# Patient Record
Sex: FEMALE | Race: WHITE | Employment: UNEMPLOYED | URBAN - METROPOLITAN AREA
[De-identification: names, ages, dates, MRNs, and addresses within clinical notes are randomized per-mention and may not be internally consistent; named-entity substitution may affect disease eponyms.]

---

## 2017-03-20 PROBLEM — R60.9 SWELLING: Status: ACTIVE | Noted: 2017-03-20

## 2017-03-20 PROBLEM — F19.10 DRUG ABUSE (HCC): Status: ACTIVE | Noted: 2017-03-20

## 2017-03-20 PROBLEM — B18.2 HEP C W/O COMA, CHRONIC (HCC): Status: ACTIVE | Noted: 2017-03-20

## 2017-03-20 PROBLEM — N17.9 AKI (ACUTE KIDNEY INJURY) (HCC): Status: ACTIVE | Noted: 2017-03-20

## 2017-03-20 PROBLEM — I33.0 ACUTE BACTERIAL ENDOCARDITIS: Status: ACTIVE | Noted: 2017-03-20

## 2017-11-26 PROBLEM — I16.1 HYPERTENSIVE EMERGENCY: Status: ACTIVE | Noted: 2017-11-26

## 2017-11-28 PROBLEM — I16.9 HYPERTENSIVE CRISIS: Status: ACTIVE | Noted: 2017-11-28

## 2017-12-11 PROBLEM — E87.5 HYPERKALEMIA: Status: ACTIVE | Noted: 2017-12-11

## 2018-06-05 LAB
A/G RATIO: 0.7 (ref 1.1–2.2)
ALBUMIN SERPL-MCNC: 3.1 G/DL (ref 3.4–5)
ALP BLD-CCNC: 144 U/L (ref 40–129)
ALT SERPL-CCNC: 12 U/L (ref 10–40)
ANION GAP SERPL CALCULATED.3IONS-SCNC: 17 MMOL/L (ref 3–16)
AST SERPL-CCNC: 17 U/L (ref 15–37)
BASOPHILS ABSOLUTE: 0.1 K/UL (ref 0–0.2)
BASOPHILS RELATIVE PERCENT: 0.4 %
BILIRUB SERPL-MCNC: 0.5 MG/DL (ref 0–1)
BUN BLDV-MCNC: 48 MG/DL (ref 7–20)
CALCIUM SERPL-MCNC: 9.2 MG/DL (ref 8.3–10.6)
CHLORIDE BLD-SCNC: 84 MMOL/L (ref 99–110)
CO2: 23 MMOL/L (ref 21–32)
CREAT SERPL-MCNC: 8.2 MG/DL (ref 0.6–1.1)
EOSINOPHILS ABSOLUTE: 0 K/UL (ref 0–0.6)
EOSINOPHILS RELATIVE PERCENT: 0.1 %
GFR AFRICAN AMERICAN: 7
GFR NON-AFRICAN AMERICAN: 6
GLOBULIN: 4.6 G/DL
GLUCOSE BLD-MCNC: 110 MG/DL (ref 70–99)
HCG QUALITATIVE: NEGATIVE
HCT VFR BLD CALC: 21.6 % (ref 36–48)
HEMOGLOBIN: 7.3 G/DL (ref 12–16)
LACTIC ACID: 0.8 MMOL/L (ref 0.4–2)
LYMPHOCYTES ABSOLUTE: 0.8 K/UL (ref 1–5.1)
LYMPHOCYTES RELATIVE PERCENT: 5.8 %
MCH RBC QN AUTO: 29.3 PG (ref 26–34)
MCHC RBC AUTO-ENTMCNC: 33.9 G/DL (ref 31–36)
MCV RBC AUTO: 86.3 FL (ref 80–100)
MONOCYTES ABSOLUTE: 0.9 K/UL (ref 0–1.3)
MONOCYTES RELATIVE PERCENT: 6.7 %
NEUTROPHILS ABSOLUTE: 12.3 K/UL (ref 1.7–7.7)
NEUTROPHILS RELATIVE PERCENT: 87 %
PDW BLD-RTO: 16.1 % (ref 12.4–15.4)
PLATELET # BLD: 144 K/UL (ref 135–450)
PMV BLD AUTO: 8.1 FL (ref 5–10.5)
POTASSIUM SERPL-SCNC: 4.6 MMOL/L (ref 3.5–5.1)
RBC # BLD: 2.5 M/UL (ref 4–5.2)
SODIUM BLD-SCNC: 124 MMOL/L (ref 136–145)
TOTAL PROTEIN: 7.7 G/DL (ref 6.4–8.2)
WBC # BLD: 14.1 K/UL (ref 4–11)

## 2018-06-05 PROCEDURE — 85610 PROTHROMBIN TIME: CPT

## 2018-06-05 PROCEDURE — 85025 COMPLETE CBC W/AUTO DIFF WBC: CPT

## 2018-06-05 PROCEDURE — 96365 THER/PROPH/DIAG IV INF INIT: CPT

## 2018-06-05 PROCEDURE — 96361 HYDRATE IV INFUSION ADD-ON: CPT

## 2018-06-05 PROCEDURE — 9990 CHARGE CONVERSION

## 2018-06-05 PROCEDURE — 96366 THER/PROPH/DIAG IV INF ADDON: CPT

## 2018-06-05 PROCEDURE — 87801 DETECT AGNT MULT DNA AMPLI: CPT

## 2018-06-05 PROCEDURE — 87040 BLOOD CULTURE FOR BACTERIA: CPT

## 2018-06-05 PROCEDURE — 84703 CHORIONIC GONADOTROPIN ASSAY: CPT

## 2018-06-05 PROCEDURE — 87077 CULTURE AEROBIC IDENTIFY: CPT

## 2018-06-05 PROCEDURE — 93005 ELECTROCARDIOGRAM TRACING: CPT

## 2018-06-05 PROCEDURE — 99285 EMERGENCY DEPT VISIT HI MDM: CPT

## 2018-06-05 PROCEDURE — 83605 ASSAY OF LACTIC ACID: CPT

## 2018-06-05 PROCEDURE — 87186 SC STD MICRODIL/AGAR DIL: CPT

## 2018-06-05 PROCEDURE — 71045 X-RAY EXAM CHEST 1 VIEW: CPT

## 2018-06-05 PROCEDURE — 80053 COMPREHEN METABOLIC PANEL: CPT

## 2018-06-05 RX ORDER — ACETAMINOPHEN 325 MG/1
650 TABLET ORAL EVERY 6 HOURS PRN
COMMUNITY

## 2018-06-05 RX ORDER — ACETAMINOPHEN 325 MG/1
650 TABLET ORAL ONCE
Status: COMPLETED | OUTPATIENT
Start: 2018-06-05 | End: 2018-06-05

## 2018-06-05 RX ORDER — 0.9 % SODIUM CHLORIDE 0.9 %
500 INTRAVENOUS SOLUTION INTRAVENOUS ONCE
Status: COMPLETED | OUTPATIENT
Start: 2018-06-05 | End: 2018-06-06

## 2018-06-05 RX ORDER — SODIUM CHLORIDE 9 MG/ML
1000 INJECTION, SOLUTION INTRAVENOUS CONTINUOUS
Status: DISCONTINUED | OUTPATIENT
Start: 2018-06-05 | End: 2018-06-06

## 2018-06-05 RX ADMIN — Medication 500 ML: at 22:41

## 2018-06-05 RX ADMIN — ACETAMINOPHEN 650 MG: 325 TABLET ORAL at 22:40

## 2018-06-05 ASSESSMENT — PAIN SCALES - GENERAL: PAINLEVEL_OUTOF10: 8

## 2018-06-06 ENCOUNTER — HOSPITAL ENCOUNTER (INPATIENT)
Dept: TELEMETRY | Age: 28
LOS: 42 days | Discharge: NURSING, REHAB OR LONG-TERM CARE | DRG: 004 | End: 2018-07-18
Attending: EMERGENCY MEDICINE | Admitting: INTERNAL MEDICINE
Payer: MEDICARE

## 2018-06-06 DIAGNOSIS — R09.02 HYPOXIA: ICD-10-CM

## 2018-06-06 DIAGNOSIS — N18.6 ESRD (END STAGE RENAL DISEASE) (HCC): ICD-10-CM

## 2018-06-06 DIAGNOSIS — K92.2 UPPER GI BLEED: ICD-10-CM

## 2018-06-06 DIAGNOSIS — Z86.79 H/O ENDOCARDITIS: ICD-10-CM

## 2018-06-06 DIAGNOSIS — F11.20 OPIATE DEPENDENCE, CONTINUOUS (HCC): ICD-10-CM

## 2018-06-06 DIAGNOSIS — J18.9 PNEUMONIA DUE TO ORGANISM: Primary | ICD-10-CM

## 2018-06-06 LAB
ABO/RH: NORMAL
ABO/RH: NORMAL
ANION GAP SERPL CALCULATED.3IONS-SCNC: 17 MMOL/L (ref 3–16)
ANTIBODY SCREEN: NORMAL
APTT: 29.8 SEC (ref 26–36)
BLOOD BANK DISPENSE STATUS: NORMAL
BLOOD BANK PRODUCT CODE: NORMAL
BPU ID: NORMAL
BUN BLDV-MCNC: 15 MG/DL (ref 7–20)
CALCIUM SERPL-MCNC: 9.2 MG/DL (ref 8.3–10.6)
CHLORIDE BLD-SCNC: 99 MMOL/L (ref 99–110)
CO2: 21 MMOL/L (ref 21–32)
CREAT SERPL-MCNC: 3 MG/DL (ref 0.6–1.1)
DESCRIPTION BLOOD BANK: NORMAL
GFR AFRICAN AMERICAN: 23
GFR NON-AFRICAN AMERICAN: 19
GLUCOSE BLD-MCNC: 104 MG/DL (ref 70–99)
HCT VFR BLD CALC: 19.8 % (ref 36–48)
HCT VFR BLD CALC: 28 % (ref 36–48)
HEMOGLOBIN: 6.6 G/DL (ref 12–16)
HEMOGLOBIN: 9.5 G/DL (ref 12–16)
INR BLD: 1.46 (ref 0.86–1.14)
INR BLD: 1.6 (ref 0.86–1.14)
LV EF: 43 %
LVEF MODALITY: NORMAL
MAGNESIUM: 2 MG/DL (ref 1.8–2.4)
POTASSIUM SERPL-SCNC: 3.5 MMOL/L (ref 3.5–5.1)
PROTHROMBIN TIME: 16.7 SEC (ref 9.8–13)
PROTHROMBIN TIME: 18.2 SEC (ref 9.8–13)
REPORT: NORMAL
SODIUM BLD-SCNC: 137 MMOL/L (ref 136–145)
VANCOMYCIN RANDOM: 25.9 UG/ML

## 2018-06-06 PROCEDURE — 86901 BLOOD TYPING SEROLOGIC RH(D): CPT

## 2018-06-06 PROCEDURE — 93306 TTE W/DOPPLER COMPLETE: CPT

## 2018-06-06 PROCEDURE — 9990 CHARGE CONVERSION

## 2018-06-06 PROCEDURE — 5A1D70Z PERFORMANCE OF URINARY FILTRATION, INTERMITTENT, LESS THAN 6 HOURS PER DAY: ICD-10-PCS | Performed by: INTERNAL MEDICINE

## 2018-06-06 PROCEDURE — A9538 TC99M PYROPHOSPHATE: HCPCS

## 2018-06-06 PROCEDURE — C9113 INJ PANTOPRAZOLE SODIUM, VIA: HCPCS

## 2018-06-06 PROCEDURE — 36430 TRANSFUSION BLD/BLD COMPNT: CPT

## 2018-06-06 PROCEDURE — 85014 HEMATOCRIT: CPT

## 2018-06-06 PROCEDURE — 80048 BASIC METABOLIC PNL TOTAL CA: CPT

## 2018-06-06 PROCEDURE — G0480 DRUG TEST DEF 1-7 CLASSES: HCPCS

## 2018-06-06 PROCEDURE — 78278 ACUTE GI BLOOD LOSS IMAGING: CPT

## 2018-06-06 PROCEDURE — 87040 BLOOD CULTURE FOR BACTERIA: CPT

## 2018-06-06 PROCEDURE — 0W3P8ZZ CONTROL BLEEDING IN GASTROINTESTINAL TRACT, VIA NATURAL OR ARTIFICIAL OPENING ENDOSCOPIC: ICD-10-PCS | Performed by: INTERNAL MEDICINE

## 2018-06-06 PROCEDURE — P9047 ALBUMIN (HUMAN), 25%, 50ML: HCPCS

## 2018-06-06 PROCEDURE — 99291 CRITICAL CARE FIRST HOUR: CPT | Performed by: INTERNAL MEDICINE

## 2018-06-06 PROCEDURE — 85610 PROTHROMBIN TIME: CPT

## 2018-06-06 PROCEDURE — 87641 MR-STAPH DNA AMP PROBE: CPT

## 2018-06-06 PROCEDURE — P9016 RBC LEUKOCYTES REDUCED: HCPCS

## 2018-06-06 PROCEDURE — 80307 DRUG TEST PRSMV CHEM ANLYZR: CPT

## 2018-06-06 PROCEDURE — 85018 HEMOGLOBIN: CPT

## 2018-06-06 PROCEDURE — 86920 COMPATIBILITY TEST SPIN: CPT

## 2018-06-06 PROCEDURE — 80202 ASSAY OF VANCOMYCIN: CPT

## 2018-06-06 PROCEDURE — 86880 COOMBS TEST DIRECT: CPT

## 2018-06-06 PROCEDURE — 93010 ELECTROCARDIOGRAM REPORT: CPT | Performed by: INTERNAL MEDICINE

## 2018-06-06 PROCEDURE — 36415 COLL VENOUS BLD VENIPUNCTURE: CPT

## 2018-06-06 PROCEDURE — 83735 ASSAY OF MAGNESIUM: CPT

## 2018-06-06 PROCEDURE — 86900 BLOOD TYPING SEROLOGIC ABO: CPT

## 2018-06-06 PROCEDURE — 85730 THROMBOPLASTIN TIME PARTIAL: CPT

## 2018-06-06 PROCEDURE — 86850 RBC ANTIBODY SCREEN: CPT

## 2018-06-06 PROCEDURE — 86923 COMPATIBILITY TEST ELECTRIC: CPT

## 2018-06-06 RX ORDER — CLONIDINE HYDROCHLORIDE 0.1 MG/1
0.1 TABLET ORAL 3 TIMES DAILY
Status: DISCONTINUED | OUTPATIENT
Start: 2018-06-06 | End: 2018-06-08

## 2018-06-06 RX ORDER — ISOSORBIDE MONONITRATE 60 MG/1
60 TABLET, EXTENDED RELEASE ORAL DAILY
Status: DISCONTINUED | OUTPATIENT
Start: 2018-06-06 | End: 2018-06-08

## 2018-06-06 RX ORDER — HYDROXYZINE PAMOATE 50 MG/1
50 CAPSULE ORAL EVERY 8 HOURS PRN
Status: DISCONTINUED | OUTPATIENT
Start: 2018-06-06 | End: 2018-07-09

## 2018-06-06 RX ORDER — 0.9 % SODIUM CHLORIDE 0.9 %
250 INTRAVENOUS SOLUTION INTRAVENOUS ONCE
Status: COMPLETED | OUTPATIENT
Start: 2018-06-06 | End: 2018-06-09

## 2018-06-06 RX ORDER — LABETALOL 100 MG/1
400 TABLET, FILM COATED ORAL 3 TIMES DAILY
Status: DISCONTINUED | OUTPATIENT
Start: 2018-06-06 | End: 2018-06-08

## 2018-06-06 RX ORDER — PROMETHAZINE HYDROCHLORIDE 25 MG/1
25 TABLET ORAL EVERY 6 HOURS PRN
Status: DISCONTINUED | OUTPATIENT
Start: 2018-06-06 | End: 2018-07-18 | Stop reason: HOSPADM

## 2018-06-06 RX ORDER — 0.9 % SODIUM CHLORIDE 0.9 %
250 INTRAVENOUS SOLUTION INTRAVENOUS ONCE
Status: COMPLETED | OUTPATIENT
Start: 2018-06-06 | End: 2018-06-07

## 2018-06-06 RX ORDER — HEPARIN SODIUM 1000 [USP'U]/ML
4100 INJECTION, SOLUTION INTRAVENOUS; SUBCUTANEOUS PRN
Status: DISCONTINUED | OUTPATIENT
Start: 2018-06-06 | End: 2018-06-17 | Stop reason: ALTCHOICE

## 2018-06-06 RX ORDER — SEVELAMER CARBONATE 800 MG/1
800 TABLET, FILM COATED ORAL
Status: DISCONTINUED | OUTPATIENT
Start: 2018-06-06 | End: 2018-06-13 | Stop reason: ALTCHOICE

## 2018-06-06 RX ORDER — SODIUM CHLORIDE 0.9 % (FLUSH) 0.9 %
10 SYRINGE (ML) INJECTION PRN
Status: DISCONTINUED | OUTPATIENT
Start: 2018-06-06 | End: 2018-06-22 | Stop reason: SDUPTHER

## 2018-06-06 RX ORDER — SODIUM CHLORIDE 9 MG/ML
INJECTION, SOLUTION INTRAVENOUS
Status: COMPLETED
Start: 2018-06-06 | End: 2018-06-07

## 2018-06-06 RX ORDER — AZITHROMYCIN 250 MG/1
250 TABLET, FILM COATED ORAL DAILY
Status: DISCONTINUED | OUTPATIENT
Start: 2018-06-07 | End: 2018-06-06

## 2018-06-06 RX ORDER — AZITHROMYCIN 250 MG/1
500 TABLET, FILM COATED ORAL DAILY
Status: DISCONTINUED | OUTPATIENT
Start: 2018-06-06 | End: 2018-06-06

## 2018-06-06 RX ORDER — SODIUM CHLORIDE 9 MG/ML
INJECTION, SOLUTION INTRAVENOUS
Status: DISCONTINUED
Start: 2018-06-06 | End: 2018-06-06

## 2018-06-06 RX ORDER — ACETAMINOPHEN 325 MG/1
650 TABLET ORAL EVERY 6 HOURS PRN
Status: DISCONTINUED | OUTPATIENT
Start: 2018-06-06 | End: 2018-07-18 | Stop reason: HOSPADM

## 2018-06-06 RX ORDER — SODIUM CHLORIDE 0.9 % (FLUSH) 0.9 %
10 SYRINGE (ML) INJECTION EVERY 12 HOURS SCHEDULED
Status: DISCONTINUED | OUTPATIENT
Start: 2018-06-06 | End: 2018-06-22 | Stop reason: SDUPTHER

## 2018-06-06 RX ORDER — ACETAMINOPHEN 10 MG/ML
1000 INJECTION, SOLUTION INTRAVENOUS ONCE
Status: COMPLETED | OUTPATIENT
Start: 2018-06-06 | End: 2018-06-06

## 2018-06-06 RX ORDER — ONDANSETRON 2 MG/ML
4 INJECTION INTRAMUSCULAR; INTRAVENOUS EVERY 6 HOURS PRN
Status: DISCONTINUED | OUTPATIENT
Start: 2018-06-06 | End: 2018-06-11

## 2018-06-06 RX ORDER — 0.9 % SODIUM CHLORIDE 0.9 %
250 INTRAVENOUS SOLUTION INTRAVENOUS ONCE
Status: COMPLETED | OUTPATIENT
Start: 2018-06-06 | End: 2018-06-06

## 2018-06-06 RX ADMIN — ACETAMINOPHEN 650 MG: 325 TABLET ORAL at 15:35

## 2018-06-06 RX ADMIN — Medication 250 ML: at 11:12

## 2018-06-06 RX ADMIN — LABETALOL 400 MG: 100 TABLET, FILM COATED ORAL at 20:13

## 2018-06-06 RX ADMIN — PROMETHAZINE HYDROCHLORIDE 25 MG: 25 TABLET ORAL at 20:37

## 2018-06-06 RX ADMIN — HYDROXYZINE PAMOATE 50 MG: 50 CAPSULE ORAL at 20:37

## 2018-06-06 RX ADMIN — Medication: at 20:13

## 2018-06-06 RX ADMIN — Medication 10 ML: at 20:13

## 2018-06-06 RX ADMIN — Medication 10 ML: at 10:20

## 2018-06-06 RX ADMIN — ACETAMINOPHEN 1000 MG: 10 INJECTION, SOLUTION INTRAVENOUS at 02:29

## 2018-06-06 RX ADMIN — HEPARIN SODIUM 4100 UNITS: 1000 INJECTION, SOLUTION INTRAVENOUS; SUBCUTANEOUS at 18:22

## 2018-06-06 RX ADMIN — Medication 250 ML: at 02:40

## 2018-06-06 RX ADMIN — SODIUM CHLORIDE 250 ML: 9 INJECTION, SOLUTION INTRAVENOUS at 11:12

## 2018-06-06 RX ADMIN — CLONIDINE HYDROCHLORIDE 0.1 MG: 0.1 TABLET ORAL at 20:13

## 2018-06-06 ASSESSMENT — PAIN DESCRIPTION - LOCATION
LOCATION: GENERALIZED
LOCATION: ABDOMEN

## 2018-06-06 ASSESSMENT — PAIN - FUNCTIONAL ASSESSMENT: PAIN_FUNCTIONAL_ASSESSMENT: 0-10

## 2018-06-06 ASSESSMENT — PAIN DESCRIPTION - DESCRIPTORS: DESCRIPTORS: ACHING;CONSTANT

## 2018-06-06 ASSESSMENT — PAIN DESCRIPTION - PAIN TYPE: TYPE: ACUTE PAIN;CHRONIC PAIN

## 2018-06-06 ASSESSMENT — PAIN SCALES - GENERAL
PAINLEVEL_OUTOF10: 6
PAINLEVEL_OUTOF10: 7
PAINLEVEL_OUTOF10: 7
PAINLEVEL_OUTOF10: 3

## 2018-06-06 NOTE — ED NOTES
Pt. Up to bedside commode independently, unable to urinate. Pt. Had yellow loose stool output which she states has been normal for her. abx started and pt. Is aware of plan of care at this time. piv's to R ac and R fa appear wnl.      Oxana Ortega RN  06/05/18 [id number]

## 2018-06-06 NOTE — PROGRESS NOTES
Lab called with panic result: Vanc 25.9 and H/H 6.6/19.8 , verified with read-back. Will notify MD. Shahana Mckeon for prepare 2 units RBC already in place. Pt in ENDO at this time.

## 2018-06-06 NOTE — PAYOR INFORMATION
Patient Dirk Ng:  [de-identified]  Primary AUTH/CERT:  [id number] [address] [place] [address] Name:   [facility]  Primary Insurance Plan Name:  [address] [place] [address]  Primary Insurance Group Number:  [id number]  Primary Insurance Plan Type: N  Primary Insurance Policy Number:  [id number]

## 2018-06-06 NOTE — PLAN OF CARE
ENDOSCOPY  PRE, INTRA, & POST PROCEDURAL  CARE PLAN    HEMODYNAMIC STATUS  INTERDISCIPLINARY   Goal: Hemodynamic Status to Baseline / Discharge Criteria Met  Interventions     1. Obtain Patient  Medical /  Surgical History     1 Assess & Review Allergies Prior to Endo & All  Meds (PRN)     2. Assess / Monitor Vital Signs / LOC (PRN). Intra Procedure VS/ LOC  Q5 Mins  & As Per Policy Until Discharge. 3. Obtain Baseline Reyna & Post Procedural  Reyna Per   Policy     4. Check Monitors, Alarms On     5. Patient Re Assessed by Physician     6. Monitor  I&O Post Procedure   NURSING   SAFETY & PSYCHO SOCIAL  INTERDISCIPLINARY   Goal: Patient Returns to Baseline Activity/ Meets Discharge Criteria  Interventions     1. Greet Patient with ID Badge/ Picture in View (PRN)     2. Be Available & Sensitive to Patients Needs (PRN)     3. Communicate referral to Pastoral Care as Appropriate (PRN)     4. Provide Age Specific Measures (PRN)     4. Admission Data Base Reviewed     5. Administer Meds Per Orders (PRN)     5. Maintain Baseline Activity  Or Activity as Ordered Per Physician     NUTRITION   INTERDISCIPLINARY   Goal: Patient to baseline/ Improved Nutrition  Interventions     1. Assess NPO Status / Notify Physician if Necessary (PRN)     2. NPO per Physician Orders     3. Assess Nutritional Status (PRN)     4. Assess Ability to Swallow as Indicated     LAB & DIAGNOSTICS   Goal: Additional Tests per Physicians   Orders  Interventions     1. Lab & Diagnostics per Physician Orders (PRN)     2.  Obtain  Urine / Serum HCG & FSBS On All Diabetic Patients  Per Policy & (PRN)     3. Assess Lab Time Out  for  Patient Safety as Needed (PRN)   RESPIRATORY  INTERDISCIPLINARY   Goal: Airway   Patency, SAO2   Saturation, Cough mechanism Maintained   Interventions     1. Evaluate Bilateral Breath Sounds  Baseline, (PRN), & Prior to Discharge     2. Weight & Height Noted ( PRN)     3.   Assess Baseline SPo2 (PRN) 4. Monitor   SAO2   Continuously During Sedation/ Analgesia & Until Patient Meets D/C Criteria. 5. Resuscitation Equipment Available (PRN)   GASTROINTESTINAL  INTERDISCIPLINARY   Goal: Bowel Sounds Maintained   Interventions     1. Evaluate Baseline Bowel Sounds, (PRN), & Prior to Discharge     2. Monitor  Abdominal status of Patient Throughout Procedure     KNOWLEDGE DEFICIT, EDUCATION, DISCHARGE PLAN   INTERDISCIPLINARY    Patient / SO verbalize Understanding Of Procedural discharge Instructions  Interventions     1. Obtain Informed Consent (PRN)      2. Initiate ENDO Plan of Care, Answer Questions (PRN)       3. Assess Patients Ability to Understand Information (PRN)     3. Discharge Planning ; Assure  Presence of   upon Patient Discharge     4. Education / Communication  Ongoing As Appropriate     5. Keep Families Aware of Delays As Appropriate     6. Reinforce Discharge Teaching / Post  Procedure  Instructions (PRN)    PAIN MANAGEMENT  INTERDISCIPLINARY   Goal:Patient Return to Pre Procedure Comfort  Interventions     1. Assess Baseline  Pain Level (PRN)     2. Intra Procedure ;  Evaluation & Assessment Of  Pain  is Ongoing     3. Post procedure; Assess Pain Level Once Awake/ Prior  To Discharge     2. Administer Analgesics as Ordered (PRN)      3. Assess Effectiveness of Pain Management (PRN)       Re-Assess Patient  after all Interventions. Assess Pain Level 30 - 60 Minutes After Pain Management Intervention.      4. Provide Discharge Teaching

## 2018-06-06 NOTE — PROCEDURES
Germaine Cox   6/6/2018  Esophagogastroduodenoscopy/gold probe coagulation  A pre-procedure re-evaluation was performed immediately prior to the procedure. Preprocedure Dx: gi bleed  Postprocedure Dx: angiodysplasia of stomach cauterised with gold probe  Medications: Procedural sedation with MAC  Complications: None  Estimated Blood Loss: <5cc  Specimens: were not obtained/RBC scan today due to massive rectal bleed after EGD may have lower GI angiodysplasia also ? can we order CT with contrast to localise bleed  Yobani Fuse

## 2018-06-06 NOTE — PLAN OF CARE
Problem: Falls - Risk of:  Goal: Will remain free from falls  Will remain free from falls   Outcome: Ongoing      Problem: Risk for Impaired Skin Integrity  Goal: Tissue integrity - skin and mucous membranes  Structural intactness and normal physiological function of skin and  mucous membranes.    Outcome: Ongoing

## 2018-06-06 NOTE — ED PROVIDER NOTES
[facility]  EMERGENCY DEPT VISIT      Patient Identification  Curt Ni is a 34 y.o. female. Chief Complaint   Fever (pt. states she has had fever x4 days tmax 104) and Shortness of Breath (pt. reporting \"pain in my legs\" left arm and shortness of breath with cough x4 days.)      History of Present Illness: This is a  34 y.o. female who presents ambulatory  to the ED with complaints of 4 day history of fever up to 103. This is been associated with generalized body aches, cough, and loose stools. She states that her chest hurts when she breathes or coughs. She denies any nausea or vomiting. She's had some slight abdominal pain. Patient has a history of previous endocarditis with subsequent severe tricuspid valve leakage. She is also on hemodialysis. She thinks they may have done blood cultures on her yesterday at dialysis however she is not currently on antibiotics. She is started to become increasingly short of breath. Past Medical History:   Diagnosis Date    Asthma     Chronic kidney disease     Depression     Hemodialysis patient ([facility] [address]     M/W/F    Hepatitis C antibody positive in blood 03/22/2017    History of blood transfusion     Hypertension     Liver disease     MRSA (methicillin resistant staph aureus) culture positive 03/12/2017    +blood culture - info [place] [facility]  [facility]    MRSA (methicillin resistant staph aureus) culture positive 07/31/2017    info from [facility] - positive nasal screen    PTSD (post-traumatic stress disorder)     Seizures ([facility] [address]     8/8/2017       Past Surgical History:   Procedure Laterality Date    TONSILLECTOMY           Current Facility-Administered Medications:     0.9 % sodium chloride infusion, 1,000 mL, Intravenous, Continuous, Kurt Funes MD, Stopped at 06/05/18 0790    meropenem (MERREM) 500 mg IVPB minibag, 500 mg, Intravenous, Once, Kurt Funes MD    Current Outpatient Prescriptions:     acetaminophen (TYLENOL) 325 MG speech difficulty  SKIN: no rashes, no erythema, no wounds, no ecchymosis      PHYSICAL EXAM:  GENERAL APPEARANCE: Ezequiel Lerner is in mildrespiratory distress. Awake and alert. VITAL SIGNS:   ED Triage Vitals   Enc Vitals Group      BP       Pulse       Resp       Temp       Temp src       SpO2       Weight       Height       Head Circumference       Peak Flow       Pain Score       Pain Loc       Pain Edu? Excl. in [address]? HEAD: Normocephalic, atraumatic. EYES:  Extraocular muscles are intact. Pupils equal round and reactive to light. Conjunctivas are pink. Negative scleral icterus. ENT:  Mucous membranes are moist.  Pharynx without erythema or exudates. NECK: Nontender and supple. No cervical adenopathy. CHEST:  Rales LLL. No wheezing. Right IJ vascath  HEART:  tachycardic rate and regular rhythm. Murmur present. Strong and equal pulses in the upper and lower extremities. ABDOMEN: Soft,  nondistended, positive bowel sounds. abdomen is tender in epigastrium. No rebound. no guarding. MUSCULOSKELETAL: The calves are nontender to palpation. Active range of motion of the upper and lower extremities. No edema. NEUROLOGICAL: Awake, alert and oriented x 3. Power intact in the upper and lower extremities. Sensation is intact to light touch in the upper and lower extremities. Cranial Nerves 2-12 are intact. DERMATOLOGIC: No petechiae, rashes, or ecchymoses. No erythema. PSYCH: normal mood and affect. Normal thought content. ED COURSE AND MEDICAL DECISION MAKING:    EKG as interpreted by myself:  sinus tachycardia, ugtp=[id number]   Axis is   Normal  QTc is  normal  Intervals and Durations are unremarkable. No specific ST-T wave changes appreciated. No evidence of acute ischemia. Compared to prior EKG dated 12/11/17, rate increassed today    Radiology:  Films have been read by radiologist as noted in chart unless otherwise stated.  Other radiologic studies (i.e. CT, MRI, ultrasounds, etc ) have been interpreted by radiologist.     XR CHEST PORTABLE   Final Result   Patchy airspace disease in the right lung, pneumonia versus atelectasis   versus less likely edema. That should be followed to resolution. Borderline pulmonary vascular congestion.              Labs:  Results for orders placed or performed during the hospital encounter of 06/05/18   CBC Auto Differential   Result Value Ref Range    WBC 14.1 (H) 4.0 - 11.0 K/uL    RBC 2.50 (L) 4.00 - 5.20 M/uL    Hemoglobin 7.3 (L) 12.0 - 16.0 g/dL    Hematocrit 21.6 (L) 36.0 - 48.0 %    MCV 86.3 80.0 - 100.0 fL    MCH 29.3 26.0 - 34.0 pg    MCHC 33.9 31.0 - 36.0 g/dL    RDW 16.1 (H) 12.4 - 15.4 %    Platelets 290 974 - 014 K/uL    MPV 8.1 5.0 - 10.5 fL    Neutrophils % 87.0 %    Lymphocytes % 5.8 %    Monocytes % 6.7 %    Eosinophils % 0.1 %    Basophils % 0.4 %    Neutrophils # 12.3 (H) 1.7 - 7.7 K/uL    Lymphocytes # 0.8 (L) 1.0 - 5.1 K/uL    Monocytes # 0.9 0.0 - 1.3 K/uL    Eosinophils # 0.0 0.0 - 0.6 K/uL    Basophils # 0.1 0.0 - 0.2 K/uL   Comprehensive Metabolic Panel   Result Value Ref Range    Sodium 124 (L) 136 - 145 mmol/L    Potassium 4.6 3.5 - 5.1 mmol/L    Chloride 84 (L) 99 - 110 mmol/L    CO2 23 21 - 32 mmol/L    Anion Gap 17 (H) 3 - 16    Glucose 110 (H) 70 - 99 mg/dL    BUN 48 (H) 7 - 20 mg/dL    CREATININE 8.2 (HH) 0.6 - 1.1 mg/dL    GFR Non- 6 (A) >60    GFR  7 (A) >60    Calcium 9.2 8.3 - 10.6 mg/dL    Total Protein 7.7 6.4 - 8.2 g/dL    Alb 3.1 (L) 3.4 - 5.0 g/dL    Albumin/Globulin Ratio 0.7 (L) 1.1 - 2.2    Total Bilirubin 0.5 0.0 - 1.0 mg/dL    Alkaline Phosphatase 144 (H) 40 - 129 U/L    ALT 12 10 - 40 U/L    AST 17 15 - 37 U/L    Globulin 4.6 g/dL   Lactic Acid, Plasma   Result Value Ref Range    Lactic Acid 0.8 0.4 - 2.0 mmol/L   HCG Qualitative, Serum   Result Value Ref Range    hCG Qual Negative Detects HCG level >10 MIU/mL   EKG 12 Lead   Result Value Ref Range    Ventricular Rate 107 BPM and ESRD with h/o noncompliance with HD at times and risk for volume overload. Clinical Impression:  1. Pneumonia due to organism    2. Upper GI bleed    3. Hypoxia    4. ESRD (end stage renal disease) ([address]    5. H/O endocarditis        Dispo:  Patient will be admitted at this time. Patient was informed of this decision and agrees with plan. I have discussed lab and xray findings with patient and they understand. Questions were answered to the best of my ability. Discharge vitals:  Blood pressure (!) 163/86, pulse 100, temperature 102.9 °F (39.4 °C), temperature source Oral, resp. rate 13, height 5' 2\" (1.575 m), weight 130 lb 1.1 oz (59 kg), SpO2 98 %, not currently breastfeeding. Prescriptions given:   New Prescriptions    No medications on file       Critical care time: 45 minutes excluding procedures. There was concern for hypoxia, upper GI bleed, potential for sepsis/hemorrhage. This was managed by IVF resuscitation, supplemental O2, consultation with specialists    This chart was created using Dragon voice recognition software.        Holden Calderón MD  06/06/18 [id number]

## 2018-06-06 NOTE — PROGRESS NOTES
Vancomycin Day: 2  Vanc random 25.9mcg/ml, no vanc today.  Continue to follow vanc sliding scale at end of HD  Yoon RAMOS 6/6/201811:06 AM  .

## 2018-06-06 NOTE — ED NOTES
This RN notified MD that pt. Now with blood in stool that is bright red with clots. Pt. Remains normotensive on hr/rr monitoring. bp changed to q10 cycle. pivs to R ac and R fa appear wnl.   md and clinical  stated ok for pt. To be transferred to icu at this time.       Angelina Lawrence RN  06/06/18 0100

## 2018-06-06 NOTE — CONSULTS
[facility]  Consult Note           Reason for Consult:  End stage renal disease  Requesting Physician:  Dr. Merlin Myron    Chief Complaint:  Weakness, fatigue, sob, abdominal pain, chest pain, fevers  History Obtained From:  patient, electronic medical record    History of Present Ilness:    34year old female with ESRD on HD MWF, polysubstance drug use, bacterial endocarditis,   chronic hepatitis C, hypertension admitted with weakness, fatigue, sob, abdominal pain, chest pain, fevers. We have been asked to assist in further mgmt of her dialysis care. Last had full HD treatment last Wednesday. Noted to have fevers as high as 102-104 F at home. Started having hematemesis, bright red blood in stool with clots here. Underwent EGD revealing angiodysplasia with cauterization. Still with maroon colored stools. Receiving prbc's. Continues to c/o sob, abdominal pain. Past Medical History:        Diagnosis Date    Asthma     Depression     ESRD (end stage renal disease) on dialysis ([facility] [address]     M/W/F    Hepatitis C antibody positive in blood 03/22/2017    History of blood transfusion     Hypertension     Liver disease     MRSA (methicillin resistant staph aureus) culture positive 03/12/2017    +[facility]    MRSA (methicillin resistant staph aureus) culture positive 07/31/2017    info from [facility] - positive nasal screen    PTSD (post-traumatic stress disorder)     Seizures ([facility] [address]     8/8/2017       Past Surgical History:        Procedure Laterality Date    TONSILLECTOMY         Home Medications:    No current facility-administered medications on file prior to encounter.       Current Outpatient Prescriptions on File Prior to Encounter   Medication Sig Dispense Refill    cloNIDine (CATAPRES) 0.1 MG tablet Take 0.1 mg by mouth three times daily      azithromycin (ZITHROMAX) 250 MG tablet Take 250 mg by mouth daily      folic acid (FOLVITE) 1 MG tablet Take 1 mg by mouth daily      sevelamer (RENVELA) 800 MG tablet Take 1 tablet by mouth 3 times daily (with meals)      labetalol (NORMODYNE) 100 MG tablet Take 400 mg by mouth 3 times daily       isosorbide mononitrate (IMDUR) 60 MG extended release tablet Take 60 mg by mouth daily         Allergies:  Patient has no known allergies. Social History:    Social History     Social History    Marital status: Single     Spouse name: N/A    Number of children: N/A    Years of education: N/A     Occupational History    NA      Social History Main Topics    Smoking status: Current Every Day Smoker     Packs/day: 0.25     Years: 4.00     Types: Cigarettes    Smokeless tobacco: Never Used      Comment: pt states that she only smokes 2 cigarettes per day    Alcohol use No    Drug use: Yes     Types: Cocaine, IV, Opiates       Comment: states she smoked marijuana this morning.  Sexual activity: Not on file     Other Topics Concern    Not on file     Social History Narrative    No narrative on file       Family History:   No history of kidney disease. Review of Systems:   Pertinent positives stated above in HPI. Remainder of 10 point review of systems were reviewed and were negative.     Physical exam:   Constitutional:  VITALS:  BP (!) 147/92   Pulse 99   Temp 100.8 °F (38.2 °C) Comment: axillary  Resp 27   Ht 5' 2\" (1.575 m)   Wt 130 lb 4.7 oz (59.1 kg)   SpO2 99%   BMI 23.83 kg/m²   Gen: alert, awake, ill-appearing  Skin: no rash, turgor wnl  Heent:  eomi, mmm  Neck: no bruits or jvd noted, thyroid normal  Cardiovascular:  S1, S2 without m/r/g  Respiratory: CTA B without w/r/r; respiratory effort normal  Abdomen:  +bs, soft, tender with palpation, nd, no hepatosplenomegaly  Ext: no lower extremity edema  Access: R IJ TDC  Psychiatric: mood and affect appropriate; judgement and insight intact  Musculoskeletal:  Rom, muscular strength limited; digits, nails normal    Data/  CBC:   Lab Results Component Value Date    WBC 14.1 06/05/2018    RBC 2.50 06/05/2018    HGB 6.6 06/06/2018    HCT 19.8 06/06/2018    MCV 86.3 06/05/2018    MCH 29.3 06/05/2018    MCHC 33.9 06/05/2018    RDW 16.1 06/05/2018     06/05/2018    MPV 8.1 06/05/2018     BMP:    Lab Results   Component Value Date     06/05/2018    K 4.6 06/05/2018    CL 84 06/05/2018    CO2 23 06/05/2018    BUN 48 06/05/2018    LABALBU 3.1 06/05/2018    CREATININE 8.2 06/05/2018    CALCIUM 9.2 06/05/2018    GFRAA 7 06/05/2018    LABGLOM 6 06/05/2018    GLUCOSE 110 06/05/2018         Assessment/    - End stage renal disease - on HD MWF  - Acute GI bleed - plans per GI noted  - Fevers, hx of MSSA bacteremia/endocarditis with severe TR - started on vancomycin, meropenem, azithromycin  - Hyponatremia - acute on chronic secondary to fluid overload, missed dialysis  - Hypertension - controlled    Plan/    - HD today per outpatient orders with no heparin  - Okay for CT with IV contrast, plan to complete HD treatment afterwards  - Dose KI with HD today  - Check blood culture from [facility] site  - 2D echocardiogram  - Dose vancomycin with HD per levels  - Trend labs      Thank you for the consultation. Please do not hesitate to call with questions.     [employer]

## 2018-06-06 NOTE — CONSULTS
History and Physical / Pre-Sedation Assessment    Patient:  Mychal Powers   :   1990     Intended Procedure:  egd    HPI: hemetemesis    Nurses notes reviewed and agreed. Medications reviewed  Allergies: No Known Allergies    Physical Exam:  Vital Signs: BP (!) 140/85   Pulse 111   Temp 100.5 °F (38.1 °C) (Temporal)   Resp 21   Ht 5' 2\" (1.575 m)   Wt 130 lb 4.7 oz (59.1 kg)   SpO2 (!) 89%   BMI 23.83 kg/m²    Airway: Mallampati: II (soft palate, uvula, fauces visible)  Pulmonary:Normal  Cardiac:Normal  Abdomen:Normal    Pre-Procedure Assessment / Plan:  ASA: Class 3 - A patient with severe systemic disease that limits activity but is not incapacitating  Level of Sedation Plan:mod sedation  Post Procedure plan: Return to same level of care    I assessed the patient and find that the patient is in satisfactory condition to proceed with the planned procedure and sedation plan. I have explained the risk, benefits, and alternatives to the procedure; the patient understands and agrees to proceed.        John  2018

## 2018-06-06 NOTE — H&P
History and Physical / Pre-Sedation Assessment    Patient:  Farhat Galloway   :   1990     Intended Procedure:  EGD    HPI: melena/hemetemesis    Nurses notes reviewed and agreed. Medications reviewed  Allergies: No Known Allergies    Physical Exam:  Vital Signs: BP (!) 140/85   Pulse 111   Temp 100.5 °F (38.1 °C) (Temporal)   Resp 21   Ht 5' 2\" (1.575 m)   Wt 130 lb 4.7 oz (59.1 kg)   SpO2 (!) 89%   BMI 23.83 kg/m²    Airway:Normal  Pulmonary:Normal  Cardiac:Normal  Abdomen:Normal    Pre-Procedure Assessment / Plan:  ASA:CLASS 2  Level of Sedation Plan:MAC  Post Procedure plan: Return to same level of care    I assessed the patient and find that the patient is in satisfactory condition to proceed with the planned procedure and sedation plan. I have explained the risk, benefits, and alternatives to the procedure; the patient understands and agrees to proceed.        John  2018

## 2018-06-06 NOTE — H&P
[facility], [address]                               HISTORY AND PHYSICAL    PATIENT NAME: Natalie Wen                    :        1990  MED REC NO:   [id number]                          ROOM:       3008  ACCOUNT NO:   [de-identified]                          ADMIT DATE: 2018  PROVIDER:     Cipriano Gruber MD    I obtained the history and performed a physical exam on the patient in the  intensive care unit on 2018. CHIEF COMPLAINT:  Pain all over and throwing out blood. HISTORY OF PRESENT ILLNESS:  The patient is a 32-year-old  female  who has got a history of multiple medical problems, has got end-stage renal  disease and is on hemodialysis, presents to the hospital with chief  complaint of two to three days of what she describes as generalized  weakness with increasing fatigue, bilateral leg pain, abdominal pain, sharp  chest pain, and increasing shortness of breath with fevers, with chills,  that she noted were as high as 102 to 104 at home. The patient does not  have any other constitutional symptoms. No hemoptysis and hematemesis. No  melena or hematochezia. While in the emergency room, the patient had suddenly started having two  episodes of massive hematemesis with passage of blood clots and also bright  red blood and also one episode of passage of large amount of bright red  blood in her stool, after which she has been feeling very weak and  diaphoretic. PAST MEDICAL HISTORY/PAST SURGICAL HISTORY:  1. 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 2.  Chronic hepatitis C.  3.  Hypertension. PAST SURGICAL HISTORY:  Right internal jugular vein hemodialysis access  placement. ALLERGIC HISTORY:  No known allergies.     FAMILY HISTORY:  Reviewed by me and is currently noncontributory. SOCIAL HISTORY:  The patient lives at home and is accompanied by her  mother. The patient states that she has been clean for over six months. MEDICATIONS:  Home medication list reviewed. REVIEW OF SYSTEMS:  Significant for the generalized pain, abdominal pain,  and per the history of present illness. All other systems have been  reviewed and are negative except for the history of present illness. PHYSICAL EXAMINATION:  The patient was examined by me in the intensive care  unit. VITAL SIGNS:  Temperature T-max 102.9, respirations 20, pulse 110, blood  pressure 137/81, saturating 94%. CNS:  Alert, awake and oriented to time, place, and person. PSYCH:  The patient is cooperative, answering questions appropriately. EYES:  Pupils are bilaterally equal, reactive to light. ENT:  Extraocular muscle movements are intact. The patient has got  significant conjunctival pallor. RESPIRATORY SYSTEM:  The patient has got no rales, rubs, or rhonchi. CVS:  S1, S2 are heard. The patient does have holosystolic murmur along  the right parasternal border. ABDOMEN:  Soft. Significant epigastric tenderness and right upper quadrant  tenderness to palpation noted. MUSCULOSKELETAL:  No acute deformities. SKIN:  Appears pale. The patient has got right internal jugular vein  PermCath which is clean with a good clean dressing on it and there is no  evidence of any tract tenderness palpable along the entire subcutaneous  tract. There is no evidence of any pus at the exit of the catheter through  the skin. DIAGNOSTIC DATA:  CBC shows hemoglobin 7.3, hematocrit 29.6, white count of  14.1, platelets 521. Comprehensive metabolic panel showed BUN 48,  creatinine 8.2, sodium 124, potassium 4.6, lactic acid is 0.8.  _____ is  negative.     Portable chest x-ray shows evidence of some infiltrate in the right lower  lobe, more towards the hilum, that has been interpreted by the radiologist  as patchy airspace disease in the right lung. EKG independently reviewed by me showed sinus tachycardia with a rate of  around 107 beats per minute. OLD RECORDS SUMMARY:  Shows:  1. A 2D echo from 2017 that shows severe eccentric tricuspid  regurgitation. 2.  Hemoglobin of 9.4, which clearly indicates a significant drop of almost  over 2 gm today, which is consistent with the large volume upper GI bleed. CONSULTATIONS:  We have requested a consultation to Nephrology and to  Gastroenterology. ASSESSMENT:  1. Acute-on-chronic posthemorrhagic anemia requiring transfusion,  secondary to acute large volume upper gastrointestinal bleed from a  bleeding peptic ulcer. 2.  Right lower lobe healthcare-associated bacterial pneumonia associated  with hemodialysis unit along with sepsis. 3.  Severe tricuspid regurgitation secondary to valvular damage from  infected endocarditis. 4.  End-stage renal dialysis on hemodialysis. 5.  Hypertension. PLAN OF CARE:  The patient is currently critically ill given the large  volume upper GI bleed and over 2 gm drop in hemoglobin at presentation  before this event of witnessed GI bleeding. Hence, she has been admitted  to the intensive care unit by me. We will continue the patient on Protonix  drip initiated in the emergency room. We will keep the patient NPO and we  will obtain H&H every six hours. GI consult has been requested. Likely  source of the GI bleed based on the clinical symptoms, especially the  epigastric tenderness, is bleeding peptic ulcer. We will consult Nephrology for dialysis requirements. We will continue the patient on broad-spectrum antibiotics especially  vancomycin and meropenem to cover for any recurrence of MRSA or any  Gram-negative infection, especially in a patient who is on end-stage renal  disease on dialysis and also has a history of intravenous drug use.   We  will repeat a 2D echo of the heart to look at the tricuspid valve to

## 2018-06-06 NOTE — PROGRESS NOTES
Spoke with Dr. Pinedo La, pt still with large volume post EGD - discussed need for Red cell scan.  See notes and eMAR for other orders/plan

## 2018-06-06 NOTE — PROGRESS NOTES
Nephrology consult called into Dr. Morris Muniz on 6/6/18 at [address] [place] [address].      Nayely Grier MT/PCA

## 2018-06-06 NOTE — FLOWSHEET NOTE
Treatment time: 1hr out of 4hrs. Tx interrupted for another urgent procedure. Net UF: 1.8 lliters    Pre weight: 58.3kg    Post weight: 57.1kg bedscale  EDW: 49kg    Access used: Rt IJ TDC  Access function: Good    Medications or blood products given: None. Aranesp 100mcg to be given later today w/HD. Regular outpatient schedule: MWF    Summary of response to treatment:      06/06/18 1141   Vital Signs   BP (!) 172/86   Temp 98.3 °F (36.8 °C)   Pulse 107   Weight 125 lb 14.1 oz (57.1 kg)   Weight Method Bed scale   Percent Weight Change -3.38   Dry Weight 108 lb 0.4 oz (49 kg)   Pain Assessment   Pain Assessment 0-10   Pain Level 7   Pain Location Abdomen   Post-Hemodialysis Assessment   Post-Treatment Procedures Blood returned;Catheter capped, clamped and heparinized x 2 ports   Machine Disinfection Process Exterior Machine Disinfection   Rinseback Volume (ml) 300 ml   Total Liters Processed (l/min) 22.4 l/min   Dialyzer Clearance Lightly streaked   Duration of Treatment (minutes) 60 minutes   Heparin amount administered during treatment (units) 0 units   Hemodialysis Intake (ml) 300 ml   Hemodialysis Output (ml) 2100 ml   NET Removed (ml) 1800 ml   Tolerated Treatment Fair   Patient Response to Treatment Hr elevated to 120 bpm toward end of 1hr tx. Bp stable. Pt also had prbc transfusing during tx per staff via IV pump. Pt had shivers. Oral temp 98.5 & 98.3F . Pt also had liquid bloody stool. x 1 during tx. Assisted w/bm care. Physician Notified? Yes   Copy of dialysis treatment record placed in chart, to be scanned into EMR.

## 2018-06-06 NOTE — PROGRESS NOTES
Spoke with Savana Montero in [address] - informed of CTA order and that pt will be going to Tag scan, will call to coordinate completing CT afterwards.

## 2018-06-06 NOTE — ANESTHESIA PRE-OP
(H) 06/05/2018    HGB 6.6 (LL) 06/06/2018    HGB 7.3 (L) 06/05/2018    HCT 19.8 (LL) 06/06/2018    HCT 21.6 (L) 06/05/2018     06/05/2018     Coagulation Studies   INR 1.60 (H) 06/06/2018    INR 1.46 (H) 06/05/2018     Blood Type: O POS  Pregnancy Test:     PREGTESTUR Negative 03/20/2017     EKG: ; no acute ischemic changes. ECHO:  Ejection fraction is visually estimated to be 55-60 %. Severe eccentric tricuspid regurgitation. No obvious masses, thrombi, or vegetations are noted. Medications:   acetaminophen (TYLENOL) 325 MG tablet Take 650 mg by mouth every 6 hours as needed for Pain      cloNIDine (CATAPRES) 0.1 MG tablet Take 0.1 mg by mouth three times daily      azithromycin (ZITHROMAX) 250 MG tablet Take 250 mg by mouth daily      folic acid (FOLVITE) 1 MG tablet Take 1 mg by mouth daily      sevelamer (RENVELA) 800 MG tablet Take 1 tablet by mouth 3 times daily (with meals)      labetalol (NORMODYNE) 100 MG tablet Take 400 mg by mouth 3 times daily       isosorbide mononitrate (IMDUR) 60 MG extended release tablet Take 60 mg by mouth daily          cloNIDine  0.1 mg Oral TID    isosorbide mononitrate  60 mg Oral Daily    labetalol  400 mg Oral TID    sevelamer  800 mg Oral TID WC    sodium chloride flush  10 mL Intravenous 2 times per day    azithromycin  500 mg Oral Daily    Followed by   Trudy Lisa ON 6/7/2018] azithromycin  250 mg Oral Daily    meropenem  500 mg Intravenous Q24H    vancomycin (VANCOCIN) intermittent dosing (placeholder)   Other RX Placeholder        Exercise Tolerance  Patient is able to walk up a flight of stairs or 1-2 blocks without chest pain or shortness of breath?  Yes    Anesthetic History  Personal History of Problems: No  Family History of Problems: No    Physical Examination    Airway    Mallampati: II    Dentition: intact    Mouth Opening:   >3 FB    Prominent Incisors: no    Thyromental Distance:   >3 cm    Head & Neck Range of Motion: full Anesthetic Plan     ASA Status: 4E Anesthesia Type: TIVA-GA if needed   Difficult Airway: No PONV History: No   Full Stomach:   No Nerve Block: none   Code Status: Full Code Advanced Directives: Not Received   Emergent: YES Notes: **PATIENT HAS RECEIVED SEDATION- GIVEN THE EMERGENT NATURE, IMPLIED CONSENT          The anesthetic plan was discussed with the patient and/or patient representative. All questions were answered. Risks and possible complications were explained. Complications include, but are not limited to, sore throat, dental injury or damage, trauma to soft tissue, eye injury, corneal abrasion, adverse reaction to blood products and/or medications, bleeding, nerve injury, cardiac or respiratory arrest, and death. The patient and patients family understands these complications and are agreeable to the anesthetic plan. Nursing staff present in room during this discussion and consent. Pt seen and examined, chart reviewed (including anesthesia, drug and allergy history). No interval changes to history and physical examination. Anesthetic plan, risks, benefits, alternatives, and personnel involved discussed with patient. Patient verbalized an understanding and agrees to proceed. Informed consent obtained verbally from the patient. Vargas Bowles MD

## 2018-06-06 NOTE — CONSULTS
WBC  14.1*   --    HGB  7.3*  6.6*   HCT  21.6*  19.8*   MCV  86.3   --    PLT  144   --      BMP:   Recent Labs      06/05/18   2224   NA  124*   K  4.6   CL  84*   CO2  23   BUN  48*   CREATININE  8.2*        Recent Labs      06/05/18   2224   AST  17   ALT  12   BILITOT  0.5   ALKPHOS  144*     Recent Labs      06/05/18   2245  06/06/18   0651   PROTIME  16.7*  18.2*   INR  1.46*  1.60*     No results for input(s): NITRITE, COLORU, PHUR, LABCAST, WBCUA, RBCUA, MUCUS, TRICHOMONAS, YEAST, BACTERIA, CLARITYU, SPECGRAV, LEUKOCYTESUR, UROBILINOGEN, BILIRUBINUR, BLOODU, GLUCOSEU, AMORPHOUS in the last 72 hours. Invalid input(s): KETONESU  No results for input(s): PHART, BMO2VVX, PO2ART in the last 72 hours. Chest imaging was reviewed by me and showed   Patchy airspace disease in the right lung, pneumonia versus atelectasis   versus less likely edema.  That should be followed to resolution. ASSESSMENT:  · Suspected upper and lower GI bleed  · Anemia from acute blood loss  · Sepsis from pneumonia  · Healthcare associated pneumonia - probable gram negative, risk for methicillin resistant Staph aureus as well  · ESRD disease on dialysis  · HCV  · History of infective endocarditis with severe tricuspid valve regurgitation    PLAN:  · Transfuse 2u PRBC  · Vit K IV  · H/H q6  · Continue Vanc, Merrem   · Pan-culture  · Sputum cx  · Blood cx  · PPI gtt  · TTE  · CTA ABD and tagged RBC scan per GI - will need to coordinate with HD  · Colonoscopy planned  · SCD  · Due to at least single organ failure and risk of rapid deterioration, I spent 45 minutes of Critical care time reviewing labs/films, examining patient, collaborating with other physicians. This does not include time performing critical procedures.     Thank you Stacey Reveles MD for this consult

## 2018-06-06 NOTE — CONSULTS
Pharmacy Note  Vancomycin Consult    Jennifer Chew is a 34 y.o. female dialysis patient started on Vancomycin for gram positive coverage; consult received from Dr. Sonia Andrews to manage therapy. Also receiving the following antibiotics: Merrem. Patient Active Problem List   Diagnosis    Bacterial endocarditis    J[facility]) [facility]    Drug abuse    Hep C w/o coma, chronic (HCC)    Swelling    Endocarditis and heart valve disorders in diseases classified elsewhere    IVDU (intravenous drug user)    MDD (major depressive disorder), recurrent severe, without psychosis ([facility]    Opiate dependence, continuous ([facility]    Opiate withdrawal ([facility]    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 Temp max: 102.9    Recent Labs      06/05/18   2224   BUN  48*       Recent Labs      06/05/18   2224   CREATININE  8.2*       Recent Labs      06/05/18   2224   WBC  14.1*         Intake/Output Summary (Last 24 hours) at 06/06/18 0446  Last data filed at 06/06/18 0350   Gross per 24 hour   Intake 604 ml   Output 0 ml   Net 604 ml       Culture Date      Source                       Results      Ht Readings from Last 1 Encounters:   06/06/18 5' 2\" (1.575 m)        Wt Readings from Last 1 Encounters:   06/06/18 130 lb 4.7 oz (59.1 kg)         Body mass index is 23.83 kg/m². Estimated Creatinine Clearance: 8 mL/min (A) (based on SCr of 8.2 mg/dL [facility])).     Goal

## 2018-06-07 PROBLEM — E44.0 MODERATE PROTEIN-CALORIE MALNUTRITION (HCC): Chronic | Status: ACTIVE | Noted: 2018-06-07

## 2018-06-07 LAB
ANION GAP SERPL CALCULATED.3IONS-SCNC: 15 MMOL/L (ref 3–16)
APTT: 28 SEC (ref 26–36)
BUN BLDV-MCNC: 31 MG/DL (ref 7–20)
CALCIUM SERPL-MCNC: 8.8 MG/DL (ref 8.3–10.6)
CHLORIDE BLD-SCNC: 97 MMOL/L (ref 99–110)
CO2: 23 MMOL/L (ref 21–32)
CREAT SERPL-MCNC: 4.6 MG/DL (ref 0.6–1.1)
EKG ATRIAL RATE: 107 BPM
EKG DIAGNOSIS: NORMAL
EKG P AXIS: 60 DEGREES
EKG P-R INTERVAL: 152 MS
EKG Q-T INTERVAL: 326 MS
EKG QRS DURATION: 74 MS
EKG QTC CALCULATION (BAZETT): 435 MS
EKG R AXIS: 67 DEGREES
EKG T AXIS: 49 DEGREES
EKG VENTRICULAR RATE: 107 BPM
GFR AFRICAN AMERICAN: 14
GFR NON-AFRICAN AMERICAN: 12
GLUCOSE BLD-MCNC: 103 MG/DL (ref 70–99)
HCT VFR BLD CALC: 26.6 % (ref 36–48)
HEMOGLOBIN: 8.9 G/DL (ref 12–16)
INR BLD: 1.25 (ref 0.86–1.14)
MCH RBC QN AUTO: 29 PG (ref 26–34)
MCHC RBC AUTO-ENTMCNC: 33.6 G/DL (ref 31–36)
MCV RBC AUTO: 86.3 FL (ref 80–100)
MRSA SCREEN RT-PCR: NORMAL
PDW BLD-RTO: 17.2 % (ref 12.4–15.4)
PLATELET # BLD: 122 K/UL (ref 135–450)
PMV BLD AUTO: 8.5 FL (ref 5–10.5)
POTASSIUM REFLEX MAGNESIUM: 4.4 MMOL/L (ref 3.5–5.1)
PROTHROMBIN TIME: 14.3 SEC (ref 9.8–13)
RBC # BLD: 3.08 M/UL (ref 4–5.2)
SODIUM BLD-SCNC: 135 MMOL/L (ref 136–145)
VANCOMYCIN RANDOM: 9.9 UG/ML
WBC # BLD: 16.5 K/UL (ref 4–11)

## 2018-06-07 PROCEDURE — 74174 CTA ABD&PLVS W/CONTRAST: CPT

## 2018-06-07 PROCEDURE — 99233 SBSQ HOSP IP/OBS HIGH 50: CPT | Performed by: INTERNAL MEDICINE

## 2018-06-07 PROCEDURE — 80048 BASIC METABOLIC PNL TOTAL CA: CPT

## 2018-06-07 PROCEDURE — 85027 COMPLETE CBC AUTOMATED: CPT

## 2018-06-07 PROCEDURE — 36415 COLL VENOUS BLD VENIPUNCTURE: CPT

## 2018-06-07 PROCEDURE — 80202 ASSAY OF VANCOMYCIN: CPT

## 2018-06-07 PROCEDURE — 87040 BLOOD CULTURE FOR BACTERIA: CPT

## 2018-06-07 PROCEDURE — C9113 INJ PANTOPRAZOLE SODIUM, VIA: HCPCS

## 2018-06-07 PROCEDURE — 85730 THROMBOPLASTIN TIME PARTIAL: CPT

## 2018-06-07 PROCEDURE — 85610 PROTHROMBIN TIME: CPT

## 2018-06-07 PROCEDURE — 73620 X-RAY EXAM OF FOOT: CPT

## 2018-06-07 PROCEDURE — 9990 CHARGE CONVERSION

## 2018-06-07 PROCEDURE — 99232 SBSQ HOSP IP/OBS MODERATE 35: CPT | Performed by: INTERNAL MEDICINE

## 2018-06-07 RX ORDER — SODIUM CHLORIDE 9 MG/ML
INJECTION, SOLUTION INTRAVENOUS
Status: COMPLETED
Start: 2018-06-07 | End: 2018-06-07

## 2018-06-07 RX ORDER — PANTOPRAZOLE SODIUM 40 MG/1
40 TABLET, DELAYED RELEASE ORAL
Status: DISCONTINUED | OUTPATIENT
Start: 2018-06-07 | End: 2018-06-09

## 2018-06-07 RX ADMIN — ACETAMINOPHEN 650 MG: 325 TABLET ORAL at 05:50

## 2018-06-07 RX ADMIN — Medication 10 ML: at 22:44

## 2018-06-07 RX ADMIN — HYDROXYZINE PAMOATE 50 MG: 50 CAPSULE ORAL at 12:14

## 2018-06-07 RX ADMIN — SEVELAMER CARBONATE 800 MG: 800 TABLET, FILM COATED ORAL at 12:14

## 2018-06-07 RX ADMIN — LABETALOL 400 MG: 100 TABLET, FILM COATED ORAL at 12:15

## 2018-06-07 RX ADMIN — PROMETHAZINE HYDROCHLORIDE 25 MG: 25 TABLET ORAL at 22:44

## 2018-06-07 RX ADMIN — HYDROXYZINE PAMOATE 50 MG: 50 CAPSULE ORAL at 22:44

## 2018-06-07 RX ADMIN — SODIUM CHLORIDE 250 ML: 9 INJECTION, SOLUTION INTRAVENOUS at 22:35

## 2018-06-07 RX ADMIN — HEPARIN SODIUM 4100 UNITS: 1000 INJECTION, SOLUTION INTRAVENOUS; SUBCUTANEOUS at 10:55

## 2018-06-07 RX ADMIN — ACETAMINOPHEN 650 MG: 325 TABLET ORAL at 22:34

## 2018-06-07 RX ADMIN — PROMETHAZINE HYDROCHLORIDE 25 MG: 25 TABLET ORAL at 12:16

## 2018-06-07 ASSESSMENT — PAIN DESCRIPTION - PROGRESSION: CLINICAL_PROGRESSION: GRADUALLY WORSENING

## 2018-06-07 ASSESSMENT — PAIN DESCRIPTION - LOCATION: LOCATION: GENERALIZED

## 2018-06-07 ASSESSMENT — PAIN SCALES - GENERAL
PAINLEVEL_OUTOF10: 0
PAINLEVEL_OUTOF10: 3
PAINLEVEL_OUTOF10: 0

## 2018-06-07 ASSESSMENT — PAIN DESCRIPTION - FREQUENCY: FREQUENCY: INTERMITTENT

## 2018-06-07 ASSESSMENT — PAIN DESCRIPTION - PAIN TYPE: TYPE: ACUTE PAIN

## 2018-06-07 ASSESSMENT — PAIN DESCRIPTION - DESCRIPTORS: DESCRIPTORS: ACHING;DISCOMFORT

## 2018-06-07 ASSESSMENT — PAIN DESCRIPTION - ONSET: ONSET: GRADUAL

## 2018-06-07 NOTE — CONSULTS
[address]                  [address] [facility]                                   CONSULTATION    PATIENT NAME: Smita Barber                    :        1990  MED REC NO:   [id number]                          ROOM:       3008  ACCOUNT NO:   [de-identified]                          ADMIT DATE: 2018  PROVIDER:     Orest Merlin, MD    CONSULT DATE:  2018    ATTENDING PHYSICIAN:  Suki Cornell MD    ROOM NO:  3008    HISTORY OF PRESENT ILLNESS:  The patient is a 32-year-old female with a  chief complaint of bacteremia. I have been asked to see the patient in  consultation regarding antibiotic management. The patient became ill 4 or  5 days ago and has had daily fevers to 103. The patient's highest  temperature since she came to the hospital was 102.9 on admission. The  patient has had diffuse myalgias, cough, loose stool, pleuritic chest pain,  particularly on the left and increasing dyspnea over the period of her  illness. The patient has positive blood cultures. One of the blood  cultures appears to be growing Gram-positive cocci as well as a  Gram-negative salazar in the anaerobic bottle. The other blood culture is  growing MRSA, Streptococcus. Second blood culture appears to be growing  Streptococcus and MRSA. The patient is currently receiving meropenem and  vancomycin. The patient's chest x-ray and CT scan of the abdomen suggests  the presence of cavitary pulmonary nodules consistent with septic pulmonary  emboli. The patient's vancomycin level was 25.9 today. The patient has  end-stage renal disease and is on hemodialysis Monday, Wednesday, and  Friday. Urine toxic screen was positive for cocaine. The patient's  urinalysis showed 10-20 white cells. The patient's white count is 14,000  with some left shift. ALLERGIES:  None known. FAMILY HISTORY:  Noncontributory.     CURRENT MEDICATIONS:  Vancomycin, meropenem, Renvela, Protonix, Zofran,  Bactroban, Normodyne, Imdur, Aranesp, and Catapres. PAST MEDICAL HISTORY:  The patient has had two previous episodes of  tricuspid endocarditis in 2017. One was due to MRSA and the other was due  to oxacillin-sensitive Staph aureus. The patient has had tricuspid  insufficiency since then. She developed end-stage renal disease in the  course of one of these previous episodes of endocarditis. She has a  history of asthma, depression, hepatitis C, hypertension, PTSD, and seizure  disorder. She has had a previous tonsillectomy. REVIEW OF SYSTEMS:  The patient had hematemesis earlier today. She had a  red cell scan, which did not clearly delineate a source of bleeding. The  patient denies headache, otalgia, or sore throat. She has a cough. She  has pleuritic left chest pain when she coughs. She is mildly dyspneic. She has some mild right paramedian abdominal discomfort. She has had loose  stools intermittently. The patient denies rash, pruritus, new visual  disturbance. The patient states that her left great toe is painful. She  also notes some mild pain of her left fifth toe and difficulty bearing  weight on her left lower extremity due to knee pain. NEUROLOGIC:  The  patient is alert and appropriate. There are no obvious focal findings. PHYSICAL EXAMINATION:  GENERAL:  The patient is a young female who is currently in the midst of a  dialysis treatment. VITAL SIGNS:  Temperature 101, heart 101, respirations 33, and blood  pressure 170/95. HEENT:  Eyes:  PERRL. There are no conjunctival lesions. Oral cavity:  No  mucosal lesions. NECK:  Supple, without lymphadenopathy. There are no remarkable  supraclavicular nodes. CHEST:  Clear to auscultation. Respirations are unlabored. HEART:  Regular rhythm, S1 and S2 within normal limits. There are no  murmurs, rubs or gallops. ABDOMEN:  Soft.   There is mild midabdominal tenderness without mass or  hepatosplenomegaly. EXTREMITIES:  Without clubbing, cyanosis, or peripheral edema. The patient  has tenderness over her distal left metatarsal over the extensor tendon. She does not seem to have pain on range of motion of the toe with erythema  of the skin over the dorsal aspect of the toe. She complains of mild  tenderness of left fifth toe. I was not able to elicit much tenderness  over the knee and it did not appear to be a significant effusion. NEUROLOGIC:  The patient is alert and appropriate. There are no obvious  focal findings. ASSESSMENT:  This patient has a history of two previous episodes of  tricuspid valve endocarditis associated with IV drug use. She is admitted  now with bacteremia and what appears to be a new episode of tricuspid  endocarditis, which may be polymicrobic in etiology. At least one of the  microbial etiologies would appeared to be MRSA. The source of the  endocarditis would appear most likely to be either infection of her  tunneled hemodialysis catheter, or more likely, recent IV drug use (which  the patient denies). The patient would appear to have evidence of cavitary  pulmonary nodules consistent with septic pulmonary emboli related to her  tricuspid valve infection. She had evidence of a GI bleed today. She has  tricuspid insufficiency. She has end-stage renal disease. Her random  vancomycin level today was 25. She might have metastatic infection to her  right foot. A splenic infarct is not ruled out. I would favor continuing the vancomycin with dosing based on levels. I would continue the  meropenem for now. Transesophageal echocardiogram at some point might be  helpful for ruling out infection of the left-sided valves, although at the  moment, it is not clear whether infection of the left sidedd valves would change her treatment. Repeat blood cultures have been ordered.   The patient will  probably need to have her tunneled line changed at some point

## 2018-06-07 NOTE — CARE COORDINATION
INTERDISCIPLINARY PLAN OF CARE CONFERENCE    Date/Time: 6/7/2018 10:20 AM  Completed by: Nitin Yi Case Management      Patient Name:  Maricarmen Stark  YOB: 1990  Admitting Diagnosis: UGIB     Admit Date/Time:  6/6/2018 12:35 AM    Chart reviewed. Interdisciplinary team met to discuss patient progress and discharge plans. Disciplines included Case Management, Nursing, and Dietitian. Current Status: Inpatient ICU    Anticipated Discharge Date: TBD  Expected D/C Disposition:  Home    Discharge Plan Comments: Chart reviewed. Plan: TBD. May need IVABTX (currently on Vanco and meropenum) if positive for endocarditis await GWENDOLYN. h/o IV drug use however denies recent use. CM will follow to assist with DC needs. Pt prefers to return home with mother and resume HD at [facility]. CM following.

## 2018-06-07 NOTE — PLAN OF CARE
Problem: Falls - Risk of:  Goal: Will remain free from falls  Will remain free from falls   Outcome: Ongoing  [place] fall-risk light on outside door, fall precautions in place. Problem: Risk for Impaired Skin Integrity  Goal: Tissue integrity - skin and mucous membranes  Structural intactness and normal physiological function of skin and  mucous membranes. Outcome: Ongoing  Patient mobile in bed. Instructed on importance of frequent repositioning to maintain skin integrity. Demonstrates understanding. Problem: Discharge Planning:  Goal: Discharged to appropriate level of care  Discharged to appropriate level of care  Outcome: Ongoing      Problem:  Bowel Function - Altered:  Goal: Bowel elimination is within specified parameters  Bowel elimination is within specified parameters  Outcome: Ongoing      Problem: Fluid Volume - Imbalance:  Goal: Absence of imbalanced fluid volume signs and symptoms  Absence of imbalanced fluid volume signs and symptoms  Outcome: Ongoing    Goal: Will show no signs and symptoms of excessive bleeding  Will show no signs and symptoms of excessive bleeding  Outcome: Ongoing

## 2018-06-07 NOTE — FLOWSHEET NOTE
Completed 3hr 40min of 4hr tx. Signed off tx AMA. Pt aware of risks. Dr Shawn Looney informed. VSS throughout. 2.4L net fluid removed. No HD meds given. HD again tomorrow. Report to [place], [place].  Full tx record in pts chart to be scanned to [facility].      06/07/18 0645 06/07/18 1045   Vital Signs   BP (!) 161/77 (!) 152/85   Temp 102.6 °F (39.2 °C) 98 °F (36.7 °C)   Pulse 92 102   Weight 119 lb 0.8 oz (54 kg) 114 lb 10.2 oz (52 kg)   Weight Method Bed scale Bed scale   Dry Weight 108 lb 0.4 oz (49 kg) --    Post-Hemodialysis Assessment   Duration of Treatment (minutes) --  220 minutes   Heparin amount administered during treatment (units) --  0 units   Hemodialysis Intake (ml) --  500 ml   Hemodialysis Output (ml) --  2940 ml   NET Removed (ml) --  2440 ml   Tolerated Treatment --  Good

## 2018-06-07 NOTE — PROGRESS NOTES
Internal Medicine ICU Progress Note      Events of Last 24 hours:     34year old white female who was admitted for GI bleed. Had an EGD yesterday. No chest pain. BC positive for strep pneumonia, staph aureus and gram-negative salazar. Echocardiogram showed tricuspid valve endocarditis. She's had this before. CT chest showed septic emboli and possible splenic infarct. She also is having tenderness of the right first MTP joint. Invasive Lines: Tunneled dialysis catheter, two peripheral IV       MV:  n/a    No results for input(s): PHART, KME3ZQO, PO2ART in the last 72 hours. MV Settings:     / / /     IV:   pantoprozole (PROTONIX) infusion 8 mg/hr (18 0117)       Vitals:  Temp  Av.9 °F (37.7 °C)  Min: 97.5 °F (36.4 °C)  Max: 102.6 °F (39.2 °C)  Pulse  Av.8  Min: 73  Max: 131  BP  Min: 100/44  Max: 180/89  SpO2  Av.1 %  Min: 87 %  Max: 100 %  Patient Vitals for the past 4 hrs:   BP Temp Temp src Pulse Resp SpO2 Weight   18 0914 - 97.5 °F (36.4 °C) Oral - - - -   18 0900 (!) 157/106 - - 82 20 95 % -   18 0800 (!) 170/81 - - 88 29 94 % -   18 07 (!) 162/88 - - 99 22 98 % -   18 0645 (!) 161/77 102.6 °F (39.2 °C) - 92 - - 119 lb 0.8 oz (54 kg)   18 06 (!) 176/80 - - 91 28 92 % -       CVP:        Intake/Output Summary (Last 24 hours) at 18 0952  Last data filed at 18 0300   Gross per 24 hour   Intake           2170.5 ml   Output             5400 ml   Net          -3229.5 ml       EXAM:  General: No distress. Alert. Eyes: PERRL. No sclera icterus. No conjunctiva injected. ENT: No discharge. Pharynx clear. Neck: Trachea midline. Normal thyroid. Resp: No accessory muscle use. No crackles. No wheezing. No rhonchi. No dullness on percussion. CV: Regular rate. Regular rhythm. + mumur no rub. No edema. No JVD. Palpable pedal pulses. GI: Non-tender. Non-distended. No masses. No organmegaly. Normal bowel sounds. No hernia.   Skin: Warm and dry. No nodule on exposed extremities. No rash on exposed extremities. Lymph: No cervical LAD. No supraclavicular LAD. M/S: No cyanosis. No clubbing. Right first MTP is swollen and tender  Neuro: Awake. Follows commands. Positive pupils/gag/corneals. Normal pain response. Psych: Oriented to person, place, time. No anxiety or agitation. Medications:  Scheduled Meds:   cloNIDine  0.1 mg Oral TID    isosorbide mononitrate  60 mg Oral Daily    labetalol  400 mg Oral TID    sevelamer  800 mg Oral TID WC    sodium chloride flush  10 mL Intravenous 2 times per day    meropenem  500 mg Intravenous Q24H    mupirocin   Nasal BID    sodium chloride  250 mL Intravenous Once    darbepoetin emi-polysorbate  100 mcg Intravenous Weekly       PRN Meds:  sodium chloride flush, ondansetron, acetaminophen, heparin (porcine), promethazine, hydrOXYzine    Results:  CBC: Recent Labs      06/05/18 2224 06/06/18 0651 06/06/18 1932 06/07/18   0455   WBC  14.1*   --    --   16.5*   HGB  7.3*  6.6*  9.5*  8.9*   HCT  21.6*  19.8*  28.0*  26.6*   MCV  86.3   --    --   86.3   PLT  144   --    --   122*     BMP: Recent Labs      06/05/18 2224 06/06/18 1932 06/07/18   0455   NA  124*  137  135*   K  4.6  3.5  4.4   CL  84*  99  97*   CO2  23  21  23   BUN  48*  15  31*   CREATININE  8.2*  3.0*  4.6*     LIVER PROFILE:   Recent Labs      06/05/18 2224   AST  17   ALT  12   BILITOT  0.5   ALKPHOS  144*     PT/INR:   Recent Labs      06/05/18 2245 06/06/18 0651  06/07/18   0455   PROTIME  16.7*  18.2*  14.3*   INR  1.46*  1.60*  1.25*     APTT:   Recent Labs      06/06/18 0651 06/07/18   0455   APTT  29.8  28.0       Cultures:  BC: S aureus, S pneumonia and gram neg salazar    Films:  CTA ABDOMEN PELVIS W WO CONTRAST   Final Result   No active gastrointestinal hemorrhage is identified. No vascular abnormality   is appreciated. Right lower lobe pneumonia as well as cavitary nodules.   Septic emboli are consideration. Compared with 11/26/2017, there is waxing and waning airspace   disease and pulmonary nodules. Organizing pneumonia is an alternative   possibility. Hepatosplenomegaly. Hepatomegaly is similar to 11/26/2017. Splenomegaly is   new. Hypodensity in the spleen, suggestive of acute to subacute embolic   infarction. Small amount of ascites, nonspecific. Hyperdensity of the gallbladder wall. This may represent mural   calcification. Etiology is unclear. There is variable association with   gallbladder wall calcification and risk for gallbladder carcinoma. Stable cardiomegaly. Lumbar spine and visualized osseous structures appear intact. NM GI BLOOD LOSS   Final Result   No evidence of active GI bleeding during acquisition. RECOMMENDATIONS:   If the patient shows hemodynamic signs of an active bleed in the next 20   hours, additional images can be acquired. XR CHEST PORTABLE   Final Result   Patchy airspace disease in the right lung, pneumonia versus atelectasis   versus less likely edema. That should be followed to resolution. Borderline pulmonary vascular congestion. ECHO 6/6/18  Conclusions      Summary   LV systolic function is mildly reduced with LVEF estimated at 40-45%.   Mild global hypokinesis is present.  There is mild systolic and diastolic   septal flattening c/w RV pressure and volume overload.   Normal left ventricular diastolic filling pressure.   The right atrium is mildly dilated.   There is trivial aortic regurgitation.   Moderate to large mobile, pedunculated vegetation (1.15 cm x 2.43 cm)   attached to base of TV leaflet c/w endocarditis.   Moderate tricuspid regurgitation.   Systolic pulmonary artery pressure (SPAP) estimated at 55 mmHg (RA pressure   3 mmHg), c/w moderate pulmonary hypertension.   Note TV endocarditis seen on prior March 2017 study but LV systolic function   is mildly decreased

## 2018-06-07 NOTE — PROGRESS NOTES
Patient care assumed, assessment completed as charted. Patient resting in bed, requesting medication for anxiety, and restless legs. Spoke with Dr. Yahaira Schwartz regarding, orders to come. Will monitor.

## 2018-06-07 NOTE — PROGRESS NOTES
Vanc-Day 3  Vanc level today 9.9mcg/ml, HD yesterday on MWF schedule.  Will place order for Vanc 1 gm today, random in am.   Bonny Rosales Pharm D 6/7/20188:21 AM  .

## 2018-06-07 NOTE — PROGRESS NOTES
ID Follow-up NOTE    CC: tricuspid valve endocarditis    Subjective:     Patient denies nausea, abdominal pain or diarrhea, cough, SOB, fever. Says she feels mildly improved overall. R foot a little less painful.  Says pleuritic chest pain not as noticeable      Objective:     Patient Vitals for the past 24 hrs:   BP Temp Temp src Pulse Resp SpO2 Weight   18 1300 (!) 174/77 - - 108 (!) 34 94 % -   18 1236 - 98.1 °F (36.7 °C) - - - - -   18 1214 - - - 107 - - -   18 1200 (!) 173/102 - - 103 28 93 % -   18 1100 (!) 159/78 - - 93 27 - -   18 1045 (!) 152/85 98 °F (36.7 °C) - 102 - - 114 lb 10.2 oz (52 kg)   18 1000 (!) 180/86 - - 87 25 - -   18 0914 - 97.5 °F (36.4 °C) Oral - - - -   18 0900 (!) 157/106 - - 82 20 95 % -   18 0800 (!) 170/81 - - 88 29 94 % -   18 0700 (!) 162/88 - - 99 22 98 % -   18 0645 (!) 161/77 102.6 °F (39.2 °C) - 92 - - 119 lb 0.8 oz (54 kg)   18 0600 (!) 176/80 - - 91 28 92 % -   18 0550 - 102.3 °F (39.1 °C) Oral - - - -   18 0500 (!) 156/74 - - 88 (!) 33 93 % -   18 0400 (!) 143/81 - - 84 (!) 35 97 % -   18 0300 (!) 148/85 98.4 °F (36.9 °C) Axillary 75 24 98 % 117 lb 4.6 oz (53.2 kg)   18 0200 130/66 - - 73 24 95 % -   18 0100 125/63 - - 73 26 95 % -   18 0000 123/70 - - 80 28 92 % -   18 2300 (!) 116/51 97.9 °F (36.6 °C) Oral 76 23 94 % -   18 2200 (!) 100/44 - - 84 28 - -   18 2100 (!) 150/67 - - 99 (!) 33 - -   18 2000 (!) 180/89 102.1 °F (38.9 °C) Oral 119 (!) 32 (!) 87 % -   18 1900 - - - 131 21 98 % -   18 1836 (!) 147/96 97.6 °F (36.4 °C) - 107 - - 118 lb 13.3 oz (53.9 kg)   18 1746 (!) 133/97 - - 109 (!) 32 - -   18 1716 (!) 138/103 - - 122 16 - -   18 1456 - 101.7 °F (38.7 °C) - - - - -   18 1447 (!) 170/95 - - 111 (!) 33 - -   18 1331 (!) 156/88 - - 119 (!) 31 - -     Temp (24hrs), Av.6 °F (37.6 °C), Min:97.5 °F (36.4 °C), Max:102.6 °F (39.2 °C)          EXAM:  General: alert appropriate afebrile  HEENT: mild conj icterus      LUNGS: Resp unlabored; clear to auscultation     CV: RR c gd II syst M     ABD: soft, non-tender, without mass     EXT: without edema; erythema and tenderness over R great toe. L knee nontender and ROM is not painful   Skin: no rash or stigmata of endocarditis      Data Review:    Lab Results   Component Value Date    WBC 16.5 (H) 06/07/2018    HGB 8.9 (L) 06/07/2018    HCT 26.6 (L) 06/07/2018    MCV 86.3 06/07/2018     (L) 06/07/2018     Lab Results   Component Value Date    CREATININE 4.6 (H) 06/07/2018    BUN 31 (H) 06/07/2018     (L) 06/07/2018    K 4.4 06/07/2018    CL 97 (L) 06/07/2018    CO2 23 06/07/2018     Lab Results   Component Value Date    ALT 12 06/05/2018    AST 17 06/05/2018    ALKPHOS 144 (H) 06/05/2018    BILITOT 0.5 06/05/2018         MICRO: both admission blood cultures growing MRSA. One culture also growing strep while the other culture is also growing a GNR to be identified: appears to be an aerobe. IMAGING: RBC scan did not identifiy a source of bleeding    Assessment:     Active Problems:    HCAP (healthcare-associated pneumonia)    UGIB (upper gastrointestinal bleed)    Sepsis due to Streptococcus pneumoniae ([facility] [address]  Resolved Problems:    * No resolved hospital problems. *  Tricuspid valve endocarditis due to MRSA and perhaps Strep and a gm negative salazar as well which are also being isolated from blood cultures. Septic pulmonary emboli due to TV endocarditis  TI  Possible splenic infarct. Possible metastatic infection to R great toe  L knee nontender and very little pain with ROM  No ongoing hemetemesis    Plan:   Vanc redosed this am  Level in am  Repeat blood cultures in progress.    Continue vancomycin and meropenem

## 2018-06-07 NOTE — PROGRESS NOTES
Dr. Citlaly Demarco team @ bedside. Discussed labs, meds, VS, I/O's, 02 needs, U/O, assessment, & plan of care for today. Please see progress note & new orders for details.     Emilee Griffin RN on 6/7/18 at 9:22 AM

## 2018-06-07 NOTE — PROGRESS NOTES
Pulmonary & Critical Care Medicine ICU Progress Note    CC: Endocarditis and GI bleed    Events of Last 24 hours: Hgb stable overnight. More alert today    Vitals:  BP (!) 157/106   Pulse 82   Temp 97.5 °F (36.4 °C) (Oral)   Resp 20   Ht 5' 2\" (1.575 m)   Wt 119 lb 0.8 oz (54 kg)   SpO2 95%   BMI 21.77 kg/m²   on ra    Intake/Output Summary (Last 24 hours) at 06/07/18 0923  Last data filed at 06/07/18 0300   Gross per 24 hour   Intake           2170.5 ml   Output             5400 ml   Net          -3229.5 ml     EXAM:  Constitutional: ill appearing. Not In distress. Eyes: PERRL. No sclera icterus. ENT: Normal Nose. Normal Tongue. Neck:  Trachea is midline. No thyroid tenderness. Respiratory: No accessory muscle usage. No wheezes. No rales. No Rhonchi. Cardiovascular: Normal S1S2. + murmur. No murmur. No digit cyanosis. No digit clubbing. No LE edema. GI: Non-tender. Non-distended. Normal bowel sounds. No masses. No umbilical hernia. Skin: No rash on the exposed extremities. No Nodules on exposed extremities. Right great toe with dorsal area of erythema and TTP  Psych: Alert. Oriented. No agitation, no anxiety.     Scheduled Meds:   cloNIDine  0.1 mg Oral TID    isosorbide mononitrate  60 mg Oral Daily    labetalol  400 mg Oral TID    sevelamer  800 mg Oral TID     sodium chloride flush  10 mL Intravenous 2 times per day    meropenem  500 mg Intravenous Q24H    mupirocin   Nasal BID    sodium chloride  250 mL Intravenous Once    darbepoetin emi-polysorbate  100 mcg Intravenous Weekly     PRN Meds:  sodium chloride flush, ondansetron, acetaminophen, heparin (porcine), promethazine, hydrOXYzine    Results:  CBC: Recent Labs      06/05/18   2224  06/06/18   0651  06/06/18   1932  06/07/18   0455   WBC  14.1*   --    --   16.5*   HGB  7.3*  6.6*  9.5*  8.9*   HCT  21.6*  19.8*  28.0*  26.6*   MCV  86.3   --    --   86.3   PLT  144   --    --   122*     BMP: Recent Labs      06/05/18

## 2018-06-07 NOTE — PLAN OF CARE
Problem: Nutrition  Goal: Optimal nutrition therapy  Outcome: Ongoing  Nutrition Problem:  Moderate malnutrition  Intervention: Food and/or Nutrient Delivery: Continue current diet, Start ONS  Nutritional Goals: patient will consume 75% or greater of meals on renal diet order x 3 meals per day without GI distress, without s/s of aspiration, and without additional BS/fluid/electrolyte abnormalities; weight will remain stable during remainder of admission; patient will accept Nepro with breakfast and dinner meals

## 2018-06-08 LAB
ALBUMIN SERPL-MCNC: 1.7 G/DL (ref 3.4–5)
ALBUMIN SERPL-MCNC: 2 G/DL (ref 3.4–5)
ALBUMIN SERPL-MCNC: 2 G/DL (ref 3.4–5)
ALP BLD-CCNC: 574 U/L (ref 40–129)
ALT SERPL-CCNC: 105 U/L (ref 10–40)
AMPHETAMINES SCREEN BLOOD: NEGATIVE NG/ML
ANION GAP SERPL CALCULATED.3IONS-SCNC: 13 MMOL/L (ref 3–16)
ANION GAP SERPL CALCULATED.3IONS-SCNC: 17 MMOL/L (ref 3–16)
ANION GAP SERPL CALCULATED.3IONS-SCNC: 23 MMOL/L (ref 3–16)
ANION GAP SERPL CALCULATED.3IONS-SCNC: 24 MMOL/L (ref 3–16)
ANISOCYTOSIS: ABNORMAL
APTT: 50.1 SEC (ref 26–36)
AST SERPL-CCNC: 193 U/L (ref 15–37)
BARBITURATES SCREEN BLOOD: NEGATIVE NG/ML
BASE EXCESS ARTERIAL: -11.5 MMOL/L (ref -3–3)
BASE EXCESS ARTERIAL: -18.7 MMOL/L (ref -3–3)
BASE EXCESS ARTERIAL: -9 (ref -3–3)
BASOPHILS ABSOLUTE: 0 K/UL (ref 0–0.2)
BASOPHILS RELATIVE PERCENT: 0 %
BENZODIAZEPINES SCREEN BLOOD: POSITIVE NG/ML
BILIRUB SERPL-MCNC: 2.5 MG/DL (ref 0–1)
BILIRUBIN DIRECT: 2.2 MG/DL (ref 0–0.3)
BILIRUBIN, INDIRECT: 0.3 MG/DL (ref 0–1)
BLOOD CULTURE, ROUTINE: ABNORMAL
BUN BLDV-MCNC: 29 MG/DL (ref 7–20)
BUN BLDV-MCNC: 30 MG/DL (ref 7–20)
BUN BLDV-MCNC: 31 MG/DL (ref 7–20)
BUN BLDV-MCNC: 31 MG/DL (ref 7–20)
BUPRENORPHINE: NEGATIVE NG/ML
CALCIUM IONIZED: 0.79 MMOL/L (ref 1.12–1.32)
CALCIUM IONIZED: 0.9 MMOL/L (ref 1.12–1.32)
CALCIUM SERPL-MCNC: 7 MG/DL (ref 8.3–10.6)
CALCIUM SERPL-MCNC: 7.7 MG/DL (ref 8.3–10.6)
CALCIUM SERPL-MCNC: 7.9 MG/DL (ref 8.3–10.6)
CALCIUM SERPL-MCNC: 8.5 MG/DL (ref 8.3–10.6)
CANNABINOID SCREEN BLOOD: POSITIVE NG/ML
CARBOXYHEMOGLOBIN ARTERIAL: 1.1 % (ref 0–1.5)
CARBOXYHEMOGLOBIN ARTERIAL: 1.9 % (ref 0–1.5)
CHLORIDE BLD-SCNC: 89 MMOL/L (ref 99–110)
CHLORIDE BLD-SCNC: 93 MMOL/L (ref 99–110)
CHLORIDE BLD-SCNC: 94 MMOL/L (ref 99–110)
CHLORIDE BLD-SCNC: 96 MMOL/L (ref 99–110)
CO2: 16 MMOL/L (ref 21–32)
CO2: 21 MMOL/L (ref 21–32)
CO2: 5 MMOL/L (ref 21–32)
CO2: 8 MMOL/L (ref 21–32)
COCAINE SCREEN BLOOD: POSITIVE NG/ML
CREAT SERPL-MCNC: 4 MG/DL (ref 0.6–1.1)
CREAT SERPL-MCNC: 4 MG/DL (ref 0.6–1.1)
CREAT SERPL-MCNC: 5.1 MG/DL (ref 0.6–1.1)
CREAT SERPL-MCNC: 5.2 MG/DL (ref 0.6–1.1)
CULTURE, BLOOD 2: ABNORMAL
D DIMER: >5250 NG/ML DDU (ref 0–229)
DAT IGG CAPTURE: NORMAL
DAT POLYSPECIFIC: NORMAL
DAT POLYSPECIFIC: NORMAL
EKG ATRIAL RATE: 136 BPM
EKG ATRIAL RATE: 144 BPM
EKG DIAGNOSIS: NORMAL
EKG DIAGNOSIS: NORMAL
EKG P AXIS: 50 DEGREES
EKG P AXIS: 84 DEGREES
EKG P-R INTERVAL: 136 MS
EKG P-R INTERVAL: 150 MS
EKG Q-T INTERVAL: 254 MS
EKG Q-T INTERVAL: 276 MS
EKG QRS DURATION: 68 MS
EKG QRS DURATION: 84 MS
EKG QTC CALCULATION (BAZETT): 393 MS
EKG QTC CALCULATION (BAZETT): 415 MS
EKG R AXIS: 106 DEGREES
EKG R AXIS: 96 DEGREES
EKG T AXIS: 38 DEGREES
EKG T AXIS: 48 DEGREES
EKG VENTRICULAR RATE: 136 BPM
EKG VENTRICULAR RATE: 144 BPM
EOSINOPHILS ABSOLUTE: 0 K/UL (ref 0–0.6)
EOSINOPHILS RELATIVE PERCENT: 0 %
FERRITIN: 4649 NG/ML (ref 15–150)
FIBRINOGEN: 153 MG/DL (ref 200–397)
GFR AFRICAN AMERICAN: 12
GFR AFRICAN AMERICAN: 12
GFR AFRICAN AMERICAN: 16
GFR AFRICAN AMERICAN: 16
GFR NON-AFRICAN AMERICAN: 10
GFR NON-AFRICAN AMERICAN: 10
GFR NON-AFRICAN AMERICAN: 14
GFR NON-AFRICAN AMERICAN: 14
GLUCOSE BLD-MCNC: 117 MG/DL (ref 70–99)
GLUCOSE BLD-MCNC: 230 MG/DL (ref 70–99)
GLUCOSE BLD-MCNC: 231 MG/DL (ref 70–99)
GLUCOSE BLD-MCNC: 236 MG/DL (ref 70–99)
GLUCOSE BLD-MCNC: 252 MG/DL (ref 70–99)
GLUCOSE BLD-MCNC: 290 MG/DL (ref 70–99)
GLUCOSE BLD-MCNC: 63 MG/DL (ref 70–99)
GLUCOSE BLD-MCNC: 99 MG/DL (ref 70–99)
HAPTOGLOBIN: 240 MG/DL (ref 30–200)
HCO3 ARTERIAL: 12 MMOL/L (ref 21–29)
HCO3 ARTERIAL: 15.1 MMOL/L (ref 21–29)
HCO3 ARTERIAL: 6.7 MMOL/L (ref 21–29)
HCT VFR BLD CALC: 16.4 % (ref 36–48)
HCT VFR BLD CALC: 18.4 % (ref 36–48)
HCT VFR BLD CALC: 23 % (ref 36–48)
HCT VFR BLD CALC: 23.9 % (ref 36–48)
HEMOGLOBIN, ART, EXTENDED: 4.7 G/DL (ref 12–16)
HEMOGLOBIN, ART, EXTENDED: 6.4 G/DL (ref 12–16)
HEMOGLOBIN: 4.7 G/DL (ref 12–16)
HEMOGLOBIN: 5.3 G/DL (ref 12–16)
HEMOGLOBIN: 7.8 G/DL (ref 12–16)
HEMOGLOBIN: 8.2 G/DL (ref 12–16)
HYPOCHROMIA: ABNORMAL
INR BLD: 1.07 (ref 0.86–1.14)
IRON SATURATION: 45 % (ref 15–50)
IRON: 64 UG/DL (ref 37–145)
LACTATE DEHYDROGENASE: 243 U/L (ref 100–190)
LACTIC ACID: 12.7 MMOL/L (ref 0.4–2)
LACTIC ACID: 7.7 MMOL/L (ref 0.4–2)
LACTIC ACID: 9.9 MMOL/L (ref 0.4–2)
LYMPHOCYTES ABSOLUTE: 4.4 K/UL (ref 1–5.1)
LYMPHOCYTES RELATIVE PERCENT: 19 %
Lab: NORMAL
MAGNESIUM: 2.6 MG/DL (ref 1.8–2.4)
MAGNESIUM: 3 MG/DL (ref 1.8–2.4)
MAGNESIUM: 3 MG/DL (ref 1.8–2.4)
MCH RBC QN AUTO: 28.4 PG (ref 26–34)
MCH RBC QN AUTO: 28.9 PG (ref 26–34)
MCH RBC QN AUTO: 29.2 PG (ref 26–34)
MCHC RBC AUTO-ENTMCNC: 28.2 G/DL (ref 31–36)
MCHC RBC AUTO-ENTMCNC: 28.5 G/DL (ref 31–36)
MCHC RBC AUTO-ENTMCNC: 34.2 G/DL (ref 31–36)
MCV RBC AUTO: 100.7 FL (ref 80–100)
MCV RBC AUTO: 101.4 FL (ref 80–100)
MCV RBC AUTO: 85.6 FL (ref 80–100)
METHADONE SCREEN BLOOD: NEGATIVE NG/ML
METHAMPHETAMINES SERUM/ PLASMA: NEGATIVE NG/ML
METHEMOGLOBIN ARTERIAL: 0.2 %
METHEMOGLOBIN ARTERIAL: 0.4 %
MONOCYTES ABSOLUTE: 0.2 K/UL (ref 0–1.3)
MONOCYTES RELATIVE PERCENT: 1 %
NEUTROPHILS ABSOLUTE: 18.5 K/UL (ref 1.7–7.7)
NEUTROPHILS RELATIVE PERCENT: 80 %
NUCLEATED RED BLOOD CELLS: 1 /100 WBC
O2 CONTENT ARTERIAL: 10 ML/DL
O2 CONTENT ARTERIAL: 7 ML/DL
O2 SAT, ARTERIAL: 100 % (ref 93–100)
O2 SAT, ARTERIAL: 98.6 %
O2 SAT, ARTERIAL: 99.6 %
O2 THERAPY: ABNORMAL
O2 THERAPY: ABNORMAL
OPIATES SCREEN BLOOD: NEGATIVE NG/ML
ORGANISM: ABNORMAL
OXYCODONE: POSITIVE NG/ML
PCO2 ARTERIAL: 14.9 MMHG (ref 35–45)
PCO2 ARTERIAL: 19.4 MMHG (ref 35–45)
PCO2 ARTERIAL: 20.5 MM HG (ref 35–45)
PDW BLD-RTO: 16.9 % (ref 12.4–15.4)
PDW BLD-RTO: 18.8 % (ref 12.4–15.4)
PDW BLD-RTO: 19 % (ref 12.4–15.4)
PERFORMED ON: ABNORMAL
PH ARTERIAL: 7.27 (ref 7.35–7.45)
PH ARTERIAL: 7.41 (ref 7.35–7.45)
PH ARTERIAL: 7.47 (ref 7.35–7.45)
PH VENOUS: 7.24 (ref 7.35–7.45)
PH VENOUS: 7.31 (ref 7.35–7.45)
PHENCYCLIDINE SCREEN BLOOD: NEGATIVE NG/ML
PHOSPHORUS: 5.7 MG/DL (ref 2.5–4.9)
PHOSPHORUS: 6.6 MG/DL (ref 2.5–4.9)
PHOSPHORUS: 7.2 MG/DL (ref 2.5–4.9)
PLATELET # BLD: 129 K/UL (ref 135–450)
PLATELET # BLD: 91 K/UL (ref 135–450)
PLATELET # BLD: 98 K/UL (ref 135–450)
PLATELET SLIDE REVIEW: ABNORMAL
PMV BLD AUTO: 8.3 FL (ref 5–10.5)
PMV BLD AUTO: 8.5 FL (ref 5–10.5)
PMV BLD AUTO: 9 FL (ref 5–10.5)
PO2 ARTERIAL: 142.2 MMHG (ref 75–108)
PO2 ARTERIAL: 163.9 MM HG (ref 75–108)
PO2 ARTERIAL: 252.2 MMHG (ref 75–108)
POC SAMPLE TYPE: ABNORMAL
POIKILOCYTES: ABNORMAL
POTASSIUM REFLEX MAGNESIUM: 3.8 MMOL/L (ref 3.5–5.1)
POTASSIUM SERPL-SCNC: 4.4 MMOL/L (ref 3.5–5.1)
POTASSIUM SERPL-SCNC: 5.5 MMOL/L (ref 3.5–5.1)
POTASSIUM SERPL-SCNC: 5.8 MMOL/L (ref 3.5–5.1)
PROTHROMBIN TIME: 12.2 SEC (ref 9.8–13)
RBC # BLD: 1.61 M/UL (ref 4–5.2)
RBC # BLD: 1.9 M/UL (ref 4–5.2)
RBC # BLD: 2.79 M/UL (ref 4–5.2)
SLIDE REVIEW: ABNORMAL
SODIUM BLD-SCNC: 122 MMOL/L (ref 136–145)
SODIUM BLD-SCNC: 123 MMOL/L (ref 136–145)
SODIUM BLD-SCNC: 125 MMOL/L (ref 136–145)
SODIUM BLD-SCNC: 129 MMOL/L (ref 136–145)
TCO2 ARTERIAL: 12.6 MMOL/L
TCO2 ARTERIAL: 7.1 MMOL/L
TOTAL IRON BINDING CAPACITY: 143 UG/DL (ref 260–445)
TOTAL PROTEIN: 5.3 G/DL (ref 6.4–8.2)
TROPONIN: 0.12 NG/ML
VANCOMYCIN RANDOM: 21.2 UG/ML
WBC # BLD: 13.1 K/UL (ref 4–11)
WBC # BLD: 21.4 K/UL (ref 4–11)
WBC # BLD: 23.1 K/UL (ref 4–11)

## 2018-06-08 PROCEDURE — 94750 CHARGE CONVERSION: CPT

## 2018-06-08 PROCEDURE — 99292 CRITICAL CARE ADDL 30 MIN: CPT | Performed by: INTERNAL MEDICINE

## 2018-06-08 PROCEDURE — 86923 COMPATIBILITY TEST ELECTRIC: CPT

## 2018-06-08 PROCEDURE — 31500 INSERT EMERGENCY AIRWAY: CPT | Performed by: INTERNAL MEDICINE

## 2018-06-08 PROCEDURE — 92950 HEART/LUNG RESUSCITATION CPR: CPT | Performed by: INTERNAL MEDICINE

## 2018-06-08 PROCEDURE — 85610 PROTHROMBIN TIME: CPT

## 2018-06-08 PROCEDURE — 82330 ASSAY OF CALCIUM: CPT

## 2018-06-08 PROCEDURE — 85730 THROMBOPLASTIN TIME PARTIAL: CPT

## 2018-06-08 PROCEDURE — 83540 ASSAY OF IRON: CPT

## 2018-06-08 PROCEDURE — 83010 ASSAY OF HAPTOGLOBIN QUANT: CPT

## 2018-06-08 PROCEDURE — 82746 ASSAY OF FOLIC ACID SERUM: CPT

## 2018-06-08 PROCEDURE — 85018 HEMOGLOBIN: CPT

## 2018-06-08 PROCEDURE — 80069 RENAL FUNCTION PANEL: CPT

## 2018-06-08 PROCEDURE — 83550 IRON BINDING TEST: CPT

## 2018-06-08 PROCEDURE — 90945 DIALYSIS ONE EVALUATION: CPT

## 2018-06-08 PROCEDURE — 80076 HEPATIC FUNCTION PANEL: CPT

## 2018-06-08 PROCEDURE — 84100 ASSAY OF PHOSPHORUS: CPT

## 2018-06-08 PROCEDURE — 5A1945Z RESPIRATORY VENTILATION, 24-96 CONSECUTIVE HOURS: ICD-10-PCS | Performed by: INTERNAL MEDICINE

## 2018-06-08 PROCEDURE — 85025 COMPLETE CBC W/AUTO DIFF WBC: CPT

## 2018-06-08 PROCEDURE — 85384 FIBRINOGEN ACTIVITY: CPT

## 2018-06-08 PROCEDURE — 05H633Z INSERTION OF INFUSION DEVICE INTO LEFT SUBCLAVIAN VEIN, PERCUTANEOUS APPROACH: ICD-10-PCS | Performed by: INTERNAL MEDICINE

## 2018-06-08 PROCEDURE — 36600 WITHDRAWAL OF ARTERIAL BLOOD: CPT

## 2018-06-08 PROCEDURE — 94761 N-INVAS EAR/PLS OXIMETRY MLT: CPT

## 2018-06-08 PROCEDURE — 82803 BLOOD GASES ANY COMBINATION: CPT

## 2018-06-08 PROCEDURE — 85379 FIBRIN DEGRADATION QUANT: CPT

## 2018-06-08 PROCEDURE — 36415 COLL VENOUS BLD VENIPUNCTURE: CPT

## 2018-06-08 PROCEDURE — 93005 ELECTROCARDIOGRAM TRACING: CPT

## 2018-06-08 PROCEDURE — 87040 BLOOD CULTURE FOR BACTERIA: CPT

## 2018-06-08 PROCEDURE — 36556 INSERT NON-TUNNEL CV CATH: CPT | Performed by: INTERNAL MEDICINE

## 2018-06-08 PROCEDURE — 85045 AUTOMATED RETICULOCYTE COUNT: CPT

## 2018-06-08 PROCEDURE — 83605 ASSAY OF LACTIC ACID: CPT

## 2018-06-08 PROCEDURE — 93308 TTE F-UP OR LMTD: CPT

## 2018-06-08 PROCEDURE — 9990 CHARGE CONVERSION

## 2018-06-08 PROCEDURE — 0BH18EZ INSERTION OF ENDOTRACHEAL AIRWAY INTO TRACHEA, VIA NATURAL OR ARTIFICIAL OPENING ENDOSCOPIC: ICD-10-PCS | Performed by: INTERNAL MEDICINE

## 2018-06-08 PROCEDURE — 99291 CRITICAL CARE FIRST HOUR: CPT | Performed by: INTERNAL MEDICINE

## 2018-06-08 PROCEDURE — 80202 ASSAY OF VANCOMYCIN: CPT

## 2018-06-08 PROCEDURE — 84238 ASSAY NONENDOCRINE RECEPTOR: CPT

## 2018-06-08 PROCEDURE — 84484 ASSAY OF TROPONIN QUANT: CPT

## 2018-06-08 PROCEDURE — 85027 COMPLETE CBC AUTOMATED: CPT

## 2018-06-08 PROCEDURE — P9016 RBC LEUKOCYTES REDUCED: HCPCS

## 2018-06-08 PROCEDURE — 85014 HEMATOCRIT: CPT

## 2018-06-08 PROCEDURE — 83735 ASSAY OF MAGNESIUM: CPT

## 2018-06-08 PROCEDURE — 82728 ASSAY OF FERRITIN: CPT

## 2018-06-08 PROCEDURE — 82607 VITAMIN B-12: CPT

## 2018-06-08 PROCEDURE — 99233 SBSQ HOSP IP/OBS HIGH 50: CPT | Performed by: INTERNAL MEDICINE

## 2018-06-08 PROCEDURE — 92950 HEART/LUNG RESUSCITATION CPR: CPT

## 2018-06-08 PROCEDURE — 71045 X-RAY EXAM CHEST 1 VIEW: CPT

## 2018-06-08 PROCEDURE — 82668 ASSAY OF ERYTHROPOIETIN: CPT

## 2018-06-08 PROCEDURE — 94002 VENT MGMT INPAT INIT DAY: CPT

## 2018-06-08 PROCEDURE — 86880 COOMBS TEST DIRECT: CPT

## 2018-06-08 PROCEDURE — 5A1D90Z PERFORMANCE OF URINARY FILTRATION, CONTINUOUS, GREATER THAN 18 HOURS PER DAY: ICD-10-PCS | Performed by: INTERNAL MEDICINE

## 2018-06-08 PROCEDURE — 83615 LACTATE (LD) (LDH) ENZYME: CPT

## 2018-06-08 RX ORDER — FENTANYL CITRATE 50 UG/ML
25 INJECTION, SOLUTION INTRAMUSCULAR; INTRAVENOUS
Status: DISCONTINUED | OUTPATIENT
Start: 2018-06-08 | End: 2018-06-11

## 2018-06-08 RX ORDER — SODIUM CHLORIDE 9 MG/ML
INJECTION, SOLUTION INTRAVENOUS CONTINUOUS PRN
Status: COMPLETED | OUTPATIENT
Start: 2018-06-08 | End: 2018-06-08

## 2018-06-08 RX ORDER — MIDAZOLAM HYDROCHLORIDE 1 MG/ML
2 INJECTION INTRAMUSCULAR; INTRAVENOUS
Status: DISCONTINUED | OUTPATIENT
Start: 2018-06-08 | End: 2018-06-11

## 2018-06-08 RX ORDER — MIDAZOLAM HYDROCHLORIDE 1 MG/ML
2 INJECTION INTRAMUSCULAR; INTRAVENOUS ONCE
Status: COMPLETED | OUTPATIENT
Start: 2018-06-08 | End: 2018-06-08

## 2018-06-08 RX ORDER — NALOXONE HYDROCHLORIDE 1 MG/ML
INJECTION INTRAMUSCULAR; INTRAVENOUS; SUBCUTANEOUS
Status: COMPLETED | OUTPATIENT
Start: 2018-06-08 | End: 2018-06-08

## 2018-06-08 RX ORDER — MAGNESIUM SULFATE 1 G/100ML
1 INJECTION INTRAVENOUS PRN
Status: DISCONTINUED | OUTPATIENT
Start: 2018-06-08 | End: 2018-06-12

## 2018-06-08 RX ORDER — PROPOFOL 10 MG/ML
10 INJECTION, EMULSION INTRAVENOUS CONTINUOUS
Status: DISCONTINUED | OUTPATIENT
Start: 2018-06-08 | End: 2018-06-11

## 2018-06-08 RX ORDER — NALOXONE HYDROCHLORIDE 1 MG/ML
INJECTION INTRAMUSCULAR; INTRAVENOUS; SUBCUTANEOUS
Status: DISPENSED
Start: 2018-06-08 | End: 2018-06-08

## 2018-06-08 RX ORDER — 0.9 % SODIUM CHLORIDE 0.9 %
250 INTRAVENOUS SOLUTION INTRAVENOUS ONCE
Status: COMPLETED | OUTPATIENT
Start: 2018-06-08 | End: 2018-06-09

## 2018-06-08 RX ORDER — DEXTROSE MONOHYDRATE 50 MG/ML
100 INJECTION, SOLUTION INTRAVENOUS PRN
Status: DISCONTINUED | OUTPATIENT
Start: 2018-06-08 | End: 2018-07-18 | Stop reason: HOSPADM

## 2018-06-08 RX ORDER — METHYLPREDNISOLONE SODIUM SUCCINATE 125 MG/2ML
INJECTION, POWDER, LYOPHILIZED, FOR SOLUTION INTRAMUSCULAR; INTRAVENOUS
Status: DISCONTINUED
Start: 2018-06-08 | End: 2018-06-09

## 2018-06-08 RX ORDER — METHYLPREDNISOLONE SODIUM SUCCINATE 125 MG/2ML
125 INJECTION, POWDER, LYOPHILIZED, FOR SOLUTION INTRAMUSCULAR; INTRAVENOUS EVERY 6 HOURS
Status: DISCONTINUED | OUTPATIENT
Start: 2018-06-08 | End: 2018-06-12

## 2018-06-08 RX ORDER — SODIUM CHLORIDE 9 MG/ML
INJECTION, SOLUTION INTRAVENOUS
Status: DISCONTINUED
Start: 2018-06-08 | End: 2018-06-09

## 2018-06-08 RX ORDER — SODIUM CHLORIDE 9 MG/ML
INJECTION, SOLUTION INTRAVENOUS CONTINUOUS
Status: DISCONTINUED | OUTPATIENT
Start: 2018-06-08 | End: 2018-06-08

## 2018-06-08 RX ORDER — PROPOFOL 10 MG/ML
INJECTION, EMULSION INTRAVENOUS
Status: DISPENSED
Start: 2018-06-08 | End: 2018-06-08

## 2018-06-08 RX ORDER — LIDOCAINE HYDROCHLORIDE 10 MG/ML
5 INJECTION, SOLUTION INFILTRATION; PERINEURAL ONCE
Status: DISCONTINUED | OUTPATIENT
Start: 2018-06-08 | End: 2018-06-22

## 2018-06-08 RX ORDER — ALBUMIN (HUMAN) 12.5 G/50ML
37.5 SOLUTION INTRAVENOUS ONCE
Status: DISCONTINUED | OUTPATIENT
Start: 2018-06-08 | End: 2018-06-08 | Stop reason: CLARIF

## 2018-06-08 RX ORDER — SODIUM CHLORIDE 0.9 % (FLUSH) 0.9 %
SYRINGE (ML) INJECTION
Status: DISPENSED
Start: 2018-06-08 | End: 2018-06-08

## 2018-06-08 RX ORDER — SODIUM CHLORIDE 0.9 % (FLUSH) 0.9 %
10 SYRINGE (ML) INJECTION EVERY 12 HOURS SCHEDULED
Status: DISCONTINUED | OUTPATIENT
Start: 2018-06-08 | End: 2018-06-22 | Stop reason: SDUPTHER

## 2018-06-08 RX ORDER — GLUCAGON 1 MG/ML
1 KIT INJECTION PRN
Status: DISCONTINUED | OUTPATIENT
Start: 2018-06-08 | End: 2018-06-09

## 2018-06-08 RX ORDER — CHLORHEXIDINE GLUCONATE 0.12 MG/ML
15 RINSE ORAL 2 TIMES DAILY
Status: DISCONTINUED | OUTPATIENT
Start: 2018-06-08 | End: 2018-06-11

## 2018-06-08 RX ORDER — SODIUM CHLORIDE 0.9 % (FLUSH) 0.9 %
10 SYRINGE (ML) INJECTION PRN
Status: DISCONTINUED | OUTPATIENT
Start: 2018-06-08 | End: 2018-06-22 | Stop reason: SDUPTHER

## 2018-06-08 RX ORDER — DEXTROSE MONOHYDRATE 25 G/50ML
12.5 INJECTION, SOLUTION INTRAVENOUS PRN
Status: DISCONTINUED | OUTPATIENT
Start: 2018-06-08 | End: 2018-06-09

## 2018-06-08 RX ORDER — NICOTINE POLACRILEX 4 MG
15 LOZENGE BUCCAL PRN
Status: DISCONTINUED | OUTPATIENT
Start: 2018-06-08 | End: 2018-06-09 | Stop reason: SDUPTHER

## 2018-06-08 RX ORDER — EPINEPHRINE 1 MG/ML
INJECTION, SOLUTION, CONCENTRATE INTRAVENOUS
Status: COMPLETED | OUTPATIENT
Start: 2018-06-08 | End: 2018-06-08

## 2018-06-08 RX ADMIN — PANTOPRAZOLE SODIUM 40 MG: 40 TABLET, DELAYED RELEASE ORAL at 16:39

## 2018-06-08 RX ADMIN — SODIUM CHLORIDE: 9 INJECTION, SOLUTION INTRAVENOUS at 12:26

## 2018-06-08 RX ADMIN — CHLORHEXIDINE GLUCONATE 15 ML: 0.12 RINSE ORAL at 15:15

## 2018-06-08 RX ADMIN — PROPOFOL 10 MCG/KG/MIN: 10 INJECTION, EMULSION INTRAVENOUS at 10:35

## 2018-06-08 RX ADMIN — NALOXONE HYDROCHLORIDE 0.4 MG: 1 INJECTION INTRAMUSCULAR; INTRAVENOUS; SUBCUTANEOUS at 09:59

## 2018-06-08 RX ADMIN — Medication 250 ML: at 12:45

## 2018-06-08 RX ADMIN — Medication: at 15:15

## 2018-06-08 RX ADMIN — CHLORHEXIDINE GLUCONATE 15 ML: 0.12 RINSE ORAL at 21:02

## 2018-06-08 RX ADMIN — Medication 50 MEQ: at 13:15

## 2018-06-08 RX ADMIN — PROPOFOL 50 MCG/KG/MIN: 10 INJECTION, EMULSION INTRAVENOUS at 21:00

## 2018-06-08 RX ADMIN — MIDAZOLAM HYDROCHLORIDE 2 MG: 1 INJECTION INTRAMUSCULAR; INTRAVENOUS at 22:11

## 2018-06-08 RX ADMIN — MIDAZOLAM HYDROCHLORIDE 2 MG: 1 INJECTION INTRAMUSCULAR; INTRAVENOUS at 10:35

## 2018-06-08 RX ADMIN — Medication: at 21:02

## 2018-06-08 RX ADMIN — PANTOPRAZOLE SODIUM 40 MG: 40 TABLET, DELAYED RELEASE ORAL at 06:32

## 2018-06-08 RX ADMIN — EPINEPHRINE 1 MG: 1 INJECTION, SOLUTION, CONCENTRATE INTRAVENOUS at 10:12

## 2018-06-08 RX ADMIN — SODIUM CHLORIDE 1000 ML: 9 INJECTION, SOLUTION INTRAVENOUS at 10:01

## 2018-06-08 RX ADMIN — DEXTROSE MONOHYDRATE 12.5 G: 25 INJECTION, SOLUTION INTRAVENOUS at 16:46

## 2018-06-08 RX ADMIN — MIDAZOLAM HYDROCHLORIDE 2 MG: 1 INJECTION INTRAMUSCULAR; INTRAVENOUS at 10:40

## 2018-06-08 RX ADMIN — Medication 10 ML: at 21:00

## 2018-06-08 RX ADMIN — SODIUM CHLORIDE 250 ML/HR: 9 INJECTION, SOLUTION INTRAVENOUS at 10:13

## 2018-06-08 RX ADMIN — METHYLPREDNISOLONE SODIUM SUCCINATE 125 MG: 125 INJECTION, POWDER, LYOPHILIZED, FOR SOLUTION INTRAMUSCULAR; INTRAVENOUS at 16:40

## 2018-06-08 RX ADMIN — METHYLPREDNISOLONE SODIUM SUCCINATE 125 MG: 125 INJECTION, POWDER, LYOPHILIZED, FOR SOLUTION INTRAMUSCULAR; INTRAVENOUS at 22:00

## 2018-06-08 ASSESSMENT — PAIN DESCRIPTION - PROGRESSION
CLINICAL_PROGRESSION: GRADUALLY WORSENING

## 2018-06-08 ASSESSMENT — PULMONARY FUNCTION TESTS
PIF_VALUE: 22
PIF_VALUE: 17
PIF_VALUE: 19

## 2018-06-08 NOTE — PROGRESS NOTES
6/8 [place] [facility] [place] [facility] Kyra Wu in xray to find jennifer freire for stat echo

## 2018-06-08 NOTE — PROGRESS NOTES
ID Follow-up NOTE    CC: tricuspid valve endocarditis    Subjective:     Patient gives no history. apparently complained of chest pain earlier. Objective:     Patient Vitals for the past 24 hrs:   BP Temp Temp src Pulse Resp SpO2 Height Weight   18 1100 (!) 152/138 98.6 °F (37 °C) Axillary 123 (!) 41 98 % - -   18 1037 - - - - - - 5' 2\" (1.575 m) -   18 1010 (!) 96/57 - - 136 30 100 % - -   18 1000 (!) 130/58 - - 65 21 - - -   18 0956 (!) 130/58 - - 103 28 - - -   18 0930 (!) 67/44 - - 124 - - - -   18 0720 116/74 100.8 °F (38.2 °C) Oral 96 22 97 % - -   18 0317 112/74 97.7 °F (36.5 °C) Oral 77 28 96 % - 103 lb 9.9 oz (47 kg)   18 0030 - 100.1 °F (37.8 °C) Oral - - - - -   18 2249 117/65 100.8 °F (38.2 °C) Oral 97 28 93 % - -   18 2040 (!) 102/56 99.2 °F (37.3 °C) Oral 90 (!) 32 93 % - -   18 1900 107/60 - - 89 (!) 34 - - -   18 1800 110/65 - - 91 (!) 32 93 % - -   18 1749 123/67 - - 92 (!) 31 94 % - -   18 1300 (!) 174/77 - - 108 (!) 34 94 % - -   18 1255 - - - - - - 5' 2\" (1.575 m) -   18 1236 - 98.1 °F (36.7 °C) - - - - - -   18 1214 - - - 107 - - - -   18 1200 (!) 173/102 - - 103 28 93 % - -     Temp (24hrs), Av.3 °F (37.4 °C), Min:97.7 °F (36.5 °C), Max:100.8 °F (38.2 °C)          EXAM:  General: extubated, tachypneic, doesn respond to questions or commands  HEENT: mild conj icterus      LUNGS: Resp minimally labored; clear to auscultation     CV: RR c gd II syst M     ABD: soft, non-tender, without mass     EXT: without edema; erythema R great toe.    Skin: no rash or stigmata of endocarditis      Data Review:    Lab Results   Component Value Date    WBC 13.1 (H) 2018    HGB 8.2 (L) 2018    HCT 23.9 (L) 2018    MCV 85.6 2018     (L) 2018     Lab Results   Component Value Date    CREATININE 4.0 (H) 2018    BUN 29 (H) 2018     (L) 06/08/2018    K 3.8 06/08/2018    CL 89 (L) 06/08/2018    CO2 21 06/08/2018     Lab Results   Component Value Date    ALT 12 06/05/2018    AST 17 06/05/2018    ALKPHOS 144 (H) 06/05/2018    BILITOT 0.5 06/05/2018         MICRO: 6/5 blood cultures both growing MRSA. One culture also growing strep while the other culture is also growing Enterobacter sensitive to cefepime. More recent blood cultures negative  IMAGING: RBC scan did not identifiy a source of bleeding    Assessment:     Active Problems:    HCAP (healthcare-associated pneumonia)    UGIB (upper gastrointestinal bleed)    Sepsis due to Streptococcus pneumoniae (HCC)    Moderate protein-calorie malnutrition (HCC)    PEA (Pulseless electrical activity) ([facility])  Resolved Problems:    * No resolved hospital problems. *  Tricuspid valve endocarditis due to MRSA and perhaps Strep and a gm negative salazar as well which are also being isolated from blood cultures. Would think this illness is mostly due to MRSA. The role of the streptococcus and the Enterobacter is less clear. Septic pulmonary emboli due to TV endocarditis. TI  Possible splenic infarct. Possible metastatic infection to R great toe    Events of this am note. Pt became hypotensive, unresponsive, had brief PEA arrest, was intubated and apparently self extubated a few minutes ago. Is tachypneic, BP fluctuating quite a bit. Question whether today's episode is related to recurrent septic pulmonary embolization of vegetation on tricuspid valve. Prognosis appears poor. Plan:   Vanc random level this am: 21.2  Repeat blood cultures in progress.    Continue vancomycin  Switch meropenem to cefepime  CRRT planned according to nurse: will start vanco 1 gm q24h if that is done and switch cefepime to 2 gms q12h

## 2018-06-08 NOTE — PROGRESS NOTES
Patient re-intubated per Dr. Olvera Nim. &.5 replaced at same location, 23 at [facility]. Tolerated well. SPO2 maintained at 100%.

## 2018-06-08 NOTE — CONSULTS
HEMATOLOGY/ONCOLOGY CONSULTATION:     6/8/2018 2:06 PM    REASON FOR CONSULT: Anemia    PROVIDERS:  Vickie Turpin, SHREYA - CNP    CHIEF COMPLAINT:     Chief Complaint   Patient presents with    Fever     pt. states she has had fever x4 days tmax 104    Shortness of Breath     pt. reporting \"pain in my legs\" left arm and shortness of breath with cough x4 days. HISTORY OF PRESENT ILLNESS:     HPI:  34 WF with pmh ESRD on dialysis, chronic anemia on Procrit, Hep C, IVDA, endocarditis. Pt is admitted with a 3rd bout of endocarditis. She is intubated and in the ICU and actually had a code blue earlier today. Her Hgb at baseline is in the 8-9 range. It dropped to 4.7 this admission. She had hematemesis and blood in the stool. EGD revealed an angiodysplasia which was cauterized.     PAST MEDICAL HISTORY:     Past Medical History:   Diagnosis Date    Asthma     Depression     ESRD (end stage renal disease) on dialysis ([facility] [address]     M/W/F    Hepatitis C antibody positive in blood 03/22/2017    History of blood transfusion     Hypertension     Liver disease     MRSA (methicillin resistant staph aureus) culture positive 6/5/18, 03/12/2017    +blood culture     MRSA (methicillin resistant staph aureus) culture positive 07/31/2017    info from [facility] - positive nasal screen    PTSD (post-traumatic stress disorder)     Seizures ([facility] [address]     8/8/2017       PAST SURGICAL HISTORY:        Past Surgical History:   Procedure Laterality Date    TONSILLECTOMY      UPPER GASTROINTESTINAL ENDOSCOPY  06/06/2018    Gastric Angiodysplasia       SOCIAL HISTORY:     Social History     Social History    Marital status: Single     Spouse name: N/A    Number of children: N/A    Years of education: N/A     Occupational History    NA      Social History Main Topics    Smoking status: Current Every Day Smoker     Packs/day: 0.25     Years: 4.00     Types: Cigarettes    Smokeless tobacco: Never Used      Comment: pt states 06/08/2018    BUN 31 (H) 06/08/2018    CREATININE 5.1 (HH) 06/08/2018    K 5.8 (H) 06/08/2018    PHOS 7.2 (H) 06/08/2018       Lab Results   Component Value Date    ALKPHOS 574 (H) 06/08/2018     (H) 06/08/2018     (H) 06/08/2018    BILITOT 2.5 (H) 06/08/2018    BILIDIR 2.2 (H) 06/08/2018    PROT 5.3 (L) 06/08/2018       Lab Results   Component Value Date    PROTIME 12.2 06/08/2018    INR 1.07 06/08/2018       IMAGING:     Xr Foot Right (2 Views)  Result Date: 6/7/2018  Lucency through the medial sesamoid most consistent with bipartite medial sesamoid in the absence of history of trauma. This can be associated with pain. Nm Gi Blood Loss  Result Date: 6/6/2018  No evidence of active GI bleeding during acquisition. RECOMMENDATIONS: If the patient shows hemodynamic signs of an active bleed in the next 20 hours, additional images can be acquired. Xr Chest Portable  Result Date: 6/8/2018  Multifocal pneumonia. Cta Abdomen Pelvis W Wo Contrast  Result Date: 6/6/2018  No active gastrointestinal hemorrhage is identified. No vascular abnormality is appreciated. Right lower lobe pneumonia as well as cavitary nodules. Septic emboli are consideration. Compared with 11/26/2017, there is waxing and waning airspace disease and pulmonary nodules. Organizing pneumonia is an alternative possibility. Hepatosplenomegaly. Hepatomegaly is similar to 11/26/2017. Splenomegaly is new. Hypodensity in the spleen, suggestive of acute to subacute embolic infarction. Small amount of ascites, nonspecific. Hyperdensity of the gallbladder wall. This may represent mural calcification. Etiology is unclear. There is variable association with gallbladder wall calcification and risk for gallbladder carcinoma. Stable cardiomegaly. Lumbar spine and visualized osseous structures appear intact. ASSESSMENT:     1. Bacteremia/Endocarditis  2. HCAP  3. Severe Anemia  4. ESRD on dialysis  5. Hep C  6. IVDA  7.  S/P Code Blue/PEA    PLAN:     1. Anemia  - suspect multifactorial  - baseline ACD with ESRD and Hep C - d/w nephrology, is chronically on Procrit  - suspect acute drop from GIB given hematemesis and melena  - EGD revealed angiodysplasia which was cauterized  - sepsis also likely contributing  - hemolysis also a concern   - check iron studies, STR, B12, folate, Epo, Julia, hemolytic panel  - transfuse for Hgb < 7  - if VICKIE, start Venofer    2. Leukocytosis  - suspect reactive from infection    Uli Campos MD     Addendum:    Polyspecific Julia   DIRECT ANTIGLOBULIN TEST   Collected: 06/06/18 0103   Resulting lab: [facility]   Value: POS   Comment:    *Additional information available - narrative     Julia (+). Will start solumederol. D/W Dr. Alta Marsh.     Uli Campos MD

## 2018-06-08 NOTE — PROGRESS NOTES
degrees or higher at all times  Daily spontaneous breathing trial once PEEP less than 8, FiO2 less than 55%. · Continue Vanc, Merrem  - ID following  · Limited TTE ruled out pericardial effusion  · See HPI for Code blue notes. · Recheck all labs   · PPI gtt per GI  · HD today vs CRRT depending on BP  · PICC for prolonged need for antibioitcs  · Change tunneled catheter after a period of antibiotics and negative blood cultures  · SCD  Due to at least single organ failure and risk of rapid deterioration, I spent 75 minutes of Critical care time reviewing labs/films, examining patient, collaborating with other physicians. This does not include time performing critical procedures.

## 2018-06-08 NOTE — PROGRESS NOTES
PCA called RN into room for patient \"not looking good\" and complaining of chest pain. Pt pale,  RR 40s, diaphoretic, tachycardic, and tachypneic. Charge RN and clinical notified. 250 ml bolus inititated per MD order, stat EKG, CXR, and POC glucose ordered. Patient continues to complain of chest pain and states she can't breathe. Denies any drug use at this time. Glucose was 231, BP 67/44, HR 130s-140s. Patient lost control of bowels. Patient placed on 2L O2 per NC and transferred to ICU.

## 2018-06-08 NOTE — FLOWSHEET NOTE
06/08/18 1245   Vital Signs   Temp 97.4 °F (36.3 °C)   Temp Source Axillary   Pulse 93   Resp (!) 38   BP (!) 63/38   MAP (mmHg) 47   Level of Consciousness 2   MEWS Score 8     MD paged at this time regarding hypotension, RR and PICC team unable to place line until after 1400.

## 2018-06-08 NOTE — PROCEDURES
Procedure: central venous access with TLC, Left subclavian    Indication: invasive hemodynamic monitoring, frequent blood draws, ensure stable IV access     Informed Consent: was obtained from family    Time Out: taken   Procedure: Sterile prep with chlorhexidine. Full maximum sterile field/barrier technique was followed (with cap and mask and sterile gown and large sterile sheet and hand hygeine and 2% chlorhexidine). Aqueous lidocaine anesthetic. I placed the left subclavian central venous catheter using anatomic landmarks and modified seldinger technique. Good dark venous blood from all 3 lumens. CXR pending. No immediate complication.    Recommendation: remove central line at earliest time feasible to mitigate infectious risks

## 2018-06-08 NOTE — PROGRESS NOTES
ABG's drawn from Left Radial x1 attempt. Site prepped using proper technique. Modified Boris's test performed and positive. Pressure held on site for 5 minutes after procedure. Pt tolerated well- unable to obtain 1cc- EPOC used.

## 2018-06-08 NOTE — PROCEDURES
ENDOTRACHEAL INTUBATION    INDICATION: Life threatening respiratory failure    TIME OUT: non, emergent    SEDATION: none    PROCEDURE: Using video laryngoscopy, the vocal cords were well visualized and a 7.5 mm endotracheal tube was place directly through the cords. Good breath sounds auscultated bilaterally without sounds over abdomen. Good return of CO2 on the monitor. CXR is pending.

## 2018-06-08 NOTE — PROCEDURES
ENDOTRACHEAL INTUBATION    INDICATION: Life threatening respiratory failure    TIME OUT: emergent    SEDATION: etomidate    PROCEDURE: Using video laryngoscopy, the vocal cords were well visualized and a 7.5 mm endotracheal tube was place directly through the cords. Good breath sounds auscultated bilaterally without sounds over abdomen. Good return of CO2 on the monitor. CXR is pending.

## 2018-06-08 NOTE — PROGRESS NOTES
Spoke with [facility] L IJ CVC okay to use. Recommended that ETT be pull back 3 cm- Dr. Maricarmen White here on unit & only wants its retracted 1 cm. RT called and made aware.   Karli Adams RN, BSN

## 2018-06-08 NOTE — PROGRESS NOTES
4 Eyes Skin Assessment     The patient is being assess for   Transfer to New Unit    I agree that 2 RN's have performed a thorough Head to Toe Skin Assessment on the patient. ALL assessment sites listed below have been assessed. Areas assessed by both nurses:   [x]   Head, Face, and Ears   [x]   Shoulders, Back, and Chest, Abdomen  [x]   Arms, Elbows, and Hands   [x]   Coccyx, Sacrum, and Ischium  [x]   Legs, Feet, and Heels        Redness to right foot, mepilex in place, coccyx pink and blanchable. **SHARE this note so that the co-signing nurse is able to place an eSignature**    Co-signer eSignature: Electronically signed by Delilah Lopez RN on 6/8/18 at 7:06 PM    Does the Patient have Skin Breakdown?   No          Hipolito Prevention initiated:  No   Wound Care Orders initiated:  No      [facility] nurse consulted for Pressure Injury (Stage 3,4, Unstageable, DTI, NWPT, Complex wounds)and New or Established Ostomies:  No      Primary Nurse eSignature: Electronically signed by Abel Carranza RN on 6/8/18 at 7:03 PM

## 2018-06-08 NOTE — FLOWSHEET NOTE
06/07/18 2040   Vital Signs   Temp 99.2 °F (37.3 °C)   Temp Source Oral   Pulse 90   Heart Rate Source Monitor   Resp (!) 32   BP (!) 102/56   BP Location Right upper arm   BP Upper/Lower Upper   Patient Position Semi fowlers   Level of Consciousness 0   MEWS Score 3   Oxygen Therapy   SpO2 93 %   O2 Device None (Room air)     Patient resting in bed, A&Ox4. Held BP medications at this time. NSR on telemetry. Currently on RA. No further needs indicated at this time. Bed in low position with wheels locked. Call light in reach. Will continue to monitor.

## 2018-06-08 NOTE — PROGRESS NOTES
Nutrition Assessment    Type and Reason for Visit: Reassess    Nutrition Recommendations:   1. Continue NPO status until patient is medically cleared for EN while intubated or po diet order, if extubated. 2. TF recommendations, if needed - Nepro with a goal rate of 40 ml/hr x 20 hours. Start with 20 ml/hr and increase initial rate by 20 ml every 4-6 hours, as tolerated by patient, until goal rate can be achieved and maintained. Water flushes, 60 ml every 6 hours or per MD guidance. 3. Monitor vent status, sedation type/amount, mental status, and plan of care. 4. Monitor for additional in-patient weight changes during this admission. 5. Monitor nutrition-related labs, bowel activity/regimen, and fluid/electrolyte management. Malnutrition Assessment:  · Malnutrition Status: Meets the criteria for moderate malnutrition  · Context: Acute illness or injury  · Findings of the 6 clinical characteristics of malnutrition (Minimum of 2 out of 6 clinical characteristics is required to make the diagnosis of moderate or severe Protein Calorie Malnutrition based on AND/ASPEN Guidelines):  1. Energy Intake-Less than or equal to 75%, greater than or equal to 3 months    2. Weight Loss-20% loss or greater (- 27# or 20.8% weight loss),  (x 2 days admission)  3. Fat Loss-Mild subcutaneous fat loss, Orbital, Triceps  4. Muscle Loss-Mild muscle mass loss, Temples (temporalis muscle), Clavicles (pectoralis and deltoids)  5. Fluid Accumulation-No significant fluid accumulation,    6.  Strength-Not measured    Nutrition Diagnosis:   · Problem:  Moderate malnutrition  · Etiology: related to Insufficient energy/nutrient consumption, Renal dysfunction, Psychological cause/life stress, Cardiac dysfunction, Alteration in GI function, Diarrhea     Signs and symptoms:  as evidenced by Weight loss, Lab values, GI abnormality, Diarrhea, Known losses from dialysis, Other (mild fat and muscle loss)    Nutrition

## 2018-06-08 NOTE — PROGRESS NOTES
2940 ml   Net            -1955 ml       EXAM:  General: Sick appearing on the ventilator now   Eyes: PERRL. No sclera icterus. No conjunctiva injected. ENT: No discharge. Neck: Trachea midline. Normal thyroid. Resp: + accessory muscle use. Few crackles. No wheezing. No rhonchi. No dullness on percussion. CV: Increased rate. Regular rhythm. + mumur no rub. No edema. No JVD. Palpable pedal pulses. GI: Non-tender. Non-distended. No masses. No organmegaly. Normal bowel sounds. No hernia. Skin: Warm and dry. No nodule on exposed extremities. No rash on exposed extremities. Lymph: No cervical LAD. No supraclavicular LAD. M/S: No cyanosis. No clubbing. Right first MTP is swollen and tender  Neuro: Awake. Follows commands. Psych: BRIGHT    Medications:  Scheduled Meds:   naloxone        sodium chloride flush        propofol        pantoprazole  40 mg Oral BID AC    cloNIDine  0.1 mg Oral TID    isosorbide mononitrate  60 mg Oral Daily    labetalol  400 mg Oral TID    sevelamer  800 mg Oral TID WC    sodium chloride flush  10 mL Intravenous 2 times per day    meropenem  500 mg Intravenous Q24H    mupirocin   Nasal BID    sodium chloride  250 mL Intravenous Once    darbepoetin emi-polysorbate  100 mcg Intravenous Weekly       PRN Meds:  sodium chloride flush, ondansetron, acetaminophen, heparin (porcine), promethazine, hydrOXYzine    Results:  CBC:   Recent Labs      06/05/18   2224   06/06/18 1932 06/07/18 0455 06/08/18   0432   WBC  14.1*   --    --   16.5*  13.1*   HGB  7.3*   < >  9.5*  8.9*  8.2*   HCT  21.6*   < >  28.0*  26.6*  23.9*   MCV  86.3   --    --   86.3  85.6   PLT  144   --    --   122*  129*    < > = values in this interval not displayed.      BMP:   Recent Labs      06/06/18 1932 06/07/18 0455 06/08/18   0431   NA  137  135*  123*   K  3.5  4.4  3.8   CL  99  97*  89*   CO2  21  23  21   BUN  15  31*  29*   CREATININE  3.0*  4.6*  4.0*     LIVER PROFILE:   Recent Enterobacter cloacae. Repeat BC sent. #  Bacteremia from poly microbial organism. Source of infection could be tunneled dialysis catheter and her endocarditis. This is from underlying IV drug abuse. She relapsed using IV drugs 2 months ago. #  End stage renal disease due to prior infection of the kidney from an infection from IV drug use. Continue hemodialysis. #  Monitor blood pressure closely. #  Patient has tenderness and pain of the right first metatarsophalangeal joint. Xray is negative. All questions and concerns were addressed to the patient/family. Alternatives to my treatment were discussed. The note was completed using EMR. Every effort was made to ensure accuracy; however, inadvertent computerized transcription errors may be present.          Darrick Comment 10:30 AM 6/8/2018

## 2018-06-08 NOTE — PROGRESS NOTES
Dr. Benton Reversrini paged in regards to ABG results & H/H results. Awaiting call back.   Donte Mahmood RN, BSN

## 2018-06-08 NOTE — FLOWSHEET NOTE
06/08/18 1100   Vital Signs   Temp 98.6 °F (37 °C)   Temp Source Axillary   Pulse 123   Heart Rate Source Monitor   Resp (!) 41   BP (!) 152/138   MAP (mmHg) 143   Level of Consciousness 0   MEWS Score 5   Oxygen Therapy   SpO2 98 %   O2 Device Ventilator   FiO2  50 %     Pt agitated, now hypertensive. Propofol infusing at 5mcg/kg/hr, mother at bedside. All lines and monitoring devices in place.

## 2018-06-08 NOTE — CODE DOCUMENTATION
Pt arrived to ICU RM 5 from PCU Rm 314 with agonal breathing noted. Pupils fixed and dilated. Not responsive at this time. Patient covered in stool. Cardiac monitor applied. Unable to palpate a pulse. CPR initiated per Clinical adm. Dr. Roberto Yan and Dr. Stacey Chávez at bedside. Code blue phoned over head.

## 2018-06-08 NOTE — PROGRESS NOTES
PROGRESS NOTE  S:28 yrs Patient  admitted on 6/6/2018 with GI BLEED . Today she complains of chest pain today    Exam:   Vitals:    06/08/18 1200   BP: (!) 65/48   Pulse: 109   Resp: (!) 50   Temp:    SpO2: 100%      General appearance: fatigued and flushed  HEENT: PERRLA  Neck: no adenopathy, no carotid bruit, no JVD, supple, symmetrical, trachea midline and thyroid not enlarged, symmetric, no tenderness/mass/nodules  Lungs: diminished breath sounds bilaterally  Heart: regular rate and rhythm, S1, S2 normal, no murmur, click, rub or gallop  Abdomen: soft, non-tender; bowel sounds normal; no masses,  no organomegaly  Extremities: extremities normal, atraumatic, no cyanosis or edema     Medications: Reviewed    Labs:  CBC:   Recent Labs      06/07/18   0455  06/08/18   0432  06/08/18   1125  06/08/18   1221   WBC  16.5*  13.1*  21.4*   --    HGB  8.9*  8.2*  4.7*  5.3*   HCT  26.6*  23.9*  16.4*  18.4*   MCV  86.3  85.6  101.4*   --    PLT  122*  129*  98*   --      BMP:   Recent Labs      06/07/18   0455  06/08/18   0431  06/08/18   1125   NA  135*  123*  125*   K  4.4  3.8  5.5*   CL  97*  89*  94*   CO2  23  21  8*   PHOS   --    --   6.6*   BUN  31*  29*  31*   CREATININE  4.6*  4.0*  5.2*     LIVER PROFILE:   Recent Labs      06/05/18   2224  06/08/18   1125   AST  17  193*   ALT  12  105*   PROT  7.7  5.3*   BILIDIR   --   2.2*   BILITOT  0.5  2.5*   ALKPHOS  144*  574*     PT/INR:   Recent Labs      06/05/18   2245  06/06/18   0651  06/07/18   0455   INR  1.46*  1.60*  1.25*       IMAGING:      Impression:ANEMIA WITHOUT BLEED IN LAST 36 HRS  ENDOCARDITIS  ANGIODYSPLASIA OF STOMACH CAUTERISED  RESPIRATORY ARREST/CARDIAC EVENT TODAY    Recommendation:  1.  Mario Santana MD  12:34 PM 6/8/2018

## 2018-06-08 NOTE — PROGRESS NOTES
Pt self -extubated shortly after intubation after the code blue . Since then has had spo2 100% on nasal cannula but fluctuating mental status and BP and increased work of breathing. She will need to be reintubated and a central line placed and CRRT started.

## 2018-06-08 NOTE — PROGRESS NOTES
Bedside report given to [facility]. Care transferred at this time.   Madison Raza RN

## 2018-06-09 LAB
A/G RATIO: 0.6 (ref 1.1–2.2)
ABO/RH: NORMAL
ALBUMIN SERPL-MCNC: 1.4 G/DL (ref 3.4–5)
ALBUMIN SERPL-MCNC: 2.2 G/DL (ref 3.4–5)
ALBUMIN SERPL-MCNC: 2.4 G/DL (ref 3.4–5)
ALBUMIN SERPL-MCNC: 2.7 G/DL (ref 3.4–5)
ALBUMIN SERPL-MCNC: 2.8 G/DL (ref 3.4–5)
ALP BLD-CCNC: 436 U/L (ref 40–129)
ALT SERPL-CCNC: 949 U/L (ref 10–40)
ANION GAP SERPL CALCULATED.3IONS-SCNC: 16 MMOL/L (ref 3–16)
ANION GAP SERPL CALCULATED.3IONS-SCNC: 19 MMOL/L (ref 3–16)
ANION GAP SERPL CALCULATED.3IONS-SCNC: 26 MMOL/L (ref 3–16)
ANION GAP SERPL CALCULATED.3IONS-SCNC: 27 MMOL/L (ref 3–16)
ANION GAP SERPL CALCULATED.3IONS-SCNC: 33 MMOL/L (ref 3–16)
ANION GAP SERPL CALCULATED.3IONS-SCNC: 34 MMOL/L (ref 3–16)
ANTIBODY SCREEN: NORMAL
AST SERPL-CCNC: 2716 U/L (ref 15–37)
BASE EXCESS ARTERIAL: -12.8 MMOL/L (ref -3–3)
BASE EXCESS ARTERIAL: -17.7 MMOL/L (ref -3–3)
BASE EXCESS ARTERIAL: 2.9 MMOL/L (ref -3–3)
BILIRUB SERPL-MCNC: 2.9 MG/DL (ref 0–1)
BLOOD BANK DISPENSE STATUS: NORMAL
BLOOD BANK PRODUCT CODE: NORMAL
BLOOD CULTURE, ROUTINE: ABNORMAL
BLOOD CULTURE, ROUTINE: ABNORMAL
BPU ID: NORMAL
BUN BLDV-MCNC: 21 MG/DL (ref 7–20)
BUN BLDV-MCNC: 22 MG/DL (ref 7–20)
BUN BLDV-MCNC: 22 MG/DL (ref 7–20)
BUN BLDV-MCNC: 24 MG/DL (ref 7–20)
BUN BLDV-MCNC: 24 MG/DL (ref 7–20)
BUN BLDV-MCNC: 26 MG/DL (ref 7–20)
CALCIUM IONIZED: 0.69 MMOL/L (ref 1.12–1.32)
CALCIUM IONIZED: 0.73 MMOL/L (ref 1.12–1.32)
CALCIUM IONIZED: 0.87 MMOL/L (ref 1.12–1.32)
CALCIUM IONIZED: 0.95 MMOL/L (ref 1.12–1.32)
CALCIUM IONIZED: 0.96 MMOL/L (ref 1.12–1.32)
CALCIUM IONIZED: 0.98 MMOL/L (ref 1.12–1.32)
CALCIUM SERPL-MCNC: 6.5 MG/DL (ref 8.3–10.6)
CALCIUM SERPL-MCNC: 6.8 MG/DL (ref 8.3–10.6)
CALCIUM SERPL-MCNC: 7.5 MG/DL (ref 8.3–10.6)
CALCIUM SERPL-MCNC: 8.4 MG/DL (ref 8.3–10.6)
CALCIUM SERPL-MCNC: 8.4 MG/DL (ref 8.3–10.6)
CALCIUM SERPL-MCNC: 8.6 MG/DL (ref 8.3–10.6)
CARBOXYHEMOGLOBIN ARTERIAL: 0.5 % (ref 0–1.5)
CARBOXYHEMOGLOBIN ARTERIAL: 0.6 % (ref 0–1.5)
CARBOXYHEMOGLOBIN ARTERIAL: 1.1 % (ref 0–1.5)
CHLORIDE BLD-SCNC: 94 MMOL/L (ref 99–110)
CHLORIDE BLD-SCNC: 95 MMOL/L (ref 99–110)
CHLORIDE BLD-SCNC: 95 MMOL/L (ref 99–110)
CHLORIDE BLD-SCNC: 96 MMOL/L (ref 99–110)
CHLORIDE BLD-SCNC: 96 MMOL/L (ref 99–110)
CHLORIDE BLD-SCNC: 97 MMOL/L (ref 99–110)
CO2: 10 MMOL/L (ref 21–32)
CO2: 11 MMOL/L (ref 21–32)
CO2: 14 MMOL/L (ref 21–32)
CO2: 22 MMOL/L (ref 21–32)
CO2: 25 MMOL/L (ref 21–32)
CO2: 8 MMOL/L (ref 21–32)
CREAT SERPL-MCNC: 1.2 MG/DL (ref 0.6–1.1)
CREAT SERPL-MCNC: 1.5 MG/DL (ref 0.6–1.1)
CREAT SERPL-MCNC: 1.9 MG/DL (ref 0.6–1.1)
CREAT SERPL-MCNC: 2.4 MG/DL (ref 0.6–1.1)
CREAT SERPL-MCNC: 2.4 MG/DL (ref 0.6–1.1)
CREAT SERPL-MCNC: 2.6 MG/DL (ref 0.6–1.1)
DESCRIPTION BLOOD BANK: NORMAL
EKG ATRIAL RATE: 106 BPM
EKG ATRIAL RATE: 396 BPM
EKG DIAGNOSIS: NORMAL
EKG DIAGNOSIS: NORMAL
EKG P AXIS: 76 DEGREES
EKG P AXIS: 78 DEGREES
EKG P-R INTERVAL: 222 MS
EKG Q-T INTERVAL: 336 MS
EKG Q-T INTERVAL: 346 MS
EKG QRS DURATION: 110 MS
EKG QRS DURATION: 84 MS
EKG QTC CALCULATION (BAZETT): 444 MS
EKG QTC CALCULATION (BAZETT): 446 MS
EKG R AXIS: 56 DEGREES
EKG R AXIS: 77 DEGREES
EKG T AXIS: 61 DEGREES
EKG T AXIS: 72 DEGREES
EKG VENTRICULAR RATE: 106 BPM
EKG VENTRICULAR RATE: 99 BPM
FIBRINOGEN: 144 MG/DL (ref 200–397)
FIBRINOGEN: 45 MG/DL (ref 200–397)
FIBRINOGEN: 61 MG/DL (ref 200–397)
FOLATE: >20 NG/ML (ref 4.78–24.2)
GFR AFRICAN AMERICAN: 27
GFR AFRICAN AMERICAN: 30
GFR AFRICAN AMERICAN: 30
GFR AFRICAN AMERICAN: 39
GFR AFRICAN AMERICAN: 51
GFR AFRICAN AMERICAN: >60
GFR NON-AFRICAN AMERICAN: 22
GFR NON-AFRICAN AMERICAN: 24
GFR NON-AFRICAN AMERICAN: 24
GFR NON-AFRICAN AMERICAN: 32
GFR NON-AFRICAN AMERICAN: 42
GFR NON-AFRICAN AMERICAN: 54
GLOBULIN: 2.3 G/DL
GLUCOSE BLD-MCNC: 199 MG/DL (ref 70–99)
GLUCOSE BLD-MCNC: 203 MG/DL (ref 70–99)
GLUCOSE BLD-MCNC: 219 MG/DL (ref 70–99)
GLUCOSE BLD-MCNC: 22 MG/DL (ref 70–99)
GLUCOSE BLD-MCNC: 221 MG/DL (ref 70–99)
GLUCOSE BLD-MCNC: 247 MG/DL (ref 70–99)
GLUCOSE BLD-MCNC: 281 MG/DL (ref 70–99)
GLUCOSE BLD-MCNC: 288 MG/DL (ref 70–99)
GLUCOSE BLD-MCNC: 292 MG/DL (ref 70–99)
GLUCOSE BLD-MCNC: 54 MG/DL (ref 70–99)
HCO3 ARTERIAL: 13.6 MMOL/L (ref 21–29)
HCO3 ARTERIAL: 24.9 MMOL/L (ref 21–29)
HCO3 ARTERIAL: 7.1 MMOL/L (ref 21–29)
HCT VFR BLD CALC: 19.5 % (ref 36–48)
HCT VFR BLD CALC: 21.1 % (ref 36–48)
HCT VFR BLD CALC: 21.3 % (ref 36–48)
HCT VFR BLD CALC: 26.5 % (ref 36–48)
HCT VFR BLD CALC: 27.7 % (ref 36–48)
HCT VFR BLD CALC: 34.6 % (ref 36–48)
HEMOGLOBIN, ART, EXTENDED: 5.5 G/DL (ref 12–16)
HEMOGLOBIN, ART, EXTENDED: 8.8 G/DL (ref 12–16)
HEMOGLOBIN, ART, EXTENDED: 9.5 G/DL (ref 12–16)
HEMOGLOBIN: 11.7 G/DL (ref 12–16)
HEMOGLOBIN: 3.8 GM/DL (ref 12–16)
HEMOGLOBIN: 6.3 G/DL (ref 12–16)
HEMOGLOBIN: 7.2 G/DL (ref 12–16)
HEMOGLOBIN: 9.1 G/DL (ref 12–16)
HEMOGLOBIN: 9.2 G/DL (ref 12–16)
IMMATURE RETIC FRACT: 0.58 (ref 0.21–0.37)
INR BLD: 2.12 (ref 0.86–1.14)
INR BLD: 2.95 (ref 0.86–1.14)
INR BLD: 3.55 (ref 0.86–1.14)
INR BLD: 4.42 (ref 0.86–1.14)
LACTIC ACID: 18.2 MMOL/L (ref 0.4–2)
LACTIC ACID: 24.4 MMOL/L (ref 0.4–2)
LACTIC ACID: 6 MMOL/L (ref 0.4–2)
LACTIC ACID: 8.1 MMOL/L (ref 0.4–2)
MAGNESIUM: 2.1 MG/DL (ref 1.8–2.4)
MAGNESIUM: 2.2 MG/DL (ref 1.8–2.4)
MAGNESIUM: 2.3 MG/DL (ref 1.8–2.4)
MAGNESIUM: 2.9 MG/DL (ref 1.8–2.4)
MAGNESIUM: 3 MG/DL (ref 1.8–2.4)
MCH RBC QN AUTO: 29.3 PG (ref 26–34)
MCH RBC QN AUTO: 29.4 PG (ref 26–34)
MCH RBC QN AUTO: 29.5 PG (ref 26–34)
MCH RBC QN AUTO: 30.2 PG (ref 26–34)
MCHC RBC AUTO-ENTMCNC: 32 G/DL (ref 31–36)
MCHC RBC AUTO-ENTMCNC: 32.9 G/DL (ref 31–36)
MCHC RBC AUTO-ENTMCNC: 33.8 G/DL (ref 31–36)
MCHC RBC AUTO-ENTMCNC: 34.5 G/DL (ref 31–36)
MCV RBC AUTO: 85.3 FL (ref 80–100)
MCV RBC AUTO: 86.7 FL (ref 80–100)
MCV RBC AUTO: 89.4 FL (ref 80–100)
MCV RBC AUTO: 94.2 FL (ref 80–100)
METHEMOGLOBIN ARTERIAL: 0.4 %
METHEMOGLOBIN ARTERIAL: 0.4 %
METHEMOGLOBIN ARTERIAL: 1.4 %
O2 CONTENT ARTERIAL: 13 ML/DL
O2 CONTENT ARTERIAL: 13 ML/DL
O2 CONTENT ARTERIAL: 5 ML/DL
O2 SAT, ARTERIAL: 58.4 %
O2 SAT, ARTERIAL: 98.2 %
O2 SAT, ARTERIAL: 99.6 %
O2 THERAPY: ABNORMAL
ORGANISM: ABNORMAL
PCO2 ARTERIAL: 15.8 MMHG (ref 35–45)
PCO2 ARTERIAL: 29.3 MMHG (ref 35–45)
PCO2 ARTERIAL: 33.8 MMHG (ref 35–45)
PDW BLD-RTO: 15.5 % (ref 12.4–15.4)
PDW BLD-RTO: 15.6 % (ref 12.4–15.4)
PDW BLD-RTO: 15.9 % (ref 12.4–15.4)
PDW BLD-RTO: 16.5 % (ref 12.4–15.4)
PERFORMED ON: ABNORMAL
PH ARTERIAL: 7.22 (ref 7.35–7.45)
PH ARTERIAL: 7.27 (ref 7.35–7.45)
PH ARTERIAL: 7.55 (ref 7.35–7.45)
PH VENOUS: 7.14 (ref 7.35–7.45)
PH VENOUS: 7.18 (ref 7.35–7.45)
PH VENOUS: 7.31 (ref 7.35–7.45)
PH VENOUS: 7.38 (ref 7.35–7.45)
PH VENOUS: 7.44 (ref 7.35–7.45)
PH VENOUS: 7.51 (ref 7.35–7.45)
PHOSPHORUS: 11.2 MG/DL (ref 2.5–4.9)
PHOSPHORUS: 4.6 MG/DL (ref 2.5–4.9)
PHOSPHORUS: 6 MG/DL (ref 2.5–4.9)
PHOSPHORUS: 6 MG/DL (ref 2.5–4.9)
PHOSPHORUS: 7.9 MG/DL (ref 2.5–4.9)
PLATELET # BLD: 18 K/UL (ref 135–450)
PLATELET # BLD: 23 K/UL (ref 135–450)
PLATELET # BLD: 59 K/UL (ref 135–450)
PLATELET # BLD: 82 K/UL (ref 135–450)
PLATELET SLIDE REVIEW: ABNORMAL
PLATELET SLIDE REVIEW: ABNORMAL
PMV BLD AUTO: 8.5 FL (ref 5–10.5)
PMV BLD AUTO: 8.8 FL (ref 5–10.5)
PMV BLD AUTO: 8.9 FL (ref 5–10.5)
PMV BLD AUTO: 9.1 FL (ref 5–10.5)
PO2 ARTERIAL: 301.3 MMHG (ref 75–108)
PO2 ARTERIAL: 35.7 MMHG (ref 75–108)
PO2 ARTERIAL: 98.6 MMHG (ref 75–108)
POC HEMATOCRIT: 11 % (ref 36–48)
POC POTASSIUM: 5.2 MMOL/L (ref 3.5–5.1)
POC SAMPLE TYPE: ABNORMAL
POC SODIUM: 147 MMOL/L (ref 136–145)
POTASSIUM REFLEX MAGNESIUM: 5.5 MMOL/L (ref 3.5–5.1)
POTASSIUM REFLEX MAGNESIUM: 6.5 MMOL/L (ref 3.5–5.1)
POTASSIUM SERPL-SCNC: 4 MMOL/L (ref 3.5–5.1)
POTASSIUM SERPL-SCNC: 4.2 MMOL/L (ref 3.5–5.1)
POTASSIUM SERPL-SCNC: 4.9 MMOL/L (ref 3.5–5.1)
POTASSIUM SERPL-SCNC: 6.4 MMOL/L (ref 3.5–5.1)
PROTHROMBIN TIME: 24.2 SEC (ref 9.8–13)
PROTHROMBIN TIME: 33.6 SEC (ref 9.8–13)
PROTHROMBIN TIME: 40.5 SEC (ref 9.8–13)
PROTHROMBIN TIME: 50.4 SEC (ref 9.8–13)
RBC # BLD: 2.07 M/UL (ref 4–5.2)
RBC # BLD: 3.1 M/UL (ref 4–5.2)
RBC # BLD: 3.11 M/UL (ref 4–5.2)
RBC # BLD: 3.98 M/UL (ref 4–5.2)
RETICULOCYTE ABSOLUTE COUNT: 0.03 M/UL (ref 0.02–0.1)
RETICULOCYTE COUNT PCT: 1.18 % (ref 0.5–2.18)
SLIDE REVIEW: ABNORMAL
SLIDE REVIEW: ABNORMAL
SODIUM BLD-SCNC: 133 MMOL/L (ref 136–145)
SODIUM BLD-SCNC: 135 MMOL/L (ref 136–145)
SODIUM BLD-SCNC: 136 MMOL/L (ref 136–145)
SODIUM BLD-SCNC: 137 MMOL/L (ref 136–145)
SODIUM BLD-SCNC: 138 MMOL/L (ref 136–145)
SODIUM BLD-SCNC: 139 MMOL/L (ref 136–145)
TCO2 ARTERIAL: 14.7 MMOL/L
TCO2 ARTERIAL: 25.8 MMOL/L
TCO2 ARTERIAL: 7.6 MMOL/L
TOTAL PROTEIN: 3.7 G/DL (ref 6.4–8.2)
TROPONIN: 0.04 NG/ML
VANCOMYCIN RANDOM: 22 UG/ML
VITAMIN B-12: 1595 PG/ML (ref 211–911)
WBC # BLD: 15.9 K/UL (ref 4–11)
WBC # BLD: 28.9 K/UL (ref 4–11)
WBC # BLD: 36.8 K/UL (ref 4–11)
WBC # BLD: 39.2 K/UL (ref 4–11)

## 2018-06-09 PROCEDURE — 86923 COMPATIBILITY TEST ELECTRIC: CPT

## 2018-06-09 PROCEDURE — 36600 WITHDRAWAL OF ARTERIAL BLOOD: CPT

## 2018-06-09 PROCEDURE — 84132 ASSAY OF SERUM POTASSIUM: CPT

## 2018-06-09 PROCEDURE — 36592 COLLECT BLOOD FROM PICC: CPT

## 2018-06-09 PROCEDURE — 85610 PROTHROMBIN TIME: CPT

## 2018-06-09 PROCEDURE — 85384 FIBRINOGEN ACTIVITY: CPT

## 2018-06-09 PROCEDURE — 84100 ASSAY OF PHOSPHORUS: CPT

## 2018-06-09 PROCEDURE — 82803 BLOOD GASES ANY COMBINATION: CPT

## 2018-06-09 PROCEDURE — 80069 RENAL FUNCTION PANEL: CPT

## 2018-06-09 PROCEDURE — C9113 INJ PANTOPRAZOLE SODIUM, VIA: HCPCS

## 2018-06-09 PROCEDURE — 71045 X-RAY EXAM CHEST 1 VIEW: CPT

## 2018-06-09 PROCEDURE — 93005 ELECTROCARDIOGRAM TRACING: CPT

## 2018-06-09 PROCEDURE — 92950 HEART/LUNG RESUSCITATION CPR: CPT

## 2018-06-09 PROCEDURE — P9017 PLASMA 1 DONOR FRZ W/IN 8 HR: HCPCS

## 2018-06-09 PROCEDURE — 36415 COLL VENOUS BLD VENIPUNCTURE: CPT

## 2018-06-09 PROCEDURE — 86157 COLD AGGLUTININ TITER: CPT

## 2018-06-09 PROCEDURE — 94750 CHARGE CONVERSION: CPT

## 2018-06-09 PROCEDURE — 94003 VENT MGMT INPAT SUBQ DAY: CPT

## 2018-06-09 PROCEDURE — P9012 CRYOPRECIPITATE EACH UNIT: HCPCS

## 2018-06-09 PROCEDURE — 36430 TRANSFUSION BLD/BLD COMPNT: CPT

## 2018-06-09 PROCEDURE — P9016 RBC LEUKOCYTES REDUCED: HCPCS

## 2018-06-09 PROCEDURE — P9035 PLATELET PHERES LEUKOREDUCED: HCPCS

## 2018-06-09 PROCEDURE — 84295 ASSAY OF SERUM SODIUM: CPT

## 2018-06-09 PROCEDURE — 86850 RBC ANTIBODY SCREEN: CPT

## 2018-06-09 PROCEDURE — 86901 BLOOD TYPING SEROLOGIC RH(D): CPT

## 2018-06-09 PROCEDURE — 87040 BLOOD CULTURE FOR BACTERIA: CPT

## 2018-06-09 PROCEDURE — 9990 CHARGE CONVERSION

## 2018-06-09 PROCEDURE — 90945 DIALYSIS ONE EVALUATION: CPT

## 2018-06-09 PROCEDURE — 82800 BLOOD PH: CPT

## 2018-06-09 PROCEDURE — 82947 ASSAY GLUCOSE BLOOD QUANT: CPT

## 2018-06-09 PROCEDURE — 93010 ELECTROCARDIOGRAM REPORT: CPT | Performed by: INTERNAL MEDICINE

## 2018-06-09 PROCEDURE — 82330 ASSAY OF CALCIUM: CPT

## 2018-06-09 PROCEDURE — 85027 COMPLETE CBC AUTOMATED: CPT

## 2018-06-09 PROCEDURE — 84484 ASSAY OF TROPONIN QUANT: CPT

## 2018-06-09 PROCEDURE — 83605 ASSAY OF LACTIC ACID: CPT

## 2018-06-09 PROCEDURE — 94762 N-INVAS EAR/PLS OXIMTRY CONT: CPT

## 2018-06-09 PROCEDURE — 80202 ASSAY OF VANCOMYCIN: CPT

## 2018-06-09 PROCEDURE — 83735 ASSAY OF MAGNESIUM: CPT

## 2018-06-09 PROCEDURE — 99233 SBSQ HOSP IP/OBS HIGH 50: CPT | Performed by: INTERNAL MEDICINE

## 2018-06-09 PROCEDURE — 80053 COMPREHEN METABOLIC PANEL: CPT

## 2018-06-09 PROCEDURE — 99291 CRITICAL CARE FIRST HOUR: CPT | Performed by: INTERNAL MEDICINE

## 2018-06-09 PROCEDURE — 86900 BLOOD TYPING SEROLOGIC ABO: CPT

## 2018-06-09 PROCEDURE — 85018 HEMOGLOBIN: CPT

## 2018-06-09 PROCEDURE — 85014 HEMATOCRIT: CPT

## 2018-06-09 RX ORDER — NICOTINE POLACRILEX 4 MG
15 LOZENGE BUCCAL PRN
Status: DISCONTINUED | OUTPATIENT
Start: 2018-06-09 | End: 2018-07-18 | Stop reason: HOSPADM

## 2018-06-09 RX ORDER — SODIUM CHLORIDE 9 MG/ML
INJECTION, SOLUTION INTRAVENOUS
Status: DISCONTINUED
Start: 2018-06-09 | End: 2018-06-09

## 2018-06-09 RX ORDER — METHYLPREDNISOLONE SODIUM SUCCINATE 125 MG/2ML
125 INJECTION, POWDER, LYOPHILIZED, FOR SOLUTION INTRAMUSCULAR; INTRAVENOUS ONCE
Status: COMPLETED | OUTPATIENT
Start: 2018-06-09 | End: 2018-06-09

## 2018-06-09 RX ORDER — DEXTROSE MONOHYDRATE 50 MG/ML
100 INJECTION, SOLUTION INTRAVENOUS PRN
Status: DISCONTINUED | OUTPATIENT
Start: 2018-06-09 | End: 2018-06-09

## 2018-06-09 RX ORDER — 0.9 % SODIUM CHLORIDE 0.9 %
250 INTRAVENOUS SOLUTION INTRAVENOUS ONCE
Status: COMPLETED | OUTPATIENT
Start: 2018-06-09 | End: 2018-06-09

## 2018-06-09 RX ORDER — 0.9 % SODIUM CHLORIDE 0.9 %
250 INTRAVENOUS SOLUTION INTRAVENOUS ONCE
Status: DISCONTINUED | OUTPATIENT
Start: 2018-06-09 | End: 2018-06-09

## 2018-06-09 RX ORDER — METHYLPREDNISOLONE SODIUM SUCCINATE 125 MG/2ML
125 INJECTION, POWDER, LYOPHILIZED, FOR SOLUTION INTRAMUSCULAR; INTRAVENOUS DAILY
Status: DISCONTINUED | OUTPATIENT
Start: 2018-06-09 | End: 2018-06-09

## 2018-06-09 RX ORDER — ATROPINE SULFATE 0.1 MG/ML
INJECTION INTRAVENOUS
Status: COMPLETED | OUTPATIENT
Start: 2018-06-09 | End: 2018-06-09

## 2018-06-09 RX ORDER — DEXTROSE MONOHYDRATE 25 G/50ML
INJECTION, SOLUTION INTRAVENOUS
Status: COMPLETED
Start: 2018-06-09 | End: 2018-06-09

## 2018-06-09 RX ORDER — PANTOPRAZOLE SODIUM 40 MG/10ML
40 INJECTION, POWDER, LYOPHILIZED, FOR SOLUTION INTRAVENOUS 2 TIMES DAILY
Status: DISCONTINUED | OUTPATIENT
Start: 2018-06-09 | End: 2018-06-18

## 2018-06-09 RX ORDER — GLUCAGON 1 MG/ML
1 KIT INJECTION PRN
Status: DISCONTINUED | OUTPATIENT
Start: 2018-06-09 | End: 2018-07-18 | Stop reason: HOSPADM

## 2018-06-09 RX ORDER — DOPAMINE HYDROCHLORIDE 160 MG/100ML
INJECTION, SOLUTION INTRAVENOUS CONTINUOUS PRN
Status: DISCONTINUED | OUTPATIENT
Start: 2018-06-09 | End: 2018-06-09

## 2018-06-09 RX ORDER — DEXTROSE MONOHYDRATE 25 G/50ML
12.5 INJECTION, SOLUTION INTRAVENOUS PRN
Status: DISCONTINUED | OUTPATIENT
Start: 2018-06-09 | End: 2018-06-17 | Stop reason: DRUGHIGH

## 2018-06-09 RX ORDER — 0.9 % SODIUM CHLORIDE 0.9 %
250 INTRAVENOUS SOLUTION INTRAVENOUS ONCE
Status: DISCONTINUED | OUTPATIENT
Start: 2018-06-09 | End: 2018-06-10

## 2018-06-09 RX ADMIN — DOPAMINE HYDROCHLORIDE 5 MCG/KG/MIN: 160 INJECTION, SOLUTION INTRAVENOUS at 01:34

## 2018-06-09 RX ADMIN — Medication: at 08:29

## 2018-06-09 RX ADMIN — Medication 10 ML: at 21:11

## 2018-06-09 RX ADMIN — METHYLPREDNISOLONE SODIUM SUCCINATE 125 MG: 125 INJECTION, POWDER, LYOPHILIZED, FOR SOLUTION INTRAMUSCULAR; INTRAVENOUS at 16:03

## 2018-06-09 RX ADMIN — METHYLPREDNISOLONE SODIUM SUCCINATE 125 MG: 125 INJECTION, POWDER, LYOPHILIZED, FOR SOLUTION INTRAMUSCULAR; INTRAVENOUS at 02:54

## 2018-06-09 RX ADMIN — PANTOPRAZOLE SODIUM 40 MG: 40 INJECTION, POWDER, LYOPHILIZED, FOR SOLUTION INTRAVENOUS at 09:03

## 2018-06-09 RX ADMIN — METHYLPREDNISOLONE SODIUM SUCCINATE 125 MG: 125 INJECTION, POWDER, LYOPHILIZED, FOR SOLUTION INTRAMUSCULAR; INTRAVENOUS at 05:19

## 2018-06-09 RX ADMIN — Medication 10 ML: at 08:29

## 2018-06-09 RX ADMIN — CHLORHEXIDINE GLUCONATE 15 ML: 0.12 RINSE ORAL at 21:11

## 2018-06-09 RX ADMIN — PROPOFOL 40 MCG/KG/MIN: 10 INJECTION, EMULSION INTRAVENOUS at 05:24

## 2018-06-09 RX ADMIN — CHLORHEXIDINE GLUCONATE 15 ML: 0.12 RINSE ORAL at 08:21

## 2018-06-09 RX ADMIN — Medication 10 ML: at 08:30

## 2018-06-09 RX ADMIN — PROPOFOL 40 MCG/KG/MIN: 10 INJECTION, EMULSION INTRAVENOUS at 15:10

## 2018-06-09 RX ADMIN — DEXTROSE MONOHYDRATE 50 ML: 25 INJECTION, SOLUTION INTRAVENOUS at 01:45

## 2018-06-09 RX ADMIN — Medication 250 ML: at 11:57

## 2018-06-09 RX ADMIN — METHYLPREDNISOLONE SODIUM SUCCINATE 125 MG: 125 INJECTION, POWDER, LYOPHILIZED, FOR SOLUTION INTRAMUSCULAR; INTRAVENOUS at 22:08

## 2018-06-09 RX ADMIN — Medication 250 ML: at 02:50

## 2018-06-09 RX ADMIN — Medication 250 ML: at 02:51

## 2018-06-09 RX ADMIN — Medication 50 MEQ: at 01:40

## 2018-06-09 RX ADMIN — PANTOPRAZOLE SODIUM 40 MG: 40 INJECTION, POWDER, LYOPHILIZED, FOR SOLUTION INTRAVENOUS at 21:11

## 2018-06-09 RX ADMIN — METHYLPREDNISOLONE SODIUM SUCCINATE 125 MG: 125 INJECTION, POWDER, LYOPHILIZED, FOR SOLUTION INTRAMUSCULAR; INTRAVENOUS at 11:00

## 2018-06-09 RX ADMIN — PROPOFOL 40 MCG/KG/MIN: 10 INJECTION, EMULSION INTRAVENOUS at 21:10

## 2018-06-09 RX ADMIN — ATROPINE SULFATE 1 MG: 0.1 INJECTION INTRAVENOUS at 01:32

## 2018-06-09 RX ADMIN — Medication: at 21:16

## 2018-06-09 ASSESSMENT — PULMONARY FUNCTION TESTS
PIF_VALUE: 28
PIF_VALUE: 21
PIF_VALUE: 28
PIF_VALUE: 28
PIF_VALUE: 30

## 2018-06-09 NOTE — CODE DOCUMENTATION
Dr Janel Stanley again attempted an A Line got in but was unable to thread the wire on the R femoral artery.

## 2018-06-09 NOTE — PROGRESS NOTES
Dr Tahnh Nova calls in and updated per phone on current vasopressor requirements and last lab results Ibeth Reynolds RN

## 2018-06-09 NOTE — PROGRESS NOTES
Dr Jarrod Hdez into eval patient and updated him on fibrinogen level.  He updates family at bedside Chloe

## 2018-06-09 NOTE — PROGRESS NOTES
3rd unit PRBCs initiated, patient tolerating without incident. Dr. Merlin Myron on unit, panic labs discussed, order for EKG received. Will monitor.

## 2018-06-09 NOTE — PROGRESS NOTES
Panic ABG results discussed with Dr. Yaquelin Dinero. RT to decrease FiO2. RR improved with Fentanyl drip. Will monitor.

## 2018-06-09 NOTE — PROGRESS NOTES
ID Follow-up NOTE    CC: tricuspid valve endocarditis    Subjective:     Patient gives no history; apparently had another arrest last night.        Objective:     Patient Vitals for the past 24 hrs:   BP Temp Temp src Pulse Resp SpO2 Weight   06/09/18 1201 134/79 96.9 °F (36.1 °C) - 95 22 100 % -   06/09/18 1117 - - - 92 22 100 % -   06/09/18 1100 138/74 - - 93 23 100 % -   06/09/18 1000 (!) 154/73 - - 87 25 100 % -   06/09/18 0900 (!) 173/88 - - 81 27 - -   06/09/18 0855 (!) 163/78 96.8 °F (36 °C) - 81 27 - -   06/09/18 0831 - - - 86 (!) 34 100 % -   06/09/18 0800 (!) 168/92 - - 83 (!) 35 100 % -   06/09/18 0700 (!) 159/89 - - 90 (!) 37 100 % -   06/09/18 0645 (!) 159/94 96.2 °F (35.7 °C) Temporal 93 (!) 38 100 % -   06/09/18 0612 (!) 167/111 96.3 °F (35.7 °C) - 102 (!) 42 - -   06/09/18 0600 (!) 165/91 - - 103 (!) 44 100 % -   06/09/18 0500 (!) 171/111 - - 110 (!) 34 100 % -   06/09/18 0400 (!) 167/97 97.6 °F (36.4 °C) Oral 105 (!) 47 100 % 130 lb 11.7 oz (59.3 kg)   06/09/18 0339 (!) 144/95 95.9 °F (35.5 °C) Temporal 106 (!) 34 100 % -   06/09/18 0312 - - - 102 (!) 50 (!) 79 % -   06/09/18 0310 (!) 143/73 - - 101 (!) 50 92 % -   06/09/18 0300 (!) 152/58 - - 100 (!) 49 93 % -   06/09/18 0240 (!) 149/107 - - 97 (!) 45 (!) 61 % -   06/09/18 0230 110/82 - - 94 (!) 44 - -   06/09/18 0220 93/82 - - 95 (!) 40 - -   06/09/18 0210 (!) 131/99 (!) 86 °F (30 °C) Temporal 98 (!) 46 (!) 54 % -   06/09/18 0208 - (!) 86 °F (30 °C) Temporal - - - -   06/09/18 0205 135/78 - - 101 (!) 45 - -   06/09/18 0200 (!) 144/85 - - 104 (!) 44 (!) 42 % -   06/09/18 0145 (!) 83/59 - - 111 (!) 48 - -   06/09/18 0136 (!) 57/47 - - 106 (!) 46 - -   06/09/18 0133 - - - 67 (!) 43 - -   06/09/18 0132 - - - (!) 41 - - -   06/09/18 0130 (!) 36/21 - - 70 (!) 47 - -   06/09/18 0127 (!) 53/16 - - 74 (!) 44 - -   06/09/18 0124 (!) 82/42 - - 74 (!) 43 (!) 28 % -   06/09/18 0121 (!) 114/100 - - 74 (!) 41 (!) 23 % -   06/09/18 0118 100/75 - - 76 (!) 43 - -

## 2018-06-09 NOTE — PROGRESS NOTES
06/09/18 0312   Vent Information   Vent Type 840   Vent Mode AC/VC   Vt Ordered 450 mL   Rate Set 22 bmp   Peak Flow 90 L/min   Pressure Support 0 cmH20   FiO2  100 %   Sensitivity 3   PEEP/CPAP 5   I Time/ I Time % 0 s   Vent Patient Data   Vt Exhaled 507 mL   Rate Measured 52 br/min   Minute Volume 26.1 Liters   Peak Inspiratory Pressure 21 cmH2O   I:E Ratio 1:1.20   High Peep/I Pressure 0   Mean Airway Pressure 15 cmH20   Spontaneous Breathing Trial (SBT) RT Doc   Pulse 102   SpO2 (!) 79 %   Breath Sounds   Right Upper Lobe Diminished   Right Middle Lobe Diminished   Right Lower Lobe Diminished   Left Upper Lobe Diminished   Left Lower Lobe Diminished   Additional Respiratory  Assessments   Resp (!) 50   Position Semi-Lucero's   Alarm Settings   High Pressure Alarm 40 cmH2O   Low Minute Volume Alarm 5 L/min   High Respiratory Rate 60 br/min   Non-Surgical Airway Endo Tracheal Tube   Placement Date/Time: 06/08/18 1005   Inserted by:    Insertion attempts: 1  Airway Device: Endo Tracheal Tube  Size: (c)   Placement Verified By[de-identified] Auscultation;Colorimetric EtCO2 device   Secured at 22 cm   Measured From [address] By [employer] tube [employer]   Site Condition Dry

## 2018-06-09 NOTE — ONCOLOGY
(SOLU-MEDROL) injection 125 mg, 125 mg, Intravenous, Q6H  glucose (GLUTOSE) 40 % oral gel 15 g, 15 g, Oral, PRN  dextrose 50 % solution 12.5 g, 12.5 g, Intravenous, PRN  glucagon injection 1 mg, 1 mg, Intramuscular, PRN  dextrose 5 % solution, 100 mL/hr, Intravenous, PRN  cefepime (MAXIPIME) 1 g IVPB minibag, 1 g, Intravenous, Q12H  fentaNYL (SUBLIMAZE) 1,000 mcg in sodium chloride 0.9 % 100 mL infusion, 25 mcg/hr, Intravenous, Continuous  sevelamer (RENVELA) tablet 800 mg, 800 mg, Oral, TID WC  sodium chloride flush 0.9 % injection 10 mL, 10 mL, Intravenous, 2 times per day  sodium chloride flush 0.9 % injection 10 mL, 10 mL, Intravenous, PRN  ondansetron (ZOFRAN) injection 4 mg, 4 mg, Intravenous, Q6H PRN  mupirocin (BACTROBAN) 2 % ointment, , Nasal, BID  0.9 % sodium chloride bolus, 250 mL, Intravenous, Once  darbepoetin emi-polysorbate (ARANESP) injection 100 mcg, 100 mcg, Intravenous, Weekly  acetaminophen (TYLENOL) tablet 650 mg, 650 mg, Oral, Q6H PRN  heparin (porcine) injection 4,100 Units, 4,100 Units, Intercatheter, PRN  promethazine (PHENERGAN) tablet 25 mg, 25 mg, Oral, Q6H PRN  hydrOXYzine (VISTARIL) capsule 50 mg, 50 mg, Oral, Q8H PRN    Imaging:     Xr Foot Right (2 Views)  Result Date: 6/7/2018  Lucency through the medial sesamoid most consistent with bipartite medial sesamoid in the absence of history of trauma. This can be associated with pain.      Nm Gi Blood Loss  Result Date: 6/6/2018  No evidence of active GI bleeding during acquisition. RECOMMENDATIONS: If the patient shows hemodynamic signs of an active bleed in the next 20 hours, additional images can be acquired.      Xr Chest Portable  Result Date: 6/8/2018  Multifocal pneumonia.      Cta Abdomen Pelvis W Wo Contrast  Result Date: 6/6/2018  No active gastrointestinal hemorrhage is identified. No vascular abnormality is appreciated. Right lower lobe pneumonia as well as cavitary nodules. Septic emboli are consideration.   Compared with 11/26/2017, there is waxing and waning airspace disease and pulmonary nodules. Organizing pneumonia is an alternative possibility. Hepatosplenomegaly. Hepatomegaly is similar to 11/26/2017. Splenomegaly is new. Hypodensity in the spleen, suggestive of acute to subacute embolic infarction. Small amount of ascites, nonspecific. Hyperdensity of the gallbladder wall. This may represent mural calcification. Etiology is unclear. There is variable association with gallbladder wall calcification and risk for gallbladder carcinoma. Stable cardiomegaly. Lumbar spine and visualized osseous structures appear intact. Impression:     1. Bacteremia/Endocarditis  2. HCAP  3. Severe Anemia  4. ESRD on dialysis  5. Hep C  6. IVDA  7. S/P Code Blue/PEA    Assessment and Plan:     1. Leukocytosis, anemia, thrombocytopenia  - suspect multifactorial  - baseline ACD with ESRD and Hep C - d/w nephrology, is chronically on Procrit  - suspect acute drop from GIB given hematemesis and rectal bleeding noted at the time of EGD  - EGD revealed angiodysplasia which was cauterized  - sepsis also likely contributing  - hemolysis also a concern     - Updated labs showed:   A. Fibrinogen 45 mg/dL, INR 4.42, aPTT 50.1 sec. (consistent with DIC)  B. Ferritin 4649 ng/mL, iron sat 45% (acute inflammation. Not iron deficient)  C. SHILPA pos. Anti IgG neg. Haptoglobin 240 mg/dL. (most likely the SHILPA was not a significant finding, but a cold agglutinin could do this. Check cold agglutinin titer). - Recommend continuing steroids for now. Check cold agglutinin titer. Give Cryoprecipitate for fibrinogen 45 and FFP for prolonged clotting time.     Jay Zepeda MD  Medical Oncology/Hematology    [facility] [place] [address] [address]  [place] [address]   Phone: [phone]  Fax: [phone]

## 2018-06-09 NOTE — PROGRESS NOTES
[facility]       Progress Note           Subjective/   34y.o. year old female who we are seeing in consultation for ESRD. HPI:  On CRRT  Coded again last night  On pressors now  Fighting the vent      Objective/   GEN:  Chronically ill, BP (!) 154/73   Pulse 87   Temp 96.8 °F (36 °C)   Resp 25   Ht 5' 2\" (1.575 m)   Wt 130 lb 11.7 oz (59.3 kg)   SpO2 100%   BMI 23.91 kg/m²   CV: S1, S2 without m/r/g; no edema  RESP: CTA B without w/r/r; breathing wnl  ACCESS: R IJ [facility]    Data/  Recent Labs      06/08/18   1221   06/09/18   0010  06/09/18   0140  06/09/18   0210  06/09/18   0600   WBC  23.1*   --    --    --   15.9*  28.9*   HGB  5.3*   < >  7.2*  3.8*  6.3*  9.1*   HCT  18.4*   < >  21.3*   --   19.5*  27.7*   MCV  100.7*   --    --    --   94.2  89.4   PLT  91*   --    --    --   23*  18*    < > = values in this interval not displayed.      Recent Labs      06/09/18   0010  06/09/18   0210  06/09/18   0600  06/09/18   0809   NA  133*  138  139  137   K  6.4*  6.5*  5.5*  4.9   CL  96*  97*  95*  96*   CO2  11*  8*  10*  14*   GLUCOSE  54*  219*  292*  281*   PHOS  6.0*  11.2*   --   7.9*   MG  2.90*  3.00*   --   2.30   BUN  22*  22*  26*  24*   CREATININE  2.4*  2.6*  2.4*  1.9*   LABGLOM  24*  22*  24*  32*   GFRAA  30*  27*  30*  39*       Assessment/Plan     - End stage renal disease - on HD MWF  Now on CRRT  -continue bicarbonate drip  -no UF at the moment    - Acute GI bleed    - plans per GI, ?angiodysplasia of UGI tract   - Weekly KI with HD, transfuse as needed    - MRSA, Streptococcus bacteremia, persistent endocarditis involving TV   - Hx of MSSA bacteremia/endocarditis with severe TR    - Started on vancomycin, meropenem - ID following

## 2018-06-09 NOTE — PROGRESS NOTES
Pt continues to rest quietly and no further changes noted in exam. Vitals and SpO2 stable. Dopamine and Epi have been titrated off and levophed gtt down to 5 mcg. All lines and monitoring devices remain in place. CRRT running well and maintaining even fluid balance. All ordered blood products have infused without adverse rx. Pt repositioned in bed. Continue current plan of care.  Sebastian Ramirez RN

## 2018-06-09 NOTE — PROGRESS NOTES
Patient care assumed, assessment completed as charted. Patient continues on ventilator, #7.5 at the 22 lip. FiO2 30%, PEEP 5, , A/C 22. Diprivan infusing at 45mcg/kg/min through intact left chest CVC, patient alert with stimulation, PERRL, moving all extremities. CRRT running without incident via right chest tunneled vascath. Bilateral soft wrist restraints in place for continued patient safety. No further needs assessed at this time. Will monitor.

## 2018-06-09 NOTE — PROGRESS NOTES
GI Progress Note      SUBJECTIVE:  Chart reviewed, events noted. Pt remains intubated on pressors receiving PRBCs. No reports of hematemesis, melena or hematochezia.     OBJECTIVE      Medications    Current Facility-Administered Medications: sodium bicarbonate 150 mEq in dextrose 5 % 1,000 mL infusion, , Intravenous, Continuous  EPINEPHrine (EPINEPHrine HCL) 5 mg in dextrose 5 % 250 mL infusion, 1 mcg/min, Intravenous, Continuous  DOPamine (INTROPIN) 400 mg in dextrose 5 % 250 mL infusion, , Intravenous, Continuous PRN  0.9 % sodium chloride bolus, 250 mL, Intravenous, Once  prismaSATE BGK 2/0 5,000 mL with calcium chloride 0.92 g solution, , Dialysis, Continuous  prismaSATE BGK 2/0 5,000 mL with calcium chloride 0.92 g solution, , Dialysis, Continuous  prismaSATE BGK 2/0 5,000 mL with calcium chloride 0.92 g solution, , Dialysis, Continuous  sodium chloride 0.9 % infusion, , ,   phytonadione (ADULT) (VITAMIN K) 10 mg in dextrose 5 % 100 mL IVPB, 10 mg, Intravenous, Once  pantoprazole (PROTONIX) injection 40 mg, 40 mg, Intravenous, BID  chlorhexidine (PERIDEX) 0.12 % solution 15 mL, 15 mL, Mouth/Throat, BID  propofol 1000 MG/100ML injection, 10 mcg/kg/min, Intravenous, Continuous  midazolam (VERSED) injection 2 mg, 2 mg, Intravenous, Q1H PRN  fentaNYL (SUBLIMAZE) injection 25 mcg, 25 mcg, Intravenous, Q1H PRN  norepinephrine (LEVOPHED) 16 mg in dextrose 5 % 250 mL infusion, 2 mcg/min, Intravenous, Continuous  magnesium sulfate 1 g in dextrose 5% 100 mL IVPB, 1 g, Intravenous, PRN  calcium gluconate 1 g in dextrose 5 % 100 mL IVPB, 1 g, Intravenous, PRN **OR** calcium gluconate 2 g in dextrose 5 % 100 mL IVPB, 2 g, Intravenous, PRN **OR** calcium gluconate 3 g in dextrose 5 % 100 mL IVPB, 3 g, Intravenous, PRN **OR** calcium gluconate 4 g in dextrose 5 % 100 mL IVPB, 4 g, Intravenous, PRN  sodium phosphate 6 mmol in dextrose 5 % 250 mL IVPB, 6 mmol, Intravenous, PRN **OR** sodium phosphate 12 mmol in dextrose 5 %

## 2018-06-09 NOTE — PROGRESS NOTES
Internal Medicine ICU Progress Note  34year old white female who was admitted for GI bleed. Had an EGD     BC positive for strep pneumonia, staph aureus and gram-negative salazar. Echocardiogram showed tricuspid valve endocarditis. She's had this before. CT chest showed septic emboli and possible splenic infarct.      -transferred t [facility]  For PEA arrest  ,intubated resuscitated with fluids    Pt is gettiingHD    Invasive Lines: Tunneled dialysis catheter, two peripheral IV       Recent Labs      18   0130  18   0445   PHART  7.224*  7.273*   [id number]  33.8*  15.8*   PO2ART  35.7*  301.3*       MV Settings:  Vent Mode: AC/VC Rate Set: 22 bmp/Vt Ordered: 450 mL/ /FiO2 : 35 %    IV:   IV infusion builder 150 mL/hr at 18 0821    EPINEPHrine infusion 1 mcg/min (18 0708)    dialysis builder 1,500 mL/hr at 18 1148    dialysis builder 1,000 mL/hr at 18 0929    dialysis builder      sodium chloride      propofol 40 mcg/kg/min (18 0524)    norepinephrine 10 mcg/min (18 0645)    vasopressin infusion 0.04 Units/min (18 1057)    dextrose      fentaNYL (SUBLIMAZE) infusion 25 mcg/hr (18 0457)       Vitals:  Temp  Av.5 °F (35.3 °C)  Min: 86 °F (30 °C)  Max: 97.8 °F (36.6 °C)  Pulse  Av.6  Min: 41  Max: 111  BP  Min: 36/21  Max: 173/88  SpO2  Av.6 %  Min: 23 %  Max: 100 %  FiO2   Av.8 %  Min: 30 %  Max: 100 %  Patient Vitals for the past 4 hrs:   BP Temp Pulse Resp SpO2   18 1201 134/79 96.9 °F (36.1 °C) 95 22 100 %   18 1117 - - 92 22 100 %   18 1100 138/74 - 93 23 100 %   18 1000 (!) 154/73 - 87 25 100 %   18 0900 (!) 173/88 - 81 27 -   18 0855 (!) 163/78 96.8 °F (36 °C) 81  -   18 0831 - - 86 (!) 34 100 %       CVP: CVP (Mean): 2 mmHg      Intake/Output Summary (Last 24 hours) at 18 1229  Last data filed at 18 1000   Gross per 24 hour   Intake             8877 ml   Output

## 2018-06-09 NOTE — PROGRESS NOTES
Non-Surgical Airway Endo Tracheal Tube   Placement Date/Time: 06/08/18 1005   Inserted by:    Insertion attempts: 1  Airway Device: Endo Tracheal Tube  Size: (c)   Placement Verified By[de-identified] Auscultation;Colorimetric EtCO2 device   Secured at 22 cm   Measured From Lips   Secured Location Left   Secured By Commercial tube cordova   Site Condition Dry

## 2018-06-10 LAB
7-AMINOCLONAZEPAM: <5 NG/ML
ALBUMIN SERPL-MCNC: 2.5 G/DL (ref 3.4–5)
ALBUMIN SERPL-MCNC: 2.7 G/DL (ref 3.4–5)
ALP BLD-CCNC: 616 U/L (ref 40–129)
ALPHA-HYDROXYALPRAZOLAM, S/P, QUANT: <5 NG/ML
ALPHA-HYDROXYMIDAZOLAM, S/P, QUANT: <20 NG/ML
ALPRAZOLAM: <5 NG/ML
ALT SERPL-CCNC: 2787 U/L (ref 10–40)
ANION GAP SERPL CALCULATED.3IONS-SCNC: 10 MMOL/L (ref 3–16)
ANION GAP SERPL CALCULATED.3IONS-SCNC: 17 MMOL/L (ref 3–16)
ANION GAP SERPL CALCULATED.3IONS-SCNC: 18 MMOL/L (ref 3–16)
ANION GAP SERPL CALCULATED.3IONS-SCNC: 9 MMOL/L (ref 3–16)
AST SERPL-CCNC: 5541 U/L (ref 15–37)
BASE EXCESS ARTERIAL: -6.1 MMOL/L (ref -3–3)
BASE EXCESS ARTERIAL: 3.7 MMOL/L (ref -3–3)
BILIRUB SERPL-MCNC: 7 MG/DL (ref 0–1)
BILIRUBIN DIRECT: 5.5 MG/DL (ref 0–0.3)
BILIRUBIN, INDIRECT: 1.5 MG/DL (ref 0–1)
BLOOD BANK DISPENSE STATUS: NORMAL
BLOOD BANK PRODUCT CODE: NORMAL
BPU ID: NORMAL
BUN BLDV-MCNC: 15 MG/DL (ref 7–20)
BUN BLDV-MCNC: 18 MG/DL (ref 7–20)
CALCIUM IONIZED: 1.06 MMOL/L (ref 1.12–1.32)
CALCIUM IONIZED: 1.06 MMOL/L (ref 1.12–1.32)
CALCIUM IONIZED: 1.09 MMOL/L (ref 1.12–1.32)
CALCIUM IONIZED: 1.09 MMOL/L (ref 1.12–1.32)
CALCIUM SERPL-MCNC: 8.7 MG/DL (ref 8.3–10.6)
CALCIUM SERPL-MCNC: 8.8 MG/DL (ref 8.3–10.6)
CARBOXYHEMOGLOBIN ARTERIAL: 1.5 % (ref 0–1.5)
CARBOXYHEMOGLOBIN ARTERIAL: 1.7 % (ref 0–1.5)
CHLORDIAZEPOXIDE: <20 NG/ML
CHLORIDE BLD-SCNC: 95 MMOL/L (ref 99–110)
CHLORIDE BLD-SCNC: 97 MMOL/L (ref 99–110)
CHLORIDE BLD-SCNC: 97 MMOL/L (ref 99–110)
CHLORIDE BLD-SCNC: 98 MMOL/L (ref 99–110)
CLONAZEPAM: <5 NG/ML
CO2: 19 MMOL/L (ref 21–32)
CO2: 22 MMOL/L (ref 21–32)
CO2: 26 MMOL/L (ref 21–32)
CO2: 28 MMOL/L (ref 21–32)
CREAT SERPL-MCNC: 0.7 MG/DL (ref 0.6–1.1)
CREAT SERPL-MCNC: 0.8 MG/DL (ref 0.6–1.1)
CREAT SERPL-MCNC: 0.8 MG/DL (ref 0.6–1.1)
CREAT SERPL-MCNC: 0.9 MG/DL (ref 0.6–1.1)
DESCRIPTION BLOOD BANK: NORMAL
DIAZEPAM LEVEL: <5 NG/ML
EKG ATRIAL RATE: 83 BPM
EKG DIAGNOSIS: NORMAL
EKG P AXIS: 62 DEGREES
EKG P-R INTERVAL: 156 MS
EKG Q-T INTERVAL: 434 MS
EKG QRS DURATION: 94 MS
EKG QTC CALCULATION (BAZETT): 509 MS
EKG R AXIS: 30 DEGREES
EKG T AXIS: 175 DEGREES
EKG VENTRICULAR RATE: 83 BPM
ERYTHROPOIETIN: 482 MU/ML (ref 4–27)
FIBRINOGEN: 107 MG/DL (ref 200–397)
FIBRINOGEN: 148 MG/DL (ref 200–397)
FIBRINOGEN: 164 MG/DL (ref 200–397)
FIBRINOGEN: 88 MG/DL (ref 200–397)
GFR AFRICAN AMERICAN: >60
GFR NON-AFRICAN AMERICAN: >60
GLUCOSE BLD-MCNC: 117 MG/DL (ref 70–99)
GLUCOSE BLD-MCNC: 128 MG/DL (ref 70–99)
GLUCOSE BLD-MCNC: 128 MG/DL (ref 70–99)
GLUCOSE BLD-MCNC: 134 MG/DL (ref 70–99)
GLUCOSE BLD-MCNC: 136 MG/DL (ref 70–99)
GLUCOSE BLD-MCNC: 138 MG/DL (ref 70–99)
GLUCOSE BLD-MCNC: 150 MG/DL (ref 70–99)
GLUCOSE BLD-MCNC: 152 MG/DL (ref 70–99)
GLUCOSE BLD-MCNC: 167 MG/DL (ref 70–99)
GLUCOSE BLD-MCNC: 197 MG/DL (ref 70–99)
HCO3 ARTERIAL: 18.4 MMOL/L (ref 21–29)
HCO3 ARTERIAL: 26.2 MMOL/L (ref 21–29)
HCT VFR BLD CALC: 20.6 % (ref 36–48)
HCT VFR BLD CALC: 21.4 % (ref 36–48)
HCT VFR BLD CALC: 23.8 % (ref 36–48)
HCT VFR BLD CALC: 25.3 % (ref 36–48)
HEMOGLOBIN, ART, EXTENDED: 7.2 G/DL (ref 12–16)
HEMOGLOBIN, ART, EXTENDED: 8.6 G/DL (ref 12–16)
HEMOGLOBIN: 7.1 G/DL (ref 12–16)
HEMOGLOBIN: 7.1 G/DL (ref 12–16)
HEMOGLOBIN: 8.1 G/DL (ref 12–16)
HEMOGLOBIN: 8.7 G/DL (ref 12–16)
INR BLD: 1.93 (ref 0.86–1.14)
INR BLD: 2.12 (ref 0.86–1.14)
INR BLD: 2.12 (ref 0.86–1.14)
INR BLD: 2.32 (ref 0.86–1.14)
LACTIC ACID: 4.2 MMOL/L (ref 0.4–2)
LORAZEPAM: <20 NG/ML
MAGNESIUM: 2.4 MG/DL (ref 1.8–2.4)
MAGNESIUM: 2.5 MG/DL (ref 1.8–2.4)
MAGNESIUM: 2.6 MG/DL (ref 1.8–2.4)
MAGNESIUM: 2.8 MG/DL (ref 1.8–2.4)
MCH RBC QN AUTO: 29.5 PG (ref 26–34)
MCH RBC QN AUTO: 29.6 PG (ref 26–34)
MCH RBC QN AUTO: 29.9 PG (ref 26–34)
MCH RBC QN AUTO: 30 PG (ref 26–34)
MCHC RBC AUTO-ENTMCNC: 33.4 G/DL (ref 31–36)
MCHC RBC AUTO-ENTMCNC: 33.9 G/DL (ref 31–36)
MCHC RBC AUTO-ENTMCNC: 34.3 G/DL (ref 31–36)
MCHC RBC AUTO-ENTMCNC: 34.4 G/DL (ref 31–36)
MCV RBC AUTO: 85.7 FL (ref 80–100)
MCV RBC AUTO: 87.4 FL (ref 80–100)
MCV RBC AUTO: 88.1 FL (ref 80–100)
MCV RBC AUTO: 88.7 FL (ref 80–100)
METHEMOGLOBIN ARTERIAL: 0.5 %
METHEMOGLOBIN ARTERIAL: 0.7 %
MIDAZOLAM: <20 NG/ML
NORDIAZEPAM: <20 NG/ML
O2 CONTENT ARTERIAL: 10 ML/DL
O2 CONTENT ARTERIAL: 12 ML/DL
O2 SAT, ARTERIAL: 98 %
O2 SAT, ARTERIAL: 98.7 %
O2 THERAPY: ABNORMAL
O2 THERAPY: ABNORMAL
OXAZEPAM: <20 NG/ML
PCO2 ARTERIAL: 31.3 MMHG (ref 35–45)
PCO2 ARTERIAL: 32.2 MMHG (ref 35–45)
PDW BLD-RTO: 14.9 % (ref 12.4–15.4)
PDW BLD-RTO: 14.9 % (ref 12.4–15.4)
PDW BLD-RTO: 15.4 % (ref 12.4–15.4)
PDW BLD-RTO: 16.2 % (ref 12.4–15.4)
PERFORMED ON: ABNORMAL
PH ARTERIAL: 7.38 (ref 7.35–7.45)
PH ARTERIAL: 7.54 (ref 7.35–7.45)
PH VENOUS: 7.29 (ref 7.35–7.45)
PH VENOUS: 7.36 (ref 7.35–7.45)
PH VENOUS: 7.43 (ref 7.35–7.45)
PH VENOUS: 7.48 (ref 7.35–7.45)
PHOSPHORUS: 3.2 MG/DL (ref 2.5–4.9)
PHOSPHORUS: 3.8 MG/DL (ref 2.5–4.9)
PHOSPHORUS: 5.2 MG/DL (ref 2.5–4.9)
PHOSPHORUS: 5.3 MG/DL (ref 2.5–4.9)
PLATELET # BLD: 35 K/UL (ref 135–450)
PLATELET # BLD: 49 K/UL (ref 135–450)
PLATELET # BLD: 52 K/UL (ref 135–450)
PLATELET # BLD: 54 K/UL (ref 135–450)
PMV BLD AUTO: 10.2 FL (ref 5–10.5)
PMV BLD AUTO: 10.7 FL (ref 5–10.5)
PMV BLD AUTO: 11.5 FL (ref 5–10.5)
PMV BLD AUTO: 9.7 FL (ref 5–10.5)
PO2 ARTERIAL: 110.9 MMHG (ref 75–108)
PO2 ARTERIAL: 118.3 MMHG (ref 75–108)
POTASSIUM SERPL-SCNC: 4.1 MMOL/L (ref 3.5–5.1)
POTASSIUM SERPL-SCNC: 4.4 MMOL/L (ref 3.5–5.1)
POTASSIUM SERPL-SCNC: 4.7 MMOL/L (ref 3.5–5.1)
POTASSIUM SERPL-SCNC: 5.4 MMOL/L (ref 3.5–5.1)
PROTHROMBIN TIME: 22 SEC (ref 9.8–13)
PROTHROMBIN TIME: 24.2 SEC (ref 9.8–13)
PROTHROMBIN TIME: 24.2 SEC (ref 9.8–13)
PROTHROMBIN TIME: 26.5 SEC (ref 9.8–13)
RBC # BLD: 2.36 M/UL (ref 4–5.2)
RBC # BLD: 2.41 M/UL (ref 4–5.2)
RBC # BLD: 2.7 M/UL (ref 4–5.2)
RBC # BLD: 2.95 M/UL (ref 4–5.2)
SODIUM BLD-SCNC: 132 MMOL/L (ref 136–145)
SODIUM BLD-SCNC: 134 MMOL/L (ref 136–145)
SODIUM BLD-SCNC: 135 MMOL/L (ref 136–145)
SODIUM BLD-SCNC: 135 MMOL/L (ref 136–145)
SOLUBLE TRANSFERRIN RECEPT: 1.2 MG/L (ref 1.9–4.4)
TCO2 ARTERIAL: 19.4 MMOL/L
TCO2 ARTERIAL: 27.2 MMOL/L
TEMAZEPAM: <20 NG/ML
TOTAL PROTEIN: 5.5 G/DL (ref 6.4–8.2)
VANCOMYCIN RANDOM: 26.4 UG/ML
WBC # BLD: 38.5 K/UL (ref 4–11)
WBC # BLD: 38.6 K/UL (ref 4–11)
WBC # BLD: 39.7 K/UL (ref 4–11)
WBC # BLD: 42 K/UL (ref 4–11)

## 2018-06-10 PROCEDURE — 83735 ASSAY OF MAGNESIUM: CPT

## 2018-06-10 PROCEDURE — 90945 DIALYSIS ONE EVALUATION: CPT

## 2018-06-10 PROCEDURE — P9017 PLASMA 1 DONOR FRZ W/IN 8 HR: HCPCS

## 2018-06-10 PROCEDURE — 99291 CRITICAL CARE FIRST HOUR: CPT | Performed by: INTERNAL MEDICINE

## 2018-06-10 PROCEDURE — 84100 ASSAY OF PHOSPHORUS: CPT

## 2018-06-10 PROCEDURE — 93005 ELECTROCARDIOGRAM TRACING: CPT

## 2018-06-10 PROCEDURE — 36592 COLLECT BLOOD FROM PICC: CPT

## 2018-06-10 PROCEDURE — 9990 CHARGE CONVERSION

## 2018-06-10 PROCEDURE — 85610 PROTHROMBIN TIME: CPT

## 2018-06-10 PROCEDURE — 71045 X-RAY EXAM CHEST 1 VIEW: CPT

## 2018-06-10 PROCEDURE — 36600 WITHDRAWAL OF ARTERIAL BLOOD: CPT

## 2018-06-10 PROCEDURE — P9016 RBC LEUKOCYTES REDUCED: HCPCS

## 2018-06-10 PROCEDURE — 82330 ASSAY OF CALCIUM: CPT

## 2018-06-10 PROCEDURE — 94750 CHARGE CONVERSION: CPT

## 2018-06-10 PROCEDURE — 86923 COMPATIBILITY TEST ELECTRIC: CPT

## 2018-06-10 PROCEDURE — 85384 FIBRINOGEN ACTIVITY: CPT

## 2018-06-10 PROCEDURE — 94003 VENT MGMT INPAT SUBQ DAY: CPT

## 2018-06-10 PROCEDURE — C9113 INJ PANTOPRAZOLE SODIUM, VIA: HCPCS

## 2018-06-10 PROCEDURE — 80069 RENAL FUNCTION PANEL: CPT

## 2018-06-10 PROCEDURE — 83605 ASSAY OF LACTIC ACID: CPT

## 2018-06-10 PROCEDURE — 85027 COMPLETE CBC AUTOMATED: CPT

## 2018-06-10 PROCEDURE — P9012 CRYOPRECIPITATE EACH UNIT: HCPCS

## 2018-06-10 PROCEDURE — 80202 ASSAY OF VANCOMYCIN: CPT

## 2018-06-10 PROCEDURE — 93010 ELECTROCARDIOGRAM REPORT: CPT | Performed by: INTERNAL MEDICINE

## 2018-06-10 PROCEDURE — 82803 BLOOD GASES ANY COMBINATION: CPT

## 2018-06-10 PROCEDURE — 94762 N-INVAS EAR/PLS OXIMTRY CONT: CPT

## 2018-06-10 PROCEDURE — 80076 HEPATIC FUNCTION PANEL: CPT

## 2018-06-10 RX ORDER — 0.9 % SODIUM CHLORIDE 0.9 %
250 INTRAVENOUS SOLUTION INTRAVENOUS ONCE
Status: COMPLETED | OUTPATIENT
Start: 2018-06-10 | End: 2018-06-11

## 2018-06-10 RX ORDER — 0.9 % SODIUM CHLORIDE 0.9 %
250 INTRAVENOUS SOLUTION INTRAVENOUS ONCE
Status: DISCONTINUED | OUTPATIENT
Start: 2018-06-10 | End: 2018-06-23

## 2018-06-10 RX ORDER — SODIUM CHLORIDE 9 MG/ML
INJECTION, SOLUTION INTRAVENOUS
Status: DISPENSED
Start: 2018-06-10 | End: 2018-06-11

## 2018-06-10 RX ORDER — 0.9 % SODIUM CHLORIDE 0.9 %
250 INTRAVENOUS SOLUTION INTRAVENOUS ONCE
Status: COMPLETED | OUTPATIENT
Start: 2018-06-10 | End: 2018-06-10

## 2018-06-10 RX ADMIN — Medication 10 ML: at 08:31

## 2018-06-10 RX ADMIN — PROPOFOL 40 MCG/KG/MIN: 10 INJECTION, EMULSION INTRAVENOUS at 23:00

## 2018-06-10 RX ADMIN — Medication: at 20:56

## 2018-06-10 RX ADMIN — Medication 250 ML: at 08:32

## 2018-06-10 RX ADMIN — METHYLPREDNISOLONE SODIUM SUCCINATE 125 MG: 125 INJECTION, POWDER, LYOPHILIZED, FOR SOLUTION INTRAMUSCULAR; INTRAVENOUS at 16:25

## 2018-06-10 RX ADMIN — PANTOPRAZOLE SODIUM 40 MG: 40 INJECTION, POWDER, LYOPHILIZED, FOR SOLUTION INTRAVENOUS at 08:30

## 2018-06-10 RX ADMIN — METHYLPREDNISOLONE SODIUM SUCCINATE 125 MG: 125 INJECTION, POWDER, LYOPHILIZED, FOR SOLUTION INTRAMUSCULAR; INTRAVENOUS at 03:58

## 2018-06-10 RX ADMIN — METHYLPREDNISOLONE SODIUM SUCCINATE 125 MG: 125 INJECTION, POWDER, LYOPHILIZED, FOR SOLUTION INTRAMUSCULAR; INTRAVENOUS at 10:21

## 2018-06-10 RX ADMIN — PROPOFOL 40 MCG/KG/MIN: 10 INJECTION, EMULSION INTRAVENOUS at 06:18

## 2018-06-10 RX ADMIN — Medication: at 08:31

## 2018-06-10 RX ADMIN — PANTOPRAZOLE SODIUM 40 MG: 40 INJECTION, POWDER, LYOPHILIZED, FOR SOLUTION INTRAVENOUS at 20:55

## 2018-06-10 RX ADMIN — METHYLPREDNISOLONE SODIUM SUCCINATE 125 MG: 125 INJECTION, POWDER, LYOPHILIZED, FOR SOLUTION INTRAMUSCULAR; INTRAVENOUS at 22:22

## 2018-06-10 RX ADMIN — Medication 10 ML: at 20:55

## 2018-06-10 RX ADMIN — CHLORHEXIDINE GLUCONATE 15 ML: 0.12 RINSE ORAL at 08:30

## 2018-06-10 RX ADMIN — Medication 250 ML: at 12:11

## 2018-06-10 RX ADMIN — CHLORHEXIDINE GLUCONATE 15 ML: 0.12 RINSE ORAL at 20:55

## 2018-06-10 ASSESSMENT — PULMONARY FUNCTION TESTS
PIF_VALUE: 31
PIF_VALUE: 30
PIF_VALUE: 35
PIF_VALUE: 29
PIF_VALUE: 28
PIF_VALUE: 31

## 2018-06-10 NOTE — PROGRESS NOTES
06/10/18 1913   Vent Information   Vent Type 840   Vent Mode AC/VC   Vt Ordered 450 mL   Rate Set 18 bmp   Peak Flow 90 L/min   Pressure Support 0 cmH20   FiO2  30 %   Sensitivity 3   PEEP/CPAP 5   I Time/ I Time % 0 s   Humidification Source Heated wire   Humidification Temp Measured 36.9   Circuit Condensation Drained   Vent Patient Data   Vt Exhaled 462 mL   Rate Measured 18 br/min   Minute Volume 8.31 Liters   Peak Inspiratory Pressure 29 cmH2O   I:E Ratio 1:5.20   High Peep/I Pressure 0   Mean Airway Pressure 9.6 cmH20   Plateau Pressure 19 PEW30   Static Compliance 33 mL/cmH2O   Dynamic Compliance 19 mL/cmH2O   Spontaneous Breathing Trial (SBT) RT Doc   Pulse 83   SpO2 98 %   Cough/Sputum   Sputum How Obtained Endotracheal;Suctioned   Cough Non-productive   Breath Sounds   Right Upper Lobe Rhonchi   Right Middle Lobe Diminished   Right Lower Lobe Clear   Left Upper Lobe Diminished   Left Lower Lobe Diminished   Additional Respiratory  Assessments   Resp 18   Position Semi-Lucero's   Alarm Settings   High Pressure Alarm 40 cmH2O   Low Minute Volume Alarm 5 L/min   High Respiratory Rate 45 br/min   Patient Observation   Observations ETT 7.5.  Ambu bag at bedside   Non-Surgical Airway Endo Tracheal Tube   Placement Date/Time: 06/08/18 1005   Inserted by:    Insertion attempts: 1  Airway Device: Endo Tracheal Tube  Size: (c)   Placement Verified By[de-identified] Auscultation;Colorimetric EtCO2 device   Secured at 22 cm   Measured From [facility] By Commercial tube cordova   Site Condition Dry

## 2018-06-10 NOTE — PROGRESS NOTES
Initial exam completed- See doc flowsheet for assessment findings. Pt resting quietly in bed and withdraws slightly to pain but does not open eyes or follow commands at this time . All lines and monitoring devices are in place . Vitals and SpO2 stable. Vasopressin at 0.04. CRRT running well and maintaining even fluid balance. Call light is within easy reach. Plan of care and goals reviewed.  Sanam Gentile RN

## 2018-06-10 NOTE — PROGRESS NOTES
GI Progress Note      SUBJECTIVE:  Pt remains sedated and intubated. No reports of melena, hematochezia or hematemesis.     OBJECTIVE      Medications    Current Facility-Administered Medications: 0.9 % sodium chloride bolus, 250 mL, Intravenous, Once  sodium bicarbonate 75 mEq in dextrose 5 % 1,000 mL infusion, , Intravenous, Continuous  pantoprazole (PROTONIX) injection 40 mg, 40 mg, Intravenous, BID  cefepime (MAXIPIME) 1 g IVPB minibag, 1 g, Intravenous, Q8H  glucose (GLUTOSE) 40 % oral gel 15 g, 15 g, Oral, PRN  dextrose 50 % solution 12.5 g, 12.5 g, Intravenous, PRN  glucagon injection 1 mg, 1 mg, Intramuscular, PRN  prismaSATE BGK 4/2.5 5,000 mL solution, , Dialysis, Continuous  prismaSATE BGK 4/2.5 5,000 mL solution, , Dialysis, Continuous  prismaSATE BGK 4/2.5 5,000 mL solution, , Dialysis, Continuous  insulin lispro (HUMALOG) injection vial 0-6 Units, 0-6 Units, Subcutaneous, Q4H  vancomycin (VANCOCIN) 1,250 mg in dextrose 5 % 250 mL IVPB, 1,250 mg, Intravenous, Q24H  chlorhexidine (PERIDEX) 0.12 % solution 15 mL, 15 mL, Mouth/Throat, BID  propofol 1000 MG/100ML injection, 10 mcg/kg/min, Intravenous, Continuous  midazolam (VERSED) injection 2 mg, 2 mg, Intravenous, Q1H PRN  fentaNYL (SUBLIMAZE) injection 25 mcg, 25 mcg, Intravenous, Q1H PRN  norepinephrine (LEVOPHED) 16 mg in dextrose 5 % 250 mL infusion, 2 mcg/min, Intravenous, Continuous  magnesium sulfate 1 g in dextrose 5% 100 mL IVPB, 1 g, Intravenous, PRN  calcium gluconate 1 g in dextrose 5 % 100 mL IVPB, 1 g, Intravenous, PRN **OR** calcium gluconate 2 g in dextrose 5 % 100 mL IVPB, 2 g, Intravenous, PRN **OR** calcium gluconate 3 g in dextrose 5 % 100 mL IVPB, 3 g, Intravenous, PRN **OR** calcium gluconate 4 g in dextrose 5 % 100 mL IVPB, 4 g, Intravenous, PRN  sodium phosphate 6 mmol in dextrose 5 % 250 mL IVPB, 6 mmol, Intravenous, PRN **OR** sodium phosphate 12 mmol in dextrose 5 % 250 mL IVPB, 12 mmol, Intravenous, PRN **OR** sodium phosphate 18 mmol in dextrose 5 % 500 mL IVPB, 18 mmol, Intravenous, PRN **OR** sodium phosphate 24 mmol in dextrose 5 % 500 mL IVPB, 24 mmol, Intravenous, PRN  lidocaine 1 % injection 5 mL, 5 mL, Intradermal, Once  sodium chloride flush 0.9 % injection 10 mL, 10 mL, Intravenous, 2 times per day  sodium chloride flush 0.9 % injection 10 mL, 10 mL, Intravenous, PRN  vasopressin 20 Units in dextrose 5 % 100 mL infusion, 0.04 Units/min, Intravenous, Continuous  methylPREDNISolone sodium (SOLU-MEDROL) injection 125 mg, 125 mg, Intravenous, Q6H  dextrose 5 % solution, 100 mL/hr, Intravenous, PRN  fentaNYL (SUBLIMAZE) 1,000 mcg in sodium chloride 0.9 % 100 mL infusion, 25 mcg/hr, Intravenous, Continuous  sevelamer (RENVELA) tablet 800 mg, 800 mg, Oral, TID WC  sodium chloride flush 0.9 % injection 10 mL, 10 mL, Intravenous, 2 times per day  sodium chloride flush 0.9 % injection 10 mL, 10 mL, Intravenous, PRN  ondansetron (ZOFRAN) injection 4 mg, 4 mg, Intravenous, Q6H PRN  mupirocin (BACTROBAN) 2 % ointment, , Nasal, BID  darbepoetin emi-polysorbate (ARANESP) injection 100 mcg, 100 mcg, Intravenous, Weekly  acetaminophen (TYLENOL) tablet 650 mg, 650 mg, Oral, Q6H PRN  heparin (porcine) injection 4,100 Units, 4,100 Units, Intercatheter, PRN  promethazine (PHENERGAN) tablet 25 mg, 25 mg, Oral, Q6H PRN  hydrOXYzine (VISTARIL) capsule 50 mg, 50 mg, Oral, Q8H PRN  Physical    VITALS:  /66   Pulse 86   Temp 95.8 °F (35.4 °C)   Resp 21   Ht 5' 2\" (1.575 m)   Wt 138 lb 7.2 oz (62.8 kg)   SpO2 100%   BMI 25.32 kg/m²   ABD: soft, no grimacing today with deep palpation, ND, NABs  Data    CBC with Differential:    Lab Results   Component Value Date    WBC 42.0 06/10/2018    RBC 2.41 06/10/2018    HGB 7.1 06/10/2018    HCT 21.4 06/10/2018    PLT 54 06/10/2018    MCV 88.7 06/10/2018    MCH 29.6 06/10/2018    MCHC 33.4 06/10/2018    RDW 16.2 06/10/2018    NRBC 1 06/08/2018    LYMPHOPCT 19.0 06/08/2018    MONOPCT 1.0 06/08/2018

## 2018-06-10 NOTE — CONSULTS
[address]                  [address] [address]                                   CONSULTATION    PATIENT NAME: Jacqueline Gamboa                    :        1990  MED REC NO:   [id number]                          ROOM:       8572  ACCOUNT NO:   [de-identified]                          ADMIT DATE: 2018  PROVIDER:     Sirena Teran MD    CONSULT DATE:  06/10/2018    REASON FOR CONSULTATION:  Evaluate the patient post arrest with abnormal  electrocardiogram.    HISTORY OF PRESENT ILLNESS:  The patient is unfortunate 72-year-old lady  who has tricuspid valve endocarditis with history of recurring  presentations for recurrent tricuspid valve endocarditis with history of IV  drug use. The patient is not available for history due to currently being  on the ventilator, attempted to call family members without current  success. History obtained from the nursing staff, currently not sure if  she is actively abusing IV drugs. She has presented to the hospital with  recurrent tricuspid valve endocarditis with evidence for numerous septic  emboli to her lungs and spleen. She has large vegetation on her tricuspid  valve. She is receiving antibiotics. She is status post code, currently  on continuous dialysis with vital signs have been maintained. She is  currently unresponsive. She has been in multiple hospitals for treatment  as she has been in [facility] _____ [address]:  Includes history of asthma, chronic kidney disease,  depression, hepatitis C positive, history of meth resistance staph aureus  positive, continued IV drug use. SOCIAL HISTORY:  Includes cocaine use, IV drugs, opioids. Drink alcohol. FAMILY HISTORY:  Currently noncontributory. PHYSICAL EXAMINATION:  GENERAL:  She is sedated on a ventilator and unresponsive. VITAL SIGNS:  Temperature 95.8, blood pressure 100/66 on ventilator. Oxygen saturation is 100%. HEENT:  Unremarkable. LUNGS:  Revealed crackles throughout. CARDIAC:  Notable for systolic and diastolic murmur. ABDOMEN:  Flat. No tenderness elicited. EXTREMITIES:  Show 1+ edema. DIAGNOSTIC DATA:  EKG now shows deep T-wave inversion inferiorly and  laterally. Recent CT scan shows multiple septic emboli in her lungs,  likely a splenic infarct. Potassium is 5.4, creatinine 0.8, alkaline phos  616, ALT 2787, AST 5541, bilirubin 7. White blood cell count 42,000,  hemoglobin 7.1. Currently, the patient is receiving continuous dialysis. She is getting Solu-Medrol, vancomycin and being sedated. Echocardiogram  shows LV dysfunction, EF 45. Large vegetation on the tricuspid valve. Recent blood cultures show meth resistance staph aureus. IMPRESSION:  Tricuspid valve endocarditis, which has never been healed and  with signs of active infection, likely she has never been able to complete  a course of therapy and then has returned to IV drug use. Continue risk of  recurrent infection, now with multisystem organ failure related to  recurrent septic pulmonary emboli and emboli elsewhere. Also fulminant  liver failure is present, abnormal EKG related to right ventricular failure  from the tricuspid valve endocarditis and septic pulmonary emboli. At this  point in time, prognosis is quite grim. I have attempted to contact family  members and we will try again not much to add to her care other than to  continue full support. Consultation from our group even suggested 11/2017,  consult from our group even suggested hospice and palliative care would be  appropriate in this unfortunate young patient with multiple end-stage  problems.         Tigre Turpin MD    D: 06/10/2018 8:50:26       T: 06/10/2018 11:01:33     [email or web address]  Job#: [id number]     Doc#: [id number]    CC:

## 2018-06-10 NOTE — PROGRESS NOTES
emi-polysorbate  100 mcg Intravenous Weekly     PRN Meds:  glucose, dextrose, glucagon (rDNA), midazolam, fentanNYL, magnesium sulfate, calcium gluconate IVPB **OR** calcium gluconate IVPB **OR** calcium gluconate IVPB **OR** calcium gluconate IVPB, sodium phosphate IVPB **OR** sodium phosphate IVPB **OR** sodium phosphate IVPB **OR** sodium phosphate IVPB, sodium chloride flush, dextrose, sodium chloride flush, ondansetron, acetaminophen, heparin (porcine), promethazine, hydrOXYzine    Results:  CBC:   Recent Labs      06/09/18   1810  06/10/18   0010  06/10/18   0610   WBC  36.8*  38.6*  42.0*   HGB  9.2*  8.7*  7.1*   HCT  26.5*  25.3*  21.4*   MCV  85.3  85.7  88.7   PLT  59*  52*  54*     BMP:   Recent Labs      06/09/18   1810  06/10/18   0010  06/10/18   0610   NA  135*  135*  134*   K  4.0  4.1  5.4*   CL  94*  97*  98*   CO2  25  28  19*   PHOS  4.6  3.8  5.2*   BUN  21*  18  15   CREATININE  1.2*  0.9  0.8     LIVER PROFILE:   Recent Labs      06/08/18   1125  06/09/18   0210  06/10/18   0610   AST  193*  2,716*  5,541*   ALT  105*  949*  2,787*   BILIDIR  2.2*   --   5.5*   BILITOT  2.5*  2.9*  7.0*   ALKPHOS  574*  436*  616*       Cultures:  6/5/18 Blood cx with MRSA, Strep pneumoniae and Enterobacter Cloacae  6/6/18 blood cx ngtd  6/7 Blood cx NGTD    Films:  CTA ABD showed basialr nodules with cavitations consistent with septic emboli; possibly splenic infarct    6/9/18 CXR: Unchanged multifocal cavitary pneumonia    ASSESSMENT:  · Recurrent Cardiac arrest: due to septic shock and severe anemia with hypotension  · Septic Shock  · DIC - requiring multiple blood products  · Bacteremia with strep pneumonia, MRSA, Enterobacter  · Tricuspid valve endocarditis  · Cavitary pneumonia  · Possible splenic infract  · Possible right foot mtp infection  · IVDA - relapsed recently  · Upper GI bleed from angiodysplasia  · Moderate TVR  · Ischemic liver injury  · ESRD disease on dialysis  · HCV  · History of infective endocarditis x2      PLAN:  Sedation vacation today  Transfuse 1 u PRBC, cryo  Mechanical ventilation as per my orders. The ventilator was adjusted by me at the bedside for unstable, life threatening respiratory failure. IV Propofol for sedation, target RASS -2, with daily spontaneous awakening trial.  Fentanyl PRN pain, gtt as needed  Head of bed 30 degrees or higher at all times  Daily spontaneous breathing trial once PEEP less than 8, FiO2 less than 55%. Vasopressin  Levophed to keep MAP > 65  Oncology Consult appreciated  Solumedrol 125mg IV q6 started by oncology  Change heme and renal labs to q8  Transfuse as needed for Hgb < 7, PLT < 10, Fibrinogen < 100  · Continue Vanc, Cefepimw  - ID following  · CRRT   · Keep I/O even  · Bicarb gtt per renal  · Start TF's and titrate to goal  · Head CT when stable  · Change tunneled catheter after a period of antibiotics and negative blood cultures  · SCD  · GI prophylaxis, PPI  Due to at least single organ failure and risk of rapid deterioration, I spent 38 minutes of Critical care time reviewing labs/films, examining patient, collaborating with other physicians. This does not include time performing critical procedures.

## 2018-06-10 NOTE — PROGRESS NOTES
Pt continues to rest quietly and no further changes noted in exam. Vitals and SpO2 stable. All lines and monitoring devices remain in place. Pt repositioned in bed. Dr Monica Galvez calls in and brief update given. Continue current plan of care.  Eliseo Mercer RN

## 2018-06-10 NOTE — PROGRESS NOTES
Increased PS to to 12 d/t patient with increased WOB and RSBI over 100. Patient says he is having a hard time breathing. sp02 92% on PS 12 and 45%. Will continue to monitor.

## 2018-06-10 NOTE — PROGRESS NOTES
Pt resting quietly in bed with all lines and monitoring devices in place. Vitals and SpO2 stable. Vasopressin remains at 0.04 units. Has tolerated all blood products without adverse rx. CRRT on target to maintain even fluid balance.  No changes noted in exam. Continue current plan of care Champ Al RN

## 2018-06-10 NOTE — PROGRESS NOTES
06/10/18 0318   Vent Information   Vent Type 840   Vent Mode AC/VC   Vt Ordered 450 mL   Rate Set 18 bmp   Peak Flow 90 L/min   Pressure Support 0 cmH20   FiO2  30 %   Sensitivity 3   PEEP/CPAP 5   I Time/ I Time % 0 s   Humidification Source Heated wire   Humidification Temp Measured 35.3   Circuit Condensation Drained   Vent Patient Data   Vt Exhaled 473 mL   Rate Measured 18 br/min   Minute Volume 8.69 Liters   Peak Inspiratory Pressure 30 cmH2O   I:E Ratio 1:5.20   High Peep/I Pressure 0   Mean Airway Pressure 9.8 cmH20   Spontaneous Breathing Trial (SBT) RT Doc   Pulse 97   SpO2 100 %   Cough/Sputum   Sputum How Obtained None   Breath Sounds   Right Upper Lobe Diminished   Right Middle Lobe Diminished   Right Lower Lobe Diminished   Left Upper Lobe Diminished   Left Lower Lobe Diminished   Additional Respiratory  Assessments   Resp 18   Position Semi-Lucero's   Alarm Settings   High Pressure Alarm 40 cmH2O   Low Minute Volume Alarm 5 L/min   High Respiratory Rate 60 br/min   Low Exhaled Vt  350 mL   Patient Observation   Observations Ambu bag at bedside   Non-Surgical Airway Endo Tracheal Tube   Placement Date/Time: 06/08/18 1005   Inserted by:    Insertion attempts: 1  Airway Device: Endo Tracheal Tube  Size: (c)   Placement Verified By[de-identified] Auscultation;Colorimetric EtCO2 device   Secured at 22 cm   Measured From Lips   Secured Location Left   Secured By Commercial tube cordova   Site Condition Dry

## 2018-06-10 NOTE — PROGRESS NOTES
°C) - 86 18 100 %       CVP: CVP (Mean): 7 mmHg      Intake/Output Summary (Last 24 hours) at 06/10/18 1421  Last data filed at 06/10/18 1300   Gross per 24 hour   Intake             4744 ml   Output             4782 ml   Net              -38 ml       EXAM:  General:on vent appears ill sedated  Eyes: PERRL. No sclera icterus. No conjunctiva injected. ENT: No discharge. Neck: Trachea midline. Normal thyroid. Resp:coarse AE No rhonchi. No dullness on percussion. CV:tachy . Regular rhythm. No edema. No JVD. Palpable pedal pulses. GI: Non-tender. Non-distended. No masses. No organmegaly. Normal bowel sounds. No hernia. Skin: Warm and dry. No nodule on exposed extremities. No rash on exposed extremities. appears jaundiced  Lymph: No cervical LAD. No supraclavicular LAD.    M/S: . Right first MTP is swollen    Psych: BRIGHT    Medications:  Scheduled Meds:   sodium chloride  250 mL Intravenous Once    sodium chloride  250 mL Intravenous Once    sodium chloride  250 mL Intravenous Once    vancomycin  1,000 mg Intravenous Q24H    pantoprazole  40 mg Intravenous BID    cefepime  1 g Intravenous Q8H    insulin lispro  0-6 Units Subcutaneous Q4H    chlorhexidine  15 mL Mouth/Throat BID    lidocaine 1 % injection  5 mL Intradermal Once    sodium chloride flush  10 mL Intravenous 2 times per day    methylPREDNISolone  125 mg Intravenous Q6H    sevelamer  800 mg Oral TID WC    sodium chloride flush  10 mL Intravenous 2 times per day    mupirocin   Nasal BID    darbepoetin emi-polysorbate  100 mcg Intravenous Weekly       PRN Meds:  glucose, dextrose, glucagon (rDNA), midazolam, fentanNYL, magnesium sulfate, calcium gluconate IVPB **OR** calcium gluconate IVPB **OR** calcium gluconate IVPB **OR** calcium gluconate IVPB, sodium phosphate IVPB **OR** sodium phosphate IVPB **OR** sodium phosphate IVPB **OR** sodium phosphate IVPB, sodium chloride flush, dextrose, sodium chloride flush, ondansetron, acetaminophen,

## 2018-06-10 NOTE — PROGRESS NOTES
06/10/18 1523   Vent Information   Vent Type 840   Vent Mode AC/VC   Vt Ordered 450 mL   Rate Set 18 bmp   Peak Flow 90 L/min   Pressure Support 0 cmH20   FiO2  30 %   Sensitivity 3   PEEP/CPAP 5   I Time/ I Time % 0 s   Humidification Source Heated wire   Humidification Temp 37   Circuit Condensation Drained   Vent Patient Data   Vt Exhaled 468 mL   Rate Measured 18 br/min   Minute Volume 8.43 Liters   Peak Inspiratory Pressure 31 cmH2O   I:E Ratio 1:5.20   High Peep/I Pressure 0   Mean Airway Pressure 9.9 cmH20   Spontaneous Breathing Trial (SBT) RT Doc   Pulse 84   SpO2 99 %   Cough/Sputum   Sputum How Obtained Endotracheal;Suctioned   Sputum Amount Small   Sputum Color Cloudy   Tenacity Thick   Breath Sounds   Right Upper Lobe Diminished;Rhonchi   Right Middle Lobe Diminished   Right Lower Lobe Diminished   Left Upper Lobe Diminished;Rhonchi   Left Lower Lobe Diminished   Additional Respiratory  Assessments   Resp 18   Subglottic Suction Done?  Yes   Alarm Settings   High Pressure Alarm 40 cmH2O   Low Minute Volume Alarm 5 L/min   Apnea (secs) 20 secs   High Respiratory Rate 45 br/min   Low Exhaled Vt  350 mL   Patient Observation   Observations ambu bs, h20 ok   Non-Surgical Airway Endo Tracheal Tube   Placement Date/Time: 06/08/18 1005   Inserted by:    Insertion attempts: 1  Airway Device: Endo Tracheal Tube  Size: (c)   Placement Verified By[de-identified] Auscultation;Colorimetric EtCO2 device   Secured at 22 cm   Measured From Lips   Secured Location Right   Secured By Commercial tube cordova   Site Condition Dry

## 2018-06-10 NOTE — PROGRESS NOTES
06/09/18 2244   Vent Information   Vent Type 840   Vent Mode AC/VC   Vt Ordered 450 mL   Rate Set 18 bmp   Peak Flow 90 L/min   Pressure Support 0 cmH20   FiO2  30 %   Sensitivity 3   PEEP/CPAP 5   I Time/ I Time % 0 s   Humidification Source Heated wire   Humidification Temp Measured 35.1   Circuit Condensation Drained   Vent Patient Data   Vt Exhaled 464 mL   Rate Measured 18 br/min   Minute Volume 8.37 Liters   Peak Inspiratory Pressure 28 cmH2O   I:E Ratio 1:5.20   High Peep/I Pressure 0   Mean Airway Pressure 9.4 cmH20   Spontaneous Breathing Trial (SBT) RT Doc   Pulse 66   SpO2 98 %   Cough/Sputum   Sputum How Obtained Suctioned   Cough None   Breath Sounds   Right Upper Lobe Diminished   Right Middle Lobe Diminished   Right Lower Lobe Diminished   Left Upper Lobe Diminished   Left Lower Lobe Diminished   Additional Respiratory  Assessments   Resp 18   Position Semi-Lucero's   Alarm Settings   High Pressure Alarm 40 cmH2O   Low Minute Volume Alarm 5 L/min   High Respiratory Rate 60 br/min   Low Exhaled Vt  350 mL   Patient Observation   Observations Ambu bag at bedside   Non-Surgical Airway Endo Tracheal Tube   Placement Date/Time: 06/08/18 1005   Inserted by:    Insertion attempts: 1  Airway Device: Endo Tracheal Tube  Size: (c)   Placement Verified By[de-identified] Auscultation;Colorimetric EtCO2 device   Secured at 22 cm   Measured From Lips   Secured Location Left   Secured By Commercial tube cordova   Site Condition Dry

## 2018-06-10 NOTE — ONCOLOGY
Hematology/Oncology Progress Note       Subjective: Intubated. Down to Vasopressin only. On Propofol and Fentanyl. On CVVHD. No obvious bleeding. ROS:     Constitutional: Denies fever, sweats, weight loss. .     Eyes: No visual changes or diplopia. No scleral icterus. ENT: No Headaches, no hearing loss, no  vertigo. No mouth sores or sore throat. Cardiovascular: No chest pain, no dyspnea on exertion, no palpitations, no  loss of consciousness. Respiratory: No cough, no  wheezing, no dyspnea, no sputum production. No hemoptysis. .    Gastrointestinal: No abdominal pain, no appetite loss, no blood in stools. No change in bowel habits. Genitourinary: No dysuria, trouble voiding, or hematuria. Musculoskeletal:  Generalized weakness. No joint complaints. Integumentary: No rash or pruritis. Neurological: No headache, diplopia. No change in gait, balance, or coordination. No paresthesias. Endocrine: No temperature intolerance. No excessive thirst, fluid intake, or urination. Hematologic/Lymphatic: No abnormal bruising or ecchymoses, no blood clots or swollen lymph nodes. Allergic/Immunologic: No nasal congestion or hives. Objective:     Physical examination:     /66   Pulse 87   Temp 96.2 °F (35.7 °C)   Resp 18   Ht 5' 2\" (1.575 m)   Wt 138 lb 7.2 oz (62.8 kg)   SpO2 100%   BMI 25.32 kg/m²     CONSTITUTIONAL: awake, alert, cooperative, no apparent distress. EYES:  Pupils equal, round and reactive to light, sclera non-icteric, conjunctiva normal  ENT:  Normocephalic, without obvious abnormality, atraumatic, sinuses nontender on palpation, external ears without lesions, oral pharynx with moist mucus membranes, no mucositis.   NECK:  Supple, symmetrical, trachea midline, no adenopathy, thyroid symmetric, not enlarged and no tenderness, skin normal  HEMATOLOGIC/LYMPHATICS:  no cervical lymphadenopathy, no supraclavicular lymphadenopathy, no axillary lymphadenopathy and no inguinal gel 15 g, 15 g, Oral, PRN  dextrose 50 % solution 12.5 g, 12.5 g, Intravenous, PRN  glucagon injection 1 mg, 1 mg, Intramuscular, PRN  prismaSATE BGK 4/2.5 5,000 mL solution, , Dialysis, Continuous  prismaSATE BGK 4/2.5 5,000 mL solution, , Dialysis, Continuous  prismaSATE BGK 4/2.5 5,000 mL solution, , Dialysis, Continuous  insulin lispro (HUMALOG) injection vial 0-6 Units, 0-6 Units, Subcutaneous, Q4H  vancomycin (VANCOCIN) 1,250 mg in dextrose 5 % 250 mL IVPB, 1,250 mg, Intravenous, Q24H  chlorhexidine (PERIDEX) 0.12 % solution 15 mL, 15 mL, Mouth/Throat, BID  propofol 1000 MG/100ML injection, 10 mcg/kg/min, Intravenous, Continuous  midazolam (VERSED) injection 2 mg, 2 mg, Intravenous, Q1H PRN  fentaNYL (SUBLIMAZE) injection 25 mcg, 25 mcg, Intravenous, Q1H PRN  norepinephrine (LEVOPHED) 16 mg in dextrose 5 % 250 mL infusion, 2 mcg/min, Intravenous, Continuous  magnesium sulfate 1 g in dextrose 5% 100 mL IVPB, 1 g, Intravenous, PRN  calcium gluconate 1 g in dextrose 5 % 100 mL IVPB, 1 g, Intravenous, PRN **OR** calcium gluconate 2 g in dextrose 5 % 100 mL IVPB, 2 g, Intravenous, PRN **OR** calcium gluconate 3 g in dextrose 5 % 100 mL IVPB, 3 g, Intravenous, PRN **OR** calcium gluconate 4 g in dextrose 5 % 100 mL IVPB, 4 g, Intravenous, PRN  sodium phosphate 6 mmol in dextrose 5 % 250 mL IVPB, 6 mmol, Intravenous, PRN **OR** sodium phosphate 12 mmol in dextrose 5 % 250 mL IVPB, 12 mmol, Intravenous, PRN **OR** sodium phosphate 18 mmol in dextrose 5 % 500 mL IVPB, 18 mmol, Intravenous, PRN **OR** sodium phosphate 24 mmol in dextrose 5 % 500 mL IVPB, 24 mmol, Intravenous, PRN  lidocaine 1 % injection 5 mL, 5 mL, Intradermal, Once  sodium chloride flush 0.9 % injection 10 mL, 10 mL, Intravenous, 2 times per day  sodium chloride flush 0.9 % injection 10 mL, 10 mL, Intravenous, PRN  vasopressin 20 Units in dextrose 5 % 100 mL infusion, 0.04 Units/min, Intravenous, Continuous  methylPREDNISolone sodium (SOLU-MEDROL)

## 2018-06-11 PROBLEM — J96.01 ACUTE RESPIRATORY FAILURE WITH HYPOXIA (HCC): Status: ACTIVE | Noted: 2018-06-11

## 2018-06-11 LAB
ALBUMIN SERPL-MCNC: 2.7 G/DL (ref 3.4–5)
ALBUMIN SERPL-MCNC: 3 G/DL (ref 3.4–5)
ANION GAP SERPL CALCULATED.3IONS-SCNC: 11 MMOL/L (ref 3–16)
ANION GAP SERPL CALCULATED.3IONS-SCNC: 6 MMOL/L (ref 3–16)
BASE EXCESS ARTERIAL: 1.2 MMOL/L (ref -3–3)
BASE EXCESS ARTERIAL: 2.7 MMOL/L (ref -3–3)
BLOOD BANK DISPENSE STATUS: NORMAL
BLOOD BANK PRODUCT CODE: NORMAL
BLOOD CULTURE, ROUTINE: NORMAL
BPU ID: NORMAL
BUN BLDV-MCNC: 16 MG/DL (ref 7–20)
BUN BLDV-MCNC: 17 MG/DL (ref 7–20)
CALCIUM IONIZED: 1.05 MMOL/L (ref 1.12–1.32)
CALCIUM IONIZED: 1.1 MMOL/L (ref 1.12–1.32)
CALCIUM SERPL-MCNC: 8.5 MG/DL (ref 8.3–10.6)
CALCIUM SERPL-MCNC: 8.6 MG/DL (ref 8.3–10.6)
CANNABINOID GC/MS: 279 NG/ML
CARBOXYHEMOGLOBIN ARTERIAL: 1.4 % (ref 0–1.5)
CARBOXYHEMOGLOBIN ARTERIAL: 1.9 % (ref 0–1.5)
CHLORIDE BLD-SCNC: 97 MMOL/L (ref 99–110)
CHLORIDE BLD-SCNC: 97 MMOL/L (ref 99–110)
CO2: 27 MMOL/L (ref 21–32)
CO2: 29 MMOL/L (ref 21–32)
COCAINE GC/MS CONF: >1000 NG/ML
CREAT SERPL-MCNC: 0.6 MG/DL (ref 0.6–1.1)
CREAT SERPL-MCNC: 0.7 MG/DL (ref 0.6–1.1)
DESCRIPTION BLOOD BANK: NORMAL
FIBRINOGEN: 117 MG/DL (ref 200–397)
FIBRINOGEN: 129 MG/DL (ref 200–397)
GFR AFRICAN AMERICAN: >60
GFR AFRICAN AMERICAN: >60
GFR NON-AFRICAN AMERICAN: >60
GFR NON-AFRICAN AMERICAN: >60
GLUCOSE BLD-MCNC: 103 MG/DL (ref 70–99)
GLUCOSE BLD-MCNC: 115 MG/DL (ref 70–99)
GLUCOSE BLD-MCNC: 116 MG/DL (ref 70–99)
GLUCOSE BLD-MCNC: 147 MG/DL (ref 70–99)
GLUCOSE BLD-MCNC: 156 MG/DL (ref 70–99)
GLUCOSE BLD-MCNC: 179 MG/DL (ref 70–99)
GLUCOSE BLD-MCNC: 35 MG/DL (ref 70–99)
GLUCOSE BLD-MCNC: 93 MG/DL (ref 70–99)
GONADOTROPIN, CHORIONIC (HCG) QUANT: <5 MIU/ML
HCO3 ARTERIAL: 25.7 MMOL/L (ref 21–29)
HCO3 ARTERIAL: 26.3 MMOL/L (ref 21–29)
HCT VFR BLD CALC: 19.3 % (ref 36–48)
HCT VFR BLD CALC: 24.3 % (ref 36–48)
HCT VFR BLD CALC: 26 % (ref 36–48)
HEMOGLOBIN, ART, EXTENDED: 6.4 G/DL (ref 12–16)
HEMOGLOBIN, ART, EXTENDED: 9.4 G/DL (ref 12–16)
HEMOGLOBIN: 6.5 G/DL (ref 12–16)
HEMOGLOBIN: 8.1 G/DL (ref 12–16)
HEMOGLOBIN: 8.8 G/DL (ref 12–16)
INR BLD: 1.53 (ref 0.86–1.14)
INR BLD: 1.56 (ref 0.86–1.14)
INR BLD: 1.83 (ref 0.86–1.14)
LACTIC ACID: 2.6 MMOL/L (ref 0.4–2)
MAGNESIUM: 2.5 MG/DL (ref 1.8–2.4)
MAGNESIUM: 2.7 MG/DL (ref 1.8–2.4)
MCH RBC QN AUTO: 29.2 PG (ref 26–34)
MCHC RBC AUTO-ENTMCNC: 33.3 G/DL (ref 31–36)
MCHC RBC AUTO-ENTMCNC: 33.4 G/DL (ref 31–36)
MCHC RBC AUTO-ENTMCNC: 33.8 G/DL (ref 31–36)
MCV RBC AUTO: 86.3 FL (ref 80–100)
MCV RBC AUTO: 87.3 FL (ref 80–100)
MCV RBC AUTO: 87.7 FL (ref 80–100)
METHEMOGLOBIN ARTERIAL: 0.4 %
METHEMOGLOBIN ARTERIAL: 0.7 %
O2 CONTENT ARTERIAL: 13 ML/DL
O2 CONTENT ARTERIAL: 9 ML/DL
O2 SAT, ARTERIAL: 95.4 %
O2 SAT, ARTERIAL: 97.6 %
O2 THERAPY: ABNORMAL
O2 THERAPY: ABNORMAL
OPIATES, HYDROCODONE: <2 NG/ML
OPIATES, HYDROMORPHONE: <2 NG/ML
OPIATES, OXYCODONE: <2 NG/ML
OPIATES, OXYMORPHONE: <2 NG/ML
OPIATES, SER/ PLASMA CODEINE: <2 NG/ML
OPIATES, SER/PLAS: <2 NG/ML
OPITATES, MORPHINE: <2 NG/ML
PCO2 ARTERIAL: 32.4 MMHG (ref 35–45)
PCO2 ARTERIAL: 43.9 MMHG (ref 35–45)
PDW BLD-RTO: 15.1 % (ref 12.4–15.4)
PDW BLD-RTO: 15.3 % (ref 12.4–15.4)
PDW BLD-RTO: 15.6 % (ref 12.4–15.4)
PERFORMED ON: ABNORMAL
PH ARTERIAL: 7.39 (ref 7.35–7.45)
PH ARTERIAL: 7.52 (ref 7.35–7.45)
PH VENOUS: 7.37 (ref 7.35–7.45)
PH VENOUS: 7.46 (ref 7.35–7.45)
PHOSPHORUS: 2.3 MG/DL (ref 2.5–4.9)
PHOSPHORUS: 2.9 MG/DL (ref 2.5–4.9)
PLATELET # BLD: 45 K/UL (ref 135–450)
PLATELET # BLD: 51 K/UL (ref 135–450)
PLATELET # BLD: 62 K/UL (ref 135–450)
PMV BLD AUTO: 10.9 FL (ref 5–10.5)
PMV BLD AUTO: 10.9 FL (ref 5–10.5)
PMV BLD AUTO: 11.1 FL (ref 5–10.5)
PO2 ARTERIAL: 77.6 MMHG (ref 75–108)
PO2 ARTERIAL: 88.5 MMHG (ref 75–108)
POTASSIUM SERPL-SCNC: 4.3 MMOL/L (ref 3.5–5.1)
POTASSIUM SERPL-SCNC: 4.3 MMOL/L (ref 3.5–5.1)
PROTHROMBIN TIME: 17.4 SEC (ref 9.8–13)
PROTHROMBIN TIME: 17.8 SEC (ref 9.8–13)
PROTHROMBIN TIME: 20.9 SEC (ref 9.8–13)
RBC # BLD: 2.17 M/UL (ref 4–5.2)
RBC # BLD: 2.78 M/UL (ref 4–5.2)
RBC # BLD: 3.01 M/UL (ref 4–5.2)
SODIUM BLD-SCNC: 132 MMOL/L (ref 136–145)
SODIUM BLD-SCNC: 135 MMOL/L (ref 136–145)
TCO2 ARTERIAL: 26.7 MMOL/L
TCO2 ARTERIAL: 27.6 MMOL/L
VANCOMYCIN RANDOM: 20.6 UG/ML
WBC # BLD: 39.3 K/UL (ref 4–11)
WBC # BLD: 50.1 K/UL (ref 4–11)
WBC # BLD: 53.7 K/UL (ref 4–11)

## 2018-06-11 PROCEDURE — 36415 COLL VENOUS BLD VENIPUNCTURE: CPT

## 2018-06-11 PROCEDURE — 36600 WITHDRAWAL OF ARTERIAL BLOOD: CPT

## 2018-06-11 PROCEDURE — 0H9MXZZ DRAINAGE OF RIGHT FOOT SKIN, EXTERNAL APPROACH: ICD-10-PCS | Performed by: PODIATRIST

## 2018-06-11 PROCEDURE — 85027 COMPLETE CBC AUTOMATED: CPT

## 2018-06-11 PROCEDURE — G8996 SWALLOW CURRENT STATUS: HCPCS

## 2018-06-11 PROCEDURE — P9016 RBC LEUKOCYTES REDUCED: HCPCS

## 2018-06-11 PROCEDURE — 94762 N-INVAS EAR/PLS OXIMTRY CONT: CPT

## 2018-06-11 PROCEDURE — G8997 SWALLOW GOAL STATUS: HCPCS

## 2018-06-11 PROCEDURE — 83605 ASSAY OF LACTIC ACID: CPT

## 2018-06-11 PROCEDURE — 82803 BLOOD GASES ANY COMBINATION: CPT

## 2018-06-11 PROCEDURE — 85384 FIBRINOGEN ACTIVITY: CPT

## 2018-06-11 PROCEDURE — 86901 BLOOD TYPING SEROLOGIC RH(D): CPT

## 2018-06-11 PROCEDURE — 92610 EVALUATE SWALLOWING FUNCTION: CPT

## 2018-06-11 PROCEDURE — 83735 ASSAY OF MAGNESIUM: CPT

## 2018-06-11 PROCEDURE — 85610 PROTHROMBIN TIME: CPT

## 2018-06-11 PROCEDURE — C9113 INJ PANTOPRAZOLE SODIUM, VIA: HCPCS

## 2018-06-11 PROCEDURE — 86850 RBC ANTIBODY SCREEN: CPT

## 2018-06-11 PROCEDURE — 71045 X-RAY EXAM CHEST 1 VIEW: CPT

## 2018-06-11 PROCEDURE — 90945 DIALYSIS ONE EVALUATION: CPT

## 2018-06-11 PROCEDURE — 86900 BLOOD TYPING SEROLOGIC ABO: CPT

## 2018-06-11 PROCEDURE — 87040 BLOOD CULTURE FOR BACTERIA: CPT

## 2018-06-11 PROCEDURE — 80202 ASSAY OF VANCOMYCIN: CPT

## 2018-06-11 PROCEDURE — 92526 ORAL FUNCTION THERAPY: CPT

## 2018-06-11 PROCEDURE — 80069 RENAL FUNCTION PANEL: CPT

## 2018-06-11 PROCEDURE — 9990 CHARGE CONVERSION

## 2018-06-11 PROCEDURE — 94003 VENT MGMT INPAT SUBQ DAY: CPT

## 2018-06-11 PROCEDURE — 84702 CHORIONIC GONADOTROPIN TEST: CPT

## 2018-06-11 PROCEDURE — 36592 COLLECT BLOOD FROM PICC: CPT

## 2018-06-11 PROCEDURE — 82330 ASSAY OF CALCIUM: CPT

## 2018-06-11 PROCEDURE — 86923 COMPATIBILITY TEST ELECTRIC: CPT

## 2018-06-11 PROCEDURE — 99291 CRITICAL CARE FIRST HOUR: CPT | Performed by: INTERNAL MEDICINE

## 2018-06-11 PROCEDURE — 99233 SBSQ HOSP IP/OBS HIGH 50: CPT | Performed by: INTERNAL MEDICINE

## 2018-06-11 RX ORDER — 0.9 % SODIUM CHLORIDE 0.9 %
250 INTRAVENOUS SOLUTION INTRAVENOUS ONCE
Status: COMPLETED | OUTPATIENT
Start: 2018-06-11 | End: 2018-06-11

## 2018-06-11 RX ORDER — ONDANSETRON 2 MG/ML
4 INJECTION INTRAMUSCULAR; INTRAVENOUS EVERY 6 HOURS PRN
Status: DISCONTINUED | OUTPATIENT
Start: 2018-06-11 | End: 2018-07-18 | Stop reason: HOSPADM

## 2018-06-11 RX ORDER — DIMETHICONE, OXYBENZONE, AND PADIMATE O 2; 2.5; 6.6 G/100G; G/100G; G/100G
STICK TOPICAL
Status: COMPLETED
Start: 2018-06-11 | End: 2018-06-11

## 2018-06-11 RX ADMIN — Medication 10 ML: at 08:24

## 2018-06-11 RX ADMIN — HEPARIN SODIUM 4100 UNITS: 1000 INJECTION, SOLUTION INTRAVENOUS; SUBCUTANEOUS at 18:10

## 2018-06-11 RX ADMIN — PANTOPRAZOLE SODIUM 40 MG: 40 INJECTION, POWDER, LYOPHILIZED, FOR SOLUTION INTRAVENOUS at 21:16

## 2018-06-11 RX ADMIN — PROPOFOL 40 MCG/KG/MIN: 10 INJECTION, EMULSION INTRAVENOUS at 06:39

## 2018-06-11 RX ADMIN — Medication 10 ML: at 21:16

## 2018-06-11 RX ADMIN — Medication 250 ML: at 06:10

## 2018-06-11 RX ADMIN — PANTOPRAZOLE SODIUM 40 MG: 40 INJECTION, POWDER, LYOPHILIZED, FOR SOLUTION INTRAVENOUS at 08:30

## 2018-06-11 RX ADMIN — CHLORHEXIDINE GLUCONATE 15 ML: 0.12 RINSE ORAL at 08:23

## 2018-06-11 RX ADMIN — METHYLPREDNISOLONE SODIUM SUCCINATE 125 MG: 125 INJECTION, POWDER, LYOPHILIZED, FOR SOLUTION INTRAMUSCULAR; INTRAVENOUS at 17:30

## 2018-06-11 RX ADMIN — DIMETHICONE, OXYBENZONE, AND PADIMATE O: 2; 2.5; 6.6 STICK TOPICAL at 13:12

## 2018-06-11 RX ADMIN — METHYLPREDNISOLONE SODIUM SUCCINATE 125 MG: 125 INJECTION, POWDER, LYOPHILIZED, FOR SOLUTION INTRAMUSCULAR; INTRAVENOUS at 04:35

## 2018-06-11 RX ADMIN — METHYLPREDNISOLONE SODIUM SUCCINATE 125 MG: 125 INJECTION, POWDER, LYOPHILIZED, FOR SOLUTION INTRAMUSCULAR; INTRAVENOUS at 10:16

## 2018-06-11 RX ADMIN — METHYLPREDNISOLONE SODIUM SUCCINATE 125 MG: 125 INJECTION, POWDER, LYOPHILIZED, FOR SOLUTION INTRAMUSCULAR; INTRAVENOUS at 21:58

## 2018-06-11 ASSESSMENT — PULMONARY FUNCTION TESTS
PIF_VALUE: 10
PIF_VALUE: 30
PIF_VALUE: 32

## 2018-06-11 ASSESSMENT — PAIN SCALES - GENERAL: PAINLEVEL_OUTOF10: 0

## 2018-06-11 NOTE — PROGRESS NOTES
Results for Nai Young (MRN [id number]) as of 6/11/2018 11:27   Ref. Range 6/11/2018 10:59   pH, Arterial Latest Ref Range: 7.350 - 7.450  7.395   pCO2, Arterial Latest Ref Range: 35.0 - 45.0 mmHg 43.9   pO2, Arterial Latest Ref Range: 75.0 - 108.0 mmHg 77.6   HCO3, Arterial Latest Ref Range: 21.0 - 29.0 mmol/L 26.3   TCO2 (calc), Art Latest Ref Range: Not Established mmol/L 27.6   Base Excess, Arterial Latest Ref Range: -3.0 - 3.0 mmol/L 1.2   O2 Sat, Arterial Latest Ref Range: >92 % 95.4   O2 Content, Arterial Latest Ref Range: Not Established mL/dL 13   Methemoglobin, Arterial Latest Ref Range: <1.5 % 0.4   Carboxyhgb, Arterial Latest Ref Range: 0.0 - 1.5 % 1.4       Spoke with Dr. Nneka Hyatt and order to extubate. RT made aware.  SLP eval ordered for later

## 2018-06-11 NOTE — PROGRESS NOTES
Reassessment completed, see flowhseets. Pt extubated and remains on RA. Pt alert and oriented x2, recognizes family, moving all extremities. Voice is weak. L CW CVC, WNL, fentanyl was stopped at 1130. PIVs, WNL. R CW VasCath, WNL, CRRT to keep pt even, pt tolerating well and lines remained reversed. SCDs in place. Call light within reach. Bed locked in lowest position. Family at the bedside.

## 2018-06-11 NOTE — ONCOLOGY
Hematology/Oncology Progress Note       Subjective:     Remains in ICU intubated. ROS:     Unable to assess. Objective:     Physical examination:     /80   Pulse 74   Temp 97.6 °F (36.4 °C) (Oral)   Resp 13   Ht 5' 2\" (1.575 m)   Wt 144 lb 6.4 oz (65.5 kg)   SpO2 98%   BMI 26.41 kg/m²     CONSTITUTIONAL: intubated  HEMATOLOGIC/LYMPHATICS:  no cervical lymphadenopathy, no supraclavicular lymphadenopathy, no axillary lymphadenopathy and no inguinal lymphadenopathy  BACK:  Symmetric, no curvature, spinous processes are non-tender on palpation, paraspinous muscles are non-tender on palpation, no costal vertebral tenderness  LUNGS:  Clear to auscultation bilaterally, no crackles or wheezing  CARDIOVASCULAR:  Regular rate and rhythm, normal S1 and S2, no S3 or S4, and no murmur noted  ABDOMEN:  Normal bowel sounds, soft, non-distended, non-tender, no masses palpated, no hepatosplenomegally  MUSCULOSKELETAL:  There is no redness, warmth, or swelling of the joints  NEUROLOGIC:   No focal findings. SKIN:  Scattered bruising and ecchymoses. EXT: without clubbing, cyanosis or edema.     Data:     CBC:   Recent Labs      06/11/18   0445  06/10/18   2200  06/10/18   1407   WBC  39.3*  39.7*  38.5*   HGB  6.5*  7.1*  8.1*   HCT  19.3*  20.6*  23.8*   MCV  87.3  87.4  88.1   PLT  45*  49*  35*       BMP:   Lab Results   Component Value Date     06/11/2018    K 4.3 06/11/2018    K 5.5 06/09/2018    CO2 27 06/11/2018    BUN 17 06/11/2018    CREATININE 0.7 06/11/2018    CALCIUM 8.5 06/11/2018    MG 2.50 06/11/2018    TSH 4.47 11/26/2017       HEPATIC:  Lab Results   Component Value Date    AST 5,541 06/10/2018    ALT 2,787 06/10/2018    ALKPHOS 616 06/10/2018    PROT 5.5 06/10/2018    BILITOT 7.0 06/10/2018    BILIDIR 5.5 06/10/2018     06/08/2018       Current Medications:     Current Facility-Administered Medications: vancomycin (VANCOCIN) 1,250 mg in dextrose 5 % 250 mL IVPB, 1,250 mg, Intravenous, Q24H  sodium bicarbonate 75 mEq in dextrose 5 % 1,000 mL infusion, , Intravenous, Continuous  0.9 % sodium chloride bolus, 250 mL, Intravenous, Once  pantoprazole (PROTONIX) injection 40 mg, 40 mg, Intravenous, BID  cefepime (MAXIPIME) 1 g IVPB minibag, 1 g, Intravenous, Q8H  glucose (GLUTOSE) 40 % oral gel 15 g, 15 g, Oral, PRN  dextrose 50 % solution 12.5 g, 12.5 g, Intravenous, PRN  glucagon injection 1 mg, 1 mg, Intramuscular, PRN  prismaSATE BGK 4/2.5 5,000 mL solution, , Dialysis, Continuous  prismaSATE BGK 4/2.5 5,000 mL solution, , Dialysis, Continuous  prismaSATE BGK 4/2.5 5,000 mL solution, , Dialysis, Continuous  insulin lispro (HUMALOG) injection vial 0-6 Units, 0-6 Units, Subcutaneous, Q4H  chlorhexidine (PERIDEX) 0.12 % solution 15 mL, 15 mL, Mouth/Throat, BID  propofol 1000 MG/100ML injection, 10 mcg/kg/min, Intravenous, Continuous  midazolam (VERSED) injection 2 mg, 2 mg, Intravenous, Q1H PRN  fentaNYL (SUBLIMAZE) injection 25 mcg, 25 mcg, Intravenous, Q1H PRN  norepinephrine (LEVOPHED) 16 mg in dextrose 5 % 250 mL infusion, 2 mcg/min, Intravenous, Continuous  magnesium sulfate 1 g in dextrose 5% 100 mL IVPB, 1 g, Intravenous, PRN  calcium gluconate 1 g in dextrose 5 % 100 mL IVPB, 1 g, Intravenous, PRN **OR** calcium gluconate 2 g in dextrose 5 % 100 mL IVPB, 2 g, Intravenous, PRN **OR** calcium gluconate 3 g in dextrose 5 % 100 mL IVPB, 3 g, Intravenous, PRN **OR** calcium gluconate 4 g in dextrose 5 % 100 mL IVPB, 4 g, Intravenous, PRN  sodium phosphate 6 mmol in dextrose 5 % 250 mL IVPB, 6 mmol, Intravenous, PRN **OR** sodium phosphate 12 mmol in dextrose 5 % 250 mL IVPB, 12 mmol, Intravenous, PRN **OR** sodium phosphate 18 mmol in dextrose 5 % 500 mL IVPB, 18 mmol, Intravenous, PRN **OR** sodium phosphate 24 mmol in dextrose 5 % 500 mL IVPB, 24 mmol, Intravenous, PRN  lidocaine 1 % injection 5 mL, 5 mL, Intradermal, Once  sodium chloride flush 0.9 % injection 10 mL, 10 mL, Intravenous, 2 times per day  sodium chloride flush 0.9 % injection 10 mL, 10 mL, Intravenous, PRN  vasopressin 20 Units in dextrose 5 % 100 mL infusion, 0.04 Units/min, Intravenous, Continuous  methylPREDNISolone sodium (SOLU-MEDROL) injection 125 mg, 125 mg, Intravenous, Q6H  dextrose 5 % solution, 100 mL/hr, Intravenous, PRN  fentaNYL (SUBLIMAZE) 1,000 mcg in sodium chloride 0.9 % 100 mL infusion, 25 mcg/hr, Intravenous, Continuous  sevelamer (RENVELA) tablet 800 mg, 800 mg, Oral, TID WC  sodium chloride flush 0.9 % injection 10 mL, 10 mL, Intravenous, 2 times per day  sodium chloride flush 0.9 % injection 10 mL, 10 mL, Intravenous, PRN  ondansetron (ZOFRAN) injection 4 mg, 4 mg, Intravenous, Q6H PRN  darbepoetin emi-polysorbate (ARANESP) injection 100 mcg, 100 mcg, Intravenous, Weekly  acetaminophen (TYLENOL) tablet 650 mg, 650 mg, Oral, Q6H PRN  heparin (porcine) injection 4,100 Units, 4,100 Units, Intercatheter, PRN  promethazine (PHENERGAN) tablet 25 mg, 25 mg, Oral, Q6H PRN  hydrOXYzine (VISTARIL) capsule 50 mg, 50 mg, Oral, Q8H PRN    Imaging:     Xr Foot Right (2 Views)  Result Date: 6/7/2018  Lucency through the medial sesamoid most consistent with bipartite medial sesamoid in the absence of history of trauma. This can be associated with pain.      Nm Gi Blood Loss  Result Date: 6/6/2018  No evidence of active GI bleeding during acquisition. RECOMMENDATIONS: If the patient shows hemodynamic signs of an active bleed in the next 20 hours, additional images can be acquired.      Xr Chest Portable  Result Date: 6/8/2018  Multifocal pneumonia.      Cta Abdomen Pelvis W Wo Contrast  Result Date: 6/6/2018  No active gastrointestinal hemorrhage is identified. No vascular abnormality is appreciated. Right lower lobe pneumonia as well as cavitary nodules. Septic emboli are consideration. Compared with 11/26/2017, there is waxing and waning airspace disease and pulmonary nodules.   Organizing pneumonia is an alternative possibility. Hepatosplenomegaly. Hepatomegaly is similar to 11/26/2017. Splenomegaly is new. Hypodensity in the spleen, suggestive of acute to subacute embolic infarction. Small amount of ascites, nonspecific. Hyperdensity of the gallbladder wall. This may represent mural calcification. Etiology is unclear. There is variable association with gallbladder wall calcification and risk for gallbladder carcinoma. Stable cardiomegaly. Lumbar spine and visualized osseous structures appear intact. Impression:     1. Bacteremia/Endocarditis  2. HCAP  3. Severe Anemia  4. ESRD on dialysis  5. Hep C  6. IVDA  7. S/P Code Blue/PEA    Assessment and Plan:     1. Leukocytosis, anemia, thrombocytopenia  - suspect multifactorial  - baseline ACD with ESRD and Hep C - d/w nephrology, is chronically on Procrit  - suspect acute drop from GIB given hematemesis and rectal bleeding noted at the time of EGD  - EGD revealed angiodysplasia which was cauterized  - sepsis also likely contributing  - hemolysis also a concern     - Updated labs showed:   A. Fibrinogen 45 mg/dL, INR 4.42, aPTT 50.1 sec. (consistent with DIC)  B. Ferritin 4649 ng/mL, iron sat 45% (acute inflammation. Not iron deficient)  C. SHILPA pos. Anti IgG neg. Haptoglobin 240 mg/dL. (most likely the SHILPA was not a significant finding, but a cold agglutinin could do this. Check cold agglutinin titer). - Recommend continuing steroids for now. Check cold agglutinin titer pending. Agree with 1 unit PRBC with severe anemia and Hgb < 7.  Will give additional cryoprecipitate if bleeding or fibrinogen < 100      Uli Campos MD

## 2018-06-11 NOTE — PROGRESS NOTES
06/11/18 0306   Vent Information   Vent Type 840   Vent Mode AC/VC   Vt Ordered 450 mL   Rate Set 18 bmp   Peak Flow 90 L/min   Pressure Support 0 cmH20   FiO2  30 %   Sensitivity 3   PEEP/CPAP 5   I Time/ I Time % 0 s   Humidification Source Heated wire   Humidification Temp Measured 35.3   Circuit Condensation Drained   Vent Patient Data   Vt Exhaled 460 mL   Rate Measured 18 br/min   Minute Volume 8.31 Liters   Peak Inspiratory Pressure 32 cmH2O   I:E Ratio 1:5.20   High Peep/I Pressure 0   Mean Airway Pressure 9.8 cmH20   Spontaneous Breathing Trial (SBT) RT Doc   Pulse 65   SpO2 100 %   Cough/Sputum   Sputum How Obtained None   Breath Sounds   Right Upper Lobe Diminished   Right Middle Lobe Diminished   Right Lower Lobe Diminished   Left Upper Lobe Inspiratory Wheezes   Left Lower Lobe Diminished   Additional Respiratory  Assessments   Resp 18   Position Semi-Lucero's   Alarm Settings   High Pressure Alarm 40 cmH2O   Low Minute Volume Alarm 5 L/min   High Respiratory Rate 45 br/min   Low Exhaled Vt  350 mL   Patient Observation   Observations ambu bag at bedside   Non-Surgical Airway Endo Tracheal Tube   Placement Date/Time: 06/08/18 1005   Inserted by:    Insertion attempts: 1  Airway Device: Endo Tracheal Tube  Size: (c)   Placement Verified By[de-identified] Auscultation;Colorimetric EtCO2 device   Secured at 22 cm   Measured From Lips   Secured Location Right   Secured By Commercial tube cordova   Site Condition Dry

## 2018-06-11 NOTE — CONSULTS
Progress Note    Admit Date:  6/6/2018    Subjective:  34 y.o. female who is seen as consult for evaluation of abscess on the right foot. Patient currently in ICU and drowsy. Most of information obtained from nursing and chart. Patient was admitted on 6/5/18 and found to have positive blood cultures and endocarditis. Was transferred to ICU after PEA arrest.   Does have history of IV drug use.        Past Medical History:        Diagnosis Date    Asthma     Cardiac arrest ([facility] 06/08/2018    Depression     Endocarditis     ESRD (end stage renal disease) on dialysis ([facility] [address] [facility]     M/W/F    Hepatitis C antibody positive in blood 03/22/2017    History of blood transfusion     Hypertension     IV drug abuse     Liver disease     MRSA (methicillin resistant staph aureus) culture positive 6/5/18, 03/12/2017    +blood culture     MRSA (methicillin resistant staph aureus) culture positive 07/31/2017    info from [facility] - positive nasal screen    PTSD (post-traumatic stress disorder)     Seizures ([facility]     8/8/2017       Past Surgical History:        Procedure Laterality Date    TONSILLECTOMY      UPPER GASTROINTESTINAL ENDOSCOPY  06/06/2018    Gastric Angiodysplasia       Current Medications:    Current Facility-Administered Medications: vancomycin (VANCOCIN) 1,250 mg in dextrose 5 % 250 mL IVPB, 1,250 mg, Intravenous, Q24H  ondansetron (ZOFRAN) injection 4 mg, 4 mg, Intravenous, Q6H PRN  sodium phosphate 6 mmol in dextrose 5 % 250 mL IVPB, 6 mmol, Intravenous, Once  0.9 % sodium chloride bolus, 250 mL, Intravenous, Once  pantoprazole (PROTONIX) injection 40 mg, 40 mg, Intravenous, BID  cefepime (MAXIPIME) 1 g IVPB minibag, 1 g, Intravenous, Q8H  glucose (GLUTOSE) 40 % oral gel 15 g, 15 g, Oral, PRN  dextrose 50 % solution 12.5 g, 12.5 g, Intravenous, PRN  glucagon injection 1 mg, 1 mg, Intramuscular, PRN  prismaSATE BGK 4/2.5 5,000 mL solution, , Dialysis, Continuous  prismaSATE BGK 4/2.5 5,000 mL solution, , Dialysis, Continuous  prismaSATE BGK 4/2.5 5,000 mL solution, , Dialysis, Continuous  insulin lispro (HUMALOG) injection vial 0-6 Units, 0-6 Units, Subcutaneous, Q4H  norepinephrine (LEVOPHED) 16 mg in dextrose 5 % 250 mL infusion, 2 mcg/min, Intravenous, Continuous  magnesium sulfate 1 g in dextrose 5% 100 mL IVPB, 1 g, Intravenous, PRN  calcium gluconate 1 g in dextrose 5 % 100 mL IVPB, 1 g, Intravenous, PRN **OR** calcium gluconate 2 g in dextrose 5 % 100 mL IVPB, 2 g, Intravenous, PRN **OR** calcium gluconate 3 g in dextrose 5 % 100 mL IVPB, 3 g, Intravenous, PRN **OR** calcium gluconate 4 g in dextrose 5 % 100 mL IVPB, 4 g, Intravenous, PRN  sodium phosphate 6 mmol in dextrose 5 % 250 mL IVPB, 6 mmol, Intravenous, PRN **OR** sodium phosphate 12 mmol in dextrose 5 % 250 mL IVPB, 12 mmol, Intravenous, PRN **OR** sodium phosphate 18 mmol in dextrose 5 % 500 mL IVPB, 18 mmol, Intravenous, PRN **OR** sodium phosphate 24 mmol in dextrose 5 % 500 mL IVPB, 24 mmol, Intravenous, PRN  lidocaine 1 % injection 5 mL, 5 mL, Intradermal, Once  sodium chloride flush 0.9 % injection 10 mL, 10 mL, Intravenous, 2 times per day  sodium chloride flush 0.9 % injection 10 mL, 10 mL, Intravenous, PRN  vasopressin 20 Units in dextrose 5 % 100 mL infusion, 0.04 Units/min, Intravenous, Continuous  methylPREDNISolone sodium (SOLU-MEDROL) injection 125 mg, 125 mg, Intravenous, Q6H  dextrose 5 % solution, 100 mL/hr, Intravenous, PRN  sevelamer (RENVELA) tablet 800 mg, 800 mg, Oral, TID WC  sodium chloride flush 0.9 % injection 10 mL, 10 mL, Intravenous, 2 times per day  sodium chloride flush 0.9 % injection 10 mL, 10 mL, Intravenous, PRN  darbepoetin emi-polysorbate (ARANESP) injection 100 mcg, 100 mcg, Intravenous, Weekly  acetaminophen (TYLENOL) tablet 650 mg, 650 mg, Oral, Q6H PRN  heparin (porcine) injection 4,100 Units, 4,100 Units, Intercatheter, PRN  promethazine (PHENERGAN) tablet 25 mg, 25 mg, Oral, Q6H tissues noted. Physical Exam:    General Appearance: alert and oriented to person, place and time, well developed and well- nourished, in no acute distress  Skin: warm and dry, no rash or erythema  Head: normocephalic and atraumatic  Eyes: pupils equal, round, and reactive to light, extraocular eye movements intact, conjunctivae normal  ENT: tympanic membrane, external ear and ear canal normal bilaterally, nose without deformity, nasal mucosa and turbinates normal without polyps  Neck: supple and non-tender without mass, no thyromegaly or thyroid nodules, no cervical lymphadenopathy  Pulmonary/Chest: clear to auscultation bilaterally- no wheezes, rales or rhonchi, normal air movement, no respiratory distress  Cardiovascular: normal rate, regular rhythm, normal S1 and S2, no murmurs, rubs, clicks, or gallops, distal pulses intact, no carotid bruits  Abdomen: soft, non-tender, non-distended, normal bowel sounds, no masses or organomegaly    DP/PT palpable right foot  Dorsal aspect right 1st MPJ with fluctuant mass with purple discoloration in central aspect. Mild erythema surrounding area on dorsal aspect and does not extend to medial or plantar aspect of the 1st MPJ region. No increase in skin temperature noted. No crepitus noted. No malodor noted. Able to actively perform 1st MPJ ROM right foot. No bony prominence in region of abscess noted. Manipulation of the right 1st MPJ did not cause her pain. Compression of the 1st IM space did cause her to moan in some pain during drainage and this decreased after the I&D.        Assessment:  Patient Active Problem List   Diagnosis Code    Bacterial endocarditis I33.0    JAMES (acute kidney injury) ([facility] [address] N17.9    Drug abuse F19.10    Hep C w/o coma, chronic (HCC) B18.2    Swelling R60.9    Endocarditis and heart valve disorders in diseases classified elsewhere I39    IVDU (intravenous drug user) F19.90    MDD (major depressive disorder), recurrent severe, without psychosis ([facility] F33.2    Opiate dependence, continuous ([facility]) F11.20    Opiate withdrawal ([facility]) F11.23    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 Pneumonia due to organism J18.9    ESRD (end stage renal disease) ([facility] N18.6    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    Healthcare-associated pneumonia J18.9    Abnormal CT of the chest R93.8    Essential hypertension I10    Pericardial effusion I31.3    Hypertensive urgency I16.0    H/O endocarditis Z86.79    Hypertensive crisis I16.9    Cardiac mass I51.89    Noncompliance Z91.19    Hyperkalemia E87.5    HCAP (healthcare-associated pneumonia) J18.9    Upper GI bleed K92.2    Sepsis due to Streptococcus pneumoniae ([facility]) A40.3    Moderate protein-calorie malnutrition ([facility]) E44.0    PEA (Pulseless electrical activity) ([facility]) I46.9    DIC (disseminated intravascular coagulation) ([facility] D65    Acute respiratory failure with hypoxia ([facility]) J96.01     Abscess right foot      Plan  Patient examined. Reviewed labs and imaging. IV antibiotics as per ID. No open lesions so suspect abscess secondary to bacteremia. Cleansed dorsal aspect of the sterile prep. Attempted aspiration with 18 gauge and was only able to obtain very small amount of thick yellow purulence. Therefore small incision overlying the abscess was performed with 15 blade. Thick yellow purulence expressed. Pocket of purulence did not appear to be in the joint. Applied mepilex border. If does not improve may require formal I&D in the OR. May have future complications depending on extent of soft tissue destruction from the infection. Will follow. Thank you for allowing me to participate in the care of your patient.          Electronically signed by Helen Palacio DPM on 6/11/2018 at 5:36 PM.

## 2018-06-11 NOTE — PROGRESS NOTES
Dr. Kimmie James at the bedside to examine pt. See progress not for details. DC bicarb gtt. If pt extubated then potentially may stop CRRT after this shift and pt may have HD tomorrow. Stated to call if any changes with pt.

## 2018-06-11 NOTE — PROGRESS NOTES
PROGRESS NOTE  S:28 yrs Patient  admitted on 6/6/2018 with GI BLEED . Today she is more alert. She was extubated and is off pressors but continues to be on CVRT    Exam:   Vitals:    06/11/18 1600   BP: 138/77   Pulse: 86   Resp: 25   Temp: 98.2 °F (36.8 °C)   SpO2: 92%      General appearance: alert, appears stated age and cooperative  HEENT: Oropharynx clear, no lesions  Neck: no adenopathy and supple, symmetrical, trachea midline  Lungs: clear to auscultation bilaterally  Heart: regular rate and rhythm, S1, S2 normal, no murmur, click, rub or gallop  Abdomen: soft, non-tender; bowel sounds normal; no masses,  no organomegaly  Extremities: extremities normal, atraumatic, no cyanosis or edema     Medications: Reviewed    Labs:  CBC:   Recent Labs      06/10/18   2200  06/11/18   0445  06/11/18   1426   WBC  39.7*  39.3*  50.1*   HGB  7.1*  6.5*  8.8*   HCT  20.6*  19.3*  26.0*   MCV  87.4  87.3  86.3   PLT  49*  45*  51*     BMP:   Recent Labs      06/10/18   2200  06/11/18   0600  06/11/18   1426   NA  132*  135*  132*   K  4.4  4.3  4.3   CL  97*  97*  97*   CO2  26  27  29   PHOS  3.2  2.9  2.3*   BUN  15  17  16   CREATININE  0.7  0.7  0.6     LIVER PROFILE:   Recent Labs      06/09/18   0210  06/10/18   0610   AST  2,716*  5,541*   ALT  949*  2,787*   PROT  3.7*  5.5*   BILIDIR   --   5.5*   BILITOT  2.9*  7.0*   ALKPHOS  436*  616*     PT/INR:   Recent Labs      06/10/18   2200  06/11/18   0600  06/11/18   1426   INR  1.93*  1.83*  1.56*     Impression: 34year old female with history of HTN, Chronic hepatitis C, seizure do, ESRD, IVDU, endocarditis, admitted with sepsis and PEA c/b shock liver, septic emboli, DIC and GIbleed    Recommendation:  1. Continue supportive care  2. Broad spectrum antibiotics  3. Monitor LFTs  4. Transfuse pRBC and platelets as necessary  5. Observe for signs of bleeding  6.  Will follow      Favian Bauman MD  4:45 PM 6/11/2018

## 2018-06-11 NOTE — PROGRESS NOTES
Shift assessment, completed, see flow sheet. RASS -1 Following commands. Intubated and sedated on AC # 7.5 ETT, at 22 LL. 18 / 450 /30 %/ 5. SR 76, /67 (82), SpO2 99%. Respirations are easy, even, and unlabored. Bilateral lung sounds clear. OG in place at 47 , with with TF at 40 mL/hr.  L CW CVC, WNL with fentanyl @ 75/mcg/hr infusing, propofol stopped for SBT, was infusing at 40 mcg/kg/min. 2 PIVs, WNL, 1 unit of blood just finished infusing. R CW VC with CRRT, running well with lines reversed and Blood flow 200 ml/min, Pre blood 1000ml/hr, dialysate 1500 ml/hr, and post blood 500 ml/hr. Running CRRT to keep even. No michelle in place as pt anuric. Bilateral soft wrist restraints in place for pt safety. Lizeth hugger on high  (43 C) and blood warmer on CRRT on at (40.8 C) to help keep pt normothermic. Call light within reach. Bed in lowest position. Bed alarm on.  Will continue to monitor

## 2018-06-11 NOTE — PROGRESS NOTES
[facility] Daily Progress Note      Admit Date:  6/6/2018    Subjective:  Ms. Jc Llamas is seen for follow up  She is intubated on vent. She opens eyes to routine exam.  Appears in no pain. [place]:  Seen by my associate Dr Vivek Faust  With following initial history. She is s/p arrest with abnormal  electrocardiogram.    The patient is unfortunate 51-year-old lady  who has tricuspid valve endocarditis with history of recurring  presentations for recurrent tricuspid valve endocarditis with history of IV  drug use. Most recent use 6 weeks ago. The patient is not available for history due to currently being  on the ventilator. I talked to mother who reports patient having endocarditis one year ago treated at [facility] and then at [facility]. She is on hemodialysis for a year. Has not used any IV drugs until about 6 weeks ago. She came to hospital with fever weakness and seen in ED. Per mother initially thought to have pneumonia and then vomited bright red blood in ED with blood per rectum. She has history of recurrent tricuspid valve endocarditis with evidence for numerous septic  emboli to her lungs and spleen. She has large vegetation on her tricuspid  valve. She is receiving antibiotics. She is status post code, currently  on continuous dialysis with vital signs have been maintained. Mother says patient became unresponsive on the floor.  Apparently unresponsive leading to intubation.   ROS:  12 point ROS negative in all areas as listed below except as in [place]  Constitutional, EENT, Cardiovascular, pulmonary, GI, , Musculoskeletal, skin, neurological, hematological, endocrine, Psychiatric    Past Medical History:   Diagnosis Date    Asthma     Cardiac arrest ([facility] 06/08/2018    Depression     Endocarditis     ESRD (end stage renal disease) on dialysis ([facility]     M/W/F    Hepatitis C antibody positive in blood 03/22/2017    History of blood transfusion     Hypertension     IV drug abuse     bleed) 06/06/2018    Sepsis due to Streptococcus pneumoniae ([facility]     Hyperkalemia 12/11/2017    Hypertensive crisis 11/28/2017    H/O endocarditis     Healthcare-associated pneumonia     Abnormal CT of the chest     Essential hypertension     Pericardial effusion     Hypertensive emergency 11/26/2017    Severe hypertension     Pneumonia due to organism     ESRD on dialysis [facility])     Endocarditis of tricuspid valve     Opiate dependence, continuous (HCC)     Opiate withdrawal (HCC)     Mood disorder secondary to multiple medical problems     MDD (major depressive disorder), recurrent severe, without psychosis ([facility]     Bacterial endocarditis 03/20/2017    JAMES (acute kidney injury) ([facility] 03/20/2017    Drug abuse 03/20/2017    Hep C w/o coma, chronic ([facility] 03/20/2017    Swelling 03/20/2017    Endocarditis and heart valve disorders in diseases classified elsewhere     IVDU (intravenous drug user)        Plan:  1. Antibiotics to cover gram +ve and gram -ve  2. Pregnancy test  3. Will follow   4. No cardiac surgery indicated at this time  5.  Discussed with mother    Core Measures:  · Discharge instructions:   · LVEF documented:   · ACEI for LV dysfunction:   · Smoking Cessation:    Berna Church MD 6/11/2018 5:40 AM

## 2018-06-11 NOTE — PROGRESS NOTES
Patient report given to Shell Hood RN. Patient finishing PRBC transfusion, tolerating well. Vasopressin off, and patient resting comfortably on Propofol 40mcg/kg/min, and Fentanyl 75mcg/hr. Care transferred.

## 2018-06-11 NOTE — PROGRESS NOTES
ID Follow-up NOTE    CC: tricuspid valve endocarditis    Subjective:     Patient gives no history, is intubated, on vent.        Objective:     Patient Vitals for the past 24 hrs:   BP Temp Temp src Pulse Resp SpO2 Weight   06/11/18 0620 (!) 114/54 96.8 °F (36 °C) Temporal 75 21 96 % -   06/11/18 0600 112/60 - - 73 18 95 % -   06/11/18 0545 (!) 110/56 96.3 °F (35.7 °C) Temporal 72 18 95 % -   06/11/18 0518 - 95.7 °F (35.4 °C) - - - - -   06/11/18 0500 (!) 107/58 - - 68 18 98 % -   06/11/18 0400 (!) 101/57 95 °F (35 °C) Temporal 69 18 99 % 144 lb 6.4 oz (65.5 kg)   06/11/18 0306 - - - 65 18 100 % -   06/11/18 0300 102/62 - - 67 18 100 % -   06/11/18 0200 (!) 101/58 - - 70 18 97 % -   06/11/18 0100 103/62 - - 78 18 96 % -   06/11/18 0000 129/72 97.5 °F (36.4 °C) Oral 74 18 96 % -   06/10/18 2305 - - - 81 18 98 % -   06/10/18 2300 111/63 - - 82 18 97 % -   06/10/18 2200 (!) 93/52 - - 85 18 98 % -   06/10/18 2100 (!) 92/49 - - 86 18 98 % -   06/10/18 2000 (!) 104/59 97.7 °F (36.5 °C) Oral 83 18 97 % -   06/10/18 1913 - - - 83 18 98 % -   06/10/18 1900 (!) 104/53 - - 83 18 98 % -   06/10/18 1830 98/62 - - 84 18 98 % -   06/10/18 1800 (!) 100/52 - - 82 18 99 % -   06/10/18 1730 (!) 107/53 - - 82 18 99 % -   06/10/18 1702 (!) 102/51 - - 82 18 99 % -   06/10/18 1700 (!) 102/51 96.8 °F (36 °C) Temporal 84 18 99 % -   06/10/18 1630 (!) 99/54 - - 81 18 99 % -   06/10/18 1600 (!) 101/56 96 °F (35.6 °C) - 83 18 99 % -   06/10/18 1531 (!) 100/56 - - 82 18 99 % -   06/10/18 1530 - 96.2 °F (35.7 °C) - 81 18 99 % -   06/10/18 1523 - - - 84 18 99 % -   06/10/18 1506 - 96.4 °F (35.8 °C) - - - - -   06/10/18 1501 (!) 103/59 96.4 °F (35.8 °C) - 83 18 98 % -   06/10/18 1500 (!) 103/59 - - 84 18 99 % -   06/10/18 1430 (!) 106/55 - - 84 18 98 % -   06/10/18 1400 (!) 105/58 - - 84 18 99 % -   06/10/18 1330 102/64 - - 84 18 99 % -   06/10/18 1300 91/64 - - 86 18 99 % -   06/10/18 1231 99/86 - - 87 18 99 % -   06/10/18 1226 96/68 96 °F (35.6

## 2018-06-11 NOTE — PROGRESS NOTES
Spoke with Dr. Ashli Cole, and stated to stop CRRT at end of shift. Cancel overnight renal panel overnight and draw it in the AM and will decide if pt needs HD tomorrow or Wednesday.

## 2018-06-11 NOTE — PROGRESS NOTES
Patient care assumed, assessment completed as charted. Patient continues on ventilator, #7.5 at the 22 lip line. FiO2 30%, PEEP 5, , A/C 18. Diprivan infusing at 40mcg/kg/min, Fentanyl at 75mcg/hr, and Bicarb at 100mL/hr through intact left chest CVC. CRRT running smoothly via right chest vascath, with goal for even fluid balance. OG in place with tube feed running at 20mL/hr. Patient wakes to gentle stimuli, attempts to speak over ET tube, then drifts back to sleep. Bilateral soft wrist restraints in place for continued patient safety. No further needs assessed at this time. Will monitor.

## 2018-06-11 NOTE — PLAN OF CARE
Problem: Falls - Risk of:  Goal: Will remain free from falls  Will remain free from falls   Outcome: Ongoing    Goal: Absence of physical injury  Absence of physical injury   Outcome: Ongoing      Problem: Risk for Impaired Skin Integrity  Goal: Tissue integrity - skin and mucous membranes  Structural intactness and normal physiological function of skin and  mucous membranes. Outcome: Ongoing  Patient turned/repositioned every 2 hours and as needed to maintain and improve skin integrity. Problem: Discharge Planning:  Goal: Discharged to appropriate level of care  Discharged to appropriate level of care    Outcome: Ongoing    Goal: Participates in care planning  Participates in care planning   Outcome: Ongoing      Problem: Bowel Function - Altered:  Goal: Bowel elimination is within specified parameters  Bowel elimination is within specified parameters   Outcome: Ongoing      Problem: Fluid Volume - Imbalance:  Goal: Absence of imbalanced fluid volume signs and symptoms  Absence of imbalanced fluid volume signs and symptoms   Outcome: Ongoing    Goal: Will show no signs and symptoms of excessive bleeding  Will show no signs and symptoms of excessive bleeding   Outcome: Ongoing      Problem: Nausea/Vomiting:  Goal: Absence of nausea/vomiting  Absence of nausea/vomiting   Outcome: Ongoing    Goal: Able to drink  Able to drink   Outcome: Ongoing    Goal: Able to eat  Able to eat   Outcome: Ongoing    Goal: Ability to achieve adequate nutritional intake will improve  Ability to achieve adequate nutritional intake will improve   Outcome: Ongoing      Problem: Nutrition  Goal: Optimal nutrition therapy  Outcome: Ongoing  Patient receiving nutrition via continuous tube feed.    Goal: Understanding of nutritional guidelines  Outcome: Ongoing      Problem: Airway Clearance - Ineffective:  Goal: Clear lung sounds  Clear lung sounds   Outcome: Ongoing  Breath sounds assessed every four hours and as indicated by nursing staff.   Goal: Ability to maintain a clear airway will improve  Ability to maintain a clear airway will improve   Outcome: Ongoing      Problem: Fluid Volume - Deficit:  Goal: Achieves intake and output within specified parameters  Achieves intake and output within specified parameters   Outcome: Ongoing  I&O monitored carefully each hour. Problem: Gas Exchange - Impaired:  Goal: Levels of oxygenation will improve  Levels of oxygenation will improve   Outcome: Ongoing      Problem: Hyperthermia:  Goal: Ability to maintain a body temperature in the normal range will improve  Ability to maintain a body temperature in the normal range will improve   Outcome: Ongoing      Problem: Infection - Central Venous Catheter-Associated Bloodstream Infection:  Goal: Will show no infection signs and symptoms  Will show no infection signs and symptoms   Outcome: Ongoing      Problem: Restraint Use - Nonviolent/Non-Self-Destructive Behavior:  Goal: Absence of restraint indications  Absence of restraint indications   Outcome: Ongoing  Bilateral soft wrist restraints remain in place for patient safety, and to protect all lines and airways.    Goal: Absence of restraint-related injury  Absence of restraint-related injury   Outcome: Ongoing

## 2018-06-11 NOTE — PROGRESS NOTES
Placed the patient on SBT with pressure support of 5 and peep of 5. The patient appears comfortable at this time.  I will cont to monitor the patient during the breathing trial.

## 2018-06-11 NOTE — PROGRESS NOTES
Dr. Lilo Lynne at the bedside to examine pt. See progress not for details.  Discussed the option of abscess on R foot being drainted

## 2018-06-11 NOTE — CONSULTS
Take 0.1 mg by mouth three times daily   Yes Historical Provider, MD   azithromycin (ZITHROMAX) 250 MG tablet Take 250 mg by mouth daily   Yes Historical Provider, MD   folic acid (FOLVITE) 1 MG tablet Take 1 mg by mouth daily   Yes Historical Provider, MD   sevelamer (RENVELA) 800 MG tablet Take 1 tablet by mouth 3 times daily (with meals)   Yes Historical Provider, MD   labetalol (NORMODYNE) 100 MG tablet Take 400 mg by mouth 3 times daily  11/25/17  Yes Historical Provider, MD   isosorbide mononitrate (IMDUR) 60 MG extended release tablet Take 60 mg by mouth daily   Yes Historical Provider, MD       Allergies:  Patient has no known allergies. Social History:    Tobacco:  reports that she has been smoking Cigarettes. She has a 1.00 pack-year smoking history. She has never used smokeless tobacco.   Alcohol:  reports that she does not drink alcohol. Illicit Drug: Yes  Family History:   History reviewed. No pertinent family history. REVIEW OF SYSTEMS:    CONSTITUTIONAL:  negative  MUSCULOSKELETAL:  positive for  pain    PHYSICAL EXAM:    Appears stated age. Intubated on vent. MUSCULOSKELETAL:    Right foot: 1+ pitting edema right foot. 2+ pedal pulse. Erythema and warmth noted to right great toe MTP joint. No open lesions noted, but a dark lesion noted at the center of the erythema. Pt grimacing with palpation and manipulation of joint. Unable to assess ROM or strength.     DATA:    CBC:   Recent Labs      06/10/18   1407  06/10/18   2200  06/11/18   0445   WBC  38.5*  39.7*  39.3*   HGB  8.1*  7.1*  6.5*   PLT  35*  49*  45*     BMP:    Recent Labs      06/10/18   1407  06/10/18   2200  06/11/18   0600   NA  135*  132*  135*   K  4.7  4.4  4.3   CL  95*  97*  97*   CO2  22  26  27   BUN  15  15  17   CREATININE  0.8  0.7  0.7   GLUCOSE  152*  134*  147*     INR:   Recent Labs      06/10/18   1407  06/10/18   2200  06/11/18   0600   INR  2.12*  1.93*  1.83*       Radiology:   XR CHEST PORTABLE   Final Result   Improving bilateral airspace disease. XR CHEST PORTABLE   Final Result   Worsening bilateral airspace disease, probably pneumonia. XR CHEST PORTABLE   Final Result   Multifocal pneumonia, unchanged. XR CHEST 1 VW   Final Result   No change other than repositioning of the endotracheal tube. XR CHEST PORTABLE   Final Result   The endotracheal tube needs retracted approximately 3 cm. Satisfactory position right IJ tunnel catheter and left central venous   catheter. Increased size of the multifocal nodular lung opacities presumed multifocal   pneumonia. Findings were discussed with ICU nurse caring for the patient at 2:21 pm on   6/8/2018. XR CHEST PORTABLE   Final Result   Multifocal pneumonia. XR FOOT RIGHT (2 VIEWS)   Final Result   Lucency through the medial sesamoid most consistent with bipartite medial   sesamoid in the absence of history of trauma. This can be associated with   pain. CTA ABDOMEN PELVIS W WO CONTRAST   Final Result   No active gastrointestinal hemorrhage is identified. No vascular abnormality   is appreciated. Right lower lobe pneumonia as well as cavitary nodules. Septic emboli are   consideration. Compared with 11/26/2017, there is waxing and waning airspace   disease and pulmonary nodules. Organizing pneumonia is an alternative   possibility. Hepatosplenomegaly. Hepatomegaly is similar to 11/26/2017. Splenomegaly is   new. Hypodensity in the spleen, suggestive of acute to subacute embolic   infarction. Small amount of ascites, nonspecific. Hyperdensity of the gallbladder wall. This may represent mural   calcification. Etiology is unclear. There is variable association with   gallbladder wall calcification and risk for gallbladder carcinoma. Stable cardiomegaly. Lumbar spine and visualized osseous structures appear intact.          NM GI BLOOD LOSS   Final Result   No evidence of active GI bleeding during acquisition. RECOMMENDATIONS:   If the patient shows hemodynamic signs of an active bleed in the next 20   hours, additional images can be acquired. XR CHEST PORTABLE   Final Result   Patchy airspace disease in the right lung, pneumonia versus atelectasis   versus less likely edema. That should be followed to resolution. Borderline pulmonary vascular congestion. XR CHEST PORTABLE    (Results Pending)          IMPRESSION/RECOMMENDATIONS:    Assessment: Right 1st MTP joint abscess    Plan:  1) Discussed abscess with Dr. Alexy Lee who would like to consult podiatry given this is an abscess of the toe. Podiatry consult ordered. 2) Continue plan of care per IM. Continue IV Vanc per ID. Nephrology, GI, pulmonology, hem/onc and cardiology also consulted and following. 3) Please contact us for any further needs    Thank you for the opportunity to consult on this patient.     Arianne Gomes, CNP

## 2018-06-11 NOTE — PROGRESS NOTES
compensatory strategies, and safe eating environment. Impression  Dysphagia Diagnosis: Moderate oral stage dysphagia; Moderate pharyngeal stage dysphagia; Suspected needs further assessment  Dysphagia Outcome Severity Scale: Level 1: Severe dysphagia- NPO. Unable to tolerate any PO safely   Pt seen upright in bed, alert and agreeable to evaluation, but fatigued. RN OK'odin SLP entry and evaluation, pt's mother at bedside. Pt with hx of seizures, IV drug abuse, ESRD. Intubated 6/8-6/11/18. Per chart, pt also self-extubated on 6/8 and was then reintubated. No hx of dysphagia per chart review, pt and pt's family deny dysphagia. Pt with weak, dysphonic vocal quality-- concern for poor airway protection putting pt at a higher risk of aspiration. She completed delayed volitional swallow. Noted reduced lingual/labial strength during OM exam, likely 2/2 fatigue. Noted pt with weak, ineffective cough prior to PO trials with eventual suctioning of oral cavity required. Concern for pt's ability to manage own secretions. Pt observed with ice chips and thin liquid trials via tsp and cup with slightly delayed swallow initiation noted. Pt with x1 delayed, weak cough post-swallow and increased in RR from 20-32 post-swallow. Nectar-thick liquid then trialed via tsp and cup with grossly timely swallow initiation throughout and again x1 delayed, weak cough post-swallow. Pt also observed with several bites of puree with reduced A-P bolus transit and poor mastication noted, min oral/lingual residue post-swallow. No further solid PO trialed this date. Noted pt becoming increasingly fatigued as BSE progressed, trials discontinued at this time. Recommend that pt continue to be NPO at this time with good oral care and repeat BSE completed tomorrow AM as pt is able and appropriate to participate. Treatment Plan  Requires SLP Intervention: Yes  Duration/Frequency of Treatment: 3-5x/week for LOS  D/C Recommendations:  To be determined     Recommended Diet and Intervention  Diet Solids Recommendation: NPO (Pending repeat BSE)  Liquid Consistency Recommendation: NPO (Pending repeat BSE)  Recommended Form of Meds: Via alternative means of nutrition  Therapeutic Interventions: Patient/Family education, Therapeutic PO trials with SLP, Oral care    Compensatory Swallowing Strategies  Oral care  Upright positioning    Treatment/Goals  Short-term Goals  Timeframe for Short-term Goals: 2-3 sessions  Long-term Goals  Timeframe for Long-term Goals: 1 week  Goal 1: The pt will tolerate safest and least restrictive diet without s/s of aspiration. Dysphagia Goals: The patient will tolerate repeat BSE when able. , The patient/caregiver will demonstrate understanding of compensatory strategies for improved swallowing safety. General  Chart Reviewed: Yes  Comments: Pt with hx of seizures, IV drug abuse, ESRD. Intubated 6/8-6/11/18. Per chart, pt also self-extubated on 6/8 and was then reintubated. Behavior/Cognition: Alert; Cooperative;Lethargic;Requires cueing  Respiratory Status: Room air  O2 Device: None (Room air)  Communication Observation: Functional  Follows Directions: Simple (With cueing)  Dentition: Adequate  Patient Positioning: Upright in bed  Baseline Vocal Quality: Weak;Dysphonic  Volitional Cough: Weak  Volitional Swallow: Delayed  Prior Dysphagia History: No hx of dysphagia per chart review. Consistencies Administered: Puree; Thin - teaspoon; Ice Chips; Thin - cup;Nectar - cup    Vision/Hearing  Vision  Vision: Within Functional Limits  Hearing  Hearing: Within functional limits    Oral Motor Deficits  Oral/Motor  Oral Motor: Exceptions to [facility]  Labial Strength: Reduced (Likely 2/2 fatigue)  Lingual Strength: Reduced (Likely 2/2 fatigue)    Oral Phase Dysfunction  Oral Phase  Oral Phase: Exceptions  Oral Phase Dysfunction  Impaired Mastication: Puree  Decreased Anterior to Posterior Transit: Puree  Suspected Premature Bolus Loss: Thin - cup; Thin - teaspoon  Lingual/Palatal Residue: Puree (Minimal)  Oral Phase  Oral Phase - Comment: For solid PO, only puree trialed this date 2/2 pt's reduced A-P bolus transit noted with this consistency and increasing fatigue as BSE progressed. Indicators of Pharyngeal Phase Dysfunction   Pharyngeal Phase  Pharyngeal Phase: Exceptions  Indicators of Pharyngeal Phase Dysfunction  Delayed Swallow: Thin - cup; Thin - teaspoon  Cough - Delayed: Thin - cup  Change in Vital Signs: Thin - cup  Pharyngeal Phase   Pharyngeal: Pt with ineffective, weak cough at start of BSE, concern for inability to clear/manage own secretions. She also presents with weak, dysphonic vocal quality with likely poor airway protection at this time. Prognosis  Prognosis  Prognosis for safe diet advancement: good  Barriers to reach goals: fatigue;time post onset  Individuals consulted  Consulted and agree with results and recommendations: Patient; Family member;RN  Family member consulted: Pt's mother at bedside    Education  Patient Education: Pt educated on reason for referral, role of ST, assessment results and recommendations. Patient Education Response: Demonstrated understanding;Needs reinforcement    G-Code  SLP G-Codes  Functional Limitations: Swallowing  Swallow Current Status (): At least 80 percent but less than 100 percent impaired, limited or restricted  Swallow Goal Status ():  At least 20 percent but less than 40 percent impaired, limited or restricted     Therapy Time  SLP Individual Minutes  Time In: 1427  Time Out: 6619  Minutes: 24 minutes; dysphagia augie Geller, M.S. [id number] [facility]  Speech-language pathologist  [id number]

## 2018-06-11 NOTE — PROGRESS NOTES
69 18 99 % 144 lb 6.4 oz (65.5 kg)       CVP: CVP (Mean): 12 mmHg      Intake/Output Summary (Last 24 hours) at 06/11/18 0746  Last data filed at 06/11/18 0600   Gross per 24 hour   Intake             5566 ml   Output             5432 ml   Net              134 ml       EXAM:  General: young female off vent. Drowsy and weak  Eyes: PERRL. No sclera icterus. No conjunctiva injected. ENT: No discharge. Neck: Trachea midline. Normal thyroid. Resp:  zulma air entry,  No rhonchi. No dullness on percussion. CV:tachy . Regular rhythm. No edema. No JVD. Palpable pedal pulses. GI: right UQ +tender. Non-distended. No masses. No organmegaly. Normal bowel sounds. No hernia. Skin: Warm and dry. No nodule on exposed extremities. No rash on exposed extremities. appears jaundiced  Lymph: No cervical LAD. No supraclavicular LAD.    M/S: . Right first MTP is swollen    Psych: drowsy post extubation     Medications:  Scheduled Meds:   sodium chloride  250 mL Intravenous Once    sodium chloride  250 mL Intravenous Once    vancomycin  1,000 mg Intravenous Q24H    pantoprazole  40 mg Intravenous BID    cefepime  1 g Intravenous Q8H    insulin lispro  0-6 Units Subcutaneous Q4H    chlorhexidine  15 mL Mouth/Throat BID    lidocaine 1 % injection  5 mL Intradermal Once    sodium chloride flush  10 mL Intravenous 2 times per day    methylPREDNISolone  125 mg Intravenous Q6H    sevelamer  800 mg Oral TID WC    sodium chloride flush  10 mL Intravenous 2 times per day    mupirocin   Nasal BID    darbepoetin emi-polysorbate  100 mcg Intravenous Weekly       PRN Meds:  glucose, dextrose, glucagon (rDNA), midazolam, fentanNYL, magnesium sulfate, calcium gluconate IVPB **OR** calcium gluconate IVPB **OR** calcium gluconate IVPB **OR** calcium gluconate IVPB, sodium phosphate IVPB **OR** sodium phosphate IVPB **OR** sodium phosphate IVPB **OR** sodium phosphate IVPB, sodium chloride flush, dextrose, sodium chloride flush,

## 2018-06-12 LAB
ABO/RH: NORMAL
ALBUMIN SERPL-MCNC: 2.9 G/DL (ref 3.4–5)
ALBUMIN SERPL-MCNC: 2.9 G/DL (ref 3.4–5)
ALP BLD-CCNC: 591 U/L (ref 40–129)
ALT SERPL-CCNC: 1173 U/L (ref 10–40)
AMYLASE: 125 U/L (ref 25–115)
ANION GAP SERPL CALCULATED.3IONS-SCNC: 17 MMOL/L (ref 3–16)
ANION GAP SERPL CALCULATED.3IONS-SCNC: 23 MMOL/L (ref 3–16)
ANION GAP SERPL CALCULATED.3IONS-SCNC: 9 MMOL/L (ref 3–16)
ANTIBODY SCREEN: NORMAL
AST SERPL-CCNC: 571 U/L (ref 15–37)
BASE EXCESS ARTERIAL: -7.2 MMOL/L (ref -3–3)
BILIRUB SERPL-MCNC: 7.3 MG/DL (ref 0–1)
BILIRUBIN DIRECT: 5.8 MG/DL (ref 0–0.3)
BILIRUBIN, INDIRECT: 1.5 MG/DL (ref 0–1)
BLOOD BANK DISPENSE STATUS: NORMAL
BLOOD BANK DISPENSE STATUS: NORMAL
BLOOD BANK PRODUCT CODE: NORMAL
BLOOD BANK PRODUCT CODE: NORMAL
BPU ID: NORMAL
BPU ID: NORMAL
BUN BLDV-MCNC: 39 MG/DL (ref 7–20)
BUN BLDV-MCNC: 55 MG/DL (ref 7–20)
BUN BLDV-MCNC: 60 MG/DL (ref 7–20)
CALCIUM IONIZED: 1.07 MMOL/L (ref 1.12–1.32)
CALCIUM IONIZED: 1.15 MMOL/L (ref 1.12–1.32)
CALCIUM SERPL-MCNC: 8.9 MG/DL (ref 8.3–10.6)
CALCIUM SERPL-MCNC: 9.3 MG/DL (ref 8.3–10.6)
CALCIUM SERPL-MCNC: 9.5 MG/DL (ref 8.3–10.6)
CARBOXYHEMOGLOBIN ARTERIAL: 1.2 % (ref 0–1.5)
CHLORIDE BLD-SCNC: 100 MMOL/L (ref 99–110)
CHLORIDE BLD-SCNC: 98 MMOL/L (ref 99–110)
CHLORIDE BLD-SCNC: 99 MMOL/L (ref 99–110)
CO2: 15 MMOL/L (ref 21–32)
CO2: 21 MMOL/L (ref 21–32)
CO2: 26 MMOL/L (ref 21–32)
CREAT SERPL-MCNC: 1.7 MG/DL (ref 0.6–1.1)
CREAT SERPL-MCNC: 2.4 MG/DL (ref 0.6–1.1)
CREAT SERPL-MCNC: 2.5 MG/DL (ref 0.6–1.1)
DESCRIPTION BLOOD BANK: NORMAL
DESCRIPTION BLOOD BANK: NORMAL
FIBRINOGEN: 126 MG/DL (ref 200–397)
FIBRINOGEN: 147 MG/DL (ref 200–397)
FIBRINOGEN: 151 MG/DL (ref 200–397)
FIBRINOGEN: 168 MG/DL (ref 200–397)
GFR AFRICAN AMERICAN: 28
GFR AFRICAN AMERICAN: 30
GFR AFRICAN AMERICAN: 44
GFR NON-AFRICAN AMERICAN: 23
GFR NON-AFRICAN AMERICAN: 24
GFR NON-AFRICAN AMERICAN: 36
GLUCOSE BLD-MCNC: 118 MG/DL (ref 70–99)
GLUCOSE BLD-MCNC: 134 MG/DL (ref 70–99)
GLUCOSE BLD-MCNC: 79 MG/DL (ref 70–99)
GLUCOSE BLD-MCNC: 88 MG/DL (ref 70–99)
GLUCOSE BLD-MCNC: 95 MG/DL (ref 70–99)
GLUCOSE BLD-MCNC: 97 MG/DL (ref 70–99)
GLUCOSE BLD-MCNC: 98 MG/DL (ref 70–99)
HCO3 ARTERIAL: 17.8 MMOL/L (ref 21–29)
HCT VFR BLD CALC: 21 % (ref 36–48)
HCT VFR BLD CALC: 24.4 % (ref 36–48)
HCT VFR BLD CALC: 31 % (ref 36–48)
HEMATOLOGY PATH CONSULT: NO
HEMATOLOGY PATH CONSULT: NO
HEMATOLOGY PATH CONSULT: YES
HEMOGLOBIN, ART, EXTENDED: 8.4 G/DL (ref 12–16)
HEMOGLOBIN: 10.3 G/DL (ref 12–16)
HEMOGLOBIN: 6.7 G/DL (ref 12–16)
HEMOGLOBIN: 8.2 G/DL (ref 12–16)
INR BLD: 1.47 (ref 0.86–1.14)
INR BLD: 1.48 (ref 0.86–1.14)
INR BLD: 1.68 (ref 0.86–1.14)
LIPASE: 82 U/L (ref 13–60)
MAGNESIUM: 2.8 MG/DL (ref 1.8–2.4)
MAGNESIUM: 2.9 MG/DL (ref 1.8–2.4)
MCH RBC QN AUTO: 28.8 PG (ref 26–34)
MCH RBC QN AUTO: 29 PG (ref 26–34)
MCH RBC QN AUTO: 29.3 PG (ref 26–34)
MCHC RBC AUTO-ENTMCNC: 32 G/DL (ref 31–36)
MCHC RBC AUTO-ENTMCNC: 33.2 G/DL (ref 31–36)
MCHC RBC AUTO-ENTMCNC: 33.7 G/DL (ref 31–36)
MCV RBC AUTO: 86.1 FL (ref 80–100)
MCV RBC AUTO: 88.3 FL (ref 80–100)
MCV RBC AUTO: 89.9 FL (ref 80–100)
METHEMOGLOBIN ARTERIAL: 0.7 %
O2 CONTENT ARTERIAL: 12 ML/DL
O2 SAT, ARTERIAL: 96 %
O2 THERAPY: ABNORMAL
PCO2 ARTERIAL: 33.2 MMHG (ref 35–45)
PDW BLD-RTO: 15.3 % (ref 12.4–15.4)
PDW BLD-RTO: 15.5 % (ref 12.4–15.4)
PDW BLD-RTO: 16.1 % (ref 12.4–15.4)
PERFORMED ON: ABNORMAL
PERFORMED ON: NORMAL
PH ARTERIAL: 7.35 (ref 7.35–7.45)
PH VENOUS: 7.35 (ref 7.35–7.45)
PH VENOUS: 7.41 (ref 7.35–7.45)
PHOSPHORUS: 3.6 MG/DL (ref 2.5–4.9)
PHOSPHORUS: 6.5 MG/DL (ref 2.5–4.9)
PLATELET # BLD: 101 K/UL (ref 135–450)
PLATELET # BLD: 71 K/UL (ref 135–450)
PLATELET # BLD: 77 K/UL (ref 135–450)
PLATELET SLIDE REVIEW: ABNORMAL
PMV BLD AUTO: 10.5 FL (ref 5–10.5)
PMV BLD AUTO: 10.6 FL (ref 5–10.5)
PMV BLD AUTO: 9.8 FL (ref 5–10.5)
PO2 ARTERIAL: 84.9 MMHG (ref 75–108)
POTASSIUM REFLEX MAGNESIUM: 5.9 MMOL/L (ref 3.5–5.1)
POTASSIUM SERPL-SCNC: 4.7 MMOL/L (ref 3.5–5.1)
POTASSIUM SERPL-SCNC: 5 MMOL/L (ref 3.5–5.1)
PROTHROMBIN TIME: 16.8 SEC (ref 9.8–13)
PROTHROMBIN TIME: 16.9 SEC (ref 9.8–13)
PROTHROMBIN TIME: 19.1 SEC (ref 9.8–13)
RBC # BLD: 2.34 M/UL (ref 4–5.2)
RBC # BLD: 2.83 M/UL (ref 4–5.2)
RBC # BLD: 3.51 M/UL (ref 4–5.2)
SLIDE REVIEW: ABNORMAL
SODIUM BLD-SCNC: 134 MMOL/L (ref 136–145)
SODIUM BLD-SCNC: 136 MMOL/L (ref 136–145)
SODIUM BLD-SCNC: 138 MMOL/L (ref 136–145)
TCO2 ARTERIAL: 18.8 MMOL/L
TOTAL PROTEIN: 6.4 G/DL (ref 6.4–8.2)
TRIGL SERPL-MCNC: 194 MG/DL (ref 0–150)
VANCOMYCIN RANDOM: 32.2 UG/ML
WBC # BLD: 54.9 K/UL (ref 4–11)
WBC # BLD: 62 K/UL (ref 4–11)
WBC # BLD: 64.9 K/UL (ref 4–11)

## 2018-06-12 PROCEDURE — 5A1955Z RESPIRATORY VENTILATION, GREATER THAN 96 CONSECUTIVE HOURS: ICD-10-PCS | Performed by: INTERNAL MEDICINE

## 2018-06-12 PROCEDURE — 94762 N-INVAS EAR/PLS OXIMTRY CONT: CPT

## 2018-06-12 PROCEDURE — 99233 SBSQ HOSP IP/OBS HIGH 50: CPT | Performed by: INTERNAL MEDICINE

## 2018-06-12 PROCEDURE — 83690 ASSAY OF LIPASE: CPT

## 2018-06-12 PROCEDURE — 82330 ASSAY OF CALCIUM: CPT

## 2018-06-12 PROCEDURE — 9990 CHARGE CONVERSION

## 2018-06-12 PROCEDURE — 83735 ASSAY OF MAGNESIUM: CPT

## 2018-06-12 PROCEDURE — 36592 COLLECT BLOOD FROM PICC: CPT

## 2018-06-12 PROCEDURE — 84100 ASSAY OF PHOSPHORUS: CPT

## 2018-06-12 PROCEDURE — 86923 COMPATIBILITY TEST ELECTRIC: CPT

## 2018-06-12 PROCEDURE — 90945 DIALYSIS ONE EVALUATION: CPT

## 2018-06-12 PROCEDURE — 80202 ASSAY OF VANCOMYCIN: CPT

## 2018-06-12 PROCEDURE — 85610 PROTHROMBIN TIME: CPT

## 2018-06-12 PROCEDURE — 71045 X-RAY EXAM CHEST 1 VIEW: CPT

## 2018-06-12 PROCEDURE — 36600 WITHDRAWAL OF ARTERIAL BLOOD: CPT

## 2018-06-12 PROCEDURE — 71250 CT THORAX DX C-: CPT

## 2018-06-12 PROCEDURE — 82803 BLOOD GASES ANY COMBINATION: CPT

## 2018-06-12 PROCEDURE — 99291 CRITICAL CARE FIRST HOUR: CPT | Performed by: INTERNAL MEDICINE

## 2018-06-12 PROCEDURE — 0BH18EZ INSERTION OF ENDOTRACHEAL AIRWAY INTO TRACHEA, VIA NATURAL OR ARTIFICIAL OPENING ENDOSCOPIC: ICD-10-PCS | Performed by: INTERNAL MEDICINE

## 2018-06-12 PROCEDURE — 70450 CT HEAD/BRAIN W/O DYE: CPT

## 2018-06-12 PROCEDURE — 84478 ASSAY OF TRIGLYCERIDES: CPT

## 2018-06-12 PROCEDURE — 94003 VENT MGMT INPAT SUBQ DAY: CPT

## 2018-06-12 PROCEDURE — 87186 SC STD MICRODIL/AGAR DIL: CPT

## 2018-06-12 PROCEDURE — 87040 BLOOD CULTURE FOR BACTERIA: CPT

## 2018-06-12 PROCEDURE — 85384 FIBRINOGEN ACTIVITY: CPT

## 2018-06-12 PROCEDURE — 31500 INSERT EMERGENCY AIRWAY: CPT | Performed by: INTERNAL MEDICINE

## 2018-06-12 PROCEDURE — P9016 RBC LEUKOCYTES REDUCED: HCPCS

## 2018-06-12 PROCEDURE — 36415 COLL VENOUS BLD VENIPUNCTURE: CPT

## 2018-06-12 PROCEDURE — C9113 INJ PANTOPRAZOLE SODIUM, VIA: HCPCS

## 2018-06-12 PROCEDURE — 94750 CHARGE CONVERSION: CPT

## 2018-06-12 PROCEDURE — 80076 HEPATIC FUNCTION PANEL: CPT

## 2018-06-12 PROCEDURE — 87205 SMEAR GRAM STAIN: CPT

## 2018-06-12 PROCEDURE — 87077 CULTURE AEROBIC IDENTIFY: CPT

## 2018-06-12 PROCEDURE — 87070 CULTURE OTHR SPECIMN AEROBIC: CPT

## 2018-06-12 PROCEDURE — 82150 ASSAY OF AMYLASE: CPT

## 2018-06-12 PROCEDURE — 80069 RENAL FUNCTION PANEL: CPT

## 2018-06-12 PROCEDURE — 86403 PARTICLE AGGLUT ANTBDY SCRN: CPT

## 2018-06-12 PROCEDURE — 85027 COMPLETE CBC AUTOMATED: CPT

## 2018-06-12 PROCEDURE — 74018 RADEX ABDOMEN 1 VIEW: CPT

## 2018-06-12 PROCEDURE — 74176 CT ABD & PELVIS W/O CONTRAST: CPT

## 2018-06-12 RX ORDER — CHLORHEXIDINE GLUCONATE 0.12 MG/ML
15 RINSE ORAL 2 TIMES DAILY
Status: DISCONTINUED | OUTPATIENT
Start: 2018-06-12 | End: 2018-06-29

## 2018-06-12 RX ORDER — MORPHINE SULFATE 2 MG/ML
4 INJECTION, SOLUTION INTRAMUSCULAR; INTRAVENOUS ONCE
Status: COMPLETED | OUTPATIENT
Start: 2018-06-12 | End: 2018-06-12

## 2018-06-12 RX ORDER — FENTANYL CITRATE 50 UG/ML
50 INJECTION, SOLUTION INTRAMUSCULAR; INTRAVENOUS
Status: DISCONTINUED | OUTPATIENT
Start: 2018-06-12 | End: 2018-06-29

## 2018-06-12 RX ORDER — MORPHINE SULFATE 2 MG/ML
INJECTION, SOLUTION INTRAMUSCULAR; INTRAVENOUS
Status: COMPLETED
Start: 2018-06-12 | End: 2018-06-12

## 2018-06-12 RX ORDER — PROPOFOL 10 MG/ML
10 INJECTION, EMULSION INTRAVENOUS CONTINUOUS
Status: DISCONTINUED | OUTPATIENT
Start: 2018-06-12 | End: 2018-06-18

## 2018-06-12 RX ORDER — MINERAL OIL AND WHITE PETROLATUM 150; 830 MG/G; MG/G
OINTMENT OPHTHALMIC EVERY 4 HOURS
Status: DISCONTINUED | OUTPATIENT
Start: 2018-06-12 | End: 2018-06-29

## 2018-06-12 RX ORDER — MIDAZOLAM HYDROCHLORIDE 1 MG/ML
2 INJECTION INTRAMUSCULAR; INTRAVENOUS
Status: DISCONTINUED | OUTPATIENT
Start: 2018-06-12 | End: 2018-06-29

## 2018-06-12 RX ORDER — DEXTROSE MONOHYDRATE 25 G/50ML
25 INJECTION, SOLUTION INTRAVENOUS ONCE
Status: COMPLETED | OUTPATIENT
Start: 2018-06-12 | End: 2018-06-12

## 2018-06-12 RX ORDER — CARBOXYMETHYLCELLULOSE SODIUM 10 MG/ML
1 GEL OPHTHALMIC EVERY 4 HOURS
Status: DISCONTINUED | OUTPATIENT
Start: 2018-06-12 | End: 2018-06-29

## 2018-06-12 RX ORDER — MAGNESIUM SULFATE 1 G/100ML
1 INJECTION INTRAVENOUS PRN
Status: DISCONTINUED | OUTPATIENT
Start: 2018-06-12 | End: 2018-06-17

## 2018-06-12 RX ORDER — DEXTROSE MONOHYDRATE 25 G/50ML
25 INJECTION, SOLUTION INTRAVENOUS PRN
Status: DISCONTINUED | OUTPATIENT
Start: 2018-06-12 | End: 2018-07-18 | Stop reason: HOSPADM

## 2018-06-12 RX ORDER — METHYLPREDNISOLONE SODIUM SUCCINATE 40 MG/ML
40 INJECTION, POWDER, LYOPHILIZED, FOR SOLUTION INTRAMUSCULAR; INTRAVENOUS DAILY
Status: DISCONTINUED | OUTPATIENT
Start: 2018-06-12 | End: 2018-06-15

## 2018-06-12 RX ORDER — 0.9 % SODIUM CHLORIDE 0.9 %
250 INTRAVENOUS SOLUTION INTRAVENOUS ONCE
Status: COMPLETED | OUTPATIENT
Start: 2018-06-12 | End: 2018-06-15

## 2018-06-12 RX ORDER — SODIUM CHLORIDE 9 MG/ML
INJECTION, SOLUTION INTRAVENOUS
Status: DISPENSED
Start: 2018-06-12 | End: 2018-06-13

## 2018-06-12 RX ORDER — METHYLPREDNISOLONE SODIUM SUCCINATE 40 MG/ML
40 INJECTION, POWDER, LYOPHILIZED, FOR SOLUTION INTRAMUSCULAR; INTRAVENOUS DAILY
Status: DISCONTINUED | OUTPATIENT
Start: 2018-06-13 | End: 2018-06-12

## 2018-06-12 RX ADMIN — CARBOXYMETHYLCELLULOSE SODIUM 1 DROP: 10 GEL OPHTHALMIC at 13:30

## 2018-06-12 RX ADMIN — METHYLPREDNISOLONE SODIUM SUCCINATE 125 MG: 125 INJECTION, POWDER, LYOPHILIZED, FOR SOLUTION INTRAMUSCULAR; INTRAVENOUS at 04:42

## 2018-06-12 RX ADMIN — MORPHINE SULFATE 4 MG: 2 INJECTION, SOLUTION INTRAMUSCULAR; INTRAVENOUS at 09:04

## 2018-06-12 RX ADMIN — MIDAZOLAM HYDROCHLORIDE 2 MG: 1 INJECTION INTRAMUSCULAR; INTRAVENOUS at 10:14

## 2018-06-12 RX ADMIN — PROPOFOL 35 MCG/KG/MIN: 10 INJECTION, EMULSION INTRAVENOUS at 19:18

## 2018-06-12 RX ADMIN — FENTANYL CITRATE 50 MCG: 50 INJECTION, SOLUTION INTRAMUSCULAR; INTRAVENOUS at 09:47

## 2018-06-12 RX ADMIN — PROPOFOL 15 MCG/KG/MIN: 10 INJECTION, EMULSION INTRAVENOUS at 11:00

## 2018-06-12 RX ADMIN — ONDANSETRON 4 MG: 2 INJECTION, SOLUTION INTRAMUSCULAR; INTRAVENOUS at 08:51

## 2018-06-12 RX ADMIN — Medication 10 ML: at 11:02

## 2018-06-12 RX ADMIN — MINERAL OIL AND WHITE PETROLATUM: 150; 830 OINTMENT OPHTHALMIC at 12:08

## 2018-06-12 RX ADMIN — Medication 10 ML: at 21:23

## 2018-06-12 RX ADMIN — Medication 50 MEQ: at 15:38

## 2018-06-12 RX ADMIN — MINERAL OIL AND WHITE PETROLATUM: 150; 830 OINTMENT OPHTHALMIC at 15:41

## 2018-06-12 RX ADMIN — CARBOXYMETHYLCELLULOSE SODIUM 1 DROP: 10 GEL OPHTHALMIC at 11:22

## 2018-06-12 RX ADMIN — Medication 10 ML: at 08:51

## 2018-06-12 RX ADMIN — DEXTROSE MONOHYDRATE 25 G: 25 INJECTION, SOLUTION INTRAVENOUS at 15:38

## 2018-06-12 RX ADMIN — PANTOPRAZOLE SODIUM 40 MG: 40 INJECTION, POWDER, LYOPHILIZED, FOR SOLUTION INTRAVENOUS at 21:23

## 2018-06-12 RX ADMIN — PANTOPRAZOLE SODIUM 40 MG: 40 INJECTION, POWDER, LYOPHILIZED, FOR SOLUTION INTRAVENOUS at 08:51

## 2018-06-12 RX ADMIN — CARBOXYMETHYLCELLULOSE SODIUM 1 DROP: 10 GEL OPHTHALMIC at 22:05

## 2018-06-12 RX ADMIN — MINERAL OIL AND WHITE PETROLATUM: 150; 830 OINTMENT OPHTHALMIC at 19:52

## 2018-06-12 RX ADMIN — CHLORHEXIDINE GLUCONATE 15 ML: 0.12 RINSE ORAL at 21:23

## 2018-06-12 ASSESSMENT — PAIN SCALES - GENERAL
PAINLEVEL_OUTOF10: 10

## 2018-06-12 ASSESSMENT — PULMONARY FUNCTION TESTS
PIF_VALUE: 17
PIF_VALUE: 19
PIF_VALUE: 20

## 2018-06-12 NOTE — PROGRESS NOTES
CRRT alarming \"patient fluid limit reached\"- 189 cc blood returned to patient.  Pt tolerated w/o incident  Evetta Phalen RN, BSN

## 2018-06-12 NOTE — PROGRESS NOTES
Spoke with Dr. Elissa See (GI) and updated him about the pt's drop in her H and H. He stated he agrees with giving 2 units of PRBC but he would like for Dr. Kiana Montanez (Hematology) updated as well. Dr. Eric chappell.

## 2018-06-12 NOTE — PROGRESS NOTES
06/12/18 1127   Vent Information   Vent Type 840   Vent Mode AC/VC   Vt Ordered 450 mL   Rate Set 14 bmp   Peak Flow 50 L/min   FiO2  60 %   Sensitivity 3   PEEP/CPAP 5   Humidification Source Heated wire   Humidification Temp 36.9   Circuit Condensation Drained   Vent Patient Data   Vt Exhaled 488 mL   Rate Measured 22 br/min   Minute Volume 10.8 Liters   Peak Inspiratory Pressure 17 cmH2O   I:E Ratio 1:2.1   Mean Airway Pressure 8.6 cmH20   Spontaneous Breathing Trial (SBT) RT Doc   Pulse 107   SpO2 100 %   Cough/Sputum   Sputum How Obtained Endotracheal   Cough Non-productive   Breath Sounds   Right Upper Lobe Diminished   Right Middle Lobe Diminished   Right Lower Lobe Diminished   Left Upper Lobe Diminished   Left Lower Lobe Diminished   Additional Respiratory  Assessments   Resp 22   Position Semi-Lucero's   Subglottic Suction Done?  Yes   Patient Observation   Observations #7.5 ETT @ 23 lip line

## 2018-06-12 NOTE — ONCOLOGY
1. Bacteremia/Endocarditis  2. HCAP  3. Severe Anemia  4. ESRD on dialysis  5. Hep C  6. IVDA  7. S/P Code Blue/PEA    Assessment and Plan:     1. Leukocytosis, anemia, thrombocytopenia  - suspect multifactorial  - baseline ACD with ESRD and Hep C - d/w nephrology, is chronically on Procrit  - suspect acute drop from GIB given hematemesis and rectal bleeding noted at the time of EGD  - EGD revealed angiodysplasia which was cauterized  - sepsis also likely contributing  - hemolysis also a concern     - Updated labs showed:   A. Fibrinogen 45 mg/dL, INR 4.42, aPTT 50.1 sec. (consistent with DIC)  B. Ferritin 4649 ng/mL, iron sat 45% (acute inflammation. Not iron deficient)  C. SHILPA pos. Anti IgG neg. Haptoglobin 240 mg/dL. (most likely the SHILPA was not a significant finding, but a cold agglutinin could do this. Check cold agglutinin titer). - Check cold agglutinin titer pending. Agree with 1 unit PRBC with severe anemia and Hgb < 7.  Hgb improved  - Will give additional cryoprecipitate if bleeding or fibrinogen < 100  - suspect leukocytosis reactive from infection and steroids  - can start to wean solumederol from heme standpoint      Arslan Salazar MD

## 2018-06-12 NOTE — PROGRESS NOTES
Speech Language Pathology  Attempt Note    Noted pt re-intubated per chart, not appropriate for f/u at this time. ST to sign-off, will require new orders once pt is extubated and appropriate for further dysphagia tx.     Thank you,  Oksana Keating M.S. [id number] [facility]  Speech-language pathologist  [id number]  Phone: [phone]

## 2018-06-12 NOTE — PLAN OF CARE
Problem: Nutrition  Goal: Optimal nutrition therapy  Outcome: Not Met This Shift  Nutrition Problem:  Moderate malnutrition  Intervention: Food and/or Nutrient Delivery: Continue NPO, Start Tube Feeding  Nutritional Goals: patient will tolerate Nepro at goal rate of 40 ml/hr x 20 hours without GI distress, without s/s of aspiration, and without additional BS/fluid/electrolyte abnormalities; weight will remain stable during remainder of admission

## 2018-06-12 NOTE — PROGRESS NOTES
S2 without m/r/g; no edema  RESP: CTA B without w/r/r; breathing wnl  ACCESS: R IJ TDC  gen-confused  abd - distended, tender, no bowel sounds    Data/  Recent Labs      06/11/18   1426  06/11/18   2150  06/12/18   0515   WBC  50.1*  53.7*  62.0*   HGB  8.8*  8.1*  8.2*   HCT  26.0*  24.3*  24.4*   MCV  86.3  87.7  86.1   PLT  51*  62*  71*     Recent Labs      06/11/18   0600  06/11/18   1426  06/12/18   0515   NA  135*  132*  134*   K  4.3  4.3  4.7   CL  97*  97*  99   CO2  27  29  26   GLUCOSE  147*  93  97   PHOS  2.9  2.3*  3.6   MG  2.50*  2.70*  2.80*   BUN  17  16  39*   CREATININE  0.7  0.6  1.7*   LABGLOM  >60  >60  36*   GFRAA  >60  >60  44*       Assessment/Plan     - End stage renal disease - on HD MWF   -no acute HD need at present   - will try iHD from 6/13 if remains stable    - Acute GI bleed    - plans per GI, ?angiodysplasia of UGI tract   - Weekly KI with HD, transfuse as needed -recived 1 PRBC    - MRSA, Streptococcus bacteremia, persistent endocarditis involving TV   - Hx of MSSA bacteremia/endocarditis with severe TR    - on vancomycin, cefepime- ID following  -DIC  -cardiac arrest times 2   -HCV   -Abd pain   - concerns for peritonitis, would get CT scan a/p- defer to GI on the case       1515: labs and events of this am reviewed; pt has been re intubated and k has risen to 5.9 with worsening acidosis; will resume CRRT after using iv bicarb, iv d50 and iv insulin.

## 2018-06-12 NOTE — PROGRESS NOTES
Nutrition Assessment    Type and Reason for Visit: Reassess, Consult (consult for TF ordering and management)    Nutrition Recommendations:   1. Start TF - Nepro with a goal rate of 40 ml/hr x 20 hours. Start with 20 ml/hr and increase by 20 ml every 4-6 hours, as tolerated by patient, until goal rate can be achieved and maintained. Water flushes, 60 ml every 6 hours or per nephrology guidance. 2. Monitor TF start, rate, intake, and tolerance + water flushes. 3. Monitor vent status, sedation type/amount, test/procedure results, and plan of care. 4. Monitor for additional in-patient weight changes during remainder of admission. 5. Monitor nutrition-related labs, bowel activity/regimen, and fluid/electrolyte management. Malnutrition Assessment:  · Malnutrition Status: Meets the criteria for moderate malnutrition  · Context: Acute illness or injury  · Findings of the 6 clinical characteristics of malnutrition (Minimum of 2 out of 6 clinical characteristics is required to make the diagnosis of moderate or severe Protein Calorie Malnutrition based on AND/ASPEN Guidelines):  1. Energy Intake-Less than or equal to 75%, greater than or equal to 3 months    2. Weight Loss-No significant weight loss, in 1 week  3. Fat Loss-Mild subcutaneous fat loss, Orbital, Triceps  4. Muscle Loss-Mild muscle mass loss, Temples (temporalis muscle), Clavicles (pectoralis and deltoids)  5. Fluid Accumulation-No significant fluid accumulation,  (abdominal edema )  6.  Strength-Not measured    Nutrition Diagnosis:   · Problem:  Moderate malnutrition  · Etiology: related to Insufficient energy/nutrient consumption, Renal dysfunction, Psychological cause/life stress, Cardiac dysfunction, Alteration in GI function, Diarrhea     Signs and symptoms:  as evidenced by Weight loss, Lab values, GI abnormality, Diarrhea, Known losses from dialysis, Other (mild fat and muscle loss)    Nutrition Assessment:  · Subjective Assessment: patient was extubated on 6/11 but required re-intubation this am d/t life threatening respiratory failure; patient had been c/o abdominal pain and nausea prior to intubation; CRRT ongoing at this time - managed per nephrology; + head CT today; + KUB today; per GI, patient's abdominal pain most likely had been d/t ileus and splenic infarcts; propofol was at 15 mcg this am; on low-dose SSI  · Nutrition-Focused Physical Findings: patient remains intubated this afternoon - she was re-intubated this am; consult placed for TF ordering and management; patient's abdomen is round and appears distended; patient had been evaluated by SLP on 6/11 and SLP recommended patient remain NPO until this am; when SLP went to f/u with patient today, patient had already been re-intubated; CBW is 134# 14 oz; admission weight was 130# 4 oz; + abdominal edema; + redness on R foot remains  · Wound Type:  (boil on R buttock)  · Current Nutrition Therapies:  · Oral Diet Orders: NPO   · Oral Diet intake: NPO  · Oral Nutrition Supplement (ONS) Orders: None  · ONS intake: NPO  · Tube Feeding (TF) Orders:   · Feeding Route: Orogastric  · Formula: Renal  · Rate (ml/hr):40 ml/hr     · Volume (ml/day): 800 ml  · Duration:  (x 20 hours)  · Additives/Modulars:  (none)  · TF Residuals: Not applicable  · Water Flushes:  (60 ml every 6 hours or per nephrology)  · Current TF & Flush Orders Provides: TF recommendations requested today  · Goal TF & Flush Orders Provides: Nepro with a goal rate of 40 ml/hr x 20 hours = 800 ml TV, 1440 kcals, 65 g protein, and 582 ml free water   · Additional Calories: propofol at 15 mcg x 24 hours = 145 kcals from lipids  · Anthropometric Measures:  · Ht: 5' 2\" (157.5 cm)   · Current Body Wt: 134 lb 14 oz (61.2 kg)  · Admission Body Wt: 130 lb 4 oz (59.1 kg)  · Usual Body Wt:  (120's; weight on 12/11/17 was 126# 8 oz)  · % Weight Change: + 4# weight gain (+ dialysis patient),  x 6 days admission  · [place] Body Wt:

## 2018-06-12 NOTE — PROGRESS NOTES
PROGRESS NOTE  S:28 yrs Patient  admitted on 6/6/2018 with GI BLEED . Today she complains of Abdominal Pain    Exam:   Vitals:    06/12/18 0900   BP: 92/64   Pulse: 135   Resp: (!) 39   Temp:    SpO2: 95%      General appearance: alert, appears stated age and cooperative  HEENT: Sclera clear, anicteric and Oropharynx clear, no lesions  Neck: no adenopathy and supple, symmetrical, trachea midline  Lungs: clear to auscultation bilaterally  Heart: regular rate and rhythm, S1, S2 normal, no murmur, click, rub or gallop  Abdomen: abdominal distention  Extremities: extremities normal, atraumatic, no cyanosis or edema     Medications: Reviewed    Labs:  CBC:   Recent Labs      06/11/18   1426  06/11/18   2150 06/12/18   0515   WBC  50.1*  53.7*  62.0*   HGB  8.8*  8.1*  8.2*   HCT  26.0*  24.3*  24.4*   MCV  86.3  87.7  86.1   PLT  51*  62*  71*     BMP:   Recent Labs      06/11/18   0600  06/11/18   1426  06/12/18   0515   NA  135*  132*  134*   K  4.3  4.3  4.7   CL  97*  97*  99   CO2  27  29  26   PHOS  2.9  2.3*  3.6   BUN  17  16  39*   CREATININE  0.7  0.6  1.7*     LIVER PROFILE:   Recent Labs      06/10/18   0610   AST  5,541*   ALT  2,787*   PROT  5.5*   BILIDIR  5.5*   BILITOT  7.0*   ALKPHOS  616*     PT/INR:   Recent Labs      06/11/18   1426  06/11/18   2150  06/12/18   0515   INR  1.56*  1.53*  1.47*     Impression: 34year old female with history of HTN, Chronic hepatitis C, seizure do, ESRD, IVDU, endocarditis, admitted with sepsis and PEA c/b shock liver, septic emboli, DIC and GIbleed    Recommendation:  1. Continue supportive care  2. Check abd x ray  3. Monitor LFTs  4. Abdominal pain likely due to ileus and splenic infarcts  5. Bedside swallow eval  6.  Will follow      Pastor Murtaza MD  9:08 AM 6/12/2018

## 2018-06-12 NOTE — PROGRESS NOTES
Intravenous 2 times per day    methylPREDNISolone  125 mg Intravenous Q6H    sevelamer  800 mg Oral TID     sodium chloride flush  10 mL Intravenous 2 times per day    darbepoetin emi-polysorbate  100 mcg Intravenous Weekly      dialysis builder 1,500 mL/hr at 06/11/18 1638    dialysis builder 1,000 mL/hr at 06/11/18 1522    dialysis builder 500 mL/hr at 06/11/18 1016    norepinephrine Stopped (06/09/18 1702)    vasopressin infusion Stopped (06/11/18 0620)    dextrose       ondansetron, glucose, dextrose, glucagon (rDNA), magnesium sulfate, calcium gluconate IVPB **OR** calcium gluconate IVPB **OR** calcium gluconate IVPB **OR** calcium gluconate IVPB, sodium phosphate IVPB **OR** sodium phosphate IVPB **OR** sodium phosphate IVPB **OR** sodium phosphate IVPB, sodium chloride flush, dextrose, sodium chloride flush, acetaminophen, heparin (porcine), promethazine, hydrOXYzine    Lab Data:  CBC:   Recent Labs      06/11/18   0445  06/11/18   1426  06/11/18   2150   WBC  39.3*  50.1*  53.7*   HGB  6.5*  8.8*  8.1*   HCT  19.3*  26.0*  24.3*   MCV  87.3  86.3  87.7   PLT  45*  51*  62*     BMP:   Recent Labs      06/10/18   2200  06/11/18   0600  06/11/18   1426   NA  132*  135*  132*   K  4.4  4.3  4.3   CL  97*  97*  97*   CO2  26  27  29   PHOS  3.2  2.9  2.3*   BUN  15  17  16   CREATININE  0.7  0.7  0.6     LIVER PROFILE:   Recent Labs      06/10/18   0610   AST  5,541*   ALT  2,787*   BILIDIR  5.5*   BILITOT  7.0*   ALKPHOS  616*     PT/INR:   Recent Labs      06/11/18   0600  06/11/18   1426  06/11/18   2150   PROTIME  20.9*  17.8*  17.4*   INR  1.83*  1.56*  1.53*     APTT:   No results for input(s): APTT in the last 72 hours. BNP:  No results for input(s): BNP in the last 72 hours. CXR 6/11/18  Tubes and lines remain in place. There has been slight improvement in bilateral airspace disease. The heart size is mildly enlarged. There is no definite pleural effusion or pneumothorax.         EKG

## 2018-06-12 NOTE — PROGRESS NOTES
ABG drawn x 1 attempt(s) from right radial artery. Patient had positive modified Boris's Test.  Patient was on 30 (LPM/Fio2) oxygen per ventilator (device). Pressure held x 5 minutes. No bleeding or bruising noted at puncture site. Patient tolerated procedure well.

## 2018-06-12 NOTE — PLAN OF CARE
Problem: Falls - Risk of:   Intervention: Assess potential safety hazards  Assess potential safety hazards  Intervention: Assess risk factors for falls  Assess risk for falls  Intervention: Assess medication that may increase risk of fall  Assess any medications that may increase risk of falls

## 2018-06-12 NOTE — PROGRESS NOTES
Internal Medicine ICU Progress Note    34year old white female who was admitted for GI bleed. Had an EGD     BC positive for strep pneumonia, staph aureus and gram-negative salazar. Echocardiogram showed tricuspid valve endocarditis. She's had this before. CT chest showed septic emboli and possible splenic infarct. -transferred to ICU  For PEA arrest  ,intubated resuscitated with fluids      - Off levophed  Vaso weaned =  Extubated to  RA     reintubated this am for increasing sob       Pt seen back on vent. Sedated      Invasive Lines: Tunneled dialysis catheter, two peripheral IV       Recent Labs      18   0405  18   1059   PHART  7.518*  7.395   [id number]  32.4*  43.9   PO2ART  88.5  77.6       MV Settings:  Vent Mode: PS Rate Set: 0 bmp/Vt Ordered: 450 mL/ /FiO2 : 30 %    IV:   dialysis builder 1,500 mL/hr at 18 1638    dialysis builder 1,000 mL/hr at 18 1522    dialysis builder 500 mL/hr at 18 1016    norepinephrine Stopped (18 170)    vasopressin infusion Stopped (18 06)    dextrose         Vitals:  Temp  Av.8 °F (36.6 °C)  Min: 96.5 °F (35.8 °C)  Max: 98.4 °F (36.9 °C)  Pulse  Av.6  Min: 72  Max: 108  BP  Min: 126/86  Max: 170/109  SpO2  Av.3 %  Min: 91 %  Max: 99 %  FiO2   Av %  Min: 30 %  Max: 30 %  Patient Vitals for the past 4 hrs:   BP Pulse Resp SpO2 Height Weight   18 0700 (!) 150/88 100 26 91 % - -   18 0600 (!) 161/97 104 (!) 33 92 % - -   18 0500 (!) 170/109 108 27 92 % 5' 2\" (1.575 m) 134 lb 14.7 oz (61.2 kg)       CVP: CVP (Mean): 5 mmHg      Intake/Output Summary (Last 24 hours) at 18 0803  Last data filed at 18 2204   Gross per 24 hour   Intake              902 ml   Output             1407 ml   Net             -505 ml       EXAM:  General: young female on vent. sedated  Eyes: PERRL. No sclera icterus. No conjunctiva injected. ENT: No discharge. Neck: Trachea midline.  Normal thyroid. Resp:  zulma air entry,  No rhonchi. No dullness on percussion. CV:tachy . Regular rhythm. No edema. No JVD. Palpable pedal pulses. GI:   Non-distended. No masses. No organmegaly. Normal bowel sounds. No hernia. Skin: Warm and dry. No nodule on exposed extremities. No rash on exposed extremities. appears jaundiced  Lymph: No cervical LAD. No supraclavicular LAD.    M/S: . Right first MTP wound is drained    Psych: sedated    Medications:  Scheduled Meds:   sodium chloride  250 mL Intravenous Once    pantoprazole  40 mg Intravenous BID    cefepime  1 g Intravenous Q8H    insulin lispro  0-6 Units Subcutaneous Q4H    lidocaine 1 % injection  5 mL Intradermal Once    sodium chloride flush  10 mL Intravenous 2 times per day    methylPREDNISolone  125 mg Intravenous Q6H    sevelamer  800 mg Oral TID WC    sodium chloride flush  10 mL Intravenous 2 times per day    darbepoetin emi-polysorbate  100 mcg Intravenous Weekly       PRN Meds:  ondansetron, glucose, dextrose, glucagon (rDNA), magnesium sulfate, calcium gluconate IVPB **OR** calcium gluconate IVPB **OR** calcium gluconate IVPB **OR** calcium gluconate IVPB, sodium phosphate IVPB **OR** sodium phosphate IVPB **OR** sodium phosphate IVPB **OR** sodium phosphate IVPB, sodium chloride flush, dextrose, sodium chloride flush, acetaminophen, heparin (porcine), promethazine, hydrOXYzine    Results:  CBC:   Recent Labs      06/11/18   1426  06/11/18   2150  06/12/18   0515   WBC  50.1*  53.7*  62.0*   HGB  8.8*  8.1*  8.2*   HCT  26.0*  24.3*  24.4*   MCV  86.3  87.7  86.1   PLT  51*  62*  71*     BMP:   Recent Labs      06/11/18   0600  06/11/18   1426  06/12/18   0515   NA  135*  132*  134*   K  4.3  4.3  4.7   CL  97*  97*  99   CO2  27  29  26   PHOS  2.9  2.3*  3.6   BUN  17  16  39*   CREATININE  0.7  0.6  1.7*     LIVER PROFILE:   Recent Labs      06/10/18   0610   AST  5,541*   ALT  2,787*   BILIDIR  5.5*   BILITOT  7.0*   ALKPHOS  616*

## 2018-06-12 NOTE — PROGRESS NOTES
CRRT therapy running w/o issue. VSS> /56 (72)  ST RR 18. Pt anuric. Lungs diminished. ETT #7.5 22 @ lip line AC 14 tv 450 fIo2 30% & PEEP +5. [facility] Pt is anuric. Pt in bilateral soft wrist restraint sfor safety. L sub clavian CVC WNL Fentanyl at 25 mcg & diprivan gtt at 25 mcg. PRBC infusing at this time- pt tolerating well no s/s of transfusion reactions noted.   Valerio Tang RN, BSN

## 2018-06-12 NOTE — PROGRESS NOTES
Pulmonary & Critical Care Medicine ICU Progress Note  CC: Acute respiratory failure with hypoxemia    Events of Last 24 hours: Marked acute distress this am, complaining abdominal pain and nausea. She became tachycardic. Invasive Lines:   IV Line: L scl TLC 6/8. R scl vasc cath-chronic    MV:  6/8/18  Vent Mode: PS Rate Set: 0 bmp/Vt Ordered: 450 mL/ /FiO2 : 30 %  Recent Labs      06/11/18   0405  06/11/18   1059   PHART  7.518*  7.395   [id number]  32.4*  43.9   PO2ART  88.5  77.6       IV:   dialysis builder 1,500 mL/hr at 06/11/18 1638    dialysis builder 1,000 mL/hr at 06/11/18 1522    dialysis builder 500 mL/hr at 06/11/18 1016    norepinephrine Stopped (06/09/18 1702)    vasopressin infusion Stopped (06/11/18 0620)    dextrose         EXAM:  BP (!) 161/97   Pulse 104   Temp 98.3 °F (36.8 °C) (Oral)   Resp (!) 33   Ht 5' 2\" (1.575 m)   Wt 134 lb 14.7 oz (61.2 kg)   SpO2 92%   BMI 24.68 kg/m²  on vent    CVP: CVP (Mean): 5 mmHg    Intake/Output Summary (Last 24 hours) at 06/12/18 0638  Last data filed at 06/11/18 2204   Gross per 24 hour   Intake             1228 ml   Output             1726 ml   Net             -498 ml     General: Severe distress. Normocephalic, atraumatic. Tachypnic. Eyes: PERRL. No sclera icterus. No conjunctival injection. ENT: No discharge. Pharynx clear. Neck: Trachea midline. Normal thyroid. Resp: + accessory muscle use. No crackles. No wheezing. No rhonchi. No dullness on percussion. CV: tachy rate. Regular rhythm. No mumur or rub. No edema. GI: Diffusely tender. +distended. No masses. No organmegaly. reduced bowel sounds. No hernia. Skin: Warm and dry. No nodule on exposed extremities. No rash on exposed extremities. Lymph: No cervical LAD. No supraclavicular LAD. M/S: No cyanosis. No joint deformity. No clubbing.    Neuro:  Awake and alert, moves all extremities; agitated    Medications:   vancomycin  1,250 mg Intravenous Q24H    sodium chloride  250 mL

## 2018-06-12 NOTE — PROGRESS NOTES
06/12/18 1521   Vent Information   Vent Type 840   Vent Mode AC/VC   Vt Ordered 450 mL   Rate Set 14 bmp   Peak Flow 50 L/min   FiO2  30 %   Sensitivity 3   PEEP/CPAP 5   Humidification Source Heated wire   Humidification Temp 36.9   Circuit Condensation Drained   Vent Patient Data   Vt Exhaled 523 mL   Rate Measured 31 br/min   Minute Volume 15.2 Liters   Peak Inspiratory Pressure 19 cmH2O   I:E Ratio 1:1.5   Mean Airway Pressure 5.7 cmH20   Spontaneous Breathing Trial (SBT) RT Doc   Pulse 103   SpO2 100 %   Cough/Sputum   Sputum How Obtained Endotracheal   Cough Productive   Sputum Amount Moderate   Sputum Color Bright red   Tenacity Thick   Breath Sounds   Right Upper Lobe Diminished   Right Middle Lobe Diminished   Right Lower Lobe Diminished   Left Upper Lobe Diminished   Left Lower Lobe Diminished   Additional Respiratory  Assessments   Resp 17   Position Semi-Lucero's   Subglottic Suction Done?  Yes   Patient Observation   Observations #7.5 ETT @ 22 lip line

## 2018-06-12 NOTE — PLAN OF CARE
Problem: Falls - Risk of:  Goal: Will remain free from falls  Will remain free from falls   Outcome: Ongoing  Patient will remain free from falls

## 2018-06-13 LAB
ANION GAP SERPL CALCULATED.3IONS-SCNC: 10 MMOL/L (ref 3–16)
ANION GAP SERPL CALCULATED.3IONS-SCNC: 10 MMOL/L (ref 3–16)
ANION GAP SERPL CALCULATED.3IONS-SCNC: 12 MMOL/L (ref 3–16)
ANION GAP SERPL CALCULATED.3IONS-SCNC: 9 MMOL/L (ref 3–16)
BASE EXCESS ARTERIAL: 0 MMOL/L (ref -3–3)
BLOOD CULTURE, ROUTINE: NORMAL
BUN BLDV-MCNC: 40 MG/DL (ref 7–20)
BUN BLDV-MCNC: 41 MG/DL (ref 7–20)
BUN BLDV-MCNC: 45 MG/DL (ref 7–20)
BUN BLDV-MCNC: 45 MG/DL (ref 7–20)
CALCIUM IONIZED: 1.1 MMOL/L (ref 1.12–1.32)
CALCIUM IONIZED: 1.13 MMOL/L (ref 1.12–1.32)
CALCIUM IONIZED: 1.15 MMOL/L (ref 1.12–1.32)
CALCIUM IONIZED: 1.16 MMOL/L (ref 1.12–1.32)
CALCIUM SERPL-MCNC: 8.8 MG/DL (ref 8.3–10.6)
CALCIUM SERPL-MCNC: 8.9 MG/DL (ref 8.3–10.6)
CALCIUM SERPL-MCNC: 8.9 MG/DL (ref 8.3–10.6)
CALCIUM SERPL-MCNC: 9 MG/DL (ref 8.3–10.6)
CARBOXYHEMOGLOBIN ARTERIAL: 1.2 % (ref 0–1.5)
CHLORIDE BLD-SCNC: 101 MMOL/L (ref 99–110)
CHLORIDE BLD-SCNC: 99 MMOL/L (ref 99–110)
CO2: 24 MMOL/L (ref 21–32)
CO2: 26 MMOL/L (ref 21–32)
CO2: 26 MMOL/L (ref 21–32)
CO2: 27 MMOL/L (ref 21–32)
COLD AGGLUTININ TITER: NORMAL
CREAT SERPL-MCNC: 1.1 MG/DL (ref 0.6–1.1)
CREAT SERPL-MCNC: 1.4 MG/DL (ref 0.6–1.1)
CREAT SERPL-MCNC: 1.4 MG/DL (ref 0.6–1.1)
CREAT SERPL-MCNC: 1.6 MG/DL (ref 0.6–1.1)
FIBRINOGEN: 175 MG/DL (ref 200–397)
GFR AFRICAN AMERICAN: 47
GFR AFRICAN AMERICAN: 55
GFR AFRICAN AMERICAN: 55
GFR AFRICAN AMERICAN: >60
GFR NON-AFRICAN AMERICAN: 39
GFR NON-AFRICAN AMERICAN: 45
GFR NON-AFRICAN AMERICAN: 45
GFR NON-AFRICAN AMERICAN: >60
GLUCOSE BLD-MCNC: 101 MG/DL (ref 70–99)
GLUCOSE BLD-MCNC: 101 MG/DL (ref 70–99)
GLUCOSE BLD-MCNC: 104 MG/DL (ref 70–99)
GLUCOSE BLD-MCNC: 104 MG/DL (ref 70–99)
GLUCOSE BLD-MCNC: 129 MG/DL (ref 70–99)
GLUCOSE BLD-MCNC: 133 MG/DL (ref 70–99)
GLUCOSE BLD-MCNC: 139 MG/DL (ref 70–99)
GLUCOSE BLD-MCNC: 141 MG/DL (ref 70–99)
GLUCOSE BLD-MCNC: 89 MG/DL (ref 70–99)
HCO3 ARTERIAL: 23.8 MMOL/L (ref 21–29)
HCT VFR BLD CALC: 27 % (ref 36–48)
HCT VFR BLD CALC: 32.1 % (ref 36–48)
HEMATOLOGY PATH CONSULT: NO
HEMATOLOGY PATH CONSULT: NORMAL
HEMOGLOBIN, ART, EXTENDED: 10.3 G/DL (ref 12–16)
HEMOGLOBIN: 10.7 G/DL (ref 12–16)
HEMOGLOBIN: 9 G/DL (ref 12–16)
INR BLD: 1.34 (ref 0.86–1.14)
MAGNESIUM: 2.7 MG/DL (ref 1.8–2.4)
MAGNESIUM: 2.9 MG/DL (ref 1.8–2.4)
MAGNESIUM: 2.9 MG/DL (ref 1.8–2.4)
MCH RBC QN AUTO: 29.2 PG (ref 26–34)
MCHC RBC AUTO-ENTMCNC: 33.5 G/DL (ref 31–36)
MCV RBC AUTO: 87.2 FL (ref 80–100)
METHEMOGLOBIN ARTERIAL: 0.5 %
O2 CONTENT ARTERIAL: 14 ML/DL
O2 SAT, ARTERIAL: 97.2 %
O2 THERAPY: ABNORMAL
PCO2 ARTERIAL: 35.8 MMHG (ref 35–45)
PDW BLD-RTO: 15.3 % (ref 12.4–15.4)
PERFORMED ON: ABNORMAL
PERFORMED ON: NORMAL
PH ARTERIAL: 7.44 (ref 7.35–7.45)
PH VENOUS: 7.35 (ref 7.35–7.45)
PH VENOUS: 7.37 (ref 7.35–7.45)
PH VENOUS: 7.38 (ref 7.35–7.45)
PH VENOUS: 7.45 (ref 7.35–7.45)
PHOSPHORUS: 3.1 MG/DL (ref 2.5–4.9)
PHOSPHORUS: 3.5 MG/DL (ref 2.5–4.9)
PHOSPHORUS: 3.7 MG/DL (ref 2.5–4.9)
PLATELET # BLD: 74 K/UL (ref 135–450)
PMV BLD AUTO: 10.5 FL (ref 5–10.5)
PO2 ARTERIAL: 90.4 MMHG (ref 75–108)
POTASSIUM SERPL-SCNC: 4.7 MMOL/L (ref 3.5–5.1)
POTASSIUM SERPL-SCNC: 4.8 MMOL/L (ref 3.5–5.1)
POTASSIUM SERPL-SCNC: 5 MMOL/L (ref 3.5–5.1)
POTASSIUM SERPL-SCNC: 5.4 MMOL/L (ref 3.5–5.1)
PROTHROMBIN TIME: 15.3 SEC (ref 9.8–13)
RBC # BLD: 3.68 M/UL (ref 4–5.2)
SODIUM BLD-SCNC: 135 MMOL/L (ref 136–145)
SODIUM BLD-SCNC: 137 MMOL/L (ref 136–145)
TCO2 ARTERIAL: 24.9 MMOL/L
VANCOMYCIN RANDOM: 15.4 UG/ML
WBC # BLD: 62.4 K/UL (ref 4–11)

## 2018-06-13 PROCEDURE — 99233 SBSQ HOSP IP/OBS HIGH 50: CPT | Performed by: INTERNAL MEDICINE

## 2018-06-13 PROCEDURE — 36592 COLLECT BLOOD FROM PICC: CPT

## 2018-06-13 PROCEDURE — 85018 HEMOGLOBIN: CPT

## 2018-06-13 PROCEDURE — 80202 ASSAY OF VANCOMYCIN: CPT

## 2018-06-13 PROCEDURE — 85384 FIBRINOGEN ACTIVITY: CPT

## 2018-06-13 PROCEDURE — 36600 WITHDRAWAL OF ARTERIAL BLOOD: CPT

## 2018-06-13 PROCEDURE — 82803 BLOOD GASES ANY COMBINATION: CPT

## 2018-06-13 PROCEDURE — 85610 PROTHROMBIN TIME: CPT

## 2018-06-13 PROCEDURE — C9113 INJ PANTOPRAZOLE SODIUM, VIA: HCPCS

## 2018-06-13 PROCEDURE — 84100 ASSAY OF PHOSPHORUS: CPT

## 2018-06-13 PROCEDURE — 99291 CRITICAL CARE FIRST HOUR: CPT | Performed by: INTERNAL MEDICINE

## 2018-06-13 PROCEDURE — 80048 BASIC METABOLIC PNL TOTAL CA: CPT

## 2018-06-13 PROCEDURE — 94003 VENT MGMT INPAT SUBQ DAY: CPT

## 2018-06-13 PROCEDURE — 36415 COLL VENOUS BLD VENIPUNCTURE: CPT

## 2018-06-13 PROCEDURE — 94762 N-INVAS EAR/PLS OXIMTRY CONT: CPT

## 2018-06-13 PROCEDURE — 9990 CHARGE CONVERSION

## 2018-06-13 PROCEDURE — 94750 CHARGE CONVERSION: CPT

## 2018-06-13 PROCEDURE — 85027 COMPLETE CBC AUTOMATED: CPT

## 2018-06-13 PROCEDURE — 71045 X-RAY EXAM CHEST 1 VIEW: CPT

## 2018-06-13 PROCEDURE — 85014 HEMATOCRIT: CPT

## 2018-06-13 PROCEDURE — 83735 ASSAY OF MAGNESIUM: CPT

## 2018-06-13 PROCEDURE — 90945 DIALYSIS ONE EVALUATION: CPT

## 2018-06-13 PROCEDURE — 82330 ASSAY OF CALCIUM: CPT

## 2018-06-13 RX ORDER — SODIUM CHLORIDE 9 MG/ML
INJECTION, SOLUTION INTRAVENOUS
Status: DISPENSED
Start: 2018-06-13 | End: 2018-06-14

## 2018-06-13 RX ADMIN — MINERAL OIL AND WHITE PETROLATUM: 150; 830 OINTMENT OPHTHALMIC at 07:45

## 2018-06-13 RX ADMIN — MINERAL OIL AND WHITE PETROLATUM: 150; 830 OINTMENT OPHTHALMIC at 01:29

## 2018-06-13 RX ADMIN — CHLORHEXIDINE GLUCONATE 15 ML: 0.12 RINSE ORAL at 07:46

## 2018-06-13 RX ADMIN — CARBOXYMETHYLCELLULOSE SODIUM 1 DROP: 10 GEL OPHTHALMIC at 10:09

## 2018-06-13 RX ADMIN — CARBOXYMETHYLCELLULOSE SODIUM 1 DROP: 10 GEL OPHTHALMIC at 06:15

## 2018-06-13 RX ADMIN — PANTOPRAZOLE SODIUM 40 MG: 40 INJECTION, POWDER, LYOPHILIZED, FOR SOLUTION INTRAVENOUS at 07:45

## 2018-06-13 RX ADMIN — MIDAZOLAM HYDROCHLORIDE 2 MG: 1 INJECTION INTRAMUSCULAR; INTRAVENOUS at 02:16

## 2018-06-13 RX ADMIN — MINERAL OIL AND WHITE PETROLATUM: 150; 830 OINTMENT OPHTHALMIC at 04:04

## 2018-06-13 RX ADMIN — CARBOXYMETHYLCELLULOSE SODIUM 1 DROP: 10 GEL OPHTHALMIC at 14:21

## 2018-06-13 RX ADMIN — PANTOPRAZOLE SODIUM 40 MG: 40 INJECTION, POWDER, LYOPHILIZED, FOR SOLUTION INTRAVENOUS at 21:17

## 2018-06-13 RX ADMIN — MIDAZOLAM HYDROCHLORIDE 2 MG: 1 INJECTION INTRAMUSCULAR; INTRAVENOUS at 19:24

## 2018-06-13 RX ADMIN — CARBOXYMETHYLCELLULOSE SODIUM 1 DROP: 10 GEL OPHTHALMIC at 18:31

## 2018-06-13 RX ADMIN — MINERAL OIL AND WHITE PETROLATUM: 150; 830 OINTMENT OPHTHALMIC at 15:08

## 2018-06-13 RX ADMIN — PROPOFOL 60 MCG/KG/MIN: 10 INJECTION, EMULSION INTRAVENOUS at 21:16

## 2018-06-13 RX ADMIN — METHYLPREDNISOLONE SODIUM SUCCINATE 40 MG: 40 INJECTION, POWDER, LYOPHILIZED, FOR SOLUTION INTRAMUSCULAR; INTRAVENOUS at 07:44

## 2018-06-13 RX ADMIN — Medication 10 ML: at 07:46

## 2018-06-13 RX ADMIN — MINERAL OIL AND WHITE PETROLATUM: 150; 830 OINTMENT OPHTHALMIC at 10:08

## 2018-06-13 RX ADMIN — PROPOFOL 45 MCG/KG/MIN: 10 INJECTION, EMULSION INTRAVENOUS at 17:11

## 2018-06-13 RX ADMIN — CARBOXYMETHYLCELLULOSE SODIUM 1 DROP: 10 GEL OPHTHALMIC at 21:38

## 2018-06-13 RX ADMIN — PROPOFOL 35 MCG/KG/MIN: 10 INJECTION, EMULSION INTRAVENOUS at 06:23

## 2018-06-13 RX ADMIN — CHLORHEXIDINE GLUCONATE 15 ML: 0.12 RINSE ORAL at 21:07

## 2018-06-13 RX ADMIN — MINERAL OIL AND WHITE PETROLATUM: 150; 830 OINTMENT OPHTHALMIC at 21:05

## 2018-06-13 RX ADMIN — CARBOXYMETHYLCELLULOSE SODIUM 1 DROP: 10 GEL OPHTHALMIC at 01:29

## 2018-06-13 ASSESSMENT — PULMONARY FUNCTION TESTS
PIF_VALUE: 19
PIF_VALUE: 18
PIF_VALUE: 20
PIF_VALUE: 25
PIF_VALUE: 22
PIF_VALUE: 21
PIF_VALUE: 21

## 2018-06-13 NOTE — PROGRESS NOTES
PROGRESS NOTE  S:28 yrs Patient  admitted on 6/6/2018 with GI BLEED . Today she remains intubated and sedated. She was re-intubated yesterday. She had a decline in Hgb yesterday but responded appropriately. Exam:   Vitals:    06/13/18 1507   BP:    Pulse: 99   Resp: 14   Temp:    SpO2: 92%      General appearance: intubated and sedated  HEENT: Oropharynx clear, no lesions and Neck supple with midline trachea  Neck: no adenopathy and supple, symmetrical, trachea midline  Lungs: clear to auscultation bilaterally  Heart: regular rate and rhythm, S1, S2 normal, no murmur, click, rub or gallop  Abdomen: distended, NT, BS hypoactive  Extremities: extremities normal, atraumatic, no cyanosis or edema     Medications: Reviewed    Labs:  CBC:   Recent Labs      06/12/18   1331  06/12/18 2120  06/13/18   0530   WBC  64.9*  54.9*  62.4*   HGB  6.7*  10.3*  10.7*   HCT  21.0*  31.0*  32.1*   MCV  89.9  88.3  87.2   PLT  101*  77*  74*     BMP:   Recent Labs      06/12/18   1835  06/13/18   0020  06/13/18   0530  06/13/18   1412   NA  138  137  135*  137   K  5.0  4.8  5.4*  5.0   CL  100  101  99  101   CO2  21  27  26  26   PHOS  6.5*  3.5  3.7   --    BUN  60*  45*  40*  45*   CREATININE  2.4*  1.4*  1.1  1.6*     LIVER PROFILE:   Recent Labs      06/12/18   0515   AST  571*   ALT  1,173*   LIPASE  82.0*   PROT  6.4   BILIDIR  5.8*   BILITOT  7.3*   ALKPHOS  591*     PT/INR:   Recent Labs      06/12/18   1331  06/12/18 2120  06/13/18   0530   INR  1.68*  1.48*  1.34*     Impression: 34year old female with history of HTN, Chronic hepatitis C, seizure do, ESRD, IVDU, endocarditis, admitted with sepsis and PEA c/b shock liver, septic emboli, DIC and GIbleed    Recommendation:  1. Continue supportive care  2. Observe for signs of bleeding  3. Ascites most likely due to passive congestion  4. Will consider diagnostic paracentesis once she is more stable  5.  Start tubefeeds per NG  6. Monitor LFTs      Be Muse MD  4:01 PM 6/13/2018

## 2018-06-13 NOTE — PROGRESS NOTES
D: Bedside report received from Page LEBRON, all current drips, treatments including CRRT, skin issues, and plan of care were reviewed by both RN's, care transferred.

## 2018-06-13 NOTE — PROGRESS NOTES
Progress Note    Admit Date:  6/6/2018    Subjective:  34 y.o. female who is seen for follow up abscess on the right foot. Patient currently in ICU and intubated.     Past Medical History:        Diagnosis Date    Asthma     Cardiac arrest ([facility] 06/08/2018    Depression     Endocarditis     ESRD (end stage renal disease) on dialysis ([facility]     M/W/F    Hepatitis C antibody positive in blood 03/22/2017    History of blood transfusion     Hypertension     IV drug abuse     Liver disease     MRSA (methicillin resistant staph aureus) culture positive 6/5/18, 03/12/2017    +blood culture     MRSA (methicillin resistant staph aureus) culture positive 07/31/2017    info from [facility] - positive nasal screen    PTSD (post-traumatic stress disorder)     Seizures ([facility]     8/8/2017       Past Surgical History:        Procedure Laterality Date    TONSILLECTOMY      UPPER GASTROINTESTINAL ENDOSCOPY  06/06/2018    Gastric Angiodysplasia       Current Medications:    Current Facility-Administered Medications: vancomycin (VANCOCIN) 1,250 mg in dextrose 5 % 250 mL IVPB, 1,250 mg, Intravenous, Q24H  meropenem (MERREM) 1 g in sodium chloride 0.9 % 100 mL IVPB, 1 g, Intravenous, Q12H  chlorhexidine (PERIDEX) 0.12 % solution 15 mL, 15 mL, Mouth/Throat, BID  fentaNYL (SUBLIMAZE) injection 50 mcg, 50 mcg, Intravenous, Q1H PRN  midazolam (VERSED) injection 2 mg, 2 mg, Intravenous, Q1H PRN  propofol 1000 MG/100ML injection, 10 mcg/kg/min, Intravenous, Continuous  carboxymethylcellulose PF (THERATEARS) 1 % ophthalmic gel 1 drop, 1 drop, Both Eyes, Q4H **AND** lubrifresh P.M. (artificial tears) ophthalmic ointment, , Both Eyes, Q4H  fentaNYL (SUBLIMAZE) 1,000 mcg in sodium chloride 0.9 % 100 mL infusion, 25 mcg/hr, Intravenous, Continuous  methylPREDNISolone sodium (SOLU-MEDROL) injection 40 mg, 40 mg, Intravenous, Daily  0.9 % sodium chloride bolus, 250 mL, Intravenous, Once  dextrose 50 % solution 25 g, 25 g, Intravenous, Exam:    General Appearance: alert and oriented to person, place and time, well developed and well- nourished, in no acute distress  Skin: warm and dry, no rash or erythema  Head: normocephalic and atraumatic  Eyes: pupils equal, round, and reactive to light, extraocular eye movements intact, conjunctivae normal  ENT: tympanic membrane, external ear and ear canal normal bilaterally, nose without deformity, nasal mucosa and turbinates normal without polyps  Neck: supple and non-tender without mass, no thyromegaly or thyroid nodules, no cervical lymphadenopathy  Pulmonary/Chest: clear to auscultation bilaterally- no wheezes, rales or rhonchi, normal air movement, no respiratory distress  Cardiovascular: normal rate, regular rhythm, normal S1 and S2, no murmurs, rubs, clicks, or gallops, distal pulses intact, no carotid bruits  Abdomen: soft, non-tender, non-distended, normal bowel sounds, no masses or organomegaly    DP/PT palpable right foot  Dorsal aspect right 1st MPJ with small amount of thick purulence mixed with heme expressed with compression of the area. No malodor noted. Reduced erythema and edema of the foot noted. No increase in skin temperature noted. No crepitus noted.         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

## 2018-06-13 NOTE — ONCOLOGY
Medications: chlorhexidine (PERIDEX) 0.12 % solution 15 mL, 15 mL, Mouth/Throat, BID  fentaNYL (SUBLIMAZE) injection 50 mcg, 50 mcg, Intravenous, Q1H PRN  midazolam (VERSED) injection 2 mg, 2 mg, Intravenous, Q1H PRN  propofol 1000 MG/100ML injection, 10 mcg/kg/min, Intravenous, Continuous  carboxymethylcellulose PF (THERATEARS) 1 % ophthalmic gel 1 drop, 1 drop, Both Eyes, Q4H **AND** lubrifresh P.M. (artificial tears) ophthalmic ointment, , Both Eyes, Q4H  fentaNYL (SUBLIMAZE) 1,000 mcg in sodium chloride 0.9 % 100 mL infusion, 25 mcg/hr, Intravenous, Continuous  meropenem (MERREM) 500 mg IVPB minibag, 500 mg, Intravenous, Q12H  methylPREDNISolone sodium (SOLU-MEDROL) injection 40 mg, 40 mg, Intravenous, Daily  0.9 % sodium chloride bolus, 250 mL, Intravenous, Once  dextrose 50 % solution 25 g, 25 g, Intravenous, PRN  prismaSATE BGK 4/2.5 dialysis solution, , Dialysis, Continuous  prismaSATE BGK 4/2.5 dialysis solution, , Dialysis, Continuous  prismaSATE BGK 4/2.5 dialysis solution, , Dialysis, Continuous  magnesium sulfate 1 g in dextrose 5% 100 mL IVPB, 1 g, Intravenous, PRN  calcium gluconate 1 g in dextrose 5 % 100 mL IVPB, 1 g, Intravenous, PRN **OR** calcium gluconate 2 g in dextrose 5 % 100 mL IVPB, 2 g, Intravenous, PRN **OR** calcium gluconate 3 g in dextrose 5 % 100 mL IVPB, 3 g, Intravenous, PRN **OR** calcium gluconate 4 g in dextrose 5 % 100 mL IVPB, 4 g, Intravenous, PRN  sodium phosphate 6 mmol in dextrose 5 % 250 mL IVPB, 6 mmol, Intravenous, PRN **OR** sodium phosphate 12 mmol in dextrose 5 % 250 mL IVPB, 12 mmol, Intravenous, PRN **OR** sodium phosphate 18 mmol in dextrose 5 % 500 mL IVPB, 18 mmol, Intravenous, PRN **OR** sodium phosphate 24 mmol in dextrose 5 % 500 mL IVPB, 24 mmol, Intravenous, PRN  ondansetron (ZOFRAN) injection 4 mg, 4 mg, Intravenous, Q6H PRN  0.9 % sodium chloride bolus, 250 mL, Intravenous, Once  pantoprazole (PROTONIX) injection 40 mg, 40 mg, Intravenous, BID  glucose (GLUTOSE) 40 % oral gel 15 g, 15 g, Oral, PRN  dextrose 50 % solution 12.5 g, 12.5 g, Intravenous, PRN  glucagon injection 1 mg, 1 mg, Intramuscular, PRN  insulin lispro (HUMALOG) injection vial 0-6 Units, 0-6 Units, Subcutaneous, Q4H  norepinephrine (LEVOPHED) 16 mg in dextrose 5 % 250 mL infusion, 2 mcg/min, Intravenous, Continuous  lidocaine 1 % injection 5 mL, 5 mL, Intradermal, Once  sodium chloride flush 0.9 % injection 10 mL, 10 mL, Intravenous, 2 times per day  sodium chloride flush 0.9 % injection 10 mL, 10 mL, Intravenous, PRN  vasopressin 20 Units in dextrose 5 % 100 mL infusion, 0.04 Units/min, Intravenous, Continuous  dextrose 5 % solution, 100 mL/hr, Intravenous, PRN  sevelamer (RENVELA) tablet 800 mg, 800 mg, Oral, TID WC  sodium chloride flush 0.9 % injection 10 mL, 10 mL, Intravenous, 2 times per day  sodium chloride flush 0.9 % injection 10 mL, 10 mL, Intravenous, PRN  darbepoetin mei-polysorbate (ARANESP) injection 100 mcg, 100 mcg, Intravenous, Weekly  acetaminophen (TYLENOL) tablet 650 mg, 650 mg, Oral, Q6H PRN  heparin (porcine) injection 4,100 Units, 4,100 Units, Intercatheter, PRN  promethazine (PHENERGAN) tablet 25 mg, 25 mg, Oral, Q6H PRN  hydrOXYzine (VISTARIL) capsule 50 mg, 50 mg, Oral, Q8H PRN    Imaging:     Xr Foot Right (2 Views)  Result Date: 6/7/2018  Lucency through the medial sesamoid most consistent with bipartite medial sesamoid in the absence of history of trauma. This can be associated with pain.      Nm Gi Blood Loss  Result Date: 6/6/2018  No evidence of active GI bleeding during acquisition. RECOMMENDATIONS: If the patient shows hemodynamic signs of an active bleed in the next 20 hours, additional images can be acquired.      Xr Chest Portable  Result Date: 6/8/2018  Multifocal pneumonia.      Cta Abdomen Pelvis W Wo Contrast  Result Date: 6/6/2018  No active gastrointestinal hemorrhage is identified. No vascular abnormality is appreciated.  Right lower lobe pneumonia as

## 2018-06-13 NOTE — PROGRESS NOTES
Internal Medicine ICU Progress Note    34year old white female who was admitted for GI bleed. Had an EGD     BC positive for strep pneumonia, staph aureus and gram-negative salazar. Echocardiogram showed tricuspid valve endocarditis. She's had this before. CT chest showed septic emboli and possible splenic infarct. -transferred to ICU  For PEA arrest  ,intubated resuscitated with fluids      - Off levophed  Vaso weaned =  Extubated to  RA     reintubated  , CRRT initiated       Pt seen  on vent.  Sedated, no distress      Invasive Lines: Tunneled dialysis catheter, two peripheral IV       Recent Labs      18   1730  18   0530   PHART  7.346*  7.441   [id number]  33.2*  35.8   PO2ART  84.9  90.4       MV Settings:  Vent Mode: AC/VC Rate Set: 14 bmp/Vt Ordered: 450 mL/ /FiO2 : 30 %    IV:   propofol 35 mcg/kg/min (18 0623)    fentaNYL (SUBLIMAZE) infusion 35 mcg/hr (18 1123)    prismaSATE BGK 4/2.5 1,000 mL/hr (18 2332)    prismaSATE BGK 4/2.5 500 mL/hr (18 0414)    prismaSATE BGK 4/2.5 500 mL/hr (18 0425)    norepinephrine Stopped (18 1702)    vasopressin infusion Stopped (18 0620)    dextrose         Vitals:  Temp  Av.9 °F (36.6 °C)  Min: 96.2 °F (35.7 °C)  Max: 99 °F (37.2 °C)  Pulse  Av.5  Min: 69  Max: 135  BP  Min: 92/51  Max: 136/80  SpO2  Av.3 %  Min: 95 %  Max: 100 %  FiO2   Av.5 %  Min: 30 %  Max: 60 %  Patient Vitals for the past 4 hrs:   BP Temp Temp src Pulse Resp SpO2 Height Weight   18 0800 (!) 105/58 97 °F (36.1 °C) Oral 100 18 95 % - -   18 0730 (!) 102/59 - - 99 19 96 % - -   18 0713 - - - 99 15 96 % - -   18 0700 118/81 - - 95 15 97 % - -   18 0600 97/60 97.7 °F (36.5 °C) Oral 90 15 98 % 5' 2\" (1.575 m) 142 lb 3.2 oz (64.5 kg)   18 0530 100/63 - - 85 14 99 % - -   18 0500 102/61 - - 85 14 99 % - -       CVP: CVP (Mean): 5 mmHg      Intake/Output Summary (Last 24 hours) at 06/13/18 0833  Last data filed at 06/13/18 0800   Gross per 24 hour   Intake          1751.43 ml   Output              786 ml   Net           965.43 ml       EXAM:  General: young female on vent. sedated  Eyes: PERRL. No sclera icterus. No conjunctiva injected. ENT: No discharge. Neck: Trachea midline. Normal thyroid. Oral ETT and OG noted  Resp:  zulma air entry,  No rhonchi. No dullness on percussion. CV:tachy . Regular rhythm. No edema. No JVD. Palpable pedal pulses. GI:   Non-distended. No masses. Still with abd tenderness   No organmegaly. Normal bowel sounds. No hernia. Skin: Warm and dry. No nodule on exposed extremities. No rash on exposed extremities. appears jaundiced  Lymph: No cervical LAD. No supraclavicular LAD. M/S: . Right first MTP wound is drained    Psych: sedated    Medications:  Scheduled Meds:   chlorhexidine  15 mL Mouth/Throat BID    carboxymethylcellulose PF  1 drop Both Eyes Q4H    And    lubrifresh P.M.    Both Eyes Q4H    meropenem  500 mg Intravenous Q12H    methylPREDNISolone  40 mg Intravenous Daily    sodium chloride  250 mL Intravenous Once    sodium chloride  250 mL Intravenous Once    pantoprazole  40 mg Intravenous BID    insulin lispro  0-6 Units Subcutaneous Q4H    lidocaine 1 % injection  5 mL Intradermal Once    sodium chloride flush  10 mL Intravenous 2 times per day    sevelamer  800 mg Oral TID WC    sodium chloride flush  10 mL Intravenous 2 times per day    darbepoetin emi-polysorbate  100 mcg Intravenous Weekly       PRN Meds:  fentanNYL, midazolam, dextrose, magnesium sulfate, calcium gluconate IVPB **OR** calcium gluconate IVPB **OR** calcium gluconate IVPB **OR** calcium gluconate IVPB, sodium phosphate IVPB **OR** sodium phosphate IVPB **OR** sodium phosphate IVPB **OR** sodium phosphate IVPB, ondansetron, glucose, dextrose, glucagon (rDNA), sodium chloride flush, dextrose, sodium chloride flush, acetaminophen, heparin (porcine), promethazine, hydrOXYzine    Results:  CBC:   Recent Labs      06/12/18   1331  06/12/18 2120 06/13/18   0530   WBC  64.9*  54.9*  62.4*   HGB  6.7*  10.3*  10.7*   HCT  21.0*  31.0*  32.1*   MCV  89.9  88.3  87.2   PLT  101*  77*  74*     BMP:   Recent Labs      06/12/18   1835  06/13/18   0020  06/13/18   0530   NA  138  137  135*   K  5.0  4.8  5.4*   CL  100  101  99   CO2  21  27  26   PHOS  6.5*  3.5  3.7   BUN  60*  45*  40*   CREATININE  2.4*  1.4*  1.1     LIVER PROFILE:   Recent Labs      06/12/18   0515   AST  571*   ALT  1,173*   LIPASE  82.0*   BILIDIR  5.8*   BILITOT  7.3*   ALKPHOS  591*     PT/INR:   Recent Labs      06/12/18   1331  06/12/18 2120 06/13/18   0530   PROTIME  19.1*  16.9*  15.3*   INR  1.68*  1.48*  1.34*     APTT:   No results for input(s): APTT in the last 72 hours. Cultures:    BC: S aureus, S pneumonia and gram neg salazar    Assessment:    Active Problems:    IVDU (intravenous drug user)    Endocarditis of tricuspid valve    HCAP (healthcare-associated pneumonia)    Upper GI bleed    Sepsis due to Streptococcus pneumoniae (HCC)    Moderate protein-calorie malnutrition (HCC)    PEA (Pulseless electrical activity) ([facility]    DIC (disseminated intravascular coagulation) ([facility]    Acute respiratory failure with hypoxia (HCC)  Resolved Problems:    * No resolved hospital problems. *         Plan:    # septic shock- sec to bacteremia /septic emboli  Sustained PEA arrest , requiring multiple pressors, intubation   Now improved  - weaned off pressors- levo. Vasopressin  - monitor BP in ICU  - wbc keeps going up     #Acute resp failure  - s.p PEA arrest, off vent today 6/11- now back on vent for increasing abd distention and pain   - pulm managing  - ct chest with no new findings except for septic emboli and cavitory lesions  - ct abd with no acute findings as well    #  Acute upper GI bleed.    s/p  EGD -Three angiodysplastic spots at the junction of the body and  the fundus

## 2018-06-13 NOTE — PROGRESS NOTES
[facility]       Progress Note           ID   34y.o. year old female who we are seeing in consultation for ESRD. Sub/interval history  CRRT stopped on 6/11 PM but needed to be resumed on 6/12 afternoon; CRRT running well    Hb dropped to 6.7 on 6/12 and has increased to 10 after transfusions  Wbc 62 k  re intubated on 6/12  Extubated on 6/11    ROS: UTO  PSFH: No visitor    Scheduled Meds:   vancomycin  1,250 mg Intravenous Q24H    meropenem (MERREM) IVPB  1 g Intravenous Q12H    chlorhexidine  15 mL Mouth/Throat BID    carboxymethylcellulose PF  1 drop Both Eyes Q4H    And    lubrifresh P.M.    Both Eyes Q4H    methylPREDNISolone  40 mg Intravenous Daily    sodium chloride  250 mL Intravenous Once    sodium chloride  250 mL Intravenous Once    pantoprazole  40 mg Intravenous BID    insulin lispro  0-6 Units Subcutaneous Q4H    lidocaine 1 % injection  5 mL Intradermal Once    sodium chloride flush  10 mL Intravenous 2 times per day    sevelamer  800 mg Oral TID WC    sodium chloride flush  10 mL Intravenous 2 times per day    darbepoetin emi-polysorbate  100 mcg Intravenous Weekly     Continuous Infusions:   propofol 35 mcg/kg/min (06/13/18 0623)    fentaNYL (SUBLIMAZE) infusion 50 mcg/hr (06/13/18 0935)    prismaSATE BGK 4/2.5 1,000 mL/hr (06/12/18 2332)    prismaSATE BGK 4/2.5 500 mL/hr (06/13/18 0414)    prismaSATE BGK 4/2.5 500 mL/hr (06/13/18 0425)    norepinephrine Stopped (06/09/18 1702)    vasopressin infusion Stopped (06/11/18 0620)    dextrose       PRN Meds:.fentanNYL, midazolam, dextrose, magnesium sulfate, calcium gluconate IVPB **OR** calcium gluconate IVPB **OR** calcium gluconate IVPB **OR** calcium gluconate IVPB, sodium phosphate IVPB **OR** sodium phosphate IVPB **OR** sodium phosphate IVPB **OR** sodium phosphate IVPB, ondansetron, glucose, dextrose, glucagon (rDNA), sodium chloride flush, dextrose, sodium chloride flush, acetaminophen, heparin

## 2018-06-13 NOTE — PLAN OF CARE
Problem: Restraint Use - Nonviolent/Non-Self-Destructive Behavior:  Goal: Absence of restraint-related injury  Absence of restraint-related injury   Outcome: Ongoing  Patient shall remain free from restraint-related injury while in restraints

## 2018-06-13 NOTE — PROGRESS NOTES
Pulmonary & Critical Care Medicine ICU Progress Note  CC: Acute respiratory failure with hypoxemia    Events of Last 24 hours: Patient intubated and placed back on mechanical ventilation. She had CT of head/chest/abdomen. Main new finding was worsening of splenic infarcts/septic emboli and moderate ascites. Invasive Lines:   IV Line: L scl TLC 6/8. R scl vasc cath-chronic    MV:  6/8/18  Vent Mode: AC/VC Rate Set: 14 bmp/Vt Ordered: 450 mL/ /FiO2 : 30 %  Recent Labs      06/12/18   1730  06/13/18   0530   PHART  7.346*  7.441   [id number]  33.2*  35.8   PO2ART  84.9  90.4       IV:   propofol 35 mcg/kg/min (06/12/18 1918)    fentaNYL (SUBLIMAZE) infusion 35 mcg/hr (06/12/18 1123)    prismaSATE BGK 4/2.5 1,000 mL/hr (06/12/18 2332)    prismaSATE BGK 4/2.5 500 mL/hr (06/13/18 0414)    prismaSATE BGK 4/2.5 500 mL/hr (06/13/18 0425)    sodium chloride      norepinephrine Stopped (06/09/18 1702)    vasopressin infusion Stopped (06/11/18 0620)    dextrose         EXAM:  BP 97/60   Pulse 90   Temp 97.7 °F (36.5 °C) (Oral)   Resp 15   Ht 5' 2\" (1.575 m)   Wt 142 lb 3.2 oz (64.5 kg)   SpO2 98%   BMI 26.01 kg/m²  on vent    CVP: CVP (Mean): 5 mmHg    Intake/Output Summary (Last 24 hours) at 06/13/18 0619  Last data filed at 06/13/18 0600   Gross per 24 hour   Intake          1750.43 ml   Output              562 ml   Net          1188.43 ml     General: Mild distress. Normocephalic, atraumatic. Eyes: PERRL. No sclera icterus. No conjunctival injection. ENT: No discharge. Pharynx clear. Neck: Trachea midline. Normal thyroid. Resp: + accessory muscle use. No crackles. No wheezing. No rhonchi. No dullness on percussion. CV: tachy rate. Regular rhythm. No mumur or rub. No edema. GI: Not tender. +distended. No masses. No organmegaly. reduced bowel sounds. No hernia. Skin: Warm and dry. No nodule on exposed extremities. No rash on exposed extremities. Lymph: No cervical LAD. No supraclavicular LAD. M/S: No cyanosis. No joint deformity. No clubbing. Neuro:  sedated, moves all extremities; she does not follow commands    Medications:   chlorhexidine  15 mL Mouth/Throat BID    carboxymethylcellulose PF  1 drop Both Eyes Q4H    And    lubrifresh P.M. Both Eyes Q4H    meropenem  500 mg Intravenous Q12H    methylPREDNISolone  40 mg Intravenous Daily    sodium chloride  250 mL Intravenous Once    sodium chloride  250 mL Intravenous Once    pantoprazole  40 mg Intravenous BID    insulin lispro  0-6 Units Subcutaneous Q4H    lidocaine 1 % injection  5 mL Intradermal Once    sodium chloride flush  10 mL Intravenous 2 times per day    sevelamer  800 mg Oral TID WC    sodium chloride flush  10 mL Intravenous 2 times per day    darbepoetin emi-polysorbate  100 mcg Intravenous Weekly     PRN Meds:  fentanNYL, midazolam, dextrose, magnesium sulfate, calcium gluconate IVPB **OR** calcium gluconate IVPB **OR** calcium gluconate IVPB **OR** calcium gluconate IVPB, sodium phosphate IVPB **OR** sodium phosphate IVPB **OR** sodium phosphate IVPB **OR** sodium phosphate IVPB, ondansetron, glucose, dextrose, glucagon (rDNA), sodium chloride flush, dextrose, sodium chloride flush, acetaminophen, heparin (porcine), promethazine, hydrOXYzine    Results:  CBC:   Recent Labs      06/12/18   1331  06/12/18   2120  06/13/18   0530   WBC  64.9*  54.9*  62.4*   HGB  6.7*  10.3*  10.7*   HCT  21.0*  31.0*  32.1*   MCV  89.9  88.3  87.2   PLT  101*  77*  74*     BMP:   Recent Labs      06/12/18   0515   06/12/18   1835  06/13/18   0020  06/13/18   0530   NA  134*   < >  138  137  135*   K  4.7   < >  5.0  4.8  5.4*   CL  99   < >  100  101  99   CO2  26   < >  21  27  26   PHOS  3.6   --   6.5*  3.5   --    BUN  39*   < >  60*  45*  40*   CREATININE  1.7*   < >  2.4*  1.4*  1.1    < > = values in this interval not displayed.      LIVER PROFILE:   Recent Labs      06/12/18   0515   AST  571*   ALT  1,173*   LIPASE  82.0* BILIDIR  5.8*   BILITOT  7.3*   ALKPHOS  591*     PT/INR:   Recent Labs      06/12/18   1331  06/12/18   2120  06/13/18   0530   PROTIME  19.1*  16.9*  15.3*   INR  1.68*  1.48*  1.34*     APTT:   No results for input(s): APTT in the last 72 hours. UA:No results for input(s): NITRITE, COLORU, PHUR, LABCAST, WBCUA, RBCUA, MUCUS, TRICHOMONAS, YEAST, BACTERIA, CLARITYU, SPECGRAV, LEUKOCYTESUR, UROBILINOGEN, BILIRUBINUR, BLOODU, GLUCOSEU, AMORPHOUS in the last 72 hours. Invalid input(s): Nithya Waite    Cultures:  6/5/18 Blood cx with MRSA, Strep pneumoniae and Enterobacter Cloacae  6/6/18 blood cx ngtd  6/7 Blood cx NGTD  6/12/18 blood NGTD      Films:  CXR: low lung volumes; stable bilateral airspace opacities    Head CT- no acute disease    Chest/abd CT: Development of moderate diffuse ascites since the prior study.  Mosaic appearance of the spleen either due to multiple splenic infarcts or possible  splenic abscesses.  Question of perihepatic and subhepatic adhesions. Chronic gallbladder disease.  Bibasilar pneumonias and pulmonary nodules  consistent with septic emboli.  Mild anasarca. Assessment:  · Septic shock secondary to tricuspid valve endocarditis  · Acute respiratory failure with hypoxemia  · Probable DIC  · Polymicrobial bacteremia with MRSA strep pneumonia and Enterobacter  · Splenic infarcts/septic emboli  · Ascites  · Acute recurrent Tricuspid valve endocarditis  · Cavitary pneumonia secondary to septic emboli  · Possible right foot MTP septic joint  · IV drug abuse active  · Upper GI bleed secondary to angiodysplasia status post cauterization  · Acute liver failure secondary to shock  · End-stage renal disease on hemodialysis  · Hepatitis C infection  · Severe leukocytosis- worsening      Plan:  · Mechanical ventilation per my orders. The ventilator was adjusted by me at the bedside for unstable, life threatening respiratory failure. Wean oxygen as tolerated.    · Sedation/analgesia with propofol

## 2018-06-13 NOTE — PROGRESS NOTES
Pt more restless and coughing frequently despite suctioning.  See MAR for rate changes on Diprivan and fentanyl gtts Archie Cameron RN

## 2018-06-13 NOTE — PROGRESS NOTES
Pt coughing frequently and more restless after care rounds completed.  Diprivan gtt increased to prior rate (45 mcg) Fentanyl gtt increased to 50 mcg /hr Cassie Cosme RN

## 2018-06-13 NOTE — PROGRESS NOTES
18 1451 117/69 - - 102 24 100 % - -   18 1400 119/68 - - 104 22 100 % - -   18 1300 119/71 - - 103 20 100 % - -   18 1200 136/80 - - 106 19 100 % - -   18 1127 - - - 107 22 100 % - -   18 1100 126/80 98.3 °F (36.8 °C) Axillary 105 24 99 % - -   18 1000 136/80 - - 115 20 100 % - -     Temp (24hrs), Av.9 °F (36.6 °C), Min:96.2 °F (35.7 °C), Max:99 °F (37.2 °C)          EXAM:  General:intubated mech vent. afebrile  HEENT: conj icterus  Neck: s nodes, fairly supple      LUNGS: Resp minimally labored; fairly clear      CV: RR c gd II syst M lower LSB     ABD: soft, seems tender to palpation. Mildly distended; BS somewhat hypoactive. No mass or omegaly     EXT: without edema; Abscess R great toe has been I and D'd and most of the erythema and swelling has resolved  Skin: no rash or other skin stigmata of endocarditis; pt is icteric    Vancomycin random level this am 15 (low because CRRT was restarted)    Data Review:    Lab Results   Component Value Date    WBC 62.4 (HH) 2018    HGB 10.7 (L) 2018    HCT 32.1 (L) 2018    MCV 87.2 2018    PLT 74 (L) 2018     Lab Results   Component Value Date    CREATININE 1.1 2018    BUN 40 (H) 2018     (L) 2018    K 5.4 (H) 2018    CL 99 2018    CO2 26 2018     Lab Results   Component Value Date    ALT 1,173 (H) 2018     (H) 2018    ALKPHOS 591 (H) 2018    BILITOT 7.3 (H) 2018       MICRO: 6/5 blood cultures both growing MRSA. One culture also growing strep while the other culture is also growing Enterobacter sensitive to cefepime. More recent blood cultures negative. / blood cultures x 2 negative but  blood culture appears positive for MRSA once again. Several blood cultures drawn after  are still negative    IMAGING: CXR multifocal airspace disease presumably related to septic pulmonary emboli, improving based on today's CXR.  CT

## 2018-06-13 NOTE — CARE COORDINATION
INTERDISCIPLINARY PLAN OF CARE CONFERENCE    Date/Time: 6/13/2018 3:59 PM  Completed by: Fleet Stairs, Case Management      Patient Name:  Tres Damon  YOB: 1990  Admitting Diagnosis: GI BLEED     Admit Date/Time:  6/6/2018 12:35 AM    Chart reviewed. Interdisciplinary team met to discuss patient progress and discharge plans. Disciplines included Case Management, Nursing, and Dietitian. Current Status: Remains Intubated, CRRT    Anticipated Discharge Date: TBD  Expected D/C Disposition:  Pending  Confirmed plan with patient and/or family   Discharge Plan Comments: Chart reviewed, noted pt remains on vent. CM will continue to follow pt.            Home O2 in place on admit: No  Pt informed of need to bring portable home O2 tank on day of discharge for nursing to connect prior to leaving:  Not Indicated  Verbalized agreement/Understanding:  Not Indicated

## 2018-06-13 NOTE — PROGRESS NOTES
06/13/18 1850   Vent Information   Vent Type 840   Vent Mode AC/VC   Vt Ordered 450 mL   Rate Set 14 bmp   Peak Flow 50 L/min   Pressure Support 0 cmH20   FiO2  30 %   Sensitivity 3   PEEP/CPAP 5   I Time/ I Time % 0 s   Humidification Source Heated wire   Humidification Temp Measured 37   Circuit Condensation Drained   Vent Patient Data   Vt Exhaled 478 mL   Rate Measured 19 br/min   Minute Volume 9.02 Liters   Peak Inspiratory Pressure 18 cmH2O   I:E Ratio 1:2.20   High Peep/I Pressure 0   Mean Airway Pressure 9.3 cmH20   Plateau Pressure 16 [id number]   Static Compliance 43 mL/cmH2O   Dynamic Compliance 37 mL/cmH2O   Spontaneous Breathing Trial (SBT) RT Doc   Pulse 95   SpO2 95 %   Cough/Sputum   Sputum How Obtained Suctioned;Endotracheal   Cough Non-productive   Breath Sounds   Right Upper Lobe Diminished   Right Middle Lobe Diminished   Right Lower Lobe Diminished   Left Upper Lobe Diminished   Left Lower Lobe Diminished   Additional Respiratory  Assessments   Resp 19   Position Semi-Lucero's   Oral Care Completed? Yes   Oral Care Mouth suctioned   Subglottic Suction Done?  Yes   Alarm Settings   High Pressure Alarm 40 cmH2O   Low Minute Volume Alarm 2.5 L/min   Apnea (secs) 20 secs   High Respiratory Rate 40 br/min   Low Exhaled Vt  250 mL   ETT (adult)   Placement Date/Time: 06/12/18 1500     Secured at 21 cm   Measured From [address] By [employer]   Site Condition Dry

## 2018-06-13 NOTE — PROGRESS NOTES
D: Patient alarming the vent. Trying to move, o2 sat. Dropped into the low 70's then started to go back up to the 90's. A: In-lined suctioned patient several times with minimal results, now patient is at 100%.

## 2018-06-13 NOTE — PROGRESS NOTES
A: Following hospital protocol and sterile technique, Patient's VC was connected to CRRT. Current treatment, prescription, and access was was reviewed by two RN's, treatment started.

## 2018-06-13 NOTE — PROGRESS NOTES
Care rounds completed with Dr Candelaria Quinn and multidisciplinary team.  Reviewed labs, meds, VS (temp/HR/RR), I/O's, assessment, & plan of care for today. See progress note & new orders for details.  Ibeth Quiles RN

## 2018-06-14 PROBLEM — D73.5 SPLENIC INFARCTION: Status: ACTIVE | Noted: 2018-06-14

## 2018-06-14 LAB
ANION GAP SERPL CALCULATED.3IONS-SCNC: 12 MMOL/L (ref 3–16)
ANION GAP SERPL CALCULATED.3IONS-SCNC: 9 MMOL/L (ref 3–16)
ANION GAP SERPL CALCULATED.3IONS-SCNC: 9 MMOL/L (ref 3–16)
BASE EXCESS ARTERIAL: 0.7 MMOL/L (ref -3–3)
BLOOD BANK REF CASE: NORMAL
BLOOD CULTURE, ROUTINE: NORMAL
BUN BLDV-MCNC: 40 MG/DL (ref 7–20)
BUN BLDV-MCNC: 40 MG/DL (ref 7–20)
BUN BLDV-MCNC: 42 MG/DL (ref 7–20)
C DIFFICILE TOXIN, EIA: NORMAL
CALCIUM IONIZED: 1.11 MMOL/L (ref 1.12–1.32)
CALCIUM IONIZED: 1.13 MMOL/L (ref 1.12–1.32)
CALCIUM IONIZED: 1.15 MMOL/L (ref 1.12–1.32)
CALCIUM SERPL-MCNC: 8.8 MG/DL (ref 8.3–10.6)
CALCIUM SERPL-MCNC: 8.9 MG/DL (ref 8.3–10.6)
CALCIUM SERPL-MCNC: 8.9 MG/DL (ref 8.3–10.6)
CARBOXYHEMOGLOBIN ARTERIAL: 1.6 % (ref 0–1.5)
CHLORIDE BLD-SCNC: 101 MMOL/L (ref 99–110)
CHLORIDE BLD-SCNC: 102 MMOL/L (ref 99–110)
CHLORIDE BLD-SCNC: 98 MMOL/L (ref 99–110)
CO2: 26 MMOL/L (ref 21–32)
CO2: 26 MMOL/L (ref 21–32)
CO2: 27 MMOL/L (ref 21–32)
CREAT SERPL-MCNC: 1.2 MG/DL (ref 0.6–1.1)
CREAT SERPL-MCNC: 1.2 MG/DL (ref 0.6–1.1)
CREAT SERPL-MCNC: 1.3 MG/DL (ref 0.6–1.1)
CULTURE, RESPIRATORY: ABNORMAL
CULTURE, RESPIRATORY: ABNORMAL
FIBRINOGEN: 229 MG/DL (ref 200–397)
GFR AFRICAN AMERICAN: 60
GFR AFRICAN AMERICAN: >60
GFR AFRICAN AMERICAN: >60
GFR NON-AFRICAN AMERICAN: 49
GFR NON-AFRICAN AMERICAN: 54
GFR NON-AFRICAN AMERICAN: 54
GLUCOSE BLD-MCNC: 119 MG/DL (ref 70–99)
GLUCOSE BLD-MCNC: 125 MG/DL (ref 70–99)
GLUCOSE BLD-MCNC: 127 MG/DL (ref 70–99)
GLUCOSE BLD-MCNC: 127 MG/DL (ref 70–99)
GLUCOSE BLD-MCNC: 130 MG/DL (ref 70–99)
GLUCOSE BLD-MCNC: 139 MG/DL (ref 70–99)
GLUCOSE BLD-MCNC: 149 MG/DL (ref 70–99)
GRAM STAIN RESULT: ABNORMAL
HCO3 ARTERIAL: 24.8 MMOL/L (ref 21–29)
HCT VFR BLD CALC: 25.8 % (ref 36–48)
HCT VFR BLD CALC: 26.1 % (ref 36–48)
HEMATOLOGY PATH CONSULT: NO
HEMOGLOBIN, ART, EXTENDED: 8.4 G/DL (ref 12–16)
HEMOGLOBIN: 8.5 G/DL (ref 12–16)
HEMOGLOBIN: 8.5 G/DL (ref 12–16)
INR BLD: 1.11 (ref 0.86–1.14)
MAGNESIUM: 2.8 MG/DL (ref 1.8–2.4)
MAGNESIUM: 2.9 MG/DL (ref 1.8–2.4)
MCH RBC QN AUTO: 29.6 PG (ref 26–34)
MCHC RBC AUTO-ENTMCNC: 33 G/DL (ref 31–36)
MCV RBC AUTO: 89.4 FL (ref 80–100)
METHEMOGLOBIN ARTERIAL: 0.6 %
O2 CONTENT ARTERIAL: 12 ML/DL
O2 SAT, ARTERIAL: 97.8 %
O2 THERAPY: ABNORMAL
ORGANISM: ABNORMAL
PCO2 ARTERIAL: 37.3 MMHG (ref 35–45)
PDW BLD-RTO: 15.8 % (ref 12.4–15.4)
PERFORMED ON: ABNORMAL
PH ARTERIAL: 7.44 (ref 7.35–7.45)
PH VENOUS: 7.35 (ref 7.35–7.45)
PH VENOUS: 7.36 (ref 7.35–7.45)
PH VENOUS: 7.44 (ref 7.35–7.45)
PHOSPHORUS: 2.6 MG/DL (ref 2.5–4.9)
PHOSPHORUS: 2.7 MG/DL (ref 2.5–4.9)
PLATELET # BLD: 75 K/UL (ref 135–450)
PMV BLD AUTO: 9.7 FL (ref 5–10.5)
PO2 ARTERIAL: 99.4 MMHG (ref 75–108)
POTASSIUM SERPL-SCNC: 4.5 MMOL/L (ref 3.5–5.1)
POTASSIUM SERPL-SCNC: 4.6 MMOL/L (ref 3.5–5.1)
POTASSIUM SERPL-SCNC: 4.7 MMOL/L (ref 3.5–5.1)
PROTHROMBIN TIME: 12.6 SEC (ref 9.8–13)
RBC # BLD: 2.88 M/UL (ref 4–5.2)
SODIUM BLD-SCNC: 133 MMOL/L (ref 136–145)
SODIUM BLD-SCNC: 137 MMOL/L (ref 136–145)
SODIUM BLD-SCNC: 140 MMOL/L (ref 136–145)
TCO2 ARTERIAL: 25.9 MMOL/L
TRIGL SERPL-MCNC: 419 MG/DL (ref 0–150)
VANCOMYCIN RANDOM: 20.8 UG/ML
WBC # BLD: 62 K/UL (ref 4–11)

## 2018-06-14 PROCEDURE — 36600 WITHDRAWAL OF ARTERIAL BLOOD: CPT

## 2018-06-14 PROCEDURE — 71045 X-RAY EXAM CHEST 1 VIEW: CPT

## 2018-06-14 PROCEDURE — 99291 CRITICAL CARE FIRST HOUR: CPT | Performed by: INTERNAL MEDICINE

## 2018-06-14 PROCEDURE — 94003 VENT MGMT INPAT SUBQ DAY: CPT

## 2018-06-14 PROCEDURE — 80048 BASIC METABOLIC PNL TOTAL CA: CPT

## 2018-06-14 PROCEDURE — 36415 COLL VENOUS BLD VENIPUNCTURE: CPT

## 2018-06-14 PROCEDURE — 99233 SBSQ HOSP IP/OBS HIGH 50: CPT | Performed by: INTERNAL MEDICINE

## 2018-06-14 PROCEDURE — 85018 HEMOGLOBIN: CPT

## 2018-06-14 PROCEDURE — C9113 INJ PANTOPRAZOLE SODIUM, VIA: HCPCS

## 2018-06-14 PROCEDURE — 82803 BLOOD GASES ANY COMBINATION: CPT

## 2018-06-14 PROCEDURE — 84100 ASSAY OF PHOSPHORUS: CPT

## 2018-06-14 PROCEDURE — 36592 COLLECT BLOOD FROM PICC: CPT

## 2018-06-14 PROCEDURE — 83735 ASSAY OF MAGNESIUM: CPT

## 2018-06-14 PROCEDURE — 90945 DIALYSIS ONE EVALUATION: CPT

## 2018-06-14 PROCEDURE — 94750 CHARGE CONVERSION: CPT

## 2018-06-14 PROCEDURE — 94762 N-INVAS EAR/PLS OXIMTRY CONT: CPT

## 2018-06-14 PROCEDURE — 85384 FIBRINOGEN ACTIVITY: CPT

## 2018-06-14 PROCEDURE — 80202 ASSAY OF VANCOMYCIN: CPT

## 2018-06-14 PROCEDURE — 87449 NOS EACH ORGANISM AG IA: CPT

## 2018-06-14 PROCEDURE — 9990 CHARGE CONVERSION

## 2018-06-14 PROCEDURE — 85610 PROTHROMBIN TIME: CPT

## 2018-06-14 PROCEDURE — 85027 COMPLETE CBC AUTOMATED: CPT

## 2018-06-14 PROCEDURE — 82330 ASSAY OF CALCIUM: CPT

## 2018-06-14 PROCEDURE — 84478 ASSAY OF TRIGLYCERIDES: CPT

## 2018-06-14 PROCEDURE — 87324 CLOSTRIDIUM AG IA: CPT

## 2018-06-14 PROCEDURE — 85014 HEMATOCRIT: CPT

## 2018-06-14 RX ORDER — METHADONE HYDROCHLORIDE 5 MG/1
5 TABLET ORAL EVERY 8 HOURS SCHEDULED
Status: DISCONTINUED | OUTPATIENT
Start: 2018-06-14 | End: 2018-06-15

## 2018-06-14 RX ORDER — SODIUM CHLORIDE 9 MG/ML
INJECTION, SOLUTION INTRAVENOUS
Status: COMPLETED
Start: 2018-06-14 | End: 2018-06-14

## 2018-06-14 RX ORDER — SODIUM CHLORIDE 9 MG/ML
INJECTION, SOLUTION INTRAVENOUS
Status: DISPENSED
Start: 2018-06-14 | End: 2018-06-14

## 2018-06-14 RX ADMIN — PANTOPRAZOLE SODIUM 40 MG: 40 INJECTION, POWDER, LYOPHILIZED, FOR SOLUTION INTRAVENOUS at 21:15

## 2018-06-14 RX ADMIN — PROPOFOL 50 MCG/KG/MIN: 10 INJECTION, EMULSION INTRAVENOUS at 09:45

## 2018-06-14 RX ADMIN — CARBOXYMETHYLCELLULOSE SODIUM 1 DROP: 10 GEL OPHTHALMIC at 02:00

## 2018-06-14 RX ADMIN — CARBOXYMETHYLCELLULOSE SODIUM 1 DROP: 10 GEL OPHTHALMIC at 14:30

## 2018-06-14 RX ADMIN — SODIUM CHLORIDE 250 ML: 9 INJECTION, SOLUTION INTRAVENOUS at 02:00

## 2018-06-14 RX ADMIN — CARBOXYMETHYLCELLULOSE SODIUM 1 DROP: 10 GEL OPHTHALMIC at 06:06

## 2018-06-14 RX ADMIN — METHADONE HYDROCHLORIDE 5 MG: 5 TABLET ORAL at 13:26

## 2018-06-14 RX ADMIN — MINERAL OIL AND WHITE PETROLATUM: 150; 830 OINTMENT OPHTHALMIC at 23:59

## 2018-06-14 RX ADMIN — METHADONE HYDROCHLORIDE 5 MG: 5 TABLET ORAL at 21:26

## 2018-06-14 RX ADMIN — CARBOXYMETHYLCELLULOSE SODIUM 1 DROP: 10 GEL OPHTHALMIC at 21:07

## 2018-06-14 RX ADMIN — Medication 10 ML: at 08:51

## 2018-06-14 RX ADMIN — MIDAZOLAM HYDROCHLORIDE 2 MG: 1 INJECTION INTRAMUSCULAR; INTRAVENOUS at 21:58

## 2018-06-14 RX ADMIN — MINERAL OIL AND WHITE PETROLATUM: 150; 830 OINTMENT OPHTHALMIC at 12:12

## 2018-06-14 RX ADMIN — MIDAZOLAM HYDROCHLORIDE 2 MG: 1 INJECTION INTRAMUSCULAR; INTRAVENOUS at 05:40

## 2018-06-14 RX ADMIN — CHLORHEXIDINE GLUCONATE 15 ML: 0.12 RINSE ORAL at 08:51

## 2018-06-14 RX ADMIN — PROPOFOL 50 MCG/KG/MIN: 10 INJECTION, EMULSION INTRAVENOUS at 01:36

## 2018-06-14 RX ADMIN — Medication 10 ML: at 21:26

## 2018-06-14 RX ADMIN — METHYLPREDNISOLONE SODIUM SUCCINATE 40 MG: 40 INJECTION, POWDER, LYOPHILIZED, FOR SOLUTION INTRAMUSCULAR; INTRAVENOUS at 08:51

## 2018-06-14 RX ADMIN — MINERAL OIL AND WHITE PETROLATUM: 150; 830 OINTMENT OPHTHALMIC at 01:31

## 2018-06-14 RX ADMIN — MINERAL OIL AND WHITE PETROLATUM: 150; 830 OINTMENT OPHTHALMIC at 21:09

## 2018-06-14 RX ADMIN — Medication 250 ML: at 23:58

## 2018-06-14 RX ADMIN — CHLORHEXIDINE GLUCONATE 15 ML: 0.12 RINSE ORAL at 21:11

## 2018-06-14 RX ADMIN — MIDAZOLAM HYDROCHLORIDE 2 MG: 1 INJECTION INTRAMUSCULAR; INTRAVENOUS at 23:59

## 2018-06-14 RX ADMIN — MINERAL OIL AND WHITE PETROLATUM: 150; 830 OINTMENT OPHTHALMIC at 04:18

## 2018-06-14 RX ADMIN — MINERAL OIL AND WHITE PETROLATUM: 150; 830 OINTMENT OPHTHALMIC at 08:51

## 2018-06-14 RX ADMIN — MINERAL OIL AND WHITE PETROLATUM: 150; 830 OINTMENT OPHTHALMIC at 15:58

## 2018-06-14 RX ADMIN — PROPOFOL 50 MCG/KG/MIN: 10 INJECTION, EMULSION INTRAVENOUS at 21:07

## 2018-06-14 RX ADMIN — PANTOPRAZOLE SODIUM 40 MG: 40 INJECTION, POWDER, LYOPHILIZED, FOR SOLUTION INTRAVENOUS at 08:51

## 2018-06-14 RX ADMIN — HYDROXYZINE PAMOATE 50 MG: 50 CAPSULE ORAL at 21:58

## 2018-06-14 RX ADMIN — CARBOXYMETHYLCELLULOSE SODIUM 1 DROP: 10 GEL OPHTHALMIC at 09:34

## 2018-06-14 RX ADMIN — Medication 10 ML: at 21:16

## 2018-06-14 RX ADMIN — CARBOXYMETHYLCELLULOSE SODIUM 1 DROP: 10 GEL OPHTHALMIC at 17:17

## 2018-06-14 ASSESSMENT — PULMONARY FUNCTION TESTS
PIF_VALUE: 18
PIF_VALUE: 18
PIF_VALUE: 16
PIF_VALUE: 17
PIF_VALUE: 17
PIF_VALUE: 16

## 2018-06-14 ASSESSMENT — PAIN SCALES - GENERAL
PAINLEVEL_OUTOF10: 0
PAINLEVEL_OUTOF10: 0
PAINLEVEL_OUTOF10: 4

## 2018-06-14 NOTE — PROGRESS NOTES
D: CRRT continues without difficulty, patient is tolerating well, all vital signs are within normal limits, will continue to monitor closely.

## 2018-06-14 NOTE — PROGRESS NOTES
Initial exam completed- See doc flowsheet for assessment findings. Pt resting quietly in bed weakly opens eyes to stimulation of exam and she does not follow commands. She starts to cough more frequently once awake and suctioned for moderate amts of lt brown/ tan sputum. Diprivan at 50 mcg and Fentanyl at 100 mcg. All lines and monitoring devices are in place. CRRT running well and behind on fluid removal so removal rate increased until ordered balance reached- see flow sheet. Vitals and SpO2 stable. Call light is within easy reach. Plan of care and goals reviewed.  Timmy Croft RN

## 2018-06-14 NOTE — PROGRESS NOTES
A: Following hospital policy and using sterile technique, connected CRRT lines to access lines of patient via right vas cath, CRRT started without difficulty all vital signs are within limits.

## 2018-06-14 NOTE — PROGRESS NOTES
Pt is resting quietly and no further changes noted in exam. Vitals and SpO2 stable. All lines and monitoring devices remain in place. Pt repositioned in bed. Continue current plan of care.  Romero Lucas RN

## 2018-06-14 NOTE — PROGRESS NOTES
06/14/18 1916   Vent Information   Vt Ordered 450 mL   Rate Set 14 bmp   Peak Flow 50 L/min   Pressure Support 0 cmH20   FiO2  30 %   Sensitivity 3   PEEP/CPAP 5   I Time/ I Time % 0 s   Humidification Source Heated wire   Humidification Temp Measured 37   Circuit Condensation Drained   Vent Patient Data   Vt Exhaled 483 mL   Rate Measured 14 br/min   Minute Volume 6.77 Liters   Peak Inspiratory Pressure 16 cmH2O   I:E Ratio 1:3.40   High Peep/I Pressure 0   Mean Airway Pressure 7.9 cmH20   Plateau Pressure 14 [id number]   Static Compliance 53 mL/cmH2O   Dynamic Compliance 43 mL/cmH2O   Spontaneous Breathing Trial (SBT) RT Doc   Pulse 96   SpO2 98 %   Cough/Sputum   Sputum How Obtained Endotracheal   Cough Productive   Sputum Amount Small   Sputum Color Brown   Tenacity Thick   Breath Sounds   Right Upper Lobe Diminished   Right Middle Lobe Diminished   Right Lower Lobe Diminished   Left Upper Lobe Diminished   Additional Respiratory  Assessments   Resp 14   Position Semi-Lucero's   Alarm Settings   High Pressure Alarm 40 cmH2O   Low Minute Volume Alarm 2.5 L/min   High Respiratory Rate 40 br/min   Patient Observation   Observations 7.5 ETT, 21 lip   ETT (adult)   Placement Date/Time: 06/12/18 1500     Secured at 21 cm   Measured From Lips   ET Placement Left   Secured By [employer]

## 2018-06-14 NOTE — PROGRESS NOTES
Pt continues to rest quietly and no further changes noted in exam. Pt now passing small amt of liquid stool per rectal tube and specimen sent for C diff. Vitals and SpO2 stable. All lines and monitoring devices remain in place. Pt repositioned in bed. Continue current plan of care.   Romero Lucas RN

## 2018-06-14 NOTE — PROGRESS NOTES
06/14/18 1053   Vent Information   Vent Type 840   Vent Mode AC/VC   Vt Ordered 450 mL   Rate Set 14 bmp   Peak Flow 50 L/min   Pressure Support 0 cmH20   FiO2  30 %   Sensitivity 3   PEEP/CPAP 5   I Time/ I Time % 0 s   Cuff Pressure (cm H2O) 30 cm H2O   Humidification Source Heated wire   Humidification Temp 36   Humidification Temp Measured 36   Circuit Condensation Drained   Vent Patient Data   Vt Exhaled 481 mL   Rate Measured 14 br/min   Minute Volume 6.68 Liters   Peak Inspiratory Pressure 17 cmH2O   I:E Ratio 1:3.40   High Peep/I Pressure 0   Mean Airway Pressure 8.3 cmH20   Plateau Pressure 18 [id number]   Spontaneous Breathing Trial (SBT) RT Doc   Pulse 83   SpO2 100 %   Cough/Sputum   Sputum How Obtained Endotracheal   Cough Productive   Sputum Amount Small   Sputum Color Brown;Creamy   Tenacity Thick   Breath Sounds   Right Upper Lobe Diminished   Right Middle Lobe Diminished   Right Lower Lobe Diminished   Left Upper Lobe Diminished   Left Lower Lobe Diminished   Additional Respiratory  Assessments   Resp 14   Position Semi-Lucero's   Oral Care Completed? Yes   Oral Care Mouth suctioned   Subglottic Suction Done?  Yes   Alarm Settings   High Pressure Alarm 40 cmH2O   Low Minute Volume Alarm 2.5 L/min   High Respiratory Rate 40 br/min   ETT (adult)   Placement Date/Time: 06/12/18 1500     Secured at 21 cm   Measured From [address] [place] [address],[address] By [employer]

## 2018-06-14 NOTE — PROGRESS NOTES
ID Follow-up NOTE    CC: tricuspid valve endocarditis    Subjective:     Patient gives no history, is intubated, on vent. Objective:     Patient Vitals for the past 24 hrs:   BP Temp Temp src Pulse Resp SpO2 Height Weight   18 1000 113/66 - - 88 16 100 % - -   18 0900 (!) 129/57 - - 99 15 98 % - -   18 0800 (!) 101/54 - - 85 14 99 % - -   18 0714 - - - 86 14 100 % - -   18 0700 (!) 94/55 - - 80 14 98 % - -   18 0600 (!) 92/46 - - 84 14 99 % - -   18 0500 103/70 - - 82 14 99 % 5' 2\" (1.575 m) 132 lb 15 oz (60.3 kg)   18 0400 (!) 96/48 97.9 °F (36.6 °C) Oral 84 14 100 % - -   18 0300 106/66 - - 86 14 98 % - -   18 0200 (!) 102/55 - - 91 14 98 % - -   18 0100 123/63 - - 98 14 96 % - -   18 0000 114/61 98.3 °F (36.8 °C) Oral 95 18 94 % - -   18 2315 - - - 88 22 95 % - -   18 2300 (!) 99/57 - - 87 22 95 % - -   18 2200 (!) 98/52 - - 87 21 95 % - -   18 2100 (!) 103/57 - - 92 20 95 % - -   18 2000 (!) 102/51 96.5 °F (35.8 °C) Temporal 95 20 94 % - -   18 1900 (!) 144/54 - - 93 19 95 % - -   18 1850 - - - 95 19 95 % - -   18 1800 124/60 - - 96 19 92 % - -   18 1700 (!) 106/49 - - 93 17 93 % - -   18 1600 105/63 97.1 °F (36.2 °C) - 99 15 92 % - -   18 1507 - - - 99 14 92 % - -   18 1500 108/65 - - 101 14 92 % - -   18 1400 112/64 - - 104 15 99 % - -   18 1300 113/61 - - 103 14 99 % - -   18 1200 114/66 97 °F (36.1 °C) Temporal 112 14 100 % - -   18 1111 - - - 107 15 95 % - -   18 1100 (!) 103/58 - - 107 16 95 % - -     Temp (24hrs), Av.4 °F (36.3 °C), Min:96.5 °F (35.8 °C), Max:98.3 °F (36.8 °C)          EXAM:  General:intubated mech vent.  Afebrile; responds to verbal stimuli a bit with eye opening; grimaces a little with abdominal palpation   HEENT: conj icterus less marked  Neck: s nodes, fairly supple      LUNGS: Resp minimally labored; fairly clear      CV: RR c gd I syst M lower LSB     ABD: soft, less tender to palpation. No mass or omegaly     EXT: without edema; Abscess R great toe mostly resolved  Skin: no rash or other skin stigmata of endocarditis; icterus seems less intense. Vancomycin random level this am 20  Data Review:    Lab Results   Component Value Date    WBC 62.0 (HH) 06/14/2018    HGB 8.5 (L) 06/14/2018    HCT 25.8 (L) 06/14/2018    MCV 89.4 06/14/2018    PLT 75 (L) 06/14/2018     Lab Results   Component Value Date    CREATININE 1.2 (H) 06/14/2018    BUN 40 (H) 06/14/2018     06/14/2018    K 4.6 06/14/2018     06/14/2018    CO2 26 06/14/2018     Lab Results   Component Value Date    ALT 1,173 (H) 06/12/2018     (H) 06/12/2018    ALKPHOS 591 (H) 06/12/2018    BILITOT 7.3 (H) 06/12/2018       MICRO: 6/5 blood cultures both growing MRSA. One culture also growing strep while the other culture is also growing Enterobacter sensitive to cefepime. More recent blood cultures negative. 6/6 blood cultures x 2 negative but 6/7 blood culture appears positive for MRSA once again. Several blood cultures drawn after 6/7 are still negative    IMAGING: CXR multifocal airspace disease presumably related to septic pulmonary emboli, improving. CT head: NAD; CT chest: iniltrates and cavitary pulmonary nodules consistent with septic emboli. CT abd and pelvis: ascites, splenic abscess or infarct. Assessment:     Active Problems:    IVDU (intravenous drug user)    Endocarditis of tricuspid valve    HCAP (healthcare-associated pneumonia)    Upper GI bleed    Sepsis due to Streptococcus pneumoniae (HCC)    Moderate protein-calorie malnutrition (HCC)    PEA (Pulseless electrical activity) (HCC)    DIC (disseminated intravascular coagulation) ([address]    Acute respiratory failure with hypoxia (HCC)    Splenic infarction  Resolved Problems:    * No resolved hospital problems.  *  Tricuspid valve endocarditis (L sided endocarditis not ruled out) due to MRSA and perhaps Strep and Enterobacter which were also isolated from admission blood cultures. Strep was nonviable for on plate and definitive ID could not be done. Ths is not Strep pneumoniae based on BCID. Would think this illness is mostly due to MRSA. The role of the streptococcus and the Enterobacter is less clear. Last positive blood culture was drawn on 6/7: I would think the patient's bacteremia is cleared. Septic pulmonary emboli due to TV endocarditis; L sided endocarditis not ruled out. possible splenic infarct or abscess. Abscess of R great toe s/p I and D appears to be nearly resolved. s/p arrest x2.    Icterus and liver enzyme abmnormalities presumably related to hypoperfusion associated with hypotension  Leukemoid reaction continues  Fibrinogen improved   Plan:   Continue vancomycin 1250 mg q24h  Random vanc level in am  Continue meropenem  Stool for C diff

## 2018-06-14 NOTE — ONCOLOGY
Contrast  Result Date: 6/6/2018  No active gastrointestinal hemorrhage is identified. No vascular abnormality is appreciated. Right lower lobe pneumonia as well as cavitary nodules. Septic emboli are consideration. Compared with 11/26/2017, there is waxing and waning airspace disease and pulmonary nodules. Organizing pneumonia is an alternative possibility. Hepatosplenomegaly. Hepatomegaly is similar to 11/26/2017. Splenomegaly is new. Hypodensity in the spleen, suggestive of acute to subacute embolic infarction. Small amount of ascites, nonspecific. Hyperdensity of the gallbladder wall. This may represent mural calcification. Etiology is unclear. There is variable association with gallbladder wall calcification and risk for gallbladder carcinoma. Stable cardiomegaly. Lumbar spine and visualized osseous structures appear intact. Impression:     1. Bacteremia/Endocarditis  2. HCAP  3. Severe Anemia  4. ESRD on dialysis  5. Hep C  6. IVDA  7. S/P Code Blue/PEA    Assessment and Plan:     1.  Leukocytosis, anemia, thrombocytopenia  - suspect multifactorial  - baseline ACD with ESRD and Hep C - d/w nephrology, is chronically on Procrit  - suspect acute drop from GIB given hematemesis and rectal bleeding noted at the time of EGD  - EGD revealed angiodysplasia which was cauterized  - sepsis also likely contributing  - hemolysis Was a concern, but her haptoglobin is normal which makes this less of a concern    Disseminated intravascular coagulation  -Her fibrinogen and platelet count are stable  -She does not need blood products today    Anemia:  -She is slowly drifting down and this could be residual from her bleed  -No transfusion today  -cold agglutin titer is negative          Hattie Ureña MD

## 2018-06-14 NOTE — PROGRESS NOTES
06/14/18 0714   Vent Information   Vent Type 840   Vent Mode AC/VC   Vt Ordered 450 mL   Rate Set 14 bmp   Peak Flow 50 L/min   Pressure Support 0 cmH20   FiO2  30 %   Sensitivity 3   PEEP/CPAP 5   I Time/ I Time % 0 s   Vent Patient Data   Vt Exhaled 38 mL   Rate Measured 17 br/min   Minute Volume 9.18 Liters   Peak Inspiratory Pressure 16 cmH2O   I:E Ratio 2.50:1   High Peep/I Pressure 0   Mean Airway Pressure 9.5 cmH20   Plateau Pressure 18 [id number]   Static Compliance 42 mL/cmH2O   Dynamic Compliance 36 mL/cmH2O   Spontaneous Breathing Trial (SBT) RT Doc   Pulse 86   SpO2 100 %   Cough/Sputum   Sputum How Obtained Endotracheal   Cough Productive   Sputum Amount Small   Sputum Color Brown   Tenacity Thick   Breath Sounds   Right Upper Lobe Diminished   Right Middle Lobe Diminished   Right Lower Lobe Diminished   Left Upper Lobe Diminished   Left Lower Lobe Diminished   Additional Respiratory  Assessments   Resp 14   Position Semi-Ulcero's   Oral Care Completed? Yes   Oral Care Mouth suctioned   Subglottic Suction Done?  Yes   Alarm Settings   High Pressure Alarm 40 cmH2O   Low Minute Volume Alarm 2.5 L/min   High Respiratory Rate 40 br/min   Patient Observation   Observations 7.5 ett 21 lip

## 2018-06-14 NOTE — PROGRESS NOTES
A: Assessment completed and documented, discussed plan of care with patient's mother who agrees, patient is currently sedated and intubated, is unable to understand, bed exit alarm is on for added safety.

## 2018-06-14 NOTE — PROGRESS NOTES
06/14/18 1536   Vent Information   Vent Type 840   Vent Mode AC/VC   Vt Ordered 450 mL   Rate Set 14 bmp   Peak Flow 50 L/min   Pressure Support 0 cmH20   FiO2  30 %   Sensitivity 3   PEEP/CPAP 5   I Time/ I Time % 0 s   Vent Patient Data   Vt Exhaled 525 mL   Rate Measured 14 br/min   Minute Volume 6.63 Liters   Peak Inspiratory Pressure 18 cmH2O   I:E Ratio 1:3.40   High Peep/I Pressure 0   Mean Airway Pressure 8.4 cmH20   Spontaneous Breathing Trial (SBT) RT Doc   Pulse 89   SpO2 98 %   Cough/Sputum   Sputum How Obtained Endotracheal   Cough Productive   Sputum Amount Small   Sputum Color Brown   Tenacity Thick   Breath Sounds   Right Upper Lobe Diminished   Right Middle Lobe Diminished   Right Lower Lobe Diminished   Left Upper Lobe Diminished   Left Lower Lobe Diminished   Additional Respiratory  Assessments   Resp 14   Position Semi-Lucero's   Oral Care Completed? Yes   Oral Care Mouth suctioned   Subglottic Suction Done?  Yes   Alarm Settings   High Pressure Alarm 40 cmH2O   Low Minute Volume Alarm 2.5 L/min   High Respiratory Rate 40 br/min   ETT (adult)   Placement Date/Time: 06/12/18 1500     Secured at 21 cm   Measured From Lips   ET Placement Left   Secured By Commercial tube cordova

## 2018-06-14 NOTE — PROGRESS NOTES
[facility]       Progress Note           ID   34y.o. year old female who we are seeing in consultation for ESRD. Sub/interval history    Seen and examined while in CRRT, filter clotted last night and blood couldn't be returned; getting  UF close to -50 ml/h  Now has diarrhea    CRRT stopped on 6/11 PM but needed to be resumed on 6/12 afternoon; CRRT running well  Wbc 62 k  re intubated on 6/12  Extubated on 6/11    ROS: UTO  PSFH: No visitor    Scheduled Meds:   methadone  5 mg Oral 3 times per day    vancomycin  1,250 mg Intravenous Q24H    meropenem (MERREM) IVPB  1 g Intravenous Q12H    chlorhexidine  15 mL Mouth/Throat BID    carboxymethylcellulose PF  1 drop Both Eyes Q4H    And    lubrifresh P.M.    Both Eyes Q4H    methylPREDNISolone  40 mg Intravenous Daily    sodium chloride  250 mL Intravenous Once    sodium chloride  250 mL Intravenous Once    pantoprazole  40 mg Intravenous BID    insulin lispro  0-6 Units Subcutaneous Q4H    lidocaine 1 % injection  5 mL Intradermal Once    sodium chloride flush  10 mL Intravenous 2 times per day    sodium chloride flush  10 mL Intravenous 2 times per day    darbepoetin emi-polysorbate  100 mcg Intravenous Weekly     Continuous Infusions:   sodium chloride      propofol 50 mcg/kg/min (06/14/18 0136)    fentaNYL (SUBLIMAZE) infusion 100 mcg/hr (06/14/18 0232)    prismaSATE BGK 4/2.5 1,000 mL/hr (06/14/18 4582)    prismaSATE BGK 4/2.5 500 mL/hr (06/14/18 0022)    prismaSATE BGK 4/2.5 500 mL/hr (06/14/18 0023)    norepinephrine Stopped (06/09/18 1702)    vasopressin infusion Stopped (06/11/18 0620)    dextrose       PRN Meds:.fentanNYL, midazolam, dextrose, magnesium sulfate, calcium gluconate IVPB **OR** calcium gluconate IVPB **OR** calcium gluconate IVPB **OR** calcium gluconate IVPB, sodium phosphate IVPB **OR** sodium phosphate IVPB **OR** sodium phosphate IVPB **OR** sodium phosphate IVPB, ondansetron, glucose, throughout the lungs   suspected to be from septic emboli.  The emboli may be from recurrent   endocarditis, IV drug abuse, or infected DVT.  Scattered focal pneumonia in   the lingula, right middle lobe, and right lower lobe.  Trace bilateral   pleural effusions.  Tip of the endotracheal tube lying near the tee. Moderate ascites seen in the upper abdomen.  Possible multiple splenic   infarcts or abscesses.  Please refer to the dictation of the CT scan of the   abdomen and pelvis.       RECOMMENDATIONS:   Endotracheal tube should be pulled back 1-2 cm.          Assessment/Plan     - End stage renal disease - on HD MWF   -CRRT resumed on 6/12 with hyperkalemia and acidosis   - UF -50 ml/h, cont same      - Acute GI bleed    - plans per GI, ?angiodysplasia of UGI tract   - Weekly KI with HD, transfuse as needed     - MRSA, Streptococcus bacteremia, persistent endocarditis involving TV with septic embolizations to lungs and spleen   - Hx of MSSA bacteremia/endocarditis with severe TR    - on vancomycin, cefepime--> vanc and meropenem- ID following  -DIC  -cardiac arrest times 2   -HCV   -Ascites, splenic infarcts vs abscess  -Acute resp failure , re intubated on 6/12/18      Sree Oates MD  [facility]  Office: [phone]  Fax:    [phone]

## 2018-06-14 NOTE — PROGRESS NOTES
--    --    INR  1.47*   < >  1.48*  1.34*  1.11    < > = values in this interval not displayed. HgBA1c:No results found for: LABA1C    Cultures:     Imaging: xray right foot - no osteomyelitis noted. No gas in soft tissues noted. Physical Exam:    General Appearance: alert and oriented to person, place and time, well developed and well- nourished, in no acute distress  Skin: warm and dry, no rash or erythema  Head: normocephalic and atraumatic  Eyes: pupils equal, round, and reactive to light, extraocular eye movements intact, conjunctivae normal  ENT: tympanic membrane, external ear and ear canal normal bilaterally, nose without deformity, nasal mucosa and turbinates normal without polyps  Neck: supple and non-tender without mass, no thyromegaly or thyroid nodules, no cervical lymphadenopathy  Pulmonary/Chest: clear to auscultation bilaterally- no wheezes, rales or rhonchi, normal air movement, no respiratory distress  Cardiovascular: normal rate, regular rhythm, normal S1 and S2, no murmurs, rubs, clicks, or gallops, distal pulses intact, no carotid bruits  Abdomen: soft, non-tender, non-distended, normal bowel sounds, no masses or organomegaly    DP/PT palpable right foot  Dorsal aspect right 1st MPJ with small amount of purulence mixed with heme expressed with compression of the area. No malodor noted. Minimal erythema in region of 1st MH. No edema in 1st MPJ region. Does not appear to have pain with 1st MPJ ROM. No increase in skin temperature noted. No crepitus noted.         Assessment:  Patient Active Problem List   Diagnosis Code    Bacterial endocarditis I33.0    JAMES (acute kidney injury) ([facility] [address] N17.9    Drug abuse F19.10    Hep C w/o coma, chronic (HCC) B18.2    Swelling R60.9    Endocarditis and heart valve disorders in diseases classified elsewhere I39    IVDU (intravenous drug user) F19.90    MDD (major depressive disorder), recurrent severe, without psychosis ([facility] [address] F33.2    Opiate

## 2018-06-14 NOTE — PROGRESS NOTES
Pulmonary & Critical Care Medicine ICU Progress Note  CC: Acute respiratory failure with hypoxemia    Events of Last 24 hours: Patient tolerating CRR; filter clotted. Invasive Lines:   IV Line: L scl TLC 6/8. R scl vasc cath-chronic    MV:  6/8/18  Vent Mode: AC/VC Rate Set: 14 bmp/Vt Ordered: 450 mL/ /FiO2 : 30 %  Recent Labs      06/13/18   0530  06/14/18   0555   PHART  7.441  7.440   ESN8XSV  35.8  37.3   PO2ART  90.4  99.4       IV:   sodium chloride      propofol 50 mcg/kg/min (06/14/18 0136)    fentaNYL (SUBLIMAZE) infusion 100 mcg/hr (06/14/18 0232)    prismaSATE BGK 4/2.5 1,000 mL/hr (06/14/18 0022)    prismaSATE BGK 4/2.5 500 mL/hr (06/14/18 0022)    prismaSATE BGK 4/2.5 500 mL/hr (06/14/18 0023)    norepinephrine Stopped (06/09/18 1702)    vasopressin infusion Stopped (06/11/18 0620)    dextrose         EXAM:  BP (!) 92/46   Pulse 84   Temp 97.9 °F (36.6 °C) (Oral)   Resp 14   Ht 5' 2\" (1.575 m)   Wt 132 lb 15 oz (60.3 kg)   SpO2 99%   BMI 24.31 kg/m²  on vent    CVP: CVP (Mean): 5 mmHg    Intake/Output Summary (Last 24 hours) at 06/14/18 0623  Last data filed at 06/14/18 0600   Gross per 24 hour   Intake             2485 ml   Output             3084 ml   Net             -599 ml     General: Mild distress. Normocephalic, atraumatic. Eyes: PERRL. No sclera icterus. No conjunctival injection. ENT: No discharge. Pharynx clear. Neck: Trachea midline. Normal thyroid. Resp: + accessory muscle use. No crackles. No wheezing. No rhonchi. No dullness on percussion. CV: tachy rate. Regular rhythm. No mumur or rub. No edema. GI: Not tender. +distended. No masses. No organmegaly. + bowel sounds. No hernia. Skin: Warm and dry. No nodule on exposed extremities. No rash on exposed extremities. Lymph: No cervical LAD. No supraclavicular LAD. M/S: No cyanosis. No joint deformity. No clubbing.    Neuro:  sedated, moves all extremities; she does not follow commands    Medications:   06/14/18   0550   PROTIME  16.9*  15.3*  12.6   INR  1.48*  1.34*  1.11     APTT:   No results for input(s): APTT in the last 72 hours. UA:No results for input(s): NITRITE, COLORU, PHUR, LABCAST, WBCUA, RBCUA, MUCUS, TRICHOMONAS, YEAST, BACTERIA, CLARITYU, SPECGRAV, LEUKOCYTESUR, UROBILINOGEN, BILIRUBINUR, BLOODU, GLUCOSEU, AMORPHOUS in the last 72 hours. Invalid input(s): Billee     Cultures:  6/5/18 Blood cx with MRSA, Strep pneumoniae and Enterobacter Cloacae  6/6/18 blood cx ngtd  6/7 Blood cx NGTD  6/12/18 blood NGTD  6/12 trach asp- MRSA      Films:  CXR 6/14: stable, ETT in place low lung volumes; stable bilateral airspace opacities    Head CT- no acute disease    Chest/abd CT: Development of moderate diffuse ascites since the prior study.  Mosaic appearance of the spleen either due to multiple splenic infarcts or possible  splenic abscesses.  Question of perihepatic and subhepatic adhesions. Chronic gallbladder disease.  Bibasilar pneumonias and pulmonary nodules  consistent with septic emboli.  Mild anasarca. Assessment:  · Septic shock secondary to tricuspid valve endocarditis  · Acute respiratory failure with hypoxemia  · Probable DIC  · Polymicrobial bacteremia with MRSA strep pneumonia and Enterobacter  · Splenic infarcts/septic emboli  · Ascites  · Acute recurrent Tricuspid valve endocarditis  · Cavitary pneumonia secondary to septic emboli  · Possible right foot MTP septic joint  · IV drug abuse active  · Upper GI bleed secondary to angiodysplasia status post cauterization  · Acute liver failure secondary to shock  · End-stage renal disease on hemodialysis  · Hepatitis C infection  · Severe leukocytosis- worsening      Plan:  · Mechanical ventilation per my orders. The ventilator was adjusted by me at the bedside for unstable, life threatening respiratory failure. Wean oxygen as tolerated.    · Sedation/analgesia with propofol gtt and fentanyl gtt  · abx: vanc D10 and merrem D2  · Solu-Medrol

## 2018-06-15 LAB
ALBUMIN SERPL-MCNC: 3.1 G/DL (ref 3.4–5)
ALP BLD-CCNC: 679 U/L (ref 40–129)
ALT SERPL-CCNC: 329 U/L (ref 10–40)
ANION GAP SERPL CALCULATED.3IONS-SCNC: 10 MMOL/L (ref 3–16)
ANION GAP SERPL CALCULATED.3IONS-SCNC: 9 MMOL/L (ref 3–16)
ANION GAP SERPL CALCULATED.3IONS-SCNC: 9 MMOL/L (ref 3–16)
AST SERPL-CCNC: 125 U/L (ref 15–37)
BASE EXCESS ARTERIAL: 1 MMOL/L (ref -3–3)
BILIRUB SERPL-MCNC: 4.9 MG/DL (ref 0–1)
BILIRUBIN DIRECT: 3.9 MG/DL (ref 0–0.3)
BILIRUBIN, INDIRECT: 1 MG/DL (ref 0–1)
BUN BLDV-MCNC: 36 MG/DL (ref 7–20)
BUN BLDV-MCNC: 40 MG/DL (ref 7–20)
BUN BLDV-MCNC: 42 MG/DL (ref 7–20)
CALCIUM IONIZED: 1.14 MMOL/L (ref 1.12–1.32)
CALCIUM IONIZED: 1.14 MMOL/L (ref 1.12–1.32)
CALCIUM IONIZED: 1.17 MMOL/L (ref 1.12–1.32)
CALCIUM SERPL-MCNC: 8.8 MG/DL (ref 8.3–10.6)
CALCIUM SERPL-MCNC: 8.9 MG/DL (ref 8.3–10.6)
CALCIUM SERPL-MCNC: 8.9 MG/DL (ref 8.3–10.6)
CARBOXYHEMOGLOBIN ARTERIAL: 1.7 % (ref 0–1.5)
CHLORIDE BLD-SCNC: 98 MMOL/L (ref 99–110)
CHLORIDE BLD-SCNC: 99 MMOL/L (ref 99–110)
CHLORIDE BLD-SCNC: 99 MMOL/L (ref 99–110)
CO2: 25 MMOL/L (ref 21–32)
CO2: 26 MMOL/L (ref 21–32)
CO2: 26 MMOL/L (ref 21–32)
CREAT SERPL-MCNC: 1.1 MG/DL (ref 0.6–1.1)
CREAT SERPL-MCNC: 1.2 MG/DL (ref 0.6–1.1)
CREAT SERPL-MCNC: 1.4 MG/DL (ref 0.6–1.1)
FIBRINOGEN: 221 MG/DL (ref 200–397)
GFR AFRICAN AMERICAN: 55
GFR AFRICAN AMERICAN: >60
GFR AFRICAN AMERICAN: >60
GFR NON-AFRICAN AMERICAN: 45
GFR NON-AFRICAN AMERICAN: 54
GFR NON-AFRICAN AMERICAN: >60
GLUCOSE BLD-MCNC: 102 MG/DL (ref 70–99)
GLUCOSE BLD-MCNC: 112 MG/DL (ref 70–99)
GLUCOSE BLD-MCNC: 112 MG/DL (ref 70–99)
GLUCOSE BLD-MCNC: 113 MG/DL (ref 70–99)
GLUCOSE BLD-MCNC: 120 MG/DL (ref 70–99)
GLUCOSE BLD-MCNC: 122 MG/DL (ref 70–99)
GLUCOSE BLD-MCNC: 92 MG/DL (ref 70–99)
GLUCOSE BLD-MCNC: 98 MG/DL (ref 70–99)
HCO3 ARTERIAL: 25.6 MMOL/L (ref 21–29)
HCT VFR BLD CALC: 26.1 % (ref 36–48)
HEMOGLOBIN, ART, EXTENDED: 8.7 G/DL (ref 12–16)
HEMOGLOBIN: 8.4 G/DL (ref 12–16)
INR BLD: 1.02 (ref 0.86–1.14)
MAGNESIUM: 2.6 MG/DL (ref 1.8–2.4)
MAGNESIUM: 2.8 MG/DL (ref 1.8–2.4)
MCH RBC QN AUTO: 31.1 PG (ref 26–34)
MCHC RBC AUTO-ENTMCNC: 32.3 G/DL (ref 31–36)
MCV RBC AUTO: 96.3 FL (ref 80–100)
METHEMOGLOBIN ARTERIAL: 0.6 %
O2 CONTENT ARTERIAL: 11 ML/DL
O2 SAT, ARTERIAL: 91.8 %
O2 THERAPY: ABNORMAL
PCO2 ARTERIAL: 40.5 MMHG (ref 35–45)
PDW BLD-RTO: 15.9 % (ref 12.4–15.4)
PERFORMED ON: ABNORMAL
PERFORMED ON: NORMAL
PERFORMED ON: NORMAL
PH ARTERIAL: 7.42 (ref 7.35–7.45)
PH VENOUS: 7.33 (ref 7.35–7.45)
PH VENOUS: 7.35 (ref 7.35–7.45)
PH VENOUS: 7.4 (ref 7.35–7.45)
PHOSPHORUS: 2.3 MG/DL (ref 2.5–4.9)
PHOSPHORUS: 3.2 MG/DL (ref 2.5–4.9)
PLATELET # BLD: 67 K/UL (ref 135–450)
PMV BLD AUTO: 10.1 FL (ref 5–10.5)
PO2 ARTERIAL: 61 MMHG (ref 75–108)
POTASSIUM SERPL-SCNC: 4.3 MMOL/L (ref 3.5–5.1)
POTASSIUM SERPL-SCNC: 4.4 MMOL/L (ref 3.5–5.1)
POTASSIUM SERPL-SCNC: 4.6 MMOL/L (ref 3.5–5.1)
PROTHROMBIN TIME: 11.6 SEC (ref 9.8–13)
RBC # BLD: 2.71 M/UL (ref 4–5.2)
SODIUM BLD-SCNC: 132 MMOL/L (ref 136–145)
SODIUM BLD-SCNC: 134 MMOL/L (ref 136–145)
SODIUM BLD-SCNC: 135 MMOL/L (ref 136–145)
TCO2 ARTERIAL: 26.8 MMOL/L
TOTAL PROTEIN: 7.1 G/DL (ref 6.4–8.2)
TRIGL SERPL-MCNC: 304 MG/DL (ref 0–150)
VANCOMYCIN RANDOM: 21.4 UG/ML
WBC # BLD: 44.7 K/UL (ref 4–11)

## 2018-06-15 PROCEDURE — 85610 PROTHROMBIN TIME: CPT

## 2018-06-15 PROCEDURE — 84100 ASSAY OF PHOSPHORUS: CPT

## 2018-06-15 PROCEDURE — C9113 INJ PANTOPRAZOLE SODIUM, VIA: HCPCS

## 2018-06-15 PROCEDURE — 80202 ASSAY OF VANCOMYCIN: CPT

## 2018-06-15 PROCEDURE — 9990 CHARGE CONVERSION

## 2018-06-15 PROCEDURE — 71045 X-RAY EXAM CHEST 1 VIEW: CPT

## 2018-06-15 PROCEDURE — 85027 COMPLETE CBC AUTOMATED: CPT

## 2018-06-15 PROCEDURE — 82330 ASSAY OF CALCIUM: CPT

## 2018-06-15 PROCEDURE — 99291 CRITICAL CARE FIRST HOUR: CPT | Performed by: INTERNAL MEDICINE

## 2018-06-15 PROCEDURE — 36592 COLLECT BLOOD FROM PICC: CPT

## 2018-06-15 PROCEDURE — 94003 VENT MGMT INPAT SUBQ DAY: CPT

## 2018-06-15 PROCEDURE — 80048 BASIC METABOLIC PNL TOTAL CA: CPT

## 2018-06-15 PROCEDURE — 84478 ASSAY OF TRIGLYCERIDES: CPT

## 2018-06-15 PROCEDURE — 99232 SBSQ HOSP IP/OBS MODERATE 35: CPT | Performed by: INTERNAL MEDICINE

## 2018-06-15 PROCEDURE — 85384 FIBRINOGEN ACTIVITY: CPT

## 2018-06-15 PROCEDURE — 82803 BLOOD GASES ANY COMBINATION: CPT

## 2018-06-15 PROCEDURE — 83735 ASSAY OF MAGNESIUM: CPT

## 2018-06-15 PROCEDURE — 94762 N-INVAS EAR/PLS OXIMTRY CONT: CPT

## 2018-06-15 PROCEDURE — 80076 HEPATIC FUNCTION PANEL: CPT

## 2018-06-15 RX ORDER — METHYLPREDNISOLONE SODIUM SUCCINATE 40 MG/ML
30 INJECTION, POWDER, LYOPHILIZED, FOR SOLUTION INTRAMUSCULAR; INTRAVENOUS DAILY
Status: DISCONTINUED | OUTPATIENT
Start: 2018-06-16 | End: 2018-06-18

## 2018-06-15 RX ORDER — SODIUM CHLORIDE 9 MG/ML
INJECTION, SOLUTION INTRAVENOUS
Status: COMPLETED
Start: 2018-06-15 | End: 2018-06-15

## 2018-06-15 RX ORDER — METHADONE HYDROCHLORIDE 10 MG/1
10 TABLET ORAL EVERY 8 HOURS SCHEDULED
Status: DISCONTINUED | OUTPATIENT
Start: 2018-06-15 | End: 2018-06-28

## 2018-06-15 RX ORDER — DIAZEPAM 10 MG/1
10 TABLET ORAL 3 TIMES DAILY
Status: DISCONTINUED | OUTPATIENT
Start: 2018-06-15 | End: 2018-06-29

## 2018-06-15 RX ADMIN — CARBOXYMETHYLCELLULOSE SODIUM 1 DROP: 10 GEL OPHTHALMIC at 05:37

## 2018-06-15 RX ADMIN — DIAZEPAM 10 MG: 10 TABLET ORAL at 14:25

## 2018-06-15 RX ADMIN — PROPOFOL 50 MCG/KG/MIN: 10 INJECTION, EMULSION INTRAVENOUS at 06:56

## 2018-06-15 RX ADMIN — Medication 10 ML: at 22:23

## 2018-06-15 RX ADMIN — PROPOFOL 50 MCG/KG/MIN: 10 INJECTION, EMULSION INTRAVENOUS at 01:34

## 2018-06-15 RX ADMIN — PANTOPRAZOLE SODIUM 40 MG: 40 INJECTION, POWDER, LYOPHILIZED, FOR SOLUTION INTRAVENOUS at 20:41

## 2018-06-15 RX ADMIN — CARBOXYMETHYLCELLULOSE SODIUM 1 DROP: 10 GEL OPHTHALMIC at 10:01

## 2018-06-15 RX ADMIN — METHADONE HYDROCHLORIDE 10 MG: 10 TABLET ORAL at 14:25

## 2018-06-15 RX ADMIN — MINERAL OIL AND WHITE PETROLATUM: 150; 830 OINTMENT OPHTHALMIC at 07:52

## 2018-06-15 RX ADMIN — Medication 10 ML: at 20:41

## 2018-06-15 RX ADMIN — DIAZEPAM 10 MG: 10 TABLET ORAL at 10:18

## 2018-06-15 RX ADMIN — MINERAL OIL AND WHITE PETROLATUM: 150; 830 OINTMENT OPHTHALMIC at 16:22

## 2018-06-15 RX ADMIN — CARBOXYMETHYLCELLULOSE SODIUM 1 DROP: 10 GEL OPHTHALMIC at 21:46

## 2018-06-15 RX ADMIN — CHLORHEXIDINE GLUCONATE 15 ML: 0.12 RINSE ORAL at 08:15

## 2018-06-15 RX ADMIN — SODIUM CHLORIDE 1000 ML: 9 INJECTION, SOLUTION INTRAVENOUS at 17:32

## 2018-06-15 RX ADMIN — MINERAL OIL AND WHITE PETROLATUM: 150; 830 OINTMENT OPHTHALMIC at 03:58

## 2018-06-15 RX ADMIN — METHADONE HYDROCHLORIDE 5 MG: 5 TABLET ORAL at 05:37

## 2018-06-15 RX ADMIN — METHADONE HYDROCHLORIDE 10 MG: 10 TABLET ORAL at 21:46

## 2018-06-15 RX ADMIN — MIDAZOLAM HYDROCHLORIDE 2 MG: 1 INJECTION INTRAMUSCULAR; INTRAVENOUS at 16:13

## 2018-06-15 RX ADMIN — Medication 10 ML: at 20:42

## 2018-06-15 RX ADMIN — CARBOXYMETHYLCELLULOSE SODIUM 1 DROP: 10 GEL OPHTHALMIC at 14:25

## 2018-06-15 RX ADMIN — PANTOPRAZOLE SODIUM 40 MG: 40 INJECTION, POWDER, LYOPHILIZED, FOR SOLUTION INTRAVENOUS at 08:15

## 2018-06-15 RX ADMIN — CARBOXYMETHYLCELLULOSE SODIUM 1 DROP: 10 GEL OPHTHALMIC at 18:09

## 2018-06-15 RX ADMIN — METHYLPREDNISOLONE SODIUM SUCCINATE 40 MG: 40 INJECTION, POWDER, LYOPHILIZED, FOR SOLUTION INTRAMUSCULAR; INTRAVENOUS at 08:15

## 2018-06-15 RX ADMIN — SODIUM CHLORIDE 1000 ML: 9 INJECTION, SOLUTION INTRAVENOUS at 16:21

## 2018-06-15 RX ADMIN — DIAZEPAM 10 MG: 10 TABLET ORAL at 20:41

## 2018-06-15 RX ADMIN — CARBOXYMETHYLCELLULOSE SODIUM 1 DROP: 10 GEL OPHTHALMIC at 02:08

## 2018-06-15 RX ADMIN — MIDAZOLAM HYDROCHLORIDE 2 MG: 1 INJECTION INTRAMUSCULAR; INTRAVENOUS at 19:39

## 2018-06-15 RX ADMIN — CHLORHEXIDINE GLUCONATE 15 ML: 0.12 RINSE ORAL at 20:41

## 2018-06-15 RX ADMIN — MINERAL OIL AND WHITE PETROLATUM: 150; 830 OINTMENT OPHTHALMIC at 19:57

## 2018-06-15 RX ADMIN — Medication 10 ML: at 19:39

## 2018-06-15 RX ADMIN — Medication 10 ML: at 08:16

## 2018-06-15 RX ADMIN — Medication 10 ML: at 08:15

## 2018-06-15 RX ADMIN — Medication 10 ML: at 14:26

## 2018-06-15 RX ADMIN — MINERAL OIL AND WHITE PETROLATUM: 150; 830 OINTMENT OPHTHALMIC at 12:11

## 2018-06-15 RX ADMIN — MIDAZOLAM HYDROCHLORIDE 2 MG: 1 INJECTION INTRAMUSCULAR; INTRAVENOUS at 03:57

## 2018-06-15 ASSESSMENT — PAIN SCALES - GENERAL
PAINLEVEL_OUTOF10: 0
PAINLEVEL_OUTOF10: 0
PAINLEVEL_OUTOF10: 3
PAINLEVEL_OUTOF10: 0
PAINLEVEL_OUTOF10: 0

## 2018-06-15 ASSESSMENT — PULMONARY FUNCTION TESTS
PIF_VALUE: 19
PIF_VALUE: 16
PIF_VALUE: 15
PIF_VALUE: 24
PIF_VALUE: 30
PIF_VALUE: 21

## 2018-06-15 NOTE — PROGRESS NOTES
Dr. Emmy Lopez at the bedside to examine pt. See progress not for details. No new orders at this time.

## 2018-06-15 NOTE — PROGRESS NOTES
supple      LUNGS: Resp minimally labored; fairly clear      CV: RR c gd I syst M lower LSB     ABD: soft, nontender. No mass or omegaly     EXT: without edema; Abscess R great toe resolved,  Skin: no rash or other skin stigmata of endocarditis; icterus seems less intense. Vancomycin random level this am 20  Data Review:    Lab Results   Component Value Date    WBC 44.7 (HH) 06/15/2018    HGB 8.4 (L) 06/15/2018    HCT 26.1 (L) 06/15/2018    MCV 96.3 06/15/2018    PLT 67 (L) 06/15/2018     Lab Results   Component Value Date    CREATININE 1.2 (H) 06/15/2018    BUN 40 (H) 06/15/2018     (L) 06/15/2018    K 4.3 06/15/2018    CL 98 (L) 06/15/2018    CO2 25 06/15/2018     Lab Results   Component Value Date    ALT 1,173 (H) 06/12/2018     (H) 06/12/2018    ALKPHOS 591 (H) 06/12/2018    BILITOT 7.3 (H) 06/12/2018       MICRO: 6/5 blood cultures both growing MRSA. One culture also growing strep while the other culture is also growing Enterobacter sensitive to cefepime. More recent blood cultures negative. 6/6 blood cultures x 2 negative but 6/7 blood culture appears positive for MRSA once again. Several blood cultures drawn after 6/7 are still negative    IMAGING: CXR multifocal airspace disease presumably related to septic pulmonary emboli, improving. CT head: NAD; CT chest: iniltrates and cavitary pulmonary nodules consistent with septic emboli. CT abd and pelvis: ascites, splenic abscess or infarct. Assessment:     Active Problems:    IVDU (intravenous drug user)    Endocarditis of tricuspid valve    HCAP (healthcare-associated pneumonia)    Upper GI bleed    Sepsis due to Streptococcus pneumoniae (HCC)    Moderate protein-calorie malnutrition (HCC)    PEA (Pulseless electrical activity) (HCC)    DIC (disseminated intravascular coagulation) ([address]    Acute respiratory failure with hypoxia (HCC)    Splenic infarction  Resolved Problems:    * No resolved hospital problems.  *  Tricuspid valve endocarditis

## 2018-06-15 NOTE — PROGRESS NOTES
Spoke with Dr. Zapata Cart and updated on pt tolerating removal of 75 ml/hr and filter will be going down shortly. Stated to continue CRRT and may stop it tomorrow.

## 2018-06-15 NOTE — PROGRESS NOTES
Shift assessment, completed, see flow sheet. RASS -2. Intubated and sedated on AC # 7.5 ETT, at 22 LL. 14 / 450 /30 %/ 5. , /69 (86), SpO2 95%, RR 14. Respirations are easy, even, and unlabored. Bilateral lung sounds diminished. Moderate amount of thick secretions orally and endotracheally. Pt starts coughing with any stimulus. OG in place at 60, with with TF at 40 mL/hr. LCW CVC WNL with propofol @ 60 mcg/kg/min and fentanyl @ 100 mcg/hr infusing. R CW Vas Cath with CRRT, running smoothly at the moment, removing 50 ml/hr, pt tolerating well. Flexiseal in place for watery brown stool. Bilateral soft wrist restraints in place for pt safety. Lizeth hugger on medium. SCDs in place. Bed in lowest position. Bed alarm on.  Will continue to monitor

## 2018-06-15 NOTE — PROGRESS NOTES
RR increased to 18 and tidal volume decreased to 350 on the vent by Dr. Eusebio Kuhn at the bedside.

## 2018-06-15 NOTE — PROGRESS NOTES
Reassessment completed, see flowsheets. VSS. Pt RASS -3, weaning down and hopefully off propofol now that versed gtt is at 4 mg/hr and fentanyl @ 100 mcg/hr. LCW CVC, WNL and R CW Vas Cath, WNL with CRRT running well with 75 ml/hr removed, pt tolerating well. All ICU monitoring and lines in place.

## 2018-06-15 NOTE — PROGRESS NOTES
Pt coughing and gagging on ET tube. Eyes open off and on. Pt appears uncomfortable. Medicated with Versed 2 mg IVP and Visaril per OG for increased oral secretions.

## 2018-06-15 NOTE — PROGRESS NOTES
06/15/18 1526   Vent Information   Vent Type 840   Vent Mode AC/VC   Vt Ordered 350 mL   Rate Set 18 bmp   Peak Flow 50 L/min   Pressure Support 0 cmH20   FiO2  30 %   Sensitivity 3   PEEP/CPAP 5   I Time/ I Time % 0 s   Humidification Source Heated wire   Humidification Temp 36.7   Circuit Condensation Drained   Vent Patient Data   Vt Exhaled 378 mL   Rate Measured 18 br/min   Minute Volume 6.79 Liters   Peak Inspiratory Pressure 21 cmH2O   I:E Ratio 1:3.40   High Peep/I Pressure 0   Mean Airway Pressure 8.3 cmH20   Spontaneous Breathing Trial (SBT) RT Doc   Pulse 103   SpO2 98 %   Cough/Sputum   Sputum How Obtained Endotracheal   Cough Non-productive   Breath Sounds   Right Upper Lobe Clear   Right Middle Lobe Diminished   Right Lower Lobe Diminished   Left Upper Lobe Clear   Left Lower Lobe Diminished   Additional Respiratory  Assessments   Resp 18   Position Semi-Lucero's   Subglottic Suction Done?  Yes   Alarm Settings   High Pressure Alarm 40 cmH2O   Low Minute Volume Alarm 2.5 L/min   High Respiratory Rate 40 br/min   Patient Observation   Observations #7.5 ETT @ 22 lip line   ETT (adult)   Placement Date/Time: 06/12/18 1500     Secured at 22 cm   Measured From [address] By [employer] tube [employer]   Site Condition Dry

## 2018-06-15 NOTE — PLAN OF CARE
Problem: Risk for Impaired Skin Integrity  Goal: Tissue integrity - skin and mucous membranes  Structural intactness and normal physiological function of skin and  mucous membranes. Outcome: Ongoing  Pt intubated/sedated. Pt with preventative mepilex on coccyx. Healing surgical wound on R foot and abrasion on R buttocks. Pt turned q2h, podus (heel protective boots) on, and pericare provided with incontinent episodes/leaking around flexiseal.     Problem: Bowel Function - Altered:  Goal: Bowel elimination is within specified parameters  Bowel elimination is within specified parameters   Outcome: Ongoing  No bleeding seen. Hbg remain stable around 8.0. Pt with diarrhea, flexiseal in place. Negative for CDiff but pt is receiving TF. Problem: Nutrition  Goal: Optimal nutrition therapy  Outcome: Ongoing  Pt receiving renal formula TF at goal of 40 ml/hr. Pt tolerating well. Problem: Airway Clearance - Ineffective:  Goal: Clear lung sounds  Clear lung sounds   Outcome: Ongoing  As day as gone on, pt lungs now clear in upper lobes (not rhonchorus like earlier). Continues to have thick secretions but less frequently. Pt tolerating vent when not stimulated. When pt stimulated to coughing and gagging. Problem: Infection - Central Venous Catheter-Associated Bloodstream Infection:  Goal: Will show no infection signs and symptoms  Will show no infection signs and symptoms   Outcome: Ongoing  RCW VasCath is WNL and L CW CVC is WNL. No sign of symptoms of infection. Problem: Restraint Use - Nonviolent/Non-Self-Destructive Behavior:  Goal: Absence of restraint indications  Absence of restraint indications   Outcome: Ongoing  Pt remains intubated and sedated, bilateral soft wrist restraints remain in place as pt tries to pull at lines and tubes. Goal: Absence of restraint-related injury  Absence of restraint-related injury   Outcome: Ongoing  Pt remains free from any injury related to restraints.  Will continue to monitor.

## 2018-06-15 NOTE — CARE COORDINATION
INTERDISCIPLINARY PLAN OF CARE CONFERENCE    Date/Time: 6/15/2018 9:17 AM  Completed by: Oliver Rios Case Management      Patient Name:  Ezequiel Lerner  YOB: 1990  Admitting Diagnosis: GI BLEED     Admit Date/Time:  6/6/2018 12:35 AM    Chart reviewed. Interdisciplinary team met to discuss patient progress and discharge plans. Disciplines included Case Management, Nursing, and Dietitian. Current Status:ongoing,monitor labs,vital signs    Anticipated Discharge Date: TBD  Expected D/C Disposition:  Home vrs SNF  Confirmed plan with patient and/or family No  Discharge Plan Comments: Reviewed chart. Pt remains in ICU on CRRT and Mechanical Ventilation. Pt from home with Mom and will need PT/OT evaluation when appropriate. Pt active with HD at [facility] prior to admission. Following.            Home O2 in place on admit: No  Pt informed of need to bring portable home O2 tank on day of discharge for nursing to connect prior to leaving:  Not Indicated  Verbalized agreement/Understanding:  Not Indicated

## 2018-06-15 NOTE — PROGRESS NOTES
Pt agitated and coughing. Pt medicated with Versed 2 mg IVP for restlessness. Pt calms quickly after Versed given.

## 2018-06-15 NOTE — ONCOLOGY
Hematology/Oncology Progress Note       Subjective: The patient remains intubated and sedated. ROS:     Unable to assess     Objective:     Physical examination:     /63   Pulse 101   Temp 98.6 °F (37 °C) (Oral)   Resp 18   Ht 5' 2\" (1.575 m)   Wt 130 lb 8.2 oz (59.2 kg)   SpO2 96%   BMI 23.87 kg/m²     CONSTITUTIONAL: Intubated on CRRT  HEMATOLOGIC/LYMPHATICS:  no cervical lymphadenopathy, no supraclavicular lymphadenopathy, no axillary lymphadenopathy and no inguinal lymphadenopathy  BACK:  Symmetric, no curvature, spinous processes are non-tender on palpation, paraspinous muscles are non-tender on palpation, no costal vertebral tenderness  LUNGS:  Clear to auscultation bilaterally, no crackles or wheezing  CARDIOVASCULAR:  Regular rate and rhythm, normal S1 and S2, no S3 or S4, and no murmur noted  ABDOMEN:  Normal bowel sounds, soft, non-distended, non-tender, no masses palpated, no hepatosplenomegally  MUSCULOSKELETAL:  There is no redness, warmth, or swelling of the joints  NEUROLOGIC:   No focal findings. SKIN:  Scattered bruising and ecchymoses. EXT: without clubbing, cyanosis or edema. Data:     CBC:   Recent Labs      06/15/18   0545  06/14/18   1815  06/14/18   0550   06/13/18   0530   WBC  44.7*   --   62.0*   --   62.4*   HGB  8.4*  8.5*  8.5*   < >  10.7*   HCT  26.1*  26.1*  25.8*   < >  32.1*   MCV  96.3   --   89.4   --   87.2   PLT  67*   --   75*   --   74*    < > = values in this interval not displayed.        BMP:   Lab Results   Component Value Date     06/15/2018    K 4.3 06/15/2018    K 5.9 06/12/2018    CO2 25 06/15/2018    BUN 40 06/15/2018    CREATININE 1.2 06/15/2018    CALCIUM 8.9 06/15/2018    MG 2.80 06/15/2018    TSH 4.47 11/26/2017       HEPATIC:  Lab Results   Component Value Date     06/12/2018    ALT 1,173 06/12/2018    ALKPHOS 591 06/12/2018    PROT 6.4 06/12/2018    BILITOT 7.3 06/12/2018    BILIDIR 5.8 06/12/2018     06/08/2018 Current Medications:     Current Facility-Administered Medications: midazolam (VERSED) 100 mg in dextrose 5 % 100 mL infusion, 4 mg/hr, Intravenous, Continuous  methadone (DOLOPHINE) tablet 10 mg, 10 mg, Oral, 3 times per day  diazepam (VALIUM) tablet 10 mg, 10 mg, Oral, TID  [START ON 6/16/2018] methylPREDNISolone sodium (SOLU-MEDROL) injection 30 mg, 30 mg, Intravenous, Daily  vancomycin 1000 mg IVPB in 250 mL D5W addavial, 1,000 mg, Intravenous, Q24H  meropenem (MERREM) 1 g in sodium chloride 0.9 % 100 mL IVPB, 1 g, Intravenous, Q12H  chlorhexidine (PERIDEX) 0.12 % solution 15 mL, 15 mL, Mouth/Throat, BID  fentaNYL (SUBLIMAZE) injection 50 mcg, 50 mcg, Intravenous, Q1H PRN  midazolam (VERSED) injection 2 mg, 2 mg, Intravenous, Q1H PRN  propofol 1000 MG/100ML injection, 10 mcg/kg/min, Intravenous, Continuous  carboxymethylcellulose PF (THERATEARS) 1 % ophthalmic gel 1 drop, 1 drop, Both Eyes, Q4H **AND** lubrifresh P.M. (artificial tears) ophthalmic ointment, , Both Eyes, Q4H  fentaNYL (SUBLIMAZE) 1,000 mcg in sodium chloride 0.9 % 100 mL infusion, 25 mcg/hr, Intravenous, Continuous  0.9 % sodium chloride bolus, 250 mL, Intravenous, Once  dextrose 50 % solution 25 g, 25 g, Intravenous, PRN  prismaSATE BGK 4/2.5 dialysis solution, , Dialysis, Continuous  prismaSATE BGK 4/2.5 dialysis solution, , Dialysis, Continuous  prismaSATE BGK 4/2.5 dialysis solution, , Dialysis, Continuous  magnesium sulfate 1 g in dextrose 5% 100 mL IVPB, 1 g, Intravenous, PRN  calcium gluconate 1 g in dextrose 5 % 100 mL IVPB, 1 g, Intravenous, PRN **OR** calcium gluconate 2 g in dextrose 5 % 100 mL IVPB, 2 g, Intravenous, PRN **OR** calcium gluconate 3 g in dextrose 5 % 100 mL IVPB, 3 g, Intravenous, PRN **OR** calcium gluconate 4 g in dextrose 5 % 100 mL IVPB, 4 g, Intravenous, PRN  sodium phosphate 6 mmol in dextrose 5 % 250 mL IVPB, 6 mmol, Intravenous, PRN **OR** sodium phosphate 12 mmol in dextrose 5 % 250 mL IVPB, 12 mmol,

## 2018-06-15 NOTE — PROGRESS NOTES
Nutrition Assessment    Type and Reason for Visit: Reassess    Nutrition Recommendations:   1. Continue Nepro TF at goal rate of 40 ml/hr x 20 hours. Water flushes, 60 ml every 6 hours or per nephrology guidance. 2. Monitor TF rate, intake, and tolerance + water flushes. 3. Monitor vent status, sedation type/amount, and check TG every 72 hours while patient is receiving propofol for sedation. 4. Monitor for in-patient weight changes during remainder of admission. 5. Monitor nutrition-related labs, bowel activity/regimen, and fluid/electrolyte management. Malnutrition Assessment:  · Malnutrition Status: Meets the criteria for moderate malnutrition  · Context: Acute illness or injury  · Findings of the 6 clinical characteristics of malnutrition (Minimum of 2 out of 6 clinical characteristics is required to make the diagnosis of moderate or severe Protein Calorie Malnutrition based on AND/ASPEN Guidelines):  1. Energy Intake-Less than or equal to 75%, greater than or equal to 3 months    2. Weight Loss-No significant weight loss, in 1 week (x 9 days admission)  3. Fat Loss-Mild subcutaneous fat loss, Orbital, Triceps  4. Muscle Loss-Mild muscle mass loss, Temples (temporalis muscle), Clavicles (pectoralis and deltoids)  5. Fluid Accumulation- (+ ascites), Ascites  6.  Strength-Not measured    Nutrition Diagnosis:   · Problem:  Moderate malnutrition  · Etiology: related to Insufficient energy/nutrient consumption, Renal dysfunction, Psychological cause/life stress, Cardiac dysfunction, Alteration in GI function, Diarrhea     Signs and symptoms:  as evidenced by Weight loss, Lab values, GI abnormality, Diarrhea, Known losses from dialysis, Other (mild fat and muscle loss)    Nutrition Assessment:  · Subjective Assessment: patient remains intubated and was sedated this am on 50 mcg propofol; + CRRT running at this time - it had been turned off on 6/11 but had to be resumed in the afternoon on · Evaluation: Goal achieved   · Goals: patient will continue to tolerate Nepro at goal rate of 40 ml/hr x 20 hours without GI distress, without s/s of aspiration, and without additional BS/fluid/electrolyte abnormalities; weight will remain stable during remainder of admission   · Monitoring: NPO Status, TF Intake, TF Tolerance, Skin Integrity, Wound Healing, Ascites/Edema, Weight, Pertinent Labs, Diarrhea    See Adult Nutrition Doc Flowsheet for more detail.      Electronically signed by Christina Worley RD, IRMA on 6/15/18 at 4:57 PM    Contact Number: [phone]

## 2018-06-15 NOTE — PROGRESS NOTES
[facility]       Progress Note           ID   34y.o. year old female who we are seeing in consultation for ESRD. Sub/interval history    Seen and examined while in CRRT, getting  UF close to -50 ml/h    CRRT stopped on 6/11 PM but needed to be resumed on 6/12 afternoon; CRRT running well  Wbc 62--> 40s k  re intubated on 6/12  Extubated on 6/11    ROS: UTO  PSFH: No visitor    Scheduled Meds:   methadone  10 mg Oral 3 times per day    diazepam  10 mg Oral TID    [START ON 6/16/2018] methylPREDNISolone  30 mg Intravenous Daily    vancomycin  1,250 mg Intravenous Q24H    meropenem (MERREM) IVPB  1 g Intravenous Q12H    chlorhexidine  15 mL Mouth/Throat BID    carboxymethylcellulose PF  1 drop Both Eyes Q4H    And    lubrifresh P.M.    Both Eyes Q4H    sodium chloride  250 mL Intravenous Once    sodium chloride  250 mL Intravenous Once    pantoprazole  40 mg Intravenous BID    insulin lispro  0-6 Units Subcutaneous Q4H    lidocaine 1 % injection  5 mL Intradermal Once    sodium chloride flush  10 mL Intravenous 2 times per day    sodium chloride flush  10 mL Intravenous 2 times per day    darbepoetin emi-polysorbate  100 mcg Intravenous Weekly     Continuous Infusions:   midazolam      propofol 50 mcg/kg/min (06/15/18 1012)    fentaNYL (SUBLIMAZE) infusion 100 mcg/hr (06/15/18 0847)    prismaSATE BGK 4/2.5 1,000 mL/hr (06/15/18 0705)    prismaSATE BGK 4/2.5 500 mL/hr (06/15/18 0705)    prismaSATE BGK 4/2.5 500 mL/hr (06/15/18 0705)    dextrose       PRN Meds:.fentanNYL, midazolam, dextrose, magnesium sulfate, calcium gluconate IVPB **OR** calcium gluconate IVPB **OR** calcium gluconate IVPB **OR** calcium gluconate IVPB, sodium phosphate IVPB **OR** sodium phosphate IVPB **OR** sodium phosphate IVPB **OR** sodium phosphate IVPB, ondansetron, glucose, dextrose, glucagon (rDNA), sodium chloride flush, dextrose, sodium chloride flush, acetaminophen, heparin (porcine), promethazine, hydrOXYzine      Objective/   GEN:  Chronically ill, BP (!) 102/59   Pulse 102   Temp 98.6 °F (37 °C) (Oral)   Resp 14   Ht 5' 2\" (1.575 m)   Wt 130 lb 8.2 oz (59.2 kg)   SpO2 96%   BMI 23.87 kg/m²   CV: S1, S2 without m/r/g; no edema  RESP: CTA B without w/r/r; breathing wnl  ACCESS: R IJ TDC  gen-confused  abd - distended, tender, no bowel sounds    Data/  Recent Labs      06/13/18   0530   06/14/18   0550  06/14/18   1815  06/15/18   0545   WBC  62.4*   --   62.0*   --   44.7*   HGB  10.7*   < >  8.5*  8.5*  8.4*   HCT  32.1*   < >  25.8*  26.1*  26.1*   MCV  87.2   --   89.4   --   96.3   PLT  74*   --   75*   --   67*    < > = values in this interval not displayed. Recent Labs      06/14/18   0550  06/14/18   1405  06/14/18   1815  06/14/18   2145  06/15/18   0545   NA  140  137   --   133*  132*   K  4.6  4.7   --   4.5  4.3   CL  102  101   --   98*  98*   CO2  26  27   --   26  25   GLUCOSE  139*  149*   --   127*  113*   PHOS  2.6   --   2.7   --   2.3*   MG  2.90*   --   2.80*   --   2.80*   BUN  40*  42*   --   40*  40*   CREATININE  1.2*  1.2*   --   1.3*  1.2*   LABGLOM  54*  54*   --   49*  54*   GFRAA  >60  >60   --   60*  >60     CT chest/a/p on 6/12/18  Impression   Development of moderate diffuse ascites since the prior study.  Mosaic   appearance of the spleen either due to multiple splenic infarcts or possible   splenic abscesses.  Question of perihepatic and subhepatic adhesions. Chronic gallbladder disease.  Bibasilar pneumonias and pulmonary nodules   consistent with septic emboli.  Mild anasarca.       RECOMMENDATIONS:   NG tube should be pulled back about 4 cm since it is pressing against the   anterior stomach wall.       Gallbladder ultrasound suggested for evaluation of gallbladder disease.       Diagnostic Ultrasound-guided paracentesis may be helpful.      Impression   Multiple scattered cavitary and solid nodules scattered throughout the lungs   suspected to

## 2018-06-15 NOTE — PROGRESS NOTES
2545 ml   Output             4355 ml   Net            -1810 ml       EXAM:  General: young female on vent. sedated  Eyes: PERRL. No sclera icterus. No conjunctiva injected. ENT: No discharge. Neck: Trachea midline. Normal thyroid. Oral ETT and OG noted  Resp:  zulma air entry,  No rhonchi. No dullness on percussion. CV:tachy . Regular rhythm. No edema. No JVD. Palpable pedal pulses. GI:   Non-distended. No masses. Still with abd tenderness , grimaces with deep palpation   No organmegaly. Normal bowel sounds. No hernia. Skin: Warm and dry. No nodule on exposed extremities. No rash on exposed extremities. appears jaundiced  Lymph: No cervical LAD. No supraclavicular LAD. M/S: . Right first MTP wound is drained  Improving erythema  Psych: sedated    Medications:  Scheduled Meds:   methadone  5 mg Oral 3 times per day    vancomycin  1,250 mg Intravenous Q24H    meropenem (MERREM) IVPB  1 g Intravenous Q12H    chlorhexidine  15 mL Mouth/Throat BID    carboxymethylcellulose PF  1 drop Both Eyes Q4H    And    lubrifresh P.M.    Both Eyes Q4H    methylPREDNISolone  40 mg Intravenous Daily    sodium chloride  250 mL Intravenous Once    sodium chloride  250 mL Intravenous Once    pantoprazole  40 mg Intravenous BID    insulin lispro  0-6 Units Subcutaneous Q4H    lidocaine 1 % injection  5 mL Intradermal Once    sodium chloride flush  10 mL Intravenous 2 times per day    sodium chloride flush  10 mL Intravenous 2 times per day    darbepoetin emi-polysorbate  100 mcg Intravenous Weekly       PRN Meds:  fentanNYL, midazolam, dextrose, magnesium sulfate, calcium gluconate IVPB **OR** calcium gluconate IVPB **OR** calcium gluconate IVPB **OR** calcium gluconate IVPB, sodium phosphate IVPB **OR** sodium phosphate IVPB **OR** sodium phosphate IVPB **OR** sodium phosphate IVPB, ondansetron, glucose, dextrose, glucagon (rDNA), sodium chloride flush, dextrose, sodium chloride flush, acetaminophen, heparin

## 2018-06-15 NOTE — PROGRESS NOTES
06/15/18 0335   Vent Information   Vent Type 840   Vent Mode AC/VC   Vt Ordered 450 mL   Rate Set 14 bmp   Peak Flow 50 L/min   Pressure Support 0 cmH20   FiO2  30 %   Sensitivity 3   PEEP/CPAP 5   I Time/ I Time % 0 s   Humidification Source Heated wire   Humidification Temp Measured 36.5   Circuit Condensation Drained   Vent Patient Data   Vt Exhaled 0 mL   Rate Measured 41 br/min   Minute Volume 5.49 Liters   Peak Inspiratory Pressure 30 cmH2O   I:E Ratio 4.60:1   High Peep/I Pressure 0   Mean Airway Pressure 9.2 cmH20   Spontaneous Breathing Trial (SBT) RT Doc   Pulse 105   SpO2 98 %   Cough/Sputum   Sputum How Obtained Endotracheal   Cough Productive   Sputum Amount Small   Sputum Color Brown   Tenacity Thick   Breath Sounds   Right Upper Lobe Diminished   Right Middle Lobe Diminished   Right Lower Lobe Diminished   Left Upper Lobe Diminished   Left Lower Lobe Diminished   Additional Respiratory  Assessments   Resp 14   Position Semi-Lucero's   Alarm Settings   High Pressure Alarm 40 cmH2O   Low Minute Volume Alarm 2.5 L/min   High Respiratory Rate 40 br/min   Patient Observation   Observations water bag Changed

## 2018-06-15 NOTE — PROGRESS NOTES
06/15/18 0743   Vent Information   Vent Type I-Vent   Vent Mode AC/VC   Vt Ordered 450 mL   Rate Set 14 bmp   Peak Flow 50 L/min   Pressure Support 0 cmH20   FiO2  30 %   Sensitivity 3   PEEP/CPAP 5   I Time/ I Time % 0 s   Humidification Source Heated wire   Humidification Temp 36.8   Circuit Condensation Drained   Vent Patient Data   Vt Exhaled 46 mL   Rate Measured 25 br/min   Minute Volume 8.49 Liters   Peak Inspiratory Pressure 24 cmH2O   I:E Ratio 1:3.30   High Peep/I Pressure 0   Mean Airway Pressure 9.5 cmH20   Spontaneous Breathing Trial (SBT) RT Doc   Pulse 100   SpO2 100 %   Cough/Sputum   Sputum How Obtained Endotracheal   Cough Non-productive   Breath Sounds   Right Upper Lobe Clear   Right Middle Lobe Diminished   Right Lower Lobe Diminished   Left Upper Lobe Clear   Left Lower Lobe Diminished   Additional Respiratory  Assessments   Resp 12   Position Semi-Lucero's   Subglottic Suction Done?  Yes   Alarm Settings   High Pressure Alarm 40 cmH2O   Low Minute Volume Alarm 2.5 L/min   High Respiratory Rate 40 br/min   Patient Observation   Observations #7.5 ETT @ 22 lip line   ETT (adult)   Placement Date/Time: 06/12/18 1500     Secured at 22 cm   Measured From [address] [place] [address],[address] By [employer] tube [employer]   Site Condition Dry

## 2018-06-15 NOTE — PROGRESS NOTES
LIPASE, BILIDIR, BILITOT, ALKPHOS in the last 72 hours. Invalid input(s): AMYLASE,  ALB  PT/INR:   Recent Labs      06/13/18   0530  06/14/18   0550  06/15/18   0545   PROTIME  15.3*  12.6  11.6   INR  1.34*  1.11  1.02     APTT:   No results for input(s): APTT in the last 72 hours. UA:No results for input(s): NITRITE, COLORU, PHUR, LABCAST, WBCUA, RBCUA, MUCUS, TRICHOMONAS, YEAST, BACTERIA, CLARITYU, SPECGRAV, LEUKOCYTESUR, UROBILINOGEN, BILIRUBINUR, BLOODU, GLUCOSEU, AMORPHOUS in the last 72 hours. Invalid input(s): beatrizvargasjackelin Waite    Cultures:  6/5/18 Blood cx with MRSA, Strep pneumoniae and Enterobacter Cloacae  6/6/18 blood cx ngtd  6/7 Blood cx NGTD  6/12/18 blood NGTD  6/12 trach asp- MRSA      Films:  CXR 6/15:  stable, ETT in place low lung volumes; stable bilateral airspace opacities    Head CT 6/13- no acute disease    Chest/abd CT 6/13: Development of moderate diffuse ascites since the prior study.  Mosaic appearance of the spleen either due to multiple splenic infarcts or possible  splenic abscesses.  Question of perihepatic and subhepatic adhesions. Chronic gallbladder disease.  Bibasilar pneumonias and pulmonary nodules  consistent with septic emboli.  Mild anasarca. Assessment:  · Septic shock secondary to tricuspid valve endocarditis  · Acute respiratory failure with hypoxemia  · Probable DIC  · Polymicrobial bacteremia with MRSA strep pneumonia and Enterobacter  · Splenic infarcts/septic emboli  · Ascites  · Acute recurrent Tricuspid valve endocarditis  · Cavitary pneumonia secondary to septic emboli  · Possible right foot MTP septic joint  · IV drug abuse active  · Upper GI bleed secondary to angiodysplasia status post cauterization  · Acute liver failure secondary to shock  · End-stage renal disease on hemodialysis  · Hepatitis C infection  · Severe leukocytosis  · Elevated tricglycerides      Plan:  · Mechanical ventilation per my orders.  The ventilator was adjusted by me at the bedside for

## 2018-06-16 LAB
ALBUMIN SERPL-MCNC: 2.6 G/DL (ref 3.4–5)
ALP BLD-CCNC: 695 U/L (ref 40–129)
ALT SERPL-CCNC: 267 U/L (ref 10–40)
ANION GAP SERPL CALCULATED.3IONS-SCNC: 10 MMOL/L (ref 3–16)
ANION GAP SERPL CALCULATED.3IONS-SCNC: 7 MMOL/L (ref 3–16)
ANION GAP SERPL CALCULATED.3IONS-SCNC: 9 MMOL/L (ref 3–16)
AST SERPL-CCNC: 104 U/L (ref 15–37)
BASE EXCESS ARTERIAL: 1.1 MMOL/L (ref -3–3)
BILIRUB SERPL-MCNC: 4.2 MG/DL (ref 0–1)
BILIRUBIN DIRECT: 3.4 MG/DL (ref 0–0.3)
BILIRUBIN, INDIRECT: 0.8 MG/DL (ref 0–1)
BLOOD CULTURE, ROUTINE: NORMAL
BUN BLDV-MCNC: 30 MG/DL (ref 7–20)
BUN BLDV-MCNC: 36 MG/DL (ref 7–20)
BUN BLDV-MCNC: 37 MG/DL (ref 7–20)
CALCIUM IONIZED: 1.13 MMOL/L (ref 1.12–1.32)
CALCIUM IONIZED: 1.14 MMOL/L (ref 1.12–1.32)
CALCIUM IONIZED: 1.15 MMOL/L (ref 1.12–1.32)
CALCIUM SERPL-MCNC: 8.5 MG/DL (ref 8.3–10.6)
CALCIUM SERPL-MCNC: 8.6 MG/DL (ref 8.3–10.6)
CALCIUM SERPL-MCNC: 9 MG/DL (ref 8.3–10.6)
CARBOXYHEMOGLOBIN ARTERIAL: 2 % (ref 0–1.5)
CHLORIDE BLD-SCNC: 101 MMOL/L (ref 99–110)
CHLORIDE BLD-SCNC: 97 MMOL/L (ref 99–110)
CHLORIDE BLD-SCNC: 98 MMOL/L (ref 99–110)
CO2: 26 MMOL/L (ref 21–32)
CO2: 26 MMOL/L (ref 21–32)
CO2: 27 MMOL/L (ref 21–32)
CREAT SERPL-MCNC: 0.8 MG/DL (ref 0.6–1.1)
CREAT SERPL-MCNC: 1 MG/DL (ref 0.6–1.1)
CREAT SERPL-MCNC: 1.1 MG/DL (ref 0.6–1.1)
FIBRINOGEN: 269 MG/DL (ref 200–397)
GFR AFRICAN AMERICAN: >60
GFR NON-AFRICAN AMERICAN: >60
GLUCOSE BLD-MCNC: 106 MG/DL (ref 70–99)
GLUCOSE BLD-MCNC: 113 MG/DL (ref 70–99)
GLUCOSE BLD-MCNC: 122 MG/DL (ref 70–99)
GLUCOSE BLD-MCNC: 135 MG/DL (ref 70–99)
GLUCOSE BLD-MCNC: 58 MG/DL (ref 70–99)
GLUCOSE BLD-MCNC: 64 MG/DL (ref 70–99)
GLUCOSE BLD-MCNC: 67 MG/DL (ref 70–99)
GLUCOSE BLD-MCNC: 72 MG/DL (ref 70–99)
GLUCOSE BLD-MCNC: 78 MG/DL (ref 70–99)
GLUCOSE BLD-MCNC: 84 MG/DL (ref 70–99)
GLUCOSE BLD-MCNC: 86 MG/DL (ref 70–99)
GLUCOSE BLD-MCNC: 88 MG/DL (ref 70–99)
GLUCOSE BLD-MCNC: 94 MG/DL (ref 70–99)
HCO3 ARTERIAL: 24.5 MMOL/L (ref 21–29)
HCT VFR BLD CALC: 28.3 % (ref 36–48)
HEMATOLOGY PATH CONSULT: NO
HEMOGLOBIN, ART, EXTENDED: 9.8 G/DL (ref 12–16)
HEMOGLOBIN: 8.9 G/DL (ref 12–16)
INR BLD: 1.05 (ref 0.86–1.14)
MAGNESIUM: 2.7 MG/DL (ref 1.8–2.4)
MAGNESIUM: 2.7 MG/DL (ref 1.8–2.4)
MCH RBC QN AUTO: 31.3 PG (ref 26–34)
MCHC RBC AUTO-ENTMCNC: 31.5 G/DL (ref 31–36)
MCV RBC AUTO: 99.3 FL (ref 80–100)
METHEMOGLOBIN ARTERIAL: 0.7 %
O2 CONTENT ARTERIAL: 13 ML/DL
O2 SAT, ARTERIAL: 93.8 %
O2 THERAPY: ABNORMAL
PCO2 ARTERIAL: 34 MMHG (ref 35–45)
PDW BLD-RTO: 16.5 % (ref 12.4–15.4)
PERFORMED ON: ABNORMAL
PERFORMED ON: NORMAL
PH ARTERIAL: 7.47 (ref 7.35–7.45)
PH VENOUS: 7.26 (ref 7.35–7.45)
PH VENOUS: 7.31 (ref 7.35–7.45)
PH VENOUS: 7.33 (ref 7.35–7.45)
PHOSPHORUS: 1.8 MG/DL (ref 2.5–4.9)
PHOSPHORUS: 3.3 MG/DL (ref 2.5–4.9)
PLATELET # BLD: 59 K/UL (ref 135–450)
PLATELET SLIDE REVIEW: ABNORMAL
PMV BLD AUTO: 9.9 FL (ref 5–10.5)
PO2 ARTERIAL: 63.4 MMHG (ref 75–108)
POTASSIUM SERPL-SCNC: 4 MMOL/L (ref 3.5–5.1)
POTASSIUM SERPL-SCNC: 4.2 MMOL/L (ref 3.5–5.1)
POTASSIUM SERPL-SCNC: 4.3 MMOL/L (ref 3.5–5.1)
PROTHROMBIN TIME: 12 SEC (ref 9.8–13)
RBC # BLD: 2.85 M/UL (ref 4–5.2)
SLIDE REVIEW: ABNORMAL
SODIUM BLD-SCNC: 132 MMOL/L (ref 136–145)
SODIUM BLD-SCNC: 133 MMOL/L (ref 136–145)
SODIUM BLD-SCNC: 136 MMOL/L (ref 136–145)
TCO2 ARTERIAL: 25.5 MMOL/L
TOTAL PROTEIN: 6.9 G/DL (ref 6.4–8.2)
TRIGL SERPL-MCNC: 249 MG/DL (ref 0–150)
VANCOMYCIN RANDOM: 15.7 UG/ML
WBC # BLD: 24 K/UL (ref 4–11)

## 2018-06-16 PROCEDURE — 36592 COLLECT BLOOD FROM PICC: CPT

## 2018-06-16 PROCEDURE — 94750 CHARGE CONVERSION: CPT

## 2018-06-16 PROCEDURE — 80048 BASIC METABOLIC PNL TOTAL CA: CPT

## 2018-06-16 PROCEDURE — 84100 ASSAY OF PHOSPHORUS: CPT

## 2018-06-16 PROCEDURE — 83735 ASSAY OF MAGNESIUM: CPT

## 2018-06-16 PROCEDURE — 94003 VENT MGMT INPAT SUBQ DAY: CPT

## 2018-06-16 PROCEDURE — C9113 INJ PANTOPRAZOLE SODIUM, VIA: HCPCS

## 2018-06-16 PROCEDURE — 94762 N-INVAS EAR/PLS OXIMTRY CONT: CPT

## 2018-06-16 PROCEDURE — 84478 ASSAY OF TRIGLYCERIDES: CPT

## 2018-06-16 PROCEDURE — 9990 CHARGE CONVERSION

## 2018-06-16 PROCEDURE — 85027 COMPLETE CBC AUTOMATED: CPT

## 2018-06-16 PROCEDURE — 82330 ASSAY OF CALCIUM: CPT

## 2018-06-16 PROCEDURE — 80076 HEPATIC FUNCTION PANEL: CPT

## 2018-06-16 PROCEDURE — 71045 X-RAY EXAM CHEST 1 VIEW: CPT

## 2018-06-16 PROCEDURE — 80202 ASSAY OF VANCOMYCIN: CPT

## 2018-06-16 PROCEDURE — 99291 CRITICAL CARE FIRST HOUR: CPT | Performed by: INTERNAL MEDICINE

## 2018-06-16 PROCEDURE — 85610 PROTHROMBIN TIME: CPT

## 2018-06-16 PROCEDURE — 90945 DIALYSIS ONE EVALUATION: CPT

## 2018-06-16 PROCEDURE — 85384 FIBRINOGEN ACTIVITY: CPT

## 2018-06-16 PROCEDURE — 36591 DRAW BLOOD OFF VENOUS DEVICE: CPT

## 2018-06-16 PROCEDURE — 82803 BLOOD GASES ANY COMBINATION: CPT

## 2018-06-16 RX ADMIN — MINERAL OIL AND WHITE PETROLATUM: 150; 830 OINTMENT OPHTHALMIC at 00:03

## 2018-06-16 RX ADMIN — MIDAZOLAM HYDROCHLORIDE 2 MG: 1 INJECTION INTRAMUSCULAR; INTRAVENOUS at 23:40

## 2018-06-16 RX ADMIN — DEXTROSE MONOHYDRATE 12.5 G: 25 INJECTION, SOLUTION INTRAVENOUS at 04:08

## 2018-06-16 RX ADMIN — MINERAL OIL AND WHITE PETROLATUM: 150; 830 OINTMENT OPHTHALMIC at 08:53

## 2018-06-16 RX ADMIN — DIAZEPAM 10 MG: 10 TABLET ORAL at 14:19

## 2018-06-16 RX ADMIN — Medication 10 ML: at 09:10

## 2018-06-16 RX ADMIN — CHLORHEXIDINE GLUCONATE 15 ML: 0.12 RINSE ORAL at 19:42

## 2018-06-16 RX ADMIN — MIDAZOLAM HYDROCHLORIDE 2 MG: 1 INJECTION INTRAMUSCULAR; INTRAVENOUS at 09:10

## 2018-06-16 RX ADMIN — PANTOPRAZOLE SODIUM 40 MG: 40 INJECTION, POWDER, LYOPHILIZED, FOR SOLUTION INTRAVENOUS at 20:28

## 2018-06-16 RX ADMIN — CARBOXYMETHYLCELLULOSE SODIUM 1 DROP: 10 GEL OPHTHALMIC at 15:10

## 2018-06-16 RX ADMIN — Medication 10 ML: at 23:39

## 2018-06-16 RX ADMIN — METHADONE HYDROCHLORIDE 10 MG: 10 TABLET ORAL at 21:33

## 2018-06-16 RX ADMIN — Medication 10 ML: at 20:28

## 2018-06-16 RX ADMIN — FENTANYL CITRATE 50 MCG: 50 INJECTION, SOLUTION INTRAMUSCULAR; INTRAVENOUS at 09:39

## 2018-06-16 RX ADMIN — Medication 10 ML: at 00:42

## 2018-06-16 RX ADMIN — DEXTROSE MONOHYDRATE 12.5 G: 25 INJECTION, SOLUTION INTRAVENOUS at 23:39

## 2018-06-16 RX ADMIN — MINERAL OIL AND WHITE PETROLATUM: 150; 830 OINTMENT OPHTHALMIC at 04:07

## 2018-06-16 RX ADMIN — DEXTROSE MONOHYDRATE 12.5 G: 25 INJECTION, SOLUTION INTRAVENOUS at 06:22

## 2018-06-16 RX ADMIN — Medication 10 ML: at 09:11

## 2018-06-16 RX ADMIN — CARBOXYMETHYLCELLULOSE SODIUM 1 DROP: 10 GEL OPHTHALMIC at 21:32

## 2018-06-16 RX ADMIN — MIDAZOLAM HYDROCHLORIDE 2 MG: 1 INJECTION INTRAMUSCULAR; INTRAVENOUS at 04:07

## 2018-06-16 RX ADMIN — MINERAL OIL AND WHITE PETROLATUM: 150; 830 OINTMENT OPHTHALMIC at 19:19

## 2018-06-16 RX ADMIN — CARBOXYMETHYLCELLULOSE SODIUM 1 DROP: 10 GEL OPHTHALMIC at 18:25

## 2018-06-16 RX ADMIN — CARBOXYMETHYLCELLULOSE SODIUM 1 DROP: 10 GEL OPHTHALMIC at 01:51

## 2018-06-16 RX ADMIN — DIAZEPAM 10 MG: 10 TABLET ORAL at 20:28

## 2018-06-16 RX ADMIN — MINERAL OIL AND WHITE PETROLATUM: 150; 830 OINTMENT OPHTHALMIC at 16:21

## 2018-06-16 RX ADMIN — METHADONE HYDROCHLORIDE 10 MG: 10 TABLET ORAL at 06:11

## 2018-06-16 RX ADMIN — DIAZEPAM 10 MG: 10 TABLET ORAL at 09:11

## 2018-06-16 RX ADMIN — MIDAZOLAM HYDROCHLORIDE 2 MG: 1 INJECTION INTRAMUSCULAR; INTRAVENOUS at 00:42

## 2018-06-16 RX ADMIN — PANTOPRAZOLE SODIUM 40 MG: 40 INJECTION, POWDER, LYOPHILIZED, FOR SOLUTION INTRAVENOUS at 09:10

## 2018-06-16 RX ADMIN — MIDAZOLAM HYDROCHLORIDE 2 MG: 1 INJECTION INTRAMUSCULAR; INTRAVENOUS at 06:22

## 2018-06-16 RX ADMIN — METHYLPREDNISOLONE SODIUM SUCCINATE 30 MG: 40 INJECTION, POWDER, LYOPHILIZED, FOR SOLUTION INTRAMUSCULAR; INTRAVENOUS at 09:10

## 2018-06-16 RX ADMIN — CHLORHEXIDINE GLUCONATE 15 ML: 0.12 RINSE ORAL at 08:55

## 2018-06-16 RX ADMIN — MINERAL OIL AND WHITE PETROLATUM: 150; 830 OINTMENT OPHTHALMIC at 10:59

## 2018-06-16 RX ADMIN — FENTANYL CITRATE 50 MCG: 50 INJECTION, SOLUTION INTRAMUSCULAR; INTRAVENOUS at 03:14

## 2018-06-16 RX ADMIN — Medication 10 ML: at 11:41

## 2018-06-16 RX ADMIN — MIDAZOLAM HYDROCHLORIDE 2 MG: 1 INJECTION INTRAMUSCULAR; INTRAVENOUS at 11:41

## 2018-06-16 RX ADMIN — CARBOXYMETHYLCELLULOSE SODIUM 1 DROP: 10 GEL OPHTHALMIC at 06:11

## 2018-06-16 RX ADMIN — METHADONE HYDROCHLORIDE 10 MG: 10 TABLET ORAL at 14:19

## 2018-06-16 ASSESSMENT — PAIN SCALES - GENERAL
PAINLEVEL_OUTOF10: 0

## 2018-06-16 ASSESSMENT — PULMONARY FUNCTION TESTS
PIF_VALUE: 27
PIF_VALUE: 16
PIF_VALUE: 14
PIF_VALUE: 18
PIF_VALUE: 11
PIF_VALUE: 20
PIF_VALUE: 17
PIF_VALUE: 11
PIF_VALUE: 14
PIF_VALUE: 16

## 2018-06-16 NOTE — PROGRESS NOTES
Shift assessment completed and documented in flowsheet. RASS -1. Pt intubed and sedated. VSS. Respirations are east, even, and unlabored with diminished breath sounds bilaterally. Rhonchi heard in upper lung lobes. Small amount of thick, brown secretions suctioned from ETT. OG in placed with TF @ 40mL/hr. CVC is WNL with fentanyl infusing @ 150mcg/hr and versed infusing @ 6mg/hr. R Vas cath is WNL with CRRT currently running. Flexiseal in place with watery brown stool. Abdominal tenderness noted with active bowel sounds x4. Bilateral soft wrist restraints in place. SCDs in place. Will continue to monitor pt.  Primitivo Khalil RN

## 2018-06-16 NOTE — PROGRESS NOTES
06/16/18 0307   Vent Information   Vent Type 840   Vent Mode AC/VC   Vt Ordered 350 mL   Rate Set 18 bmp   Peak Flow 50 L/min   FiO2  30 %   Sensitivity 3   PEEP/CPAP 5   Humidification Temp Measured 36.9   Circuit Condensation Drained   Vent Patient Data   Vt Exhaled 425 mL   Rate Measured 31 br/min   Minute Volume 9.9 Liters   Peak Inspiratory Pressure 27 cmH2O   I:E Ratio 1:3.4   Mean Airway Pressure 12 cmH20   Spontaneous Breathing Trial (SBT) RT Doc   Pulse 108   SpO2 100 %   Cough/Sputum   Sputum How Obtained Endotracheal   Cough Productive   Sputum Amount Small   Sputum Color Red   Tenacity Thick   Breath Sounds   Right Upper Lobe Rhonchi   Right Middle Lobe Diminished   Right Lower Lobe Diminished   Left Upper Lobe Rhonchi   Left Lower Lobe Diminished   Alarm Settings   High Pressure Alarm 40 cmH2O   Low Minute Volume Alarm 2.5 L/min   Apnea (secs) 20 secs   High Respiratory Rate 40 br/min   Low Exhaled Vt  200 mL   Patient Observation   Observations 7.5 ett 22 atlip

## 2018-06-16 NOTE — PROGRESS NOTES
Pt leaked small amount of stool aroung flexiseal tube. Pt cleaned and pad changed. CRRT continues without problem. Pt remains tachycardic with HR in low 100's. Will continue to closely monitor.  Martita Serrano RN

## 2018-06-16 NOTE — PROGRESS NOTES
Patient medications collected and placed in security bag to be escorted to pharmacy.    Eddie Mejias RN

## 2018-06-16 NOTE — PROGRESS NOTES
Patient medicated with 2mg PRN versed for agitation, restlessness, and continued coughing. ETT suctioning performed with thick, creamy secretions. Will continue to monitor.  Álvaro Ards, RN

## 2018-06-16 NOTE — PROGRESS NOTES
06/16/18 1911   Vent Information   Vent Type 840   Vent Mode AC/VC   Vt Ordered 350 mL   Rate Set 18 bmp   Peak Flow 50 L/min   Pressure Support 0 cmH20   FiO2  30 %   Sensitivity 3   PEEP/CPAP 5   I Time/ I Time % 0 s   Cuff Pressure (cm H2O) 25 cm H2O   Humidification Source Heated wire   Humidification Temp 36.4   Circuit Condensation Drained   Vent Patient Data   Vt Exhaled 378 mL   Rate Measured 18 br/min   Minute Volume 6.54 Liters   Peak Inspiratory Pressure 20 cmH2O   I:E Ratio 1:3.40   High Peep/I Pressure 0   Mean Airway Pressure 8.8 cmH20   Plateau Pressure 13 MSN07   Static Compliance 47 mL/cmH2O   Dynamic Compliance 25 mL/cmH2O   Spontaneous Breathing Trial (SBT) RT Doc   Pulse 117   Cough/Sputum   Sputum How Obtained Endotracheal   Cough Productive   Sputum Amount Moderate   Sputum Color Cloudy;Brown   Tenacity Thick   Breath Sounds   Right Upper Lobe Rhonchi   Right Middle Lobe Diminished   Right Lower Lobe Diminished   Left Upper Lobe Rhonchi   Left Lower Lobe Diminished   Additional Respiratory  Assessments   Resp 24   Position Semi-Lucero's   Alarm Settings   High Pressure Alarm 40 cmH2O   Low Minute Volume Alarm 2.5 L/min   High Respiratory Rate 40 br/min   Patient Observation   Observations 7.5 ETT, 22 @ lip   ETT (adult)   Placement Date/Time: 06/12/18 1500     Secured at 22 cm   Measured From [address] [place] [address],[address] By [employer] tube [employer]   Site Condition Dry

## 2018-06-16 NOTE — PROGRESS NOTES
Dr Slater Stake at the pt's bedside. Meds, labs, V/S, and POC reviewed. Tachycardia discussed and stated sedation was increased and several prn's of fentanyl and versed have been given. CRRT continues to run without problem. Will continue to closely monitor.  Kimbelry Obrien RN

## 2018-06-16 NOTE — PROGRESS NOTES
Pulmonary & Critical Care Medicine ICU Progress Note  CC: Acute respiratory failure with hypoxemia    Events of Last 24 hours: Patient became tachycardic during SAT/SBT. Invasive Lines:   IV Line: L scl TLC 6/8. R scl vasc cath-chronic    MV:  6/8/18  Vent Mode: AC/VC Rate Set: 18 bmp/Vt Ordered: 350 mL/ /FiO2 : 30 %  Recent Labs      06/15/18   0615  06/16/18 0617   PHART  7.418  7.475*   [id number]  40.5  34.0*   PO2ART  61.0*  63.4*       IV:   midazolam 6 mg/hr (06/16/18 0419)    propofol Stopped (06/15/18 1348)    fentaNYL (SUBLIMAZE) infusion 100 mcg/hr (06/16/18 0630)    prismaSATE BGK 4/2.5 1,000 mL/hr (06/16/18 0334)    prismaSATE BGK 4/2.5 500 mL/hr (06/16/18 0341)    prismaSATE BGK 4/2.5 500 mL/hr (06/16/18 0344)    dextrose         EXAM:  BP (!) 146/87   Pulse 120   Temp 98.3 °F (36.8 °C) (Temporal)   Resp 18   Ht 5' 2\" (1.575 m)   Wt 136 lb 11 oz (62 kg)   SpO2 99%   BMI 25.00 kg/m²  on vent    CVP: CVP (Mean): 5 mmHg    Intake/Output Summary (Last 24 hours) at 06/16/18 0716  Last data filed at 06/16/18 0700   Gross per 24 hour   Intake             2451 ml   Output             4469 ml   Net            -2018 ml     General: Mild distress. Normocephalic, atraumatic. Eyes: PERRL. No sclera icterus. No conjunctival injection. ENT: No discharge. Pharynx clear. Neck: Trachea midline. Normal thyroid. Resp: No accessory muscle use. No crackles. No wheezing. L sided rhonchi. No dullness on percussion. CV: tachy rate. Regular rhythm. No mumur or rub. No edema. GI: Not tender. +distended. No masses. No organmegaly. + bowel sounds. No hernia. Skin: Warm and dry. No nodule on exposed extremities. No rash on exposed extremities. Lymph: No cervical LAD. No supraclavicular LAD. M/S: No cyanosis. No joint deformity. No clubbing.    Neuro:  sedated, moves all extremities; she does not follow commands, + pupillary response    Medications:   methadone  10 mg Oral 3 times per day    diazepam D12 and merrem D4  · Solu-Medrol 30 mg IV daily  · Transfuse blood products products as needed-   · Daily cbc  · CRRT per renal- remove 75 ml/hr  · Infectious disease following  · Cardiology following  · Oncology following  · GI following  · Podiatry following  · methadone 10 mg tid; valium 10mg tid  · Negative stool for C diff  · ICU care: protonix, finished bactroban; SCDs    Critical care time spent reviewing labs/films, examining patient, collaborating with other physicians but excluding procedures for life threatening organ failure is 31 minutes.

## 2018-06-16 NOTE — PROGRESS NOTES
Reassessment completed and documented in flowsheet. VSS. RASS -1. CVC is WNL with propofol infusing @ 60mcg/kg/min, fentanyl infusing @ 100mcg/hr, and versed @ 6mg/hr. R Vas cath is WNL with CRRT currently running. No additional changes. Will continue to monitor.  Rocael Saunders RN

## 2018-06-16 NOTE — PROGRESS NOTES
06/16/18 0825   Vital Signs   Pulse 141   Resp 20   Oxygen Therapy   SpO2 100 %       I switched patient back to Assist Control at this time.

## 2018-06-16 NOTE — PROGRESS NOTES
Department of Internal Medicine  Nephrology Attending Progress Note        SUBJECTIVE:  We are following this patient for JAMES on CRRT. The patient remains on CRRT    Physical Exam:    VITALS:  BP (!) 148/80   Pulse 141   Temp 99.9 °F (37.7 °C)   Resp 20   Ht 5' 2\" (1.575 m)   Wt 136 lb 11 oz (62 kg)   SpO2 100%   BMI 25.00 kg/m²   TEMPERATURE:  Current - Temp: 99.9 °F (37.7 °C); Max - Temp  Av.7 °F (37.1 °C)  Min: 98 °F (36.7 °C)  Max: 99.9 °F (37.7 °C)  RESPIRATIONS RANGE: Resp  Av  Min: 11  Max: 20  PULSE RANGE: Pulse  Av.4  Min: 97  Max: 141  BLOOD PRESSURE RANGE:  Systolic (98WZC), LHB:745 , Min:102 , CDH:686   ; Diastolic (28ACK), WPW:86, Min:55, Max:88    24HR INTAKE/OUTPUT:    Intake/Output Summary (Last 24 hours) at 18 1025  Last data filed at 18 0700   Gross per 24 hour   Intake             2022 ml   Output             4039 ml   Net            -2017 ml       Social History:  Family not present. Systems Review:  General:  Sedated and intubated  Pulmonary:   On vent  CV: negative, tachy with breathing trial  GI:  Tolerating TF  :  Not making much urine    Exam:  Constitutional:  Sedated and intubated   Cardiovascular:  tachy  Edema:  none  Respiratory:  Bilateral rhonchi  Gastrointestinal:  Soft, nt, nd    DATA:   Recent Labs      18   0550  18   1815  06/15/18   0545  18   0612   WBC  62.0*   --   44.7*  24.0*   HGB  8.5*  8.5*  8.4*  8.9*   HCT  25.8*  26.1*  26.1*  28.3*   MCV  89.4   --   96.3  99.3   PLT  75*   --   67*  59*     Recent Labs      06/15/18   0545  06/15/18   1418  06/15/18   1806  06/15/18   2157  18   0612   NA  132*  134*   --   135*  136   K  4.3  4.6   --   4.4  4.0   CL  98*  99   --   99  101   CO2  25  26   --   26  26   GLUCOSE  113*  112*   --   102*  94   PHOS  2.3*   --   3.2   --   1.8*   MG  2.80*   --   2.60*   --   2.70*   BUN  40*  36*   --   42*  37*   CREATININE  1.2*  1.1   --   1.4*  1.1   LABGLOM 54*  >60   --   45*  >60   GFRAA  >60  >60   --   55*  >60       IMPRESSION/RECOMMENDATIONS:      JAMES:  Remains on CRRT with good clearances. No change to Rx. Will consider stopping CRRT tomorrow    Sepsis:  Multi infectious agents. ID following. Please do not hesitate to contact me if you have any questions regarding the patients care or my recommendations. My cell number is ([phone]). If you need assistance after 5 pm, please call my office ([phone]) for the on call nephrologist  __________________________________________________________________  Yfn Cornelius.  Juana Suarez MD, FACP, FASN  [facility]

## 2018-06-16 NOTE — PROGRESS NOTES
EXAM:  General: young female on vent. sedated  Eyes: PERRL. No sclera icterus. No conjunctiva injected. ENT: No discharge. Neck: Trachea midline. Normal thyroid. Oral ETT and OG noted  Resp:  zulma air entry,  No rhonchi. No dullness on percussion. CV:tachy . Regular rhythm. No edema. No JVD. Palpable pedal pulses. GI:   Non-distended. No masses. Still with abd tenderness , grimaces with deep palpation   No organmegaly. Normal bowel sounds. No hernia. Skin: Warm and dry. No nodule on exposed extremities. No rash on exposed extremities. appears jaundiced  Lymph: No cervical LAD. No supraclavicular LAD. M/S: . Right first MTP wound is drained  Improving erythema  Psych: sedated    Medications:  Scheduled Meds:   methadone  10 mg Oral 3 times per day    diazepam  10 mg Oral TID    methylPREDNISolone  30 mg Intravenous Daily    vancomycin  1,000 mg Intravenous Q24H    meropenem (MERREM) IVPB  1 g Intravenous Q12H    chlorhexidine  15 mL Mouth/Throat BID    carboxymethylcellulose PF  1 drop Both Eyes Q4H    And    lubrifresh P.M.    Both Eyes Q4H    sodium chloride  250 mL Intravenous Once    pantoprazole  40 mg Intravenous BID    insulin lispro  0-6 Units Subcutaneous Q4H    lidocaine 1 % injection  5 mL Intradermal Once    sodium chloride flush  10 mL Intravenous 2 times per day    sodium chloride flush  10 mL Intravenous 2 times per day    darbepoetin emi-polysorbate  100 mcg Intravenous Weekly       PRN Meds:  fentanNYL, midazolam, dextrose, magnesium sulfate, calcium gluconate IVPB **OR** calcium gluconate IVPB **OR** calcium gluconate IVPB **OR** calcium gluconate IVPB, sodium phosphate IVPB **OR** sodium phosphate IVPB **OR** sodium phosphate IVPB **OR** sodium phosphate IVPB, ondansetron, glucose, dextrose, glucagon (rDNA), sodium chloride flush, dextrose, sodium chloride flush, acetaminophen, heparin (porcine), promethazine, hydrOXYzine    Results:  CBC:   Recent Labs for septic emboli and cavitory pneumonia  - ct abd with no acute findings as well    #  Acute upper GI bleed. s/p  EGD -Three angiodysplastic spots at the junction of the body and  the fundus area that was cauterized during endoscopy.     h/h monitors/p multiple  PRBC h/h stable. RBC scan neg      #  Tricuspid valve endocarditis- recurrent   She is growing MRSA, strep pneumoniae and Enterobacter cloacae. ID involved  Large pedunculated vegetation on septal leaflet of TV   Previously had MSSA TV endocarditis  Now on  vancomycin and merrem. Wbc remains very high at 60 K- to decrease steroids as above  Consider CHf given new tachycardia     #  Bacteremia from poly microbial organism. Source of infection could be tunneled dialysis catheter and her recurrent endocarditis. has underlying IV drug abuse. She relapsed using IV drugs 2 months ago    #  End stage renal disease on HD  Now on  CRRT, off pressors,  Nephrology f/w    #  Hyperglycemia   - sec to steroids  - on ssi    # DIC, -   Sec to severe sepsis , on steroids- weaning   Hematology f/w     SCD for prophylaxis  TF      All questions and concerns were addressed to the patient/family. Alternatives to my treatment were discussed. The note was completed using EMR. Every effort was made to ensure accuracy; however, inadvertent computerized transcription errors may be present.        Jerry Peralta MD 6/16/2018 7:46 AM

## 2018-06-17 LAB
ANION GAP SERPL CALCULATED.3IONS-SCNC: 8 MMOL/L (ref 3–16)
BASE EXCESS ARTERIAL: 0.4 MMOL/L (ref -3–3)
BLOOD CULTURE, ROUTINE: NORMAL
BUN BLDV-MCNC: 37 MG/DL (ref 7–20)
CALCIUM IONIZED: 1.18 MMOL/L (ref 1.12–1.32)
CALCIUM SERPL-MCNC: 9.1 MG/DL (ref 8.3–10.6)
CARBOXYHEMOGLOBIN ARTERIAL: 2.9 % (ref 0–1.5)
CHLORIDE BLD-SCNC: 100 MMOL/L (ref 99–110)
CO2: 29 MMOL/L (ref 21–32)
CREAT SERPL-MCNC: 1.2 MG/DL (ref 0.6–1.1)
FIBRINOGEN: 415 MG/DL (ref 200–397)
GFR AFRICAN AMERICAN: >60
GFR NON-AFRICAN AMERICAN: 54
GLUCOSE BLD-MCNC: 100 MG/DL (ref 70–99)
GLUCOSE BLD-MCNC: 122 MG/DL (ref 70–99)
GLUCOSE BLD-MCNC: 65 MG/DL (ref 70–99)
GLUCOSE BLD-MCNC: 68 MG/DL (ref 70–99)
GLUCOSE BLD-MCNC: 70 MG/DL (ref 70–99)
GLUCOSE BLD-MCNC: 78 MG/DL (ref 70–99)
GLUCOSE BLD-MCNC: 84 MG/DL (ref 70–99)
GLUCOSE BLD-MCNC: 87 MG/DL (ref 70–99)
GLUCOSE BLD-MCNC: 94 MG/DL (ref 70–99)
GLUCOSE BLD-MCNC: 95 MG/DL (ref 70–99)
HCO3 ARTERIAL: 24.8 MMOL/L (ref 21–29)
HCT VFR BLD CALC: 28.9 % (ref 36–48)
HEMOGLOBIN, ART, EXTENDED: 10.3 G/DL (ref 12–16)
HEMOGLOBIN: 9.2 G/DL (ref 12–16)
INR BLD: 1.07 (ref 0.86–1.14)
MAGNESIUM: 2.6 MG/DL (ref 1.8–2.4)
MCH RBC QN AUTO: 31.4 PG (ref 26–34)
MCHC RBC AUTO-ENTMCNC: 31.8 G/DL (ref 31–36)
MCV RBC AUTO: 99 FL (ref 80–100)
METHEMOGLOBIN ARTERIAL: 0.5 %
O2 CONTENT ARTERIAL: 14 ML/DL
O2 SAT, ARTERIAL: 97 %
O2 THERAPY: ABNORMAL
PCO2 ARTERIAL: 39.2 MMHG (ref 35–45)
PDW BLD-RTO: 17.5 % (ref 12.4–15.4)
PERFORMED ON: ABNORMAL
PERFORMED ON: NORMAL
PH ARTERIAL: 7.42 (ref 7.35–7.45)
PH VENOUS: 7.34 (ref 7.35–7.45)
PHOSPHORUS: 3.2 MG/DL (ref 2.5–4.9)
PLATELET # BLD: 61 K/UL (ref 135–450)
PMV BLD AUTO: 9.5 FL (ref 5–10.5)
PO2 ARTERIAL: 90.2 MMHG (ref 75–108)
POTASSIUM SERPL-SCNC: 4.5 MMOL/L (ref 3.5–5.1)
PROTHROMBIN TIME: 12.2 SEC (ref 9.8–13)
RBC # BLD: 2.92 M/UL (ref 4–5.2)
SODIUM BLD-SCNC: 137 MMOL/L (ref 136–145)
TCO2 ARTERIAL: 26 MMOL/L
TRIGL SERPL-MCNC: 204 MG/DL (ref 0–150)
VANCOMYCIN RANDOM: 18.3 UG/ML
WBC # BLD: 26.7 K/UL (ref 4–11)

## 2018-06-17 PROCEDURE — 85027 COMPLETE CBC AUTOMATED: CPT

## 2018-06-17 PROCEDURE — 99232 SBSQ HOSP IP/OBS MODERATE 35: CPT | Performed by: INTERNAL MEDICINE

## 2018-06-17 PROCEDURE — 80048 BASIC METABOLIC PNL TOTAL CA: CPT

## 2018-06-17 PROCEDURE — 99291 CRITICAL CARE FIRST HOUR: CPT | Performed by: INTERNAL MEDICINE

## 2018-06-17 PROCEDURE — 36592 COLLECT BLOOD FROM PICC: CPT

## 2018-06-17 PROCEDURE — 90945 DIALYSIS ONE EVALUATION: CPT

## 2018-06-17 PROCEDURE — 9990 CHARGE CONVERSION

## 2018-06-17 PROCEDURE — 85384 FIBRINOGEN ACTIVITY: CPT

## 2018-06-17 PROCEDURE — 94750 CHARGE CONVERSION: CPT

## 2018-06-17 PROCEDURE — 84478 ASSAY OF TRIGLYCERIDES: CPT

## 2018-06-17 PROCEDURE — 80202 ASSAY OF VANCOMYCIN: CPT

## 2018-06-17 PROCEDURE — 71045 X-RAY EXAM CHEST 1 VIEW: CPT

## 2018-06-17 PROCEDURE — 36600 WITHDRAWAL OF ARTERIAL BLOOD: CPT

## 2018-06-17 PROCEDURE — 94762 N-INVAS EAR/PLS OXIMTRY CONT: CPT

## 2018-06-17 PROCEDURE — 85610 PROTHROMBIN TIME: CPT

## 2018-06-17 PROCEDURE — 83735 ASSAY OF MAGNESIUM: CPT

## 2018-06-17 PROCEDURE — C9113 INJ PANTOPRAZOLE SODIUM, VIA: HCPCS

## 2018-06-17 PROCEDURE — 82803 BLOOD GASES ANY COMBINATION: CPT

## 2018-06-17 PROCEDURE — 84100 ASSAY OF PHOSPHORUS: CPT

## 2018-06-17 PROCEDURE — 82330 ASSAY OF CALCIUM: CPT

## 2018-06-17 PROCEDURE — 94003 VENT MGMT INPAT SUBQ DAY: CPT

## 2018-06-17 RX ORDER — HEPARIN SODIUM 1000 [USP'U]/ML
1000 INJECTION, SOLUTION INTRAVENOUS; SUBCUTANEOUS ONCE
Status: COMPLETED | OUTPATIENT
Start: 2018-06-17 | End: 2018-06-17

## 2018-06-17 RX ADMIN — METHADONE HYDROCHLORIDE 10 MG: 10 TABLET ORAL at 05:42

## 2018-06-17 RX ADMIN — DIAZEPAM 10 MG: 10 TABLET ORAL at 09:25

## 2018-06-17 RX ADMIN — Medication 10 ML: at 09:38

## 2018-06-17 RX ADMIN — MIDAZOLAM HYDROCHLORIDE 2 MG: 1 INJECTION INTRAMUSCULAR; INTRAVENOUS at 14:49

## 2018-06-17 RX ADMIN — PANTOPRAZOLE SODIUM 40 MG: 40 INJECTION, POWDER, LYOPHILIZED, FOR SOLUTION INTRAVENOUS at 20:24

## 2018-06-17 RX ADMIN — METHYLPREDNISOLONE SODIUM SUCCINATE 30 MG: 40 INJECTION, POWDER, LYOPHILIZED, FOR SOLUTION INTRAMUSCULAR; INTRAVENOUS at 09:24

## 2018-06-17 RX ADMIN — MINERAL OIL AND WHITE PETROLATUM: 150; 830 OINTMENT OPHTHALMIC at 16:00

## 2018-06-17 RX ADMIN — MIDAZOLAM HYDROCHLORIDE 2 MG: 1 INJECTION INTRAMUSCULAR; INTRAVENOUS at 04:20

## 2018-06-17 RX ADMIN — DIAZEPAM 10 MG: 10 TABLET ORAL at 14:24

## 2018-06-17 RX ADMIN — FENTANYL CITRATE 50 MCG: 50 INJECTION, SOLUTION INTRAMUSCULAR; INTRAVENOUS at 01:22

## 2018-06-17 RX ADMIN — MINERAL OIL AND WHITE PETROLATUM: 150; 830 OINTMENT OPHTHALMIC at 00:21

## 2018-06-17 RX ADMIN — Medication 10 ML: at 04:20

## 2018-06-17 RX ADMIN — HEPARIN SODIUM 1000 UNITS: 1000 INJECTION, SOLUTION INTRAVENOUS; SUBCUTANEOUS at 19:26

## 2018-06-17 RX ADMIN — Medication 10 ML: at 20:24

## 2018-06-17 RX ADMIN — MINERAL OIL AND WHITE PETROLATUM: 150; 830 OINTMENT OPHTHALMIC at 09:25

## 2018-06-17 RX ADMIN — FENTANYL CITRATE 50 MCG: 50 INJECTION, SOLUTION INTRAMUSCULAR; INTRAVENOUS at 05:42

## 2018-06-17 RX ADMIN — METHADONE HYDROCHLORIDE 10 MG: 10 TABLET ORAL at 20:23

## 2018-06-17 RX ADMIN — FENTANYL CITRATE 50 MCG: 50 INJECTION, SOLUTION INTRAMUSCULAR; INTRAVENOUS at 20:21

## 2018-06-17 RX ADMIN — DEXTROSE MONOHYDRATE 12.5 G: 25 INJECTION, SOLUTION INTRAVENOUS at 00:04

## 2018-06-17 RX ADMIN — DEXTROSE MONOHYDRATE 12.5 G: 25 INJECTION, SOLUTION INTRAVENOUS at 02:00

## 2018-06-17 RX ADMIN — CHLORHEXIDINE GLUCONATE 15 ML: 0.12 RINSE ORAL at 20:23

## 2018-06-17 RX ADMIN — CARBOXYMETHYLCELLULOSE SODIUM 1 DROP: 10 GEL OPHTHALMIC at 10:15

## 2018-06-17 RX ADMIN — CARBOXYMETHYLCELLULOSE SODIUM 1 DROP: 10 GEL OPHTHALMIC at 22:44

## 2018-06-17 RX ADMIN — MINERAL OIL AND WHITE PETROLATUM: 150; 830 OINTMENT OPHTHALMIC at 14:16

## 2018-06-17 RX ADMIN — CARBOXYMETHYLCELLULOSE SODIUM 1 DROP: 10 GEL OPHTHALMIC at 02:03

## 2018-06-17 RX ADMIN — Medication 10 ML: at 20:23

## 2018-06-17 RX ADMIN — CARBOXYMETHYLCELLULOSE SODIUM 1 DROP: 10 GEL OPHTHALMIC at 18:31

## 2018-06-17 RX ADMIN — METHADONE HYDROCHLORIDE 10 MG: 10 TABLET ORAL at 14:24

## 2018-06-17 RX ADMIN — CARBOXYMETHYLCELLULOSE SODIUM 1 DROP: 10 GEL OPHTHALMIC at 14:25

## 2018-06-17 RX ADMIN — DIAZEPAM 10 MG: 10 TABLET ORAL at 20:24

## 2018-06-17 RX ADMIN — Medication 10 ML: at 02:00

## 2018-06-17 RX ADMIN — ACETAMINOPHEN 650 MG: 325 TABLET ORAL at 21:18

## 2018-06-17 RX ADMIN — Medication 10 ML: at 09:25

## 2018-06-17 RX ADMIN — PANTOPRAZOLE SODIUM 40 MG: 40 INJECTION, POWDER, LYOPHILIZED, FOR SOLUTION INTRAVENOUS at 09:25

## 2018-06-17 RX ADMIN — MINERAL OIL AND WHITE PETROLATUM: 150; 830 OINTMENT OPHTHALMIC at 20:23

## 2018-06-17 RX ADMIN — MINERAL OIL AND WHITE PETROLATUM: 150; 830 OINTMENT OPHTHALMIC at 03:53

## 2018-06-17 RX ADMIN — MIDAZOLAM HYDROCHLORIDE 2 MG: 1 INJECTION INTRAMUSCULAR; INTRAVENOUS at 19:26

## 2018-06-17 RX ADMIN — CHLORHEXIDINE GLUCONATE 15 ML: 0.12 RINSE ORAL at 09:24

## 2018-06-17 RX ADMIN — DEXTROSE MONOHYDRATE 12.5 G: 25 INJECTION, SOLUTION INTRAVENOUS at 09:57

## 2018-06-17 RX ADMIN — CARBOXYMETHYLCELLULOSE SODIUM 1 DROP: 10 GEL OPHTHALMIC at 05:42

## 2018-06-17 ASSESSMENT — PULMONARY FUNCTION TESTS
PIF_VALUE: 17
PIF_VALUE: 11
PIF_VALUE: 18
PIF_VALUE: 14
PIF_VALUE: 11
PIF_VALUE: .4
PIF_VALUE: 15
PIF_VALUE: 17

## 2018-06-17 ASSESSMENT — PAIN SCALES - GENERAL
PAINLEVEL_OUTOF10: 5
PAINLEVEL_OUTOF10: 0
PAINLEVEL_OUTOF10: 5
PAINLEVEL_OUTOF10: 0

## 2018-06-17 NOTE — PROGRESS NOTES
Dr Vlad Almaguer at the pt's bedside assessing the pt. Meds, labs, CRRT, and POC reviewed. New orders to stop CRRT today. Will continue to closely monitor.  Claudia Jade RN

## 2018-06-17 NOTE — PROGRESS NOTES
06/16/18 2247   Vent Information   Vent Type 840   Vent Mode AC/VC   Vt Ordered 350 mL   Rate Set 18 bmp   Peak Flow 50 L/min   Pressure Support 0 cmH20   FiO2  30 %   Sensitivity 3   PEEP/CPAP 5   I Time/ I Time % 0 s   Humidification Source Heated wire   Humidification Temp 35.4   Circuit Condensation Drained   Vent Patient Data   Vt Exhaled 166 mL   Rate Measured 24 br/min   Minute Volume 8.27 Liters   Peak Inspiratory Pressure 14 cmH2O   I:E Ratio 1:1.20   High Peep/I Pressure 0   Mean Airway Pressure 9 cmH20   Spontaneous Breathing Trial (SBT) RT Doc   Pulse 96   Cough/Sputum   Sputum How Obtained None   Breath Sounds   Right Upper Lobe Clear   Right Middle Lobe Diminished   Right Lower Lobe Diminished   Left Upper Lobe Clear   Left Lower Lobe Diminished   Additional Respiratory  Assessments   Resp 22   Position Semi-Lucero's   Alarm Settings   High Pressure Alarm 40 cmH2O   Low Minute Volume Alarm 2.5 L/min   High Respiratory Rate 40 br/min   Patient Observation   Observations water level is good

## 2018-06-17 NOTE — PROGRESS NOTES
1. 07     APTT:   No results for input(s): APTT in the last 72 hours. UA:No results for input(s): NITRITE, COLORU, PHUR, LABCAST, WBCUA, RBCUA, MUCUS, TRICHOMONAS, YEAST, BACTERIA, CLARITYU, SPECGRAV, LEUKOCYTESUR, UROBILINOGEN, BILIRUBINUR, BLOODU, GLUCOSEU, AMORPHOUS in the last 72 hours. Invalid input(s): Aneita Sings    Cultures:  6/5/18 Blood cx with MRSA, Strep pneumoniae and Enterobacter Cloacae  6/6/18 blood cx ngtd  6/7 Blood cx NGTD  6/12/18 blood NGTD  6/12 trach asp- MRSA      Films:  CXR 6/17: slightly improved, ETT in place low lung volumes; stable bilateral airspace opacities    Head CT 6/13- no acute disease    Chest/abd CT 6/13: Development of moderate diffuse ascites since the prior study.  Mosaic appearance of the spleen either due to multiple splenic infarcts or possible  splenic abscesses.  Question of perihepatic and subhepatic adhesions. Chronic gallbladder disease.  Bibasilar pneumonias and pulmonary nodules  consistent with septic emboli.  Mild anasarca. Assessment:  · Tricuspid valve endocarditis  · Acute respiratory failure with hypoxemia  · Polymicrobial bacteremia with MRSA strep pneumonia and Enterobacter  · Splenic infarcts/septic emboli  · Ascites  · Cavitary pneumonia secondary to septic emboli  · Possible right foot MTP septic joint  · IV drug abuse active  · Upper GI bleed secondary to angiodysplasia status post cauterization  · Acute liver failure secondary to shock -improving enzymes  · End-stage renal disease on hemodialysis  · Hepatitis C infection  · Severe leukocytosis  · Elevated tricglycerides      Plan:  · Mechanical ventilation per my orders. The ventilator was adjusted by me at the bedside for unstable, life threatening respiratory failure. Wean oxygen as tolerated. · Sedation/analgesia with propofol gtt and fentanyl gtt;  versed gtt;off propofol given elevated triglycerides  · abx: vanc D13 and merrem D5  · Solu-Medrol 30 mg IV daily for ?  Autoimmune hemolytic

## 2018-06-17 NOTE — PROGRESS NOTES
06/15/18   0545  06/16/18   0612  06/17/18   0517   WBC  44.7*  24.0*  26.7*   HGB  8.4*  8.9*  9.2*   HCT  26.1*  28.3*  28.9*   MCV  96.3  99.3  99.0   PLT  67*  59*  61*     BMP:   Recent Labs      06/16/18   0612  06/16/18   1435  06/16/18   1930  06/16/18   2121  06/17/18   0517   NA  136  133*   --   132*  137   K  4.0  4.3   --   4.2  4.5   CL  101  97*   --   98*  100   CO2  26  26   --   27  29   PHOS  1.8*   --   3.3   --   3.2   BUN  37*  30*   --   36*  37*   CREATININE  1.1  0.8   --   1.0  1.2*     LIVER PROFILE:   Recent Labs      06/15/18   1418  06/16/18   0612   AST  125*  104*   ALT  329*  267*   BILIDIR  3.9*  3.4*   BILITOT  4.9*  4.2*   ALKPHOS  679*  695*     PT/INR:   Recent Labs      06/15/18   0545  06/16/18   0612  06/17/18   0517   PROTIME  11.6  12.0  12.2   INR  1.02  1.05  1.07     APTT:   No results for input(s): APTT in the last 72 hours. Cultures:    BC: S aureus, S pneumonia and gram neg salazar    Assessment:    Active Problems:    IVDU (intravenous drug user)    Endocarditis of tricuspid valve    HCAP (healthcare-associated pneumonia)    Upper GI bleed    Sepsis due to Streptococcus pneumoniae (HCC)    Moderate protein-calorie malnutrition (HCC)    PEA (Pulseless electrical activity) (HCC)    DIC (disseminated intravascular coagulation) ([address]    Acute respiratory failure with hypoxia ([address]    Splenic infarction  Resolved Problems:    * No resolved hospital problems. *         Plan:    # septic shock- sec to bacteremia /septic emboli/endocarditis    Sustained PEA arrest x2  requiring multiple pressors, intubation     - weaned off pressors- levo.  Vasopressin  - monitor BP in ICU  - wbc improving from 60 K to 44 K - critical care and ID involved    #Acute resp failure/cavitary pneumonia    - s.p PEA arrest, off vent  6/11- now back on vent for increasing abd distention and pain   - pulm managing  - ct chest with no new findings except for septic emboli and cavitory pneumonia  - ct

## 2018-06-17 NOTE — PROGRESS NOTES
Pt agitated and biting ETT. Medicated with 2mg PRN versed. Will continue to monitor pt.  Lanre Vang RN

## 2018-06-17 NOTE — FLOWSHEET NOTE
Pt is RASS -1, opens eyes with name called or when touched. VSS. CRRT continues without problem. Will continue to closely monitor.  Stacie Garcia RN

## 2018-06-17 NOTE — PROGRESS NOTES
Department of Internal Medicine  Nephrology Attending Progress Note        SUBJECTIVE:  We are following this patient for JAMES on CRRT. The patient remains on CRRT    Physical Exam:    VITALS:  BP (!) 144/74   Pulse 127   Temp 98.3 °F (36.8 °C) (Axillary)   Resp 22   Ht 5' 2\" (1.575 m)   Wt 126 lb 8.7 oz (57.4 kg)   SpO2 99%   BMI 23.15 kg/m²   TEMPERATURE:  Current - Temp: 98.3 °F (36.8 °C); Max - Temp  Av.1 °F (36.7 °C)  Min: 97.2 °F (36.2 °C)  Max: 99.3 °F (37.4 °C)  RESPIRATIONS RANGE: Resp  Av.6  Min: 13  Max: 24  PULSE RANGE: Pulse  Av.2  Min: 96  Max: 133  BLOOD PRESSURE RANGE:  Systolic (04EII), QFZ:866 , Min:114 , RWV:489   ; Diastolic (85OHS), PFY:62, Min:59, Max:105    24HR INTAKE/OUTPUT:      Intake/Output Summary (Last 24 hours) at 18 1051  Last data filed at 18 0700   Gross per 24 hour   Intake             2366 ml   Output             4262 ml   Net            -1896 ml       Social History:  Family not present. Systems Review:  General:  Sedated and intubated  Pulmonary:   On vent  CV: negative, tachy with breathing trial  GI:  Tolerating TF  :  Not making much urine    Exam:  Constitutional:  Sedated and intubated   Cardiovascular:  tachy  Edema:  none  Respiratory:  Bilateral rhonchi  Gastrointestinal:  Soft, nt, nd    DATA:   Recent Labs      06/15/18   0545  18   0612  18   0517   WBC  44.7*  24.0*  26.7*   HGB  8.4*  8.9*  9.2*   HCT  26.1*  28.3*  28.9*   MCV  96.3  99.3  99.0   PLT  67*  59*  61*     Recent Labs      18   0612  18   1435  18   1930  18   2121  18   0517   NA  136  133*   --   132*  137   K  4.0  4.3   --   4.2  4.5   CL  101  97*   --   98*  100   CO2  26  26   --   27  29   GLUCOSE  94  135*   --   122*  84   PHOS  1.8*   --   3.3   --   3.2   MG  2.70*   --   2.70*   --   2.60*   BUN  37*  30*   --   36*  37*   CREATININE  1.1  0.8   --   1.0  1.2*   LABGLOM  >60  >60   --   >60  54*   GFRAA

## 2018-06-17 NOTE — PROGRESS NOTES
06/17/18 1910   Vent Information   Vent Type 840   Vent Mode AC/VC   Vt Ordered 350 mL   Rate Set 18 bmp   Peak Flow 50 L/min   Pressure Support 0 cmH20   FiO2  30 %   Sensitivity 3   PEEP/CPAP 5   I Time/ I Time % 0 s   Cuff Pressure (cm H2O) 25 cm H2O   Humidification Source Heated wire   Humidification Temp 36.4   Circuit Condensation Drained   Vent Patient Data   Vt Exhaled 392 mL   Rate Measured 19 br/min   Minute Volume 7.3 Liters   Peak Inspiratory Pressure 14 cmH2O   I:E Ratio 1:3.30   High Peep/I Pressure 0   Mean Airway Pressure 8.4 cmH20   Plateau Pressure 14 [id number]   Static Compliance 44 mL/cmH2O   Dynamic Compliance 44 mL/cmH2O   Spontaneous Breathing Trial (SBT) RT Doc   Pulse 129   SpO2 97 %   Cough/Sputum   Sputum How Obtained None   Breath Sounds   Right Upper Lobe Diminished   Right Middle Lobe Diminished   Right Lower Lobe Diminished   Left Upper Lobe Diminished   Left Lower Lobe Diminished   Additional Respiratory  Assessments   Resp 19   Position Semi-Lucero's   Alarm Settings   High Pressure Alarm 40 cmH2O   Low Minute Volume Alarm 2.5 L/min   High Respiratory Rate 40 br/min   Patient Observation   Observations . 5 ETT, 22 @ Lip   ETT (adult)   Placement Date/Time: 06/12/18 1500     Secured at 22 cm   Measured From [address] [place] [address],[address] [employer]   Site Condition Dry

## 2018-06-17 NOTE — PROGRESS NOTES
ABG drawn x 1l attempt(s) from Rt Radial artery. Patient had positive modified Boris's Test.  Patient was on 40% (LPM/Fio2) oxygen per Vent (device). Pressure held x 5 minutes. No bleeding or bruising noted at puncture site. Patient tolerated procedure well.

## 2018-06-17 NOTE — PROGRESS NOTES
Reassessment completed and documented in flowsheet. No changes noted. RASS -1. CVC is WNL with fentanyl infusing @ 150mcg/hr and versed infusing @ 6mg/hr. R Vas cath is WNL with CRRT currently running. OG in placed with TF @ 40mL/hr. Will continue to monitor.  Vita Sales RN

## 2018-06-17 NOTE — PROGRESS NOTES
ID Follow-up NOTE    CC: tricuspid valve endocarditis    Subjective:     Patient gives no history, is intubated, on vent sedated    Objective:     Patient Vitals for the past 24 hrs:   BP Temp Temp src Pulse Resp SpO2 Weight   18 0803 - - - 128 18 - -   18 0800 (!) 147/105 - - 115 20 - -   18 0756 - - - 117 20 - -   18 0702 136/77 - - 126 21 99 % -   18 0700 136/77 - - 128 21 - -   18 0600 136/82 - - 118 23 98 % -   18 0500 131/78 - - 110 20 100 % -   18 0400 (!) 132/93 - - 114 23 99 % 126 lb 8.7 oz (57.4 kg)   18 0318 - - - 101 20 - -   18 0300 126/77 97.5 °F (36.4 °C) Oral 103 20 100 % -   18 0200 121/85 - - 97 20 100 % -   18 0100 121/77 - - 102 20 99 % -   18 0000 131/86 - - 114 19 100 % -   18 2300 130/76 97.4 °F (36.3 °C) Temporal 96 21 99 % -   18 2247 - - - 96 22 - -   18 2200 121/70 - - 101 18 100 % -   18 2100 128/74 - - 106 18 99 % -   18 2000 127/69 - - 105 18 100 % -   18 1911 - - - 117 24 - -   18 1900 136/81 97.2 °F (36.2 °C) Temporal 105 18 99 % -   18 1800 125/74 - - 103 18 - -   18 1700 122/69 - - 104 19 - -   18 1600 (!) 114/59 98.8 °F (37.1 °C) Axillary 108 18 - -   18 1540 - - - 109 18 - -   18 1500 114/62 - - 114 18 - -   18 1400 (!) 144/80 - - 123 19 - -   18 1300 138/76 - - 121 19 - -   18 1200 139/82 99.3 °F (37.4 °C) Axillary 129 19 - -   18 1142 - - - 131 18 100 % -   18 1100 122/73 - - 133 22 - -   18 1000 129/78 - - 130 18 - -   18 0900 (!) 138/94 - - 134 20 - -   18 0825 - - - 141 20 100 % -     Temp (24hrs), Av °F (36.7 °C), Min:97.2 °F (36.2 °C), Max:99.3 °F (37.4 °C)          EXAM:  General:intubated mech vent. Afebrile; on weaning trial. Opens eyes to voice.  Does not really respond to questions   HEENT: conj icterus less marked  Neck: s nodes, fairly supple      LUNGS: coarse BS bilat

## 2018-06-17 NOTE — PROGRESS NOTES
Pt continuing to cough, appears agitated and restless in bed. ETT suctioning performed with thick, creamy secretions. 50mcg PRN fentanyl administered. Will continue to monitor.  Roseann Dillard RN

## 2018-06-18 LAB
ALBUMIN SERPL-MCNC: 2.5 G/DL (ref 3.4–5)
ALP BLD-CCNC: 448 U/L (ref 40–129)
ALT SERPL-CCNC: 127 U/L (ref 10–40)
AMMONIA: 35 UMOL/L (ref 11–51)
ANION GAP SERPL CALCULATED.3IONS-SCNC: 12 MMOL/L (ref 3–16)
AST SERPL-CCNC: 40 U/L (ref 15–37)
BASE EXCESS ARTERIAL: -2.2 MMOL/L (ref -3–3)
BILIRUB SERPL-MCNC: 2.5 MG/DL (ref 0–1)
BILIRUBIN DIRECT: 1.8 MG/DL (ref 0–0.3)
BILIRUBIN, INDIRECT: 0.7 MG/DL (ref 0–1)
BUN BLDV-MCNC: 70 MG/DL (ref 7–20)
CALCIUM SERPL-MCNC: 9 MG/DL (ref 8.3–10.6)
CARBOXYHEMOGLOBIN ARTERIAL: 3 % (ref 0–1.5)
CHLORIDE BLD-SCNC: 98 MMOL/L (ref 99–110)
CO2: 24 MMOL/L (ref 21–32)
CREAT SERPL-MCNC: 2.5 MG/DL (ref 0.6–1.1)
EKG ATRIAL RATE: 136 BPM
EKG DIAGNOSIS: NORMAL
EKG P AXIS: 43 DEGREES
EKG P-R INTERVAL: 194 MS
EKG Q-T INTERVAL: 244 MS
EKG QRS DURATION: 76 MS
EKG QTC CALCULATION (BAZETT): 367 MS
EKG R AXIS: 49 DEGREES
EKG T AXIS: 184 DEGREES
EKG VENTRICULAR RATE: 136 BPM
FIBRINOGEN: 443 MG/DL (ref 200–397)
GFR AFRICAN AMERICAN: 28
GFR NON-AFRICAN AMERICAN: 23
GLUCOSE BLD-MCNC: 123 MG/DL (ref 70–99)
GLUCOSE BLD-MCNC: 124 MG/DL (ref 70–99)
GLUCOSE BLD-MCNC: 73 MG/DL (ref 70–99)
GLUCOSE BLD-MCNC: 76 MG/DL (ref 70–99)
GLUCOSE BLD-MCNC: 81 MG/DL (ref 70–99)
GLUCOSE BLD-MCNC: 83 MG/DL (ref 70–99)
GLUCOSE BLD-MCNC: 90 MG/DL (ref 70–99)
HCO3 ARTERIAL: 23.1 MMOL/L (ref 21–29)
HCT VFR BLD CALC: 25.9 % (ref 36–48)
HEMOGLOBIN, ART, EXTENDED: 9 G/DL (ref 12–16)
HEMOGLOBIN: 8.4 G/DL (ref 12–16)
INR BLD: 1.11 (ref 0.86–1.14)
MCH RBC QN AUTO: 32.3 PG (ref 26–34)
MCHC RBC AUTO-ENTMCNC: 32.3 G/DL (ref 31–36)
MCV RBC AUTO: 100.2 FL (ref 80–100)
METHEMOGLOBIN ARTERIAL: 0.7 %
O2 CONTENT ARTERIAL: 12 ML/DL
O2 SAT, ARTERIAL: 93.1 %
O2 THERAPY: ABNORMAL
PCO2 ARTERIAL: 42.2 MMHG (ref 35–45)
PDW BLD-RTO: 27.5 % (ref 12.4–15.4)
PERFORMED ON: ABNORMAL
PERFORMED ON: ABNORMAL
PERFORMED ON: NORMAL
PH ARTERIAL: 7.36 (ref 7.35–7.45)
PLATELET # BLD: 94 K/UL (ref 135–450)
PMV BLD AUTO: 10.1 FL (ref 5–10.5)
PO2 ARTERIAL: 68.9 MMHG (ref 75–108)
POTASSIUM SERPL-SCNC: 4.5 MMOL/L (ref 3.5–5.1)
PROTHROMBIN TIME: 12.6 SEC (ref 9.8–13)
RBC # BLD: 2.59 M/UL (ref 4–5.2)
SODIUM BLD-SCNC: 134 MMOL/L (ref 136–145)
TCO2 ARTERIAL: 24.4 MMOL/L
TOTAL PROTEIN: 6.8 G/DL (ref 6.4–8.2)
TRIGL SERPL-MCNC: 210 MG/DL (ref 0–150)
TROPONIN: 0.18 NG/ML
TROPONIN: 0.18 NG/ML
TROPONIN: 0.22 NG/ML
VANCOMYCIN TROUGH: 48.7 UG/ML (ref 10–20)
WBC # BLD: 26.3 K/UL (ref 4–11)

## 2018-06-18 PROCEDURE — 36415 COLL VENOUS BLD VENIPUNCTURE: CPT

## 2018-06-18 PROCEDURE — 80076 HEPATIC FUNCTION PANEL: CPT

## 2018-06-18 PROCEDURE — 84484 ASSAY OF TROPONIN QUANT: CPT

## 2018-06-18 PROCEDURE — 99291 CRITICAL CARE FIRST HOUR: CPT | Performed by: INTERNAL MEDICINE

## 2018-06-18 PROCEDURE — 94003 VENT MGMT INPAT SUBQ DAY: CPT

## 2018-06-18 PROCEDURE — 93010 ELECTROCARDIOGRAM REPORT: CPT | Performed by: INTERNAL MEDICINE

## 2018-06-18 PROCEDURE — 94750 CHARGE CONVERSION: CPT

## 2018-06-18 PROCEDURE — C9113 INJ PANTOPRAZOLE SODIUM, VIA: HCPCS

## 2018-06-18 PROCEDURE — 71045 X-RAY EXAM CHEST 1 VIEW: CPT

## 2018-06-18 PROCEDURE — 84478 ASSAY OF TRIGLYCERIDES: CPT

## 2018-06-18 PROCEDURE — 80202 ASSAY OF VANCOMYCIN: CPT

## 2018-06-18 PROCEDURE — 85027 COMPLETE CBC AUTOMATED: CPT

## 2018-06-18 PROCEDURE — 99233 SBSQ HOSP IP/OBS HIGH 50: CPT | Performed by: INTERNAL MEDICINE

## 2018-06-18 PROCEDURE — 94762 N-INVAS EAR/PLS OXIMTRY CONT: CPT

## 2018-06-18 PROCEDURE — 9990 CHARGE CONVERSION

## 2018-06-18 PROCEDURE — 80048 BASIC METABOLIC PNL TOTAL CA: CPT

## 2018-06-18 PROCEDURE — 82140 ASSAY OF AMMONIA: CPT

## 2018-06-18 PROCEDURE — 85384 FIBRINOGEN ACTIVITY: CPT

## 2018-06-18 PROCEDURE — 82803 BLOOD GASES ANY COMBINATION: CPT

## 2018-06-18 PROCEDURE — 93005 ELECTROCARDIOGRAM TRACING: CPT

## 2018-06-18 PROCEDURE — 85610 PROTHROMBIN TIME: CPT

## 2018-06-18 RX ORDER — METHYLPREDNISOLONE SODIUM SUCCINATE 40 MG/ML
10 INJECTION, POWDER, LYOPHILIZED, FOR SOLUTION INTRAMUSCULAR; INTRAVENOUS DAILY
Status: DISCONTINUED | OUTPATIENT
Start: 2018-06-19 | End: 2018-06-19

## 2018-06-18 RX ORDER — PANTOPRAZOLE SODIUM 40 MG/10ML
40 INJECTION, POWDER, LYOPHILIZED, FOR SOLUTION INTRAVENOUS DAILY
Status: DISCONTINUED | OUTPATIENT
Start: 2018-06-19 | End: 2018-07-14

## 2018-06-18 RX ADMIN — PANTOPRAZOLE SODIUM 40 MG: 40 INJECTION, POWDER, LYOPHILIZED, FOR SOLUTION INTRAVENOUS at 08:11

## 2018-06-18 RX ADMIN — MINERAL OIL AND WHITE PETROLATUM: 150; 830 OINTMENT OPHTHALMIC at 00:40

## 2018-06-18 RX ADMIN — Medication 10 ML: at 08:12

## 2018-06-18 RX ADMIN — MIDAZOLAM HYDROCHLORIDE 2 MG: 1 INJECTION INTRAMUSCULAR; INTRAVENOUS at 01:22

## 2018-06-18 RX ADMIN — DIAZEPAM 10 MG: 10 TABLET ORAL at 21:08

## 2018-06-18 RX ADMIN — CHLORHEXIDINE GLUCONATE 15 ML: 0.12 RINSE ORAL at 21:08

## 2018-06-18 RX ADMIN — CARBOXYMETHYLCELLULOSE SODIUM 1 DROP: 10 GEL OPHTHALMIC at 17:25

## 2018-06-18 RX ADMIN — Medication 10 ML: at 21:09

## 2018-06-18 RX ADMIN — METHYLPREDNISOLONE SODIUM SUCCINATE 30 MG: 40 INJECTION, POWDER, LYOPHILIZED, FOR SOLUTION INTRAMUSCULAR; INTRAVENOUS at 08:11

## 2018-06-18 RX ADMIN — CARBOXYMETHYLCELLULOSE SODIUM 1 DROP: 10 GEL OPHTHALMIC at 15:06

## 2018-06-18 RX ADMIN — FENTANYL CITRATE 50 MCG: 50 INJECTION, SOLUTION INTRAMUSCULAR; INTRAVENOUS at 09:03

## 2018-06-18 RX ADMIN — CARBOXYMETHYLCELLULOSE SODIUM 1 DROP: 10 GEL OPHTHALMIC at 01:27

## 2018-06-18 RX ADMIN — METHADONE HYDROCHLORIDE 10 MG: 10 TABLET ORAL at 22:00

## 2018-06-18 RX ADMIN — MINERAL OIL AND WHITE PETROLATUM: 150; 830 OINTMENT OPHTHALMIC at 21:08

## 2018-06-18 RX ADMIN — CHLORHEXIDINE GLUCONATE 15 ML: 0.12 RINSE ORAL at 08:11

## 2018-06-18 RX ADMIN — CARBOXYMETHYLCELLULOSE SODIUM 1 DROP: 10 GEL OPHTHALMIC at 08:11

## 2018-06-18 RX ADMIN — CARBOXYMETHYLCELLULOSE SODIUM 1 DROP: 10 GEL OPHTHALMIC at 22:00

## 2018-06-18 RX ADMIN — MINERAL OIL AND WHITE PETROLATUM: 150; 830 OINTMENT OPHTHALMIC at 11:57

## 2018-06-18 RX ADMIN — METHADONE HYDROCHLORIDE 10 MG: 10 TABLET ORAL at 06:15

## 2018-06-18 RX ADMIN — MINERAL OIL AND WHITE PETROLATUM: 150; 830 OINTMENT OPHTHALMIC at 08:11

## 2018-06-18 RX ADMIN — CARBOXYMETHYLCELLULOSE SODIUM 1 DROP: 10 GEL OPHTHALMIC at 05:48

## 2018-06-18 RX ADMIN — Medication 10 ML: at 21:08

## 2018-06-18 RX ADMIN — DIAZEPAM 10 MG: 10 TABLET ORAL at 15:06

## 2018-06-18 RX ADMIN — MINERAL OIL AND WHITE PETROLATUM: 150; 830 OINTMENT OPHTHALMIC at 03:32

## 2018-06-18 RX ADMIN — MINERAL OIL AND WHITE PETROLATUM: 150; 830 OINTMENT OPHTHALMIC at 15:07

## 2018-06-18 RX ADMIN — METHADONE HYDROCHLORIDE 10 MG: 10 TABLET ORAL at 15:06

## 2018-06-18 RX ADMIN — DIAZEPAM 10 MG: 10 TABLET ORAL at 08:11

## 2018-06-18 ASSESSMENT — PAIN SCALES - GENERAL
PAINLEVEL_OUTOF10: 0
PAINLEVEL_OUTOF10: 2
PAINLEVEL_OUTOF10: 0
PAINLEVEL_OUTOF10: 0

## 2018-06-18 ASSESSMENT — PULMONARY FUNCTION TESTS
PIF_VALUE: 40
PIF_VALUE: 19
PIF_VALUE: 16
PIF_VALUE: 17
PIF_VALUE: 16
PIF_VALUE: 14

## 2018-06-18 NOTE — PROGRESS NOTES
This note also relates to the following rows which could not be included:  Vt Ordered - Cannot attach notes to unvalidated device data  Rate Set - Cannot attach notes to unvalidated device data  Peak Flow - Cannot attach notes to unvalidated device data  Pressure Support - Cannot attach notes to unvalidated device data  FiO2  - Cannot attach notes to unvalidated device data  Sensitivity - Cannot attach notes to unvalidated device data  PEEP/CPAP - Cannot attach notes to unvalidated device data  I Time/ I Time % - Cannot attach notes to unvalidated device data  Vt Exhaled - Cannot attach notes to unvalidated device data  Rate Measured - Cannot attach notes to unvalidated device data  Minute Volume - Cannot attach notes to unvalidated device data  Peak Inspiratory Pressure - Cannot attach notes to unvalidated device data  I:E Ratio - Cannot attach notes to unvalidated device data  High Peep/I Pressure - Cannot attach notes to unvalidated device data  Mean Airway Pressure - Cannot attach notes to unvalidated device data  Pulse - Cannot attach notes to unvalidated device data  SpO2 - Cannot attach notes to unvalidated device data  Resp - Cannot attach notes to unvalidated device data  High Pressure Alarm - Cannot attach notes to unvalidated device data  Low Minute Volume Alarm - Cannot attach notes to unvalidated device data  High Respiratory Rate - Cannot attach notes to unvalidated device data       06/18/18 0740   Vent Information   Vent Type 840   Vent Mode AC/VC   Humidification Source Heated wire   Humidification Temp 37   Humidification Temp Measured 35.5   Vent Patient Data   Plateau Pressure 14 [id number]   Static Compliance 41 mL/cmH2O   Dynamic Compliance 33 mL/cmH2O   Cough/Sputum   Sputum How Obtained Endotracheal;Suctioned   Cough Productive   Sputum Amount Small   Sputum Color Creamy;Dark red;Brown;Red   Tenacity Thick   Breath Sounds   Right Upper Lobe Clear   Right Middle Lobe Diminished   Right Lower Lobe

## 2018-06-18 NOTE — ONCOLOGY
Plan:     1.  Leukocytosis, anemia, thrombocytopenia  - suspect multifactorial  - baseline ACD with ESRD and Hep C - d/w nephrology, is chronically on Procrit  - suspect acute drop from GIB given hematemesis and rectal bleeding noted at the time of EGD  - EGD revealed angiodysplasia which was cauterized  - sepsis also likely contributing  - hemolysis Was a concern, but her haptoglobin is normal which makes this less of a concern - ok to wean/stop steroids    Disseminated intravascular coagulation  -Her fibrinogen and platelet count are stable  -She does not need blood products today    Anemia:  -She is slowly drifting down and this could be residual from her bleed  -No transfusion today  -cold agglutin titer is negative    Jennifer Gottlieb MD

## 2018-06-18 NOTE — PROGRESS NOTES
06/18/18 1923   Vent Information   Vent Mode AC/VC   Vt Ordered 350 mL   Rate Set 18 bmp   Peak Flow 50 L/min   FiO2  30 %   PEEP/CPAP 5   Vent Patient Data   Vt Exhaled 441 mL   Rate Measured 18 br/min   Minute Volume 6.79 Liters   Peak Inspiratory Pressure 17 cmH2O   I:E Ratio 1:3.4   Mean Airway Pressure 8.4 cmH20   Plateau Pressure 14 [id number]   Static Compliance 49 mL/cmH2O   Dynamic Compliance 37 mL/cmH2O   Spontaneous Breathing Trial (SBT) RT Doc   Pulse 110   SpO2 96 %   Cough/Sputum   Cough Productive   Sputum Amount Small   Sputum Color Creamy   Tenacity Thick

## 2018-06-18 NOTE — PROGRESS NOTES
volumes with bilateral ASD with satisfactory ETT and lines positions. ASSESSMENT:  · Acute hypoxemic respiratory failure   · Tricuspid valve infectious endocarditis  · Polymicrobial bacteremia with MRSA, strep pneumonia and Enterobacter  · Acute liver failure, ascites and splenic infarct/septic emboli  · Ascites  · Cavitary pneumonia- due to septic emboli  · Possible right foot MTP septic joint  · Upper GI bleed secondary to angiodysplasia status post cauterization   · DIC  · End-stage renal disease on hemodialysis  · IV drug abuse and Hepatitis C  · Severe leukocytosis  · Elevated triglyceride  · Cardiopulmonary arrest 6/8/2018   · Reintubated 6/12/2018       PLAN:  Mechanical ventilation as per my orders. The ventilator was adjusted by me at the bedside for unstable, life threatening respiratory failure. Follow ABG  IV Versed and Fentanyl, target RASS -2, with daily spontaneous awakening trial  Methadone 10 mg 3 times a day Valium 10 mg 3 times a day  Head of bed 30 degrees or higher at all times  Daily spontaneous breathing trial once PEEP less than 8, FiO2 less than 55%. Vancomycin day #13 and Meropenem day #7  Wean Solu-Medrol 10 IV daily - plan to wean off. D/W Dr. Eva Bhakta   Off CRRT and HD tomorrow   GI, podiatry, ID and oncology following  Tube feed   12 lead ECG  LFTs and ammonia   D/C daily fibrinogen   GI prophylaxis: Protonix change to daily   DVT prophylaxis: SCDs  MRSA prophylaxis: Bactroban          Total critical care time caring for this patient with life threatening, unstable organ failure, including direct patient contact, management of life support systems, review of data including imaging and labs, discussions with other team members and physicians is 40 minutes so far today, excluding procedures.

## 2018-06-18 NOTE — PROGRESS NOTES
Internal Medicine ICU Progress Note    34year old white female who was admitted for GI bleed. Had an EGD     BC positive for strep pneumonia, staph aureus and gram-negative salazar. Echocardiogram showed tricuspid valve endocarditis. She's had this before. CT chest showed septic emboli and possible splenic infarct. -transferred to ICU  For PEA arrest  ,intubated resuscitated with fluids      - Off levophed  Vaso weaned   Extubated to  RA     reintubated  , ongoing CRRT   No fevers   Ongoing SBT  Remains Tachycardic     Pt seen  on vent. Sedated, no distress, vent settings stable      Invasive Lines: Tunneled dialysis catheter, two peripheral IV       Recent Labs      18   0600  18   06   PHART  7.419  7.357   [id number]  39.2  42.2   PO2ART  90.2  68.9*       MV Settings:  Vent Mode: AC/VC Rate Set: 18 bmp/Vt Ordered: 350 mL/ /FiO2 : 30 %    IV:   midazolam 3 mg/hr (18 0317)    fentaNYL (SUBLIMAZE) infusion 100 mcg/hr (18 0548)    dextrose         Vitals:  Temp  Av.3 °F (37.4 °C)  Min: 97.6 °F (36.4 °C)  Max: 100.3 °F (37.9 °C)  Pulse  Av.9  Min: 108  Max: 140  BP  Min: 83/44  Max: 149/88  SpO2  Av.3 %  Min: 92 %  Max: 100 %  FiO2   Av %  Min: 30 %  Max: 30 %  Patient Vitals for the past 4 hrs:   BP Pulse Resp SpO2   18 1100 126/71 133 20 97 %   18 1000 117/67 134 19 97 %   18 0900 115/67 140 23 97 %   18 0800 117/66 133 19 95 %   18 0740 - 131 19 96 %       CVP: CVP (Mean): 5 mmHg      Intake/Output Summary (Last 24 hours) at 18 1115  Last data filed at 18 0500   Gross per 24 hour   Intake             1231 ml   Output              639 ml   Net              592 ml       EXAM:  General: young female on vent. sedated  Eyes: PERRL. No sclera icterus. No conjunctiva injected. ENT: No discharge. Neck: Trachea midline. Normal thyroid. Oral ETT and OG noted  Resp:  zulma air entry,  No rhonchi.  No dullness on percussion. CV:tachy . Regular rhythm. No edema. No JVD. Palpable pedal pulses. GI:   Non-distended. No masses. Still with abd tenderness , grimaces with deep palpation   No organmegaly. Normal bowel sounds. No hernia. Skin: Warm and dry. No nodule on exposed extremities. No rash on exposed extremities. appears jaundiced  Lymph: No cervical LAD. No supraclavicular LAD. M/S: . Right first MTP wound is drained  Improving erythema  Psych: sedated    Medications:  Scheduled Meds:   [START ON 6/19/2018] methylPREDNISolone  10 mg Intravenous Daily    meropenem (MERREM) IVPB  250 mg Intravenous Q8H    methadone  10 mg Oral 3 times per day    diazepam  10 mg Oral TID    chlorhexidine  15 mL Mouth/Throat BID    carboxymethylcellulose PF  1 drop Both Eyes Q4H    And    lubrifresh P.M.    Both Eyes Q4H    sodium chloride  250 mL Intravenous Once    pantoprazole  40 mg Intravenous BID    insulin lispro  0-6 Units Subcutaneous Q4H    lidocaine 1 % injection  5 mL Intradermal Once    sodium chloride flush  10 mL Intravenous 2 times per day    sodium chloride flush  10 mL Intravenous 2 times per day    darbepoetin emi-polysorbate  100 mcg Intravenous Weekly       PRN Meds:  fentanNYL, midazolam, dextrose, sodium phosphate IVPB **OR** [DISCONTINUED] sodium phosphate IVPB **OR** [DISCONTINUED] sodium phosphate IVPB **OR** [DISCONTINUED] sodium phosphate IVPB, ondansetron, glucose, glucagon (rDNA), sodium chloride flush, dextrose, sodium chloride flush, acetaminophen, promethazine, hydrOXYzine    Results:  CBC:   Recent Labs      06/16/18   0612  06/17/18   0517  06/18/18   0600   WBC  24.0*  26.7*  26.3*   HGB  8.9*  9.2*  8.4*   HCT  28.3*  28.9*  25.9*   MCV  99.3  99.0  100.2*   PLT  59*  61*  94*     BMP:   Recent Labs      06/16/18   0612   06/16/18   1930  06/16/18   2121  06/17/18   0517  06/18/18   0600   NA  136   < >   --   132*  137  134*   K  4.0   < >   --   4.2  4.5  4.5   CL  101   < >   --   98* She is growing MRSA, strep pneumoniae and Enterobacter cloacae. ID involved  Large pedunculated vegetation on septal leaflet of TV   Previously had MSSA TV endocarditis  Now on  vancomycin and merrem. Wbc remains very high at 60 K- to decrease steroids as above  Consider CHf given new tachycardia     #  Bacteremia from poly microbial organism. Source of infection could be tunneled dialysis catheter and her recurrent endocarditis. has underlying IV drug abuse.  She relapsed using IV drugs 2 months ago    #  End stage renal disease on HD  Now on  CRRT, off pressors,  Nephrology f/w  CRRT stopped     #  Hyperglycemia   - sec to steroids  - on ssi, TF on hold for SBT     # DIC, -   Sec to severe sepsis , on steroids- weaning   Hematology f/w     SCD for prophylaxis  TF        [facility], MD 6/18/2018 11:15 AM

## 2018-06-18 NOTE — PROGRESS NOTES
marked; pupils equal, gaze dysconjugate  Chest: line sites look ok  Neck: s nodes, fairly supple      LUNGS: coarse BS bilat      CV: RR c gd I syst M lower LSB     ABD: soft, nontender. No mass or omegaly     EXT: without edema;   Skin: no rash or other skin stigmata of endocarditis; icterus nearly resolved  . Data Review:    Lab Results   Component Value Date    WBC 26.3 (H) 06/18/2018    HGB 8.4 (L) 06/18/2018    HCT 25.9 (L) 06/18/2018    .2 (H) 06/18/2018    PLT 94 (L) 06/18/2018     Lab Results   Component Value Date    CREATININE 2.5 (H) 06/18/2018    BUN 70 (H) 06/18/2018     (L) 06/18/2018    K 4.5 06/18/2018    CL 98 (L) 06/18/2018    CO2 24 06/18/2018     Lab Results   Component Value Date     (H) 06/16/2018     (H) 06/16/2018    ALKPHOS 695 (H) 06/16/2018    BILITOT 4.2 (H) 06/16/2018   vancomycin random level 38    MICRO: 6/5 blood cultures both growing MRSA. One culture also growing strep while the other culture is also growing Enterobacter sensitive to cefepime. More recent blood cultures negative. 6/6 blood cultures x 2 negative but 6/7 blood culture appears positive for MRSA once again. Several blood cultures drawn after 6/7 are still negative. 6/12 respiratory culture: MRSA    IMAGING: CXR: cavitary multifocal infiltrates presumably related to septic pulmonary emboli, improving. CT head: NAD;   CT chest: iniltrates and cavitary pulmonary nodules consistent with septic emboli. CT abd and pelvis: ascites, splenic abscess or infarct.    Assessment:     Active Problems:    IVDU (intravenous drug user)    Endocarditis of tricuspid valve    HCAP (healthcare-associated pneumonia)    Upper GI bleed    Sepsis due to Streptococcus pneumoniae (HCC)    Moderate protein-calorie malnutrition (HCC)    PEA (Pulseless electrical activity) (HCC)    DIC (disseminated intravascular coagulation) ([facility] [address]    Acute respiratory failure with hypoxia (HCC)    Splenic infarction  Resolved

## 2018-06-18 NOTE — PROGRESS NOTES
[facility]       Progress Note           ID   34y.o. year old female who we are seeing in consultation for ESRD. Sub/interval history  Tachycardic  electrolytes stable     CRRT stopped on 6/11 PM but needed to be resumed on 6/12 afternoon;stopped on 6/17  re intubated on 6/12  Extubated on 6/11    ROS: UTO  PSFH: No visitor    Scheduled Meds:   [START ON 6/19/2018] methylPREDNISolone  10 mg Intravenous Daily    meropenem (MERREM) IVPB  250 mg Intravenous Q8H    methadone  10 mg Oral 3 times per day    diazepam  10 mg Oral TID    chlorhexidine  15 mL Mouth/Throat BID    carboxymethylcellulose PF  1 drop Both Eyes Q4H    And    lubrifresh P.M.    Both Eyes Q4H    sodium chloride  250 mL Intravenous Once    pantoprazole  40 mg Intravenous BID    insulin lispro  0-6 Units Subcutaneous Q4H    lidocaine 1 % injection  5 mL Intradermal Once    sodium chloride flush  10 mL Intravenous 2 times per day    sodium chloride flush  10 mL Intravenous 2 times per day    darbepoetin emi-polysorbate  100 mcg Intravenous Weekly     Continuous Infusions:   midazolam 3 mg/hr (06/18/18 0317)    fentaNYL (SUBLIMAZE) infusion 100 mcg/hr (06/18/18 0548)    dextrose       PRN Meds:.fentanNYL, midazolam, dextrose, sodium phosphate IVPB **OR** [DISCONTINUED] sodium phosphate IVPB **OR** [DISCONTINUED] sodium phosphate IVPB **OR** [DISCONTINUED] sodium phosphate IVPB, ondansetron, glucose, glucagon (rDNA), sodium chloride flush, dextrose, sodium chloride flush, acetaminophen, promethazine, hydrOXYzine      Objective/   GEN:  Chronically ill, /67   Pulse 140   Temp 98.9 °F (37.2 °C) (Oral)   Resp 23   Ht 5' 2\" (1.575 m)   Wt 126 lb 8.7 oz (57.4 kg)   SpO2 97%   BMI 23.15 kg/m²   CV: S1, S2 without m/r/g; no edema  RESP:clear anteriorly  ACCESS: R IJ TDC  gen-intubated, sedated   abd - distended, BS+    Data/  Recent Labs      06/16/18   0612  06/17/18   0517  06/18/18   0600   WBC  24.0* 26.7*  26.3*   HGB  8.9*  9.2*  8.4*   HCT  28.3*  28.9*  25.9*   MCV  99.3  99.0  100.2*   PLT  59*  61*  94*     Recent Labs      06/16/18   0612   06/16/18   1930  06/16/18   2121  06/17/18   0517  06/18/18   0600   NA  136   < >   --   132*  137  134*   K  4.0   < >   --   4.2  4.5  4.5   CL  101   < >   --   98*  100  98*   CO2  26   < >   --   27  29  24   GLUCOSE  94   < >   --   122*  84  81   PHOS  1.8*   --   3.3   --   3.2   --    MG  2.70*   --   2.70*   --   2.60*   --    BUN  37*   < >   --   36*  37*  70*   CREATININE  1.1   < >   --   1.0  1.2*  2.5*   LABGLOM  >60   < >   --   >60  54*  23*   GFRAA  >60   < >   --   >60  >60  28*    < > = values in this interval not displayed. CT chest/a/p on 6/12/18  Impression   Development of moderate diffuse ascites since the prior study.  Mosaic   appearance of the spleen either due to multiple splenic infarcts or possible   splenic abscesses.  Question of perihepatic and subhepatic adhesions. Chronic gallbladder disease.  Bibasilar pneumonias and pulmonary nodules   consistent with septic emboli.  Mild anasarca.       RECOMMENDATIONS:   NG tube should be pulled back about 4 cm since it is pressing against the   anterior stomach wall.       Gallbladder ultrasound suggested for evaluation of gallbladder disease.       Diagnostic Ultrasound-guided paracentesis may be helpful. Impression   Multiple scattered cavitary and solid nodules scattered throughout the lungs   suspected to be from septic emboli.  The emboli may be from recurrent   endocarditis, IV drug abuse, or infected DVT.  Scattered focal pneumonia in   the lingula, right middle lobe, and right lower lobe.  Trace bilateral   pleural effusions.  Tip of the endotracheal tube lying near the tee.    Moderate ascites seen in the upper abdomen.  Possible multiple splenic   infarcts or abscesses.  Please refer to the dictation of the CT scan of the   abdomen and pelvis.       RECOMMENDATIONS: Endotracheal tube should be pulled back 1-2 cm.          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

## 2018-06-18 NOTE — PROGRESS NOTES
Eric Prieto NP updated regarding Pt's elevated HR. Give fentanyl PRN and continue to monitor.   Roberto Maya RN

## 2018-06-18 NOTE — PROGRESS NOTES
Versed gtt increased at this time d/t agitation. Pt responded well. Resting comfortably at this time, HR continues to be elevated. MD aware.   Lenore Maya RN

## 2018-06-18 NOTE — PLAN OF CARE
Problem: Risk for Impaired Skin Integrity  Goal: Tissue integrity - skin and mucous membranes  Structural intactness and normal physiological function of skin and  mucous membranes. Outcome: Ongoing  Increased rash through berenice area. Area cleaned and dry. Problem: Gas Exchange - Impaired:  Goal: Levels of oxygenation will improve  Levels of oxygenation will improve   Outcome: Ongoing  Pt O2 sats had decreased with heart rate up Pt recovered after several rounds of 100% O2. Pt suctioned for mod amount of mucous.

## 2018-06-18 NOTE — PROGRESS NOTES
This note also relates to the following rows which could not be included:  Vt Ordered - Cannot attach notes to unvalidated device data  Rate Set - Cannot attach notes to unvalidated device data  Peak Flow - Cannot attach notes to unvalidated device data  Pressure Support - Cannot attach notes to unvalidated device data  FiO2  - Cannot attach notes to unvalidated device data  Sensitivity - Cannot attach notes to unvalidated device data  PEEP/CPAP - Cannot attach notes to unvalidated device data  I Time/ I Time % - Cannot attach notes to unvalidated device data  Vt Exhaled - Cannot attach notes to unvalidated device data  Rate Measured - Cannot attach notes to unvalidated device data  Minute Volume - Cannot attach notes to unvalidated device data  Peak Inspiratory Pressure - Cannot attach notes to unvalidated device data  I:E Ratio - Cannot attach notes to unvalidated device data  High Peep/I Pressure - Cannot attach notes to unvalidated device data  Mean Airway Pressure - Cannot attach notes to unvalidated device data  Pulse - Cannot attach notes to unvalidated device data  SpO2 - Cannot attach notes to unvalidated device data  Resp - Cannot attach notes to unvalidated device data  High Pressure Alarm - Cannot attach notes to unvalidated device data  Low Minute Volume Alarm - Cannot attach notes to unvalidated device data  High Respiratory Rate - Cannot attach notes to unvalidated device data       06/18/18 1511   Vent Information   Vent Type 840   Vent Mode AC/VC   Cough/Sputum   Sputum How Obtained Endotracheal;Suctioned   Cough Productive   Sputum Amount Small   Sputum Color Brown;Creamy   Tenacity Thick   Breath Sounds   Right Upper Lobe Diminished   Right Middle Lobe Diminished   Right Lower Lobe Diminished   Left Upper Lobe Diminished   Left Lower Lobe Diminished   Additional Respiratory  Assessments   Position Semi-Lucero's   Alarm Settings   Apnea (secs) 20 secs   Low Exhaled Vt  200 mL   ETT (adult)

## 2018-06-18 NOTE — PROGRESS NOTES
06/18/18 1128   Vent Information   Vent Type 840   Vent Mode AC/VC   Vt Ordered 350 mL   Rate Set 18 bmp   Peak Flow 50 L/min   Pressure Support 0 cmH20   FiO2  30 %   Sensitivity 3   PEEP/CPAP 5   I Time/ I Time % 0 s   Vent Patient Data   Vt Exhaled 427 mL   Rate Measured 18 br/min   Minute Volume 7.29 Liters   Peak Inspiratory Pressure 16 cmH2O   I:E Ratio 1:3.40   High Peep/I Pressure 0   Mean Airway Pressure 7.8 cmH20   Spontaneous Breathing Trial (SBT) RT Doc   Pulse 132   SpO2 100 %   Cough/Sputum   Sputum How Obtained Endotracheal   Cough Productive   Sputum Amount Small   Sputum Color Brown;Creamy   Tenacity Thick   Breath Sounds   Right Upper Lobe Diminished   Right Middle Lobe Diminished   Right Lower Lobe Diminished   Left Upper Lobe Diminished   Left Lower Lobe Diminished   Additional Respiratory  Assessments   Resp 17   Position Semi-Lucero's   Oral Care Completed? Yes   Oral Care Mouth suctioned   Subglottic Suction Done?  Yes   Alarm Settings   High Pressure Alarm 40 cmH2O   Low Minute Volume Alarm 2.5 L/min   High Respiratory Rate 40 br/min   ETT (adult)   Placement Date/Time: 06/12/18 1500     Secured at 22 cm   Measured From Lips   ET Placement Right   Secured By [employer] tube cordova

## 2018-06-18 NOTE — PROGRESS NOTES
Report given to Florencia Duke RN at bedside for transfer of care. Pt resting comfortably at this time, call light within reach.   Jamal Maya RN

## 2018-06-18 NOTE — PROGRESS NOTES
Dr Jaya Beasley called with updates on the pt's status. Orders received to end SBT and flip pt back to [facility] mode. RT called and aware of new orders. Fentanyl gtt increased to 150 mcg, and versed gtt to 3 mg. Will continue to closely monitor.  Abhi Duron RN

## 2018-06-18 NOTE — PROGRESS NOTES
06/17/18 2301   Vent Information   Vent Type 840   Vent Mode AC/VC   Vt Ordered 350 mL   Rate Set 18 bmp   Peak Flow 50 L/min   Pressure Support 0 cmH20   FiO2  30 %   Sensitivity 3   PEEP/CPAP 5   I Time/ I Time % 0 s   Humidification Source Heated wire   Humidification Temp 36.6   Circuit Condensation Drained   Vent Patient Data   Vt Exhaled 417 mL   Rate Measured 18 br/min   Minute Volume 6.69 Liters   Peak Inspiratory Pressure 15 cmH2O   I:E Ratio 1:3.40   High Peep/I Pressure 0   Mean Airway Pressure 8.2 cmH20   Spontaneous Breathing Trial (SBT) RT Doc   Pulse 112   SpO2 99 %   Cough/Sputum   Sputum How Obtained Endotracheal   Cough Productive   Sputum Amount Small   Sputum Color Creamy;Cloudy   Tenacity Thick   Breath Sounds   Right Upper Lobe Rhonchi   Right Middle Lobe Diminished   Right Lower Lobe Diminished   Left Upper Lobe Rhonchi   Left Lower Lobe Diminished   Additional Respiratory  Assessments   Resp 18   Position Semi-Lucero's   Alarm Settings   High Pressure Alarm 40 cmH2O   Low Minute Volume Alarm 2.5 L/min   High Respiratory Rate 40 br/min   Patient Observation   Observations water bag changed

## 2018-06-18 NOTE — PROGRESS NOTES
Pt bathed and CVC dressing changed. Pt reaches for ETT tube when awake with both R  & L hand. HR up to 135. Pt had several episodes of desaturation- recovering with 100% for 2 minutes then dropping again. Versed increased to 3 mg/hr. Pt suctioned per ETT for moderate amount of mucous. Pt finally settled down HR now 126 with O2 sat maintaining at 95%.

## 2018-06-19 LAB
ALBUMIN SERPL-MCNC: 2.5 G/DL (ref 3.4–5)
ALP BLD-CCNC: 398 U/L (ref 40–129)
ALT SERPL-CCNC: 99 U/L (ref 10–40)
ANION GAP SERPL CALCULATED.3IONS-SCNC: 16 MMOL/L (ref 3–16)
AST SERPL-CCNC: 38 U/L (ref 15–37)
BASE EXCESS ARTERIAL: -5.5 MMOL/L (ref -3–3)
BILIRUB SERPL-MCNC: 2.1 MG/DL (ref 0–1)
BILIRUBIN DIRECT: 1.5 MG/DL (ref 0–0.3)
BILIRUBIN, INDIRECT: 0.6 MG/DL (ref 0–1)
BUN BLDV-MCNC: 123 MG/DL (ref 7–20)
CALCIUM SERPL-MCNC: 8.6 MG/DL (ref 8.3–10.6)
CARBOXYHEMOGLOBIN ARTERIAL: 2.8 % (ref 0–1.5)
CHLORIDE BLD-SCNC: 97 MMOL/L (ref 99–110)
CO2: 21 MMOL/L (ref 21–32)
CREAT SERPL-MCNC: 4.3 MG/DL (ref 0.6–1.1)
GFR AFRICAN AMERICAN: 15
GFR NON-AFRICAN AMERICAN: 12
GLUCOSE BLD-MCNC: 102 MG/DL (ref 70–99)
GLUCOSE BLD-MCNC: 103 MG/DL (ref 70–99)
GLUCOSE BLD-MCNC: 121 MG/DL (ref 70–99)
GLUCOSE BLD-MCNC: 156 MG/DL (ref 70–99)
GLUCOSE BLD-MCNC: 84 MG/DL (ref 70–99)
GLUCOSE BLD-MCNC: 87 MG/DL (ref 70–99)
GLUCOSE BLD-MCNC: 88 MG/DL (ref 70–99)
HCO3 ARTERIAL: 19.9 MMOL/L (ref 21–29)
HCT VFR BLD CALC: 24.8 % (ref 36–48)
HEMOGLOBIN, ART, EXTENDED: 8.4 G/DL (ref 12–16)
HEMOGLOBIN: 7.8 G/DL (ref 12–16)
INR BLD: 1.13 (ref 0.86–1.14)
MCH RBC QN AUTO: 31.5 PG (ref 26–34)
MCHC RBC AUTO-ENTMCNC: 31.6 G/DL (ref 31–36)
MCV RBC AUTO: 99.7 FL (ref 80–100)
METHEMOGLOBIN ARTERIAL: 0.7 %
O2 CONTENT ARTERIAL: 11 ML/DL
O2 SAT, ARTERIAL: 94.5 %
O2 THERAPY: ABNORMAL
PCO2 ARTERIAL: 38.6 MMHG (ref 35–45)
PDW BLD-RTO: 24.8 % (ref 12.4–15.4)
PERFORMED ON: ABNORMAL
PERFORMED ON: NORMAL
PERFORMED ON: NORMAL
PH ARTERIAL: 7.33 (ref 7.35–7.45)
PLATELET # BLD: 147 K/UL (ref 135–450)
PMV BLD AUTO: 9.2 FL (ref 5–10.5)
PO2 ARTERIAL: 76.6 MMHG (ref 75–108)
POTASSIUM SERPL-SCNC: 4.9 MMOL/L (ref 3.5–5.1)
PROTHROMBIN TIME: 12.9 SEC (ref 9.8–13)
RBC # BLD: 2.49 M/UL (ref 4–5.2)
SODIUM BLD-SCNC: 134 MMOL/L (ref 136–145)
TCO2 ARTERIAL: 21.1 MMOL/L
TOTAL PROTEIN: 6.5 G/DL (ref 6.4–8.2)
VANCOMYCIN RANDOM: 38.6 UG/ML
WBC # BLD: 27.4 K/UL (ref 4–11)

## 2018-06-19 PROCEDURE — 94762 N-INVAS EAR/PLS OXIMTRY CONT: CPT

## 2018-06-19 PROCEDURE — C9113 INJ PANTOPRAZOLE SODIUM, VIA: HCPCS

## 2018-06-19 PROCEDURE — 94750 CHARGE CONVERSION: CPT

## 2018-06-19 PROCEDURE — P9047 ALBUMIN (HUMAN), 25%, 50ML: HCPCS

## 2018-06-19 PROCEDURE — 85027 COMPLETE CBC AUTOMATED: CPT

## 2018-06-19 PROCEDURE — 36415 COLL VENOUS BLD VENIPUNCTURE: CPT

## 2018-06-19 PROCEDURE — 82803 BLOOD GASES ANY COMBINATION: CPT

## 2018-06-19 PROCEDURE — 99291 CRITICAL CARE FIRST HOUR: CPT | Performed by: INTERNAL MEDICINE

## 2018-06-19 PROCEDURE — 85610 PROTHROMBIN TIME: CPT

## 2018-06-19 PROCEDURE — 9990 CHARGE CONVERSION

## 2018-06-19 PROCEDURE — 71045 X-RAY EXAM CHEST 1 VIEW: CPT

## 2018-06-19 PROCEDURE — 94003 VENT MGMT INPAT SUBQ DAY: CPT

## 2018-06-19 PROCEDURE — 80202 ASSAY OF VANCOMYCIN: CPT

## 2018-06-19 PROCEDURE — 80076 HEPATIC FUNCTION PANEL: CPT

## 2018-06-19 PROCEDURE — 80048 BASIC METABOLIC PNL TOTAL CA: CPT

## 2018-06-19 PROCEDURE — 99233 SBSQ HOSP IP/OBS HIGH 50: CPT | Performed by: INTERNAL MEDICINE

## 2018-06-19 RX ORDER — HEPARIN SODIUM 1000 [USP'U]/ML
4100 INJECTION, SOLUTION INTRAVENOUS; SUBCUTANEOUS PRN
Status: DISCONTINUED | OUTPATIENT
Start: 2018-06-19 | End: 2018-06-26

## 2018-06-19 RX ORDER — ALBUMIN (HUMAN) 12.5 G/50ML
12.5 SOLUTION INTRAVENOUS PRN
Status: DISCONTINUED | OUTPATIENT
Start: 2018-06-19 | End: 2018-07-18 | Stop reason: HOSPADM

## 2018-06-19 RX ADMIN — ACETAMINOPHEN 650 MG: 325 TABLET ORAL at 07:56

## 2018-06-19 RX ADMIN — DIAZEPAM 10 MG: 10 TABLET ORAL at 21:42

## 2018-06-19 RX ADMIN — Medication 10 ML: at 07:56

## 2018-06-19 RX ADMIN — CHLORHEXIDINE GLUCONATE 15 ML: 0.12 RINSE ORAL at 21:44

## 2018-06-19 RX ADMIN — ACETAMINOPHEN 650 MG: 325 TABLET ORAL at 21:42

## 2018-06-19 RX ADMIN — METHADONE HYDROCHLORIDE 10 MG: 10 TABLET ORAL at 21:42

## 2018-06-19 RX ADMIN — Medication 10 ML: at 20:00

## 2018-06-19 RX ADMIN — Medication 10 ML: at 19:59

## 2018-06-19 RX ADMIN — HEPARIN SODIUM 4100 UNITS: 1000 INJECTION, SOLUTION INTRAVENOUS; SUBCUTANEOUS at 12:39

## 2018-06-19 RX ADMIN — MINERAL OIL AND WHITE PETROLATUM: 150; 830 OINTMENT OPHTHALMIC at 07:55

## 2018-06-19 RX ADMIN — MINERAL OIL AND WHITE PETROLATUM: 150; 830 OINTMENT OPHTHALMIC at 20:00

## 2018-06-19 RX ADMIN — METHYLPREDNISOLONE SODIUM SUCCINATE 10 MG: 40 INJECTION, POWDER, LYOPHILIZED, FOR SOLUTION INTRAMUSCULAR; INTRAVENOUS at 07:55

## 2018-06-19 RX ADMIN — METHADONE HYDROCHLORIDE 10 MG: 10 TABLET ORAL at 14:54

## 2018-06-19 RX ADMIN — CARBOXYMETHYLCELLULOSE SODIUM 1 DROP: 10 GEL OPHTHALMIC at 14:54

## 2018-06-19 RX ADMIN — MINERAL OIL AND WHITE PETROLATUM: 150; 830 OINTMENT OPHTHALMIC at 00:49

## 2018-06-19 RX ADMIN — ALBUMIN (HUMAN) 12.5 G: 12.5 SOLUTION INTRAVENOUS at 10:15

## 2018-06-19 RX ADMIN — CARBOXYMETHYLCELLULOSE SODIUM 1 DROP: 10 GEL OPHTHALMIC at 17:46

## 2018-06-19 RX ADMIN — MINERAL OIL AND WHITE PETROLATUM: 150; 830 OINTMENT OPHTHALMIC at 03:37

## 2018-06-19 RX ADMIN — CHLORHEXIDINE GLUCONATE 15 ML: 0.12 RINSE ORAL at 07:55

## 2018-06-19 RX ADMIN — PANTOPRAZOLE SODIUM 40 MG: 40 INJECTION, POWDER, FOR SOLUTION INTRAVENOUS at 07:55

## 2018-06-19 RX ADMIN — CARBOXYMETHYLCELLULOSE SODIUM 1 DROP: 10 GEL OPHTHALMIC at 21:43

## 2018-06-19 RX ADMIN — CARBOXYMETHYLCELLULOSE SODIUM 1 DROP: 10 GEL OPHTHALMIC at 07:55

## 2018-06-19 RX ADMIN — ALBUMIN (HUMAN) 12.5 G: 12.5 SOLUTION INTRAVENOUS at 08:40

## 2018-06-19 RX ADMIN — DIAZEPAM 10 MG: 10 TABLET ORAL at 14:54

## 2018-06-19 RX ADMIN — MINERAL OIL AND WHITE PETROLATUM: 150; 830 OINTMENT OPHTHALMIC at 12:00

## 2018-06-19 RX ADMIN — ALBUMIN (HUMAN) 12.5 G: 12.5 SOLUTION INTRAVENOUS at 08:39

## 2018-06-19 RX ADMIN — CARBOXYMETHYLCELLULOSE SODIUM 1 DROP: 10 GEL OPHTHALMIC at 03:37

## 2018-06-19 RX ADMIN — DIAZEPAM 10 MG: 10 TABLET ORAL at 07:56

## 2018-06-19 RX ADMIN — FENTANYL CITRATE 50 MCG: 50 INJECTION, SOLUTION INTRAMUSCULAR; INTRAVENOUS at 12:00

## 2018-06-19 RX ADMIN — METHADONE HYDROCHLORIDE 10 MG: 10 TABLET ORAL at 07:56

## 2018-06-19 ASSESSMENT — PAIN SCALES - GENERAL
PAINLEVEL_OUTOF10: 0
PAINLEVEL_OUTOF10: 7
PAINLEVEL_OUTOF10: 0

## 2018-06-19 ASSESSMENT — PULMONARY FUNCTION TESTS
PIF_VALUE: 14
PIF_VALUE: 17
PIF_VALUE: 22

## 2018-06-19 NOTE — PROGRESS NOTES
This note also relates to the following rows which could not be included:  Vt Ordered - Cannot attach notes to unvalidated device data  Rate Set - Cannot attach notes to unvalidated device data  Peak Flow - Cannot attach notes to unvalidated device data  Pressure Support - Cannot attach notes to unvalidated device data  FiO2  - Cannot attach notes to unvalidated device data  Sensitivity - Cannot attach notes to unvalidated device data  PEEP/CPAP - Cannot attach notes to unvalidated device data  I Time/ I Time % - Cannot attach notes to unvalidated device data  Vt Exhaled - Cannot attach notes to unvalidated device data  Rate Measured - Cannot attach notes to unvalidated device data  Minute Volume - Cannot attach notes to unvalidated device data  Peak Inspiratory Pressure - Cannot attach notes to unvalidated device data  I:E Ratio - Cannot attach notes to unvalidated device data  High Peep/I Pressure - Cannot attach notes to unvalidated device data  Mean Airway Pressure - Cannot attach notes to unvalidated device data  Pulse - Cannot attach notes to unvalidated device data  SpO2 - Cannot attach notes to unvalidated device data  Resp - Cannot attach notes to unvalidated device data  High Pressure Alarm - Cannot attach notes to unvalidated device data  Low Minute Volume Alarm - Cannot attach notes to unvalidated device data  High Respiratory Rate - Cannot attach notes to unvalidated device data       06/19/18 0701   Vent Information   Vent Type 840   Vent Mode AC/VC   Humidification Source Heated wire   Humidification Temp 37   Humidification Temp Measured 35.5   Vent Patient Data   Plateau Pressure 15 [id number]   Static Compliance 42 mL/cmH2O   Dynamic Compliance 38 mL/cmH2O   Cough/Sputum   Sputum How Obtained Endotracheal;Suctioned   Cough Productive   Sputum Amount Large   Sputum Color Tan;Creamy   Tenacity Thick   Breath Sounds   Right Upper Lobe Clear   Right Middle Lobe Clear;Diminished   Right Lower Lobe Diminished

## 2018-06-19 NOTE — PROGRESS NOTES
ID Follow-up NOTE    CC: tricuspid valve endocarditis    Subjective:     Patient gives no history, is intubated, on vent; sedated    Objective:     Patient Vitals for the past 24 hrs:   BP Temp Temp src Pulse Resp SpO2   18 0800 130/71 - - 128 19 99 %   18 0700 124/71 100.6 °F (38.1 °C) Oral 122 18 91 %   18 0500 116/68 - - 121 18 95 %   18 0400 (!) 112/59 - - 115 18 94 %   18 0300 120/67 - - 117 18 92 %   18 0200 119/70 - - 116 19 92 %   18 0100 113/62 - - 110 18 97 %   18 0000 (!) 112/57 98.9 °F (37.2 °C) Oral 110 18 97 %   18 2331 - - - 113 19 94 %   18 2330 - - - 112 18 95 %   18 2300 111/60 - - 112 18 95 %   18 2200 110/60 - - 115 18 94 %   18 2100 121/71 - - 112 18 97 %   18 2002 112/64 - - - - 97 %   18 - - - 114 18 97 %   18 1923 - - - 110 18 96 %   18 1900 120/70 98.9 °F (37.2 °C) Oral 113 18 96 %   18 1750 - 98.7 °F (37.1 °C) Oral - - -   18 1700 109/69 - - 110 18 99 %   18 1400 111/67 - - 117 18 98 %   18 1324 - 99.6 °F (37.6 °C) Oral - - -   18 1300 118/66 - - 124 19 98 %   18 1200 - - - 128 21 97 %   18 1128 - - - 132 17 100 %   18 1100 126/71 101.1 °F (38.4 °C) Oral 133 20 97 %   18 1000 117/67 - - 134 19 97 %   18 0900 115/67 - - 140 23 97 %     Temp (24hrs), Av.6 °F (37.6 °C), Min:98.7 °F (37.1 °C), Max:101.1 °F (38.4 °C)          EXAM:  General:intubated mech vent. Low grade fever. Afebrile; sedated. Does not respond to verbal stim; tachycardic at about 140  HEENT: conj icterus less marked; pupils equal, gaze dysconjugate  Neck: s nodes, fairly supple      LUNGS: coarse BS bilat      CV: RR c gd I syst M lower LSB     ABD: soft, question mild tenderness. No mass or omegaly     EXT: without edema; Abscess R great toe resolved,  Skin: no rash or other skin stigmata of endocarditis; icterus less intense.   Vancomycin random level this of the streptococcus and the Enterobacter is less clear. possible splenic infarct or abscess. s/p arrest x2. Icterus and liver enzyme abmnormalities improving  Continues to have a leukocytosis and occasional low grade fever; would suspect these are related to septic pulmonary infarcts  Diarrhea, C diff negative. vanc level high as CRRT was stopped. Appears to have SVT this am  Plan:   vanc level 38  Day 14 of vanco  Don't see a compelling reason to have patient on meropenem at this point. Will de-escalate to cefepime to continue to cover the Enterobacter in one of the admission blood cultures.

## 2018-06-19 NOTE — PROGRESS NOTES
Report given to Pauline Blanca RN at bedside for transfer of care. Pt resting comfortably at this time, call light within reach. Mother at bedside.  Roberto Maya RN

## 2018-06-19 NOTE — PROGRESS NOTES
Pt resting comfortably at this time, Precedex increased d/t restlessness and agitation. See MAR. Mother at bedside, will continue to monitor.   Lulu Maya RN

## 2018-06-19 NOTE — ONCOLOGY
Hematology/Oncology Progress Note       Subjective: The patient remains intubated and sedated. ROS:     Unable to assess     Objective:     Physical examination:     /71   Pulse 122   Temp 100.6 °F (38.1 °C) (Oral)   Resp 18   Ht 5' 2\" (1.575 m)   Wt 126 lb 8.7 oz (57.4 kg)   SpO2 91%   BMI 23.15 kg/m²     CONSTITUTIONAL: Intubated on CRRT  HEMATOLOGIC/LYMPHATICS:  no cervical lymphadenopathy, no supraclavicular lymphadenopathy, no axillary lymphadenopathy and no inguinal lymphadenopathy  BACK:  Symmetric, no curvature, spinous processes are non-tender on palpation, paraspinous muscles are non-tender on palpation, no costal vertebral tenderness  LUNGS:  Clear to auscultation bilaterally, no crackles or wheezing  CARDIOVASCULAR:  Regular rate and rhythm, normal S1 and S2, no S3 or S4, and no murmur noted  ABDOMEN:  Normal bowel sounds, soft, non-distended, non-tender, no masses palpated, no hepatosplenomegally  MUSCULOSKELETAL:  There is no redness, warmth, or swelling of the joints  NEUROLOGIC:   No focal findings. SKIN:  Scattered bruising and ecchymoses. EXT: without clubbing, cyanosis or edema.     Data:     CBC:   Recent Labs      06/19/18   0555  06/18/18   0600  06/17/18   0517   WBC  27.4*  26.3*  26.7*   HGB  7.8*  8.4*  9.2*   HCT  24.8*  25.9*  28.9*   MCV  99.7  100.2*  99.0   PLT  147  94*  61*       BMP:   Lab Results   Component Value Date     06/19/2018    K 4.9 06/19/2018    K 5.9 06/12/2018    CO2 21 06/19/2018     06/19/2018    CREATININE 4.3 06/19/2018    CALCIUM 8.6 06/19/2018    MG 2.60 06/17/2018    TSH 4.47 11/26/2017       HEPATIC:  Lab Results   Component Value Date    AST 40 06/18/2018     06/18/2018    ALKPHOS 448 06/18/2018    PROT 6.8 06/18/2018    BILITOT 2.5 06/18/2018    BILIDIR 1.8 06/18/2018     06/08/2018       Current Medications:     Current Facility-Administered Medications: methylPREDNISolone sodium (SOLU-MEDROL) injection 10 Leukocytosis, anemia, thrombocytopenia  - suspect multifactorial  - baseline ACD with ESRD and Hep C - d/w nephrology, is chronically on Procrit  - suspect acute drop from GIB given hematemesis and rectal bleeding noted at the time of EGD  - EGD revealed angiodysplasia which was cauterized  - sepsis also likely contributing  - hemolysis Was a concern, but her haptoglobin is normal which makes this less of a concern - ok to wean/stop steroids    Disseminated intravascular coagulation  -Her fibrinogen and platelet count are stable  -She does not need blood products today    Anemia:  -She is slowly drifting down and this could be residual from her bleed  -No transfusion today  -cold agglutin titer is negative    Ronni Melgar MD

## 2018-06-19 NOTE — PROGRESS NOTES
IVPB  250 mg Intravenous Q8H    methadone  10 mg Oral 3 times per day    diazepam  10 mg Oral TID    chlorhexidine  15 mL Mouth/Throat BID    carboxymethylcellulose PF  1 drop Both Eyes Q4H    And    lubrifresh P.M. Both Eyes Q4H    sodium chloride  250 mL Intravenous Once    insulin lispro  0-6 Units Subcutaneous Q4H    lidocaine 1 % injection  5 mL Intradermal Once    sodium chloride flush  10 mL Intravenous 2 times per day    sodium chloride flush  10 mL Intravenous 2 times per day    darbepoetin emi-polysorbate  100 mcg Intravenous Weekly     PRN Meds:  fentanNYL, midazolam, dextrose, sodium phosphate IVPB **OR** [DISCONTINUED] sodium phosphate IVPB **OR** [DISCONTINUED] sodium phosphate IVPB **OR** [DISCONTINUED] sodium phosphate IVPB, ondansetron, glucose, glucagon (rDNA), sodium chloride flush, dextrose, sodium chloride flush, acetaminophen, promethazine, hydrOXYzine    Results:  CBC:   Recent Labs      06/17/18   0517  06/18/18   0600  06/19/18   0555   WBC  26.7*  26.3*  27.4*   HGB  9.2*  8.4*  7.8*   HCT  28.9*  25.9*  24.8*   MCV  99.0  100.2*  99.7   PLT  61*  94*  147     BMP:   Recent Labs      06/16/18   1930   06/17/18   0517  06/18/18   0600  06/19/18   0555   NA   --    < >  137  134*  134*   K   --    < >  4.5  4.5  4.9   CL   --    < >  100  98*  97*   CO2   --    < >  29  24  21   PHOS  3.3   --   3.2   --    --    BUN   --    < >  37*  70*  123*   CREATININE   --    < >  1.2*  2.5*  4.3*    < > = values in this interval not displayed.      LIVER PROFILE:   Recent Labs      06/18/18   1720   AST  40*   ALT  127*   BILIDIR  1.8*   BILITOT  2.5*   ALKPHOS  448*       Cultures:  BCx 6/7 MRSA and Streptococcus   BCx 6/12 NGTD  Resp Cx 6/12 Staph aureus MRSA (A) and Enterobacter cloacae (A)      Films:  CXR 6/19 was reviewed by me and it showed low lung volumes with bilateral ASD with satisfactory ETT and lines positions- no changes     ASSESSMENT:  · Acute hypoxemic respiratory failure · Tricuspid valve infectious endocarditis  · Polymicrobial bacteremia with MRSA, strep pneumonia and Enterobacter  · Acute liver failure, ascites and splenic infarct/septic emboli  · Ascites  · Cavitary pneumonia- due to septic emboli  · Possible right foot MTP septic joint  · Upper GI bleed secondary to angiodysplasia status post cauterization   · DIC  · End-stage renal disease on hemodialysis  · IV drug abuse and Hepatitis C  · Severe leukocytosis  · Elevated triglyceride  · Cardiopulmonary arrest 6/8/2018   · Reintubated 6/12/2018       PLAN:  Mechanical ventilation as per my orders. The ventilator was adjusted by me at the bedside for unstable, life threatening respiratory failure. Follow ABG  IV Versed and Fentanyl, target RASS -2, with daily spontaneous awakening trial  Precedex drip   Methadone 10 mg 3 times a day Valium 10 mg 3 times a day  Head of bed 30 degrees or higher at all times  Daily spontaneous breathing trial once PEEP less than 8, FiO2 less than 55%. Vancomycin day #14 and Meropenem day #8  D/C Solu-Medrol 10 IV daily per Dr. Adeola Landaverde   HD today   Tube feed   GI prophylaxis: Protonix change to daily   DVT prophylaxis: SCDs  MRSA prophylaxis: Bactroban          Total critical care time caring for this patient with life threatening, unstable organ failure, including direct patient contact, management of life support systems, review of data including imaging and labs, discussions with other team members and physicians is 32 minutes so far today, excluding procedures.

## 2018-06-19 NOTE — FLOWSHEET NOTE
Treatment time: 4 hours  Net UF: 500 ml    Pre weight: 53.6 kg   Post weight: 52.9 kg  EDW: TBD kg( 56.5 KG -HD clinic dry weight)    Access used: right chest wall TDC  Access function: Well with  ml/min    Medications or blood products given: 25% Albumin 37.5 gm , Hecotorol and Aranesp    Regular outpatient schedule: MWF    Summary of response to treatment: Good, hypotension once and albumin given. Copy of dialysis treatment record placed in chart, to be scanned into EMR.     06/19/18 0830 06/19/18 1245   Vital Signs   /68 (!) 143/71   Temp 99 °F (37.2 °C) 98.4 °F (36.9 °C)   Pulse 126 122   Weight 118 lb 2.7 oz (53.6 kg) 116 lb 10 oz (52.9 kg)   Weight Method Actual;Bed scale  (1 pillow, 2 sheets and 1 pad) Actual;Bed scale   Percent Weight Change -6.62 -1.31   Pain Assessment   Pain Assessment CPOT CPOT   Pain Level 0 0   Post-Hemodialysis Assessment   Post-Treatment Procedures --  Blood returned;Catheter capped, clamped and heparinized x 2 ports   Machine Disinfection Process --  Acid/Vinegar Clean;Heat Disinfect; Exterior Machine Disinfection   Rinseback Volume (ml) --  400 ml   Total Liters Processed (l/min) --  90.3 l/min   Dialyzer Clearance --  Lightly streaked   Duration of Treatment (minutes) --  210 minutes   Heparin amount administered during treatment (units) --  0 units   Hemodialysis Intake (ml) --  750 ml   Hemodialysis Output (ml) --  1250 ml   NET Removed (ml) --  500 ml   Tolerated Treatment --  Fair   Bilateral Breath Sounds Clear;Diminished Clear;Diminished   Edema None None

## 2018-06-19 NOTE — PROGRESS NOTES
AM assessment complete, see flowsheet. Pt responds to voice and follows commands. Continues with elevated HR. PRN tylenol administered per OG for elevated temp. Dialysis RN at bedside preparing pt for hemodialysis. Oral care provided, pt resting comfortably at this time, will continue to monitor.   Jamal Maya RN

## 2018-06-19 NOTE — PROGRESS NOTES
06/19/18 1949   Vent Information   Vt Ordered 350 mL   Rate Set 18 bmp   Peak Flow 50 L/min   FiO2  40 %   Sensitivity 3   PEEP/CPAP 5   Vent Patient Data   Vt Exhaled 339 mL   Rate Measured 18 br/min   Minute Volume 6.39 Liters   Peak Inspiratory Pressure 17 cmH2O   I:E Ratio 1:3.40   Mean Airway Pressure 8.1 cmH20   Plateau Pressure 15 BXO60   Static Compliance 34 mL/cmH2O   Dynamic Compliance 28 mL/cmH2O   Spontaneous Breathing Trial (SBT) RT Doc   Pulse 94   SpO2 96 %   Cough/Sputum   Cough Productive   Sputum Amount Moderate   Sputum Color Creamy; Tan   Tenacity Thick

## 2018-06-19 NOTE — PROGRESS NOTES
refer to the dictation of the CT scan of the   abdomen and pelvis.       RECOMMENDATIONS:   Endotracheal tube should be pulled back 1-2 cm.          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

## 2018-06-19 NOTE — PROGRESS NOTES
Positive Boris's test to right wrist. ABG drawn as per P&P. Site prepped with x's one attempt. Pressure held for 5 minutes with dressing applied. No further bleeding or hematoma noted.  Denys Saint, RN

## 2018-06-20 LAB
ALBUMIN SERPL-MCNC: 2.8 G/DL (ref 3.4–5)
ALBUMIN SERPL-MCNC: 2.9 G/DL (ref 3.4–5)
ALP BLD-CCNC: 325 U/L (ref 40–129)
ALT SERPL-CCNC: 70 U/L (ref 10–40)
ANION GAP SERPL CALCULATED.3IONS-SCNC: 13 MMOL/L (ref 3–16)
ANION GAP SERPL CALCULATED.3IONS-SCNC: 14 MMOL/L (ref 3–16)
ANISOCYTOSIS: ABNORMAL
APPEARANCE BAL (LAVAGE): ABNORMAL
AST SERPL-CCNC: 45 U/L (ref 15–37)
BANDED NEUTROPHILS RELATIVE PERCENT: 9 % (ref 0–7)
BASE EXCESS ARTERIAL: -1.4 MMOL/L (ref -3–3)
BASE EXCESS ARTERIAL: -4.7 MMOL/L (ref -3–3)
BASOPHILS ABSOLUTE: 0 K/UL (ref 0–0.2)
BASOPHILS RELATIVE PERCENT: 0 %
BILIRUB SERPL-MCNC: 2.6 MG/DL (ref 0–1)
BILIRUBIN DIRECT: 1.8 MG/DL (ref 0–0.3)
BILIRUBIN, INDIRECT: 0.8 MG/DL (ref 0–1)
BUN BLDV-MCNC: 63 MG/DL (ref 7–20)
BUN BLDV-MCNC: 83 MG/DL (ref 7–20)
CALCIUM SERPL-MCNC: 8.8 MG/DL (ref 8.3–10.6)
CALCIUM SERPL-MCNC: 9.1 MG/DL (ref 8.3–10.6)
CARBOXYHEMOGLOBIN ARTERIAL: 2.1 % (ref 0–1.5)
CARBOXYHEMOGLOBIN ARTERIAL: 2.6 % (ref 0–1.5)
CHLORIDE BLD-SCNC: 101 MMOL/L (ref 99–110)
CHLORIDE BLD-SCNC: 97 MMOL/L (ref 99–110)
CLOT EVALUATION BAL: ABNORMAL
CO2: 21 MMOL/L (ref 21–32)
CO2: 23 MMOL/L (ref 21–32)
COLOR LAVAGE: ABNORMAL
CREAT SERPL-MCNC: 2.8 MG/DL (ref 0.6–1.1)
CREAT SERPL-MCNC: 3.6 MG/DL (ref 0.6–1.1)
EOSINOPHILS ABSOLUTE: 0 K/UL (ref 0–0.6)
EOSINOPHILS RELATIVE PERCENT: 0 %
GFR AFRICAN AMERICAN: 18
GFR AFRICAN AMERICAN: 25
GFR NON-AFRICAN AMERICAN: 15
GFR NON-AFRICAN AMERICAN: 20
GLUCOSE BLD-MCNC: 100 MG/DL (ref 70–99)
GLUCOSE BLD-MCNC: 74 MG/DL (ref 70–99)
GLUCOSE BLD-MCNC: 77 MG/DL (ref 70–99)
GLUCOSE BLD-MCNC: 91 MG/DL (ref 70–99)
GLUCOSE BLD-MCNC: 92 MG/DL (ref 70–99)
GLUCOSE BLD-MCNC: 95 MG/DL (ref 70–99)
HCO3 ARTERIAL: 20 MMOL/L (ref 21–29)
HCO3 ARTERIAL: 23.1 MMOL/L (ref 21–29)
HCT VFR BLD CALC: 24.2 % (ref 36–48)
HCT VFR BLD CALC: 28 % (ref 36–48)
HEMOGLOBIN, ART, EXTENDED: 9.1 G/DL (ref 12–16)
HEMOGLOBIN, ART, EXTENDED: 9.5 G/DL (ref 12–16)
HEMOGLOBIN: 7.7 G/DL (ref 12–16)
HEMOGLOBIN: 8.9 G/DL (ref 12–16)
INR BLD: 1.24 (ref 0.86–1.14)
LYMPHOCYTES ABSOLUTE: 3.3 K/UL (ref 1–5.1)
LYMPHOCYTES RELATIVE PERCENT: 11 %
LYMPHOCYTES, BAL: 6 % (ref 5–10)
MACROPHAGES, BAL: 28 % (ref 90–95)
MCH RBC QN AUTO: 31.6 PG (ref 26–34)
MCH RBC QN AUTO: 31.6 PG (ref 26–34)
MCHC RBC AUTO-ENTMCNC: 31.9 G/DL (ref 31–36)
MCHC RBC AUTO-ENTMCNC: 32 G/DL (ref 31–36)
MCV RBC AUTO: 98.9 FL (ref 80–100)
MCV RBC AUTO: 98.9 FL (ref 80–100)
METAMYELOCYTES RELATIVE PERCENT: 4 %
METHEMOGLOBIN ARTERIAL: 0.6 %
METHEMOGLOBIN ARTERIAL: 0.8 %
MONOCYTES ABSOLUTE: 0.9 K/UL (ref 0–1.3)
MONOCYTES RELATIVE PERCENT: 3 %
MYELOCYTE PERCENT: 1 %
NEUTROPHILS ABSOLUTE: 25.6 K/UL (ref 1.7–7.7)
NEUTROPHILS RELATIVE PERCENT: 72 %
NUMBER OF CELLS COUNTED BAL (LAVAGE): 200
O2 CONTENT ARTERIAL: 12 ML/DL
O2 CONTENT ARTERIAL: 13 ML/DL
O2 SAT, ARTERIAL: 93.4 %
O2 SAT, ARTERIAL: 96.7 %
O2 THERAPY: ABNORMAL
O2 THERAPY: ABNORMAL
PCO2 ARTERIAL: 35.3 MMHG (ref 35–45)
PCO2 ARTERIAL: 38 MMHG (ref 35–45)
PDW BLD-RTO: 24.3 % (ref 12.4–15.4)
PDW BLD-RTO: 24.9 % (ref 12.4–15.4)
PERFORMED ON: ABNORMAL
PERFORMED ON: NORMAL
PH ARTERIAL: 7.37 (ref 7.35–7.45)
PH ARTERIAL: 7.4 (ref 7.35–7.45)
PHOSPHORUS: 4.6 MG/DL (ref 2.5–4.9)
PLATELET # BLD: 168 K/UL (ref 135–450)
PLATELET # BLD: 187 K/UL (ref 135–450)
PMV BLD AUTO: 8.8 FL (ref 5–10.5)
PMV BLD AUTO: 8.9 FL (ref 5–10.5)
PO2 ARTERIAL: 66.9 MMHG (ref 75–108)
PO2 ARTERIAL: 89.3 MMHG (ref 75–108)
POTASSIUM SERPL-SCNC: 4.3 MMOL/L (ref 3.5–5.1)
POTASSIUM SERPL-SCNC: 4.3 MMOL/L (ref 3.5–5.1)
PROTHROMBIN TIME: 14.1 SEC (ref 9.8–13)
RBC # BLD: 2.45 M/UL (ref 4–5.2)
RBC # BLD: 2.83 M/UL (ref 4–5.2)
RBC, BAL: ABNORMAL /CUMM
SEGMENTED NEUTROPHILS, BAL: 66 % (ref 5–10)
SODIUM BLD-SCNC: 134 MMOL/L (ref 136–145)
SODIUM BLD-SCNC: 135 MMOL/L (ref 136–145)
TCO2 ARTERIAL: 21.1 MMOL/L
TCO2 ARTERIAL: 24.3 MMOL/L
TOTAL PROTEIN: 7 G/DL (ref 6.4–8.2)
VANCOMYCIN RANDOM: 20.6 UG/ML
WBC # BLD: 24.6 K/UL (ref 4–11)
WBC # BLD: 29.8 K/UL (ref 4–11)
WBC/EPI CELLS BAL: 328 /CUMM

## 2018-06-20 PROCEDURE — 88112 CYTOPATH CELL ENHANCE TECH: CPT

## 2018-06-20 PROCEDURE — 88305 TISSUE EXAM BY PATHOLOGIST: CPT

## 2018-06-20 PROCEDURE — 9990 CHARGE CONVERSION

## 2018-06-20 PROCEDURE — 99291 CRITICAL CARE FIRST HOUR: CPT | Performed by: INTERNAL MEDICINE

## 2018-06-20 PROCEDURE — 86403 PARTICLE AGGLUT ANTBDY SCRN: CPT

## 2018-06-20 PROCEDURE — 87070 CULTURE OTHR SPECIMN AEROBIC: CPT

## 2018-06-20 PROCEDURE — 80048 BASIC METABOLIC PNL TOTAL CA: CPT

## 2018-06-20 PROCEDURE — 94761 N-INVAS EAR/PLS OXIMETRY MLT: CPT

## 2018-06-20 PROCEDURE — 87077 CULTURE AEROBIC IDENTIFY: CPT

## 2018-06-20 PROCEDURE — 87015 SPECIMEN INFECT AGNT CONCNTJ: CPT

## 2018-06-20 PROCEDURE — 94750 CHARGE CONVERSION: CPT

## 2018-06-20 PROCEDURE — 87252 VIRUS INOCULATION TISSUE: CPT

## 2018-06-20 PROCEDURE — 36415 COLL VENOUS BLD VENIPUNCTURE: CPT

## 2018-06-20 PROCEDURE — 99232 SBSQ HOSP IP/OBS MODERATE 35: CPT | Performed by: INTERNAL MEDICINE

## 2018-06-20 PROCEDURE — 87205 SMEAR GRAM STAIN: CPT

## 2018-06-20 PROCEDURE — 89051 BODY FLUID CELL COUNT: CPT

## 2018-06-20 PROCEDURE — 0B9D8ZX DRAINAGE OF RIGHT MIDDLE LUNG LOBE, VIA NATURAL OR ARTIFICIAL OPENING ENDOSCOPIC, DIAGNOSTIC: ICD-10-PCS | Performed by: INTERNAL MEDICINE

## 2018-06-20 PROCEDURE — 80076 HEPATIC FUNCTION PANEL: CPT

## 2018-06-20 PROCEDURE — 71045 X-RAY EXAM CHEST 1 VIEW: CPT

## 2018-06-20 PROCEDURE — 87116 MYCOBACTERIA CULTURE: CPT

## 2018-06-20 PROCEDURE — 0BCB8ZZ EXTIRPATION OF MATTER FROM LEFT LOWER LOBE BRONCHUS, VIA NATURAL OR ARTIFICIAL OPENING ENDOSCOPIC: ICD-10-PCS | Performed by: INTERNAL MEDICINE

## 2018-06-20 PROCEDURE — 85025 COMPLETE CBC W/AUTO DIFF WBC: CPT

## 2018-06-20 PROCEDURE — 31645 BRNCHSC W/THER ASPIR 1ST: CPT | Performed by: INTERNAL MEDICINE

## 2018-06-20 PROCEDURE — 87040 BLOOD CULTURE FOR BACTERIA: CPT

## 2018-06-20 PROCEDURE — 94640 AIRWAY INHALATION TREATMENT: CPT

## 2018-06-20 PROCEDURE — 87280 RESPIRATORY SYNCYTIAL AG IF: CPT

## 2018-06-20 PROCEDURE — 94003 VENT MGMT INPAT SUBQ DAY: CPT

## 2018-06-20 PROCEDURE — 80202 ASSAY OF VANCOMYCIN: CPT

## 2018-06-20 PROCEDURE — 87186 SC STD MICRODIL/AGAR DIL: CPT

## 2018-06-20 PROCEDURE — 84100 ASSAY OF PHOSPHORUS: CPT

## 2018-06-20 PROCEDURE — 0BC68ZZ EXTIRPATION OF MATTER FROM RIGHT LOWER LOBE BRONCHUS, VIA NATURAL OR ARTIFICIAL OPENING ENDOSCOPIC: ICD-10-PCS | Performed by: INTERNAL MEDICINE

## 2018-06-20 PROCEDURE — 85610 PROTHROMBIN TIME: CPT

## 2018-06-20 PROCEDURE — 85027 COMPLETE CBC AUTOMATED: CPT

## 2018-06-20 PROCEDURE — 31624 DX BRONCHOSCOPE/LAVAGE: CPT | Performed by: INTERNAL MEDICINE

## 2018-06-20 PROCEDURE — 87254 VIRUS INOCULATION SHELL VIA: CPT

## 2018-06-20 PROCEDURE — 88312 SPECIAL STAINS GROUP 1: CPT

## 2018-06-20 PROCEDURE — 87102 FUNGUS ISOLATION CULTURE: CPT

## 2018-06-20 PROCEDURE — C9113 INJ PANTOPRAZOLE SODIUM, VIA: HCPCS

## 2018-06-20 PROCEDURE — 82803 BLOOD GASES ANY COMBINATION: CPT

## 2018-06-20 PROCEDURE — 87081 CULTURE SCREEN ONLY: CPT

## 2018-06-20 PROCEDURE — 87206 SMEAR FLUORESCENT/ACID STAI: CPT

## 2018-06-20 RX ORDER — MORPHINE SULFATE 4 MG/ML
4 INJECTION, SOLUTION INTRAMUSCULAR; INTRAVENOUS EVERY 4 HOURS PRN
Status: DISCONTINUED | OUTPATIENT
Start: 2018-06-20 | End: 2018-06-30

## 2018-06-20 RX ORDER — MORPHINE SULFATE 2 MG/ML
4 INJECTION, SOLUTION INTRAMUSCULAR; INTRAVENOUS EVERY 4 HOURS PRN
Status: DISCONTINUED | OUTPATIENT
Start: 2018-06-20 | End: 2018-06-20

## 2018-06-20 RX ORDER — SODIUM CHLORIDE FOR INHALATION 3 %
15 VIAL, NEBULIZER (ML) INHALATION 4 TIMES DAILY
Status: DISCONTINUED | OUTPATIENT
Start: 2018-06-20 | End: 2018-06-27

## 2018-06-20 RX ORDER — SODIUM CHLORIDE 9 MG/ML
INJECTION, SOLUTION INTRAVENOUS
Status: COMPLETED
Start: 2018-06-20 | End: 2018-06-20

## 2018-06-20 RX ORDER — PROPOFOL 10 MG/ML
10 INJECTION, EMULSION INTRAVENOUS
Status: DISCONTINUED | OUTPATIENT
Start: 2018-06-20 | End: 2018-06-21

## 2018-06-20 RX ORDER — LINEZOLID 2 MG/ML
600 INJECTION, SOLUTION INTRAVENOUS EVERY 12 HOURS
Status: DISCONTINUED | OUTPATIENT
Start: 2018-06-20 | End: 2018-06-20

## 2018-06-20 RX ORDER — HEPARIN SODIUM 5000 [USP'U]/ML
5000 INJECTION, SOLUTION INTRAVENOUS; SUBCUTANEOUS EVERY 8 HOURS SCHEDULED
Status: DISCONTINUED | OUTPATIENT
Start: 2018-06-20 | End: 2018-06-21

## 2018-06-20 RX ADMIN — CHLORHEXIDINE GLUCONATE 15 ML: 0.12 RINSE ORAL at 11:30

## 2018-06-20 RX ADMIN — FENTANYL CITRATE 50 MCG: 50 INJECTION, SOLUTION INTRAMUSCULAR; INTRAVENOUS at 14:00

## 2018-06-20 RX ADMIN — PROPOFOL 10 MCG/KG/MIN: 10 INJECTION, EMULSION INTRAVENOUS at 14:44

## 2018-06-20 RX ADMIN — CARBOXYMETHYLCELLULOSE SODIUM 1 DROP: 10 GEL OPHTHALMIC at 14:54

## 2018-06-20 RX ADMIN — Medication 10 ML: at 11:31

## 2018-06-20 RX ADMIN — Medication 10 ML: at 20:39

## 2018-06-20 RX ADMIN — Medication 15 ML: at 11:47

## 2018-06-20 RX ADMIN — HEPARIN SODIUM 5000 UNITS: 5000 INJECTION, SOLUTION INTRAVENOUS; SUBCUTANEOUS at 15:23

## 2018-06-20 RX ADMIN — ACETAMINOPHEN 650 MG: 325 TABLET ORAL at 22:15

## 2018-06-20 RX ADMIN — PANTOPRAZOLE SODIUM 40 MG: 40 INJECTION, POWDER, FOR SOLUTION INTRAVENOUS at 11:30

## 2018-06-20 RX ADMIN — MIDAZOLAM HYDROCHLORIDE 2 MG: 1 INJECTION INTRAMUSCULAR; INTRAVENOUS at 14:01

## 2018-06-20 RX ADMIN — CARBOXYMETHYLCELLULOSE SODIUM 1 DROP: 10 GEL OPHTHALMIC at 21:57

## 2018-06-20 RX ADMIN — DIAZEPAM 10 MG: 10 TABLET ORAL at 11:31

## 2018-06-20 RX ADMIN — DIAZEPAM 10 MG: 10 TABLET ORAL at 20:38

## 2018-06-20 RX ADMIN — HEPARIN SODIUM 5000 UNITS: 5000 INJECTION, SOLUTION INTRAVENOUS; SUBCUTANEOUS at 22:06

## 2018-06-20 RX ADMIN — MINERAL OIL AND WHITE PETROLATUM: 150; 830 OINTMENT OPHTHALMIC at 11:31

## 2018-06-20 RX ADMIN — SODIUM CHLORIDE 500 ML/HR: 9 INJECTION, SOLUTION INTRAVENOUS at 18:12

## 2018-06-20 RX ADMIN — METHADONE HYDROCHLORIDE 10 MG: 10 TABLET ORAL at 21:57

## 2018-06-20 RX ADMIN — Medication 15 ML: at 19:15

## 2018-06-20 RX ADMIN — ACETAMINOPHEN 650 MG: 325 TABLET ORAL at 15:49

## 2018-06-20 RX ADMIN — CARBOXYMETHYLCELLULOSE SODIUM 1 DROP: 10 GEL OPHTHALMIC at 05:28

## 2018-06-20 RX ADMIN — MINERAL OIL AND WHITE PETROLATUM: 150; 830 OINTMENT OPHTHALMIC at 04:20

## 2018-06-20 RX ADMIN — MINERAL OIL AND WHITE PETROLATUM: 150; 830 OINTMENT OPHTHALMIC at 00:33

## 2018-06-20 RX ADMIN — SODIUM CHLORIDE 1000 ML: 9 INJECTION, SOLUTION INTRAVENOUS at 21:56

## 2018-06-20 RX ADMIN — MINERAL OIL AND WHITE PETROLATUM: 150; 830 OINTMENT OPHTHALMIC at 20:12

## 2018-06-20 RX ADMIN — METHADONE HYDROCHLORIDE 10 MG: 10 TABLET ORAL at 15:22

## 2018-06-20 RX ADMIN — CARBOXYMETHYLCELLULOSE SODIUM 1 DROP: 10 GEL OPHTHALMIC at 18:11

## 2018-06-20 RX ADMIN — DIAZEPAM 10 MG: 10 TABLET ORAL at 15:22

## 2018-06-20 RX ADMIN — MINERAL OIL AND WHITE PETROLATUM: 150; 830 OINTMENT OPHTHALMIC at 14:54

## 2018-06-20 RX ADMIN — FENTANYL CITRATE 50 MCG: 50 INJECTION, SOLUTION INTRAMUSCULAR; INTRAVENOUS at 16:19

## 2018-06-20 RX ADMIN — SODIUM CHLORIDE 1000 ML: 9 INJECTION, SOLUTION INTRAVENOUS at 16:25

## 2018-06-20 RX ADMIN — CHLORHEXIDINE GLUCONATE 15 ML: 0.12 RINSE ORAL at 20:38

## 2018-06-20 RX ADMIN — CARBOXYMETHYLCELLULOSE SODIUM 1 DROP: 10 GEL OPHTHALMIC at 11:30

## 2018-06-20 RX ADMIN — Medication 10 ML: at 11:30

## 2018-06-20 RX ADMIN — PROPOFOL 25 MCG/KG/MIN: 10 INJECTION, EMULSION INTRAVENOUS at 22:12

## 2018-06-20 RX ADMIN — METHADONE HYDROCHLORIDE 10 MG: 10 TABLET ORAL at 05:28

## 2018-06-20 ASSESSMENT — PULMONARY FUNCTION TESTS
PIF_VALUE: 17
PIF_VALUE: 18
PIF_VALUE: 18
PIF_VALUE: 21

## 2018-06-20 ASSESSMENT — PAIN SCALES - GENERAL
PAINLEVEL_OUTOF10: 0

## 2018-06-20 NOTE — PROGRESS NOTES
06/20/18 0703   Vent Information   Vt Ordered 350 mL   Rate Set 18 bmp   Peak Flow 50 L/min   Pressure Support 0 cmH20   FiO2  40 %   Sensitivity 3   PEEP/CPAP 5   I Time/ I Time % 0 s   Vent Patient Data   Vt Exhaled 105 mL   Rate Measured 20 br/min   Minute Volume 6.86 Liters   Peak Inspiratory Pressure 18 cmH2O   I:E Ratio 1.70:1   High Peep/I Pressure 0   Mean Airway Pressure 8.8 cmH20   Plateau Pressure 12 DEQ93   Static Compliance 47 mL/cmH2O   Dynamic Compliance 28 mL/cmH2O   Spontaneous Breathing Trial (SBT) RT Doc   Pulse 109   SpO2 95 %   Cough/Sputum   Sputum How Obtained Endotracheal;Suctioned   Cough Productive   Sputum Amount Large   Sputum Color Tan   Tenacity Thick   Breath Sounds   Right Upper Lobe Diminished   Right Middle Lobe Diminished   Right Lower Lobe Diminished   Left Upper Lobe Diminished   Left Lower Lobe Diminished   Additional Respiratory  Assessments   Resp 17   Position Semi-Lucero's   Oral Care Completed? Yes   Subglottic Suction Done?  Yes   Alarm Settings   High Pressure Alarm 40 cmH2O   Low Minute Volume Alarm 2.5 L/min   High Respiratory Rate 40 br/min   Low Exhaled Vt  200 mL   Patient Observation   Observations water level good, ambu at [facility]   ETT (adult)   Placement Date/Time: 06/12/18 1500     Secured at 22 cm   Measured From Lips   ET Placement Left   Secured By Commercial tube cordova

## 2018-06-20 NOTE — PROGRESS NOTES
Pt sat dropping to 80's and unable to correct with suctioning. Pt placed on 100% and RT called to room.   Noy Maya RN

## 2018-06-20 NOTE — PROGRESS NOTES
Pt tolerating SBT well. Tachy at times but resolves with 1:1. Will continue to monitor.   Kostas Maya RN

## 2018-06-20 NOTE — PROGRESS NOTES
Return call from Dr. Honey Ross, restart Fentanyl gtt and start propofol gtt. Will continue to monitor.   Lulu Maya RN

## 2018-06-20 NOTE — PROGRESS NOTES
affect is full       Scheduled Meds:   cefepime  2 g Intravenous Once per day on Mon Wed Fri    pantoprazole  40 mg Intravenous Daily    methadone  10 mg Oral 3 times per day    diazepam  10 mg Oral TID    chlorhexidine  15 mL Mouth/Throat BID    carboxymethylcellulose PF  1 drop Both Eyes Q4H    And    lubrifresh P.M. Both Eyes Q4H    sodium chloride  250 mL Intravenous Once    insulin lispro  0-6 Units Subcutaneous Q4H    lidocaine 1 % injection  5 mL Intradermal Once    sodium chloride flush  10 mL Intravenous 2 times per day    sodium chloride flush  10 mL Intravenous 2 times per day     PRN Meds:  heparin (porcine), albumin human, fentanNYL, midazolam, dextrose, sodium phosphate IVPB **OR** [DISCONTINUED] sodium phosphate IVPB **OR** [DISCONTINUED] sodium phosphate IVPB **OR** [DISCONTINUED] sodium phosphate IVPB, ondansetron, glucose, glucagon (rDNA), sodium chloride flush, dextrose, sodium chloride flush, acetaminophen, promethazine, hydrOXYzine    Results:  CBC:   Recent Labs      06/18/18   0600  06/19/18   0555  06/20/18   0442   WBC  26.3*  27.4*  24.6*   HGB  8.4*  7.8*  7.7*   HCT  25.9*  24.8*  24.2*   MCV  100.2*  99.7  98.9   PLT  94*  147  168     BMP:   Recent Labs      06/18/18   0600  06/19/18   0555  06/20/18   0442   NA  134*  134*  134*   K  4.5  4.9  4.3   CL  98*  97*  97*   CO2  24  21  23   BUN  70*  123*  63*   CREATININE  2.5*  4.3*  2.8*     LIVER PROFILE:   Recent Labs      06/18/18   1720  06/19/18   0555   AST  40*  38*   ALT  127*  99*   BILIDIR  1.8*  1.5*   BILITOT  2.5*  2.1*   ALKPHOS  448*  398*       Cultures:  BCx 6/7 MRSA and Streptococcus   BCx 6/12 NGTD  Resp Cx 6/12 Staph aureus MRSA (A) and Enterobacter cloacae (A)      Films:  CXR 6/20 was reviewed by me and it showed; Low lung volume with R atelectasis with satisfactory ETT and L CVC positions.      ASSESSMENT:  · Acute hypoxemic respiratory failure    · Mucus plugging  · Tricuspid valve infectious endocarditis  · Polymicrobial bacteremia with MRSA, strep pneumonia and Enterobacter  · Acute liver failure, ascites and splenic infarct/septic emboli  · Ascites  · Cavitary pneumonia- due to septic emboli  · Possible right foot MTP septic joint  · Upper GI bleed secondary to angiodysplasia status post cauterization   · DIC  · End-stage renal disease on hemodialysis  · IV drug abuse and Hepatitis C  · Severe leukocytosis  · Elevated triglyceride  · Cardiopulmonary arrest 6/8/2018   · Reintubated 6/12/2018       PLAN:  Mechanical ventilation as per my orders. The ventilator was adjusted by me at the bedside for unstable, life threatening respiratory failure. Follow ABG  Precedex drip   Decrease Methadone 5 mg 3 times a day Valium 5 mg 3 times a day if she gets extubated   Head of bed 30 degrees or higher at all times  Daily spontaneous breathing trial once PEEP less than 8, FiO2 less than 55%. Vancomycin day #15 and Cefepime D#2. Completed 8 days of Meropenem. Off Solumedrol   HD today   Tube feed   Surveillance/Therapeutic bronchoscopy   Hypertonic saline Neb   GI prophylaxis: Protonix daily   DVT prophylaxis: start Heparin SQ   MRSA prophylaxis: Bactroban          Total critical care time caring for this patient with life threatening, unstable organ failure, including direct patient contact, management of life support systems, review of data including imaging and labs, discussions with other team members and physicians is 34 minutes so far today, excluding procedures.

## 2018-06-20 NOTE — PROGRESS NOTES
[facility]       Progress Note           ID   34y.o. year old female who we are seeing in consultation for ESRD. Sub/interval history  Tachycardic, intubated    HD on 6/19, pre wt 53.6-->52.9 kg after 500 ml UF, HR in the 120s     CRRT stopped on 6/11 PM but needed to be resumed on 6/12 afternoon;stopped on 6/17  re intubated on 6/12  Extubated on 6/11    ROS: UTO  PSFH: No visitor    Scheduled Meds:   heparin (porcine)  5,000 Units Subcutaneous 3 times per day    sodium chloride (Inhalant)  15 mL Nebulization 4x Daily    cefepime  1 g Intravenous Once    pantoprazole  40 mg Intravenous Daily    methadone  10 mg Oral 3 times per day    diazepam  10 mg Oral TID    chlorhexidine  15 mL Mouth/Throat BID    carboxymethylcellulose PF  1 drop Both Eyes Q4H    And    lubrifresh P.M.    Both Eyes Q4H    sodium chloride  250 mL Intravenous Once    insulin lispro  0-6 Units Subcutaneous Q4H    lidocaine 1 % injection  5 mL Intradermal Once    sodium chloride flush  10 mL Intravenous 2 times per day    sodium chloride flush  10 mL Intravenous 2 times per day     Continuous Infusions:   dexmedetomidine (PRECEDEX) IV infusion 0.5 mcg/kg/hr (06/20/18 0908)    midazolam Stopped (06/19/18 1500)    fentaNYL (SUBLIMAZE) infusion Stopped (06/20/18 0340)    dextrose       PRN Meds:.morphine, heparin (porcine), albumin human, fentanNYL, midazolam, dextrose, sodium phosphate IVPB **OR** [DISCONTINUED] sodium phosphate IVPB **OR** [DISCONTINUED] sodium phosphate IVPB **OR** [DISCONTINUED] sodium phosphate IVPB, ondansetron, glucose, glucagon (rDNA), sodium chloride flush, dextrose, sodium chloride flush, acetaminophen, promethazine, hydrOXYzine      Objective/   GEN:  Chronically ill, BP (!) 96/39 Comment: RN notified  Pulse 112   Temp 99.3 °F (37.4 °C) (Axillary)   Resp 18   Ht 5' 2\" (1.575 m)   Wt 118 lb 9.7 oz (53.8 kg)   SpO2 98%   BMI 21.69 kg/m²   CV: S1, S2 without m/r/g; no for this week      - Acute GI bleed    - plans per GI, ?angiodysplasia of UGI tract   - Weekly KI with HD, transfuse as needed     - MRSA, Streptococcus bacteremia, 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  On vanc and cefepime  -DIC  -cardiac arrest times 2   -HCV   -Ascites, splenic infarcts vs abscess  -Acute resp failure , re intubated on 6/12/18      Brandie Chung MD  [facility]  Office: [phone]  Fax:    [phone]

## 2018-06-20 NOTE — PROGRESS NOTES
Report given to Rusty Rodriguez RN at bedside for transfer of care. Pt resting comfortably at this time, no distress noted.    Case Maya RN

## 2018-06-20 NOTE — PROGRESS NOTES
Shift assessment complete and documented. Patient is resting quietly in bed with eyes closed. Continues with fentanyl and precedex infusion with [id number] for RASS goal -1 to -2. Patient repositioned in bed with pillow support to turn to left side. BLE elevated off bed with pillows and continues with SCD therapy. All lines and tubing remains secured and in position. Will continue to monitor and adjust POC as needed.  Candace Salgado RN

## 2018-06-20 NOTE — PROGRESS NOTES
No discharge. Neck: Trachea midline. Normal thyroid. Oral ETT and OG noted  Resp:  zulma air entry,  No rhonchi. No dullness on percussion. CV:tachy . Regular rhythm. No edema. No JVD. Palpable pedal pulses. GI:   Non-distended. No masses. No organmegaly. Normal bowel sounds. No hernia. Skin: Warm and dry. No nodule on exposed extremities. No rash on exposed extremities. appears jaundiced  Lymph: No cervical LAD. No supraclavicular LAD. M/S: . Right first MTP wound is drained  Improving erythema  Psych: sedated    Medications:  Scheduled Meds:   cefepime  2 g Intravenous Once per day on Mon Wed Fri    pantoprazole  40 mg Intravenous Daily    methadone  10 mg Oral 3 times per day    diazepam  10 mg Oral TID    chlorhexidine  15 mL Mouth/Throat BID    carboxymethylcellulose PF  1 drop Both Eyes Q4H    And    lubrifresh P.M.    Both Eyes Q4H    sodium chloride  250 mL Intravenous Once    insulin lispro  0-6 Units Subcutaneous Q4H    lidocaine 1 % injection  5 mL Intradermal Once    sodium chloride flush  10 mL Intravenous 2 times per day    sodium chloride flush  10 mL Intravenous 2 times per day       PRN Meds:  heparin (porcine), albumin human, fentanNYL, midazolam, dextrose, sodium phosphate IVPB **OR** [DISCONTINUED] sodium phosphate IVPB **OR** [DISCONTINUED] sodium phosphate IVPB **OR** [DISCONTINUED] sodium phosphate IVPB, ondansetron, glucose, glucagon (rDNA), sodium chloride flush, dextrose, sodium chloride flush, acetaminophen, promethazine, hydrOXYzine    Results:  CBC:   Recent Labs      06/18/18   0600  06/19/18   0555  06/20/18   0442   WBC  26.3*  27.4*  24.6*   HGB  8.4*  7.8*  7.7*   HCT  25.9*  24.8*  24.2*   MCV  100.2*  99.7  98.9   PLT  94*  147  168     BMP:   Recent Labs      06/18/18   0600  06/19/18   0555  06/20/18   0442   NA  134*  134*  134*   K  4.5  4.9  4.3   CL  98*  97*  97*   CO2  24  21  23   BUN  70*  123*  63*   CREATININE  2.5*  4.3*  2.8*     LIVER PROFILE: high at 60 K- to decrease steroids as above  Consider  b blockers  given new tachycardia     #  Bacteremia from poly microbial organism. Source of infection could be tunneled dialysis catheter and her recurrent endocarditis. has underlying IV drug abuse. She relapsed using IV drugs 2 months ago    #  End stage renal disease on HD  Now on  CRRT, off pressors,  Nephrology f/w  CRRT stopped.   Now on HD    #  Hyperglycemia   - sec to steroids  - on ssi, TF on hold for SBT     # DIC, -   Sec to severe sepsis , on steroids- weaning   Hematology f/w     SCD for prophylaxis  TF        Xavier Fernandez MD 6/20/2018 7:27 AM

## 2018-06-20 NOTE — ONCOLOGY
Hematology/Oncology Progress Note       Subjective: The patient remains intubated and sedated. ROS:     Unable to assess     Objective:     Physical examination:     /76   Pulse 123   Temp 99.3 °F (37.4 °C) (Axillary)   Resp 24   Ht 5' 2\" (1.575 m)   Wt 118 lb 9.7 oz (53.8 kg)   SpO2 96%   BMI 21.69 kg/m²     CONSTITUTIONAL: Intubated on CRRT  HEMATOLOGIC/LYMPHATICS:  no cervical lymphadenopathy, no supraclavicular lymphadenopathy, no axillary lymphadenopathy and no inguinal lymphadenopathy  BACK:  Symmetric, no curvature, spinous processes are non-tender on palpation, paraspinous muscles are non-tender on palpation, no costal vertebral tenderness  LUNGS:  Clear to auscultation bilaterally, no crackles or wheezing  CARDIOVASCULAR:  Regular rate and rhythm, normal S1 and S2, no S3 or S4, and no murmur noted  ABDOMEN:  Normal bowel sounds, soft, non-distended, non-tender, no masses palpated, no hepatosplenomegally  MUSCULOSKELETAL:  There is no redness, warmth, or swelling of the joints  NEUROLOGIC:   No focal findings. SKIN:  Scattered bruising and ecchymoses. EXT: without clubbing, cyanosis or edema.     Data:     CBC:   Recent Labs      06/20/18   0442  06/19/18   0555  06/18/18   0600   WBC  24.6*  27.4*  26.3*   HGB  7.7*  7.8*  8.4*   HCT  24.2*  24.8*  25.9*   MCV  98.9  99.7  100.2*   PLT  168  147  94*       BMP:   Lab Results   Component Value Date     06/20/2018    K 4.3 06/20/2018    K 5.9 06/12/2018    CO2 23 06/20/2018    BUN 63 06/20/2018    CREATININE 2.8 06/20/2018    CALCIUM 9.1 06/20/2018    MG 2.60 06/17/2018    TSH 4.47 11/26/2017       HEPATIC:  Lab Results   Component Value Date    AST 38 06/19/2018    ALT 99 06/19/2018    ALKPHOS 398 06/19/2018    PROT 6.5 06/19/2018    BILITOT 2.1 06/19/2018    BILIDIR 1.5 06/19/2018     06/08/2018       Current Medications:     Current Facility-Administered Medications: morphine injection 4 mg, 4 mg, Intravenous, Q4H PRN  insulin lispro (HUMALOG) injection vial 0-6 Units, 0-6 Units, Subcutaneous, Q4H  lidocaine 1 % injection 5 mL, 5 mL, Intradermal, Once  sodium chloride flush 0.9 % injection 10 mL, 10 mL, Intravenous, 2 times per day  sodium chloride flush 0.9 % injection 10 mL, 10 mL, Intravenous, PRN  dextrose 5 % solution, 100 mL/hr, Intravenous, PRN  sodium chloride flush 0.9 % injection 10 mL, 10 mL, Intravenous, 2 times per day  sodium chloride flush 0.9 % injection 10 mL, 10 mL, Intravenous, PRN  acetaminophen (TYLENOL) tablet 650 mg, 650 mg, Oral, Q6H PRN  promethazine (PHENERGAN) tablet 25 mg, 25 mg, Oral, Q6H PRN  hydrOXYzine (VISTARIL) capsule 50 mg, 50 mg, Oral, Q8H PRN    Imaging:     Xr Foot Right (2 Views)  Result Date: 6/7/2018  Lucency through the medial sesamoid most consistent with bipartite medial sesamoid in the absence of history of trauma. This can be associated with pain.      Nm Gi Blood Loss  Result Date: 6/6/2018  No evidence of active GI bleeding during acquisition. RECOMMENDATIONS: If the patient shows hemodynamic signs of an active bleed in the next 20 hours, additional images can be acquired.      Xr Chest Portable  Result Date: 6/8/2018  Multifocal pneumonia.      Cta Abdomen Pelvis W Wo Contrast  Result Date: 6/6/2018  No active gastrointestinal hemorrhage is identified. No vascular abnormality is appreciated. Right lower lobe pneumonia as well as cavitary nodules. Septic emboli are consideration. Compared with 11/26/2017, there is waxing and waning airspace disease and pulmonary nodules. Organizing pneumonia is an alternative possibility. Hepatosplenomegaly. Hepatomegaly is similar to 11/26/2017. Splenomegaly is new. Hypodensity in the spleen, suggestive of acute to subacute embolic infarction. Small amount of ascites, nonspecific. Hyperdensity of the gallbladder wall. This may represent mural calcification. Etiology is unclear.   There is variable association with gallbladder wall

## 2018-06-20 NOTE — PROGRESS NOTES
CVP continues at 8, started 2nd 500mil bolus at this time. Pt resting comfortably, HR continues in the 130s. Will continue to monitor.   Conrado Maya RN

## 2018-06-20 NOTE — PROGRESS NOTES
Patient placed on SBT 5/5. , RR 14, /60, Vt 424mL, VE 6, RSBI 35, SpO2 96%. Patient is tolerating well.

## 2018-06-21 LAB
ABO/RH: NORMAL
ALBUMIN SERPL-MCNC: 2.2 G/DL (ref 3.4–5)
ALP BLD-CCNC: 221 U/L (ref 40–129)
ALT SERPL-CCNC: 52 U/L (ref 10–40)
ANION GAP SERPL CALCULATED.3IONS-SCNC: 13 MMOL/L (ref 3–16)
ANTIBODY SCREEN: NORMAL
AST SERPL-CCNC: 42 U/L (ref 15–37)
BASE EXCESS ARTERIAL: -10.6 MMOL/L (ref -3–3)
BILIRUB SERPL-MCNC: 2 MG/DL (ref 0–1)
BILIRUBIN DIRECT: 1.4 MG/DL (ref 0–0.3)
BILIRUBIN, INDIRECT: 0.6 MG/DL (ref 0–1)
BLOOD BANK DISPENSE STATUS: NORMAL
BLOOD BANK PRODUCT CODE: NORMAL
BPU ID: NORMAL
BUN BLDV-MCNC: 88 MG/DL (ref 7–20)
CALCIUM SERPL-MCNC: 8.7 MG/DL (ref 8.3–10.6)
CARBOXYHEMOGLOBIN ARTERIAL: 1.7 % (ref 0–1.5)
CHLORIDE BLD-SCNC: 105 MMOL/L (ref 99–110)
CO2: 17 MMOL/L (ref 21–32)
CREAT SERPL-MCNC: 4.2 MG/DL (ref 0.6–1.1)
DESCRIPTION BLOOD BANK: NORMAL
GFR AFRICAN AMERICAN: 15
GFR NON-AFRICAN AMERICAN: 13
GLUCOSE BLD-MCNC: 63 MG/DL (ref 70–99)
GLUCOSE BLD-MCNC: 72 MG/DL (ref 70–99)
GLUCOSE BLD-MCNC: 74 MG/DL (ref 70–99)
GLUCOSE BLD-MCNC: 85 MG/DL (ref 70–99)
GLUCOSE BLD-MCNC: 99 MG/DL (ref 70–99)
HCO3 ARTERIAL: 15.3 MMOL/L (ref 21–29)
HCT VFR BLD CALC: 23 % (ref 36–48)
HEMOGLOBIN, ART, EXTENDED: 7.1 G/DL (ref 12–16)
HEMOGLOBIN: 7.1 G/DL (ref 12–16)
INR BLD: 1.38 (ref 0.86–1.14)
MCH RBC QN AUTO: 31.1 PG (ref 26–34)
MCHC RBC AUTO-ENTMCNC: 30.8 G/DL (ref 31–36)
MCV RBC AUTO: 101.2 FL (ref 80–100)
METHEMOGLOBIN ARTERIAL: 0.9 %
O2 CONTENT ARTERIAL: 10 ML/DL
O2 SAT, ARTERIAL: 98 %
O2 THERAPY: ABNORMAL
PCO2 ARTERIAL: 33.8 MMHG (ref 35–45)
PDW BLD-RTO: 23.6 % (ref 12.4–15.4)
PERFORMED ON: ABNORMAL
PERFORMED ON: NORMAL
PH ARTERIAL: 7.27 (ref 7.35–7.45)
PLATELET # BLD: 177 K/UL (ref 135–450)
PMV BLD AUTO: 8.7 FL (ref 5–10.5)
PO2 ARTERIAL: 122.7 MMHG (ref 75–108)
POTASSIUM SERPL-SCNC: 4.1 MMOL/L (ref 3.5–5.1)
PROTHROMBIN TIME: 15.7 SEC (ref 9.8–13)
RBC # BLD: 2.28 M/UL (ref 4–5.2)
SODIUM BLD-SCNC: 135 MMOL/L (ref 136–145)
TCO2 ARTERIAL: 16.3 MMOL/L
TOTAL PROTEIN: 6 G/DL (ref 6.4–8.2)
VANCOMYCIN RANDOM: 15.3 UG/ML
WBC # BLD: 31.5 K/UL (ref 4–11)

## 2018-06-21 PROCEDURE — 94640 AIRWAY INHALATION TREATMENT: CPT

## 2018-06-21 PROCEDURE — 80048 BASIC METABOLIC PNL TOTAL CA: CPT

## 2018-06-21 PROCEDURE — 86850 RBC ANTIBODY SCREEN: CPT

## 2018-06-21 PROCEDURE — 86900 BLOOD TYPING SEROLOGIC ABO: CPT

## 2018-06-21 PROCEDURE — C9113 INJ PANTOPRAZOLE SODIUM, VIA: HCPCS

## 2018-06-21 PROCEDURE — 86923 COMPATIBILITY TEST ELECTRIC: CPT

## 2018-06-21 PROCEDURE — 94003 VENT MGMT INPAT SUBQ DAY: CPT

## 2018-06-21 PROCEDURE — 71045 X-RAY EXAM CHEST 1 VIEW: CPT

## 2018-06-21 PROCEDURE — 87040 BLOOD CULTURE FOR BACTERIA: CPT

## 2018-06-21 PROCEDURE — 85610 PROTHROMBIN TIME: CPT

## 2018-06-21 PROCEDURE — 99233 SBSQ HOSP IP/OBS HIGH 50: CPT | Performed by: INTERNAL MEDICINE

## 2018-06-21 PROCEDURE — 36415 COLL VENOUS BLD VENIPUNCTURE: CPT

## 2018-06-21 PROCEDURE — 80076 HEPATIC FUNCTION PANEL: CPT

## 2018-06-21 PROCEDURE — 94761 N-INVAS EAR/PLS OXIMETRY MLT: CPT

## 2018-06-21 PROCEDURE — 94750 CHARGE CONVERSION: CPT

## 2018-06-21 PROCEDURE — 86901 BLOOD TYPING SEROLOGIC RH(D): CPT

## 2018-06-21 PROCEDURE — P9016 RBC LEUKOCYTES REDUCED: HCPCS

## 2018-06-21 PROCEDURE — 9990 CHARGE CONVERSION

## 2018-06-21 PROCEDURE — 85027 COMPLETE CBC AUTOMATED: CPT

## 2018-06-21 PROCEDURE — 82803 BLOOD GASES ANY COMBINATION: CPT

## 2018-06-21 PROCEDURE — 80202 ASSAY OF VANCOMYCIN: CPT

## 2018-06-21 PROCEDURE — 99291 CRITICAL CARE FIRST HOUR: CPT | Performed by: INTERNAL MEDICINE

## 2018-06-21 PROCEDURE — 99232 SBSQ HOSP IP/OBS MODERATE 35: CPT | Performed by: INTERNAL MEDICINE

## 2018-06-21 RX ORDER — SODIUM CHLORIDE 9 MG/ML
INJECTION, SOLUTION INTRAVENOUS
Status: DISPENSED
Start: 2018-06-21 | End: 2018-06-21

## 2018-06-21 RX ORDER — 0.9 % SODIUM CHLORIDE 0.9 %
250 INTRAVENOUS SOLUTION INTRAVENOUS ONCE
Status: DISCONTINUED | OUTPATIENT
Start: 2018-06-21 | End: 2018-06-23

## 2018-06-21 RX ORDER — SODIUM CHLORIDE 9 MG/ML
INJECTION, SOLUTION INTRAVENOUS
Status: COMPLETED
Start: 2018-06-21 | End: 2018-06-21

## 2018-06-21 RX ORDER — LINEZOLID 2 MG/ML
600 INJECTION, SOLUTION INTRAVENOUS EVERY 12 HOURS
Status: DISCONTINUED | OUTPATIENT
Start: 2018-06-21 | End: 2018-07-10

## 2018-06-21 RX ADMIN — Medication 10 ML: at 08:03

## 2018-06-21 RX ADMIN — ACETAMINOPHEN 650 MG: 325 TABLET ORAL at 04:27

## 2018-06-21 RX ADMIN — Medication 15 ML: at 19:46

## 2018-06-21 RX ADMIN — Medication 10 ML: at 20:14

## 2018-06-21 RX ADMIN — MIDAZOLAM HYDROCHLORIDE 2 MG: 1 INJECTION INTRAMUSCULAR; INTRAVENOUS at 00:29

## 2018-06-21 RX ADMIN — Medication 10 ML: at 08:04

## 2018-06-21 RX ADMIN — MINERAL OIL AND WHITE PETROLATUM: 150; 830 OINTMENT OPHTHALMIC at 12:31

## 2018-06-21 RX ADMIN — Medication 15 ML: at 11:24

## 2018-06-21 RX ADMIN — ACETAMINOPHEN 650 MG: 325 TABLET ORAL at 16:09

## 2018-06-21 RX ADMIN — MIDAZOLAM HYDROCHLORIDE 2 MG: 1 INJECTION INTRAMUSCULAR; INTRAVENOUS at 11:51

## 2018-06-21 RX ADMIN — METHADONE HYDROCHLORIDE 10 MG: 10 TABLET ORAL at 05:43

## 2018-06-21 RX ADMIN — CARBOXYMETHYLCELLULOSE SODIUM 1 DROP: 10 GEL OPHTHALMIC at 17:18

## 2018-06-21 RX ADMIN — CARBOXYMETHYLCELLULOSE SODIUM 1 DROP: 10 GEL OPHTHALMIC at 13:28

## 2018-06-21 RX ADMIN — SODIUM CHLORIDE 500 ML: 9 INJECTION, SOLUTION INTRAVENOUS at 05:55

## 2018-06-21 RX ADMIN — LINEZOLID 600 MG: 2 INJECTION, SOLUTION INTRAVENOUS at 20:14

## 2018-06-21 RX ADMIN — MINERAL OIL AND WHITE PETROLATUM: 150; 830 OINTMENT OPHTHALMIC at 23:33

## 2018-06-21 RX ADMIN — MINERAL OIL AND WHITE PETROLATUM: 150; 830 OINTMENT OPHTHALMIC at 00:17

## 2018-06-21 RX ADMIN — SODIUM CHLORIDE 1000 ML: 9 INJECTION, SOLUTION INTRAVENOUS at 00:17

## 2018-06-21 RX ADMIN — CARBOXYMETHYLCELLULOSE SODIUM 1 DROP: 10 GEL OPHTHALMIC at 05:43

## 2018-06-21 RX ADMIN — MINERAL OIL AND WHITE PETROLATUM: 150; 830 OINTMENT OPHTHALMIC at 16:08

## 2018-06-21 RX ADMIN — PANTOPRAZOLE SODIUM 40 MG: 40 INJECTION, POWDER, FOR SOLUTION INTRAVENOUS at 08:03

## 2018-06-21 RX ADMIN — METHADONE HYDROCHLORIDE 10 MG: 10 TABLET ORAL at 21:25

## 2018-06-21 RX ADMIN — DIAZEPAM 10 MG: 10 TABLET ORAL at 20:14

## 2018-06-21 RX ADMIN — Medication 15 ML: at 15:24

## 2018-06-21 RX ADMIN — LINEZOLID 600 MG: 2 INJECTION, SOLUTION INTRAVENOUS at 11:53

## 2018-06-21 RX ADMIN — CARBOXYMETHYLCELLULOSE SODIUM 1 DROP: 10 GEL OPHTHALMIC at 21:25

## 2018-06-21 RX ADMIN — MINERAL OIL AND WHITE PETROLATUM: 150; 830 OINTMENT OPHTHALMIC at 08:04

## 2018-06-21 RX ADMIN — MINERAL OIL AND WHITE PETROLATUM: 150; 830 OINTMENT OPHTHALMIC at 20:09

## 2018-06-21 RX ADMIN — DEXTROSE MONOHYDRATE 25 G: 25 INJECTION, SOLUTION INTRAVENOUS at 03:36

## 2018-06-21 RX ADMIN — DIAZEPAM 10 MG: 10 TABLET ORAL at 16:08

## 2018-06-21 RX ADMIN — CHLORHEXIDINE GLUCONATE 15 ML: 0.12 RINSE ORAL at 08:03

## 2018-06-21 RX ADMIN — FENTANYL CITRATE 50 MCG: 50 INJECTION, SOLUTION INTRAMUSCULAR; INTRAVENOUS at 20:36

## 2018-06-21 RX ADMIN — CHLORHEXIDINE GLUCONATE 15 ML: 0.12 RINSE ORAL at 20:14

## 2018-06-21 RX ADMIN — Medication 10 ML: at 20:15

## 2018-06-21 RX ADMIN — MINERAL OIL AND WHITE PETROLATUM: 150; 830 OINTMENT OPHTHALMIC at 04:15

## 2018-06-21 RX ADMIN — Medication 15 ML: at 06:58

## 2018-06-21 RX ADMIN — METHADONE HYDROCHLORIDE 10 MG: 10 TABLET ORAL at 16:08

## 2018-06-21 RX ADMIN — HEPARIN SODIUM 5000 UNITS: 5000 INJECTION, SOLUTION INTRAVENOUS; SUBCUTANEOUS at 05:58

## 2018-06-21 RX ADMIN — DIAZEPAM 10 MG: 10 TABLET ORAL at 08:03

## 2018-06-21 RX ADMIN — MIDAZOLAM HYDROCHLORIDE 2 MG: 1 INJECTION INTRAMUSCULAR; INTRAVENOUS at 18:54

## 2018-06-21 RX ADMIN — CARBOXYMETHYLCELLULOSE SODIUM 1 DROP: 10 GEL OPHTHALMIC at 02:06

## 2018-06-21 ASSESSMENT — PAIN SCALES - GENERAL
PAINLEVEL_OUTOF10: 0
PAINLEVEL_OUTOF10: 7
PAINLEVEL_OUTOF10: 0

## 2018-06-21 ASSESSMENT — PULMONARY FUNCTION TESTS
PIF_VALUE: 23
PIF_VALUE: 18
PIF_VALUE: 21
PIF_VALUE: 23
PIF_VALUE: 19
PIF_VALUE: 20

## 2018-06-21 NOTE — PROGRESS NOTES
Assessment completed and documented in flowsheet. VSS. Respirations even, regular, with diminished breath sounds and inspiratory wheezes auscultated. Pt on ventilator. Cardiac rhythm-ST with occassional PVCs. OG in place with TF at goal @ 40. Active bowel sounds x4. CVC WNL with fentanyl infusing @ 100mcg/hr, propofol infusing @ 30mcg/hr, and NS @ [place]. VC WNL. Will continue to monitor pt.  Álvaro Ards, RN

## 2018-06-21 NOTE — PROGRESS NOTES
Nutrition Assessment    Type and Reason for Visit: Reassess    Nutrition Recommendations:   1. Continue Nepro at goal rate of 40 ml/hr x 20 hours. Water flushes, 60 ml every 6 hours or per nephrology guidance. 2. Monitor TF rate, intake, and tolerance + water flushes. 3. Monitor vent status, sedation type/amount, and plan of care. 4. Monitor for additional in-patient weight changes during remainder of admission. 5. Monitor nutrition-related labs, bowel activity/regimen, and fluid/electrolyte management. Malnutrition Assessment:  · Malnutrition Status: Meets the criteria for moderate malnutrition  · Context: Acute illness or injury  · Findings of the 6 clinical characteristics of malnutrition (Minimum of 2 out of 6 clinical characteristics is required to make the diagnosis of moderate or severe Protein Calorie Malnutrition based on AND/ASPEN Guidelines):  1. Energy Intake-Less than or equal to 75%, greater than or equal to 3 months    2. Weight Loss- (+ 5# weight gain (using admission weight and CBW)), in 1 week (x 9 days admission)  3. Fat Loss-Mild subcutaneous fat loss, Orbital, Triceps  4. Muscle Loss-Mild muscle mass loss, Temples (temporalis muscle), Clavicles (pectoralis and deltoids)  5. Fluid Accumulation- (+ ascites), Ascites  6.  Strength-Not measured    Nutrition Diagnosis:   · Problem:  Moderate malnutrition  · Etiology: related to Insufficient energy/nutrient consumption, Renal dysfunction, Psychological cause/life stress, Cardiac dysfunction, Alteration in GI function, Diarrhea     Signs and symptoms:  as evidenced by Weight loss, Lab values, GI abnormality, Diarrhea, Known losses from dialysis, Other (mild fat and muscle loss)    Nutrition Assessment:  · Subjective Assessment: patient remains intubated and she was on SBT this am during rounds; patient was awake and grew more restless and anxious + coughing frequently after rounds ended; + dialysis treatment today; + 1 unit Monitoring, Coordination of Care, Coordination of Community Care    Nutrition Evaluation:   · Evaluation: Goal achieved   · Goals: patient will continue to tolerate Nepro TF at goal rate of 40 ml/hr x 20 hours without GI distress, without s/s of aspiration, and without additional BS/fluid/electrolyte abnormalities; weight will remain stable during remainder of admission   · Monitoring: NPO Status, TF Intake, TF Tolerance, Skin Integrity, Wound Healing, Ascites/Edema, Weight, Pertinent Labs, Diarrhea    See Adult Nutrition Doc Flowsheet for more detail.      Electronically signed by Bette Florez RD, LD on 6/21/18 at 1:29 PM    Contact Number: [phone]

## 2018-06-21 NOTE — PROGRESS NOTES
Pulmonary & Critical Care Medicine ICU Progress Note    CC: Acute hypoxemic respiratory failure     Events of Last 24 hours:   Fever yesterday 103 and worse leukocytosis   FiO2 40%   PEEP 5  Fentanyl drip  Propofol 10 mcg/kg/min   Precedex 0.8 mcg/kg/hr   Secretions less, thick brown this am   NS boluses 4.5 L overnight   Undergoing HD     Invasive Lines: IV: Vas cath and L SC 6/8    MV:  6/12   Vent Mode: AC/VC Rate Set: 18 bmp/Vt Ordered: 350 mL/ /FiO2 : 40 %  Recent Labs      18   1626  18   0617   PHART  7.371  7.273*   BQR1BUG  35.3  33.8*   PO2ART  89.3  122.7*       IV:   propofol 10 mcg/kg/min (18 0521)    norepinephrine Stopped (18 1716)    dexmedetomidine (PRECEDEX) IV infusion 0.8 mcg/hr (18 0714)    midazolam Stopped (18 1500)    fentaNYL (SUBLIMAZE) infusion 100 mcg/hr (18 0403)    dextrose         Vitals:  Blood pressure (!) 90/37, pulse 103, temperature 99 °F (37.2 °C), temperature source Oral, resp. rate 19, height 5' 2\" (1.575 m), weight 130 lb 15.3 oz (59.4 kg), SpO2 98 %, not currently breastfeeding. on AC 18/350/+5 40%   Temp  Av.2 °F (37.9 °C)  Min: 98.9 °F (37.2 °C)  Max: 103.2 °F (39.6 °C)    Intake/Output Summary (Last 24 hours) at 18 0724  Last data filed at 18 0556   Gross per 24 hour   Intake             5616 ml   Output              350 ml   Net             5266 ml     EXAM:  General: ill appearing. cachectic   Eyes: PERRL. No sclera icterus. No conjunctival injection. ENT: No discharge. Pharynx clear. Neck: Trachea midline. Normal thyroid. Resp: No accessory muscle use. No crackles. Few wheezing. Bilateral rhonchi. No dullness on percussion. CV: Tachy rate. Regular rhythm. No mumur or rub. No edema. GI: Non-tender. Non-distended. No masses. No organomegaly. Normal bowel sounds. No hernia. Skin: Warm and dry. No nodule on exposed extremities. No rash on exposed extremities. Lymph: No cervical LAD.  No supraclavicular LAD. M/S: No cyanosis. No joint deformity. No clubbing. Neuro: sedated. Following commands. Psych: No agitation, no anxiety, affect is full       Scheduled Meds:   heparin (porcine)  5,000 Units Subcutaneous 3 times per day    sodium chloride (Inhalant)  15 mL Nebulization 4x Daily    pantoprazole  40 mg Intravenous Daily    methadone  10 mg Oral 3 times per day    diazepam  10 mg Oral TID    chlorhexidine  15 mL Mouth/Throat BID    carboxymethylcellulose PF  1 drop Both Eyes Q4H    And    lubrifresh P.M.    Both Eyes Q4H    sodium chloride  250 mL Intravenous Once    insulin lispro  0-6 Units Subcutaneous Q4H    lidocaine 1 % injection  5 mL Intradermal Once    sodium chloride flush  10 mL Intravenous 2 times per day    sodium chloride flush  10 mL Intravenous 2 times per day     PRN Meds:  morphine, heparin (porcine), albumin human, fentanNYL, midazolam, dextrose, sodium phosphate IVPB **OR** [DISCONTINUED] sodium phosphate IVPB **OR** [DISCONTINUED] sodium phosphate IVPB **OR** [DISCONTINUED] sodium phosphate IVPB, ondansetron, glucose, glucagon (rDNA), sodium chloride flush, dextrose, sodium chloride flush, acetaminophen, promethazine, hydrOXYzine    Results:  CBC:   Recent Labs      06/20/18   0442  06/20/18   1645  06/21/18   0550   WBC  24.6*  29.8*  31.5*   HGB  7.7*  8.9*  7.1*   HCT  24.2*  28.0*  23.0*   MCV  98.9  98.9  101.2*   PLT  168  187  177     BMP:   Recent Labs      06/20/18   0442  06/20/18   1645  06/21/18   0550   NA  134*  135*  135*   K  4.3  4.3  4.1   CL  97*  101  105   CO2  23  21  17*   PHOS   --   4.6   --    BUN  63*  83*  88*   CREATININE  2.8*  3.6*  4.2*     LIVER PROFILE:   Recent Labs      06/18/18   1720  06/19/18   0555  06/20/18   1645   AST  40*  38*  45*   ALT  127*  99*  70*   BILIDIR  1.8*  1.5*  1.8*   BILITOT  2.5*  2.1*  2.6*   ALKPHOS  448*  398*  325*       Cultures:  BCx 6/7 MRSA and Streptococcus   BCx 6/12 NGTD  Resp Cx 6/12 Staph aureus MRSA (A) and Enterobacter cloacae (A)  Bronch BAL 6/20     Films:  CXR 6/21 was reviewed by me and it showed; Low lung volume with L ASD and improved R atelectasis with satisfactory ETT and L CVC positions. ASSESSMENT:  · Acute hypoxemic respiratory failure    · Mucus plugging- bronch 6/20   · Tricuspid valve infectious endocarditis  · Polymicrobial bacteremia with MRSA, strep pneumonia and Enterobacter  · Acute liver failure, ascites and splenic infarct/septic emboli  · Fever and severe leukocytosisworsening  · Cavitary pneumonia- due to septic emboli  · Possible right foot MTP septic joint  · Upper GI bleed secondary to angiodysplasia status post cauterization   · DIC  · End-stage renal disease on hemodialysis  · IV drug abuse and Hepatitis C  · Elevated triglyceride  · Cardiopulmonary arrest 6/8/2018   · Reintubated 6/12/2018       PLAN:  Mechanical ventilation as per my orders. The ventilator was adjusted by me at the bedside for unstable, life threatening respiratory failure. Follow ABG  D/C Propofol  Versed and Fentanyl Dris   Precedex drip   Methadone 10 mg 3 times a day Valium 10 mg 3 times a day  Head of bed 30 degrees or higher at all times  Daily spontaneous breathing trial once PEEP less than 8, FiO2 less than 55%. Vancomycin day #16, Cefepime D#3 and Zyvox D#1. Completed 8 days of Meropenem. Off Solumedrol   HD   Tube feed   1 PRBCs  Repeat Cx including from the lines   Hypertonic saline Neb   GI prophylaxis: Protonix daily   DVT prophylaxis: SCDs  MRSA prophylaxis: Bactroban          Total critical care time caring for this patient with life threatening, unstable organ failure, including direct patient contact, management of life support systems, review of data including imaging and labs, discussions with other team members and physicians is 33 minutes so far today, excluding procedures.

## 2018-06-21 NOTE — PROGRESS NOTES
06/21/18 1525   Vent Information   Vt Ordered 350 mL   Rate Set 18 bmp   Peak Flow 55 L/min   Pressure Support 0 cmH20   FiO2  40 %   Sensitivity 3   PEEP/CPAP 5   I Time/ I Time % 0 s   Humidification Source Heated wire   Humidification Temp 37   Vent Patient Data   Vt Exhaled 409 mL   Rate Measured 19 br/min   Minute Volume 7.18 Liters   Peak Inspiratory Pressure 18 cmH2O   I:E Ratio 1:3.00   High Peep/I Pressure 0   Mean Airway Pressure 8.6 cmH20   Spontaneous Breathing Trial (SBT) RT Doc   Pulse 103   SpO2 95 %   Cough/Sputum   Sputum How Obtained Endotracheal;Suctioned   Cough Productive   Sputum Amount Moderate   Sputum Color Tan   Tenacity Thick   Breath Sounds   Right Upper Lobe Rhonchi   Right Middle Lobe Rhonchi   Right Lower Lobe Diminished;Rhonchi   Left Upper Lobe Rhonchi   Left Lower Lobe Diminished;Rhonchi   Additional Respiratory  Assessments   Resp 19   Position Semi-Lucero's   Oral Care Mouth suctioned   Subglottic Suction Done?  Yes   Alarm Settings   High Pressure Alarm 50 cmH2O   Low Minute Volume Alarm 2.5 L/min   High Respiratory Rate 45 br/min   Low Exhaled Vt  200 mL   Patient Observation   Observations h2o full, ambu at [facility]   ETT (adult)   Placement Date/Time: 06/12/18 1500     Secured at 22 cm   Measured From [address] [employer]   Site Condition Dry

## 2018-06-21 NOTE — PROGRESS NOTES
06/21/18 1105   Vent Information   Vt Ordered 350 mL   Rate Set 18 bmp   Peak Flow 55 L/min   Pressure Support 0 cmH20   FiO2  40 %   Sensitivity 3   PEEP/CPAP 5   I Time/ I Time % 0 s   Humidification Source Heated wire   Humidification Temp 37   Humidification Temp Measured 36.9   Circuit Condensation Drained   Vent Patient Data   Vt Exhaled 830 mL   Rate Measured 23 br/min   Minute Volume 8.78 Liters   Peak Inspiratory Pressure 23 cmH2O   I:E Ratio 1:3.80   High Peep/I Pressure 0   Mean Airway Pressure 9.6 cmH20   Spontaneous Breathing Trial (SBT) RT Doc   Pulse 99   SpO2 94 %   Cough/Sputum   Sputum How Obtained Endotracheal;Suctioned   Cough Productive   Sputum Amount Large   Sputum Color Brown   Tenacity Thick   Breath Sounds   Right Upper Lobe Rhonchi   Right Middle Lobe Rhonchi   Right Lower Lobe Diminished;Rhonchi   Left Upper Lobe Rhonchi   Left Lower Lobe Diminished;Rhonchi   Additional Respiratory  Assessments   Resp 18   Position Semi-Lucero's   Oral Care Mouth suctioned   Subglottic Suction Done?  Yes   Alarm Settings   High Pressure Alarm 50 cmH2O   Low Minute Volume Alarm 2.5 L/min   High Respiratory Rate 45 br/min   Low Exhaled Vt  200 mL   Patient Observation   Observations h2o full, ambu at [facility]   ETT (adult)   Placement Date/Time: 06/12/18 1500     Secured at 22 cm   Measured From [address] [place] [address],[address] [employer]   Site Condition Dry

## 2018-06-21 NOTE — PLAN OF CARE
Problem: Nutrition  Goal: Optimal nutrition therapy  Outcome: Met This Shift  Nutrition Problem:  Moderate malnutrition  Intervention: Food and/or Nutrient Delivery: Continue NPO, Continue current Tube Feeding  Nutritional Goals: patient will continue to tolerate Nepro TF at goal rate of 40 ml/hr x 20 hours without GI distress, without s/s of aspiration, and without additional BS/fluid/electrolyte abnormalities; weight will remain stable during remainder of admission

## 2018-06-21 NOTE — PROGRESS NOTES
Reassessment completed and documented in flowsheet. RASS -1. OG in place with TF at goal @ 40. CVC WNL with fentanyl infusing @ 100mcg/hr, propofol infusing @ 20mcg/hr, and precedex @ 0.4mcg/kg/hr. VC WNL. Will continue to monitor.  Juan Manuel Soto RN

## 2018-06-21 NOTE — PROGRESS NOTES
per 24 hour   Intake             5616 ml   Output              350 ml   Net             5266 ml       EXAM:    General: young female on vent. Sedated, arousable  Eyes: PERRL. No sclera icterus. No conjunctiva injected. ENT: No discharge. Neck: Trachea midline. Normal thyroid. Oral ETT and OG noted  Resp:  zulma air entry,  No rhonchi. No dullness on percussion. CV:tachy . Regular rhythm. No edema. No JVD. Palpable pedal pulses. GI:   Non-distended. No masses. No organmegaly. Normal bowel sounds. No hernia. Skin: Warm and dry. No nodule on exposed extremities. No rash on exposed extremities. appears jaundiced  Lymph: No cervical LAD. No supraclavicular LAD. M/S: . Right first MTP wound is drained  Improving erythema  Neuro - sedated as above    Medications:  Scheduled Meds:   heparin (porcine)  5,000 Units Subcutaneous 3 times per day    sodium chloride (Inhalant)  15 mL Nebulization 4x Daily    pantoprazole  40 mg Intravenous Daily    methadone  10 mg Oral 3 times per day    diazepam  10 mg Oral TID    chlorhexidine  15 mL Mouth/Throat BID    carboxymethylcellulose PF  1 drop Both Eyes Q4H    And    lubrifresh P.M.    Both Eyes Q4H    sodium chloride  250 mL Intravenous Once    insulin lispro  0-6 Units Subcutaneous Q4H    lidocaine 1 % injection  5 mL Intradermal Once    sodium chloride flush  10 mL Intravenous 2 times per day    sodium chloride flush  10 mL Intravenous 2 times per day       PRN Meds:  morphine, heparin (porcine), albumin human, fentanNYL, midazolam, dextrose, sodium phosphate IVPB **OR** [DISCONTINUED] sodium phosphate IVPB **OR** [DISCONTINUED] sodium phosphate IVPB **OR** [DISCONTINUED] sodium phosphate IVPB, ondansetron, glucose, glucagon (rDNA), sodium chloride flush, dextrose, sodium chloride flush, acetaminophen, promethazine, hydrOXYzine    Results:  CBC:   Recent Labs      06/20/18   0442  06/20/18   1645  06/21/18   0550   WBC  24.6*  29.8*  31.5*   HGB  7.7*  8.9*

## 2018-06-21 NOTE — PROGRESS NOTES
the lingula, right middle lobe, and right lower lobe.  Trace bilateral   pleural effusions.  Tip of the endotracheal tube lying near the tee. Moderate ascites seen in the upper abdomen.  Possible multiple splenic   infarcts or abscesses.  Please refer to the dictation of the CT scan of the   abdomen and pelvis.       RECOMMENDATIONS:   Endotracheal tube should be pulled back 1-2 cm. Assessment/Plan     - End stage renal disease - on HD MWF, now on TTS schedule for this week   -TW needs adjustment      - Acute GI bleed    - plans per GI, ?angiodysplasia of UGI tract   - Weekly KI with HD, transfuse as needed    -1 pRBC on 6/21 and 125 mcg of aranesp    - MRSA, Streptococcus bacteremia, 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  On vanc and cefepime  -DIC  -cardiac arrest times 2   -HCV   -Ascites, splenic infarcts vs abscess  -Acute resp failure , re intubated on 6/12/18      Nany Jewell MD  [facility]  Office: [phone]  Fax:    [phone]

## 2018-06-21 NOTE — PROGRESS NOTES
Care rounds completed with Dr Darcie Monique and multidisciplinary team.  Reviewed labs, meds, VS (temp/HR/RR), I/O's, assessment, & plan of care for today. See progress note & new orders for details.  Tg Kelly RN

## 2018-06-21 NOTE — PROGRESS NOTES
ID Follow-up NOTE    CC: tricuspid valve endocarditis    Subjective:     Patient gives no history, is intubated, on vent; somewhat awake    Objective:     Patient Vitals for the past 24 hrs:   BP Temp Temp src Pulse Resp SpO2 Weight   18 0800 (!) 106/56 - - 107 19 - -   18 0737 131/66 98.7 °F (37.1 °C) - 118 21 - 134 lb 0.6 oz (60.8 kg)   18 0701 - - - 103 19 98 % -   18 0700 (!) 90/37 - - 102 19 98 % -   18 0600 (!) 89/38 - - 113 19 98 % -   18 0500 (!) 90/35 99 °F (37.2 °C) Oral 124 18 97 % 130 lb 15.3 oz (59.4 kg)   18 0427 - 100.6 °F (38.1 °C) Axillary - - - -   18 0400 125/78 - - 137 17 97 % -   18 0321 - - - 126 21 97 % -   18 0300 124/64 100.7 °F (38.2 °C) Oral 125 20 98 % -   18 0200 (!) 98/43 - - 111 18 99 % -   18 0100 110/61 - - 113 18 98 % -   18 0000 (!) 95/44 - - 106 18 100 % -   18 2302 - - - 112 19 100 % -   18 2300 (!) 101/47 99.1 °F (37.3 °C) Oral 112 18 100 % -   18 2215 - 100.5 °F (38.1 °C) - - - - -   18 2200 (!) 94/43 - - 112 18 99 % -   18 2100 (!) 90/42 - - 117 20 97 % -   18 (!) 100/50 - - 122 19 96 % -   18 - - - 126 20 97 % -   18 - - - - - 97 % -   18 1900 (!) 112/54 100.5 °F (38.1 °C) Axillary 132 19 98 % -   18 1600 (!) 134/102 - - 146 21 97 % -   18 1550 - 103.2 °F (39.6 °C) - - - - -   18 1500 (!) 108/55 - - 142 23 93 % -   18 1400 120/73 99.1 °F (37.3 °C) - 139 23 94 % -   18 1300 94/61 - - 110 18 97 % -   18 1200 (!) 97/59 - - 111 19 96 % -   18 1149 - 98.9 °F (37.2 °C) - - - - -   18 1100 (!) 96/39 - - 112 18 98 % -   18 1000 124/76 - - 123 24 96 % -   18 0900 114/63 - - 112 17 94 % -     Temp (24hrs), Av °F (37.8 °C), Min:98.7 °F (37.1 °C), Max:103.2 °F (39.6 °C)          EXAM:  General:intubated mech vent. Fevers higher, sometimes follows simple command.   HEENT: conj

## 2018-06-21 NOTE — PROGRESS NOTES
Patient medicated with 2mg PRN versed for restlessness and agitation. Will continue to monitor pt.  Krunal Ryan RN

## 2018-06-21 NOTE — PROGRESS NOTES
Reassessment completed and this time and documented in flowsheet. RASS -1. No addition changes noted. OG in place with TF at goal @ 40. CVC WNL with fentanyl infusing @ 100mcg/hr, propofol infusing @ 20mcg/hr, and NS bolus @ 500mL/hr. VC WNL. Will continue to monitor.  Leela Torres RN

## 2018-06-21 NOTE — PROGRESS NOTES
CVP 9 at this time, additional 500ml bolus infusing. Will continue to give bolus per order to maintain CVP between 12-15.  Emily Hale RN

## 2018-06-21 NOTE — PROGRESS NOTES
Initial exam completed- See doc flowsheet for assessment findings. Pt in bed and she appears restless coughing frequently and pulling at wrist restraints. . All lines and monitoring devices are in place . Vitals and SpO2 stable. Diprivan gtt increased from 10 to 15 mcg. Call light is within easy reach. HD RN in room for treatment. Plan of care and goals reviewed.  Sharlene Fowler RN

## 2018-06-21 NOTE — PROGRESS NOTES
Pt more restless and coughing frequently. Pt attempting to pull at lines and devices. Pt medicated with PRN dose of versed - see MAR.  No other changes noted in exam Gonzalo Moore RN

## 2018-06-21 NOTE — ONCOLOGY
injection 5,000 Units, 5,000 Units, Subcutaneous, 3 times per day  sodium chloride (Inhalant) 3 % nebulizer solution 15 mL, 15 mL, Nebulization, 4x Daily  propofol 1000 MG/100ML injection, 10 mcg/kg/min, Intravenous, Titrated  morphine (PF) injection 4 mg, 4 mg, Intravenous, Q4H PRN  norepinephrine (LEVOPHED) 16 mg in dextrose 5 % 250 mL infusion, 2 mcg/min, Intravenous, Continuous  heparin (porcine) injection 4,100 Units, 4,100 Units, Intercatheter, PRN  albumin human 25 % solution 12.5 g, 12.5 g, Intravenous, PRN  dexmedetomidine (PRECEDEX) 400 mcg in sodium chloride 0.9 % 100 mL infusion, 0.5 mcg/kg/hr, Intravenous, Continuous  pantoprazole (PROTONIX) injection 40 mg, 40 mg, Intravenous, Daily  midazolam (VERSED) 100 mg in dextrose 5 % 100 mL infusion, 4 mg/hr, Intravenous, Continuous  methadone (DOLOPHINE) tablet 10 mg, 10 mg, Oral, 3 times per day  diazepam (VALIUM) tablet 10 mg, 10 mg, Oral, TID  chlorhexidine (PERIDEX) 0.12 % solution 15 mL, 15 mL, Mouth/Throat, BID  fentaNYL (SUBLIMAZE) injection 50 mcg, 50 mcg, Intravenous, Q1H PRN  midazolam (VERSED) injection 2 mg, 2 mg, Intravenous, Q1H PRN  carboxymethylcellulose PF (THERATEARS) 1 % ophthalmic gel 1 drop, 1 drop, Both Eyes, Q4H **AND** lubrifresh P.M. (artificial tears) ophthalmic ointment, , Both Eyes, Q4H  fentaNYL (SUBLIMAZE) 1,000 mcg in sodium chloride 0.9 % 100 mL infusion, 150 mcg/hr, Intravenous, Continuous  dextrose 50 % solution 25 g, 25 g, Intravenous, PRN  sodium phosphate 6 mmol in dextrose 5 % 250 mL IVPB, 6 mmol, Intravenous, PRN **OR** [DISCONTINUED] sodium phosphate 12 mmol in dextrose 5 % 250 mL IVPB, 12 mmol, Intravenous, PRN **OR** [DISCONTINUED] sodium phosphate 18 mmol in dextrose 5 % 500 mL IVPB, 18 mmol, Intravenous, PRN **OR** [DISCONTINUED] sodium phosphate 24 mmol in dextrose 5 % 500 mL IVPB, 24 mmol, Intravenous, PRN  ondansetron (ZOFRAN) injection 4 mg, 4 mg, Intravenous, Q6H PRN  0.9 % sodium chloride bolus, 250 mL,

## 2018-06-21 NOTE — PLAN OF CARE
Problem: Falls - Risk of:  Goal: Will remain free from falls  Will remain free from falls   Outcome: Ongoing    Goal: Absence of physical injury  Absence of physical injury   Outcome: Ongoing      Problem: Risk for Impaired Skin Integrity  Goal: Tissue integrity - skin and mucous membranes  Structural intactness and normal physiological function of skin and  mucous membranes.    Outcome: Ongoing      Problem: Discharge Planning:  Goal: Discharged to appropriate level of care  Discharged to appropriate level of care    Outcome: Ongoing    Goal: Participates in care planning  Participates in care planning   Outcome: Ongoing      Problem: Fluid Volume - Imbalance:  Goal: Absence of imbalanced fluid volume signs and symptoms  Absence of imbalanced fluid volume signs and symptoms   Outcome: Ongoing    Goal: Will show no signs and symptoms of excessive bleeding  Will show no signs and symptoms of excessive bleeding   Outcome: Ongoing      Problem: Infection - Central Venous Catheter-Associated Bloodstream Infection:  Goal: Will show no infection signs and symptoms  Will show no infection signs and symptoms   Outcome: Ongoing      Problem: Restraint Use - Nonviolent/Non-Self-Destructive Behavior:  Goal: Absence of restraint indications  Absence of restraint indications   Outcome: Ongoing    Goal: Absence of restraint-related injury  Absence of restraint-related injury   Outcome: Ongoing    Goal: Free from restraint events  Outcome: Ongoing

## 2018-06-21 NOTE — PROGRESS NOTES
No changes noted in exam. Temp up to 103. 1. BP trend lower and CVP up to 11. Last BP 84/38 ( 53).  Levophed gtt started - see KATYA Cameron RN

## 2018-06-22 LAB
ALBUMIN SERPL-MCNC: 2 G/DL (ref 3.4–5)
ALP BLD-CCNC: 191 U/L (ref 40–129)
ALT SERPL-CCNC: 36 U/L (ref 10–40)
AMYLASE: 36 U/L (ref 25–115)
ANION GAP SERPL CALCULATED.3IONS-SCNC: 10 MMOL/L (ref 3–16)
AST SERPL-CCNC: 23 U/L (ref 15–37)
BASE EXCESS ARTERIAL: -4 MMOL/L (ref -3–3)
BILIRUB SERPL-MCNC: 1.8 MG/DL (ref 0–1)
BILIRUBIN DIRECT: 1.2 MG/DL (ref 0–0.3)
BILIRUBIN, INDIRECT: 0.6 MG/DL (ref 0–1)
BUN BLDV-MCNC: 46 MG/DL (ref 7–20)
CALCIUM SERPL-MCNC: 8.6 MG/DL (ref 8.3–10.6)
CARBOXYHEMOGLOBIN ARTERIAL: 2 % (ref 0–1.5)
CHLORIDE BLD-SCNC: 101 MMOL/L (ref 99–110)
CO2: 22 MMOL/L (ref 21–32)
CREAT SERPL-MCNC: 2.8 MG/DL (ref 0.6–1.1)
CULTURE, RESPIRATORY: NORMAL
GFR AFRICAN AMERICAN: 25
GFR NON-AFRICAN AMERICAN: 20
GLUCOSE BLD-MCNC: 78 MG/DL (ref 70–99)
GLUCOSE BLD-MCNC: 79 MG/DL (ref 70–99)
GLUCOSE BLD-MCNC: 82 MG/DL (ref 70–99)
GLUCOSE BLD-MCNC: 83 MG/DL (ref 70–99)
GLUCOSE BLD-MCNC: 97 MG/DL (ref 70–99)
GLUCOSE BLD-MCNC: 99 MG/DL (ref 70–99)
GRAM STAIN RESULT: NORMAL
HCO3 ARTERIAL: 20.8 MMOL/L (ref 21–29)
HCT VFR BLD CALC: 26.6 % (ref 36–48)
HEMOGLOBIN, ART, EXTENDED: 9 G/DL (ref 12–16)
HEMOGLOBIN: 8.6 G/DL (ref 12–16)
INR BLD: 1.54 (ref 0.86–1.14)
LIPASE: 16 U/L (ref 13–60)
MCH RBC QN AUTO: 30.7 PG (ref 26–34)
MCHC RBC AUTO-ENTMCNC: 32.2 G/DL (ref 31–36)
MCV RBC AUTO: 95.6 FL (ref 80–100)
METHEMOGLOBIN ARTERIAL: 0.7 %
O2 CONTENT ARTERIAL: 13 ML/DL
O2 SAT, ARTERIAL: 98.5 %
O2 THERAPY: ABNORMAL
PCO2 ARTERIAL: 36.7 MMHG (ref 35–45)
PDW BLD-RTO: 20.4 % (ref 12.4–15.4)
PERFORMED ON: NORMAL
PH ARTERIAL: 7.37 (ref 7.35–7.45)
PLATELET # BLD: 169 K/UL (ref 135–450)
PMV BLD AUTO: 9.5 FL (ref 5–10.5)
PO2 ARTERIAL: 126.3 MMHG (ref 75–108)
POTASSIUM SERPL-SCNC: 3.8 MMOL/L (ref 3.5–5.1)
PROTHROMBIN TIME: 17.5 SEC (ref 9.8–13)
RBC # BLD: 2.79 M/UL (ref 4–5.2)
RESPIRATORY SYNCYTIAL VIRUS  (RSV) DFA: NEGATIVE
RSV SOURCE: NORMAL
SODIUM BLD-SCNC: 133 MMOL/L (ref 136–145)
TCO2 ARTERIAL: 22 MMOL/L
TOTAL PROTEIN: 5.9 G/DL (ref 6.4–8.2)
VANCOMYCIN RANDOM: 17.8 UG/ML
WBC # BLD: 31 K/UL (ref 4–11)

## 2018-06-22 PROCEDURE — 82803 BLOOD GASES ANY COMBINATION: CPT

## 2018-06-22 PROCEDURE — 80202 ASSAY OF VANCOMYCIN: CPT

## 2018-06-22 PROCEDURE — 9990 CHARGE CONVERSION

## 2018-06-22 PROCEDURE — 83690 ASSAY OF LIPASE: CPT

## 2018-06-22 PROCEDURE — 80048 BASIC METABOLIC PNL TOTAL CA: CPT

## 2018-06-22 PROCEDURE — 85027 COMPLETE CBC AUTOMATED: CPT

## 2018-06-22 PROCEDURE — 76705 ECHO EXAM OF ABDOMEN: CPT

## 2018-06-22 PROCEDURE — 80076 HEPATIC FUNCTION PANEL: CPT

## 2018-06-22 PROCEDURE — 36600 WITHDRAWAL OF ARTERIAL BLOOD: CPT

## 2018-06-22 PROCEDURE — 94761 N-INVAS EAR/PLS OXIMETRY MLT: CPT

## 2018-06-22 PROCEDURE — 99291 CRITICAL CARE FIRST HOUR: CPT | Performed by: INTERNAL MEDICINE

## 2018-06-22 PROCEDURE — 99232 SBSQ HOSP IP/OBS MODERATE 35: CPT | Performed by: INTERNAL MEDICINE

## 2018-06-22 PROCEDURE — 36556 INSERT NON-TUNNEL CV CATH: CPT | Performed by: INTERNAL MEDICINE

## 2018-06-22 PROCEDURE — 94003 VENT MGMT INPAT SUBQ DAY: CPT

## 2018-06-22 PROCEDURE — 02HV33Z INSERTION OF INFUSION DEVICE INTO SUPERIOR VENA CAVA, PERCUTANEOUS APPROACH: ICD-10-PCS | Performed by: INTERNAL MEDICINE

## 2018-06-22 PROCEDURE — 93970 EXTREMITY STUDY: CPT

## 2018-06-22 PROCEDURE — 36415 COLL VENOUS BLD VENIPUNCTURE: CPT

## 2018-06-22 PROCEDURE — 36592 COLLECT BLOOD FROM PICC: CPT

## 2018-06-22 PROCEDURE — 94640 AIRWAY INHALATION TREATMENT: CPT

## 2018-06-22 PROCEDURE — 71045 X-RAY EXAM CHEST 1 VIEW: CPT

## 2018-06-22 PROCEDURE — 82150 ASSAY OF AMYLASE: CPT

## 2018-06-22 PROCEDURE — 94750 CHARGE CONVERSION: CPT

## 2018-06-22 PROCEDURE — C9113 INJ PANTOPRAZOLE SODIUM, VIA: HCPCS

## 2018-06-22 PROCEDURE — 85610 PROTHROMBIN TIME: CPT

## 2018-06-22 RX ORDER — SODIUM CHLORIDE 0.9 % (FLUSH) 0.9 %
10 SYRINGE (ML) INJECTION PRN
Status: DISCONTINUED | OUTPATIENT
Start: 2018-06-22 | End: 2018-07-06 | Stop reason: SDUPTHER

## 2018-06-22 RX ORDER — LIDOCAINE HYDROCHLORIDE 10 MG/ML
5 INJECTION, SOLUTION INFILTRATION; PERINEURAL ONCE
Status: DISCONTINUED | OUTPATIENT
Start: 2018-06-22 | End: 2018-07-06

## 2018-06-22 RX ORDER — HEPARIN SODIUM 5000 [USP'U]/ML
5000 INJECTION, SOLUTION INTRAVENOUS; SUBCUTANEOUS EVERY 8 HOURS SCHEDULED
Status: DISCONTINUED | OUTPATIENT
Start: 2018-06-22 | End: 2018-07-01

## 2018-06-22 RX ORDER — SODIUM CHLORIDE 9 MG/ML
INJECTION, SOLUTION INTRAVENOUS
Status: DISPENSED
Start: 2018-06-22 | End: 2018-06-22

## 2018-06-22 RX ORDER — SODIUM CHLORIDE 0.9 % (FLUSH) 0.9 %
10 SYRINGE (ML) INJECTION EVERY 12 HOURS SCHEDULED
Status: DISCONTINUED | OUTPATIENT
Start: 2018-06-22 | End: 2018-07-06 | Stop reason: SDUPTHER

## 2018-06-22 RX ADMIN — METHADONE HYDROCHLORIDE 10 MG: 10 TABLET ORAL at 21:34

## 2018-06-22 RX ADMIN — Medication 15 ML: at 06:53

## 2018-06-22 RX ADMIN — ACETAMINOPHEN 650 MG: 325 TABLET ORAL at 19:49

## 2018-06-22 RX ADMIN — Medication 10 ML: at 19:50

## 2018-06-22 RX ADMIN — CHLORHEXIDINE GLUCONATE 15 ML: 0.12 RINSE ORAL at 08:37

## 2018-06-22 RX ADMIN — LINEZOLID 600 MG: 2 INJECTION, SOLUTION INTRAVENOUS at 19:50

## 2018-06-22 RX ADMIN — CARBOXYMETHYLCELLULOSE SODIUM 1 DROP: 10 GEL OPHTHALMIC at 18:13

## 2018-06-22 RX ADMIN — Medication 15 ML: at 19:09

## 2018-06-22 RX ADMIN — MIDAZOLAM HYDROCHLORIDE 2 MG: 1 INJECTION INTRAMUSCULAR; INTRAVENOUS at 23:15

## 2018-06-22 RX ADMIN — MINERAL OIL AND WHITE PETROLATUM: 150; 830 OINTMENT OPHTHALMIC at 14:36

## 2018-06-22 RX ADMIN — METHADONE HYDROCHLORIDE 10 MG: 10 TABLET ORAL at 14:35

## 2018-06-22 RX ADMIN — PANTOPRAZOLE SODIUM 40 MG: 40 INJECTION, POWDER, FOR SOLUTION INTRAVENOUS at 08:51

## 2018-06-22 RX ADMIN — CARBOXYMETHYLCELLULOSE SODIUM 1 DROP: 10 GEL OPHTHALMIC at 03:06

## 2018-06-22 RX ADMIN — MIDAZOLAM HYDROCHLORIDE 2 MG: 1 INJECTION INTRAMUSCULAR; INTRAVENOUS at 02:58

## 2018-06-22 RX ADMIN — MIDAZOLAM HYDROCHLORIDE 2 MG: 1 INJECTION INTRAMUSCULAR; INTRAVENOUS at 05:28

## 2018-06-22 RX ADMIN — DIAZEPAM 10 MG: 10 TABLET ORAL at 14:35

## 2018-06-22 RX ADMIN — CARBOXYMETHYLCELLULOSE SODIUM 1 DROP: 10 GEL OPHTHALMIC at 21:34

## 2018-06-22 RX ADMIN — Medication 10 ML: at 08:43

## 2018-06-22 RX ADMIN — HEPARIN SODIUM 5000 UNITS: 5000 INJECTION, SOLUTION INTRAVENOUS; SUBCUTANEOUS at 14:38

## 2018-06-22 RX ADMIN — ACETAMINOPHEN 650 MG: 325 TABLET ORAL at 02:58

## 2018-06-22 RX ADMIN — CARBOXYMETHYLCELLULOSE SODIUM 1 DROP: 10 GEL OPHTHALMIC at 05:09

## 2018-06-22 RX ADMIN — MINERAL OIL AND WHITE PETROLATUM: 150; 830 OINTMENT OPHTHALMIC at 11:44

## 2018-06-22 RX ADMIN — DIAZEPAM 10 MG: 10 TABLET ORAL at 19:49

## 2018-06-22 RX ADMIN — MINERAL OIL AND WHITE PETROLATUM: 150; 830 OINTMENT OPHTHALMIC at 08:42

## 2018-06-22 RX ADMIN — LINEZOLID 600 MG: 2 INJECTION, SOLUTION INTRAVENOUS at 08:51

## 2018-06-22 RX ADMIN — METHADONE HYDROCHLORIDE 10 MG: 10 TABLET ORAL at 05:09

## 2018-06-22 RX ADMIN — CHLORHEXIDINE GLUCONATE 15 ML: 0.12 RINSE ORAL at 19:48

## 2018-06-22 RX ADMIN — MIDAZOLAM HYDROCHLORIDE 2 MG: 1 INJECTION INTRAMUSCULAR; INTRAVENOUS at 11:55

## 2018-06-22 RX ADMIN — FENTANYL CITRATE 50 MCG: 50 INJECTION, SOLUTION INTRAMUSCULAR; INTRAVENOUS at 04:23

## 2018-06-22 RX ADMIN — Medication 10 ML: at 08:38

## 2018-06-22 RX ADMIN — MINERAL OIL AND WHITE PETROLATUM: 150; 830 OINTMENT OPHTHALMIC at 02:56

## 2018-06-22 RX ADMIN — MINERAL OIL AND WHITE PETROLATUM: 150; 830 OINTMENT OPHTHALMIC at 19:50

## 2018-06-22 RX ADMIN — CARBOXYMETHYLCELLULOSE SODIUM 1 DROP: 10 GEL OPHTHALMIC at 14:36

## 2018-06-22 RX ADMIN — CARBOXYMETHYLCELLULOSE SODIUM 1 DROP: 10 GEL OPHTHALMIC at 08:37

## 2018-06-22 RX ADMIN — Medication 15 ML: at 15:28

## 2018-06-22 RX ADMIN — MIDAZOLAM HYDROCHLORIDE 2 MG: 1 INJECTION INTRAMUSCULAR; INTRAVENOUS at 00:10

## 2018-06-22 RX ADMIN — DIAZEPAM 10 MG: 10 TABLET ORAL at 08:51

## 2018-06-22 RX ADMIN — HEPARIN SODIUM 5000 UNITS: 5000 INJECTION, SOLUTION INTRAVENOUS; SUBCUTANEOUS at 21:34

## 2018-06-22 ASSESSMENT — PAIN SCALES - GENERAL
PAINLEVEL_OUTOF10: 6
PAINLEVEL_OUTOF10: 0
PAINLEVEL_OUTOF10: 0

## 2018-06-22 ASSESSMENT — PULMONARY FUNCTION TESTS
PIF_VALUE: 27
PIF_VALUE: 26
PIF_VALUE: 21
PIF_VALUE: 22
PIF_VALUE: 22

## 2018-06-22 NOTE — PROGRESS NOTES
hydrOXYzine      Objective/   GEN:  Chronically ill, BP (!) 102/55   Pulse 115   Temp 102.4 °F (39.1 °C) (Oral)   Resp 18   Ht 5' 2\" (1.575 m)   Wt 135 lb 2.3 oz (61.3 kg)   SpO2 100%   BMI 24.72 kg/m²   CV: S1, S2 without m/r/g; no edema  RESP:clear anteriorly  ACCESS: R IJ TDC  gen-intubated, sedated   abd - distended, BS+    Data/  Recent Labs      06/20/18   1645  06/21/18   0550  06/22/18   0514   WBC  29.8*  31.5*  31.0*   HGB  8.9*  7.1*  8.6*   HCT  28.0*  23.0*  26.6*   MCV  98.9  101.2*  95.6   PLT  187  177  169     Recent Labs      06/20/18   1645  06/21/18   0550  06/22/18   0514   NA  135*  135*  133*   K  4.3  4.1  3.8   CL  101  105  101   CO2  21  17*  22   GLUCOSE  91  85  97   PHOS  4.6   --    --    BUN  83*  88*  46*   CREATININE  3.6*  4.2*  2.8*   LABGLOM  15*  13*  20*   GFRAA  18*  15*  25*     CT chest/a/p on 6/12/18  Impression   Development of moderate diffuse ascites since the prior study.  Mosaic   appearance of the spleen either due to multiple splenic infarcts or possible   splenic abscesses.  Question of perihepatic and subhepatic adhesions. Chronic gallbladder disease.  Bibasilar pneumonias and pulmonary nodules   consistent with septic emboli.  Mild anasarca.       RECOMMENDATIONS:   NG tube should be pulled back about 4 cm since it is pressing against the   anterior stomach wall.       Gallbladder ultrasound suggested for evaluation of gallbladder disease.       Diagnostic Ultrasound-guided paracentesis may be helpful. Impression   Multiple scattered cavitary and solid nodules scattered throughout the lungs   suspected to be from septic emboli.  The emboli may be from recurrent   endocarditis, IV drug abuse, or infected DVT.  Scattered focal pneumonia in   the lingula, right middle lobe, and right lower lobe.  Trace bilateral   pleural effusions.  Tip of the endotracheal tube lying near the tee.    Moderate ascites seen in the upper abdomen.  Possible multiple

## 2018-06-22 NOTE — PROGRESS NOTES
Psych: No agitation, no anxiety, affect is full       Scheduled Meds:   sodium chloride  250 mL Intravenous Once    linezolid  600 mg Intravenous Q12H    darbepoetin emi-polysorbate  100 mcg Intravenous Weekly - Thursday    And    darbepoetin emi-polysorbate  25 mcg Subcutaneous Weekly - Thursday    sodium chloride (Inhalant)  15 mL Nebulization 4x Daily    pantoprazole  40 mg Intravenous Daily    methadone  10 mg Oral 3 times per day    diazepam  10 mg Oral TID    chlorhexidine  15 mL Mouth/Throat BID    carboxymethylcellulose PF  1 drop Both Eyes Q4H    And    lubrifresh P.M.    Both Eyes Q4H    sodium chloride  250 mL Intravenous Once    insulin lispro  0-6 Units Subcutaneous Q4H    lidocaine 1 % injection  5 mL Intradermal Once    sodium chloride flush  10 mL Intravenous 2 times per day    sodium chloride flush  10 mL Intravenous 2 times per day     PRN Meds:  morphine, heparin (porcine), albumin human, fentanNYL, midazolam, dextrose, sodium phosphate IVPB **OR** [DISCONTINUED] sodium phosphate IVPB **OR** [DISCONTINUED] sodium phosphate IVPB **OR** [DISCONTINUED] sodium phosphate IVPB, ondansetron, glucose, glucagon (rDNA), sodium chloride flush, dextrose, sodium chloride flush, acetaminophen, promethazine, hydrOXYzine    Results:  CBC:   Recent Labs      06/20/18 1645 06/21/18   0550  06/22/18   0514   WBC  29.8*  31.5*  31.0*   HGB  8.9*  7.1*  8.6*   HCT  28.0*  23.0*  26.6*   MCV  98.9  101.2*  95.6   PLT  187  177  169     BMP:   Recent Labs      06/20/18 1645  06/21/18   0550  06/22/18   0514   NA  135*  135*  133*   K  4.3  4.1  3.8   CL  101  105  101   CO2  21  17*  22   PHOS  4.6   --    --    BUN  83*  88*  46*   CREATININE  3.6*  4.2*  2.8*     LIVER PROFILE:   Recent Labs      06/20/18 1645  06/21/18   0550   AST  45*  42*   ALT  70*  52*   BILIDIR  1.8*  1.4*   BILITOT  2.6*  2.0*   ALKPHOS  325*  221*       Cultures:  BCx 6/7 MRSA and Streptococcus   BCx 6/12 NGTD  BCx and physicians is 34 minutes so far today, excluding procedures.

## 2018-06-22 NOTE — PROGRESS NOTES
Dr Kenrick Lewis completes central line and pt medicated prior to procedure with additional 2 mg of versed IV and 50 fentanyl IVP per his VO Liban Owens RN

## 2018-06-22 NOTE — ONCOLOGY
Hematology/Oncology Progress Note       Subjective: The patient remains intubated. Getting Xray this am. Transfused yesterday.      ROS:     Unable to assess     Objective:     Physical examination:     BP (!) 112/54   Pulse 98   Temp 101.3 °F (38.5 °C) (Oral)   Resp 18   Ht 5' 2\" (1.575 m)   Wt 135 lb 2.3 oz (61.3 kg)   SpO2 99%   BMI 24.72 kg/m²     Unable to examine (getting Xray)    Data:     CBC:   Recent Labs      06/22/18   0514  06/21/18   0550  06/20/18   1645   WBC  31.0*  31.5*  29.8*   HGB  8.6*  7.1*  8.9*   HCT  26.6*  23.0*  28.0*   MCV  95.6  101.2*  98.9   PLT  169  177  187       BMP:   Lab Results   Component Value Date     06/22/2018    K 3.8 06/22/2018    K 5.9 06/12/2018    CO2 22 06/22/2018    BUN 46 06/22/2018    CREATININE 2.8 06/22/2018    CALCIUM 8.6 06/22/2018    MG 2.60 06/17/2018    TSH 4.47 11/26/2017       HEPATIC:  Lab Results   Component Value Date    AST 42 06/21/2018    ALT 52 06/21/2018    ALKPHOS 221 06/21/2018    PROT 6.0 06/21/2018    BILITOT 2.0 06/21/2018    BILIDIR 1.4 06/21/2018     06/08/2018       Current Medications:     Current Facility-Administered Medications: sodium chloride 0.9 % infusion, , ,   0.9 % sodium chloride bolus, 250 mL, Intravenous, Once  linezolid (ZYVOX) IVPB 600 mg, 600 mg, Intravenous, Q12H  darbepoetin emi-polysorbate (ARANESP) injection 100 mcg, 100 mcg, Intravenous, Weekly - Thursday **AND** darbepoetin emi-polysorbate (ARANESP) injection 25 mcg, 25 mcg, Subcutaneous, Weekly - Thursday  sodium chloride (Inhalant) 3 % nebulizer solution 15 mL, 15 mL, Nebulization, 4x Daily  morphine (PF) injection 4 mg, 4 mg, Intravenous, Q4H PRN  norepinephrine (LEVOPHED) 16 mg in dextrose 5 % 250 mL infusion, 2 mcg/min, Intravenous, Continuous  heparin (porcine) injection 4,100 Units, 4,100 Units, Intercatheter, PRN  albumin human 25 % solution 12.5 g, 12.5 g, Intravenous, PRN  dexmedetomidine (PRECEDEX) 400 mcg in sodium chloride 0.9 mL, 10 mL, Intravenous, PRN  acetaminophen (TYLENOL) tablet 650 mg, 650 mg, Oral, Q6H PRN  promethazine (PHENERGAN) tablet 25 mg, 25 mg, Oral, Q6H PRN  hydrOXYzine (VISTARIL) capsule 50 mg, 50 mg, Oral, Q8H PRN    Imaging:     Xr Foot Right (2 Views)  Result Date: 6/7/2018  Lucency through the medial sesamoid most consistent with bipartite medial sesamoid in the absence of history of trauma. This can be associated with pain.      Nm Gi Blood Loss  Result Date: 6/6/2018  No evidence of active GI bleeding during acquisition. RECOMMENDATIONS: If the patient shows hemodynamic signs of an active bleed in the next 20 hours, additional images can be acquired.      Xr Chest Portable  Result Date: 6/8/2018  Multifocal pneumonia.      Cta Abdomen Pelvis W Wo Contrast  Result Date: 6/6/2018  No active gastrointestinal hemorrhage is identified. No vascular abnormality is appreciated. Right lower lobe pneumonia as well as cavitary nodules. Septic emboli are consideration. Compared with 11/26/2017, there is waxing and waning airspace disease and pulmonary nodules. Organizing pneumonia is an alternative possibility. Hepatosplenomegaly. Hepatomegaly is similar to 11/26/2017. Splenomegaly is new. Hypodensity in the spleen, suggestive of acute to subacute embolic infarction. Small amount of ascites, nonspecific. Hyperdensity of the gallbladder wall. This may represent mural calcification. Etiology is unclear. There is variable association with gallbladder wall calcification and risk for gallbladder carcinoma. Stable cardiomegaly. Lumbar spine and visualized osseous structures appear intact. Impression:     1. Bacteremia/Endocarditis  2. HCAP  3. Severe Anemia  4. ESRD on dialysis  5. Hep C  6. IVDA  7. S/P Code Blue/PEA    Assessment and Plan:     1.  Leukocytosis, anemia, thrombocytopenia  - suspect multifactorial  - baseline ACD with ESRD and Hep C - d/w nephrology, is chronically on Procrit  - suspect acute drop from

## 2018-06-22 NOTE — PROGRESS NOTES
Position Semi-Lucero's   Alarm Settings   Apnea (secs) 20 secs   Low Exhaled Vt  200 mL   Patient Observation   Observations AMBU at head of bed   ETT (adult)   Placement Date/Time: 06/12/18 1500     Secured at 22 cm   Measured From Lips   ET Placement Right   Secured By Commercial tube cordova   Site Condition Dry

## 2018-06-22 NOTE — PROGRESS NOTES
Initial exam completed- See doc flowsheet for assessment findings. Pt resting quietly in bed with eyes closed and when exam started she opens eyes and becomes slightly restless. She does follow simple commands. All lines and monitoring devices are in place . Vitals and SpO2 stable. Levophed gtt at 3 mcg. Pt suctioned for moderate-large amt of thick brown sputum. Call light is within easy reach. Plan of care and goals reviewed.  Jimenez Mays RN

## 2018-06-22 NOTE — PROGRESS NOTES
Care rounds completed with Dr Honey Ross and multidisciplinary team.  Reviewed labs, meds, VS (temp/HR/RR), I/O's, assessment, & plan of care for today. See progress note & new orders for details.  Aisha Seymour RN

## 2018-06-22 NOTE — PROGRESS NOTES
18 1200 (!) 105/46 - - 98 18 - - -   18 1141 (!) 116/45 98.2 °F (36.8 °C) - 99 - - - 135 lb 2.3 oz (61.3 kg)   18 1105 - - - 99 18 94 % - -   18 1100 97/62 - - 95 16 - - -     Temp (24hrs), Av.3 °F (38.5 °C), Min:98.2 °F (36.8 °C), Max:103.4 °F (39.7 °C)          EXAM:  General:intubated mech vent. Fevers higher, sometimes follows simple command. HEENT: conj icterus less marked; pupils equal  Neck: s nodes, fairly supple      LUNGS: coarse BS bilat; no resp distress     CV: RR c gd I syst M lower LSB     ABD: soft, question mild tenderness. No mass or omegaly     EXT: without edema; Abscess R great toe resolved,  Skin: no rash or other skin stigmata of endocarditis; icterus less intense. Data Review:    Lab Results   Component Value Date    WBC 31.0 (H) 2018    HGB 8.6 (L) 2018    HCT 26.6 (L) 2018    MCV 95.6 2018     2018     Lab Results   Component Value Date    CREATININE 2.8 (H) 2018    BUN 46 (H) 2018     (L) 2018    K 3.8 2018     2018    CO2 22 2018     Lab Results   Component Value Date    ALT 52 (H) 2018    AST 42 (H) 2018    ALKPHOS 221 (H) 2018    BILITOT 2.0 (H) 2018   vancomycin random level 17  MICRO:  blood cultures both growing MRSA. One culture also growing strep while the other culture is also growing Enterobacter sensitive to cefepime. More recent blood cultures negative.  blood cultures x 2 negative but  blood culture appears positive for MRSA once again. Several blood cultures drawn after  are still negative.  respiratory culture: MRSA    IMAGING: CXR: cavitary multifocal infiltrates presumably related to septic pulmonary emboli, improving slowly. CT head: NAD;   CT chest: iniltrates and cavitary pulmonary nodules consistent with septic emboli. CT abd and pelvis: ascites, splenic abscess or infarct.    Assessment:     Active Problems: IVDU (intravenous drug user)    Endocarditis of tricuspid valve    HCAP (healthcare-associated pneumonia)    Upper GI bleed    Sepsis due to Streptococcus pneumoniae (HCC)    Moderate protein-calorie malnutrition (HCC)    PEA (Pulseless electrical activity) ([address]    DIC (disseminated intravascular coagulation) ([address]    Acute respiratory failure with hypoxia (HCC)    Splenic infarction    Bacteremia    Mucus plugging of bronchi    Cavitary lung disease    Fever  Resolved Problems:    * No resolved hospital problems. *  Tricuspid valve endocarditis with septic pulmonary emboli (L sided endocarditis not ruled out) due to MRSA and MRSA pneumonia. Strep and Enterobacter were also isolated from admission blood cultures, significance uncertain. I would think this illness is mostly due to MRSA. possible splenic infarct or abscess. s/p arrest x2. Icterus and liver enzyme abmnormalities improved  Leukocytosis and fever are worse probably caused by septic emboli  Diarrhea, C diff negative. vanc level down to 17  Pt appears to be making no progress and I suspect her treatment for MRSA is failing. Pt had bronchoscopy two days ago: respiratory culture still growing MRSA. Plan:   redose vancomycin  Continue linezolid. redose cefepime tomorrow.

## 2018-06-22 NOTE — PROGRESS NOTES
Internal Medicine ICU Progress Note    34year old white female who was admitted for GI bleed. Had an EGD     BC positive for strep pneumonia, staph aureus and gram-negative salazar. Echocardiogram showed tricuspid valve endocarditis. She's had this before. CT chest showed septic emboli and possible splenic infarct. -transferred to ICU  For PEA arrest  ,intubated resuscitated with fluids      - Off levophed  Vaso weaned   Extubated to  RA     reintubated  ,     s.p bronch  - had fever overnight, hypotensive, requiring fluid boluses  Given PRBC     Pt seen  on vent.  Sedated, no distress, arousable,  Changed TLC today by ICU team    Invasive Lines: Tunneled dialysis catheter, TLC       Recent Labs      18   0544   PHART  7.273*  7.372   [id number]  33.8*  36.7   PO2ART  122.7*  126.3*       MV Settings:  Vent Mode: AC/VC Rate Set: 18 bmp/Vt Ordered: 350 mL/ /FiO2 : 40 %    IV:   sodium chloride      norepinephrine 3 mcg/min (18)    dexmedetomidine (PRECEDEX) IV infusion 0.6 mcg/kg/hr (18 010)    midazolam 3 mg/hr (18)    fentaNYL (SUBLIMAZE) infusion 100 mcg/hr (18 032)    dextrose         Vitals:  Temp  Av.8 °F (37.7 °C)  Min: 97.8 °F (36.6 °C)  Max: 103.4 °F (39.7 °C)  Pulse  Av.8  Min: 90  Max: 118  BP  Min: 84/38  Max: 134/56  SpO2  Av %  Min: 93 %  Max: 100 %  FiO2   Av %  Min: 40 %  Max: 40 %  Patient Vitals for the past 4 hrs:   BP Temp Temp src Pulse Resp SpO2   18 0700 (!) 100/44 - - 98 17 -   18 0657 - - - 102 15 99 %   18 0600 (!) 95/48 - - 97 19 97 %   18 0500 (!) 99/59 - - 102 18 100 %   18 0400 106/63 - - 104 18 98 %   18 0355 - 101.9 °F (38.8 °C) Oral - - -   18 0329 - - - 105 19 97 %   18 0328 - 102.8 °F (39.3 °C) Oral - - -       CVP: CVP (Mean): 7 mmHg      Intake/Output Summary (Last 24 hours) at 18 0720  Last data filed at 18 1012   Gross per 24 hour   Intake             5150 ml   Output             1200 ml   Net             3950 ml       EXAM:    General: young female on vent. Sedated, arousable  Eyes: PERRL. No sclera icterus. No conjunctiva injected. ENT: No discharge. Neck: Trachea midline. Normal thyroid. Oral ETT and OG noted  Resp:  zulma air entry,  No rhonchi. No dullness on percussion. CV:tachy . Regular rhythm. No edema. No JVD. Palpable pedal pulses. GI:   Non-distended. No masses. No organmegaly. Normal bowel sounds. No hernia. Skin: Warm and dry. No nodule on exposed extremities. No rash on exposed extremities. appears jaundiced  Lymph: No cervical LAD. No supraclavicular LAD. M/S: . Right first MTP wound is drained  Improving erythema  Neuro - sedated as above    Medications:  Scheduled Meds:   sodium chloride  250 mL Intravenous Once    linezolid  600 mg Intravenous Q12H    darbepoetin emi-polysorbate  100 mcg Intravenous Weekly - Thursday    And    darbepoetin emi-polysorbate  25 mcg Subcutaneous Weekly - Thursday    sodium chloride (Inhalant)  15 mL Nebulization 4x Daily    pantoprazole  40 mg Intravenous Daily    methadone  10 mg Oral 3 times per day    diazepam  10 mg Oral TID    chlorhexidine  15 mL Mouth/Throat BID    carboxymethylcellulose PF  1 drop Both Eyes Q4H    And    lubrifresh P.M.    Both Eyes Q4H    sodium chloride  250 mL Intravenous Once    insulin lispro  0-6 Units Subcutaneous Q4H    lidocaine 1 % injection  5 mL Intradermal Once    sodium chloride flush  10 mL Intravenous 2 times per day    sodium chloride flush  10 mL Intravenous 2 times per day       PRN Meds:  morphine, heparin (porcine), albumin human, fentanNYL, midazolam, dextrose, sodium phosphate IVPB **OR** [DISCONTINUED] sodium phosphate IVPB **OR** [DISCONTINUED] sodium phosphate IVPB **OR** [DISCONTINUED] sodium phosphate IVPB, ondansetron, glucose, glucagon (rDNA), sodium chloride flush, dextrose, sodium septic emboli and cavitory pneumonia  - ct abd with no acute findings as well    Now on precedex drip. methadone and valium oral added   On vanc, cefepime,  now zyvox added  f/w BAL    #  Acute upper GI bleed. s/p  EGD -Three angiodysplastic spots at the junction of the body and  the fundus area that was cauterized during endoscopy.     h/h monitors/p multiple  PRBC h/h stable. RBC scan neg      #  Tricuspid valve endocarditis- recurrent   She is growing MRSA, strep pneumoniae and Enterobacter cloacae. ID involved  Large pedunculated vegetation on septal leaflet of TV   Previously had MSSA TV endocarditis  Now on  vancomycin and cefepime. Now zyvox added   Improving wbc         #  Bacteremia from poly microbial organism. Source of infection could be tunneled dialysis catheter and her recurrent endocarditis. has underlying IV drug abuse. She relapsed using IV drugs 2 months ago  Repeat blood cx remain neg    #  End stage renal disease on HD  Now on  CRRT, off pressors,  Nephrology f/w  CRRT stopped.   Now on HD    #  Hyperglycemia   - sec to steroids  - on ssi,     # DIC,   Sec to severe sepsis , off steroids  Hematology f/w     # Anemia - sec to sepsis, iatrogenic,   Prn transfusions    SCD for prophylaxis  TF        Janell Root MD 6/22/2018 7:20 AM

## 2018-06-22 NOTE — PROGRESS NOTES
Assessment completed and documented in flowsheet. RASS -1. VSS. Respirations even, regular, with diminished breath sounds. Pt on ventilator. Cardiac rhythm-ST with occassional PVCs. OG in place with TF at goal @ 40. Active bowel sounds x4. CVC WNL with fentanyl infusing @ 100mcg/hr, precedex @ 0.6mcg/kg/hr, versed 3mg/hr, levophed @ 3mcg/min, and zyvox @ 600mg. VC WNL. Will continue to monitor.  Valerie Asher RN

## 2018-06-22 NOTE — PROGRESS NOTES
Assessments   Position Semi-Lucero's   Alarm Settings   Apnea (secs) 20 secs   Low Exhaled Vt  200 mL   Patient Observation   Observations AMBU at head of bed   ETT (adult)   Placement Date/Time: 06/12/18 1500     Secured at 22 cm   Measured From Lips   ET Placement Left   Secured By Commercial tube cordova   Site Condition Dry

## 2018-06-22 NOTE — PROGRESS NOTES
Reassessment completed and documented-see flowsheet. RASS -1. No changes noted. Will continue to monitor.  Meg Ricci RN

## 2018-06-23 LAB
ALBUMIN SERPL-MCNC: 1.8 G/DL (ref 3.4–5)
ALP BLD-CCNC: 199 U/L (ref 40–129)
ALT SERPL-CCNC: 28 U/L (ref 10–40)
ANION GAP SERPL CALCULATED.3IONS-SCNC: 12 MMOL/L (ref 3–16)
AST SERPL-CCNC: 18 U/L (ref 15–37)
BASE EXCESS ARTERIAL: -1.8 MMOL/L (ref -3–3)
BASE EXCESS ARTERIAL: -7 MMOL/L (ref -3–3)
BILIRUB SERPL-MCNC: 1.4 MG/DL (ref 0–1)
BILIRUBIN DIRECT: 1 MG/DL (ref 0–0.3)
BILIRUBIN, INDIRECT: 0.4 MG/DL (ref 0–1)
BUN BLDV-MCNC: 66 MG/DL (ref 7–20)
CALCIUM SERPL-MCNC: 8.6 MG/DL (ref 8.3–10.6)
CARBOXYHEMOGLOBIN ARTERIAL: 2.1 % (ref 0–1.5)
CARBOXYHEMOGLOBIN ARTERIAL: 2.2 % (ref 0–1.5)
CHLORIDE BLD-SCNC: 97 MMOL/L (ref 99–110)
CO2: 20 MMOL/L (ref 21–32)
CREAT SERPL-MCNC: 4.1 MG/DL (ref 0.6–1.1)
CULTURE, RESPIRATORY: ABNORMAL
CULTURE, RESPIRATORY: ABNORMAL
GFR AFRICAN AMERICAN: 16
GFR NON-AFRICAN AMERICAN: 13
GLUCOSE BLD-MCNC: 106 MG/DL (ref 70–99)
GLUCOSE BLD-MCNC: 139 MG/DL (ref 70–99)
GLUCOSE BLD-MCNC: 67 MG/DL (ref 70–99)
GLUCOSE BLD-MCNC: 68 MG/DL (ref 70–99)
GLUCOSE BLD-MCNC: 72 MG/DL (ref 70–99)
GLUCOSE BLD-MCNC: 75 MG/DL (ref 70–99)
GLUCOSE BLD-MCNC: 86 MG/DL (ref 70–99)
GLUCOSE BLD-MCNC: 87 MG/DL (ref 70–99)
GRAM STAIN RESULT: ABNORMAL
HCO3 ARTERIAL: 19 MMOL/L (ref 21–29)
HCO3 ARTERIAL: 23.6 MMOL/L (ref 21–29)
HCT VFR BLD CALC: 26.4 % (ref 36–48)
HEMOGLOBIN, ART, EXTENDED: 10 G/DL (ref 12–16)
HEMOGLOBIN, ART, EXTENDED: 9.1 G/DL (ref 12–16)
HEMOGLOBIN: 8.7 G/DL (ref 12–16)
INR BLD: 1.36 (ref 0.86–1.14)
MCH RBC QN AUTO: 31.5 PG (ref 26–34)
MCHC RBC AUTO-ENTMCNC: 32.9 G/DL (ref 31–36)
MCV RBC AUTO: 95.8 FL (ref 80–100)
METHEMOGLOBIN ARTERIAL: 0.5 %
METHEMOGLOBIN ARTERIAL: 0.7 %
O2 CONTENT ARTERIAL: 13 ML/DL
O2 CONTENT ARTERIAL: 14 ML/DL
O2 SAT, ARTERIAL: 95.6 %
O2 SAT, ARTERIAL: 97.1 %
O2 THERAPY: ABNORMAL
O2 THERAPY: ABNORMAL
ORGANISM: ABNORMAL
PCO2 ARTERIAL: 40.3 MMHG (ref 35–45)
PCO2 ARTERIAL: 42.8 MMHG (ref 35–45)
PDW BLD-RTO: 19.3 % (ref 12.4–15.4)
PERFORMED ON: ABNORMAL
PERFORMED ON: NORMAL
PH ARTERIAL: 7.29 (ref 7.35–7.45)
PH ARTERIAL: 7.36 (ref 7.35–7.45)
PLATELET # BLD: 165 K/UL (ref 135–450)
PMV BLD AUTO: 8.7 FL (ref 5–10.5)
PO2 ARTERIAL: 102.5 MMHG (ref 75–108)
PO2 ARTERIAL: 81.7 MMHG (ref 75–108)
POTASSIUM SERPL-SCNC: 3.9 MMOL/L (ref 3.5–5.1)
PROTHROMBIN TIME: 15.5 SEC (ref 9.8–13)
RBC # BLD: 2.76 M/UL (ref 4–5.2)
SODIUM BLD-SCNC: 129 MMOL/L (ref 136–145)
TCO2 ARTERIAL: 20.2 MMOL/L
TCO2 ARTERIAL: 24.9 MMOL/L
TOTAL PROTEIN: 6.2 G/DL (ref 6.4–8.2)
VANCOMYCIN RANDOM: 12.7 UG/ML
WBC # BLD: 26.4 K/UL (ref 4–11)

## 2018-06-23 PROCEDURE — 85610 PROTHROMBIN TIME: CPT

## 2018-06-23 PROCEDURE — 36592 COLLECT BLOOD FROM PICC: CPT

## 2018-06-23 PROCEDURE — 9990 CHARGE CONVERSION

## 2018-06-23 PROCEDURE — 94640 AIRWAY INHALATION TREATMENT: CPT

## 2018-06-23 PROCEDURE — 71045 X-RAY EXAM CHEST 1 VIEW: CPT

## 2018-06-23 PROCEDURE — 94762 N-INVAS EAR/PLS OXIMTRY CONT: CPT

## 2018-06-23 PROCEDURE — P9047 ALBUMIN (HUMAN), 25%, 50ML: HCPCS

## 2018-06-23 PROCEDURE — 99291 CRITICAL CARE FIRST HOUR: CPT | Performed by: INTERNAL MEDICINE

## 2018-06-23 PROCEDURE — C9113 INJ PANTOPRAZOLE SODIUM, VIA: HCPCS

## 2018-06-23 PROCEDURE — 80076 HEPATIC FUNCTION PANEL: CPT

## 2018-06-23 PROCEDURE — 80048 BASIC METABOLIC PNL TOTAL CA: CPT

## 2018-06-23 PROCEDURE — 99232 SBSQ HOSP IP/OBS MODERATE 35: CPT | Performed by: INTERNAL MEDICINE

## 2018-06-23 PROCEDURE — 36600 WITHDRAWAL OF ARTERIAL BLOOD: CPT

## 2018-06-23 PROCEDURE — 85027 COMPLETE CBC AUTOMATED: CPT

## 2018-06-23 PROCEDURE — 80202 ASSAY OF VANCOMYCIN: CPT

## 2018-06-23 PROCEDURE — 36415 COLL VENOUS BLD VENIPUNCTURE: CPT

## 2018-06-23 PROCEDURE — 94750 CHARGE CONVERSION: CPT

## 2018-06-23 PROCEDURE — 94003 VENT MGMT INPAT SUBQ DAY: CPT

## 2018-06-23 PROCEDURE — 82803 BLOOD GASES ANY COMBINATION: CPT

## 2018-06-23 RX ADMIN — DIAZEPAM 10 MG: 10 TABLET ORAL at 20:40

## 2018-06-23 RX ADMIN — ACETAMINOPHEN 650 MG: 325 TABLET ORAL at 06:21

## 2018-06-23 RX ADMIN — DIAZEPAM 10 MG: 10 TABLET ORAL at 14:37

## 2018-06-23 RX ADMIN — PANTOPRAZOLE SODIUM 40 MG: 40 INJECTION, POWDER, FOR SOLUTION INTRAVENOUS at 11:36

## 2018-06-23 RX ADMIN — CARBOXYMETHYLCELLULOSE SODIUM 1 DROP: 10 GEL OPHTHALMIC at 21:50

## 2018-06-23 RX ADMIN — CARBOXYMETHYLCELLULOSE SODIUM 1 DROP: 10 GEL OPHTHALMIC at 17:18

## 2018-06-23 RX ADMIN — MINERAL OIL AND WHITE PETROLATUM: 150; 830 OINTMENT OPHTHALMIC at 23:18

## 2018-06-23 RX ADMIN — Medication 15 ML: at 07:45

## 2018-06-23 RX ADMIN — MINERAL OIL AND WHITE PETROLATUM: 150; 830 OINTMENT OPHTHALMIC at 14:37

## 2018-06-23 RX ADMIN — CARBOXYMETHYLCELLULOSE SODIUM 1 DROP: 10 GEL OPHTHALMIC at 02:06

## 2018-06-23 RX ADMIN — DIAZEPAM 10 MG: 10 TABLET ORAL at 11:37

## 2018-06-23 RX ADMIN — CHLORHEXIDINE GLUCONATE 15 ML: 0.12 RINSE ORAL at 11:36

## 2018-06-23 RX ADMIN — METHADONE HYDROCHLORIDE 10 MG: 10 TABLET ORAL at 21:50

## 2018-06-23 RX ADMIN — HEPARIN SODIUM 5000 UNITS: 5000 INJECTION, SOLUTION INTRAVENOUS; SUBCUTANEOUS at 21:51

## 2018-06-23 RX ADMIN — METHADONE HYDROCHLORIDE 10 MG: 10 TABLET ORAL at 06:21

## 2018-06-23 RX ADMIN — CARBOXYMETHYLCELLULOSE SODIUM 1 DROP: 10 GEL OPHTHALMIC at 14:38

## 2018-06-23 RX ADMIN — Medication 15 ML: at 19:02

## 2018-06-23 RX ADMIN — HEPARIN SODIUM 5000 UNITS: 5000 INJECTION, SOLUTION INTRAVENOUS; SUBCUTANEOUS at 14:38

## 2018-06-23 RX ADMIN — HEPARIN SODIUM 5000 UNITS: 5000 INJECTION, SOLUTION INTRAVENOUS; SUBCUTANEOUS at 06:21

## 2018-06-23 RX ADMIN — METHADONE HYDROCHLORIDE 10 MG: 10 TABLET ORAL at 14:37

## 2018-06-23 RX ADMIN — LINEZOLID 600 MG: 2 INJECTION, SOLUTION INTRAVENOUS at 20:40

## 2018-06-23 RX ADMIN — ACETAMINOPHEN 650 MG: 325 TABLET ORAL at 23:24

## 2018-06-23 RX ADMIN — Medication 10 ML: at 20:40

## 2018-06-23 RX ADMIN — MINERAL OIL AND WHITE PETROLATUM: 150; 830 OINTMENT OPHTHALMIC at 04:04

## 2018-06-23 RX ADMIN — MINERAL OIL AND WHITE PETROLATUM: 150; 830 OINTMENT OPHTHALMIC at 11:38

## 2018-06-23 RX ADMIN — Medication 10 ML: at 11:36

## 2018-06-23 RX ADMIN — Medication 15 ML: at 11:29

## 2018-06-23 RX ADMIN — CHLORHEXIDINE GLUCONATE 15 ML: 0.12 RINSE ORAL at 20:40

## 2018-06-23 RX ADMIN — DEXTROSE MONOHYDRATE 25 G: 25 INJECTION, SOLUTION INTRAVENOUS at 21:51

## 2018-06-23 RX ADMIN — Medication 15 ML: at 14:37

## 2018-06-23 RX ADMIN — MINERAL OIL AND WHITE PETROLATUM: 150; 830 OINTMENT OPHTHALMIC at 20:40

## 2018-06-23 RX ADMIN — CARBOXYMETHYLCELLULOSE SODIUM 1 DROP: 10 GEL OPHTHALMIC at 06:21

## 2018-06-23 RX ADMIN — LINEZOLID 600 MG: 2 INJECTION, SOLUTION INTRAVENOUS at 11:36

## 2018-06-23 RX ADMIN — DEXTROSE MONOHYDRATE 25 G: 25 INJECTION, SOLUTION INTRAVENOUS at 16:13

## 2018-06-23 RX ADMIN — DOXERCALCIFEROL 1 MCG: 2 INJECTION, SOLUTION INTRAVENOUS at 10:31

## 2018-06-23 RX ADMIN — ACETAMINOPHEN 650 MG: 325 TABLET ORAL at 16:07

## 2018-06-23 ASSESSMENT — PAIN SCALES - GENERAL
PAINLEVEL_OUTOF10: 0

## 2018-06-23 ASSESSMENT — PULMONARY FUNCTION TESTS
PIF_VALUE: 24
PIF_VALUE: 20
PIF_VALUE: 23
PIF_VALUE: 22

## 2018-06-23 NOTE — PROGRESS NOTES
06/23/18 1134   Vent Information   Vent Type 840   Vent Mode AC/VC   Vt Ordered 350 mL   Rate Set 18 bmp   Peak Flow 55 L/min   Pressure Support 0 cmH20   FiO2  30 %   Sensitivity 3   PEEP/CPAP 5   I Time/ I Time % 0 s   Humidification Source Heated wire   Humidification Temp 36.9   Circuit Condensation Drained   Vent Patient Data   Vt Exhaled 602 mL   Rate Measured 22 br/min   Minute Volume 8.59 Liters   Peak Inspiratory Pressure 22 cmH2O   I:E Ratio 1:3.80   High Peep/I Pressure 0   Mean Airway Pressure 10 cmH20   Spontaneous Breathing Trial (SBT) RT Doc   Pulse 130   Cough/Sputum   Sputum How Obtained Endotracheal   Cough Productive   Sputum Amount Large   Sputum Color Brown   Tenacity Thick   Breath Sounds   Right Upper Lobe Rhonchi   Right Middle Lobe Diminished   Right Lower Lobe Diminished   Left Upper Lobe Rhonchi   Left Lower Lobe Diminished   Additional Respiratory  Assessments   Resp 18   Position Semi-Lucero's   Subglottic Suction Done?  Yes   Alarm Settings   High Pressure Alarm 50 cmH2O   Low Minute Volume Alarm 2.5 L/min   High Respiratory Rate 45 br/min   Patient Observation   Observations #7.5 ETT @ 22 lip line   ETT (adult)   Placement Date/Time: 06/12/18 1500     Secured at 22 cm   Measured From [address] [place] [address],[address] By Commercial tube cordova   Site Condition Dry

## 2018-06-23 NOTE — PLAN OF CARE
Problem: Falls - Risk of:  Goal: Will remain free from falls  Will remain free from falls   Outcome: Ongoing  [place] fall-risk light on outside door, fall precautions in place. Goal: Absence of physical injury  Absence of physical injury   Outcome: Ongoing      Problem: Risk for Impaired Skin Integrity  Goal: Tissue integrity - skin and mucous membranes  Structural intactness and normal physiological function of skin and  mucous membranes. Outcome: Ongoing  Patient turned/repositioned every 2 hours and as needed to maintain and improve skin integrity. Problem: Discharge Planning:  Goal: Discharged to appropriate level of care  Discharged to appropriate level of care    Outcome: Ongoing    Goal: Participates in care planning  Participates in care planning   Outcome: Ongoing      Problem: Bowel Function - Altered:  Goal: Bowel elimination is within specified parameters  Bowel elimination is within specified parameters   Outcome: Ongoing      Problem: Fluid Volume - Imbalance:  Goal: Absence of imbalanced fluid volume signs and symptoms  Absence of imbalanced fluid volume signs and symptoms   Outcome: Ongoing    Goal: Will show no signs and symptoms of excessive bleeding  Will show no signs and symptoms of excessive bleeding   Outcome: Ongoing      Problem: Nausea/Vomiting:  Goal: Absence of nausea/vomiting  Absence of nausea/vomiting   Outcome: Ongoing      Problem: Nutrition  Goal: Optimal nutrition therapy  Outcome: Ongoing  Patient receiving nutrition via continuous tube feed.    Goal: Understanding of nutritional guidelines  Outcome: Ongoing      Problem: Infection - Central Venous Catheter-Associated Bloodstream Infection:  Goal: Will show no infection signs and symptoms  Will show no infection signs and symptoms   Outcome: Ongoing      Problem: Restraint Use - Nonviolent/Non-Self-Destructive Behavior:  Goal: Absence of restraint indications  Absence of restraint indications   Outcome: Ongoing  Bilateral

## 2018-06-23 NOTE — PROGRESS NOTES
Internal Medicine ICU Progress Note    34year old white female who was admitted for GI bleed. Had an EGD     BC positive for strep pneumonia, staph aureus and gram-negative salazar. Echocardiogram showed tricuspid valve endocarditis. She's had this before. CT chest showed septic emboli and possible splenic infarct. -transferred to ICU  For PEA arrest  ,intubated resuscitated with fluids      - Off levophed  Vaso weaned   Extubated to  RA     reintubated  ,     s.p bronch  - had fever overnight, hypotensive, requiring fluid boluses  Given PRBC     Pt seen  on vent. Sedated, no distress, arousable,  Changed TLC by ICU team    - awake and alert on the vent and able to follow simple commands. Undergoing HD. Has low grade fever.     Invasive Lines: Tunneled dialysis catheter, TLC       Recent Labs      18   0544  18   0415   PHART  7.372  7.291*   [id number]  36.7  40.3   PO2ART  126.3*  102.5       MV Settings:  Vent Mode: AC/VC Rate Set: 18 bmp/Vt Ordered: 350 mL/ /FiO2 : 30 %    IV:   norepinephrine Stopped (18 0133)    dexmedetomidine (PRECEDEX) IV infusion Stopped (18 1442)    midazolam 5 mg/hr (18 0733)    fentaNYL (SUBLIMAZE) infusion 150 mcg/hr (18 0914)    dextrose         Vitals:  Temp  Av.3 °F (37.9 °C)  Min: 98.4 °F (36.9 °C)  Max: 102.4 °F (39.1 °C)  Pulse  Av.6  Min: 97  Max: 129  BP  Min: 94/50  Max: 129/68  SpO2  Av.9 %  Min: 92 %  Max: 100 %  FiO2   Av.2 %  Min: 30 %  Max: 40 %  Patient Vitals for the past 4 hrs:   BP Temp Temp src Pulse Resp SpO2 Weight   18 0900 (!) 100/57 - - 127 18 - -   18 0800 108/60 - - 125 18 - -   18 0745 - - - 128 18 - -   18 0700 115/61 100 °F (37.8 °C) Axillary 127 18 97 % 138 lb 14.2 oz (63 kg)   18 0600 129/68 100.6 °F (38.1 °C) Oral 129 18 99 % -       CVP: CVP (Mean): 0 mmHg      Intake/Output Summary (Last 24 hours) at 18 6855  Last data filed at 06/23/18 0630   Gross per 24 hour   Intake             2513 ml   Output                0 ml   Net             2513 ml       EXAM:    General: young female on vent. Responds to verbal command and follows simple commands. Ill appearing  Eyes: PERRL. No sclera icterus. No conjunctiva injected. ENT: No discharge. Neck: Trachea midline. Normal thyroid. Oral ETT and OG noted  Resp:  zulma air entry,  No rhonchi. No dullness on percussion. CV:tachy . Regular rhythm. No edema. No JVD. Palpable pedal pulses. GI:   Non-distended. No masses. No organmegaly. Normal bowel sounds. No hernia. Skin: Warm and dry. No nodule on exposed extremities. No rash on exposed extremities. appears jaundiced  Lymph: No cervical LAD. No supraclavicular LAD. M/S: . Right first MTP wound is drained  Improving erythema  Neuro: follows simple commands. Medications:  Scheduled Meds:   heparin (porcine)  5,000 Units Subcutaneous 3 times per day    lidocaine 1 % injection  5 mL Intradermal Once    sodium chloride flush  10 mL Intravenous 2 times per day    sodium chloride  250 mL Intravenous Once    linezolid  600 mg Intravenous Q12H    darbepoetin emi-polysorbate  100 mcg Intravenous Weekly - Thursday    And    darbepoetin emi-polysorbate  25 mcg Subcutaneous Weekly - Thursday    sodium chloride (Inhalant)  15 mL Nebulization 4x Daily    pantoprazole  40 mg Intravenous Daily    methadone  10 mg Oral 3 times per day    diazepam  10 mg Oral TID    chlorhexidine  15 mL Mouth/Throat BID    carboxymethylcellulose PF  1 drop Both Eyes Q4H    And    lubrifresh P.M.    Both Eyes Q4H    sodium chloride  250 mL Intravenous Once    insulin lispro  0-6 Units Subcutaneous Q4H       PRN Meds:  sodium chloride flush, morphine, heparin (porcine), albumin human, fentanNYL, midazolam, dextrose, sodium phosphate IVPB **OR** [DISCONTINUED] sodium phosphate IVPB **OR** [DISCONTINUED] sodium phosphate IVPB **OR** [DISCONTINUED] sodium phosphate IVPB, ondansetron, glucose, glucagon (rDNA), dextrose, acetaminophen, promethazine, hydrOXYzine    Results:  CBC:   Recent Labs      06/21/18   0550  06/22/18   0514  06/23/18   0415   WBC  31.5*  31.0*  26.4*   HGB  7.1*  8.6*  8.7*   HCT  23.0*  26.6*  26.4*   MCV  101.2*  95.6  95.8   PLT  177  169  165     BMP:   Recent Labs      06/20/18   1645  06/21/18   0550  06/22/18   0514  06/23/18   0415   NA  135*  135*  133*  129*   K  4.3  4.1  3.8  3.9   CL  101  105  101  97*   CO2  21  17*  22  20*   PHOS  4.6   --    --    --    BUN  83*  88*  46*  66*   CREATININE  3.6*  4.2*  2.8*  4.1*     LIVER PROFILE:   Recent Labs      06/20/18   1645  06/21/18   0550  06/22/18   0514  06/22/18   1514   AST  45*  42*   --   23   ALT  70*  52*   --   36   LIPASE   --    --   16.0   --    BILIDIR  1.8*  1.4*   --   1.2*   BILITOT  2.6*  2.0*   --   1.8*   ALKPHOS  325*  221*   --   191*     PT/INR:   Recent Labs      06/21/18   0550  06/22/18   0514  06/23/18   0415   PROTIME  15.7*  17.5*  15.5*   INR  1.38*  1.54*  1.36*         Cultures:    BC: S aureus, S pneumonia and gram neg salazar    Assessment:    Active Problems:    IVDU (intravenous drug user)    Endocarditis of tricuspid valve    HCAP (healthcare-associated pneumonia)    Upper GI bleed    Sepsis due to Streptococcus pneumoniae (HCC)    Moderate protein-calorie malnutrition (HCC)    PEA (Pulseless electrical activity) ([facility])    DIC (disseminated intravascular coagulation) ([facility] [address]    Acute respiratory failure with hypoxia ([facility])    Splenic infarction    Bacteremia    Mucus plugging of bronchi    Cavitary lung disease    Fever    Persistent fever  Resolved Problems:    * No resolved hospital problems. *         Plan:    # septic shock- sec to bacteremia /septic emboli/endocarditis    Sustained PEA arrest x2  requiring multiple pressors, intubation   - weaned off pressors- levo.  Vasopressin  - monitor BP in ICU  - wbc improving from 60 K  - critical care and ID involved. WBC down today. - still has a low grade fever. #Acute resp failure/cavitary pneumonia    - s.p PEA arrest, off vent  6/11- now back on vent for increasing abd distention and pain   - pulm managing  - ct chest with no new findings except for septic emboli and cavitory pneumonia  - ct abd with no acute findings as well    Off precedex drip. methadone and valium oral added   On vanc, cefepime,  now zyvox added  f/w BAL  -  Possible PEG and tracheostomy. Dr Naidr Moran will discuss with mother of patient. #  Acute upper GI bleed. s/p  EGD -Three angiodysplastic spots at the junction of the body and  the fundus area that was cauterized during endoscopy.     h/h monitors/p multiple  PRBC h/h stable. RBC scan neg      #  Tricuspid valve endocarditis- recurrent   She is growing MRSA, strep pneumoniae and Enterobacter cloacae. ID involved  Large pedunculated vegetation on septal leaflet of TV   Previously had MSSA TV endocarditis  - vancomycin and cefepime and zyvox was added   Improving wbc         #  Bacteremia from poly microbial organism. Source of infection could be tunneled dialysis catheter and her recurrent endocarditis. has underlying IV drug abuse. She relapsed using IV drugs 2 months ago  Repeat blood cx remain neg    #  End stage renal disease on HD  Was on CRRT. Now on HD.     #  Hyperglycemia   - sec to steroids  - on ssi,     # DIC,   Sec to severe sepsis , off steroids  Hematology f/w     # Anemia - sec to sepsis, iatrogenic,   Prn transfusions    SCD for prophylaxis  TF        Stanton Graff MD 6/23/2018 9:17 AM

## 2018-06-23 NOTE — PROGRESS NOTES
Pt incontinent of large amount of stool- pt given linen change and berenice care.    Paulo Brooks RN, BSN

## 2018-06-23 NOTE — PROGRESS NOTES
ABG drawn from the right radial artery X 1 attempt(s). Sight prepped per policy and procedure. Modified Boris's test positive. Pressure held to sight after procedure for five minutes. No hematoma present. Pulse present after procedure. Patient on 30%

## 2018-06-23 NOTE — PROGRESS NOTES
Pulmonary & Critical Care Medicine ICU Progress Note    CC: Acute hypoxemic respiratory failure     Events of Last 24 hours:   Still febrile   Off Levophed    FiO2 30%   PEEP 5  Fentanyl 150 mcg/hr   Versed 5 mg/hr   Off precedex   Brown thick secretions     Invasive Lines: IV: Vas cath   R IJ     MV:     Vent Mode: AC/VC Rate Set: 18 bmp/Vt Ordered: 350 mL/ /FiO2 : 40 %  Recent Labs      18   0544  18   0415   PHART  7.372  7.291*   [id number]  36.7  40.3   PO2ART  126.3*  102.5       IV:   norepinephrine Stopped (18)    dexmedetomidine (PRECEDEX) IV infusion Stopped (18)    midazolam 5 mg/hr (18)    fentaNYL (SUBLIMAZE) infusion 150 mcg/hr (18)    dextrose         Vitals:  Blood pressure 115/61, pulse 132, temperature 100 °F (37.8 °C), temperature source Axillary, resp. rate 18, height 5' 2\" (1.575 m), weight 140 lb 10.5 oz (63.8 kg), SpO2 97 %, not currently breastfeeding. on AC 18/350/+5 40%   Temp  Av.4 °F (38 °C)  Min: 98.4 °F (36.9 °C)  Max: 102.4 °F (39.1 °C)    Intake/Output Summary (Last 24 hours) at 18 0729  Last data filed at 18 0630   Gross per 24 hour   Intake             2513 ml   Output                0 ml   Net             2513 ml     EXAM:  General: ill appearing. cachectic   Eyes: PERRL. No sclera icterus. No conjunctival injection. ENT: No discharge. Pharynx clear. Neck: Trachea midline. Normal thyroid. Resp: No accessory muscle use. No crackles. No wheezing. Bilateral rhonchi. No dullness on percussion. CV: Tachy rate. Regular rhythm. No mumur or rub. No edema. GI: Non-tender. Mildly distended. No masses. No organomegaly. Normal bowel sounds. No hernia. Skin: Warm and dry. No nodule on exposed extremities. No rash on exposed extremities. Lymph: No cervical LAD. No supraclavicular LAD. M/S: No cyanosis. No joint deformity. No clubbing.    Neuro: Awake and following commands   Psych: No agitation, no review of data including imaging and labs, discussions with other team members and physicians is 34 minutes so far today, excluding procedures.

## 2018-06-23 NOTE — PROGRESS NOTES
Pt completed dialysis treatment- AM meds administered. FSBS not requiring and coverage at this time. Fentanyl & Versed gtt remain unchanged. Pt awake & follows some commands. HR remains 's with any stimulation. Pt afebrile at this time. Will continue to closely monitor.   Aminata Askew RN, BSN

## 2018-06-23 NOTE — PROGRESS NOTES
06/23/18 0242   Vent Information   Vent Type 840   Vent Mode AC/VC   Vt Ordered 350 mL   Rate Set 18 bmp   Peak Flow 55 L/min   Pressure Support 0 cmH20   FiO2  40 %   Sensitivity 3   PEEP/CPAP 5   I Time/ I Time % 0 s   Vent Patient Data   Vt Exhaled 383 mL   Rate Measured 18 br/min   Minute Volume 6.88 Liters   Peak Inspiratory Pressure 23 cmH2O   I:E Ratio 1:3.80   High Peep/I Pressure 0   Mean Airway Pressure 8.9 cmH20   Spontaneous Breathing Trial (SBT) RT Doc   Pulse 107   SpO2 92 %   Cough/Sputum   Sputum How Obtained Suctioned   Cough Productive   Sputum Amount Moderate   Sputum Color Tan   Tenacity Thick   Breath Sounds   Right Upper Lobe Rhonchi   Right Middle Lobe Diminished   Right Lower Lobe Diminished   Left Upper Lobe Rhonchi   Left Lower Lobe Diminished   Additional Respiratory  Assessments   Resp 18   Position Semi-Lucero's   Subglottic Suction Done?  Yes   Alarm Settings   High Pressure Alarm 50 cmH2O   Low Minute Volume Alarm 2.5 L/min   High Respiratory Rate 45 br/min   ETT (adult)   Placement Date/Time: 06/12/18 1500     Secured at 21 cm   Measured From Lips   ET Placement Left   Secured By Commercial tube cordova   Site Condition Dry

## 2018-06-23 NOTE — PROGRESS NOTES
ID Follow-up NOTE    CC: tricuspid valve endocarditis    Subjective:     Patient gives no history, is intubated, on vent; somewhat awake; follows command to open eyes a little.     Objective:     Patient Vitals for the past 24 hrs:   BP Temp Temp src Pulse Resp SpO2 Weight   06/23/18 0900 (!) 100/57 - - 127 18 - -   06/23/18 0800 108/60 - - 125 18 - -   06/23/18 0745 - - - 128 18 - -   06/23/18 0700 115/61 100 °F (37.8 °C) Axillary 127 18 97 % 138 lb 14.2 oz (63 kg)   06/23/18 0600 129/68 100.6 °F (38.1 °C) Oral 129 18 99 % -   06/23/18 0500 121/66 - - 118 18 96 % -   06/23/18 0400 104/61 99.1 °F (37.3 °C) Oral 112 18 93 % 140 lb 10.5 oz (63.8 kg)   06/23/18 0300 106/61 - - 110 18 - -   06/23/18 0242 - - - 107 18 92 % -   06/23/18 0200 (!) 94/50 - - 107 18 93 % -   06/23/18 0100 (!) 102/54 - - 108 18 96 % -   06/23/18 0000 112/67 98.4 °F (36.9 °C) Oral 112 18 98 % -   06/22/18 2300 127/65 - - 112 18 - -   06/22/18 2243 - - - 110 18 - -   06/22/18 2231 - - - 110 18 - -   06/22/18 2200 (!) 103/53 - - 110 18 - -   06/22/18 2100 (!) 98/46 99.4 °F (37.4 °C) Oral 119 18 100 % -   06/22/18 2000 (!) 110/56 101.2 °F (38.4 °C) Oral 127 18 96 % -   06/22/18 1910 - - - 129 19 96 % -   06/22/18 1830 121/71 - - 129 18 97 % -   06/22/18 1800 (!) 107/95 - - 122 18 100 % -   06/22/18 1730 (!) 119/56 - - 124 18 100 % -   06/22/18 1700 (!) 109/54 - - 122 18 100 % -   06/22/18 1630 (!) 117/58 - - 122 18 100 % -   06/22/18 1600 117/61 101.4 °F (38.6 °C) Oral 119 19 100 % -   06/22/18 1530 (!) 114/59 - - 118 18 100 % -   06/22/18 1528 - - - 115 18 100 % -   06/22/18 1500 (!) 102/55 - - 114 18 100 % -   06/22/18 1430 106/60 - - 106 18 100 % -   06/22/18 1400 (!) 109/58 102.4 °F (39.1 °C) Oral 105 18 100 % -   06/22/18 1330 (!) 98/56 - - 102 18 100 % -   06/22/18 1300 (!) 105/56 - - 104 18 99 % -   06/22/18 1230 (!) 109/57 - - 107 20 - -   06/22/18 1200 (!) 116/52 - - 103 18 - -   06/22/18 1130 (!) 110/55 - - 97 21 98 % -   06/22/18 1100 (!) 98/55 - - 98 21 98 % -   18 1030 (!) 106/54 - - 100 19 98 % -     Temp (24hrs), Av.3 °F (37.9 °C), Min:98.4 °F (36.9 °C), Max:102.4 °F (39.1 °C)          EXAM:  General:intubated mech vent. Fevers higher, sometimes follows simple command. HEENT: conj icterus less marked; pupils equal  Neck: s nodes, fairly supple      LUNGS: coarse BS bilat; no resp distress     CV: RR c gd I syst M lower LSB     ABD: soft, question mild tenderness. No mass or omegaly     EXT: without edema; Abscess R great toe resolved,  Skin: no rash or other skin stigmata of endocarditis; icterus less intense. Data Review:    Lab Results   Component Value Date    WBC 26.4 (H) 2018    HGB 8.7 (L) 2018    HCT 26.4 (L) 2018    MCV 95.8 2018     2018     Lab Results   Component Value Date    CREATININE 4.1 (H) 2018    BUN 66 (H) 2018     (L) 2018    K 3.9 2018    CL 97 (L) 2018    CO2 20 (L) 2018     Lab Results   Component Value Date    ALT 36 2018    AST 23 2018    ALKPHOS 191 (H) 2018    BILITOT 1.8 (H) 2018   vancomycin random level 17  MICRO:  blood cultures both growing MRSA. One culture also growing strep while the other culture is also growing Enterobacter sensitive to cefepime. More recent blood cultures negative.  blood cultures x 2 negative but  blood culture appears positive for MRSA once again. Several blood cultures drawn after  are still negative.  respiratory culture: MRSA vanc PANCHO 1    IMAGING: CXR: cavitary multifocal infiltrates presumably related to septic pulmonary emboli, improving slowly. CT head: NAD;   CT chest: iniltrates and cavitary pulmonary nodules consistent with septic emboli. CT abd and pelvis: ascites, splenic abscess or infarct.    Assessment:     Active Problems:    IVDU (intravenous drug user)    Endocarditis of tricuspid valve    HCAP (healthcare-associated pneumonia)    Upper GI bleed    Sepsis due to Streptococcus pneumoniae (HCC)    Moderate protein-calorie malnutrition (HCC)    PEA (Pulseless electrical activity) ([address]    DIC (disseminated intravascular coagulation) ([address]    Acute respiratory failure with hypoxia (HCC)    Splenic infarction    Bacteremia    Mucus plugging of bronchi    Cavitary lung disease    Fever    Persistent fever  Resolved Problems:    * No resolved hospital problems. *  Tricuspid valve endocarditis with septic pulmonary emboli (L sided endocarditis not ruled out) due to MRSA, and MRSA pneumonia. Strep and Enterobacter were also isolated from admission blood cultures, significance uncertain: I would think this illness is mostly due to MRSA. possible splenic infarct or abscess. s/p arrest x2. Icterus and liver enzyme abmnormalities improved  Leukocytosis and fevers see, a little better  Diarrhea less, C diff negative. Pt had bronchoscopy three days ago: respiratory culture still growing MRSA. Plan:   redose vancomycin after dialysis depending on level. Continue linezolid.    redose cefepime

## 2018-06-23 NOTE — PROGRESS NOTES
06/23/18 1547   Vent Information   Vent Type 840   Vent Mode AC/VC   Vt Ordered 350 mL   Rate Set 18 bmp   Peak Flow 55 L/min   Pressure Support 0 cmH20   FiO2  30 %   Sensitivity 3   PEEP/CPAP 5   I Time/ I Time % 0 s   Humidification Source Heated wire   Humidification Temp 36.8   Circuit Condensation Drained   Vent Patient Data   Vt Exhaled 380 mL   Rate Measured 18 br/min   Minute Volume 6.89 Liters   Peak Inspiratory Pressure 22 cmH2O   I:E Ratio 1:3.80   High Peep/I Pressure 0   Mean Airway Pressure 8.9 cmH20   Spontaneous Breathing Trial (SBT) RT Doc   Pulse 124   Cough/Sputum   Sputum How Obtained Endotracheal   Cough Productive   Sputum Amount Moderate   Sputum Color Brown   Tenacity Thick   Breath Sounds   Right Upper Lobe Diminished   Right Middle Lobe Diminished   Right Lower Lobe Diminished   Left Upper Lobe Diminished   Left Lower Lobe Diminished   Additional Respiratory  Assessments   Resp 18   Position Semi-Lucero's   Subglottic Suction Done?  Yes   Alarm Settings   High Pressure Alarm 50 cmH2O   Low Minute Volume Alarm 2.5 L/min   High Respiratory Rate 45 br/min   Patient Observation   Observations #7.5 ETT @ 22 lip line   ETT (adult)   Placement Date/Time: 06/12/18 1500     Secured at 22 cm   Measured From Lips   ET Placement Left   Secured By Commercial tube cordova   Site Condition Dry

## 2018-06-23 NOTE — PROGRESS NOTES
Pt turned and repositioned- no incontinence noted. Pt family at bedside. All questions answered. No new orders at present time. Assessment unchanged.   Jagjit Dobbins RN, BSN

## 2018-06-23 NOTE — FLOWSHEET NOTE
Completed 4hr HD w/ 1.5L net fluid removal. Lowest SBP 89. Tx w/ Alb and NS w/ good response. Awake and alert during last half of tx. Good BFR using RIJ. Hectorol given. Report given to VI Abel.  Full tx record in pts chart to be scanned to EMR.      06/23/18 0700 06/23/18 1130   Vital Signs   /61 (!) 114/55   Temp 100 °F (37.8 °C) --    Pulse 127 119   Weight 138 lb 14.2 oz (63 kg) 135 lb 9.3 oz (61.5 kg)   Weight Method Bed scale --    Post-Hemodialysis Assessment   Duration of Treatment (minutes) --  240 minutes   Heparin amount administered during treatment (units) --  0 units   Hemodialysis Intake (ml) --  400 ml   Hemodialysis Output (ml) --  1850 ml   NET Removed (ml) --  1450 ml

## 2018-06-24 LAB
ALBUMIN SERPL-MCNC: 1.9 G/DL (ref 3.4–5)
ALP BLD-CCNC: 206 U/L (ref 40–129)
ALT SERPL-CCNC: 20 U/L (ref 10–40)
ANION GAP SERPL CALCULATED.3IONS-SCNC: 11 MMOL/L (ref 3–16)
AST SERPL-CCNC: 16 U/L (ref 15–37)
BASE EXCESS ARTERIAL: -6.5 MMOL/L (ref -3–3)
BILIRUB SERPL-MCNC: 1.4 MG/DL (ref 0–1)
BILIRUBIN DIRECT: 0.9 MG/DL (ref 0–0.3)
BILIRUBIN, INDIRECT: 0.5 MG/DL (ref 0–1)
BUN BLDV-MCNC: 41 MG/DL (ref 7–20)
CALCIUM SERPL-MCNC: 8.6 MG/DL (ref 8.3–10.6)
CHLORIDE BLD-SCNC: 96 MMOL/L (ref 99–110)
CO2: 22 MMOL/L (ref 21–32)
CREAT SERPL-MCNC: 3 MG/DL (ref 0.6–1.1)
FINAL REPORT: NORMAL
GFR AFRICAN AMERICAN: 23
GFR NON-AFRICAN AMERICAN: 19
GLUCOSE BLD-MCNC: 117 MG/DL (ref 70–99)
GLUCOSE BLD-MCNC: 119 MG/DL (ref 70–99)
GLUCOSE BLD-MCNC: 68 MG/DL (ref 70–99)
GLUCOSE BLD-MCNC: 68 MG/DL (ref 70–99)
GLUCOSE BLD-MCNC: 81 MG/DL (ref 70–99)
GLUCOSE BLD-MCNC: 82 MG/DL (ref 70–99)
GLUCOSE BLD-MCNC: 83 MG/DL (ref 70–99)
GLUCOSE BLD-MCNC: 83 MG/DL (ref 70–99)
GLUCOSE BLD-MCNC: 84 MG/DL (ref 70–99)
GLUCOSE BLD-MCNC: 85 MG/DL (ref 70–99)
GLUCOSE BLD-MCNC: 86 MG/DL (ref 70–99)
GLUCOSE BLD-MCNC: 86 MG/DL (ref 70–99)
GLUCOSE BLD-MCNC: 87 MG/DL (ref 70–99)
GLUCOSE BLD-MCNC: 88 MG/DL (ref 70–99)
GLUCOSE BLD-MCNC: 92 MG/DL (ref 70–99)
GLUCOSE BLD-MCNC: 93 MG/DL (ref 70–99)
GLUCOSE BLD-MCNC: 97 MG/DL (ref 70–99)
GLUCOSE BLD-MCNC: 97 MG/DL (ref 70–99)
GLUCOSE BLD-MCNC: 99 MG/DL (ref 70–99)
HCO3 ARTERIAL: 18.4 MMOL/L (ref 21–29)
HCT VFR BLD CALC: 25.7 % (ref 36–48)
HEMOGLOBIN: 8.2 G/DL (ref 12–16)
INR BLD: 1.33 (ref 0.86–1.14)
MCH RBC QN AUTO: 31.1 PG (ref 26–34)
MCHC RBC AUTO-ENTMCNC: 32 G/DL (ref 31–36)
MCV RBC AUTO: 96.9 FL (ref 80–100)
O2 SAT, ARTERIAL: 98.8 %
O2 THERAPY: ABNORMAL
PCO2 ARTERIAL: 35.2 MMHG (ref 35–45)
PDW BLD-RTO: 19.8 % (ref 12.4–15.4)
PERFORMED ON: ABNORMAL
PERFORMED ON: NORMAL
PH ARTERIAL: 7.34 (ref 7.35–7.45)
PLATELET # BLD: 142 K/UL (ref 135–450)
PMV BLD AUTO: 8.5 FL (ref 5–10.5)
PO2 ARTERIAL: 147.1 MMHG (ref 75–108)
POTASSIUM SERPL-SCNC: 3.9 MMOL/L (ref 3.5–5.1)
PRELIMINARY: NORMAL
PROTHROMBIN TIME: 15.2 SEC (ref 9.8–13)
RBC # BLD: 2.65 M/UL (ref 4–5.2)
SODIUM BLD-SCNC: 129 MMOL/L (ref 136–145)
TCO2 ARTERIAL: 19.5 MMOL/L
TOTAL PROTEIN: 6 G/DL (ref 6.4–8.2)
WBC # BLD: 17.7 K/UL (ref 4–11)

## 2018-06-24 PROCEDURE — 85610 PROTHROMBIN TIME: CPT

## 2018-06-24 PROCEDURE — 80076 HEPATIC FUNCTION PANEL: CPT

## 2018-06-24 PROCEDURE — 36415 COLL VENOUS BLD VENIPUNCTURE: CPT

## 2018-06-24 PROCEDURE — 94003 VENT MGMT INPAT SUBQ DAY: CPT

## 2018-06-24 PROCEDURE — 85027 COMPLETE CBC AUTOMATED: CPT

## 2018-06-24 PROCEDURE — 71045 X-RAY EXAM CHEST 1 VIEW: CPT

## 2018-06-24 PROCEDURE — 99291 CRITICAL CARE FIRST HOUR: CPT | Performed by: INTERNAL MEDICINE

## 2018-06-24 PROCEDURE — 94750 CHARGE CONVERSION: CPT

## 2018-06-24 PROCEDURE — 82803 BLOOD GASES ANY COMBINATION: CPT

## 2018-06-24 PROCEDURE — 9990 CHARGE CONVERSION

## 2018-06-24 PROCEDURE — 94640 AIRWAY INHALATION TREATMENT: CPT

## 2018-06-24 PROCEDURE — C9113 INJ PANTOPRAZOLE SODIUM, VIA: HCPCS

## 2018-06-24 PROCEDURE — 94762 N-INVAS EAR/PLS OXIMTRY CONT: CPT

## 2018-06-24 PROCEDURE — 80048 BASIC METABOLIC PNL TOTAL CA: CPT

## 2018-06-24 RX ORDER — DEXTROSE MONOHYDRATE 100 MG/ML
INJECTION, SOLUTION INTRAVENOUS CONTINUOUS
Status: DISCONTINUED | OUTPATIENT
Start: 2018-06-24 | End: 2018-06-26

## 2018-06-24 RX ADMIN — CARBOXYMETHYLCELLULOSE SODIUM 1 DROP: 10 GEL OPHTHALMIC at 15:00

## 2018-06-24 RX ADMIN — Medication 10 ML: at 08:18

## 2018-06-24 RX ADMIN — MINERAL OIL AND WHITE PETROLATUM: 150; 830 OINTMENT OPHTHALMIC at 04:02

## 2018-06-24 RX ADMIN — MINERAL OIL AND WHITE PETROLATUM: 150; 830 OINTMENT OPHTHALMIC at 11:29

## 2018-06-24 RX ADMIN — MINERAL OIL AND WHITE PETROLATUM: 150; 830 OINTMENT OPHTHALMIC at 08:19

## 2018-06-24 RX ADMIN — CARBOXYMETHYLCELLULOSE SODIUM 1 DROP: 10 GEL OPHTHALMIC at 02:00

## 2018-06-24 RX ADMIN — LINEZOLID 600 MG: 2 INJECTION, SOLUTION INTRAVENOUS at 20:47

## 2018-06-24 RX ADMIN — DIAZEPAM 10 MG: 10 TABLET ORAL at 08:22

## 2018-06-24 RX ADMIN — ACETAMINOPHEN 650 MG: 325 TABLET ORAL at 11:31

## 2018-06-24 RX ADMIN — DIAZEPAM 10 MG: 10 TABLET ORAL at 20:52

## 2018-06-24 RX ADMIN — METHADONE HYDROCHLORIDE 10 MG: 10 TABLET ORAL at 14:54

## 2018-06-24 RX ADMIN — CARBOXYMETHYLCELLULOSE SODIUM 1 DROP: 10 GEL OPHTHALMIC at 05:08

## 2018-06-24 RX ADMIN — DEXTROSE MONOHYDRATE: 100 INJECTION, SOLUTION INTRAVENOUS at 04:33

## 2018-06-24 RX ADMIN — DEXTROSE MONOHYDRATE: 100 INJECTION, SOLUTION INTRAVENOUS at 18:28

## 2018-06-24 RX ADMIN — Medication 15 ML: at 14:48

## 2018-06-24 RX ADMIN — MINERAL OIL AND WHITE PETROLATUM: 150; 830 OINTMENT OPHTHALMIC at 16:08

## 2018-06-24 RX ADMIN — Medication 15 ML: at 11:18

## 2018-06-24 RX ADMIN — HEPARIN SODIUM 5000 UNITS: 5000 INJECTION, SOLUTION INTRAVENOUS; SUBCUTANEOUS at 21:51

## 2018-06-24 RX ADMIN — CARBOXYMETHYLCELLULOSE SODIUM 1 DROP: 10 GEL OPHTHALMIC at 16:09

## 2018-06-24 RX ADMIN — CHLORHEXIDINE GLUCONATE 15 ML: 0.12 RINSE ORAL at 20:52

## 2018-06-24 RX ADMIN — PANTOPRAZOLE SODIUM 40 MG: 40 INJECTION, POWDER, FOR SOLUTION INTRAVENOUS at 08:22

## 2018-06-24 RX ADMIN — HEPARIN SODIUM 5000 UNITS: 5000 INJECTION, SOLUTION INTRAVENOUS; SUBCUTANEOUS at 14:54

## 2018-06-24 RX ADMIN — CARBOXYMETHYLCELLULOSE SODIUM 1 DROP: 10 GEL OPHTHALMIC at 21:51

## 2018-06-24 RX ADMIN — HEPARIN SODIUM 5000 UNITS: 5000 INJECTION, SOLUTION INTRAVENOUS; SUBCUTANEOUS at 05:10

## 2018-06-24 RX ADMIN — CARBOXYMETHYLCELLULOSE SODIUM 1 DROP: 10 GEL OPHTHALMIC at 08:19

## 2018-06-24 RX ADMIN — DIAZEPAM 10 MG: 10 TABLET ORAL at 14:54

## 2018-06-24 RX ADMIN — ACETAMINOPHEN 650 MG: 325 TABLET ORAL at 05:09

## 2018-06-24 RX ADMIN — CHLORHEXIDINE GLUCONATE 15 ML: 0.12 RINSE ORAL at 08:22

## 2018-06-24 RX ADMIN — METHADONE HYDROCHLORIDE 10 MG: 10 TABLET ORAL at 05:10

## 2018-06-24 RX ADMIN — MINERAL OIL AND WHITE PETROLATUM: 150; 830 OINTMENT OPHTHALMIC at 20:23

## 2018-06-24 RX ADMIN — Medication 15 ML: at 07:18

## 2018-06-24 RX ADMIN — Medication 15 ML: at 19:33

## 2018-06-24 RX ADMIN — METHADONE HYDROCHLORIDE 10 MG: 10 TABLET ORAL at 21:51

## 2018-06-24 RX ADMIN — ACETAMINOPHEN 650 MG: 325 TABLET ORAL at 19:27

## 2018-06-24 RX ADMIN — LINEZOLID 600 MG: 2 INJECTION, SOLUTION INTRAVENOUS at 08:22

## 2018-06-24 ASSESSMENT — PAIN SCALES - GENERAL
PAINLEVEL_OUTOF10: 0

## 2018-06-24 ASSESSMENT — PULMONARY FUNCTION TESTS
PIF_VALUE: 21
PIF_VALUE: 23
PIF_VALUE: 24
PIF_VALUE: 20
PIF_VALUE: 22
PIF_VALUE: 24

## 2018-06-24 NOTE — PROGRESS NOTES
Patient BS 76 and Dr Cleopatra deluca served. Received new orders and will continue to monitor.  Electronically signed by Arnold Bush RN on 6/24/2018 at 4:18 AM

## 2018-06-24 NOTE — PROGRESS NOTES
Internal Medicine ICU Progress Note    34year old white female who was admitted for GI bleed. Had an EGD     BC positive for strep pneumonia, staph aureus and gram-negative salazar. Echocardiogram showed tricuspid valve endocarditis. She's had this before. CT chest showed septic emboli and possible splenic infarct. -transferred to ICU  For PEA arrest  ,intubated resuscitated with fluids      - Off levophed  Vaso weaned   Extubated to  RA     reintubated  ,     s.p bronch  - had fever overnight, hypotensive, requiring fluid boluses  Given PRBC     Pt seen  on vent. Sedated, no distress, arousable,  Changed TLC by ICU team    - awake and alert on the vent and able to follow simple commands. Undergoing HD. Has low grade fever. -  Still having fever. She is responding to verbal command.     Invasive Lines: Tunneled dialysis catheter, TLC       Recent Labs      18   1140  18   0502   PHART  7.359  7.336*   [id number]  42.8  35.2   PO2ART  81.7  147.1*       MV Settings:  Vent Mode: AC/VC Rate Set: 18 bmp/Vt Ordered: 350 mL/ /FiO2 : 30 %    IV:   dextrose 60 mL/hr at 18 0433    norepinephrine Stopped (18 0133)    dexmedetomidine (PRECEDEX) IV infusion Stopped (18 1442)    midazolam 4 mg/hr (18 0258)    fentaNYL (SUBLIMAZE) infusion 150 mcg/hr (18 6430)    dextrose         Vitals:  Temp  Av.7 °F (37.6 °C)  Min: 97.7 °F (36.5 °C)  Max: 101.1 °F (38.4 °C)  Pulse  Av.3  Min: 115  Max: 133  BP  Min: 89/68  Max: 123/57  SpO2  Av.6 %  Min: 78 %  Max: 99 %  FiO2   Av %  Min: 30 %  Max: 30 %  Patient Vitals for the past 4 hrs:   BP Temp Temp src Pulse Resp SpO2   18 0719 - - - 121 18 96 %   18 0700 (!) 97/50 99.5 °F (37.5 °C) Oral 122 18 97 %   18 0639 - 100.1 °F (37.8 °C) Oral - - -   18 0500 112/63 - - 126 18 96 %   18 0451 - 101.1 °F (38.4 °C) Oral - - -   18 0404 - 100 °F (37.8 °C) Oral - phosphate IVPB **OR** [DISCONTINUED] sodium phosphate IVPB **OR** [DISCONTINUED] sodium phosphate IVPB **OR** [DISCONTINUED] sodium phosphate IVPB, ondansetron, glucose, glucagon (rDNA), dextrose, acetaminophen, promethazine, hydrOXYzine    Results:  CBC:   Recent Labs      06/22/18   0514  06/23/18   0415  06/24/18   0445   WBC  31.0*  26.4*  17.7*   HGB  8.6*  8.7*  8.2*   HCT  26.6*  26.4*  25.7*   MCV  95.6  95.8  96.9   PLT  169  165  142     BMP:   Recent Labs      06/22/18   0514  06/23/18   0415  06/24/18   0445   NA  133*  129*  129*   K  3.8  3.9  3.9   CL  101  97*  96*   CO2  22  20*  22   BUN  46*  66*  41*   CREATININE  2.8*  4.1*  3.0*     LIVER PROFILE:   Recent Labs      06/22/18   0514  06/22/18   1514  06/23/18   0415   AST   --   23  18   ALT   --   36  28   LIPASE  16.0   --    --    BILIDIR   --   1.2*  1.0*   BILITOT   --   1.8*  1.4*   ALKPHOS   --   191*  199*     PT/INR:   Recent Labs      06/22/18   0514  06/23/18   0415  06/24/18   0445   PROTIME  17.5*  15.5*  15.2*   INR  1.54*  1.36*  1.33*         Cultures:    BC: S aureus, S pneumonia and gram neg salazar    Assessment:    Active Problems:    IVDU (intravenous drug user)    Endocarditis of tricuspid valve    HCAP (healthcare-associated pneumonia)    Upper GI bleed    Sepsis due to Streptococcus pneumoniae (HCC)    Moderate protein-calorie malnutrition (HCC)    PEA (Pulseless electrical activity) (HCC)    DIC (disseminated intravascular coagulation) (HCC)    Acute respiratory failure with hypoxia (HCC)    Splenic infarction    Bacteremia    Mucus plugging of bronchi    Cavitary lung disease    Fever    Persistent fever  Resolved Problems:    * No resolved hospital problems. *         Plan:    # Septic shock- sec to bacteremia /septic emboli/endocarditis    Sustained PEA arrest x2  requiring multiple pressors, intubation   - weaned off pressors- levo.  Vasopressin  - monitor BP in ICU  - wbc improving from 60 K  - critical care and ID involved. WBC down to 17.7 K today. - still has a fever. May need chest Ct to rule out empyema. #Acute resp failure/cavitary pneumonia    - s.p PEA arrest, off vent  6/11- now back on vent for increasing abd distention and pain   - pulm managing  - ct chest with no new findings except for septic emboli and cavitory pneumonia  - ct abd with no acute findings as well    Off precedex drip. methadone and valium oral added   On vanc, cefepime,  now zyvox added  f/w BAL  -  Possible PEG and tracheostomy. Dr Marquita Jeffers will discuss with mother of patient. #  Acute upper GI bleed. s/p  EGD -Three angiodysplastic spots at the junction of the body and  the fundus area that was cauterized during endoscopy.     h/h monitors/p multiple  PRBC h/h stable. RBC scan neg      #  Tricuspid valve endocarditis- recurrent   She is growing MRSA, strep pneumoniae and Enterobacter cloacae. ID involved  Large pedunculated vegetation on septal leaflet of TV   Previously had MSSA TV endocarditis  - vancomycin and cefepime and zyvox was added   Improving wbc         #  Bacteremia from poly microbial organism. Source of infection could be tunneled dialysis catheter and her recurrent endocarditis. has underlying IV drug abuse. She relapsed using IV drugs 2 months ago  Repeat blood cx remain neg    #  End stage renal disease on HD  Was on CRRT. Now on HD. #  Hypoglycemia  -  I will increase tube feeds. # DIC,   Sec to severe sepsis , off steroids  Hematology f/w     # Anemia - sec to sepsis, iatrogenic,   Prn transfusions    SCD for prophylaxis  TF    She is critically ill.     Nakul Castañeda MD 6/24/2018 7:34 AM

## 2018-06-24 NOTE — PROGRESS NOTES
A: Assessment completed and documented, no family present to discuss plan of care and patient is sedated and intubated, is unable to participate, bed exit is on for patient safety. D: Patient has an oral temperature of 103. 1. A: Applied cool wash cloths to forehead and medicated per dr order, see MAR.

## 2018-06-24 NOTE — PROGRESS NOTES
Pulmonary & Critical Care Medicine ICU Progress Note    CC: Acute hypoxemic respiratory failure     Events of Last 24 hours:   Fever Tmax 101.1  Low glucose overnight started D10   Off Levophed    FiO2 30%   PEEP 5  Fentanyl 150 mcg/hr   Versed 5 mg/hr   Off precedex   Brown thick secretions   HD yesterday removed 1.8 L     Invasive Lines: IV: Vas cath   R IJ     MV:     Vent Mode: AC/VC Rate Set: 18 bmp/Vt Ordered: 350 mL/ /FiO2 : 30 %  Recent Labs      18   1140  18   0502   PHART  7.359  7.336*   [id number]  42.8  35.2   PO2ART  81.7  147.1*       IV:   dextrose 60 mL/hr at 18 0433    norepinephrine Stopped (18 0133)    dexmedetomidine (PRECEDEX) IV infusion Stopped (18 1442)    midazolam 4 mg/hr (18 0258)    fentaNYL (SUBLIMAZE) infusion 150 mcg/hr (18 9143)    dextrose         Vitals:  Blood pressure (!) 97/50, pulse 122, temperature 100.1 °F (37.8 °C), temperature source Oral, resp. rate 18, height 5' 2\" (1.575 m), weight 135 lb 9.3 oz (61.5 kg), SpO2 97 %, not currently breastfeeding. on AC 18/350/+5 40%   Temp  Av.8 °F (37.7 °C)  Min: 97.7 °F (36.5 °C)  Max: 101.1 °F (38.4 °C)    Intake/Output Summary (Last 24 hours) at 18 0722  Last data filed at 18 2974   Gross per 24 hour   Intake             3136 ml   Output             1850 ml   Net             1286 ml     EXAM:  General: Ill appearing. cachectic   Eyes: PERRL. No sclera icterus. No conjunctival injection. ENT: No discharge. Pharynx clear. Neck: Trachea midline. Normal thyroid. Resp: No accessory muscle use. No crackles. No wheezing. Bilateral rhonchi. No dullness on percussion. CV: Tachy rate. Regular rhythm. No mumur or rub. No edema. GI: Non-tender. Mildly distended. No masses. No organomegaly. Normal bowel sounds. No hernia. Skin: Warm and dry. No nodule on exposed extremities. No rash on exposed extremities. Lymph: No cervical LAD. No supraclavicular LAD.    M/S: No including direct patient contact, management of life support systems, review of data including imaging and labs, discussions with other team members and physicians is 35 minutes so far today, excluding procedures.

## 2018-06-24 NOTE — PROGRESS NOTES
06/24/18 0719   Vent Information   Vent Type 840   Vent Mode AC/VC   Vt Ordered 350 mL   Rate Set 18 bmp   Peak Flow 55 L/min   Pressure Support 0 cmH20   FiO2  30 %   Sensitivity 3   PEEP/CPAP 5   I Time/ I Time % 0 s   Humidification Source Heated wire   Humidification Temp 36.7   Circuit Condensation Drained   Vent Patient Data   Vt Exhaled 375 mL   Rate Measured 18 br/min   Minute Volume 6.75 Liters   Peak Inspiratory Pressure 20 cmH2O   I:E Ratio 1:3.80   High Peep/I Pressure 0   Mean Airway Pressure 8.3 cmH20   Plateau Pressure 17 CAJ57   Static Compliance 29 mL/cmH2O   Dynamic Compliance 19 mL/cmH2O   Spontaneous Breathing Trial (SBT) RT Doc   Pulse 121   SpO2 96 %   Cough/Sputum   Sputum How Obtained Spontaneous cough   Cough Non-productive   Breath Sounds   Right Upper Lobe Diminished   Right Middle Lobe Diminished   Right Lower Lobe Diminished   Left Upper Lobe Diminished   Left Lower Lobe Diminished   Additional Respiratory  Assessments   Resp 18   Position Semi-Lucero's   Subglottic Suction Done?  Yes   Alarm Settings   High Pressure Alarm 50 cmH2O   Low Minute Volume Alarm 2.5 L/min   High Respiratory Rate 45 br/min   Patient Observation   Observations #7.5 ETT @ 21 lip line   ETT (adult)   Placement Date/Time: 06/12/18 1500     Secured at 21 cm   Measured From Lips   ET Placement Right   Secured By [employer] tube [employer]   Site Condition Dry

## 2018-06-24 NOTE — PROGRESS NOTES
06/24/18 0314   Vent Information   Vent Type 840   Vent Mode AC/VC   Vt Ordered 350 mL   Rate Set 18 bmp   Peak Flow 55 L/min   Pressure Support 0 cmH20   FiO2  30 %   Sensitivity 3   PEEP/CPAP 5   I Time/ I Time % 0 s   Humidification Temp Measured 35.8   Vent Patient Data   Vt Exhaled 370 mL   Rate Measured 18 br/min   Minute Volume 6.65 Liters   Peak Inspiratory Pressure 21 cmH2O   I:E Ratio 1:3.80   High Peep/I Pressure 0   Mean Airway Pressure 8.5 cmH20   Spontaneous Breathing Trial (SBT) RT Doc   Pulse 127   SpO2 97 %   Cough/Sputum   Sputum How Obtained Endotracheal   Cough Productive   Sputum Amount Small   Sputum Color Brown   Tenacity Thick   Breath Sounds   Right Upper Lobe Diminished   Right Middle Lobe Diminished   Right Lower Lobe Diminished   Left Upper Lobe Diminished   Left Lower Lobe Diminished   Additional Respiratory  Assessments   Resp 19   Alarm Settings   High Pressure Alarm 50 cmH2O   Low Minute Volume Alarm 2.5 L/min   Apnea (secs) 20 secs   High Respiratory Rate 45 br/min   Low Exhaled Vt  200 mL   Patient Observation   Observations 7.5 ett 21 at lip

## 2018-06-24 NOTE — PROGRESS NOTES
06/23/18 2252   Vent Information   Vent Type 840   Vent Mode AC/VC   Vt Ordered 350 mL   Rate Set 18 bmp   Peak Flow 55 L/min   Pressure Support 0 cmH20   FiO2  30 %   Sensitivity 3   PEEP/CPAP 5   I Time/ I Time % 0 s   Vent Patient Data   Vt Exhaled 348 mL   Rate Measured 19 br/min   Minute Volume 6.43 Liters   Peak Inspiratory Pressure 24 cmH2O   I:E Ratio 1:3.80   High Peep/I Pressure 0   Mean Airway Pressure 9.2 cmH20   Spontaneous Breathing Trial (SBT) RT Doc   Pulse 126   SpO2 95 %   Cough/Sputum   Sputum How Obtained Endotracheal   Cough Productive   Sputum Amount Small   Sputum Color Brown   Tenacity Thick   Breath Sounds   Right Upper Lobe Rhonchi   Right Middle Lobe Diminished   Right Lower Lobe Diminished   Left Upper Lobe Rhonchi   Left Lower Lobe Diminished   Additional Respiratory  Assessments   Resp 19   Alarm Settings   High Pressure Alarm 50 cmH2O   Low Minute Volume Alarm 2.5 L/min   Apnea (secs) 20 secs   High Respiratory Rate 45 br/min   Low Exhaled Vt  200 mL   Patient Observation   Observations 7.5 ett 21 AT LIP

## 2018-06-24 NOTE — PROGRESS NOTES
Patient resting in bed with eyes closed sedated and on vent. Patient BS was 68 and 1/2 amp of D50 was given as ordered per hypoglycemic protocol. Patients vitals are stable. Patient opens eyes when spoken to. Patient mother called and was updated on status. Will continue to monitor.  Electronically signed by Trina Villagomez RN on 6/23/2018 at 10:55 PM

## 2018-06-24 NOTE — PROGRESS NOTES
Patient resting with eyes closed on sedation and vent. Patient vitals stable and BS is WNL after having D10 at 60ml/hr. Patient did not have any BM during the night. Restraints in place and patient tolerating well. Patient opens eyes when spoke to. Will continue to monitor.  Electronically signed by Junior Hull RN on 6/24/2018 at [address] AM

## 2018-06-24 NOTE — PROGRESS NOTES
[facility]       Progress Note           Subjective:   34y.o. year old female who we are seeing in consultation for ESRD management. The patient was seen and examined; there were no acute events noted overnight. ROS: Fever, no chills. Social: No family at bedside. Scheduled Meds:   doxercalciferol  1 mcg Intravenous Once per day on Tue Thu Sat    heparin (porcine)  5,000 Units Subcutaneous 3 times per day    lidocaine 1 % injection  5 mL Intradermal Once    sodium chloride flush  10 mL Intravenous 2 times per day    linezolid  600 mg Intravenous Q12H    darbepoetin emi-polysorbate  100 mcg Intravenous Weekly - Thursday    And    darbepoetin emi-polysorbate  25 mcg Subcutaneous Weekly - Thursday    sodium chloride (Inhalant)  15 mL Nebulization 4x Daily    pantoprazole  40 mg Intravenous Daily    methadone  10 mg Oral 3 times per day    diazepam  10 mg Oral TID    chlorhexidine  15 mL Mouth/Throat BID    carboxymethylcellulose PF  1 drop Both Eyes Q4H    And    lubrifresh P.M.    Both Eyes Q4H     Continuous Infusions:   dextrose 30 mL/hr at 06/24/18 0923    norepinephrine Stopped (06/23/18 0133)    dexmedetomidine (PRECEDEX) IV infusion Stopped (06/22/18 1442)    midazolam 4 mg/hr (06/24/18 0258)    fentaNYL (SUBLIMAZE) infusion 150 mcg/hr (06/24/18 0633)    dextrose       PRN Meds:.sodium chloride flush, morphine, heparin (porcine), albumin human, fentanNYL, midazolam, dextrose, sodium phosphate IVPB **OR** [DISCONTINUED] sodium phosphate IVPB **OR** [DISCONTINUED] sodium phosphate IVPB **OR** [DISCONTINUED] sodium phosphate IVPB, ondansetron, glucose, glucagon (rDNA), dextrose, acetaminophen, promethazine, hydrOXYzine      Objective/   GEN:  Chronically ill, BP (!) 100/50   Pulse 120   Temp 98.1 °F (36.7 °C) (Oral)   Resp 18   Ht 5' 2\" (1.575 m)   Wt 135 lb 9.3 oz (61.5 kg)   SpO2 97%   BMI 24.80 kg/m²      CV: S1, S2 without m/r/g; no edema  RESP:clear

## 2018-06-24 NOTE — PROGRESS NOTES
Spoke with patients mother & updated on POC. Pt temp 101.4- tylenol administered. No changes noted since previous assessment. All lines WNL. Pt repositioned & checked for incontinence.    Jonathan Blancas RN, BSN

## 2018-06-24 NOTE — PLAN OF CARE
Problem: Risk for Impaired Skin Integrity  Goal: Tissue integrity - skin and mucous membranes  Structural intactness and normal physiological function of skin and  mucous membranes. Intervention: PRESSURE ULCER PREVENTION  Patient will maintain pressure ulcer prevention by being turned every 2 hours.

## 2018-06-25 LAB
ALBUMIN SERPL-MCNC: 1.7 G/DL (ref 3.4–5)
ALP BLD-CCNC: 224 U/L (ref 40–129)
ALT SERPL-CCNC: 16 U/L (ref 10–40)
ANION GAP SERPL CALCULATED.3IONS-SCNC: 13 MMOL/L (ref 3–16)
AST SERPL-CCNC: 15 U/L (ref 15–37)
BASE EXCESS ARTERIAL: -6.6 MMOL/L (ref -3–3)
BILIRUB SERPL-MCNC: 1.1 MG/DL (ref 0–1)
BILIRUBIN DIRECT: 0.7 MG/DL (ref 0–0.3)
BILIRUBIN, INDIRECT: 0.4 MG/DL (ref 0–1)
BLOOD CULTURE, ROUTINE: NORMAL
BUN BLDV-MCNC: 61 MG/DL (ref 7–20)
CALCIUM SERPL-MCNC: 8.4 MG/DL (ref 8.3–10.6)
CARBOXYHEMOGLOBIN ARTERIAL: 2.4 % (ref 0–1.5)
CHLORIDE BLD-SCNC: 92 MMOL/L (ref 99–110)
CO2: 20 MMOL/L (ref 21–32)
CORTISOL TOTAL: 11.2 UG/DL
CREAT SERPL-MCNC: 4 MG/DL (ref 0.6–1.1)
GFR AFRICAN AMERICAN: 16
GFR NON-AFRICAN AMERICAN: 14
GLUCOSE BLD-MCNC: 100 MG/DL (ref 70–99)
GLUCOSE BLD-MCNC: 106 MG/DL (ref 70–99)
GLUCOSE BLD-MCNC: 112 MG/DL (ref 70–99)
GLUCOSE BLD-MCNC: 85 MG/DL (ref 70–99)
GLUCOSE BLD-MCNC: 87 MG/DL (ref 70–99)
GLUCOSE BLD-MCNC: 88 MG/DL (ref 70–99)
GLUCOSE BLD-MCNC: 89 MG/DL (ref 70–99)
GLUCOSE BLD-MCNC: 89 MG/DL (ref 70–99)
GLUCOSE BLD-MCNC: 91 MG/DL (ref 70–99)
GLUCOSE BLD-MCNC: 91 MG/DL (ref 70–99)
GLUCOSE BLD-MCNC: 92 MG/DL (ref 70–99)
GLUCOSE BLD-MCNC: 92 MG/DL (ref 70–99)
GLUCOSE BLD-MCNC: 94 MG/DL (ref 70–99)
GLUCOSE BLD-MCNC: 97 MG/DL (ref 70–99)
GLUCOSE BLD-MCNC: 97 MG/DL (ref 70–99)
GLUCOSE BLD-MCNC: 98 MG/DL (ref 70–99)
GLUCOSE BLD-MCNC: 99 MG/DL (ref 70–99)
HCO3 ARTERIAL: 19 MMOL/L (ref 21–29)
HCT VFR BLD CALC: 25.6 % (ref 36–48)
HEMOGLOBIN, ART, EXTENDED: 8.7 G/DL (ref 12–16)
HEMOGLOBIN: 8.3 G/DL (ref 12–16)
INR BLD: 1.32 (ref 0.86–1.14)
MCH RBC QN AUTO: 31.2 PG (ref 26–34)
MCHC RBC AUTO-ENTMCNC: 32.3 G/DL (ref 31–36)
MCV RBC AUTO: 96.4 FL (ref 80–100)
METHEMOGLOBIN ARTERIAL: 0.5 %
O2 CONTENT ARTERIAL: 12 ML/DL
O2 SAT, ARTERIAL: 95.9 %
O2 THERAPY: ABNORMAL
PCO2 ARTERIAL: 38.2 MMHG (ref 35–45)
PDW BLD-RTO: 19.3 % (ref 12.4–15.4)
PERFORMED ON: ABNORMAL
PERFORMED ON: ABNORMAL
PERFORMED ON: NORMAL
PH ARTERIAL: 7.32 (ref 7.35–7.45)
PLATELET # BLD: 107 K/UL (ref 135–450)
PMV BLD AUTO: 8.3 FL (ref 5–10.5)
PO2 ARTERIAL: 86.9 MMHG (ref 75–108)
POTASSIUM SERPL-SCNC: 4.2 MMOL/L (ref 3.5–5.1)
PROTHROMBIN TIME: 15.1 SEC (ref 9.8–13)
RBC # BLD: 2.65 M/UL (ref 4–5.2)
SODIUM BLD-SCNC: 125 MMOL/L (ref 136–145)
TCO2 ARTERIAL: 20.2 MMOL/L
TOTAL PROTEIN: 6.2 G/DL (ref 6.4–8.2)
TSH SERPL DL<=0.05 MIU/L-ACNC: 1.74 UIU/ML (ref 0.27–4.2)
VANCOMYCIN RANDOM: 22.3 UG/ML
WBC # BLD: 15 K/UL (ref 4–11)

## 2018-06-25 PROCEDURE — 71250 CT THORAX DX C-: CPT

## 2018-06-25 PROCEDURE — 9990 CHARGE CONVERSION

## 2018-06-25 PROCEDURE — 87070 CULTURE OTHR SPECIMN AEROBIC: CPT

## 2018-06-25 PROCEDURE — 80048 BASIC METABOLIC PNL TOTAL CA: CPT

## 2018-06-25 PROCEDURE — 0JPVXXZ REMOVAL OF TUNNELED VASCULAR ACCESS DEVICE FROM UPPER EXTREMITY SUBCUTANEOUS TISSUE AND FASCIA, EXTERNAL APPROACH: ICD-10-PCS | Performed by: SURGERY

## 2018-06-25 PROCEDURE — 94003 VENT MGMT INPAT SUBQ DAY: CPT

## 2018-06-25 PROCEDURE — 36600 WITHDRAWAL OF ARTERIAL BLOOD: CPT

## 2018-06-25 PROCEDURE — C9113 INJ PANTOPRAZOLE SODIUM, VIA: HCPCS

## 2018-06-25 PROCEDURE — 84443 ASSAY THYROID STIM HORMONE: CPT

## 2018-06-25 PROCEDURE — 99232 SBSQ HOSP IP/OBS MODERATE 35: CPT | Performed by: INTERNAL MEDICINE

## 2018-06-25 PROCEDURE — 71045 X-RAY EXAM CHEST 1 VIEW: CPT

## 2018-06-25 PROCEDURE — 85027 COMPLETE CBC AUTOMATED: CPT

## 2018-06-25 PROCEDURE — 36592 COLLECT BLOOD FROM PICC: CPT

## 2018-06-25 PROCEDURE — 87040 BLOOD CULTURE FOR BACTERIA: CPT

## 2018-06-25 PROCEDURE — 82803 BLOOD GASES ANY COMBINATION: CPT

## 2018-06-25 PROCEDURE — 99233 SBSQ HOSP IP/OBS HIGH 50: CPT | Performed by: INTERNAL MEDICINE

## 2018-06-25 PROCEDURE — 85610 PROTHROMBIN TIME: CPT

## 2018-06-25 PROCEDURE — 94750 CHARGE CONVERSION: CPT

## 2018-06-25 PROCEDURE — 80076 HEPATIC FUNCTION PANEL: CPT

## 2018-06-25 PROCEDURE — 82533 TOTAL CORTISOL: CPT

## 2018-06-25 PROCEDURE — 94762 N-INVAS EAR/PLS OXIMTRY CONT: CPT

## 2018-06-25 PROCEDURE — 80202 ASSAY OF VANCOMYCIN: CPT

## 2018-06-25 PROCEDURE — 99291 CRITICAL CARE FIRST HOUR: CPT | Performed by: INTERNAL MEDICINE

## 2018-06-25 PROCEDURE — 94640 AIRWAY INHALATION TREATMENT: CPT

## 2018-06-25 PROCEDURE — 36589 REMOVAL TUNNELED CV CATH: CPT | Performed by: SURGERY

## 2018-06-25 RX ORDER — LIDOCAINE HYDROCHLORIDE 10 MG/ML
INJECTION, SOLUTION INFILTRATION; PERINEURAL
Status: DISPENSED
Start: 2018-06-25 | End: 2018-06-26

## 2018-06-25 RX ORDER — LIDOCAINE HYDROCHLORIDE 10 MG/ML
5 INJECTION, SOLUTION INFILTRATION; PERINEURAL ONCE
Status: DISCONTINUED | OUTPATIENT
Start: 2018-06-25 | End: 2018-07-06 | Stop reason: SDUPTHER

## 2018-06-25 RX ADMIN — PANTOPRAZOLE SODIUM 40 MG: 40 INJECTION, POWDER, FOR SOLUTION INTRAVENOUS at 08:42

## 2018-06-25 RX ADMIN — LINEZOLID 600 MG: 2 INJECTION, SOLUTION INTRAVENOUS at 08:41

## 2018-06-25 RX ADMIN — DIAZEPAM 10 MG: 10 TABLET ORAL at 21:30

## 2018-06-25 RX ADMIN — CHLORHEXIDINE GLUCONATE 15 ML: 0.12 RINSE ORAL at 08:42

## 2018-06-25 RX ADMIN — METHADONE HYDROCHLORIDE 10 MG: 10 TABLET ORAL at 09:11

## 2018-06-25 RX ADMIN — MINERAL OIL AND WHITE PETROLATUM: 150; 830 OINTMENT OPHTHALMIC at 00:17

## 2018-06-25 RX ADMIN — CARBOXYMETHYLCELLULOSE SODIUM 1 DROP: 10 GEL OPHTHALMIC at 21:30

## 2018-06-25 RX ADMIN — MINERAL OIL AND WHITE PETROLATUM: 150; 830 OINTMENT OPHTHALMIC at 15:28

## 2018-06-25 RX ADMIN — Medication 10 ML: at 21:20

## 2018-06-25 RX ADMIN — MINERAL OIL AND WHITE PETROLATUM: 150; 830 OINTMENT OPHTHALMIC at 19:41

## 2018-06-25 RX ADMIN — MINERAL OIL AND WHITE PETROLATUM: 150; 830 OINTMENT OPHTHALMIC at 23:41

## 2018-06-25 RX ADMIN — Medication 15 ML: at 06:44

## 2018-06-25 RX ADMIN — HEPARIN SODIUM 5000 UNITS: 5000 INJECTION, SOLUTION INTRAVENOUS; SUBCUTANEOUS at 15:29

## 2018-06-25 RX ADMIN — MINERAL OIL AND WHITE PETROLATUM: 150; 830 OINTMENT OPHTHALMIC at 08:40

## 2018-06-25 RX ADMIN — HEPARIN SODIUM 5000 UNITS: 5000 INJECTION, SOLUTION INTRAVENOUS; SUBCUTANEOUS at 09:11

## 2018-06-25 RX ADMIN — METHADONE HYDROCHLORIDE 10 MG: 10 TABLET ORAL at 21:30

## 2018-06-25 RX ADMIN — CARBOXYMETHYLCELLULOSE SODIUM 1 DROP: 10 GEL OPHTHALMIC at 14:30

## 2018-06-25 RX ADMIN — CHLORHEXIDINE GLUCONATE 15 ML: 0.12 RINSE ORAL at 21:21

## 2018-06-25 RX ADMIN — HEPARIN SODIUM 5000 UNITS: 5000 INJECTION, SOLUTION INTRAVENOUS; SUBCUTANEOUS at 21:30

## 2018-06-25 RX ADMIN — LINEZOLID 600 MG: 2 INJECTION, SOLUTION INTRAVENOUS at 21:20

## 2018-06-25 RX ADMIN — DEXTROSE MONOHYDRATE: 100 INJECTION, SOLUTION INTRAVENOUS at 13:33

## 2018-06-25 RX ADMIN — Medication 15 ML: at 10:46

## 2018-06-25 RX ADMIN — METHADONE HYDROCHLORIDE 10 MG: 10 TABLET ORAL at 15:29

## 2018-06-25 RX ADMIN — MINERAL OIL AND WHITE PETROLATUM: 150; 830 OINTMENT OPHTHALMIC at 04:29

## 2018-06-25 RX ADMIN — CARBOXYMETHYLCELLULOSE SODIUM 1 DROP: 10 GEL OPHTHALMIC at 17:24

## 2018-06-25 RX ADMIN — MINERAL OIL AND WHITE PETROLATUM: 150; 830 OINTMENT OPHTHALMIC at 11:57

## 2018-06-25 RX ADMIN — CARBOXYMETHYLCELLULOSE SODIUM 1 DROP: 10 GEL OPHTHALMIC at 10:44

## 2018-06-25 RX ADMIN — ACETAMINOPHEN 650 MG: 325 TABLET ORAL at 04:57

## 2018-06-25 RX ADMIN — CARBOXYMETHYLCELLULOSE SODIUM 1 DROP: 10 GEL OPHTHALMIC at 02:38

## 2018-06-25 RX ADMIN — MIDAZOLAM HYDROCHLORIDE 2 MG: 1 INJECTION INTRAMUSCULAR; INTRAVENOUS at 14:28

## 2018-06-25 RX ADMIN — Medication 15 ML: at 15:44

## 2018-06-25 RX ADMIN — DIAZEPAM 10 MG: 10 TABLET ORAL at 15:29

## 2018-06-25 RX ADMIN — Medication 10 ML: at 08:43

## 2018-06-25 RX ADMIN — DIAZEPAM 10 MG: 10 TABLET ORAL at 08:42

## 2018-06-25 RX ADMIN — Medication 15 ML: at 19:59

## 2018-06-25 ASSESSMENT — PAIN SCALES - GENERAL
PAINLEVEL_OUTOF10: 0

## 2018-06-25 ASSESSMENT — PULMONARY FUNCTION TESTS
PIF_VALUE: 24
PIF_VALUE: 27
PIF_VALUE: 14
PIF_VALUE: 17

## 2018-06-25 NOTE — PROGRESS NOTES
06/25/18 1544   Vent Information   Vt Ordered 350 mL   Rate Set 18 bmp   Peak Flow 55 L/min   Pressure Support 0 cmH20   FiO2  30 %   Sensitivity 3   PEEP/CPAP 5   I Time/ I Time % 0 s   Vent Patient Data   Vt Exhaled 396 mL   Rate Measured 20 br/min   Minute Volume 7 Liters   Peak Inspiratory Pressure 24 cmH2O   I:E Ratio 1:3.80   High Peep/I Pressure 0   Mean Airway Pressure 9.8 cmH20   Spontaneous Breathing Trial (SBT) RT Doc   Pulse 121   Cough/Sputum   Sputum How Obtained Endotracheal   Cough Productive   Sputum Amount Small   Sputum Color Brown   Tenacity Thick   Breath Sounds   Right Upper Lobe Rhonchi   Right Middle Lobe Diminished   Right Lower Lobe Diminished   Left Upper Lobe Rhonchi   Left Lower Lobe Diminished   Additional Respiratory  Assessments   Resp 17   Position Semi-Lucero's   Oral Care Mouth suctioned   Subglottic Suction Done?  Yes   Alarm Settings   High Pressure Alarm 50 cmH2O   Low Minute Volume Alarm 2.5 L/min   High Respiratory Rate 45 br/min   Low Exhaled Vt  200 mL   Patient Observation   Observations 7.5 ett 21 at lip, h2o full, ambu at hob   ETT (adult)   Placement Date/Time: 06/12/18 1500     Secured at 21 cm   Measured From Lips   ET Placement Left   Secured By [employer] tube [employer]   Site Condition Dry

## 2018-06-25 NOTE — PROGRESS NOTES
Dr Anat Gorman is in agreement that vas cath should be D/Son today and reinserted tomorrow.  Dr Renetta Morales updated and see orders Timmy Croft RN

## 2018-06-25 NOTE — PROGRESS NOTES
Blood NG  6/5/18 Blood MRSA and ENTEROBACTER CLOACAE       Films:  CXR was reviewed by me and it showed R pleuro-parenchymal disease    ASSESSMENT:  · Acute ventilator-dependent hypoxemic respiratory failure  · Persistent fevers  · Recurrent tricuspid valve infectious endocarditis  · Polymicrobial bacteremia: MRSA, Streptococcal pneumonia, and Enterobacter  · Acute liver failure  · Cavitary pneumonia due to septic emboli  · End-stage kidney disease on hemodialysis  · Upper GI bleed secondary to angiodysplasia status post cauterization  · IV drug abuse  · Hepatitis C  · Hypertriglyceridemia    PLAN:  Mechanical ventilation as per my orders. The ventilator was adjusted by me at the bedside for unstable, life threatening respiratory failure. IV fentanyl and versed gtts  Head of bed 30 degrees or higher at all times  · Daily spontaneous breathing trial once PEEP less than 8, FiO2 less than 55%  · Vancomycin D#20, Cefepime D#7, Zyvox D#5; infectious disease is following  · F/U cortisol, TSH  · CT CHEST to evaluate empyema, abscess    · Consider changing out tunneled catheter  · Prophylaxis: DVT - SQ heparin, Protonix       Total critical care time caring for this patient with life threatening, unstable organ failure, including direct patient contact, management of life support systems, review of data including imaging and labs, discussions with other team members and physicians is 35 minutes so far today, excluding procedures.

## 2018-06-25 NOTE — PROGRESS NOTES
A: Boris's test performed on _right______ radial wrist, test was positive, site prepped with alcohol, blood gas drawn then sterile gauze was applied to site and direct pressure was held for one full minute, secured gauze with tape.

## 2018-06-25 NOTE — PROGRESS NOTES
glucose, glucagon (rDNA), dextrose, acetaminophen, promethazine, hydrOXYzine    Results:  CBC:   Recent Labs      06/23/18   0415  06/24/18   0445  06/25/18   0530   WBC  26.4*  17.7*  15.0*   HGB  8.7*  8.2*  8.3*   HCT  26.4*  25.7*  25.6*   MCV  95.8  96.9  96.4   PLT  165  142  107*     BMP:   Recent Labs      06/23/18   0415  06/24/18   0445  06/25/18   0530   NA  129*  129*  125*   K  3.9  3.9  4.2   CL  97*  96*  92*   CO2  20*  22  20*   BUN  66*  41*  61*   CREATININE  4.1*  3.0*  4.0*     LIVER PROFILE:   Recent Labs      06/22/18   1514  06/23/18   0415  06/24/18   0445   AST  23  18  16   ALT  36  28  20   BILIDIR  1.2*  1.0*  0.9*   BILITOT  1.8*  1.4*  1.4*   ALKPHOS  191*  199*  206*     PT/INR:   Recent Labs      06/23/18   0415  06/24/18   0445  06/25/18   0530   PROTIME  15.5*  15.2*  15.1*   INR  1.36*  1.33*  1.32*         Cultures:    BC: S aureus, S pneumonia and gram neg salazar    Assessment:    Active Problems:    IVDU (intravenous drug user)    Endocarditis of tricuspid valve    HCAP (healthcare-associated pneumonia)    Upper GI bleed    Sepsis due to Streptococcus pneumoniae (formerly [facility])    Moderate protein-calorie malnutrition (HCC)    PEA (Pulseless electrical activity) (formerly [facility])    DIC (disseminated intravascular coagulation) ([facility] [address]    Acute respiratory failure with hypoxia (formerly [facility])    Splenic infarction    Bacteremia    Mucus plugging of bronchi    Cavitary lung disease    Fever    Persistent fever    MRSA bacteremia  Resolved Problems:    * No resolved hospital problems. *         Plan:    # Septic shock- sec to bacteremia /septic emboli/endocarditis  Sustained PEA arrest x2  requiring multiple pressors, intubation   - weaned off pressors- levo. Vasopressin  - monitor BP in ICU  - wbc improving from 60 K  - critical care and ID involved. WBC down to 15 K today. - still has a fever.   Repeat CT chest today  - abx per ID - see below    #Acute resp failure/cavitary pneumonia  - s.p PEA arrest, off vent 6/11- now back on vent for increasing abd distention and pain   - pulm managing  - ct chest with no new findings except for septic emboli and cavitory pneumonia. Repeat CT chest today  - ct abd with no acute findings as well    Off precedex drip. methadone and valium oral added   On vanc, cefepime,  now zyvox added  f/w BAL  -  Possible PEG and tracheostomy. #  Acute upper GI bleed. s/p  EGD -Three angiodysplastic spots at the junction of the body and  the fundus area that was cauterized during endoscopy.     h/h monitors/p multiple  PRBC h/h stable. RBC scan neg      #  Tricuspid valve endocarditis- recurrent   She is growing MRSA, strep pneumoniae and Enterobacter cloacae. ID involved  Large pedunculated vegetation on septal leaflet of TV   Previously had MSSA TV endocarditis  - vancomycin and cefepime and zyvox was added   Improving wbc         #  Bacteremia from poly microbial organism. Source of infection could be tunneled dialysis catheter and her recurrent endocarditis. has underlying IV drug abuse. She relapsed using IV drugs 2 months ago  Repeat blood cx remain neg      #  End stage renal disease on HD  Was on CRRT. Now on HD. # DIC,   Sec to severe sepsis , off steroids  Hematology f/w     # Anemia - sec to sepsis, iatrogenic,   Prn transfusions        She is critically ill. DIET TUBE FEED CONTINUOUS/CYCLIC NPO; Renal Formula (Nepro);  Orogastric; Continuous; 20; 40; 20  Full Code      Kalina Caballero MD 6/25/2018 12:50 PM

## 2018-06-25 NOTE — PROGRESS NOTES
06/25/18 1047   Vent Information   Vt Ordered 350 mL   Rate Set 18 bmp   Peak Flow 55 L/min   Pressure Support 0 cmH20   FiO2  30 %   Sensitivity 3   PEEP/CPAP 5   I Time/ I Time % 0 s   Vent Patient Data   Vt Exhaled 323 mL   Rate Measured 19 br/min   Minute Volume 7.43 Liters   Peak Inspiratory Pressure 27 cmH2O   I:E Ratio 1:3.80   High Peep/I Pressure 0   Mean Airway Pressure 9.4 cmH20   Spontaneous Breathing Trial (SBT) RT Doc   Pulse 128   SpO2 96 %   Cough/Sputum   Sputum How Obtained None   Breath Sounds   Right Upper Lobe Rhonchi   Right Middle Lobe Diminished   Right Lower Lobe Diminished   Left Upper Lobe Rhonchi   Left Lower Lobe Diminished   Additional Respiratory  Assessments   Resp 24  (24)   Position Semi-Lucero's   Oral Care Mouth suctioned   Subglottic Suction Done?  Yes   Alarm Settings   High Pressure Alarm 50 cmH2O   Low Minute Volume Alarm 2.5 L/min   High Respiratory Rate 45 br/min   Low Exhaled Vt  200 mL   Patient Observation   Observations 7.5 ett 21 at lip, h2o full, ambu at hob   ETT (adult)   Placement Date/Time: 06/12/18 1500     Secured at 21 cm   Measured From [address] [place] [address],[address] By [employer] tube [employer]   Site Condition Dry

## 2018-06-25 NOTE — PROGRESS NOTES
06/24/18 2317   Vent Information   Vent Type 840   Vent Mode AC/VC   Vt Ordered 350 mL   Rate Set 18 bmp   Peak Flow 55 L/min   Pressure Support 0 cmH20   FiO2  30 %   Sensitivity 3   PEEP/CPAP 5   I Time/ I Time % 0 s   Vent Patient Data   Vt Exhaled 359 mL   Rate Measured 19 br/min   Minute Volume 6.41 Liters   Peak Inspiratory Pressure 24 cmH2O   I:E Ratio 1:3.20   High Peep/I Pressure 0   Mean Airway Pressure 9.3 cmH20   Spontaneous Breathing Trial (SBT) RT Doc   Pulse 120   SpO2 96 %   Cough/Sputum   Sputum How Obtained Endotracheal   Cough Productive   Sputum Amount Small   Sputum Color Creamy; Red   Tenacity Thick   Breath Sounds   Right Upper Lobe Diminished   Right Middle Lobe Diminished   Right Lower Lobe Diminished   Left Upper Lobe Diminished   Left Lower Lobe Diminished   Additional Respiratory  Assessments   Resp 18   Alarm Settings   High Pressure Alarm 50 cmH2O   Low Minute Volume Alarm 2.5 L/min   Apnea (secs) 20 secs   High Respiratory Rate 45 br/min   Low Exhaled Vt  200 mL   Patient Observation   Observations 7.5 ett 21 at lip

## 2018-06-25 NOTE — PROGRESS NOTES
06/25/18 0645   Vent Information   Vt Ordered 350 mL   Rate Set 18 bmp   Peak Flow 55 L/min   Pressure Support 0 cmH20   FiO2  30 %   Sensitivity 3   PEEP/CPAP 5   I Time/ I Time % 0 s   Cuff Pressure (cm H2O) 26 cm H2O   Humidification Source Heated wire   Humidification Temp 37   Humidification Temp Measured 37.2   Circuit Condensation Drained   Vent Patient Data   Vt Exhaled 380 mL   Rate Measured 20 br/min   Minute Volume 7.53 Liters   Peak Inspiratory Pressure 24 cmH2O   I:E Ratio 1:3.80   High Peep/I Pressure 0   Mean Airway Pressure 9.8 cmH20   Plateau Pressure 21 XFS05   Static Compliance 24 mL/cmH2O   Dynamic Compliance 11 mL/cmH2O   Spontaneous Breathing Trial (SBT) RT Doc   Pulse 130   SpO2 92 %   Cough/Sputum   Sputum How Obtained Endotracheal   Cough Productive   Sputum Amount Large   Sputum Color Brown   Tenacity Thick   Breath Sounds   Right Upper Lobe Rhonchi   Right Middle Lobe Diminished   Right Lower Lobe Diminished   Left Upper Lobe Rhonchi   Left Lower Lobe Diminished   Additional Respiratory  Assessments   Resp 22   Alarm Settings   High Pressure Alarm 50 cmH2O   Low Minute Volume Alarm 2.5 L/min   High Respiratory Rate 45 br/min   Low Exhaled Vt  200 mL   Patient Observation   Observations 7.5 ett 21 at lip, h2o full, ambu at hob   ETT (adult)   Placement Date/Time: 06/12/18 1500     Secured at 21 cm   Measured From [address] [place] [address],[address] By [employer] tube [employer]   Site Condition Dry

## 2018-06-25 NOTE — PROGRESS NOTES
[facility]       Progress Note           Subjective:   34y.o. year old female who we are seeing in consultation for ESRD management. Pt remains intubated and she is on d10 gtt at 30 cc/h for hypoglycemia despite being on TF  Continues to spike temp    ROS: intubated ,,sedated   Social: + family at bedside. Scheduled Meds:   doxercalciferol  1 mcg Intravenous Once per day on Tue Thu Sat    heparin (porcine)  5,000 Units Subcutaneous 3 times per day    lidocaine 1 % injection  5 mL Intradermal Once    sodium chloride flush  10 mL Intravenous 2 times per day    linezolid  600 mg Intravenous Q12H    darbepoetin emi-polysorbate  100 mcg Intravenous Weekly - Thursday    And    darbepoetin emi-polysorbate  25 mcg Subcutaneous Weekly - Thursday    sodium chloride (Inhalant)  15 mL Nebulization 4x Daily    pantoprazole  40 mg Intravenous Daily    methadone  10 mg Oral 3 times per day    diazepam  10 mg Oral TID    chlorhexidine  15 mL Mouth/Throat BID    carboxymethylcellulose PF  1 drop Both Eyes Q4H    And    lubrifresh P.M.    Both Eyes Q4H     Continuous Infusions:   dextrose 30 mL/hr at 06/24/18 1828    norepinephrine Stopped (06/23/18 0133)    dexmedetomidine (PRECEDEX) IV infusion Stopped (06/22/18 1442)    midazolam 4 mg/hr (06/25/18 0129)    fentaNYL (SUBLIMAZE) infusion 150 mcg/hr (06/25/18 0506)    dextrose       PRN Meds:.sodium chloride flush, morphine, heparin (porcine), albumin human, fentanNYL, midazolam, dextrose, sodium phosphate IVPB **OR** [DISCONTINUED] sodium phosphate IVPB **OR** [DISCONTINUED] sodium phosphate IVPB **OR** [DISCONTINUED] sodium phosphate IVPB, ondansetron, glucose, glucagon (rDNA), dextrose, acetaminophen, promethazine, hydrOXYzine      Objective/   GEN:  Chronically ill, /64   Pulse 128   Temp 99.4 °F (37.4 °C) (Oral)   Resp 24 Comment: 24  Ht 5' 5\" (1.651 m)   Wt 147 lb 7.8 oz (66.9 kg)   SpO2 96%   BMI 24.54 kg/m² CV: S1, S2 without m/r/g; no edema  RESP:clear anteriorly  ACCESS: R IJ TDC  gen-intubated   abd - distended, BS+    Data/  Recent Labs      06/23/18 0415  06/24/18   0445  06/25/18   0530   WBC  26.4*  17.7*  15.0*   HGB  8.7*  8.2*  8.3*   HCT  26.4*  25.7*  25.6*   MCV  95.8  96.9  96.4   PLT  165  142  107*     Recent Labs      06/23/18   0415  06/24/18   0445  06/25/18   0530   NA  129*  129*  125*   K  3.9  3.9  4.2   CL  97*  96*  92*   CO2  20*  22  20*   GLUCOSE  87  84  106*   BUN  66*  41*  61*   CREATININE  4.1*  3.0*  4.0*   LABGLOM  13*  19*  14*   GFRAA  16*  23*  16*     CT chest/a/p on 6/12/18  Impression   Development of moderate diffuse ascites since the prior study.  Mosaic   appearance of the spleen either due to multiple splenic infarcts or possible   splenic abscesses.  Question of perihepatic and subhepatic adhesions. Chronic gallbladder disease.  Bibasilar pneumonias and pulmonary nodules   consistent with septic emboli.  Mild anasarca.       RECOMMENDATIONS:   NG tube should be pulled back about 4 cm since it is pressing against the   anterior stomach wall.       Gallbladder ultrasound suggested for evaluation of gallbladder disease.       Diagnostic Ultrasound-guided paracentesis may be helpful. Impression   Multiple scattered cavitary and solid nodules scattered throughout the lungs   suspected to be from septic emboli.  The emboli may be from recurrent   endocarditis, IV drug abuse, or infected DVT.  Scattered focal pneumonia in   the lingula, right middle lobe, and right lower lobe.  Trace bilateral   pleural effusions.  Tip of the endotracheal tube lying near the tee. Moderate ascites seen in the upper abdomen.  Possible multiple splenic   infarcts or abscesses.  Please refer to the dictation of the CT scan of the   abdomen and pelvis.       RECOMMENDATIONS:   Endotracheal tube should be pulled back 1-2 cm.          Assessment/Plan     1- End stage renal disease: Hemodialysis per [facility] while inpatient (MWF as outpatient).     2- Acute GI bleed    - Hemoglobin stabilized s/p PRC transfusion    - Plans per GI    3- MRSA, Streptococcus bacteremia, persistent endocarditis involving TV with septic embolizations to lungs and spleen   - On IV antibiotics per ID   -now with recurrent fevers, if needed, ok to dc HD cath and reinsert a new one on 6/26 for dialysis

## 2018-06-25 NOTE — PROGRESS NOTES
°C)          EXAM:  General:intubated mech vent. Fevers higher; 103 last liliya. HEENT: conj icterus less marked; pupils equal  Neck: s nodes, fairly supple      LUNGS: coarse BS bilat; does not appear to be in respiratory distress   CV: RR c gd I syst M lower LSB     ABD: soft, nontender. No mass or omegaly     EXT: without edema; Abscess R great toe resolved,  Skin: no rash or other skin stigmata of endocarditis; icterus less intense. Data Review:    Lab Results   Component Value Date    WBC 15.0 (H) 06/25/2018    HGB 8.3 (L) 06/25/2018    HCT 25.6 (L) 06/25/2018    MCV 96.4 06/25/2018     (L) 06/25/2018     Lab Results   Component Value Date    CREATININE 4.0 (H) 06/25/2018    BUN 61 (H) 06/25/2018     (L) 06/25/2018    K 4.2 06/25/2018    CL 92 (L) 06/25/2018    CO2 20 (L) 06/25/2018     Lab Results   Component Value Date    ALT 20 06/24/2018    AST 16 06/24/2018    ALKPHOS 206 (H) 06/24/2018    BILITOT 1.4 (H) 06/24/2018   vancomycin random level 22  MICRO: 6/5 blood cultures both growing MRSA. One culture also grew strep while the other culture grew Enterobacter sensitive to cefepime. More recent (6/21) blood cultures negative. 6/20 respiratory culture: MRSA vanc PANCHO 1    IMAGING: CXR: cavitary multifocal infiltrates presumably related to septic pulmonary emboli, stable  CT head: NAD;   CT chest: iniltrates and cavitary pulmonary nodules consistent with septic emboli. CT abd and pelvis: ascites, splenic abscess or infarct.    Assessment:     Active Problems:    IVDU (intravenous drug user)    Endocarditis of tricuspid valve    HCAP (healthcare-associated pneumonia)    Upper GI bleed    Sepsis due to Streptococcus pneumoniae (HCC)    Moderate protein-calorie malnutrition (HCC)    PEA (Pulseless electrical activity) (HCC)    DIC (disseminated intravascular coagulation) ([facility] [address]    Acute respiratory failure with hypoxia (HCC)    Splenic infarction    Bacteremia    Mucus plugging of bronchi    Cavitary

## 2018-06-26 LAB
ALBUMIN SERPL-MCNC: 1.8 G/DL (ref 3.4–5)
ALP BLD-CCNC: 203 U/L (ref 40–129)
ALT SERPL-CCNC: 15 U/L (ref 10–40)
ANION GAP SERPL CALCULATED.3IONS-SCNC: 15 MMOL/L (ref 3–16)
AST SERPL-CCNC: 16 U/L (ref 15–37)
BASE EXCESS ARTERIAL: -8.1 MMOL/L (ref -3–3)
BILIRUB SERPL-MCNC: 1 MG/DL (ref 0–1)
BILIRUBIN DIRECT: 0.6 MG/DL (ref 0–0.3)
BILIRUBIN, INDIRECT: 0.4 MG/DL (ref 0–1)
BLOOD CULTURE, ROUTINE: NORMAL
BLOOD CULTURE, ROUTINE: NORMAL
BUN BLDV-MCNC: 77 MG/DL (ref 7–20)
CALCIUM SERPL-MCNC: 8.5 MG/DL (ref 8.3–10.6)
CARBOXYHEMOGLOBIN ARTERIAL: 2.1 % (ref 0–1.5)
CHLORIDE BLD-SCNC: 87 MMOL/L (ref 99–110)
CO2: 19 MMOL/L (ref 21–32)
CREAT SERPL-MCNC: 4.7 MG/DL (ref 0.6–1.1)
GFR AFRICAN AMERICAN: 14
GFR NON-AFRICAN AMERICAN: 11
GLUCOSE BLD-MCNC: 72 MG/DL (ref 70–99)
GLUCOSE BLD-MCNC: 74 MG/DL (ref 70–99)
GLUCOSE BLD-MCNC: 80 MG/DL (ref 70–99)
GLUCOSE BLD-MCNC: 82 MG/DL (ref 70–99)
GLUCOSE BLD-MCNC: 86 MG/DL (ref 70–99)
GLUCOSE BLD-MCNC: 86 MG/DL (ref 70–99)
GLUCOSE BLD-MCNC: 88 MG/DL (ref 70–99)
GLUCOSE BLD-MCNC: 89 MG/DL (ref 70–99)
GLUCOSE BLD-MCNC: 93 MG/DL (ref 70–99)
GLUCOSE BLD-MCNC: 97 MG/DL (ref 70–99)
GLUCOSE BLD-MCNC: 98 MG/DL (ref 70–99)
HCO3 ARTERIAL: 18 MMOL/L (ref 21–29)
HCT VFR BLD CALC: 24.2 % (ref 36–48)
HEMOGLOBIN, ART, EXTENDED: 8.5 G/DL (ref 12–16)
HEMOGLOBIN: 7.9 G/DL (ref 12–16)
INR BLD: 1.34 (ref 0.86–1.14)
MCH RBC QN AUTO: 31.3 PG (ref 26–34)
MCHC RBC AUTO-ENTMCNC: 32.5 G/DL (ref 31–36)
MCV RBC AUTO: 96.2 FL (ref 80–100)
METHEMOGLOBIN ARTERIAL: 0.5 %
O2 CONTENT ARTERIAL: 12 ML/DL
O2 SAT, ARTERIAL: 97 %
O2 THERAPY: ABNORMAL
PCO2 ARTERIAL: 38.9 MMHG (ref 35–45)
PDW BLD-RTO: 18.6 % (ref 12.4–15.4)
PERFORMED ON: NORMAL
PH ARTERIAL: 7.28 (ref 7.35–7.45)
PLATELET # BLD: 107 K/UL (ref 135–450)
PMV BLD AUTO: 7.6 FL (ref 5–10.5)
PO2 ARTERIAL: 102 MMHG (ref 75–108)
POTASSIUM SERPL-SCNC: 4.7 MMOL/L (ref 3.5–5.1)
PROTHROMBIN TIME: 15.3 SEC (ref 9.8–13)
RBC # BLD: 2.52 M/UL (ref 4–5.2)
SODIUM BLD-SCNC: 121 MMOL/L (ref 136–145)
TCO2 ARTERIAL: 19.1 MMOL/L
TOTAL PROTEIN: 6.3 G/DL (ref 6.4–8.2)
WBC # BLD: 15.6 K/UL (ref 4–11)

## 2018-06-26 PROCEDURE — 82803 BLOOD GASES ANY COMBINATION: CPT

## 2018-06-26 PROCEDURE — C9113 INJ PANTOPRAZOLE SODIUM, VIA: HCPCS

## 2018-06-26 PROCEDURE — 77001 FLUOROGUIDE FOR VEIN DEVICE: CPT

## 2018-06-26 PROCEDURE — C1750 CATH, HEMODIALYSIS,LONG-TERM: HCPCS

## 2018-06-26 PROCEDURE — 94750 CHARGE CONVERSION: CPT

## 2018-06-26 PROCEDURE — 99232 SBSQ HOSP IP/OBS MODERATE 35: CPT | Performed by: INTERNAL MEDICINE

## 2018-06-26 PROCEDURE — 94762 N-INVAS EAR/PLS OXIMTRY CONT: CPT

## 2018-06-26 PROCEDURE — 71045 X-RAY EXAM CHEST 1 VIEW: CPT

## 2018-06-26 PROCEDURE — 06HM33Z INSERTION OF INFUSION DEVICE INTO RIGHT FEMORAL VEIN, PERCUTANEOUS APPROACH: ICD-10-PCS | Performed by: INTERNAL MEDICINE

## 2018-06-26 PROCEDURE — 80048 BASIC METABOLIC PNL TOTAL CA: CPT

## 2018-06-26 PROCEDURE — 97162 PT EVAL MOD COMPLEX 30 MIN: CPT

## 2018-06-26 PROCEDURE — 80076 HEPATIC FUNCTION PANEL: CPT

## 2018-06-26 PROCEDURE — 76937 US GUIDE VASCULAR ACCESS: CPT

## 2018-06-26 PROCEDURE — 85027 COMPLETE CBC AUTOMATED: CPT

## 2018-06-26 PROCEDURE — G8982 BODY POS GOAL STATUS: HCPCS

## 2018-06-26 PROCEDURE — 94640 AIRWAY INHALATION TREATMENT: CPT

## 2018-06-26 PROCEDURE — 99291 CRITICAL CARE FIRST HOUR: CPT | Performed by: INTERNAL MEDICINE

## 2018-06-26 PROCEDURE — 94003 VENT MGMT INPAT SUBQ DAY: CPT

## 2018-06-26 PROCEDURE — 36556 INSERT NON-TUNNEL CV CATH: CPT

## 2018-06-26 PROCEDURE — 97110 THERAPEUTIC EXERCISES: CPT

## 2018-06-26 PROCEDURE — G8981 BODY POS CURRENT STATUS: HCPCS

## 2018-06-26 PROCEDURE — 85610 PROTHROMBIN TIME: CPT

## 2018-06-26 PROCEDURE — 9990 CHARGE CONVERSION

## 2018-06-26 PROCEDURE — 97167 OT EVAL HIGH COMPLEX 60 MIN: CPT

## 2018-06-26 PROCEDURE — P9047 ALBUMIN (HUMAN), 25%, 50ML: HCPCS

## 2018-06-26 PROCEDURE — 36415 COLL VENOUS BLD VENIPUNCTURE: CPT

## 2018-06-26 RX ORDER — HEPARIN SODIUM 1000 [USP'U]/ML
3000 INJECTION, SOLUTION INTRAVENOUS; SUBCUTANEOUS PRN
Status: DISCONTINUED | OUTPATIENT
Start: 2018-06-26 | End: 2018-07-05

## 2018-06-26 RX ADMIN — PANTOPRAZOLE SODIUM 40 MG: 40 INJECTION, POWDER, FOR SOLUTION INTRAVENOUS at 08:23

## 2018-06-26 RX ADMIN — DOXERCALCIFEROL 1 MCG: 2 INJECTION, SOLUTION INTRAVENOUS at 16:30

## 2018-06-26 RX ADMIN — HEPARIN SODIUM 5000 UNITS: 5000 INJECTION, SOLUTION INTRAVENOUS; SUBCUTANEOUS at 06:06

## 2018-06-26 RX ADMIN — DEXTROSE MONOHYDRATE: 100 INJECTION, SOLUTION INTRAVENOUS at 06:19

## 2018-06-26 RX ADMIN — DIAZEPAM 10 MG: 10 TABLET ORAL at 20:41

## 2018-06-26 RX ADMIN — METHADONE HYDROCHLORIDE 10 MG: 10 TABLET ORAL at 22:20

## 2018-06-26 RX ADMIN — CARBOXYMETHYLCELLULOSE SODIUM 1 DROP: 10 GEL OPHTHALMIC at 06:06

## 2018-06-26 RX ADMIN — CHLORHEXIDINE GLUCONATE 15 ML: 0.12 RINSE ORAL at 08:23

## 2018-06-26 RX ADMIN — CARBOXYMETHYLCELLULOSE SODIUM 1 DROP: 10 GEL OPHTHALMIC at 18:23

## 2018-06-26 RX ADMIN — CARBOXYMETHYLCELLULOSE SODIUM 1 DROP: 10 GEL OPHTHALMIC at 11:07

## 2018-06-26 RX ADMIN — Medication 15 ML: at 11:34

## 2018-06-26 RX ADMIN — MINERAL OIL AND WHITE PETROLATUM: 150; 830 OINTMENT OPHTHALMIC at 20:17

## 2018-06-26 RX ADMIN — HEPARIN SODIUM 5000 UNITS: 5000 INJECTION, SOLUTION INTRAVENOUS; SUBCUTANEOUS at 18:23

## 2018-06-26 RX ADMIN — MINERAL OIL AND WHITE PETROLATUM: 150; 830 OINTMENT OPHTHALMIC at 16:43

## 2018-06-26 RX ADMIN — MINERAL OIL AND WHITE PETROLATUM: 150; 830 OINTMENT OPHTHALMIC at 03:55

## 2018-06-26 RX ADMIN — LINEZOLID 600 MG: 2 INJECTION, SOLUTION INTRAVENOUS at 08:23

## 2018-06-26 RX ADMIN — CARBOXYMETHYLCELLULOSE SODIUM 1 DROP: 10 GEL OPHTHALMIC at 16:43

## 2018-06-26 RX ADMIN — Medication 10 ML: at 20:42

## 2018-06-26 RX ADMIN — Medication 10 ML: at 08:23

## 2018-06-26 RX ADMIN — METHADONE HYDROCHLORIDE 10 MG: 10 TABLET ORAL at 18:23

## 2018-06-26 RX ADMIN — Medication 15 ML: at 14:47

## 2018-06-26 RX ADMIN — LINEZOLID 600 MG: 2 INJECTION, SOLUTION INTRAVENOUS at 20:41

## 2018-06-26 RX ADMIN — MINERAL OIL AND WHITE PETROLATUM: 150; 830 OINTMENT OPHTHALMIC at 23:54

## 2018-06-26 RX ADMIN — DIAZEPAM 10 MG: 10 TABLET ORAL at 08:23

## 2018-06-26 RX ADMIN — METHADONE HYDROCHLORIDE 10 MG: 10 TABLET ORAL at 06:06

## 2018-06-26 RX ADMIN — CARBOXYMETHYLCELLULOSE SODIUM 1 DROP: 10 GEL OPHTHALMIC at 01:52

## 2018-06-26 RX ADMIN — Medication 15 ML: at 06:49

## 2018-06-26 RX ADMIN — HEPARIN SODIUM 5000 UNITS: 5000 INJECTION, SOLUTION INTRAVENOUS; SUBCUTANEOUS at 22:19

## 2018-06-26 RX ADMIN — MINERAL OIL AND WHITE PETROLATUM: 150; 830 OINTMENT OPHTHALMIC at 08:24

## 2018-06-26 RX ADMIN — CARBOXYMETHYLCELLULOSE SODIUM 1 DROP: 10 GEL OPHTHALMIC at 22:20

## 2018-06-26 RX ADMIN — Medication 15 ML: at 19:32

## 2018-06-26 RX ADMIN — ALBUMIN (HUMAN) 12.5 G: 12.5 SOLUTION INTRAVENOUS at 15:41

## 2018-06-26 RX ADMIN — CHLORHEXIDINE GLUCONATE 15 ML: 0.12 RINSE ORAL at 20:41

## 2018-06-26 ASSESSMENT — PULMONARY FUNCTION TESTS
PIF_VALUE: 21
PIF_VALUE: 14
PIF_VALUE: 27
PIF_VALUE: 18
PIF_VALUE: 12
PIF_VALUE: 23
PIF_VALUE: 14

## 2018-06-26 ASSESSMENT — PAIN SCALES - GENERAL
PAINLEVEL_OUTOF10: 0

## 2018-06-26 NOTE — PROGRESS NOTES
06/26/18 0844   Vent Information   Vent Mode PS   Vt Ordered 350 mL   Rate Set 0 bmp   Peak Flow 55 L/min   Pressure Support 5 cmH20   FiO2  30 %   Sensitivity 3   PEEP/CPAP 5   I Time/ I Time % 0 s   Vent Patient Data   Vt Exhaled 240 mL   Rate Measured 23 br/min   Minute Volume 7.08 Liters   Peak Inspiratory Pressure 12 cmH2O   I:E Ratio 1:3.20   High Peep/I Pressure 0   Mean Airway Pressure 6.8 cmH20   Spontaneous Breathing Trial (SBT) RT Doc   Pulse 123   SpO2 96 %   Additional Respiratory  Assessments   Resp 24   Alarm Settings   High Pressure Alarm 50 cmH2O   Low Minute Volume Alarm 2.5 L/min   High Respiratory Rate 45 br/min

## 2018-06-26 NOTE — PROGRESS NOTES
Internal Medicine ICU Progress Note      Interval history   Date of admission    34year old white female who was admitted for GI bleed. Had an EGD   BC positive for strep pneumonia, staph aureus and gram-negative salazar. Echocardiogram showed tricuspid valve endocarditis. She's had this before. History of IV drug use  CT chest showed septic emboli and possible splenic infarct. - Transferred out of ICU on 18  - Transferred back to ICU on 18 for PEA arrest, intubated,  resuscitated with fluids  - Started on CRRT for acute renal failure, on 18  - Weaned off pressors on 18, and extubated  - Reintubated   - s.p bronch    - Off CRRT and started on hemodialysis  18  - Has been on antibiotics vancomycin and cefepime and Zyvox  Patient remains on mechanical ventilation, continued fevers, on dialysis. Followed by intensivist, ID, nephrologist.     Subjective  Pt remains on mechanical ventilation . Minimal response , but opens eyes . Does not follow commands  TDC removed yesterday 18 due to persistent fevers . T-max improved today .       Recent Labs      18   0540  18   0420   PHART  7.315*  7.282*   [id number]  38.2  38.9   PO2ART  86.9  102.0       MV Settings:  Vent Mode: PS Rate Set: 0 bmp/Vt Ordered: 350 mL/ /FiO2 : 30 %    IV:   norepinephrine Stopped (18 0133)    dexmedetomidine (PRECEDEX) IV infusion Stopped (18 1442)    midazolam Stopped (18 0841)    fentaNYL (SUBLIMAZE) infusion 150 mcg/hr (18 0243)    dextrose         Vitals:  Temp  Av °F (37.8 °C)  Min: 98.9 °F (37.2 °C)  Max: 101.8 °F (38.8 °C)  Pulse  Av  Min: 109  Max: 129  BP  Min: 106/61  Max: 133/73  SpO2  Av %  Min: 94 %  Max: 98 %  FiO2   Av %  Min: 30 %  Max: 30 %  Patient Vitals for the past 4 hrs:   BP Temp Temp src Pulse Resp SpO2 Weight   18 1027 - - - - - - 153 lb (69.4 kg)   18 0900 (!) 115/59 - - 126 20 97 % -   18 0844 - 50%.   D-shaped septum c/w RV pressure and volume overload.   Large mobile, pedunculated vegetation on septal leaflet of tricuspid valve   c/w endocarditis.   No pericardial effusion noted. Cultures:  Respiratory cultures -6/12/18 - MRSA . Repeat culture 6/20/18 MRSA   Blood culture: ON ADMISSION - 6/5/18 POSITIVE S. aureus, S pneumonia and Enterobacter  Recent blood cultures negative       Radiology    XR CHEST PORTABLE   Final Result   Improving bibasilar airspace disease. Stable cavitary lesions. CT Chest WO Contrast   Final Result   1. Minimal worsening partially cavitating pulmonary nodules and airspace   disease throughout the bilateral lungs consistent with septic emboli. 2. New trace bilateral pleural effusions. 3. Mediastinal adenopathy, likely reactive. 4. Heterogeneous splenomegaly. XR CHEST PORTABLE   Final Result   Stable appearing bibasilar airspace disease. Support tubes and lines as   above. XR CHEST PORTABLE   Final Result   No significant interval change in bilateral airspace disease as compared to   prior. Cavitary pulmonary nodules are again demonstrated. XR CHEST PORTABLE   Final Result   Left mid lung airspace disease is similar to minimally improved as compared   to prior. Persistent basilar atelectasis and cavitary right lung lesions. VL Extremity Venous Bilateral   Final Result      XR CHEST PORTABLE   Final Result   Left IJ central venous line in the superior vena cava with no pneumothorax         US GALLBLADDER RUQ   Final Result   1. Small amount of complex ascites concerning for hemorrhage. Consider   further evaluation with CT of the abdomen and pelvis. 2. Cholelithiases without evidence of acute cholecystitis. 3. Cirrhosis. XR CHEST PORTABLE   Final Result   Interval increase in right basilar atelectasis.   No appreciable change in   appearance of septic pulmonary emboli         XR CHEST PORTABLE   Final Result Contrast   Final Result   Multiple scattered cavitary and solid nodules scattered throughout the lungs   suspected to be from septic emboli. The emboli may be from recurrent   endocarditis, IV drug abuse, or infected DVT. Scattered focal pneumonia in   the lingula, right middle lobe, and right lower lobe. Trace bilateral   pleural effusions. Tip of the endotracheal tube lying near the tee. Moderate ascites seen in the upper abdomen. Possible multiple splenic   infarcts or abscesses. Please refer to the dictation of the CT scan of the   abdomen and pelvis. RECOMMENDATIONS:   Endotracheal tube should be pulled back 1-2 cm. CT Head WO Contrast   Final Result   No acute intracranial abnormality. XR CHEST PORTABLE   Final Result   Moderate lingular opacity favored to be pneumonia. Moderate opacity in the   right lung base probably also pneumonia. Development of multiple pulmonary   nodules presumed to be of an infectious or least inflammatory etiology. Low   lung volumes. Some improvement in the appearance of the chest since   yesterday. RECOMMENDATION:   CT scan of the chest with contrast recommended for further evaluation. XR ABDOMEN (KUB) (SINGLE AP VIEW)   Final Result   Probable mild nonobstructive ileus. Moderate to large amount of stool in the   left colon. XR CHEST PORTABLE   Final Result   Improving bilateral airspace disease. XR CHEST PORTABLE   Final Result   Worsening bilateral airspace disease, probably pneumonia. XR CHEST PORTABLE   Final Result   Multifocal pneumonia, unchanged. XR CHEST 1 VW   Final Result   No change other than repositioning of the endotracheal tube. XR CHEST PORTABLE   Final Result   The endotracheal tube needs retracted approximately 3 cm. Satisfactory position right IJ tunnel catheter and left central venous   catheter.       Increased size of the multifocal nodular lung opacities presumed user)    Endocarditis of tricuspid valve    HCAP (healthcare-associated pneumonia)    Upper GI bleed    Sepsis due to Streptococcus pneumoniae (HCC)    Moderate protein-calorie malnutrition (HCC)    PEA (Pulseless electrical activity) ([facility] [address]    DIC (disseminated intravascular coagulation) ([facility] [address]    Acute respiratory failure with hypoxia (HCC)    Splenic infarction    Bacteremia    Mucus plugging of bronchi    Cavitary lung disease    Fever    Persistent fever    MRSA bacteremia    Hyponatremia  Resolved Problems:    * No resolved hospital problems. *         Plan:    # Septic shock- sec to bacteremia /septic emboli/endocarditis  Sustained PEA arrest x2  requiring multiple pressors, intubation   - weaned off pressors- levo. Vasopressin  - wbc improving from 60 K  - critical care and ID involved. WBC down to 15 K today. - still has a fever.   - Repeat CT chest on 6/25, showed worsening airspace disease-consistent with septic emboli  - tunneled dialysis catheter removed on 6/25/18, she will need a new access for dialysis  - abx per ID - see below    #Acute resp failure/cavitary pneumonia  Due to MRSA infection  - s.p PEA arrest, off vent  6/11- reintubated on 6/12/18,  for increasing abd distention and pain   - remains on mechanical ventilation, with spontaneous breathing trials. pulm managing  - ct chest with no new findings except for septic emboli and cavitory pneumonia. Repeat CT chest on 6/25/18 shows worsening of cavitating pulmonary nodules and airspacedisease consistent with septic emboli. Reactive mediastinal adenopathy noted  - ct abd with no acute findings   - Off precedex drip. methadone and valium oral added. On Versed and fentanyl for sedation  - IV antibiotics- On vanc, cefepime, and zyvox a  - f/w BAL. Respiratory cultures growing MRSA  - intensivist is discussing with family regarding PEG and tracheostomy. #  Acute upper GI bleed.    s/p  EGD -Three angiodysplastic spots at the junction of the body

## 2018-06-26 NOTE — PROGRESS NOTES
Pt return to unit with this RN and RT.  VSS ST with PVC on monitor. Pt tolerated well. Dialysis RN to room to prepare pt for HD.     Ruben Maya RN

## 2018-06-26 NOTE — PROGRESS NOTES
ID Follow-up NOTE    CC: tricuspid valve endocarditis    Subjective:     Patient gives no history, is intubated, on vent sedated    Objective:     Patient Vitals for the past 24 hrs:   BP Temp Temp src Pulse Resp SpO2 Weight   18 1300 113/65 - - 116 20 100 % -   18 1245 (!) 101/53 - - 117 18 100 % -   18 1242 108/68 - - 118 16 100 % -   18 1229 110/63 - - 120 18 100 % -   18 1215 114/61 - - 121 20 100 % -   18 1200 128/66 - - 124 19 100 % -   18 1136 - - - 125 24 98 % -   18 1100 133/72 - - 126 16 98 % -   18 1027 - - - - - - 153 lb (69.4 kg)   18 1000 (!) 112/58 - - 124 16 98 % -   18 0900 (!) 115/59 - - 126 20 97 % -   18 0844 - - - 123 24 96 % -   18 0800 119/61 99.9 °F (37.7 °C) Oral 121 19 95 % -   18 0700 119/64 100.5 °F (38.1 °C) Axillary 120 20 98 % -   18 0649 - - - 119 19 98 % -   18 0606 - 100.2 °F (37.9 °C) Oral - - - -   18 0434 - 99.5 °F (37.5 °C) Oral - - - -   18 0300 - 99.3 °F (37.4 °C) Oral - - - -   18 0239 - - - 118 21 - -   18 0000 118/64 - - 117 18 - -   18 2346 - 98.9 °F (37.2 °C) Oral 109 20 - -   18 2300 106/61 - - 117 20 - -   18 2200 (!) 111/57 - - 118 27 - -   18 2100 (!) 106/55 - - 117 29 - -   18 123/63 - - 111 15 - -   18 1918 - 100.1 °F (37.8 °C) Oral - - - -   18 1900 (!) 115/56 - - 119 18 - -   18 1800 123/63 - - 121 18 - -   18 1700 (!) 106/53 100.8 °F (38.2 °C) - 120 18 - -   18 1630 (!) 116/59 - - 125 19 - -   18 1600 127/68 - - 129 20 - -   18 1544 - - - 121 17 - -   18 1530 117/68 - - 117 19 - -   18 1500 117/68 101.8 °F (38.8 °C) Oral 117 19 95 % -     Temp (24hrs), Av.1 °F (37.8 °C), Min:98.9 °F (37.2 °C), Max:101.8 °F (38.8 °C)          EXAM:  General:intubated mech vent. Temperatures somewhat lower; tachycardic   ENT: conj icterus less marked; pupils equal  Neck: s nodes, fairly supple      LUNGS: coarse BS bilat; does not appear to be in respiratory distress   CV: RR c gd I syst M lower LSB     ABD: soft, nontender. No mass or omegaly     EXT: without edema;Skin: no rash or other skin stigmata of endocarditis; icterus less intense. Data Review:    Lab Results   Component Value Date    WBC 15.6 (H) 06/26/2018    HGB 7.9 (L) 06/26/2018    HCT 24.2 (L) 06/26/2018    MCV 96.2 06/26/2018     (L) 06/26/2018     Lab Results   Component Value Date    CREATININE 4.7 (H) 06/26/2018    BUN 77 (H) 06/26/2018     (L) 06/26/2018    K 4.7 06/26/2018    CL 87 (L) 06/26/2018    CO2 19 (L) 06/26/2018     Lab Results   Component Value Date    ALT 16 06/25/2018    AST 15 06/25/2018    ALKPHOS 224 (H) 06/25/2018    BILITOT 1.1 (H) 06/25/2018   vancomycin random level 22  MICRO: 6/5 blood cultures both growing MRSA. One culture also grew strep while the other culture grew Enterobacter sensitive to cefepime (6/21) blood cultures negative. 6/20 respiratory culture: MRSA vanc PANCHO 1    IMAGING: CXR: cavitary multifocal infiltrates presumably related to septic pulmonary emboli, stable  CT head: NAD;   CT chest: infiltrates more cavitary, but there are not much more in the way of foci of pulmonary disease. infiltrates and cavitary pulmonary nodules consistent with septic emboli. CT abd and pelvis: ascites, splenic abscess or infarct.    Assessment:     Active Problems:    IVDU (intravenous drug user)    Endocarditis of tricuspid valve    HCAP (healthcare-associated pneumonia)    Upper GI bleed    Sepsis due to Streptococcus pneumoniae (HCC)    Moderate protein-calorie malnutrition (HCC)    PEA (Pulseless electrical activity) ([facility])    DIC (disseminated intravascular coagulation) ([facility] [address]    Acute respiratory failure with hypoxia ([facility])    Splenic

## 2018-06-26 NOTE — PROGRESS NOTES
Per case management, patient is independent with all ADLs but has been feeling weak lately. Pain:    Rating: patient did not appear to be in pain during OT evaluation. HR: 125bpm  SpO2: >/=97%  Pt did initially cough while cervical PROM performed. RT present to suction pt. Cognition:    Patient with eyes closed majority of the time. Opened eyes in response to name 4x. Per RN, patient has been more alert and following commands on previous days. Patient receiving fentanyl during evaluation. Upper Extremity ROM:    PROM of B UEs and cervical region [facility]    Upper Extremity Strength:    Unable to assess due to patient's sedated state. Upper Extremity Sensation:    Will continue to assess    Upper Extremity Proprioception:    Will continue to assess    Skin Integrity:  Swelling in B UEs    Coordination and Tone:  Patient with flaccid tone in B UEs due to sedatives. Balance:  NT    Bed mobility:  NT    Transfers:  NT    Dressing: NT    Bathing: NT    Eating:  NT    Positioning Needs: patient reclined in bed with restraints on B wrists     Exercise / Activities Initiated: PROM of B UEs in all planes and joints    Patient/Family Education: Role of OT    Assessment of Deficits: Changing Body Position [id number]  Pt limited by critically ill medical status requiring intubation and sedatives. Patient demonstrates inability to follow or actively move B UEs during evaluation. Pt. Limited during evaluation by . . . Intubate and sedated     . At end of evaluation, pt. In bed, alarm activated with call light and needs within reach. Goal(s) :   Changing Body Position [id number]  To be met in 3 Visits:  1). Indep with UE ex x 10 reps    To be met in 5 Visits:  1). Supine to Sit: assess ability when appropriate  2). Bed to Chair/BSC: assess ability when appropriate  3). Upper Body Bathing: assess ability when appropriate  4). Lower Body Bathing: assess ability when appropriate  5).  Upper Body Dressing: assess

## 2018-06-26 NOTE — PROGRESS NOTES
ENTEROBACTER CLOACAE       Films:  CHEST CT 6/25/18  FINDINGS:   Mediastinum:  Motion artifact degrades image quality.  The unenhanced heart   is unremarkable.  The left internal jugular catheter terminates in the right   atrium.       There are new mildly enlarged mediastinal lymph nodes, likely reactive.  A   right peritracheal lymph node measures 1.1 x 1.5 cm.       Lungs/pleura: Endotracheal tube is in situ.  The tracheobronchial tree is   patent.  There is no pneumothorax.  There are new trace bilateral pleural   effusions with compressive atelectasis.       No change in the majority of the partially cavitating pulmonary nodules   throughout the bilateral lungs due to septic emboli.  However, the previously   noted airspace disease within the anterior segment of the left upper lobe has   become cavitary.  There is improved aeration and re-expansion of the left   lower lobe.     Upper Abdomen: A nasogastric tube reaches the lower gastroesophageal   junction.  A low-attenuation lesion in the left hepatic lobe is unchanged,   but incompletely imaged and cannot be fully evaluated given the lack There is   heterogeneous enhancement of the enlarged spleen measuring 14 cm in length.       Soft Tissues/Bones: No change in the old compression fractures involving the   superior endplates of T6 through T9.  A small to moderate amount of anasarca   is present throughout the lateral thorax.           Impression   1. Minimal worsening partially cavitating pulmonary nodules and airspace   disease throughout the bilateral lungs consistent with septic emboli. 2. New trace bilateral pleural effusions. 3. Mediastinal adenopathy, likely reactive. 4. Heterogeneous splenomegaly.      ASSESSMENT:  · Acute ventilator-dependent hypoxemic respiratory failure  · Persistent fevers  · Recurrent tricuspid valve infectious endocarditis  · Polymicrobial bacteremia: MRSA, Streptococcal pneumonia, and Enterobacter  · Acute liver

## 2018-06-26 NOTE — PLAN OF CARE
Problem: Risk for Impaired Skin Integrity  Goal: Tissue integrity - skin and mucous membranes  Structural intactness and normal physiological function of skin and  mucous membranes.    Outcome: Ongoing  Patient will maintain tissue integrity by being turned every two hours,

## 2018-06-26 NOTE — FLOWSHEET NOTE
Treatment time: 3.5 hrs  Net UF: 1 liter    Pre weight: 69.4 kg w/lift sheet  Post weight: 66 kg post lg liquid stool care and removal of lift sheeet. EDW: TBD    Access used: Rt Fem jebcatmechelle new from today. Access function: Good reversed. Medications or blood products given: Hectorol 1mcg, vancomycin 600mg, and albumin 12.5gm. Regular outpatient schedule: TTS    Summary of response to treatment:      06/26/18 1730   Vital Signs   /77   Temp 98.5 °F (36.9 °C)   Pulse 130   Weight 145 lb 8.1 oz (66 kg)  (flexiseal exploded. lg liquid stool. transfer sling removed.)   Weight Method Bed scale   Percent Weight Change -4.9   Pain Assessment   Pain Assessment CPOT   Pain Level 0   Post-Hemodialysis Assessment   Post-Treatment Procedures Blood returned;Catheter capped, clamped and heparinized x 2 ports   Machine Disinfection Process Acid/Vinegar Clean;Heat Disinfect; Exterior Machine Disinfection   Rinseback Volume (ml) 300 ml   Total Liters Processed (l/min) 75.1 l/min   Dialyzer Clearance Moderately streaked   Duration of Treatment (minutes) 210 minutes   Heparin amount administered during treatment (units) 0 units   Hemodialysis Intake (ml) 600 ml   Hemodialysis Output (ml) 1600 ml   NET Removed (ml) 1000 ml   Tolerated Treatment Fair   Patient Response to Treatment -130bpm. Sbp . Pt responded well to albumin 12.5gm used for bp support. Vss post tx. Physician Notified? Yes   Copy of dialysis treatment record placed in chart, to be scanned into EMR. Report given to VI Zeng.

## 2018-06-26 NOTE — PROGRESS NOTES
06/25/18 2000   Vent Information   Vt Ordered 350 mL   Rate Set 18 bmp   Peak Flow 55 L/min   FiO2  30 %   PEEP/CPAP 5   Vent Patient Data   Vt Exhaled 383 mL   Rate Measured 22 br/min   Minute Volume 8.13 Liters   Peak Inspiratory Pressure 14 cmH2O   I:E Ratio 1:2.90   Mean Airway Pressure 7.9 cmH20   Plateau Pressure 18 WGL45   Static Compliance 29 mL/cmH2O   Dynamic Compliance 43 mL/cmH2O   Cough/Sputum   Sputum Amount Moderate   Sputum Color Creamy; Tan   Tenacity Thick

## 2018-06-26 NOTE — PROGRESS NOTES
Report given to [facility], RN at bedside for transfer of care.   Claudette Manzanilla Pride RN

## 2018-06-26 NOTE — PROGRESS NOTES
06/26/18 0649   Vent Information   Vt Ordered 350 mL   Rate Set 18 bmp   Peak Flow 55 L/min   Pressure Support 0 cmH20   FiO2  30 %   Sensitivity 3   PEEP/CPAP 5   I Time/ I Time % 0 s   Vent Patient Data   Vt Exhaled 381 mL   Rate Measured 19 br/min   Minute Volume 7.14 Liters   Peak Inspiratory Pressure 21 cmH2O   I:E Ratio 1:3.80   High Peep/I Pressure 0   Mean Airway Pressure 8.3 cmH20   Plateau Pressure 15 [id number]   Static Compliance 39 mL/cmH2O   Dynamic Compliance 35 mL/cmH2O   Spontaneous Breathing Trial (SBT) RT Doc   Pulse 119   SpO2 98 %   Cough/Sputum   Sputum How Obtained Endotracheal   Cough Productive   Sputum Amount Small   Sputum Color Creamy; Tan   Tenacity Thick   Breath Sounds   Right Upper Lobe Rhonchi   Right Middle Lobe Diminished   Right Lower Lobe Diminished   Left Upper Lobe Rhonchi   Left Lower Lobe Diminished   Additional Respiratory  Assessments   Resp 19   Position Semi-Lucero's   Oral Care Mouth suctioned   Subglottic Suction Done?  Yes   Alarm Settings   High Pressure Alarm 50 cmH2O   Low Minute Volume Alarm 2.5 L/min   High Respiratory Rate 45 br/min   Low Exhaled Vt  200 mL   Patient Observation   Observations 7.5 ett 21 at lip, h2o full, ambu at hob   ETT (adult)   Placement Date/Time: 06/12/18 1500     Secured at 21 cm   Measured From [facility] [address] By [employer] tube [employer]   Site Condition Dry

## 2018-06-26 NOTE — PROGRESS NOTES
prolonged bed rest. Additionally, BLEs noted to be atrophied. Pt will benefit from acute PT to safely progress strength and to maintain ROM and skin integrity while mobility is limited. Will plan to assess mobility as it is appropriate. Pt. Limited during evaluation by sedation. At end of evaluation, pt. In bed. Goal(s) :   Walking [id number]  To be met in 3 visits:  1). Tolerate B LE AAROM 10 reps    To be met in 6 visits:  1). Supine to/from sit: assess ability when appropriate   2). Sit to/from stand: assess ability when appropriate   3). Bed to chair: assess ability when appropriate   4). Roll in bed: assess ability when appropriate   5). Tolerate B LE AAROM 15 reps    Rehabilitation Potential   Good for goals listed above. Strengths for achieving goals include: PLOF  Barriers to achieving goals include: sedation, prolonged bed rest    Plan    To be seen 3-5x/week while in acute care setting for therapeutic exercises, bed mobility, transfers, progressive gait training, balance training, and family/patient education.      Timed Code Treatment Minutes: 14 minutes  Total Treatment Time:  24 minutes    Marylin Caceres PT, DPT #[id number]

## 2018-06-26 NOTE — PROGRESS NOTES
Pt noted with stage II 5x1x0 to inner left thigh. DTI also noted to back of left thigh. Areas cleansed, moisture barrier used and mepilex placed. Small open areas to coccyx r/t skin shearing bleeding. Area cleansed moisture barrier and mepilex applied.   Conrado Maya RN

## 2018-06-27 PROBLEM — E44.1 MILD PROTEIN-CALORIE MALNUTRITION (HCC): Chronic | Status: ACTIVE | Noted: 2018-06-07

## 2018-06-27 LAB
ABO/RH: NORMAL
ALBUMIN SERPL-MCNC: 2 G/DL (ref 3.4–5)
ALP BLD-CCNC: 181 U/L (ref 40–129)
ALT SERPL-CCNC: 13 U/L (ref 10–40)
ANION GAP SERPL CALCULATED.3IONS-SCNC: 10 MMOL/L (ref 3–16)
ANTIBODY SCREEN: NORMAL
AST SERPL-CCNC: 13 U/L (ref 15–37)
BASE EXCESS ARTERIAL: -2.8 MMOL/L (ref -3–3)
BILIRUB SERPL-MCNC: 0.9 MG/DL (ref 0–1)
BILIRUBIN DIRECT: 0.6 MG/DL (ref 0–0.3)
BILIRUBIN, INDIRECT: 0.3 MG/DL (ref 0–1)
BLOOD BANK DISPENSE STATUS: NORMAL
BLOOD BANK PRODUCT CODE: NORMAL
BPU ID: NORMAL
BUN BLDV-MCNC: 47 MG/DL (ref 7–20)
CALCIUM SERPL-MCNC: 8.5 MG/DL (ref 8.3–10.6)
CARBOXYHEMOGLOBIN ARTERIAL: 2 % (ref 0–1.5)
CHLORIDE BLD-SCNC: 89 MMOL/L (ref 99–110)
CO2: 23 MMOL/L (ref 21–32)
CREAT SERPL-MCNC: 3.1 MG/DL (ref 0.6–1.1)
DESCRIPTION BLOOD BANK: NORMAL
GFR AFRICAN AMERICAN: 22
GFR NON-AFRICAN AMERICAN: 18
GLUCOSE BLD-MCNC: 116 MG/DL (ref 70–99)
GLUCOSE BLD-MCNC: 72 MG/DL (ref 70–99)
GLUCOSE BLD-MCNC: 76 MG/DL (ref 70–99)
GLUCOSE BLD-MCNC: 78 MG/DL (ref 70–99)
GLUCOSE BLD-MCNC: 79 MG/DL (ref 70–99)
GLUCOSE BLD-MCNC: 82 MG/DL (ref 70–99)
GLUCOSE BLD-MCNC: 83 MG/DL (ref 70–99)
GLUCOSE BLD-MCNC: 84 MG/DL (ref 70–99)
GLUCOSE BLD-MCNC: 84 MG/DL (ref 70–99)
GLUCOSE BLD-MCNC: 85 MG/DL (ref 70–99)
GLUCOSE BLD-MCNC: 85 MG/DL (ref 70–99)
GLUCOSE BLD-MCNC: 86 MG/DL (ref 70–99)
GLUCOSE BLD-MCNC: 87 MG/DL (ref 70–99)
GLUCOSE BLD-MCNC: 87 MG/DL (ref 70–99)
GLUCOSE BLD-MCNC: 89 MG/DL (ref 70–99)
GLUCOSE BLD-MCNC: 90 MG/DL (ref 70–99)
GLUCOSE BLD-MCNC: 91 MG/DL (ref 70–99)
GLUCOSE BLD-MCNC: 93 MG/DL (ref 70–99)
GLUCOSE BLD-MCNC: 93 MG/DL (ref 70–99)
HCG QUALITATIVE: NEGATIVE
HCO3 ARTERIAL: 22.3 MMOL/L (ref 21–29)
HCT VFR BLD CALC: 21.3 % (ref 36–48)
HEMOGLOBIN, ART, EXTENDED: 7.4 G/DL (ref 12–16)
HEMOGLOBIN: 6.9 G/DL (ref 12–16)
INR BLD: 1.33 (ref 0.86–1.14)
MCH RBC QN AUTO: 31.1 PG (ref 26–34)
MCHC RBC AUTO-ENTMCNC: 32.6 G/DL (ref 31–36)
MCV RBC AUTO: 95.6 FL (ref 80–100)
METHEMOGLOBIN ARTERIAL: 0.6 %
O2 CONTENT ARTERIAL: 10 ML/DL
O2 SAT, ARTERIAL: 97.4 %
O2 THERAPY: ABNORMAL
PCO2 ARTERIAL: 40.1 MMHG (ref 35–45)
PDW BLD-RTO: 18.2 % (ref 12.4–15.4)
PERFORMED ON: ABNORMAL
PERFORMED ON: NORMAL
PH ARTERIAL: 7.36 (ref 7.35–7.45)
PLATELET # BLD: 88 K/UL (ref 135–450)
PMV BLD AUTO: 8 FL (ref 5–10.5)
PO2 ARTERIAL: 100.1 MMHG (ref 75–108)
POTASSIUM SERPL-SCNC: 4.5 MMOL/L (ref 3.5–5.1)
PROTHROMBIN TIME: 15.2 SEC (ref 9.8–13)
RBC # BLD: 2.23 M/UL (ref 4–5.2)
SODIUM BLD-SCNC: 122 MMOL/L (ref 136–145)
TCO2 ARTERIAL: 23.5 MMOL/L
TOTAL PROTEIN: 6.5 G/DL (ref 6.4–8.2)
WBC # BLD: 11.6 K/UL (ref 4–11)

## 2018-06-27 PROCEDURE — 80048 BASIC METABOLIC PNL TOTAL CA: CPT

## 2018-06-27 PROCEDURE — 31600 PLANNED TRACHEOSTOMY: CPT | Performed by: SURGERY

## 2018-06-27 PROCEDURE — 51701 INSERT BLADDER CATHETER: CPT

## 2018-06-27 PROCEDURE — 82803 BLOOD GASES ANY COMBINATION: CPT

## 2018-06-27 PROCEDURE — 85610 PROTHROMBIN TIME: CPT

## 2018-06-27 PROCEDURE — 94640 AIRWAY INHALATION TREATMENT: CPT

## 2018-06-27 PROCEDURE — 71045 X-RAY EXAM CHEST 1 VIEW: CPT

## 2018-06-27 PROCEDURE — 80076 HEPATIC FUNCTION PANEL: CPT

## 2018-06-27 PROCEDURE — 36415 COLL VENOUS BLD VENIPUNCTURE: CPT

## 2018-06-27 PROCEDURE — 94750 CHARGE CONVERSION: CPT

## 2018-06-27 PROCEDURE — 86901 BLOOD TYPING SEROLOGIC RH(D): CPT

## 2018-06-27 PROCEDURE — 99232 SBSQ HOSP IP/OBS MODERATE 35: CPT | Performed by: INTERNAL MEDICINE

## 2018-06-27 PROCEDURE — 0B110F4 BYPASS TRACHEA TO CUTANEOUS WITH TRACHEOSTOMY DEVICE, OPEN APPROACH: ICD-10-PCS | Performed by: SURGERY

## 2018-06-27 PROCEDURE — 51798 US URINE CAPACITY MEASURE: CPT

## 2018-06-27 PROCEDURE — 84703 CHORIONIC GONADOTROPIN ASSAY: CPT

## 2018-06-27 PROCEDURE — 99233 SBSQ HOSP IP/OBS HIGH 50: CPT | Performed by: INTERNAL MEDICINE

## 2018-06-27 PROCEDURE — 86850 RBC ANTIBODY SCREEN: CPT

## 2018-06-27 PROCEDURE — 86923 COMPATIBILITY TEST ELECTRIC: CPT

## 2018-06-27 PROCEDURE — 86900 BLOOD TYPING SEROLOGIC ABO: CPT

## 2018-06-27 PROCEDURE — 9990 CHARGE CONVERSION

## 2018-06-27 PROCEDURE — C9113 INJ PANTOPRAZOLE SODIUM, VIA: HCPCS

## 2018-06-27 PROCEDURE — 0DH63UZ INSERTION OF FEEDING DEVICE INTO STOMACH, PERCUTANEOUS APPROACH: ICD-10-PCS | Performed by: INTERNAL MEDICINE

## 2018-06-27 PROCEDURE — 85027 COMPLETE CBC AUTOMATED: CPT

## 2018-06-27 PROCEDURE — 36430 TRANSFUSION BLD/BLD COMPNT: CPT

## 2018-06-27 PROCEDURE — 94003 VENT MGMT INPAT SUBQ DAY: CPT

## 2018-06-27 PROCEDURE — 94770 CHARGE CONVERSION: CPT

## 2018-06-27 PROCEDURE — 94762 N-INVAS EAR/PLS OXIMTRY CONT: CPT

## 2018-06-27 PROCEDURE — P9016 RBC LEUKOCYTES REDUCED: HCPCS

## 2018-06-27 RX ORDER — 0.9 % SODIUM CHLORIDE 0.9 %
250 INTRAVENOUS SOLUTION INTRAVENOUS ONCE
Status: COMPLETED | OUTPATIENT
Start: 2018-06-27 | End: 2018-06-27

## 2018-06-27 RX ORDER — SODIUM CHLORIDE 9 MG/ML
INJECTION, SOLUTION INTRAVENOUS
Status: COMPLETED
Start: 2018-06-27 | End: 2018-06-27

## 2018-06-27 RX ORDER — SODIUM CHLORIDE 9 MG/ML
INJECTION, SOLUTION INTRAVENOUS
Status: DISPENSED
Start: 2018-06-27 | End: 2018-06-28

## 2018-06-27 RX ORDER — ALBUTEROL SULFATE 90 UG/1
6 AEROSOL, METERED RESPIRATORY (INHALATION) EVERY 4 HOURS PRN
Status: DISCONTINUED | OUTPATIENT
Start: 2018-06-27 | End: 2018-06-29

## 2018-06-27 RX ORDER — FENTANYL 100 UG/H
1 PATCH TRANSDERMAL
Status: DISCONTINUED | OUTPATIENT
Start: 2018-06-27 | End: 2018-06-29

## 2018-06-27 RX ORDER — DEXTROSE MONOHYDRATE 100 MG/ML
INJECTION, SOLUTION INTRAVENOUS CONTINUOUS
Status: DISCONTINUED | OUTPATIENT
Start: 2018-06-27 | End: 2018-06-27 | Stop reason: SDUPTHER

## 2018-06-27 RX ADMIN — Medication 250 ML: at 06:07

## 2018-06-27 RX ADMIN — DEXTROSE MONOHYDRATE 25 G: 25 INJECTION, SOLUTION INTRAVENOUS at 20:33

## 2018-06-27 RX ADMIN — ACETAMINOPHEN 650 MG: 325 TABLET ORAL at 09:25

## 2018-06-27 RX ADMIN — HEPARIN SODIUM 5000 UNITS: 5000 INJECTION, SOLUTION INTRAVENOUS; SUBCUTANEOUS at 22:20

## 2018-06-27 RX ADMIN — METHADONE HYDROCHLORIDE 10 MG: 10 TABLET ORAL at 06:07

## 2018-06-27 RX ADMIN — Medication 15 ML: at 16:00

## 2018-06-27 RX ADMIN — HEPARIN SODIUM 5000 UNITS: 5000 INJECTION, SOLUTION INTRAVENOUS; SUBCUTANEOUS at 14:20

## 2018-06-27 RX ADMIN — CARBOXYMETHYLCELLULOSE SODIUM 1 DROP: 10 GEL OPHTHALMIC at 14:30

## 2018-06-27 RX ADMIN — MINERAL OIL AND WHITE PETROLATUM: 150; 830 OINTMENT OPHTHALMIC at 09:26

## 2018-06-27 RX ADMIN — DIAZEPAM 10 MG: 10 TABLET ORAL at 14:30

## 2018-06-27 RX ADMIN — Medication 10 ML: at 09:26

## 2018-06-27 RX ADMIN — SODIUM CHLORIDE 250 ML: 9 INJECTION, SOLUTION INTRAVENOUS at 20:01

## 2018-06-27 RX ADMIN — CHLORHEXIDINE GLUCONATE 15 ML: 0.12 RINSE ORAL at 09:27

## 2018-06-27 RX ADMIN — CARBOXYMETHYLCELLULOSE SODIUM 1 DROP: 10 GEL OPHTHALMIC at 18:17

## 2018-06-27 RX ADMIN — METHADONE HYDROCHLORIDE 10 MG: 10 TABLET ORAL at 14:30

## 2018-06-27 RX ADMIN — MINERAL OIL AND WHITE PETROLATUM: 150; 830 OINTMENT OPHTHALMIC at 16:48

## 2018-06-27 RX ADMIN — METHADONE HYDROCHLORIDE 10 MG: 10 TABLET ORAL at 22:20

## 2018-06-27 RX ADMIN — DIAZEPAM 10 MG: 10 TABLET ORAL at 22:20

## 2018-06-27 RX ADMIN — CARBOXYMETHYLCELLULOSE SODIUM 1 DROP: 10 GEL OPHTHALMIC at 02:24

## 2018-06-27 RX ADMIN — HEPARIN SODIUM 5000 UNITS: 5000 INJECTION, SOLUTION INTRAVENOUS; SUBCUTANEOUS at 06:07

## 2018-06-27 RX ADMIN — MINERAL OIL AND WHITE PETROLATUM: 150; 830 OINTMENT OPHTHALMIC at 19:47

## 2018-06-27 RX ADMIN — DIAZEPAM 10 MG: 10 TABLET ORAL at 09:27

## 2018-06-27 RX ADMIN — LINEZOLID 600 MG: 2 INJECTION, SOLUTION INTRAVENOUS at 20:55

## 2018-06-27 RX ADMIN — CARBOXYMETHYLCELLULOSE SODIUM 1 DROP: 10 GEL OPHTHALMIC at 22:27

## 2018-06-27 RX ADMIN — Medication 15 ML: at 12:49

## 2018-06-27 RX ADMIN — MINERAL OIL AND WHITE PETROLATUM: 150; 830 OINTMENT OPHTHALMIC at 03:57

## 2018-06-27 RX ADMIN — Medication 10 ML: at 09:27

## 2018-06-27 RX ADMIN — Medication 15 ML: at 06:49

## 2018-06-27 RX ADMIN — DEXTROSE MONOHYDRATE 25 G: 25 INJECTION, SOLUTION INTRAVENOUS at 18:16

## 2018-06-27 RX ADMIN — LINEZOLID 600 MG: 2 INJECTION, SOLUTION INTRAVENOUS at 09:25

## 2018-06-27 RX ADMIN — CHLORHEXIDINE GLUCONATE 15 ML: 0.12 RINSE ORAL at 20:56

## 2018-06-27 RX ADMIN — PANTOPRAZOLE SODIUM 40 MG: 40 INJECTION, POWDER, FOR SOLUTION INTRAVENOUS at 09:27

## 2018-06-27 RX ADMIN — CARBOXYMETHYLCELLULOSE SODIUM: 10 GEL OPHTHALMIC at 09:26

## 2018-06-27 RX ADMIN — CARBOXYMETHYLCELLULOSE SODIUM 1 DROP: 10 GEL OPHTHALMIC at 06:07

## 2018-06-27 ASSESSMENT — PAIN SCALES - GENERAL
PAINLEVEL_OUTOF10: 0

## 2018-06-27 ASSESSMENT — PULMONARY FUNCTION TESTS
PIF_VALUE: 24
PIF_VALUE: 15
PIF_VALUE: 20
PIF_VALUE: 21
PIF_VALUE: 18

## 2018-06-27 NOTE — PROGRESS NOTES
Patient resting in bed with eyes closed on sedation and ventilated. Patient will open eyes when spoken to, but will not squeeze hands or move feet. Patient has flexi seal in place and is patent. Left IJ CVC is in place and infusing in all three lumens. OG placement checked and residual measured. Tube feed at goal and patient tolerating well. On assessment patient has abrasion area on Left inner thigh area and is covered with Mepilex dressing. This area is documented in the wound section of flowsheet. Vitals are stable. Patient repositioned and pillows used for support. Will continue to monitor.  Electronically signed by Haja Carrillo RN on 6/27/2018 at 2:53 AM

## 2018-06-27 NOTE — PROGRESS NOTES
Nutrition Assessment    Type and Reason for Visit: Reassess    Nutrition Recommendations:   1. Once small bore feeding tube is placed by GI - restart Nepro at goal rate of 40 ml/hr x 20 hours. Water flushes, with medications only d/t hyponatremia or per nephrology guidance. 2. Monitor feeding tube placement - placement is supposed to happen today by GI. 3. Monitor TF restart, rate, intake, tolerance + water flushes. 4. Monitor for additional in-patient weight changes during remainder of admission - patient appears puffy at this time - dialysis scheduled for 6/28.   5. Monitor nutrition-related labs, bowel activity (+ rectal tube), and fluid/electrolyte management. Malnutrition Assessment:  · Malnutrition Status: Mild Malnutrition  · Context: Acute illness or injury  · Findings of the 6 clinical characteristics of malnutrition (Minimum of 2 out of 6 clinical characteristics is required to make the diagnosis of moderate or severe Protein Calorie Malnutrition based on AND/ASPEN Guidelines):  1. Energy Intake-Greater than 75%, greater than 7 days    2. Weight Loss-No significant weight loss (+ 15# weight gain ), in 1 month (x 21 days admission)  3. Fat Loss-No significant subcutaneous fat loss, Orbital, Triceps  4. Muscle Loss-Mild muscle mass loss (hard to assess d/t patient has some edema present; BLE/BUE appear to be puffy), Temples (temporalis muscle)  5. Fluid Accumulation-Mild fluid accumulation, Extremities (BUE + 1 edema)  6.   Strength-Not measured    Nutrition Diagnosis:   · Problem: Inadequate oral intake  · Etiology: related to Impaired respiratory function-inability to consume food, Lack of self-feeding ability, Nutrition support - EN, Renal dysfunction, Diarrhea     Signs and symptoms:  as evidenced by NPO status due to medical condition, Nutrition support - EN, Lab values, Known losses from dialysis, Diarrhea (trach placed this am; + small bore feeding tube to be placed today)    Nutrition Assessment:  · Subjective Assessment: patient had trach placed this am (#8 Portex) - she had just gotten back to her ICU room prior to rounds; no PEG placed this am - GI consulted for PEG placement (Dr. Meaghan Winn requested small bore feeding tube be placed); patient still had OG in place this am but TF was off (TF was turned off at midnight); she had 102 degree temp after trach placement this am; starting tomm - methadone and valium to start being weaned down  · Nutrition-Focused Physical Findings: patient was awake this am; she was able to follow simple commands from Dr. Meaghan Winn; + abrasion noted on L inner thigh area; + BM on 6/26 - rectal tube in place; patient is jaundiced all over; BUE + 1 edema; patient appeared uncomfortable today during rounds; CBW is 145# 8 oz; admission weight was 130# 4 oz; patient's eyes were open during f/u assessment but she would close them, open them off and on; patient just stared at this RD while conducting NFPE; patient's R arm is more swollen than the L arm; R hand fingertips have multiple needle pokes from BS checks; patient was drooling out of R side of her mouth - RN notified after assessment; RN told this RD that GI will place small bore feeding tube sometime today; + small scab where old HD catheter was removed on R upper chest  · Wound Type: Stage II, Pressure Ulcer, Multiple, Unstageable, Deep Tissue Injury (stage II on R coccyx and L buttocks; unstageable venous ulcer on R side of nose; possible DTI on L inner thigh)  · Current Nutrition Therapies:  · Oral Diet Orders: NPO   · Oral Diet intake: NPO  · Oral Nutrition Supplement (ONS) Orders: None  · ONS intake: NPO  · Tube Feeding (TF) Orders:   · Feeding Route: Nasogastric (small bore fedding tube to be placed today)  · Formula: Renal  · Rate (ml/hr):40 ml/hr     · Volume (ml/day): 800 ml   · Duration:  (x 20 hours)  · Additives/Modulars:  (none)  · TF Residuals: Not applicable  · Water Flushes:  (per

## 2018-06-27 NOTE — PROGRESS NOTES
This note also relates to the following rows which could not be included:  Vt Ordered - Cannot attach notes to unvalidated device data  Rate Set - Cannot attach notes to unvalidated device data  Peak Flow - Cannot attach notes to unvalidated device data  Pressure Support - Cannot attach notes to unvalidated device data  FiO2  - Cannot attach notes to unvalidated device data  Sensitivity - Cannot attach notes to unvalidated device data  PEEP/CPAP - Cannot attach notes to unvalidated device data  I Time/ I Time % - Cannot attach notes to unvalidated device data  Vt Exhaled - Cannot attach notes to unvalidated device data  Rate Measured - Cannot attach notes to unvalidated device data  Minute Volume - Cannot attach notes to unvalidated device data  Peak Inspiratory Pressure - Cannot attach notes to unvalidated device data  I:E Ratio - Cannot attach notes to unvalidated device data  High Peep/I Pressure - Cannot attach notes to unvalidated device data  Mean Airway Pressure - Cannot attach notes to unvalidated device data  Pulse - Cannot attach notes to unvalidated device data  SpO2 - Cannot attach notes to unvalidated device data  Resp - Cannot attach notes to unvalidated device data  End Tidal CO2 - Cannot attach notes to unvalidated device data  High Pressure Alarm - Cannot attach notes to unvalidated device data  Low Minute Volume Alarm - Cannot attach notes to unvalidated device data  High Respiratory Rate - Cannot attach notes to unvalidated device data       06/27/18 1601   Vent Information   Vent Type 840   Vent Mode AC/VC   Humidification Source Heated wire   Humidification Temp 37   Humidification Temp Measured 37.3   Cough/Sputum   Sputum How Obtained Tracheal;Suctioned   Cough Productive   Sputum Amount Small   Sputum Color Creamy;Tan;Brown   Tenacity Thick   Breath Sounds   Right Upper Lobe Diminished   Right Middle Lobe Diminished   Right Lower Lobe Diminished   Left Upper Lobe Diminished   Left Lower Lobe

## 2018-06-27 NOTE — PROGRESS NOTES
Physical Therapy/Occupational Therapy  Pt's chart reviewed. Noted pt had tracheostomy and PEG placement this AM. Will HOLD follow up tx today per protocol and follow up with pt tomorrow, 6/28, as appropriate. No charge.      Makeda Lauren, PT, DPT #[id number]  Rodger Floyd, OTR/L [id number]

## 2018-06-27 NOTE — PROGRESS NOTES
Procedure completed, pt tolerated. Turned and repositioned. Will continue to closely monitor.  Isabel Baig RN

## 2018-06-27 NOTE — PROGRESS NOTES
This note also relates to the following rows which could not be included:  Vt Ordered - Cannot attach notes to unvalidated device data  Rate Set - Cannot attach notes to unvalidated device data  Peak Flow - Cannot attach notes to unvalidated device data  Pressure Support - Cannot attach notes to unvalidated device data  FiO2  - Cannot attach notes to unvalidated device data  Sensitivity - Cannot attach notes to unvalidated device data  PEEP/CPAP - Cannot attach notes to unvalidated device data  I Time/ I Time % - Cannot attach notes to unvalidated device data  Vt Exhaled - Cannot attach notes to unvalidated device data  Rate Measured - Cannot attach notes to unvalidated device data  Minute Volume - Cannot attach notes to unvalidated device data  Peak Inspiratory Pressure - Cannot attach notes to unvalidated device data  I:E Ratio - Cannot attach notes to unvalidated device data  High Peep/I Pressure - Cannot attach notes to unvalidated device data  Mean Airway Pressure - Cannot attach notes to unvalidated device data  Pulse - Cannot attach notes to unvalidated device data  SpO2 - Cannot attach notes to unvalidated device data  Resp - Cannot attach notes to unvalidated device data  End Tidal CO2 - Cannot attach notes to unvalidated device data  High Pressure Alarm - Cannot attach notes to unvalidated device data  Low Minute Volume Alarm - Cannot attach notes to unvalidated device data  High Respiratory Rate - Cannot attach notes to unvalidated device data       06/27/18 0651   Vent Information   Vent Type 840   Vent Mode AC/VC   Humidification Source Heated wire   Humidification Temp 37   Humidification Temp Measured 35.5   Vent Patient Data   Plateau Pressure 15 [id number]   Static Compliance 36 mL/cmH2O   Dynamic Compliance 25 mL/cmH2O   Cough/Sputum   Sputum How Obtained Suctioned;Endotracheal   Cough Productive   Sputum Amount Small   Sputum Color Creamy;Tan;Brown   Tenacity Thick   Breath Sounds   Right Upper Lobe Diminished   Right Middle Lobe Diminished   Right Lower Lobe Diminished   Left Upper Lobe Diminished   Left Lower Lobe Diminished   ETT (adult)   Placement Date/Time: 06/12/18 1500     Secured at 20 cm   Measured From [address] [place] [address],[address] By [employer] tube [employer]   Site Condition Dry

## 2018-06-27 NOTE — BRIEF OP NOTE
Brief Postoperative Note    Annie Torre  YOB: 1990  [id number]    Pre-operative Diagnosis: Respiratory failure    Post-operative Diagnosis: Same    Procedure: Trach with 8-0 Portex    Anesthesia: General    Surgeons/Assistants: Keara Dumont      Estimated Blood Loss: less than 50     Complications: None    Specimens: Was Not Obtained    Findings: As above    Electronically signed by Keara Dumont MD on 6/27/2018 at 8:11 AM

## 2018-06-27 NOTE — PROGRESS NOTES
Report given via bedside to [facility], [facility]. Care transferred.   Isabel Baig RN

## 2018-06-27 NOTE — PROGRESS NOTES
Cultures:  6/25/18 Blood NGTD  6/20/18 RESP MRSA  6/21/18 Blood NGTD  6/11/18 Blood NG  6/14/18 C dif negative  6/12/18 RESP MRSA  6/8/18 Blood NG  6/5/18 Blood MRSA and ENTEROBACTER CLOACAE       Films:  CHEST CT 6/25/18  FINDINGS:   Mediastinum:  Motion artifact degrades image quality.  The unenhanced heart   is unremarkable.  The left internal jugular catheter terminates in the right   atrium.       There are new mildly enlarged mediastinal lymph nodes, likely reactive.  A   right peritracheal lymph node measures 1.1 x 1.5 cm.       Lungs/pleura: Endotracheal tube is in situ.  The tracheobronchial tree is   patent.  There is no pneumothorax.  There are new trace bilateral pleural   effusions with compressive atelectasis.       No change in the majority of the partially cavitating pulmonary nodules   throughout the bilateral lungs due to septic emboli.  However, the previously   noted airspace disease within the anterior segment of the left upper lobe has   become cavitary.  There is improved aeration and re-expansion of the left   lower lobe.     Upper Abdomen: A nasogastric tube reaches the lower gastroesophageal   junction.  A low-attenuation lesion in the left hepatic lobe is unchanged,   but incompletely imaged and cannot be fully evaluated given the lack There is   heterogeneous enhancement of the enlarged spleen measuring 14 cm in length.       Soft Tissues/Bones: No change in the old compression fractures involving the   superior endplates of T6 through T9.  A small to moderate amount of anasarca   is present throughout the lateral thorax.           Impression   1. Minimal worsening partially cavitating pulmonary nodules and airspace   disease throughout the bilateral lungs consistent with septic emboli. 2. New trace bilateral pleural effusions. 3. Mediastinal adenopathy, likely reactive. 4. Heterogeneous splenomegaly.      ASSESSMENT:  · Acute ventilator-dependent hypoxemic respiratory

## 2018-06-27 NOTE — PROGRESS NOTES
Internal Medicine ICU Progress Note      Interval history   Date of admission    34year old white female who was admitted for GI bleed. Had an EGD   BC positive for strep pneumonia, staph aureus and gram-negative salazar. Echocardiogram showed tricuspid valve endocarditis. She's had this before. History of IV drug use  CT chest showed septic emboli and possible splenic infarct. - Transferred out of ICU on 18  - Transferred back to ICU on 18 for PEA arrest, intubated,  resuscitated with fluids  - Started on CRRT for acute renal failure, on 18  - Weaned off pressors on 18, and extubated  - Reintubated   - s.p bronch    - Off CRRT and started on hemodialysis  18  - Has been on antibiotics vancomycin and cefepime and Zyvox  - Patient remains on mechanical ventilation, continued fevers, on dialysis. - Followed by intensivist, ID, nephrologist.  - TDC removed 18 due to persistent fevers       S/P Trach and PEG today    Subjective  Pt remains on mechanical ventilation , has tracheostomy now . awake, responding appropriately. Follows commands    S/p PEG today . TF on hold . persistent fevers   pan new TDC in AM and HD . LTAC referral made     T-max improved today .       Recent Labs      18   0420  18   0517   PHART  7.282*  7.363   [id number]  38.9  40.1   PO2ART  102.0  100.1       MV Settings:  Vent Mode: AC/VC Rate Set: 18 bmp/Vt Ordered: 350 mL/ /FiO2 : 30 %    IV:   norepinephrine Stopped (18 0133)    dexmedetomidine (PRECEDEX) IV infusion Stopped (18 1442)    midazolam Stopped (18 0841)    fentaNYL (SUBLIMAZE) infusion 150 mcg/hr (18 0705)    dextrose         Vitals:  Temp  Av.9 °F (37.7 °C)  Min: 98.5 °F (36.9 °C)  Max: 101.2 °F (38.4 °C)  Pulse  Av.6  Min: 118  Max: 135  BP  Min: 97/64  Max: 150/74  SpO2  Av.8 %  Min: 94 %  Max: 100 %  FiO2   Av %  Min: 30 %  Max: 30 %  Patient Vitals for the past 4 hrs:   BP Pulse Resp SpO2   06/27/18 1500 124/73 120 20 99 %   06/27/18 1400 120/68 118 18 97 %   06/27/18 1300 126/69 123 19 96 %   06/27/18 1242 - 123 18 97 %       CVP: CVP (Mean): 16 mmHg      Intake/Output Summary (Last 24 hours) at 06/27/18 1618  Last data filed at 06/27/18 0200   Gross per 24 hour   Intake             2821 ml   Output             3300 ml   Net             -479 ml       EXAM:    General: young female on vent. Trach +    Ill appearing. On mechanical ventilation . responsive . Diffuse edema   Eyes: PERRL. No sclera icterus. No conjunctiva injected. ENT: No discharge. Neck: Trachea midline. Normal thyroid. trach in place . Resp: Diminished breath sounds. Bilateral diffuse rhonchi. CV: Regular rate and rhythm   GI:   Non-distended. No masses. Bowel sounds present. Tolerating tube feeds. No hernia. PEG +   Skin: Warm and dry. No nodule on exposed extremities. No rash on exposed extremities  Lymph: No cervical LAD. No supraclavicular LAD. M/S: .  Diffuse 1+ edema in all extremities   Neuro: Opens eyes , following commands . Non focal     Meds:   fentaNYL  1 patch Transdermal Q72H    lidocaine  5 mL Intradermal Once    doxercalciferol  1 mcg Intravenous Once per day on Tue Thu Sat    heparin (porcine)  5,000 Units Subcutaneous 3 times per day    lidocaine 1 % injection  5 mL Intradermal Once    sodium chloride flush  10 mL Intravenous 2 times per day    linezolid  600 mg Intravenous Q12H    darbepoetin emi-polysorbate  100 mcg Intravenous Weekly - Thursday    And    darbepoetin emi-polysorbate  25 mcg Subcutaneous Weekly - Thursday    sodium chloride (Inhalant)  15 mL Nebulization 4x Daily    pantoprazole  40 mg Intravenous Daily    methadone  10 mg Oral 3 times per day    diazepam  10 mg Oral TID    chlorhexidine  15 mL Mouth/Throat BID    carboxymethylcellulose PF  1 drop Both Eyes Q4H    And    lubrifresh P.M.    Both Eyes Q4H       PRN Meds:  heparin (porcine), sodium chloride flush, morphine, albumin human, fentanNYL, midazolam, dextrose, sodium phosphate IVPB **OR** [DISCONTINUED] sodium phosphate IVPB **OR** [DISCONTINUED] sodium phosphate IVPB **OR** [DISCONTINUED] sodium phosphate IVPB, ondansetron, glucose, glucagon (rDNA), dextrose, acetaminophen, promethazine, hydrOXYzine    Results:  CBC:   Recent Labs      06/25/18   0530  06/26/18   0415  06/27/18   0516   WBC  15.0*  15.6*  11.6*   HGB  8.3*  7.9*  6.9*   HCT  25.6*  24.2*  21.3*   MCV  96.4  96.2  95.6   PLT  107*  107*  88*     BMP:   Recent Labs      06/25/18   0530  06/26/18   0415  06/27/18   0516   NA  125*  121*  122*   K  4.2  4.7  4.5   CL  92*  87*  89*   CO2  20*  19*  23   BUN  61*  77*  47*   CREATININE  4.0*  4.7*  3.1*     LIVER PROFILE:   Recent Labs      06/25/18   1820  06/26/18   0415   AST  15  16   ALT  16  15   BILIDIR  0.7*  0.6*   BILITOT  1.1*  1.0   ALKPHOS  224*  203*     PT/INR:   Recent Labs      06/25/18   0530  06/26/18   0415  06/27/18   0516   PROTIME  15.1*  15.3*  15.2*   INR  1.32*  1.34*  1.33*     ECHO 6/6/18   Summary   LV systolic function is mildly reduced with LVEF estimated at 40-45%.   Mild global hypokinesis is present. There is mild systolic and diastolic   septal flattening c/w RV pressure and volume overload.   Normal left ventricular diastolic filling pressure.   The right atrium is mildly dilated.   There is trivial aortic regurgitation.   Moderate to large mobile, pedunculated vegetation (1.15 cm x 2.43 cm)   attached to base of TV leaflet c/w endocarditis.   Moderate tricuspid regurgitation.   Systolic pulmonary artery pressure (SPAP) estimated at 55 mmHg (RA pressure   3 mmHg), c/w moderate pulmonary hypertension.   Note TV endocarditis seen on prior March 2017 study but LV systolic function   is mildly decreased now.      ECHO 6/8/18  Summary   This is a limited study s/p code.   LV systolic function is lower normal with EF visually estimated at versus atelectasis   versus less likely edema. That should be followed to resolution. Borderline pulmonary vascular congestion. XR CHEST PORTABLE    (Results Pending)         Assessment:    Active Problems:    IVDU (intravenous drug user)    Endocarditis of tricuspid valve    HCAP (healthcare-associated pneumonia)    Upper GI bleed    Sepsis due to Streptococcus pneumoniae (HCC)    Mild protein-calorie malnutrition (HCC)    PEA (Pulseless electrical activity) (HCC)    DIC (disseminated intravascular coagulation) ([facility] [address]    Acute respiratory failure with hypoxia (HCC)    Splenic infarction    Bacteremia    Mucus plugging of bronchi    Cavitary lung disease    Fever    Persistent fever    MRSA bacteremia    Hyponatremia  Resolved Problems:    * No resolved hospital problems. *         Plan:    # Septic shock- sec to bacteremia /septic emboli/endocarditis  Sustained PEA arrest x2  requiring multiple pressors, intubation   - weaned off pressors- levo. Vasopressin  - wbc improving from 60 K  - critical care and ID involved. WBC down to 11 K today. - still has a fever.   - Repeat CT chest on 6/25, showed worsening airspace disease-consistent with septic emboli  - tunneled dialysis catheter removed on 6/25/18  - abx per ID - see below    #Acute resp failure/cavitary pneumonia  Due to MRSA infection  - s.p PEA arrest, off vent  6/11- reintubated on 6/12/18,  for increasing abd distention and pain   - remains on mechanical ventilation, with spontaneous breathing trials. pulm managing  - ct chest with no new findings except for septic emboli and cavitory pneumonia. Repeat CT chest on 6/25/18 shows worsening of cavitating pulmonary nodules and airspacedisease consistent with septic emboli. Reactive mediastinal adenopathy noted  - ct abd with no acute findings   - Off precedex drip. methadone and valium oral added. On Versed and fentanyl for sedation  - IV antibiotics- On vanc, cefepime, and zyvox   - f/w BAL. Respiratory cultures growing MRSA  - S/P PEG and tracheostomy today 6/27    #  Acute upper GI bleed. s/p  EGD -Three angiodysplastic spots at the junction of the body and  the fundus area that was cauterized during endoscopy.   - s/p multiple PRBC transfusions.  -  h/h stable. - RBC scan neg      #  Tricuspid valve endocarditis- recurrent    with bacteremia   - history of IV drug abuse. - She is growing MRSA, strep pneumoniae and Enterobacter cloacae. - ID involved  - Large pedunculated vegetation on septal leaflet of TV   Previously had MSSA TV endocarditis  - vancomycin and cefepime and zyvox was added   Improving wbc       #  End stage renal disease on HD  Was on CRRT - from 6/8/18 to  6/22/18  Now on HD since 6/23/18. TDC removed 6/25/18 due to persistent fevers. Needs new dialysis access.  Plan new [address] in AM    # DIC   Sec to severe sepsis , off steroids  Hematology f/w     # Anemia - sec to sepsis, iatrogenic, GI bleed  Prn transfusions    #Hypoglycemia  - resume TF soon      She is critically ill.  subcu heparin for DVT prophylaxis  Full Code       LTAC referral     Abril Quijano MD 6/27/2018 4:18 PM

## 2018-06-27 NOTE — ANESTHESIA PRE-OP
injection 10 mL  10 mL Intravenous PRN Leonel Sánchez MD        linezolid (ZYVOX) IVPB 600 mg  600 mg Intravenous Q12H Miguel Valentin MD   Stopped at 06/26/18 2207    darbepoetin emi-polysorbate (ARANESP) injection 100 mcg  100 mcg Intravenous Weekly - Thursday Olivia Cintron RN   100 mcg at 06/21/18 1115    And    darbepoetin emi-polysorbate (ARANESP) injection 25 mcg  25 mcg Subcutaneous Weekly - Thursday Olivia Cintron RN   25 mcg at 06/21/18 1115    sodium chloride (Inhalant) 3 % nebulizer solution 15 mL  15 mL Nebulization 4x Daily Leonel Sánchez MD   15 mL at 06/26/18 1932    morphine (PF) injection 4 mg  4 mg Intravenous Q4H PRN Leonel Sánchez MD        norepinephrine (LEVOPHED) 16 mg in dextrose 5 % 250 mL infusion  2 mcg/min Intravenous Continuous Leonel Sánchez MD   Stopped at 06/23/18 0133    albumin human 25 % solution 12.5 g  12.5 g Intravenous PRN Leopoldo Armor, MD   12.5 g at 06/26/18 1541    dexmedetomidine (PRECEDEX) 400 mcg in sodium chloride 0.9 % 100 mL infusion  0.5 mcg/kg/hr Intravenous Continuous Leonel Sánchez MD   Stopped at 06/22/18 1442    pantoprazole (PROTONIX) injection 40 mg  40 mg Intravenous Daily Leonel Sánchez MD   40 mg at 06/26/18 0823    midazolam (VERSED) 100 mg in dextrose 5 % 100 mL infusion  4 mg/hr Intravenous Continuous Nadir Zuniga MD   Stopped at 06/26/18 1179    methadone (DOLOPHINE) tablet 10 mg  10 mg Oral 3 times per day Nadir Zuniga MD   10 mg at 06/26/18 2220    diazepam (VALIUM) tablet 10 mg  10 mg Oral TID Nadir Zuniga MD   10 mg at 06/26/18 2041    chlorhexidine (PERIDEX) 0.12 % solution 15 mL  15 mL Mouth/Throat BID Nadir Zuniga MD   15 mL at 06/26/18 2041    fentaNYL (SUBLIMAZE) injection 50 mcg  50 mcg Intravenous Q1H PRN Nadir Zuniga MD   50 mcg at 06/22/18 0423    midazolam (VERSED) injection 2 mg  2 mg Intravenous Q1H PRN Nadir Zuniga MD   2 mg at 06/25/18 1428    carboxymethylcellulose PF (THERATEARS) 1 % ophthalmic gel 1 drop  1 drop Both Eyes Q4H Nadir Zuniga MD   1 drop at 06/27/18 0047    And    lubrifresh P.M. (artificial tears) ophthalmic ointment   Both Eyes Q4H Nadir Zuniga MD        fentaNYL (SUBLIMAZE) 1,000 mcg in sodium chloride 0.9 % 100 mL infusion  150 mcg/hr Intravenous Continuous Nadir Zuniga MD 15 mL/hr at 06/27/18 0006 150 mcg/hr at 06/27/18 0006    dextrose 50 % solution 25 g  25 g Intravenous PRN Leopoldo Armor, MD   25 g at 06/23/18 2151    sodium phosphate 6 mmol in dextrose 5 % 250 mL IVPB  6 mmol Intravenous PRN Leopoldo Armor, MD   Stopped at 06/15/18 1055    ondansetron (ZOFRAN) injection 4 mg  4 mg Intravenous Q6H PRN Garth Walter MD   4 mg at 06/12/18 0851    glucose (GLUTOSE) 40 % oral gel 15 g  15 g Oral PRN Eli Coronado MD        glucagon injection 1 mg  1 mg Intramuscular PRN Eli Coronado MD        dextrose 5 % solution  100 mL/hr Intravenous PRN Chelsi San MD        acetaminophen (TYLENOL) tablet 650 mg  650 mg Oral Q6H PRN Jaylen Jerome MD   650 mg at 06/25/18 0457    promethazine (PHENERGAN) tablet 25 mg  25 mg Oral Q6H PRN Bryce Lau MD   25 mg at 06/07/18 2244    hydrOXYzine (VISTARIL) capsule 50 mg  50 mg Oral Q8H PRN Bryce Lau MD   50 mg at 06/14/18 2158       Allergies:  No Known Allergies    Problem List:    Patient Active Problem List   Diagnosis Code    Bacterial endocarditis I33.0    JAMES (acute kidney injury) ([address] N17.9    Drug abuse F19.10    Hep C w/o coma, chronic (HCC) B18.2    Swelling R60.9    Endocarditis and heart valve disorders in diseases classified elsewhere I39    IVDU (intravenous drug user) F19.90    MDD (major depressive disorder), recurrent severe, without psychosis ([address] F33.2    Opiate dependence, continuous ([address] F11.20    Opiate withdrawal ([address] F11.23    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

## 2018-06-27 NOTE — PROGRESS NOTES
ID Follow-up NOTE    CC: tricuspid valve endocarditis    Subjective:     Patient gives no history, is intubated, on vent sedated    Objective:     Patient Vitals for the past 24 hrs:   BP Temp Temp src Pulse Resp SpO2 Height Weight   06/27/18 1200 (!) 150/74 - - 132 20 94 % - -   06/27/18 1127 127/66 - - 130 25 95 % - -   06/27/18 1100 125/67 - - 131 21 94 % - -   06/27/18 1033 - - - - - - 5' 5\" (1.651 m) -   06/27/18 1000 135/71 - - 134 19 96 % - -   06/27/18 0900 (!) 146/80 - - 135 19 95 % - -   06/27/18 0800 (!) 141/78 101.2 °F (38.4 °C) Axillary 132 18 96 % - -   06/27/18 0700 121/61 98.8 °F (37.1 °C) Axillary 124 19 99 % - -   06/27/18 0651 - - - 124 18 99 % - -   06/27/18 0600 126/69 - - 127 19 99 % - -   06/27/18 0500 (!) 113/58 - - 126 19 99 % - -   06/27/18 0400 123/70 - - 127 19 99 % - -   06/27/18 0318 - 100.9 °F (38.3 °C) Oral 127 19 99 % - -   06/27/18 0300 117/60 - - 126 18 99 % - -   06/27/18 0200 121/67 - - 127 18 100 % - -   06/27/18 0100 115/67 - - 127 18 100 % - -   06/27/18 0000 129/67 - - 128 20 97 % - -   06/26/18 2300 (!) 102/55 100.2 °F (37.9 °C) Oral 127 18 100 % - -   06/26/18 2245 - - - 126 19 100 % - -   06/26/18 2200 (!) 98/44 - - 129 22 99 % - -   06/26/18 2100 106/64 - - 125 18 100 % - -   06/26/18 2000 97/64 99.5 °F (37.5 °C) Oral 122 18 98 % - -   06/26/18 1932 - - - 121 18 98 % - -   06/26/18 1900 107/60 - - 123 17 97 % - -   06/26/18 1800 (!) 114/54 - - 126 18 - - -   06/26/18 1730 133/77 98.5 °F (36.9 °C) - 130 - - - 145 lb 8.1 oz (66 kg)   06/26/18 1700 (!) 107/56 - - 128 19 - - -   06/26/18 1604 - 98.5 °F (36.9 °C) Oral - - - - -   06/26/18 1600 (!) 94/53 - - 125 18 100 % - -   06/26/18 1500 109/60 - - 128 18 100 % - -   06/26/18 1448 - - - 129 18 - - -   06/26/18 1400 108/63 - - 130 19 100 % - -   06/26/18 1300 113/65 - - 116 20 100 % - -   06/26/18 1250 - 98.5 °F (36.9 °C) Axillary - - - - -   18 1245 (!) 101/53 - - 117 18 100 % - -   18 1242 108/68 - - 118 16 100 % - -     Temp (24hrs), Av.5 °F (37.5 °C), Min:98.5 °F (36.9 °C), Max:101.2 °F (38.4 °C)          EXAM:  General: had tracheostomy and PEG earlier this am. Continues to be be febrile intermittently; tachycardic   ENT: conj icterus less marked; pupils equal  Neck: s nodes, fairly supple      LUNGS: coarse BS bilat; does not appear to be in respiratory distress   CV: RR c gd I syst M lower LSB     ABD: soft,  No mass or omegaly     EXT: without edema;Skin: no rash or other skin stigmata of endocarditis; icterus less intense. Data Review:    Lab Results   Component Value Date    WBC 11.6 (H) 2018    HGB 6.9 (LL) 2018    HCT 21.3 (L) 2018    MCV 95.6 2018    PLT 88 (L) 2018     Lab Results   Component Value Date    CREATININE 3.1 (H) 2018    BUN 47 (H) 2018     (L) 2018    K 4.5 2018    CL 89 (L) 2018    CO2 23 2018     Lab Results   Component Value Date    ALT 15 2018    AST 16 2018    ALKPHOS 203 (H) 2018    BILITOT 1.0 2018     MICRO:  blood cultures both grew MRSA. One culture also grew strep while the other culture grew Enterobacter sensitive to cefepime.  respiratory culture: MRSA vanc PANCHO 1  Most recent cultures (blood, line tip) are no growth so far    IMAGING: CXR: cavitary multifocal infiltrates presumably related to septic pulmonary emboli, stable  CT head: NAD;   CT chest: infiltrates more cavitary, but there are not much more in the way of foci of pulmonary disease. infiltrates and cavitary pulmonary nodules consistent with septic emboli.     Assessment:     Active Problems:    IVDU (intravenous drug user)    Endocarditis of tricuspid valve    HCAP (healthcare-associated pneumonia)    Upper GI bleed    Sepsis due to Streptococcus pneumoniae (HCC)    Mild protein-calorie malnutrition ([address]    PEA (Pulseless electrical activity) ([address]    DIC (disseminated intravascular coagulation) ([address]    Acute respiratory failure with hypoxia (HCC)    Splenic infarction    Bacteremia    Mucus plugging of bronchi    Cavitary lung disease    Fever    Persistent fever    MRSA bacteremia    Hyponatremia  Resolved Problems:    * No resolved hospital problems. *  Tricuspid valve endocarditis with septic pulmonary emboli (L sided endocarditis not ruled out) due to MRSA. Strep and Enterobacter were also isolated from admission blood cultures, significance uncertain: I would think this illness is mostly due to MRSA. possible splenic infarct or abscess. s/p arrest x2.    Leukocytosis nearly resolved   R sided tunneled HD catheter removed with substitution of femoral vascath.  bronchoscopy culture positive for MRSA   Platelets falling probably due to linezolid  Plan:   Continue vancomycin, linezolid, cefepime  redose cefepime and vancomycin after dialysis tomorrow    Will stop linezolid if platelets go significantly lower  vanc level in am

## 2018-06-27 NOTE — H&P
History and Physical / Pre-Sedation Assessment    Patient:  Asya Ponce   :   1990     Intended Procedure:  EGD    HPI: 34year old female with history of HTN, Chronic hepatitis C, seizure do, ESRD, IVDU, endocarditis, admitted with sepsis and PEA c/b shock liver, septic emboli, DIC and GIbleed now in need of PEG for nutritional support    Nurses notes reviewed and agreed. Medications reviewed  Allergies: No Known Allergies    Physical Exam:  Vital Signs: /66   Pulse 130   Temp 101.2 °F (38.4 °C) (Axillary)   Resp 25   Ht 5' 5\" (1.651 m)   Wt 145 lb 8.1 oz (66 kg) Comment: flexiseal exploded. lg liquid stool. transfer sling removed. SpO2 95%   BMI 24.21 kg/m²    Airway: Mallampati: II (soft palate, uvula, fauces visible)  Pulmonary:Normal  Cardiac:Normal  Abdomen:Normal    Pre-Procedure Assessment / Plan:  ASA: Class 3 - A patient with severe systemic disease that limits activity but is not incapacitating  Level of Sedation Plan: Moderate sedation  Post Procedure plan: Return to same level of care    I assessed the patient and find that the patient is in satisfactory condition to proceed with the planned procedure and sedation plan. I have explained the risk, benefits, and alternatives to the procedure; the patient's mother understands and agrees to proceed.        Aidan Born  2018

## 2018-06-27 NOTE — PROGRESS NOTES
Dr Mary Herrera called and made aware of urine of 5 ml with bladder scan 502. No new orders.  Leonela Reyna RN

## 2018-06-27 NOTE — PROGRESS NOTES
Pt returned to ICU via bed on portable monitor and portable vent with 2 RN from surgery. Received report. Pt repositioned. RT at the bedside and placed pt on vent. Will closely monitor.  Christopher Main, RN

## 2018-06-28 LAB
A/G RATIO: 0.4 (ref 1.1–2.2)
A/G RATIO: 0.5 (ref 1.1–2.2)
ALBUMIN SERPL-MCNC: 1.9 G/DL (ref 3.4–5)
ALBUMIN SERPL-MCNC: 2 G/DL (ref 3.4–5)
ALBUMIN SERPL-MCNC: 2 G/DL (ref 3.4–5)
ALP BLD-CCNC: 160 U/L (ref 40–129)
ALP BLD-CCNC: 162 U/L (ref 40–129)
ALP BLD-CCNC: 173 U/L (ref 40–129)
ALT SERPL-CCNC: 11 U/L (ref 10–40)
ANION GAP SERPL CALCULATED.3IONS-SCNC: 12 MMOL/L (ref 3–16)
ANION GAP SERPL CALCULATED.3IONS-SCNC: 13 MMOL/L (ref 3–16)
ANION GAP SERPL CALCULATED.3IONS-SCNC: 13 MMOL/L (ref 3–16)
AST SERPL-CCNC: 12 U/L (ref 15–37)
AST SERPL-CCNC: 12 U/L (ref 15–37)
AST SERPL-CCNC: 13 U/L (ref 15–37)
BASE EXCESS ARTERIAL: -5.7 MMOL/L (ref -3–3)
BILIRUB SERPL-MCNC: 1 MG/DL (ref 0–1)
BILIRUB SERPL-MCNC: 1.1 MG/DL (ref 0–1)
BILIRUB SERPL-MCNC: 1.1 MG/DL (ref 0–1)
BILIRUBIN DIRECT: 0.7 MG/DL (ref 0–0.3)
BILIRUBIN, INDIRECT: 0.3 MG/DL (ref 0–1)
BUN BLDV-MCNC: 30 MG/DL (ref 7–20)
BUN BLDV-MCNC: 57 MG/DL (ref 7–20)
BUN BLDV-MCNC: 57 MG/DL (ref 7–20)
CALCIUM SERPL-MCNC: 8.4 MG/DL (ref 8.3–10.6)
CARBOXYHEMOGLOBIN ARTERIAL: 2.4 % (ref 0–1.5)
CHLORIDE BLD-SCNC: 88 MMOL/L (ref 99–110)
CHLORIDE BLD-SCNC: 88 MMOL/L (ref 99–110)
CHLORIDE BLD-SCNC: 89 MMOL/L (ref 99–110)
CO2: 19 MMOL/L (ref 21–32)
CO2: 20 MMOL/L (ref 21–32)
CO2: 22 MMOL/L (ref 21–32)
CREAT SERPL-MCNC: 2.5 MG/DL (ref 0.6–1.1)
CREAT SERPL-MCNC: 3.9 MG/DL (ref 0.6–1.1)
CREAT SERPL-MCNC: 4 MG/DL (ref 0.6–1.1)
CULTURE CATHETER TIP: NORMAL
GFR AFRICAN AMERICAN: 16
GFR AFRICAN AMERICAN: 17
GFR AFRICAN AMERICAN: 28
GFR NON-AFRICAN AMERICAN: 14
GFR NON-AFRICAN AMERICAN: 14
GFR NON-AFRICAN AMERICAN: 23
GLOBULIN: 4.3 G/DL
GLOBULIN: 4.5 G/DL
GLUCOSE BLD-MCNC: 101 MG/DL (ref 70–99)
GLUCOSE BLD-MCNC: 109 MG/DL (ref 70–99)
GLUCOSE BLD-MCNC: 68 MG/DL (ref 70–99)
GLUCOSE BLD-MCNC: 69 MG/DL (ref 70–99)
GLUCOSE BLD-MCNC: 73 MG/DL (ref 70–99)
GLUCOSE BLD-MCNC: 73 MG/DL (ref 70–99)
GLUCOSE BLD-MCNC: 75 MG/DL (ref 70–99)
GLUCOSE BLD-MCNC: 76 MG/DL (ref 70–99)
GLUCOSE BLD-MCNC: 77 MG/DL (ref 70–99)
GLUCOSE BLD-MCNC: 81 MG/DL (ref 70–99)
GLUCOSE BLD-MCNC: 82 MG/DL (ref 70–99)
GLUCOSE BLD-MCNC: 85 MG/DL (ref 70–99)
GLUCOSE BLD-MCNC: 87 MG/DL (ref 70–99)
GLUCOSE BLD-MCNC: 88 MG/DL (ref 70–99)
GLUCOSE BLD-MCNC: 92 MG/DL (ref 70–99)
GLUCOSE BLD-MCNC: 97 MG/DL (ref 70–99)
HCO3 ARTERIAL: 19.3 MMOL/L (ref 21–29)
HCT VFR BLD CALC: 25.7 % (ref 36–48)
HEMOGLOBIN, ART, EXTENDED: 9.1 G/DL (ref 12–16)
HEMOGLOBIN: 8.6 G/DL (ref 12–16)
INR BLD: 1.32 (ref 0.86–1.14)
MCH RBC QN AUTO: 30.9 PG (ref 26–34)
MCHC RBC AUTO-ENTMCNC: 33.4 G/DL (ref 31–36)
MCV RBC AUTO: 92.7 FL (ref 80–100)
METHEMOGLOBIN ARTERIAL: 0.2 %
O2 CONTENT ARTERIAL: 13 ML/DL
O2 SAT, ARTERIAL: 96.6 %
O2 THERAPY: ABNORMAL
PCO2 ARTERIAL: 35.9 MMHG (ref 35–45)
PDW BLD-RTO: 16.5 % (ref 12.4–15.4)
PERFORMED ON: ABNORMAL
PERFORMED ON: NORMAL
PH ARTERIAL: 7.35 (ref 7.35–7.45)
PLATELET # BLD: 88 K/UL (ref 135–450)
PMV BLD AUTO: 8 FL (ref 5–10.5)
PO2 ARTERIAL: 90.3 MMHG (ref 75–108)
POTASSIUM SERPL-SCNC: 3.8 MMOL/L (ref 3.5–5.1)
POTASSIUM SERPL-SCNC: 4.4 MMOL/L (ref 3.5–5.1)
POTASSIUM SERPL-SCNC: 4.6 MMOL/L (ref 3.5–5.1)
PROTHROMBIN TIME: 15 SEC (ref 9.8–13)
RBC # BLD: 2.77 M/UL (ref 4–5.2)
SODIUM BLD-SCNC: 121 MMOL/L (ref 136–145)
SODIUM BLD-SCNC: 121 MMOL/L (ref 136–145)
SODIUM BLD-SCNC: 122 MMOL/L (ref 136–145)
TCO2 ARTERIAL: 20.4 MMOL/L
TOTAL PROTEIN: 6.3 G/DL (ref 6.4–8.2)
TOTAL PROTEIN: 6.4 G/DL (ref 6.4–8.2)
TOTAL PROTEIN: 6.5 G/DL (ref 6.4–8.2)
VANCOMYCIN RANDOM: 17.5 UG/ML
WBC # BLD: 10.4 K/UL (ref 4–11)

## 2018-06-28 PROCEDURE — 80202 ASSAY OF VANCOMYCIN: CPT

## 2018-06-28 PROCEDURE — C9113 INJ PANTOPRAZOLE SODIUM, VIA: HCPCS

## 2018-06-28 PROCEDURE — 9990 CHARGE CONVERSION

## 2018-06-28 PROCEDURE — 85610 PROTHROMBIN TIME: CPT

## 2018-06-28 PROCEDURE — 97110 THERAPEUTIC EXERCISES: CPT

## 2018-06-28 PROCEDURE — 36415 COLL VENOUS BLD VENIPUNCTURE: CPT

## 2018-06-28 PROCEDURE — 36592 COLLECT BLOOD FROM PICC: CPT

## 2018-06-28 PROCEDURE — 85027 COMPLETE CBC AUTOMATED: CPT

## 2018-06-28 PROCEDURE — 94761 N-INVAS EAR/PLS OXIMETRY MLT: CPT

## 2018-06-28 PROCEDURE — 94750 CHARGE CONVERSION: CPT

## 2018-06-28 PROCEDURE — 99232 SBSQ HOSP IP/OBS MODERATE 35: CPT | Performed by: INTERNAL MEDICINE

## 2018-06-28 PROCEDURE — 36556 INSERT NON-TUNNEL CV CATH: CPT

## 2018-06-28 PROCEDURE — 06HN33Z INSERTION OF INFUSION DEVICE INTO LEFT FEMORAL VEIN, PERCUTANEOUS APPROACH: ICD-10-PCS | Performed by: INTERNAL MEDICINE

## 2018-06-28 PROCEDURE — 94003 VENT MGMT INPAT SUBQ DAY: CPT

## 2018-06-28 PROCEDURE — 71045 X-RAY EXAM CHEST 1 VIEW: CPT

## 2018-06-28 PROCEDURE — 82803 BLOOD GASES ANY COMBINATION: CPT

## 2018-06-28 PROCEDURE — 36556 INSERT NON-TUNNEL CV CATH: CPT | Performed by: INTERNAL MEDICINE

## 2018-06-28 PROCEDURE — 36600 WITHDRAWAL OF ARTERIAL BLOOD: CPT

## 2018-06-28 PROCEDURE — 80053 COMPREHEN METABOLIC PANEL: CPT

## 2018-06-28 PROCEDURE — 99291 CRITICAL CARE FIRST HOUR: CPT | Performed by: INTERNAL MEDICINE

## 2018-06-28 PROCEDURE — P9047 ALBUMIN (HUMAN), 25%, 50ML: HCPCS

## 2018-06-28 RX ORDER — METHADONE HYDROCHLORIDE 10 MG/1
10 TABLET ORAL 2 TIMES DAILY
Status: DISCONTINUED | OUTPATIENT
Start: 2018-06-28 | End: 2018-06-30

## 2018-06-28 RX ADMIN — METHADONE HYDROCHLORIDE 10 MG: 10 TABLET ORAL at 07:09

## 2018-06-28 RX ADMIN — DIAZEPAM 10 MG: 10 TABLET ORAL at 14:17

## 2018-06-28 RX ADMIN — MINERAL OIL AND WHITE PETROLATUM: 150; 830 OINTMENT OPHTHALMIC at 23:55

## 2018-06-28 RX ADMIN — CARBOXYMETHYLCELLULOSE SODIUM 1 DROP: 10 GEL OPHTHALMIC at 22:19

## 2018-06-28 RX ADMIN — MINERAL OIL AND WHITE PETROLATUM: 150; 830 OINTMENT OPHTHALMIC at 00:00

## 2018-06-28 RX ADMIN — MINERAL OIL AND WHITE PETROLATUM: 150; 830 OINTMENT OPHTHALMIC at 19:08

## 2018-06-28 RX ADMIN — MORPHINE SULFATE 4 MG: 4 INJECTION, SOLUTION INTRAMUSCULAR; INTRAVENOUS at 21:08

## 2018-06-28 RX ADMIN — DIAZEPAM 10 MG: 10 TABLET ORAL at 21:07

## 2018-06-28 RX ADMIN — MORPHINE SULFATE 4 MG: 4 INJECTION, SOLUTION INTRAMUSCULAR; INTRAVENOUS at 16:18

## 2018-06-28 RX ADMIN — CHLORHEXIDINE GLUCONATE 15 ML: 0.12 RINSE ORAL at 09:00

## 2018-06-28 RX ADMIN — ACETAMINOPHEN 650 MG: 325 TABLET ORAL at 00:00

## 2018-06-28 RX ADMIN — CARBOXYMETHYLCELLULOSE SODIUM 1 DROP: 10 GEL OPHTHALMIC at 07:09

## 2018-06-28 RX ADMIN — CHLORHEXIDINE GLUCONATE 15 ML: 0.12 RINSE ORAL at 21:07

## 2018-06-28 RX ADMIN — MINERAL OIL AND WHITE PETROLATUM: 150; 830 OINTMENT OPHTHALMIC at 16:32

## 2018-06-28 RX ADMIN — Medication 10 ML: at 16:19

## 2018-06-28 RX ADMIN — METHADONE HYDROCHLORIDE 10 MG: 10 TABLET ORAL at 21:07

## 2018-06-28 RX ADMIN — HEPARIN SODIUM 5000 UNITS: 5000 INJECTION, SOLUTION INTRAVENOUS; SUBCUTANEOUS at 22:19

## 2018-06-28 RX ADMIN — PANTOPRAZOLE SODIUM 40 MG: 40 INJECTION, POWDER, FOR SOLUTION INTRAVENOUS at 19:14

## 2018-06-28 RX ADMIN — ACETAMINOPHEN 650 MG: 325 TABLET ORAL at 16:19

## 2018-06-28 RX ADMIN — Medication 10 ML: at 21:07

## 2018-06-28 RX ADMIN — CARBOXYMETHYLCELLULOSE SODIUM: 10 GEL OPHTHALMIC at 14:18

## 2018-06-28 RX ADMIN — MINERAL OIL AND WHITE PETROLATUM: 150; 830 OINTMENT OPHTHALMIC at 09:08

## 2018-06-28 RX ADMIN — HEPARIN SODIUM 5000 UNITS: 5000 INJECTION, SOLUTION INTRAVENOUS; SUBCUTANEOUS at 14:17

## 2018-06-28 RX ADMIN — CARBOXYMETHYLCELLULOSE SODIUM 1 DROP: 10 GEL OPHTHALMIC at 19:08

## 2018-06-28 RX ADMIN — MINERAL OIL AND WHITE PETROLATUM: 150; 830 OINTMENT OPHTHALMIC at 04:08

## 2018-06-28 RX ADMIN — LINEZOLID 600 MG: 2 INJECTION, SOLUTION INTRAVENOUS at 21:16

## 2018-06-28 RX ADMIN — HEPARIN SODIUM 5000 UNITS: 5000 INJECTION, SOLUTION INTRAVENOUS; SUBCUTANEOUS at 07:09

## 2018-06-28 RX ADMIN — DOXERCALCIFEROL 1 MCG: 2 INJECTION, SOLUTION INTRAVENOUS at 11:33

## 2018-06-28 RX ADMIN — DEXTROSE MONOHYDRATE 25 G: 25 INJECTION, SOLUTION INTRAVENOUS at 09:09

## 2018-06-28 RX ADMIN — ACETAMINOPHEN 650 MG: 325 TABLET ORAL at 23:45

## 2018-06-28 RX ADMIN — MINERAL OIL AND WHITE PETROLATUM: 150; 830 OINTMENT OPHTHALMIC at 14:18

## 2018-06-28 RX ADMIN — HEPARIN SODIUM 3000 UNITS: 1000 INJECTION, SOLUTION INTRAVENOUS; SUBCUTANEOUS at 12:23

## 2018-06-28 RX ADMIN — ALBUMIN (HUMAN) 12.5 G: 12.5 SOLUTION INTRAVENOUS at 08:42

## 2018-06-28 ASSESSMENT — PAIN SCALES - GENERAL
PAINLEVEL_OUTOF10: 0

## 2018-06-28 ASSESSMENT — PULMONARY FUNCTION TESTS
PIF_VALUE: 11
PIF_VALUE: 13
PIF_VALUE: 21
PIF_VALUE: 17
PIF_VALUE: 13
PIF_VALUE: 15

## 2018-06-28 NOTE — PROGRESS NOTES
Inpatient Physical Therapy Daily Treatment Note    Unit: ICU  Date:  6/28/2018  Patient Name:    Gustavo Pickard  Admitting diagnosis:  GI BLEED  Admit Date:  6/6/2018  Precautions/Restrictions:  Intubated 6/8-6/11 & re-intubated on 6/12-6/27; currently mechanically ventilated via tracheostomy (6/27/18), CRRT 6/8-6/23, now on HD; contact (MRSA), LIJ CVC, rectal tube, B soft wrist restraints, PEG  Coordinate with nursing to see pt during periods of reduced sedation; early AM most likely. Discharge Recommendations: LTACH  AM-PAC Score: 6  DME needs at discharge: defer to next level of care     Treatment Time: 16:27 - 16:53  Treatment Number: 2    Subjective    Pt. Supine in bed with mother present. Pt's mother provided consent for PT/OT to work with pt. Pt initially awake and intermittently alert, but then fell asleep after approx. 10 min in room. Able to squeeze therapist's hand 2x. Unable to follow or perform further commands. Pain    Pt appearing to have some discomfort when attempting to cough; RN notified and into suction pt. Pt also grimacing upon initiating LE PROM, but this subsided with increased reps    Mobility deferred this date 2/2 sedation    Therapeutic Exercise   The following were performed passively: Ankle PF/DF: x10 B  Hip/knee flex/ext: x10 B  Hip abd/add: x10 B  Hip IR/ER: x10 B    Balance   deferred this date 2/2 sedation    Patient/Family Education       PT role, POC  Positioning Needs       Pt remains in bed, positioned with pillows, mother at bedside  Activity Tolerance     SpO2 >/=95% through tx  HR: 139-144bpm through tx  Assessment   Patient appeared to tolerate PROM in bed well today, limited again by sedation. Pt has maintained good ROM, but would benefit from increased alertness during PT tx. Recommending LTACH upon discharge. Plan   Continue with plan of care. At end of treatment, patient in bed, with call light and needs within reach.     Time Coded Treatment Minutes: 16  minutes  (10 min with OT only)  Total Treatment Time:   26 minutes    Cara Kelley, PT, DPT #[id number]

## 2018-06-28 NOTE — PROGRESS NOTES
Fentanyl gtt d/c'ed per order. Witness wasted of 15 ml fentanyl with Page LEBRON.  Stacie Garcia, RN

## 2018-06-28 NOTE — PROGRESS NOTES
18 1300 126/69 - - 123 19 96 % -   18 1242 - - - 123 18 97 % -   18 1200 (!) 150/74 - - 132 20 94 % -     Temp (24hrs), Av °F (37.8 °C), Min:98.3 °F (36.8 °C), Max:101.8 °F (38.8 °C)          EXAM:  General: had tracheostomy and PEG yesterday. Continues to be be febrile intermittently; tachycardic   ENT:  pupils equal     LUNGS: coarse BS bilat; does not appear to be in respiratory distress   CV: RR c gd I syst M lower LSB     ABD: soft,  No mass or omegaly     EXT: without edema;Skin: no rash or other skin stigmata of endocarditis; icterus resolved. Data Review:    Lab Results   Component Value Date    WBC 10.4 2018    HGB 8.6 (L) 2018    HCT 25.7 (L) 2018    MCV 92.7 2018    PLT 88 (L) 2018     Lab Results   Component Value Date    CREATININE 4.0 (H) 2018    BUN 57 (H) 2018     (L) 2018    K 4.6 2018    CL 88 (L) 2018    CO2 20 (L) 2018     Lab Results   Component Value Date    ALT 11 2018    AST 13 (L) 2018    ALKPHOS 173 (H) 2018    BILITOT 1.1 (H) 2018     MICRO:  blood cultures both grew MRSA. One culture also grew strep while the other culture grew Enterobacter sensitive to cefepime.  respiratory culture: MRSA vanc PANCHO 1  Most recent cultures (blood, line tip) are no growth so far    IMAGING: CXR: cavitary multifocal infiltrates presumably related to septic pulmonary emboli, stable  CT head: NAD;   CT chest: infiltrates more cavitary, but there are not much more in the way of foci of pulmonary disease. infiltrates and cavitary pulmonary nodules consistent with septic emboli.     Assessment:     Active Problems:    IVDU (intravenous drug user)    Endocarditis of tricuspid valve    HCAP (healthcare-associated pneumonia)    Upper GI bleed    Sepsis due to Streptococcus pneumoniae ([address]    Mild protein-calorie malnutrition (HCC)    PEA (Pulseless electrical activity) (HCC)    DIC (disseminated intravascular coagulation) ([facility] [address]    Acute respiratory failure with hypoxia (HCC)    Splenic infarction    Bacteremia    Mucus plugging of bronchi    Cavitary lung disease    Fever    Persistent fever    MRSA bacteremia    Hyponatremia  Resolved Problems:    * No resolved hospital problems. *  Tricuspid valve endocarditis with septic pulmonary emboli (L sided endocarditis not ruled out) due to MRSA. Strep and Enterobacter were also isolated from admission blood cultures, significance uncertain: I would think this illness is mostly due to MRSA. possible splenic infarct or abscess. s/p arrest x2. Leukocytosis nearly resolved   R sided tunneled HD catheter removed with substitution of femoral vascath.   Has LIJ CVC  bronchoscopy culture positive for MRSA   Platelets mildly low possibly due to linezolid  Plan:   Continue vancomycin, linezolid, cefepime

## 2018-06-28 NOTE — FLOWSHEET NOTE
Treatment time: 4 hours  Net UF: 3000 ml     Pre weight: 69 kg   Post weight: 66.1 kg  EDW: tbd kg     Access used: RT FEM TEMP CVC  Access function: good with  ml/min     Medications or blood products given: aranesp, hectorol, albumin, heparin     Regular outpatient schedule: MWF (TTS in hospital)     Summary of response to treatment: HD tx completed in full, VSS, remained tachycardic throughout tx but stable, intubated and sedated no distress, tolerated tx well w/o complications, heparin dwelled, CVC capped and clamped, report given to RN, pt stable at time of departure from room     Copy of dialysis treatment record placed in chart, to be scanned into EMR.     06/28/18 0835 06/28/18 1235   Vital Signs   /66 134/74   Temp 100.4 °F (38 °C) 99.1 °F (37.3 °C)   Pulse 133 137   Weight 152 lb 1.9 oz (69 kg) 145 lb 11.6 oz (66.1 kg)   Weight Method Bed scale Bed scale   Percent Weight Change 0 -4.2   Pain Assessment   Pain Assessment CPOT CPOT   Pain Level 0 0   Post-Hemodialysis Assessment   Post-Treatment Procedures --  Blood returned;Catheter capped, clamped and heparinized x 2 ports   Machine Disinfection Process --  Acid/Vinegar Clean;Heat Disinfect   Rinseback Volume (ml) --  400 ml   Total Liters Processed (l/min) --  76.5 l/min   Dialyzer Clearance --  Moderately streaked   Duration of Treatment (minutes) --  240 minutes   Heparin amount administered during treatment (units) --  0 units   Hemodialysis Intake (ml) --  400 ml   Hemodialysis Output (ml) --  3400 ml   NET Removed (ml) --  3000 ml   Tolerated Treatment --  Good   Bilateral Breath Sounds Clear;Diminished Rhonchi   Edema Right upper extremity; Left upper extremity Right upper extremity; Left upper extremity;Right lower extremity; Left lower extremity   RUE Edema +2 +2   LUE Edema +2 +2   RLE Edema --  +1   LLE Edema --  +1

## 2018-06-28 NOTE — PROGRESS NOTES
Dr. Jewel Chiang and the care team at the pt's bedside. Meds, labs, and POC reviewed. See new orders.  Stacie Garcia RN

## 2018-06-28 NOTE — PROGRESS NOTES
Hemodialysis continues. Pt remains ST. Trach dressing with dry, old drainage. Dressing changed. NS used to wet dressing to be changed.  Catalino Magdaleno RN

## 2018-06-28 NOTE — PROGRESS NOTES
Right Middle Lobe Diminished   Right Lower Lobe Clear   Left Upper Lobe Diminished   Left Lower Lobe Diminished   Additional Respiratory  Assessments   Position Semi-Lucero's   Alarm Settings   Apnea (secs) 20 secs   Low Exhaled Vt  200 mL   Patient Observation   Observations Ambu bag at bedside   Surgical Airway (trach) Portex Cuffed   Placement Date/Time: 06/27/18 0746   Timeout: Patient;Procedure;Site/Side;Appropriate Equipment; Consent Confirmed;Sterile Technique using full body drape;Site prepped with chlorhexidine;Correct Position  Placed By: In surgery  Surgical Airway Type: Tr...    Status Secured   Site Assessment Bleeding

## 2018-06-28 NOTE — PROGRESS NOTES
134/73 - 134 20 92 % -   06/28/18 1235 134/74 99.1 °F (37.3 °C) 137 - - 145 lb 11.6 oz (66.1 kg)       CVP: CVP (Mean): 16 mmHg      Intake/Output Summary (Last 24 hours) at 06/28/18 1610  Last data filed at 06/28/18 1235   Gross per 24 hour   Intake          1308.97 ml   Output             3500 ml   Net         -2191.03 ml       EXAM:    General: young female on vent. Trach +    Ill appearing. On mechanical ventilation . responsive . Diffuse edema   Eyes: PERRL. No sclera icterus. No conjunctiva injected. ENT: No discharge. Neck: Trachea midline. Normal thyroid. trach in place . Resp: Diminished breath sounds. Bilateral diffuse rhonchi. CV: Regular rate and rhythm   GI:   Non-distended. No masses. Bowel sounds present. Tolerating tube feeds. No hernia. PEG +   Skin: Warm and dry. No nodule on exposed extremities. No rash on exposed extremities  Lymph: No cervical LAD. No supraclavicular LAD. M/S: .  Diffuse 1+ edema in all extremities   Neuro: Opens eyes , following commands . Non focal     Meds:   methadone  10 mg Oral BID    fentaNYL  1 patch Transdermal Q72H    lidocaine  5 mL Intradermal Once    doxercalciferol  1 mcg Intravenous Once per day on Tue Thu Sat    heparin (porcine)  5,000 Units Subcutaneous 3 times per day    lidocaine 1 % injection  5 mL Intradermal Once    sodium chloride flush  10 mL Intravenous 2 times per day    linezolid  600 mg Intravenous Q12H    darbepoetin emi-polysorbate  100 mcg Intravenous Weekly - Thursday    And    darbepoetin emi-polysorbate  25 mcg Subcutaneous Weekly - Thursday    pantoprazole  40 mg Intravenous Daily    diazepam  10 mg Oral TID    chlorhexidine  15 mL Mouth/Throat BID    carboxymethylcellulose PF  1 drop Both Eyes Q4H    And    lubrifresh P.M.    Both Eyes Q4H       PRN Meds:  albuterol sulfate HFA, ipratropium, heparin (porcine), sodium chloride flush, morphine, albumin human, fentanNYL, midazolam, dextrose, sodium phosphate IVPB **OR** [DISCONTINUED] sodium phosphate IVPB **OR** [DISCONTINUED] sodium phosphate IVPB **OR** [DISCONTINUED] sodium phosphate IVPB, ondansetron, glucose, glucagon (rDNA), dextrose, acetaminophen, promethazine, hydrOXYzine    Results:  CBC:   Recent Labs      06/26/18   0415  06/27/18   0516  06/28/18   0535   WBC  15.6*  11.6*  10.4   HGB  7.9*  6.9*  8.6*   HCT  24.2*  21.3*  25.7*   MCV  96.2  95.6  92.7   PLT  107*  88*  88*     BMP:   Recent Labs      06/27/18   0516  06/28/18   0230  06/28/18   0535   NA  122*  121*  121*   K  4.5  4.4  4.6   CL  89*  89*  88*   CO2  23  19*  20*   BUN  47*  57*  57*   CREATININE  3.1*  3.9*  4.0*     LIVER PROFILE:   Recent Labs      06/25/18   1820  06/26/18   0415  06/27/18   0516  06/28/18   0230  06/28/18   0535   AST  15  16  13*  12*  13*   ALT  16  15  13  11  11   BILIDIR  0.7*  0.6*  0.6*   --    --    BILITOT  1.1*  1.0  0.9  1.1*  1.1*   ALKPHOS  224*  203*  181*  162*  173*     PT/INR:   Recent Labs      06/26/18   0415  06/27/18   0516  06/28/18   0535   PROTIME  15.3*  15.2*  15.0*   INR  1.34*  1.33*  1.32*     ECHO 6/6/18   Summary   LV systolic function is mildly reduced with LVEF estimated at 40-45%.   Mild global hypokinesis is present. There is mild systolic and diastolic   septal flattening c/w RV pressure and volume overload.   Normal left ventricular diastolic filling pressure.   The right atrium is mildly dilated.   There is trivial aortic regurgitation.   Moderate to large mobile, pedunculated vegetation (1.15 cm x 2.43 cm)   attached to base of TV leaflet c/w endocarditis.   Moderate tricuspid regurgitation.   Systolic pulmonary artery pressure (SPAP) estimated at 55 mmHg (RA pressure   3 mmHg), c/w moderate pulmonary hypertension.   Note TV endocarditis seen on prior March 2017 study but LV systolic function   is mildly decreased now.      ECHO 6/8/18  Summary   This is a limited study s/p code.   LV systolic function is lower normal with EF visually XR CHEST PORTABLE   Final Result   Left IJ central venous line in the superior vena cava with no pneumothorax         US GALLBLADDER RUQ   Final Result   1. Small amount of complex ascites concerning for hemorrhage. Consider   further evaluation with CT of the abdomen and pelvis. 2. Cholelithiases without evidence of acute cholecystitis. 3. Cirrhosis. XR CHEST PORTABLE   Final Result   Interval increase in right basilar atelectasis. No appreciable change in   appearance of septic pulmonary emboli         XR CHEST PORTABLE   Final Result   Slightly decreased right-sided airspace disease, otherwise stable chest.         XR CHEST PORTABLE   Final Result   Slight worsening of patchy airspace opacity right lower lobe over the past   few days. Relatively stable cavitary nodular lesions both lungs. Tubes and lines remain in good position. XR CHEST PORTABLE   Final Result   No significant change in the bony basilar airspace disease and suspected   septic emboli. XR CHEST PORTABLE   Final Result   1. Stable lines, tubes and support devices. 2. Stable cardiopulmonary status including bilateral airspace opacities. XR CHEST PORTABLE   Final Result   Stable life support devices. No acute interval change regarding bilateral   airspace disease. XR CHEST PORTABLE   Final Result   Stable life support device positioning. No substantial change in multifocal consolidative cavitary airspace disease. XR CHEST PORTABLE   Final Result   Stable chest         XR CHEST PORTABLE   Final Result   Improvement in focus of airspace disease in the left mid lung. Persistent   multifocal cavitary lesions compatible with septic emboli         XR CHEST PORTABLE   Final Result   No significant interval change. CT ABDOMEN PELVIS WO CONTRAST Additional Contrast? Radiologist Recommendation   Final Result   Development of moderate diffuse ascites since the prior study.   Mosaic appearance of the spleen either due to multiple splenic infarcts or possible   splenic abscesses. Question of perihepatic and subhepatic adhesions. Chronic gallbladder disease. Bibasilar pneumonias and pulmonary nodules   consistent with septic emboli. Mild anasarca. RECOMMENDATIONS:   NG tube should be pulled back about 4 cm since it is pressing against the   anterior stomach wall. Gallbladder ultrasound suggested for evaluation of gallbladder disease. Diagnostic Ultrasound-guided paracentesis may be helpful. CT Chest WO Contrast   Final Result   Multiple scattered cavitary and solid nodules scattered throughout the lungs   suspected to be from septic emboli. The emboli may be from recurrent   endocarditis, IV drug abuse, or infected DVT. Scattered focal pneumonia in   the lingula, right middle lobe, and right lower lobe. Trace bilateral   pleural effusions. Tip of the endotracheal tube lying near the tee. Moderate ascites seen in the upper abdomen. Possible multiple splenic   infarcts or abscesses. Please refer to the dictation of the CT scan of the   abdomen and pelvis. RECOMMENDATIONS:   Endotracheal tube should be pulled back 1-2 cm. CT Head WO Contrast   Final Result   No acute intracranial abnormality. XR CHEST PORTABLE   Final Result   Moderate lingular opacity favored to be pneumonia. Moderate opacity in the   right lung base probably also pneumonia. Development of multiple pulmonary   nodules presumed to be of an infectious or least inflammatory etiology. Low   lung volumes. Some improvement in the appearance of the chest since   yesterday. RECOMMENDATION:   CT scan of the chest with contrast recommended for further evaluation. XR ABDOMEN (KUB) (SINGLE AP VIEW)   Final Result   Probable mild nonobstructive ileus. Moderate to large amount of stool in the   left colon.          XR CHEST PORTABLE   Final Result   Improving pulmonary nodules and airspacedisease consistent with septic emboli. Reactive mediastinal adenopathy noted  - ct abd with no acute findings   - Off precedex drip. methadone and valium oral added. Off Versed and fentanyl gtt . On fentanyl patch now. - IV antibiotics- On vanc, cefepime, and zyvox   - f/w BAL. Respiratory cultures growing MRSA  - S/P PEG and tracheostomy  6/27    #  Acute upper GI bleed. s/p  EGD -Three angiodysplastic spots at the junction of the body and  the fundus area that was cauterized during endoscopy.   - s/p multiple PRBC transfusions.  -  h/h stable. - RBC scan neg      #  Tricuspid valve endocarditis- recurrent    with bacteremia   - history of IV drug abuse. - She is growing MRSA, strep pneumoniae and Enterobacter cloacae. - ID involved  - Large pedunculated vegetation on septal leaflet of TV   Previously had MSSA TV endocarditis  - vancomycin and cefepime and zyvox was added   Improving wbc       #  End stage renal disease on HD  -Was on CRRT - from 6/8/18 to  6/22/18  -Now on HD since 6/23/18. -TDC removed 6/25/18 due to persistent fevers. - Needs new dialysis access.  Plan [facility]  Soon     # DIC   -Sec to severe sepsis , off steroids  -Hematology f/w     # Anemia   - sec to sepsis, iatrogenic, GI bleed  -Prn transfusions    #Hypoglycemia  - resume TF      She is critically ill.  subcu heparin for DVT prophylaxis  Full Code       LTAC referral     Kelvin Gonzalez MD 6/28/2018 4:10 PM

## 2018-06-28 NOTE — PROGRESS NOTES
D: Bedside report received from [facility], all current drips, treatments, skin issues, and plan of care were reviewed by both RN's, care transferred.

## 2018-06-28 NOTE — PROGRESS NOTES
Pulmonary & Critical Care Medicine ICU Progress Note    CC: endocarditis    Events of Last 24 hours:   PS trial  PEG placed   Transfused  Fever    Invasive Lines:    LIJ CVC 18     MV:  18 -   Vent Mode: AC/VC Rate Set: 18 bmp/Vt Ordered: 350 mL/ /FiO2 : 30 %  Recent Labs      18   0517  18   0540   PHART  7.363  7.349*   [id number]  40.1  35.9   PO2ART  100.1  90.3     IV:   IV infusion builder 50 mL/hr at 18 1817    sodium chloride      norepinephrine Stopped (18 0133)    dexmedetomidine (PRECEDEX) IV infusion Stopped (18 1442)    midazolam Stopped (18 0841)    fentaNYL (SUBLIMAZE) infusion 75 mcg/hr (18 1946)    dextrose         Vitals:  Blood pressure 131/77, pulse 124, temperature 98.3 °F (36.8 °C), temperature source Oral, resp. rate 19, height 5' 5\" (1.651 m), weight 152 lb 1.9 oz (69 kg), SpO2 93 %, not currently breastfeeding. on 30%  Temp  Av.9 °F (37.7 °C)  Min: 98.3 °F (36.8 °C)  Max: 101.8 °F (38.8 °C)    Intake/Output Summary (Last 24 hours) at 18 0656  Last data filed at 18 0500   Gross per 24 hour   Intake          1504.97 ml   Output             1305 ml   Net           199.97 ml     EXAM:  General: intubated, ill appearing    Eyes: PERRL. No sclera icterus. No conjunctival injection. ENT: No discharge. 8 Portex tracheostomy tube  Neck: Trachea midline. Normal thyroid. Resp: No accessory muscle use. + crackles. No wheezing. No rhonchi. No dullness on percussion. CV: Regular rate. Regular rhythm. No mumur or rub. + edema. Peripheral pulses are 2+. Capillary refill is less than 3 seconds. GI: Non-tender. Minimally distended. No masses. No organomegaly. Hypoactive bowel sounds. No hernia. PEG  Skin: Warm and dry. No nodule on exposed extremities. No rash on exposed extremities. Lymph: No cervical LAD. No supraclavicular LAD. M/S: No cyanosis. No joint deformity. No clubbing.    Neuro: Regards & follows commands with all 4

## 2018-06-28 NOTE — PROGRESS NOTES
A: Boris's test performed on right radial wrist outer aspect, site prepped with alcohol, arterial blood gas drawn then sterile gauze applied and direct pressured was applied for one full minute, secured with tape.

## 2018-06-28 NOTE — PROGRESS NOTES
abd - distended, BS+    Data/  Recent Labs      06/26/18   0415  06/27/18   0516  06/28/18   0535   WBC  15.6*  11.6*  10.4   HGB  7.9*  6.9*  8.6*   HCT  24.2*  21.3*  25.7*   MCV  96.2  95.6  92.7   PLT  107*  88*  88*     Recent Labs      06/27/18   0516  06/28/18   0230  06/28/18   0535   NA  122*  121*  121*   K  4.5  4.4  4.6   CL  89*  89*  88*   CO2  23  19*  20*   GLUCOSE  82  81  82   BUN  47*  57*  57*   CREATININE  3.1*  3.9*  4.0*   LABGLOM  18*  14*  14*   GFRAA  22*  17*  16*     CT chest/a/p on 6/12/18  Impression   Development of moderate diffuse ascites since the prior study.  Mosaic   appearance of the spleen either due to multiple splenic infarcts or possible   splenic abscesses.  Question of perihepatic and subhepatic adhesions. Chronic gallbladder disease.  Bibasilar pneumonias and pulmonary nodules   consistent with septic emboli.  Mild anasarca.       RECOMMENDATIONS:   NG tube should be pulled back about 4 cm since it is pressing against the   anterior stomach wall.       Gallbladder ultrasound suggested for evaluation of gallbladder disease.       Diagnostic Ultrasound-guided paracentesis may be helpful. Impression   Multiple scattered cavitary and solid nodules scattered throughout the lungs   suspected to be from septic emboli.  The emboli may be from recurrent   endocarditis, IV drug abuse, or infected DVT.  Scattered focal pneumonia in   the lingula, right middle lobe, and right lower lobe.  Trace bilateral   pleural effusions.  Tip of the endotracheal tube lying near the tee. Moderate ascites seen in the upper abdomen.  Possible multiple splenic   infarcts or abscesses.  Please refer to the dictation of the CT scan of the   abdomen and pelvis.       RECOMMENDATIONS:   Endotracheal tube should be pulled back 1-2 cm. Assessment/Plan     1- End stage renal disease: Hemodialysis per [facility] while inpatient (MWF as outpatient).    -TDC soon   -bicarb bath to 35     2- Acute

## 2018-06-29 LAB
ALBUMIN SERPL-MCNC: 2.3 G/DL (ref 3.4–5)
ANION GAP SERPL CALCULATED.3IONS-SCNC: 12 MMOL/L (ref 3–16)
BASE EXCESS ARTERIAL: -2.9 MMOL/L (ref -3–3)
BUN BLDV-MCNC: 37 MG/DL (ref 7–20)
CALCIUM SERPL-MCNC: 8.8 MG/DL (ref 8.3–10.6)
CARBOXYHEMOGLOBIN ARTERIAL: 1.6 % (ref 0–1.5)
CHLORIDE BLD-SCNC: 89 MMOL/L (ref 99–110)
CO2: 22 MMOL/L (ref 21–32)
CREAT SERPL-MCNC: 3 MG/DL (ref 0.6–1.1)
FINAL REPORT: NORMAL
GFR AFRICAN AMERICAN: 23
GFR NON-AFRICAN AMERICAN: 19
GLUCOSE BLD-MCNC: 107 MG/DL (ref 70–99)
GLUCOSE BLD-MCNC: 114 MG/DL (ref 70–99)
GLUCOSE BLD-MCNC: 114 MG/DL (ref 70–99)
GLUCOSE BLD-MCNC: 144 MG/DL (ref 70–99)
GLUCOSE BLD-MCNC: 83 MG/DL (ref 70–99)
GLUCOSE BLD-MCNC: 86 MG/DL (ref 70–99)
GLUCOSE BLD-MCNC: 95 MG/DL (ref 70–99)
GLUCOSE BLD-MCNC: 97 MG/DL (ref 70–99)
HCO3 ARTERIAL: 21 MMOL/L (ref 21–29)
HCT VFR BLD CALC: 23.4 % (ref 36–48)
HEMOGLOBIN, ART, EXTENDED: 8.4 G/DL (ref 12–16)
HEMOGLOBIN: 8 G/DL (ref 12–16)
INR BLD: 1.42 (ref 0.86–1.14)
MCH RBC QN AUTO: 31.7 PG (ref 26–34)
MCHC RBC AUTO-ENTMCNC: 34.1 G/DL (ref 31–36)
MCV RBC AUTO: 93 FL (ref 80–100)
METHEMOGLOBIN ARTERIAL: 0.4 %
O2 CONTENT ARTERIAL: 12 ML/DL
O2 SAT, ARTERIAL: 98.9 %
O2 THERAPY: ABNORMAL
PCO2 ARTERIAL: 32.7 MMHG (ref 35–45)
PDW BLD-RTO: 16.5 % (ref 12.4–15.4)
PERFORMED ON: ABNORMAL
PERFORMED ON: NORMAL
PH ARTERIAL: 7.42 (ref 7.35–7.45)
PHOSPHORUS: 4.6 MG/DL (ref 2.5–4.9)
PLATELET # BLD: 102 K/UL (ref 135–450)
PMV BLD AUTO: 7.8 FL (ref 5–10.5)
PO2 ARTERIAL: 142 MMHG (ref 75–108)
POTASSIUM SERPL-SCNC: 3.9 MMOL/L (ref 3.5–5.1)
PRELIMINARY: NORMAL
PROTHROMBIN TIME: 16.2 SEC (ref 9.8–13)
RBC # BLD: 2.51 M/UL (ref 4–5.2)
SODIUM BLD-SCNC: 123 MMOL/L (ref 136–145)
TCO2 ARTERIAL: 22 MMOL/L
WBC # BLD: 10.2 K/UL (ref 4–11)

## 2018-06-29 PROCEDURE — 82803 BLOOD GASES ANY COMBINATION: CPT

## 2018-06-29 PROCEDURE — 99233 SBSQ HOSP IP/OBS HIGH 50: CPT | Performed by: INTERNAL MEDICINE

## 2018-06-29 PROCEDURE — C9113 INJ PANTOPRAZOLE SODIUM, VIA: HCPCS

## 2018-06-29 PROCEDURE — 99232 SBSQ HOSP IP/OBS MODERATE 35: CPT | Performed by: INTERNAL MEDICINE

## 2018-06-29 PROCEDURE — 36600 WITHDRAWAL OF ARTERIAL BLOOD: CPT

## 2018-06-29 PROCEDURE — 94640 AIRWAY INHALATION TREATMENT: CPT

## 2018-06-29 PROCEDURE — 94750 CHARGE CONVERSION: CPT

## 2018-06-29 PROCEDURE — 85027 COMPLETE CBC AUTOMATED: CPT

## 2018-06-29 PROCEDURE — 9990 CHARGE CONVERSION

## 2018-06-29 PROCEDURE — 85610 PROTHROMBIN TIME: CPT

## 2018-06-29 PROCEDURE — P9047 ALBUMIN (HUMAN), 25%, 50ML: HCPCS

## 2018-06-29 PROCEDURE — 94003 VENT MGMT INPAT SUBQ DAY: CPT

## 2018-06-29 PROCEDURE — 87103 BLOOD FUNGUS CULTURE: CPT

## 2018-06-29 PROCEDURE — 80069 RENAL FUNCTION PANEL: CPT

## 2018-06-29 PROCEDURE — 74018 RADEX ABDOMEN 1 VIEW: CPT

## 2018-06-29 PROCEDURE — 87040 BLOOD CULTURE FOR BACTERIA: CPT

## 2018-06-29 PROCEDURE — 94770 CHARGE CONVERSION: CPT

## 2018-06-29 PROCEDURE — 36592 COLLECT BLOOD FROM PICC: CPT

## 2018-06-29 PROCEDURE — 94762 N-INVAS EAR/PLS OXIMTRY CONT: CPT

## 2018-06-29 RX ORDER — FENTANYL 75 UG/H
2 PATCH TRANSDERMAL
Status: DISCONTINUED | OUTPATIENT
Start: 2018-06-29 | End: 2018-07-01

## 2018-06-29 RX ORDER — DIAZEPAM 5 MG/1
5 TABLET ORAL 3 TIMES DAILY
Status: DISCONTINUED | OUTPATIENT
Start: 2018-06-29 | End: 2018-07-02

## 2018-06-29 RX ORDER — ALBUTEROL SULFATE 90 UG/1
6 AEROSOL, METERED RESPIRATORY (INHALATION) EVERY 4 HOURS
Status: DISCONTINUED | OUTPATIENT
Start: 2018-06-29 | End: 2018-07-06

## 2018-06-29 RX ORDER — CHLORHEXIDINE GLUCONATE 0.12 MG/ML
15 RINSE ORAL 2 TIMES DAILY
Status: DISCONTINUED | OUTPATIENT
Start: 2018-06-29 | End: 2018-07-18 | Stop reason: HOSPADM

## 2018-06-29 RX ORDER — MIDAZOLAM HYDROCHLORIDE 1 MG/ML
2 INJECTION INTRAMUSCULAR; INTRAVENOUS
Status: DISCONTINUED | OUTPATIENT
Start: 2018-06-29 | End: 2018-06-30

## 2018-06-29 RX ORDER — ACETAMINOPHEN 160 MG
TABLET,DISINTEGRATING ORAL
Status: DISPENSED
Start: 2018-06-29 | End: 2018-06-30

## 2018-06-29 RX ADMIN — HEPARIN SODIUM 5000 UNITS: 5000 INJECTION, SOLUTION INTRAVENOUS; SUBCUTANEOUS at 05:29

## 2018-06-29 RX ADMIN — MORPHINE SULFATE 4 MG: 4 INJECTION, SOLUTION INTRAMUSCULAR; INTRAVENOUS at 05:11

## 2018-06-29 RX ADMIN — CHLORHEXIDINE GLUCONATE 0.12% ORAL RINSE 15 ML: 1.2 LIQUID ORAL at 12:39

## 2018-06-29 RX ADMIN — MORPHINE SULFATE 4 MG: 4 INJECTION, SOLUTION INTRAMUSCULAR; INTRAVENOUS at 21:29

## 2018-06-29 RX ADMIN — METHADONE HYDROCHLORIDE 10 MG: 10 TABLET ORAL at 20:50

## 2018-06-29 RX ADMIN — CHLORHEXIDINE GLUCONATE 0.12% ORAL RINSE 15 ML: 1.2 LIQUID ORAL at 20:49

## 2018-06-29 RX ADMIN — ALBUTEROL SULFATE 6 PUFF: 90 AEROSOL, METERED RESPIRATORY (INHALATION) at 22:46

## 2018-06-29 RX ADMIN — MIDAZOLAM HYDROCHLORIDE 2 MG: 1 INJECTION INTRAMUSCULAR; INTRAVENOUS at 19:32

## 2018-06-29 RX ADMIN — PANTOPRAZOLE SODIUM 40 MG: 40 INJECTION, POWDER, FOR SOLUTION INTRAVENOUS at 08:38

## 2018-06-29 RX ADMIN — Medication 10 ML: at 20:50

## 2018-06-29 RX ADMIN — DIAZEPAM 5 MG: 5 TABLET ORAL at 14:29

## 2018-06-29 RX ADMIN — MIDAZOLAM HYDROCHLORIDE 2 MG: 1 INJECTION INTRAMUSCULAR; INTRAVENOUS at 20:49

## 2018-06-29 RX ADMIN — METHADONE HYDROCHLORIDE 10 MG: 10 TABLET ORAL at 08:38

## 2018-06-29 RX ADMIN — Medication 10 ML: at 08:38

## 2018-06-29 RX ADMIN — HEPARIN SODIUM 5000 UNITS: 5000 INJECTION, SOLUTION INTRAVENOUS; SUBCUTANEOUS at 22:56

## 2018-06-29 RX ADMIN — LINEZOLID 600 MG: 2 INJECTION, SOLUTION INTRAVENOUS at 20:50

## 2018-06-29 RX ADMIN — ALBUTEROL SULFATE 6 PUFF: 90 AEROSOL, METERED RESPIRATORY (INHALATION) at 18:58

## 2018-06-29 RX ADMIN — CARBOXYMETHYLCELLULOSE SODIUM 1 DROP: 10 GEL OPHTHALMIC at 02:22

## 2018-06-29 RX ADMIN — LINEZOLID 600 MG: 2 INJECTION, SOLUTION INTRAVENOUS at 10:45

## 2018-06-29 RX ADMIN — MIDAZOLAM HYDROCHLORIDE 2 MG: 1 INJECTION INTRAMUSCULAR; INTRAVENOUS at 08:32

## 2018-06-29 RX ADMIN — CARBOXYMETHYLCELLULOSE SODIUM 1 DROP: 10 GEL OPHTHALMIC at 05:29

## 2018-06-29 RX ADMIN — MORPHINE SULFATE 4 MG: 4 INJECTION, SOLUTION INTRAMUSCULAR; INTRAVENOUS at 10:46

## 2018-06-29 RX ADMIN — HEPARIN SODIUM 5000 UNITS: 5000 INJECTION, SOLUTION INTRAVENOUS; SUBCUTANEOUS at 14:29

## 2018-06-29 RX ADMIN — MINERAL OIL AND WHITE PETROLATUM: 150; 830 OINTMENT OPHTHALMIC at 05:15

## 2018-06-29 RX ADMIN — MIDAZOLAM HYDROCHLORIDE 2 MG: 1 INJECTION INTRAMUSCULAR; INTRAVENOUS at 05:15

## 2018-06-29 RX ADMIN — DIAZEPAM 5 MG: 5 TABLET ORAL at 20:49

## 2018-06-29 RX ADMIN — DIAZEPAM 5 MG: 5 TABLET ORAL at 08:38

## 2018-06-29 ASSESSMENT — PAIN SCALES - GENERAL
PAINLEVEL_OUTOF10: 0
PAINLEVEL_OUTOF10: 10
PAINLEVEL_OUTOF10: 10
PAINLEVEL_OUTOF10: 7
PAINLEVEL_OUTOF10: 0
PAINLEVEL_OUTOF10: 0

## 2018-06-29 ASSESSMENT — PULMONARY FUNCTION TESTS
PIF_VALUE: 14
PIF_VALUE: 12
PIF_VALUE: 11
PIF_VALUE: 13
PIF_VALUE: 11
PIF_VALUE: 19

## 2018-06-29 ASSESSMENT — PAIN DESCRIPTION - DESCRIPTORS: DESCRIPTORS: PATIENT UNABLE TO DESCRIBE

## 2018-06-29 NOTE — PROGRESS NOTES
Increased oxygen from 30% to 40% d/t desat to 82%. Spontaneous weaning trial not done at this time, d/t increased HR of 152.        06/29/18 0830   Vent Information   Vent Type 840   Vent Mode AC/VC   Vt Ordered 350 mL   Rate Set 18 bmp   Peak Flow 55 L/min   Pressure Support 0 cmH20   FiO2  40 %   Sensitivity 3   PEEP/CPAP 5   I Time/ I Time % 0 s   Humidification Source Heated wire   Humidification Temp 37   Circuit Condensation Drained   Nitric Oxide/Epoprostenol In Use? No   Vent Patient Data   Vt Exhaled 423 mL   Rate Measured 26 br/min   Minute Volume 10.4 Liters   Peak Inspiratory Pressure 19 cmH2O   I:E Ratio 1:3.80   High Peep/I Pressure 0   Mean Airway Pressure 9.3 cmH20   Plateau Pressure 15 CCQ80   Static Compliance 39 mL/cmH2O   Dynamic Compliance 30 mL/cmH2O   Spontaneous Breathing Trial (SBT) RT Doc   Pulse 152   SpO2 93 %   Cough/Sputum   Sputum How Obtained Suctioned   Cough Productive   Frequency Occasional   Sputum Amount Moderate   Sputum Color Cloudy;Brown   Tenacity Thick   Breath Sounds   Right Upper Lobe Rhonchi   Right Middle Lobe Diminished   Right Lower Lobe Diminished   Left Upper Lobe Rhonchi   Left Lower Lobe Diminished   Additional Respiratory  Assessments   Resp 19   End Tidal CO2 27 (%)   Position Semi-Lucero's   Oral Care Completed? Yes   Oral Care Mouth suctioned   Subglottic Suction Done? Yes   Alarm Settings   High Pressure Alarm 50 cmH2O   Low Minute Volume Alarm 2.5 L/min   Apnea (secs) 20 secs   High Respiratory Rate 45 br/min   Low Exhaled Vt  200 mL   Patient Observation   Observations #8 luly ruiz at bedside.

## 2018-06-29 NOTE — PROGRESS NOTES
Pt resting quietly in bed with all lines and monitoring devices in place. Vitals and SpO2 stable.   No changes noted in exam. Continue current plan of care Danika Levine RN

## 2018-06-29 NOTE — FLOWSHEET NOTE
Treatment time: 2.5 hrs IUF  Net UF: 2 liters    Pre weight: 68.3 kg   Post weight: 66.1 kg  EDW: TBD    Access used: Rt Fem temp vascath. Access function: Positional @ 250-300. Medications or blood products given: None    Regular outpatient schedule: TTS in hospitial    Summary of response to treatment:      06/29/18 1030   Vital Signs   BP (!) 144/76   Temp 99.9 °F (37.7 °C)   Pulse 137   Weight 145 lb 11.6 oz (66.1 kg)   Weight Method Bed scale   Percent Weight Change -3.22   Pain Assessment   Pain Assessment CPOT   Response to Pain Intervention Asleep with RR greater than 10   Post-Hemodialysis Assessment   Post-Treatment Procedures Blood returned;Catheter capped, clamped and heparinized x 2 ports   Machine Disinfection Process Acid/Vinegar Clean;Heat Disinfect; Exterior Machine Disinfection   Rinseback Volume (ml) 300 ml   Total Liters Processed (l/min) 0 l/min   Dialyzer Clearance Lightly streaked   Duration of Treatment (minutes) 150 minutes   Heparin amount administered during treatment (units) 0 units   Hemodialysis Intake (ml) 300 ml   Hemodialysis Output (ml) 2300 ml   NET Removed (ml) 2000 ml   Tolerated Treatment Fair   Patient Response to Treatment Bp stable but HR 140s-150 bpm. Unchange from pre tx. Altamease Sake made aware. Pt restless/agitated. Large projectile vomitting beginning of tx. No other episodes. Physician Notified? Yes   Copy of dialysis treatment record placed in chart, to be scanned into EMR.  Pt calm and HR now at 130's bpm.

## 2018-06-29 NOTE — PROGRESS NOTES
Occupational Therapy  Hold therapy this date due to respiratory status, working with respiratory therapy. Will continue to follow.   Solitario Lima, OTR/L [id number]

## 2018-06-29 NOTE — PROGRESS NOTES
Pt vomited again and belching air- approx 100 cc of liquid that appears to be tube feeding and mucous. Complete linen and gown change completed. PEG tube was clamped and now place to ILWS. Dr Karmen Mullen updated.  Radiology here for MICHELLE Rod RN

## 2018-06-29 NOTE — PROGRESS NOTES
RESPIRATORY THERAPY ASSESSMENT    Name:  [facility],[address] Record Number:  [id number]  Age: 34 y.o. Gender: female  : 1990  Today's Date:  2018  Room:  [place] [address]    Assessment     Is the patient being admitted for a COPD or Asthma exacerbation? No   (If yes the patient will be seen every 4 hours for the first 24 hours and then reassessed)    Patient Admission Diagnosis      Allergies  No Known Allergies    Minimum Predicted Vital Capacity:     850           Actual Vital Capacity:      Pt on vent          Pulmonary History:No history/ trached on   Home Oxygen Therapy:  room air  Home Respiratory Therapy:None   Current Respiratory Therapy:  Albuterol and atrovent Q4prn  MDI  Medications: Sodium Chloride (Sodium chloride 3%)    Respiratory Severity Index(RSI)   Patients with orders for inhalation medications, oxygen, or any therapeutic treatment modality will be placed on Respiratory Protocol. They will be assessed with the first treatment and at least every 72 hours thereafter. The following severity scale will be used to determine frequency of treatment intervention.     Smoking History: Pulmonary Disease or Smoking History, Greater than 15 pack year = 2    Social History  Social History   Substance Use Topics    Smoking status: Current Every Day Smoker     Packs/day: 0.25     Years: 4.00     Types: Cigarettes    Smokeless tobacco: Never Used      Comment: pt states that she only smokes 2 cigarettes per day    Alcohol use No       Recent Surgical History: Thoracic or Upper Airway = 3  Past Surgical History  Past Surgical History:   Procedure Laterality Date    TONSILLECTOMY      UPPER GASTROINTESTINAL ENDOSCOPY  2018    Gastric Angiodysplasia    UPPER GASTROINTESTINAL ENDOSCOPY  2018    Peg Placement       Level of Consciousness: Lethargic = 3    Level of Activity: Bedridden, unresponsive or quadriplegic = 4    Respiratory Pattern: Increased; RR 21-30 = 1    Breath

## 2018-06-29 NOTE — PROGRESS NOTES
[facility]       Progress Note           Subjective:   34y.o. year old female who we are seeing in consultation for ESRD management. Low grade fever  Remains on D10NS gtt    UF on 6/29 w 2l UF over 2.5 h and post wt 68.3--> 66.1 kg  HD on 6/28 w 3l UF and 69-->66 kg  HD on 6/26 w 1l UF and using 130 na bath  D/t hyponatremia  Femoral line 6/26 as RIJ was occluded  rij TDC dced on 6/25    ROS: has trach from 6/27  Social: no family at bedside.     Scheduled Meds:   diazepam  5 mg Oral TID    fentaNYL  2 patch Transdermal Q72H    chlorhexidine  15 mL Mouth/Throat BID    hydrogen peroxide        methadone  10 mg Oral BID    lidocaine  5 mL Intradermal Once    doxercalciferol  1 mcg Intravenous Once per day on Tue Thu Sat    heparin (porcine)  5,000 Units Subcutaneous 3 times per day    lidocaine 1 % injection  5 mL Intradermal Once    sodium chloride flush  10 mL Intravenous 2 times per day    linezolid  600 mg Intravenous Q12H    darbepoetin emi-polysorbate  100 mcg Intravenous Weekly - Thursday    And    darbepoetin emi-polysorbate  25 mcg Subcutaneous Weekly - Thursday    pantoprazole  40 mg Intravenous Daily     Continuous Infusions:   IV infusion builder 50 mL/hr at 06/29/18 0315    dextrose       PRN Meds:.midazolam, albuterol sulfate HFA, ipratropium, heparin (porcine), sodium chloride flush, morphine, albumin human, dextrose, sodium phosphate IVPB **OR** [DISCONTINUED] sodium phosphate IVPB **OR** [DISCONTINUED] sodium phosphate IVPB **OR** [DISCONTINUED] sodium phosphate IVPB, ondansetron, glucose, glucagon (rDNA), dextrose, acetaminophen, promethazine, hydrOXYzine      Objective/   GEN:  Chronically ill, /72   Pulse 128   Temp 100.3 °F (37.9 °C) (Axillary)   Resp 16   Ht 5' 5\" (1.651 m)   Wt 145 lb 11.6 oz (66.1 kg)   SpO2 97%   BMI 24.25 kg/m²      CV: S1, S2 without m/r/g; no edema  RESP:clear anteriorly  ACCESS: R IJ TDC  gen-intubated   abd - distended, BS+    Data/  Recent Labs      06/27/18   0516  06/28/18   0535  06/29/18   0527   WBC  11.6*  10.4  10.2   HGB  6.9*  8.6*  8.0*   HCT  21.3*  25.7*  23.4*   MCV  95.6  92.7  93.0   PLT  88*  88*  102*     Recent Labs      06/28/18   0535  06/28/18   2030  06/29/18   0527   NA  121*  122*  123*   K  4.6  3.8  3.9   CL  88*  88*  89*   CO2  20*  22  22   GLUCOSE  82  109*  97   PHOS   --    --   4.6   BUN  57*  30*  37*   CREATININE  4.0*  2.5*  3.0*   LABGLOM  14*  23*  19*   GFRAA  16*  28*  23*     CT chest/a/p on 6/12/18  Impression   Development of moderate diffuse ascites since the prior study.  Mosaic   appearance of the spleen either due to multiple splenic infarcts or possible   splenic abscesses.  Question of perihepatic and subhepatic adhesions. Chronic gallbladder disease.  Bibasilar pneumonias and pulmonary nodules   consistent with septic emboli.  Mild anasarca.       RECOMMENDATIONS:   NG tube should be pulled back about 4 cm since it is pressing against the   anterior stomach wall.       Gallbladder ultrasound suggested for evaluation of gallbladder disease.       Diagnostic Ultrasound-guided paracentesis may be helpful. Impression   Multiple scattered cavitary and solid nodules scattered throughout the lungs   suspected to be from septic emboli.  The emboli may be from recurrent   endocarditis, IV drug abuse, or infected DVT.  Scattered focal pneumonia in   the lingula, right middle lobe, and right lower lobe.  Trace bilateral   pleural effusions.  Tip of the endotracheal tube lying near the tee. Moderate ascites seen in the upper abdomen.  Possible multiple splenic   infarcts or abscesses.  Please refer to the dictation of the CT scan of the   abdomen and pelvis.       RECOMMENDATIONS:   Endotracheal tube should be pulled back 1-2 cm. Assessment/Plan     1- End stage renal disease: Hemodialysis per [facility] while inpatient (MWF as outpatient).    -TDC soon   -bicarb bath to

## 2018-06-29 NOTE — PROGRESS NOTES
PROGRESS NOTE  S:28 yrs Patient  admitted on 6/6/2018 with GI BLEED . Today she is on the vent. TF have been initiated. Exam:   Vitals:    06/28/18 2100   BP: 137/75   Pulse: 127   Resp: 18   Temp:    SpO2:       General appearance: alert, appears stated age and cooperative  HEENT: Oropharynx clear, no lesions and Neck supple with midline trachea  Neck: no adenopathy and supple, symmetrical, trachea midline  Lungs: clear to auscultation bilaterally  Heart: regular rate and rhythm, S1, S2 normal, no murmur, click, rub or gallop  Abdomen: soft, non-tender; bowel sounds normal; no masses,  no organomegaly PEG site c/d/i. External bolster released to 2cm stefani  Extremities: extremities normal, atraumatic, no cyanosis or edema     Medications: Reviewed    Labs:  CBC:   Recent Labs      06/26/18 0415  06/27/18   0516  06/28/18   0535   WBC  15.6*  11.6*  10.4   HGB  7.9*  6.9*  8.6*   HCT  24.2*  21.3*  25.7*   MCV  96.2  95.6  92.7   PLT  107*  88*  88*     BMP:   Recent Labs      06/28/18   0230  06/28/18   0535  06/28/18   2030   NA  121*  121*  122*   K  4.4  4.6  3.8   CL  89*  88*  88*   CO2  19*  20*  22   BUN  57*  57*  30*   CREATININE  3.9*  4.0*  2.5*     LIVER PROFILE:   Recent Labs      06/26/18   0415  06/27/18   0516  06/28/18   0230  06/28/18   0240  06/28/18   0535   AST  16  13*  12*  12*  13*   ALT  15  13  11  11  11   PROT  6.3*  6.5  6.3*  6.4  6.5   BILIDIR  0.6*  0.6*   --   0.7*   --    BILITOT  1.0  0.9  1.1*  1.0  1.1*   ALKPHOS  203*  181*  162*  160*  173*     PT/INR:   Recent Labs      06/26/18   0415  06/27/18   0516  06/28/18   0535   INR  1.34*  1.33*  1.32*     Impression:34year old female with history of HTN, Chronic hepatitis C, seizure do, ESRD, IVDU, endocarditis, admitted with sepsis and PEA c/b shock liver, septic emboli, DIC and GIbleed now in need of PEG for nutritional support    Recommendation:  1. Continue supportive care  2.  Advance

## 2018-06-29 NOTE — PROGRESS NOTES
tricuspid valve infectious endocarditis  · Polymicrobial bacteremia: MRSA, Streptococcal pneumonia, and Enterobacter  · Acute liver failure - improved  · Cavitary pneumonia due to septic emboli    · End-stage kidney disease on hemodialysis  · Upper GI bleed secondary to angiodysplasia status post cauterization  · IV drug abuse  · Hepatitis C  · Hyponatremia    · S/P Trach/PEG on 6/27/18  · Thrombocytopenia is stable    PLAN:  Mechanical ventilation as per my orders. Pressure support trials have been started  IV fentanyl to off, changed to patch; plan to taper valium and methadone. Has 150 mcg fentanyl patches and PRN IV versed  Head of bed 30 degrees or higher at all times  · Vancomycin D#24, Cefepime D#11, Zyvox D#9; infectious disease is following   · I would not recommend placing tunneled dialysis catheter yet, patient remains febrile.     · When NPO needs to have close monitoring of FSBS q 1 hour  · PT OT  · Prophylaxis: DVT - SQ heparin, Protonix

## 2018-06-29 NOTE — PROGRESS NOTES
Internal Medicine ICU Progress Note      Interval history   Date of admission    34year old white female who was admitted for GI bleed. Had an EGD   BC positive for strep pneumonia, staph aureus and gram-negative salazar. Echocardiogram showed tricuspid valve endocarditis. She's had this before. History of IV drug use  CT chest showed septic emboli and possible splenic infarct. - Transferred out of ICU on 18  - Transferred back to ICU on 18 for PEA arrest, intubated,  resuscitated with fluids  - Started on CRRT for acute renal failure, on 18  - Weaned off pressors on 18, and extubated  - Reintubated   - s.p bronch    - Off CRRT and started on hemodialysis  18  - Has been on antibiotics vancomycin and cefepime and Zyvox  - Patient remains on mechanical ventilation, continued fevers, on dialysis. - Followed by intensivist, ID, nephrologist.  - TDC removed 18 due to persistent fevers   - S/P Trach and PEG on   - femoral vascath placed     Subjective  Pt remains on mechanical ventilation , has tracheostomy now . Awake, responding, Follows some commands    S/p PEG , TF started ,  Nausea  And emesis today . X ray shows ileus . TF onhold . PEG tube to suction    persistent fevers   plan new TDC when fever curve improved .    LTAC referral made       Recent Labs      18   0540  18   0528   PHART  7.349*  7.425   [id number]  35.9  32.7*   PO2ART  90.3  142.0*       MV Settings:  Vent Mode: PS Rate Set: 0 bmp/Vt Ordered: 350 mL/ /FiO2 : 30 %    IV:   IV infusion builder 50 mL/hr at 18 0315    dextrose         Vitals:  Temp  Av.6 °F (38.1 °C)  Min: 99.9 °F (37.7 °C)  Max: 101.6 °F (38.7 °C)  Pulse  Av.2  Min: 118  Max: 152  BP  Min: 123/71  Max: 156/95  SpO2  Av.3 %  Min: 90 %  Max: 100 %  FiO2   Av.9 %  Min: 30 %  Max: 40 %  Patient Vitals for the past 4 hrs:   BP Pulse Resp SpO2   18 1503 - 120 15 99 %   18 1500 123/71 118 16 99 %   06/29/18 1400 (!) 140/90 126 18 99 %   06/29/18 1300 132/77 122 16 99 %       CVP: CVP (Mean): 16 mmHg      Intake/Output Summary (Last 24 hours) at 06/29/18 1605  Last data filed at 06/29/18 1400   Gross per 24 hour   Intake             2742 ml   Output             2750 ml   Net               -8 ml       EXAM:    General: young female on vent. Trach +    Ill appearing. On mechanical ventilation . responsive . Diffuse edema   Eyes: PERRL. No sclera icterus. No conjunctiva injected. ENT: No discharge. Neck: Trachea midline. Normal thyroid. trach in place . Resp: Diminished breath sounds. Bilateral diffuse rhonchi. CV: Regular rate and rhythm   GI:   distended. No masses. Bowel sounds present. Tolerating tube feeds. No hernia. PEG +   Skin: Warm and dry. No nodule on exposed extremities. No rash on exposed extremities  Lymph: No cervical LAD. No supraclavicular LAD. M/S: .  Diffuse 1+ edema in all extremities   Neuro: Opens eyes , following commands .  Non focal     Meds:   diazepam  5 mg Oral TID    fentaNYL  2 patch Transdermal Q72H    chlorhexidine  15 mL Mouth/Throat BID    hydrogen peroxide        methadone  10 mg Oral BID    lidocaine  5 mL Intradermal Once    doxercalciferol  1 mcg Intravenous Once per day on Tue Thu Sat    heparin (porcine)  5,000 Units Subcutaneous 3 times per day    lidocaine 1 % injection  5 mL Intradermal Once    sodium chloride flush  10 mL Intravenous 2 times per day    linezolid  600 mg Intravenous Q12H    darbepoetin eim-polysorbate  100 mcg Intravenous Weekly - Thursday    And    darbepoetin emi-polysorbate  25 mcg Subcutaneous Weekly - Thursday    pantoprazole  40 mg Intravenous Daily       PRN Meds:  midazolam, albuterol sulfate HFA, ipratropium, heparin (porcine), sodium chloride flush, morphine, albumin human, dextrose, sodium phosphate IVPB **OR** [DISCONTINUED] sodium phosphate IVPB **OR** [DISCONTINUED] sodium phosphate IVPB **OR** [DISCONTINUED] sodium phosphate IVPB, ondansetron, glucose, glucagon (rDNA), dextrose, acetaminophen, promethazine, hydrOXYzine    Results:  CBC:   Recent Labs      06/27/18   0516  06/28/18   0535  06/29/18   0527   WBC  11.6*  10.4  10.2   HGB  6.9*  8.6*  8.0*   HCT  21.3*  25.7*  23.4*   MCV  95.6  92.7  93.0   PLT  88*  88*  102*     BMP:   Recent Labs      06/28/18   0535  06/28/18   2030  06/29/18   0527   NA  121*  122*  123*   K  4.6  3.8  3.9   CL  88*  88*  89*   CO2  20*  22  22   PHOS   --    --   4.6   BUN  57*  30*  37*   CREATININE  4.0*  2.5*  3.0*     LIVER PROFILE:   Recent Labs      06/27/18   0516  06/28/18   0230  06/28/18   0240  06/28/18   0535   AST  13*  12*  12*  13*   ALT  13  11  11  11   BILIDIR  0.6*   --   0.7*   --    BILITOT  0.9  1.1*  1.0  1.1*   ALKPHOS  181*  162*  160*  173*     PT/INR:   Recent Labs      06/27/18   0516  06/28/18   0535  06/29/18   0527   PROTIME  15.2*  15.0*  16.2*   INR  1.33*  1.32*  1.42*     ECHO 6/6/18   Summary   LV systolic function is mildly reduced with LVEF estimated at 40-45%.   Mild global hypokinesis is present. There is mild systolic and diastolic   septal flattening c/w RV pressure and volume overload.   Normal left ventricular diastolic filling pressure.   The right atrium is mildly dilated.   There is trivial aortic regurgitation.   Moderate to large mobile, pedunculated vegetation (1.15 cm x 2.43 cm)   attached to base of TV leaflet c/w endocarditis.   Moderate tricuspid regurgitation.   Systolic pulmonary artery pressure (SPAP) estimated at 55 mmHg (RA pressure   3 mmHg), c/w moderate pulmonary hypertension.   Note TV endocarditis seen on prior March 2017 study but LV systolic function   is mildly decreased now.      ECHO 6/8/18  Summary   This is a limited study s/p code.   LV systolic function is lower normal with EF visually estimated at 50%.   D-shaped septum c/w RV pressure and volume overload.   Large mobile, pedunculated vegetation on septal leaflet of tricuspid valve   c/w endocarditis.   No pericardial effusion noted. Cultures:  Respiratory cultures -6/12/18 - MRSA . Repeat culture 6/20/18 MRSA   Blood culture: ON ADMISSION - 6/5/18 POSITIVE S. aureus, S pneumonia and Enterobacter  Recent blood cultures negative       Radiology    XR ABDOMEN (KUB) (SINGLE AP VIEW)   Final Result   Multiple mildly dilated loops of small bowel. Findings may represent ileus   or partial small bowel obstruction. XR CHEST PORTABLE   Final Result   Stable positioning of left central venous catheter. No pneumothorax. Slightly improved aeration with persistent ground-glass opacities in the left   mid lung. XR CHEST PORTABLE   Final Result   Retraction of the left internal jugular CVC with its tip in the expected   region of the brachiocephalic vein      No significant change in the cavitary nodules and bibasilar airspace disease         XR CHEST PORTABLE   Final Result   Tracheostomy tube placement. Left basilar airspace disease most consistent with atelectasis         IR NONTUNNELED VASCULAR CATHETER   Final Result   Successful ultrasound and fluoroscopy guided non-tunneled right femoral vein   temporary dialysis catheter placement. XR CHEST PORTABLE   Final Result   Improving bibasilar airspace disease. Stable cavitary lesions. CT Chest WO Contrast   Final Result   1. Minimal worsening partially cavitating pulmonary nodules and airspace   disease throughout the bilateral lungs consistent with septic emboli. 2. New trace bilateral pleural effusions. 3. Mediastinal adenopathy, likely reactive. 4. Heterogeneous splenomegaly. XR CHEST PORTABLE   Final Result   Stable appearing bibasilar airspace disease. Support tubes and lines as   above. XR CHEST PORTABLE   Final Result   No significant interval change in bilateral airspace disease as compared to   prior.   Cavitary pulmonary nodules are again variable association with   gallbladder wall calcification and risk for gallbladder carcinoma. Stable cardiomegaly. Lumbar spine and visualized osseous structures appear intact. NM GI BLOOD LOSS   Final Result   No evidence of active GI bleeding during acquisition. RECOMMENDATIONS:   If the patient shows hemodynamic signs of an active bleed in the next 20   hours, additional images can be acquired. XR CHEST PORTABLE   Final Result   Patchy airspace disease in the right lung, pneumonia versus atelectasis   versus less likely edema. That should be followed to resolution. Borderline pulmonary vascular congestion. Assessment:    Active Problems:    IVDU (intravenous drug user)    Endocarditis of tricuspid valve    HCAP (healthcare-associated pneumonia)    Upper GI bleed    Sepsis due to Streptococcus pneumoniae (HCC)    Mild protein-calorie malnutrition (HCC)    PEA (Pulseless electrical activity) (HCC)    DIC (disseminated intravascular coagulation) ([address]    Acute respiratory failure with hypoxia (HCC)    Splenic infarction    Bacteremia    Mucus plugging of bronchi    Cavitary lung disease    Fever    Persistent fever    MRSA bacteremia    Hyponatremia    Acute blood loss anemia  Resolved Problems:    * No resolved hospital problems. *         Plan:    # Septic shock- sec to bacteremia /septic emboli/endocarditis  Sustained PEA arrest x2  requiring multiple pressors, intubation   - weaned off pressors- levo. Vasopressin  - wbc improving from 60 K  - critical care and ID involved. WBC down to 10 K today.   - still has a fever.   - Repeat CT chest on 6/25, showed worsening airspace disease-consistent with septic emboli  - tunneled dialysis catheter removed on 6/25/18  - abx per ID - see below    #Acute resp failure/cavitary pneumonia  Due to MRSA infection  - s.p PEA arrest, off vent  6/11- reintubated on 6/12/18,  for increasing abd distention and pain   - remains on

## 2018-06-29 NOTE — PROGRESS NOTES
A: Assessment completed and documented, no family is present to discuss plan of care and patient is sedated and intubated, is unable to participate, bed exit alarm is on for patient safety.

## 2018-06-30 LAB
ALBUMIN SERPL-MCNC: 2.1 G/DL (ref 3.4–5)
ANION GAP SERPL CALCULATED.3IONS-SCNC: 12 MMOL/L (ref 3–16)
BLOOD CULTURE, ROUTINE: NORMAL
BUN BLDV-MCNC: 44 MG/DL (ref 7–20)
CALCIUM SERPL-MCNC: 8.7 MG/DL (ref 8.3–10.6)
CHLORIDE BLD-SCNC: 89 MMOL/L (ref 99–110)
CO2: 21 MMOL/L (ref 21–32)
CREAT SERPL-MCNC: 3.9 MG/DL (ref 0.6–1.1)
CULTURE, BLOOD 2: NORMAL
GFR AFRICAN AMERICAN: 17
GFR NON-AFRICAN AMERICAN: 14
GLUCOSE BLD-MCNC: 101 MG/DL (ref 70–99)
GLUCOSE BLD-MCNC: 107 MG/DL (ref 70–99)
GLUCOSE BLD-MCNC: 87 MG/DL (ref 70–99)
GLUCOSE BLD-MCNC: 88 MG/DL (ref 70–99)
GLUCOSE BLD-MCNC: 89 MG/DL (ref 70–99)
GLUCOSE BLD-MCNC: 93 MG/DL (ref 70–99)
GLUCOSE BLD-MCNC: 97 MG/DL (ref 70–99)
HCT VFR BLD CALC: 21.8 % (ref 36–48)
HEMOGLOBIN: 7.4 G/DL (ref 12–16)
MCH RBC QN AUTO: 31.5 PG (ref 26–34)
MCHC RBC AUTO-ENTMCNC: 33.8 G/DL (ref 31–36)
MCV RBC AUTO: 93.4 FL (ref 80–100)
PDW BLD-RTO: 16.8 % (ref 12.4–15.4)
PERFORMED ON: ABNORMAL
PERFORMED ON: ABNORMAL
PERFORMED ON: NORMAL
PHOSPHORUS: 4.3 MG/DL (ref 2.5–4.9)
PLATELET # BLD: 103 K/UL (ref 135–450)
PMV BLD AUTO: 7.8 FL (ref 5–10.5)
POTASSIUM SERPL-SCNC: 3.2 MMOL/L (ref 3.5–5.1)
RBC # BLD: 2.34 M/UL (ref 4–5.2)
SODIUM BLD-SCNC: 122 MMOL/L (ref 136–145)
VANCOMYCIN RANDOM: 22.3 UG/ML
WBC # BLD: 9.4 K/UL (ref 4–11)

## 2018-06-30 PROCEDURE — 85027 COMPLETE CBC AUTOMATED: CPT

## 2018-06-30 PROCEDURE — 94003 VENT MGMT INPAT SUBQ DAY: CPT

## 2018-06-30 PROCEDURE — 80069 RENAL FUNCTION PANEL: CPT

## 2018-06-30 PROCEDURE — 99233 SBSQ HOSP IP/OBS HIGH 50: CPT | Performed by: INTERNAL MEDICINE

## 2018-06-30 PROCEDURE — 9990 CHARGE CONVERSION

## 2018-06-30 PROCEDURE — 99232 SBSQ HOSP IP/OBS MODERATE 35: CPT | Performed by: INTERNAL MEDICINE

## 2018-06-30 PROCEDURE — 94750 CHARGE CONVERSION: CPT

## 2018-06-30 PROCEDURE — 74018 RADEX ABDOMEN 1 VIEW: CPT

## 2018-06-30 PROCEDURE — 94770 CHARGE CONVERSION: CPT

## 2018-06-30 PROCEDURE — 80202 ASSAY OF VANCOMYCIN: CPT

## 2018-06-30 PROCEDURE — 71045 X-RAY EXAM CHEST 1 VIEW: CPT

## 2018-06-30 PROCEDURE — 94762 N-INVAS EAR/PLS OXIMTRY CONT: CPT

## 2018-06-30 PROCEDURE — 36415 COLL VENOUS BLD VENIPUNCTURE: CPT

## 2018-06-30 PROCEDURE — 94640 AIRWAY INHALATION TREATMENT: CPT

## 2018-06-30 PROCEDURE — C9113 INJ PANTOPRAZOLE SODIUM, VIA: HCPCS

## 2018-06-30 PROCEDURE — P9047 ALBUMIN (HUMAN), 25%, 50ML: HCPCS

## 2018-06-30 RX ORDER — LORAZEPAM 2 MG/ML
1 INJECTION INTRAMUSCULAR
Status: DISCONTINUED | OUTPATIENT
Start: 2018-06-30 | End: 2018-07-02

## 2018-06-30 RX ORDER — METHADONE HYDROCHLORIDE 5 MG/1
5 TABLET ORAL 2 TIMES DAILY
Status: DISCONTINUED | OUTPATIENT
Start: 2018-06-30 | End: 2018-07-01

## 2018-06-30 RX ADMIN — LORAZEPAM 1 MG: 2 INJECTION INTRAMUSCULAR at 15:38

## 2018-06-30 RX ADMIN — METHADONE HYDROCHLORIDE 5 MG: 5 TABLET ORAL at 20:05

## 2018-06-30 RX ADMIN — ALBUTEROL SULFATE 6 PUFF: 90 AEROSOL, METERED RESPIRATORY (INHALATION) at 11:54

## 2018-06-30 RX ADMIN — LINEZOLID 600 MG: 2 INJECTION, SOLUTION INTRAVENOUS at 20:05

## 2018-06-30 RX ADMIN — DIAZEPAM 5 MG: 5 TABLET ORAL at 20:07

## 2018-06-30 RX ADMIN — MIDAZOLAM HYDROCHLORIDE 2 MG: 1 INJECTION INTRAMUSCULAR; INTRAVENOUS at 06:53

## 2018-06-30 RX ADMIN — Medication 1 MG: at 17:18

## 2018-06-30 RX ADMIN — MORPHINE SULFATE 4 MG: 4 INJECTION, SOLUTION INTRAMUSCULAR; INTRAVENOUS at 07:39

## 2018-06-30 RX ADMIN — MORPHINE SULFATE 4 MG: 4 INJECTION, SOLUTION INTRAMUSCULAR; INTRAVENOUS at 04:14

## 2018-06-30 RX ADMIN — ALBUTEROL SULFATE 6 PUFF: 90 AEROSOL, METERED RESPIRATORY (INHALATION) at 22:45

## 2018-06-30 RX ADMIN — ALBUTEROL SULFATE 6 PUFF: 90 AEROSOL, METERED RESPIRATORY (INHALATION) at 18:57

## 2018-06-30 RX ADMIN — Medication 10 ML: at 20:05

## 2018-06-30 RX ADMIN — LORAZEPAM 1 MG: 2 INJECTION INTRAMUSCULAR at 22:05

## 2018-06-30 RX ADMIN — HEPARIN SODIUM 5000 UNITS: 5000 INJECTION, SOLUTION INTRAVENOUS; SUBCUTANEOUS at 13:53

## 2018-06-30 RX ADMIN — LORAZEPAM 1 MG: 2 INJECTION INTRAMUSCULAR at 18:08

## 2018-06-30 RX ADMIN — MIDAZOLAM HYDROCHLORIDE 2 MG: 1 INJECTION INTRAMUSCULAR; INTRAVENOUS at 04:40

## 2018-06-30 RX ADMIN — ALBUTEROL SULFATE 6 PUFF: 90 AEROSOL, METERED RESPIRATORY (INHALATION) at 08:19

## 2018-06-30 RX ADMIN — Medication 1 MG: at 13:51

## 2018-06-30 RX ADMIN — METHADONE HYDROCHLORIDE 5 MG: 5 TABLET ORAL at 13:52

## 2018-06-30 RX ADMIN — ALBUMIN (HUMAN) 12.5 G: 12.5 SOLUTION INTRAVENOUS at 07:37

## 2018-06-30 RX ADMIN — CHLORHEXIDINE GLUCONATE 0.12% ORAL RINSE 15 ML: 1.2 LIQUID ORAL at 20:06

## 2018-06-30 RX ADMIN — PANTOPRAZOLE SODIUM 40 MG: 40 INJECTION, POWDER, FOR SOLUTION INTRAVENOUS at 13:52

## 2018-06-30 RX ADMIN — CHLORHEXIDINE GLUCONATE 0.12% ORAL RINSE 15 ML: 1.2 LIQUID ORAL at 13:51

## 2018-06-30 RX ADMIN — DIAZEPAM 5 MG: 5 TABLET ORAL at 13:53

## 2018-06-30 RX ADMIN — DOXERCALCIFEROL 1 MCG: 2 INJECTION, SOLUTION INTRAVENOUS at 12:42

## 2018-06-30 RX ADMIN — LINEZOLID 600 MG: 2 INJECTION, SOLUTION INTRAVENOUS at 13:52

## 2018-06-30 RX ADMIN — ALBUTEROL SULFATE 6 PUFF: 90 AEROSOL, METERED RESPIRATORY (INHALATION) at 15:11

## 2018-06-30 RX ADMIN — ACETAMINOPHEN 650 MG: 325 TABLET ORAL at 16:57

## 2018-06-30 RX ADMIN — MIDAZOLAM HYDROCHLORIDE 2 MG: 1 INJECTION INTRAMUSCULAR; INTRAVENOUS at 00:13

## 2018-06-30 RX ADMIN — Medication 1 MG: at 21:00

## 2018-06-30 RX ADMIN — HYDROXYZINE PAMOATE 50 MG: 50 CAPSULE ORAL at 21:04

## 2018-06-30 RX ADMIN — HEPARIN SODIUM 5000 UNITS: 5000 INJECTION, SOLUTION INTRAVENOUS; SUBCUTANEOUS at 20:07

## 2018-06-30 RX ADMIN — PROMETHAZINE HYDROCHLORIDE 25 MG: 25 TABLET ORAL at 21:04

## 2018-06-30 RX ADMIN — ALBUMIN (HUMAN) 12.5 G: 12.5 SOLUTION INTRAVENOUS at 07:35

## 2018-06-30 RX ADMIN — LORAZEPAM 1 MG: 2 INJECTION INTRAMUSCULAR at 20:06

## 2018-06-30 RX ADMIN — MIDAZOLAM HYDROCHLORIDE 2 MG: 1 INJECTION INTRAMUSCULAR; INTRAVENOUS at 07:39

## 2018-06-30 RX ADMIN — ALBUTEROL SULFATE 6 PUFF: 90 AEROSOL, METERED RESPIRATORY (INHALATION) at 02:58

## 2018-06-30 RX ADMIN — Medication 1 MG: at 23:02

## 2018-06-30 RX ADMIN — HEPARIN SODIUM 5000 UNITS: 5000 INJECTION, SOLUTION INTRAVENOUS; SUBCUTANEOUS at 06:10

## 2018-06-30 RX ADMIN — HEPARIN SODIUM 3000 UNITS: 1000 INJECTION, SOLUTION INTRAVENOUS; SUBCUTANEOUS at 12:52

## 2018-06-30 ASSESSMENT — PAIN SCALES - GENERAL
PAINLEVEL_OUTOF10: 10
PAINLEVEL_OUTOF10: 0

## 2018-06-30 ASSESSMENT — PULMONARY FUNCTION TESTS
PIF_VALUE: 11
PIF_VALUE: 11
PIF_VALUE: 38
PIF_VALUE: 11
PIF_VALUE: 11
PIF_VALUE: 12
PIF_VALUE: 17

## 2018-06-30 NOTE — PROGRESS NOTES
with EF visually estimated at 50%.   D-shaped septum c/w RV pressure and volume overload.   Large mobile, pedunculated vegetation on septal leaflet of tricuspid valve   c/w endocarditis.   No pericardial effusion noted. Cultures:  Respiratory cultures -6/12/18 - MRSA . Repeat culture 6/20/18 MRSA   Blood culture: ON ADMISSION - 6/5/18 POSITIVE S. aureus, S pneumonia and Enterobacter  Recent blood cultures negative       Radiology    XR CHEST PORTABLE   Final Result   Stable chest         XR ABDOMEN (KUB) (SINGLE AP VIEW)   Final Result   Nondiagnostic due to paucity of small bowel gas         XR ABDOMEN (KUB) (SINGLE AP VIEW)   Final Result   Multiple mildly dilated loops of small bowel. Findings may represent ileus   or partial small bowel obstruction. XR CHEST PORTABLE   Final Result   Stable positioning of left central venous catheter. No pneumothorax. Slightly improved aeration with persistent ground-glass opacities in the left   mid lung. XR CHEST PORTABLE   Final Result   Retraction of the left internal jugular CVC with its tip in the expected   region of the brachiocephalic vein      No significant change in the cavitary nodules and bibasilar airspace disease         XR CHEST PORTABLE   Final Result   Tracheostomy tube placement. Left basilar airspace disease most consistent with atelectasis         IR NONTUNNELED VASCULAR CATHETER   Final Result   Successful ultrasound and fluoroscopy guided non-tunneled right femoral vein   temporary dialysis catheter placement. XR CHEST PORTABLE   Final Result   Improving bibasilar airspace disease. Stable cavitary lesions. CT Chest WO Contrast   Final Result   1. Minimal worsening partially cavitating pulmonary nodules and airspace   disease throughout the bilateral lungs consistent with septic emboli. 2. New trace bilateral pleural effusions. 3. Mediastinal adenopathy, likely reactive. 4. Heterogeneous splenomegaly. XR CHEST PORTABLE   Final Result   Stable appearing bibasilar airspace disease. Support tubes and lines as   above. XR CHEST PORTABLE   Final Result   No significant interval change in bilateral airspace disease as compared to   prior. Cavitary pulmonary nodules are again demonstrated. XR CHEST PORTABLE   Final Result   Left mid lung airspace disease is similar to minimally improved as compared   to prior. Persistent basilar atelectasis and cavitary right lung lesions. VL Extremity Venous Bilateral   Final Result      XR CHEST PORTABLE   Final Result   Left IJ central venous line in the superior vena cava with no pneumothorax         US GALLBLADDER RUQ   Final Result   1. Small amount of complex ascites concerning for hemorrhage. Consider   further evaluation with CT of the abdomen and pelvis. 2. Cholelithiases without evidence of acute cholecystitis. 3. Cirrhosis. XR CHEST PORTABLE   Final Result   Interval increase in right basilar atelectasis. No appreciable change in   appearance of septic pulmonary emboli         XR CHEST PORTABLE   Final Result   Slightly decreased right-sided airspace disease, otherwise stable chest.         XR CHEST PORTABLE   Final Result   Slight worsening of patchy airspace opacity right lower lobe over the past   few days. Relatively stable cavitary nodular lesions both lungs. Tubes and lines remain in good position. XR CHEST PORTABLE   Final Result   No significant change in the bony basilar airspace disease and suspected   septic emboli. XR CHEST PORTABLE   Final Result   1. Stable lines, tubes and support devices. 2. Stable cardiopulmonary status including bilateral airspace opacities. XR CHEST PORTABLE   Final Result   Stable life support devices. No acute interval change regarding bilateral   airspace disease. XR CHEST PORTABLE   Final Result   Stable life support device positioning.       No

## 2018-06-30 NOTE — PROGRESS NOTES
06/30/18 0448   Vent Information   Vent Type 840   Vent Mode AC/VC   Vt Ordered 350 mL   Rate Set 18 bmp   Peak Flow 55 L/min   Pressure Support 0 cmH20   FiO2  30 %   Sensitivity 3   PEEP/CPAP 5   I Time/ I Time % 0 s   Vent Patient Data   Vt Exhaled 409 mL   Rate Measured 21 br/min   Minute Volume 7.79 Liters   Peak Inspiratory Pressure 17 cmH2O   I:E Ratio 1:3.80   High Peep/I Pressure 0   Mean Airway Pressure 8.3 cmH20   Spontaneous Breathing Trial (SBT) RT Doc   Pulse 128   SpO2 98 %   Additional Respiratory  Assessments   Resp 25   Alarm Settings   High Pressure Alarm 50 cmH2O   Low Minute Volume Alarm 2.5 L/min   High Respiratory Rate 45 br/min

## 2018-06-30 NOTE — PROGRESS NOTES
Temp (24hrs), Av.2 °F (37.3 °C), Min:98.7 °F (37.1 °C), Max:100.3 °F (37.9 °C)          EXAM:  General: on vent per trach. Febrile intermittently; tmax lat 24h only 100.3; tachycardic as usual but less so; responds to questions. Yes and no   ENT: conj icterus resolved; pupils equal reactive  Neck: s nodes, fairly supple      LUNGS: coarse BS bilat; does not appear to be in respiratory distress   CV: RR c gd I syst M lower LSB     ABD: soft nontender, s mass   EXT: without edema;Skin: no rash or other skin stigmata of endocarditis; icterus resolved. DP pulses full    Data Review:    Lab Results   Component Value Date    WBC 9.4 2018    HGB 7.4 (L) 2018    HCT 21.8 (L) 2018    MCV 93.4 2018     (L) 2018     Lab Results   Component Value Date    CREATININE 3.9 (H) 2018    BUN 44 (H) 2018     (L) 2018    K 3.2 (L) 2018    CL 89 (L) 2018    CO2 21 2018     Lab Results   Component Value Date    ALT 11 2018    AST 13 (L) 2018    ALKPHOS 173 (H) 2018    BILITOT 1.1 (H) 2018     MICRO:  blood cultures both grew MRSA. One culture also grew strep while the other culture grew Enterobacter sensitive to cefepime.  respiratory culture: MRSA vanc PANCHO 1  Most recent cultures (blood, line tip) are no growth so far    IMAGING: CXR: cavitary multifocal infiltrates presumably related to septic pulmonary emboli, stable  CT head: NAD;   CT chest: infiltrates more cavitary, but there are not much more in the way of foci of pulmonary disease. infiltrates and cavitary pulmonary nodules consistent with septic emboli.     Assessment:     Active Problems:    IVDU (intravenous drug user)    Endocarditis of tricuspid valve    HCAP (healthcare-associated pneumonia)    Upper GI bleed    Sepsis due to Streptococcus pneumoniae ([address]    Mild protein-calorie malnutrition (HCC)    PEA (Pulseless electrical activity) (HCC)    DIC (disseminated intravascular coagulation) ([facility] [address]    Acute respiratory failure with hypoxia (HCC)    Splenic infarction    Bacteremia    Mucus plugging of bronchi    Cavitary lung disease    Fever    Persistent fever    MRSA bacteremia    Hyponatremia    Acute blood loss anemia  Resolved Problems:    * No resolved hospital problems. *  Tricuspid valve endocarditis with septic pulmonary emboli (L sided endocarditis not ruled out) due to MRSA. Strep and Enterobacter were also isolated from admission blood cultures, significance uncertain: I would think this illness is mostly due to MRSA. possible splenic infarct or abscess. s/p arrest x2. R femoral vascath exit site looks ok  Mild thrombocytopenia appears stable  Leukocytosis resolved but pt still febrile: temps lower on average  pt has had 25 days of IV antibiotic therapy so far. Plan:   Continue vancomycin, linezolid, cefepime  redose cefepime and vancomycin after dialysis today    Repeat blood culture and obtain BC for fungus.

## 2018-06-30 NOTE — PROGRESS NOTES
06/30/18 0259   Vent Information   Vent Type 840   Vent Mode AC/VC   Vt Ordered 350 mL   Rate Set 0 bmp   Peak Flow 55 L/min   Pressure Support 5 cmH20   FiO2  30 %   Sensitivity 3   PEEP/CPAP 5   I Time/ I Time % 0 s   Humidification Source Heated wire   Humidification Temp Measured 36.9   Vent Patient Data   Vt Exhaled 306 mL   Rate Measured 17 br/min   Minute Volume 6.67 Liters   Peak Inspiratory Pressure 11 cmH2O   I:E Ratio 1:14.0   High Peep/I Pressure 0   Mean Airway Pressure 7.2 cmH20   Spontaneous Breathing Trial (SBT) RT Doc   Pulse 115   SpO2 100 %   Cough/Sputum   Sputum How Obtained Tracheal;Suctioned   Cough Productive   Sputum Amount Small   Sputum Color Tan   Tenacity Thick   Breath Sounds   Right Upper Lobe Rhonchi   Right Middle Lobe Rhonchi   Right Lower Lobe Diminished   Left Upper Lobe Rhonchi   Left Lower Lobe Diminished   Additional Respiratory  Assessments   Resp 13   Position Semi-Lucero's   Alarm Settings   High Pressure Alarm 50 cmH2O   Low Minute Volume Alarm 2.5 L/min   High Respiratory Rate 45 br/min   Low Exhaled Vt  250 mL   Patient Observation   Observations Ambu bag at bedside.

## 2018-06-30 NOTE — PROGRESS NOTES
PRN Versed and Morphine administered per order d/t increased anxiety, restlessness, and pt not tolerating vent. Will continue to monitor.   Trevor Maya RN

## 2018-06-30 NOTE — PROGRESS NOTES
Pt tolerating vent and resting comfortably at this time.   Will continue to [facility] [employer] RN

## 2018-06-30 NOTE — PROGRESS NOTES
Report given to Priti Carrion RN at bedside for transfer of care. Pt resting comfortably at this time, no distress noted.   Kemi Maya RN

## 2018-06-30 NOTE — PROGRESS NOTES
MRSA  6/8/18 Blood NG  6/5/18 Blood MRSA and ENTEROBACTER CLOACAE       Films:  CHEST CT 6/25/18  FINDINGS:   Mediastinum:  Motion artifact degrades image quality.  The unenhanced heart   is unremarkable.  The left internal jugular catheter terminates in the right   atrium.       There are new mildly enlarged mediastinal lymph nodes, likely reactive.  A   right peritracheal lymph node measures 1.1 x 1.5 cm.       Lungs/pleura: Endotracheal tube is in situ.  The tracheobronchial tree is   patent.  There is no pneumothorax.  There are new trace bilateral pleural   effusions with compressive atelectasis.       No change in the majority of the partially cavitating pulmonary nodules   throughout the bilateral lungs due to septic emboli.  However, the previously   noted airspace disease within the anterior segment of the left upper lobe has   become cavitary.  There is improved aeration and re-expansion of the left   lower lobe.     Upper Abdomen: A nasogastric tube reaches the lower gastroesophageal   junction.  A low-attenuation lesion in the left hepatic lobe is unchanged,   but incompletely imaged and cannot be fully evaluated given the lack There is   heterogeneous enhancement of the enlarged spleen measuring 14 cm in length.       Soft Tissues/Bones: No change in the old compression fractures involving the   superior endplates of T6 through T9.  A small to moderate amount of anasarca   is present throughout the lateral thorax.           Impression   1. Minimal worsening partially cavitating pulmonary nodules and airspace   disease throughout the bilateral lungs consistent with septic emboli. 2. New trace bilateral pleural effusions. 3. Mediastinal adenopathy, likely reactive. 4. Heterogeneous splenomegaly.      ASSESSMENT:  · Acute ventilator-dependent hypoxemic respiratory failure  · Acute blood loss anemia, hemoglobin is trending down again  · Upper GI bleed secondary to angiodysplasia status post cauterization  · Transfused 6/27/18, 6/9/18, 6/8/18, 6/6/18   · Persistent fevers, fever curve better last 24 hours  · Recurrent tricuspid valve infectious endocarditis  · Polymicrobial bacteremia: MRSA, Streptococcal pneumonia, and Enterobacter  · Acute liver failure - improved  · Cavitary pneumonia due to septic emboli    · End-stage kidney disease on hemodialysis  · IV drug abuse  · Hepatitis C  · Hyponatremia    · S/P Trach/PEG on 6/27/18  · Thrombocytopenia is stable    PLAN:  Mechanical ventilation as per my orders. Pressure support trials have been started  IV fentanyl to off, changed to patch; plan to taper valium and methadone. Has 150 mcg fentanyl patches and PRN IV versed/dilaudid.   With episodes of apnea, next step is to start lowering methadone  Head of bed 30 degrees or higher at all times  · Vancomycin D#25, Cefepime D#12, Zyvox D#10; infectious disease is following   · Holding Left IJ vascular site for tunneled HD catheter  · When NPO needs to have close monitoring of FSBS q 1 hour  · PT/OT  · Resume TF, KUB much better  · Prophylaxis: DVT - SQ heparin, Protonix

## 2018-06-30 NOTE — PROGRESS NOTES
Care rounds complete. Plan of care discussed, new order to restart tube feed. Pt resting comfortably at this time, call light within reach. Will continue to monitor.   Kale Maya RN

## 2018-06-30 NOTE — PROGRESS NOTES
06/30/18 1155   Vent Information   Vt Ordered 350 mL   Rate Set 0 bmp   Peak Flow 55 L/min   Pressure Support 5 cmH20   FiO2  30 %   Sensitivity 3   PEEP/CPAP 5   I Time/ I Time % 0 s   Vent Patient Data   Vt Exhaled 822 mL   Rate Measured 18 br/min   Minute Volume 12.3 Liters   Peak Inspiratory Pressure 12 cmH2O   I:E Ratio 1:1.20   High Peep/I Pressure 0   Mean Airway Pressure 7.7 cmH20   Spontaneous Breathing Trial (SBT) RT Doc   Pulse 132   SpO2 96 %   Cough/Sputum   Sputum How Obtained Tracheal   Cough Productive   Sputum Amount Moderate   Sputum Color Yellow   Tenacity Thick   Breath Sounds   Right Upper Lobe Rhonchi   Right Middle Lobe Diminished   Right Lower Lobe Diminished   Left Upper Lobe Rhonchi   Left Lower Lobe Diminished   Additional Respiratory  Assessments   Resp 20   Position Semi-Lucero's   Alarm Settings   High Pressure Alarm 50 cmH2O   Low Minute Volume Alarm 2.5 L/min   High Respiratory Rate 45 br/min   Patient Observation   Observations ambu bs, h20 ok   Surgical Airway (trach) Portex Cuffed   Placement Date/Time: 06/27/18 0746   Timeout: Patient;Procedure;Site/Side;Appropriate Equipment; Consent Confirmed;Sterile Technique using full body drape;Site prepped with chlorhexidine;Correct Position  Placed By: In surgery  Surgical Airway Type: Tr...    Status Secured   Site Assessment Dry   Ties Assessment Secure

## 2018-06-30 NOTE — PROGRESS NOTES
Patient care assumed, assessment completed as charted. Patient continues on vent, tracheostomy in place. Patient not tolerating A/C mode despite frequent suctioning, and Versed 2mg. Double stacking breaths, . Switched back to PS per RT, continues with tachycardia and vent alarms. Versed 2mg administered at 2050, and Morphine 4mg at 2129. Patient now resting comfortably, . PEG intact, right femoral VC, and left femoral CVC in place. Flexiseal patent, bilateral soft wrist restraints in place for continued patient safety. No further needs assessed at this time. Will monitor.

## 2018-06-30 NOTE — PROGRESS NOTES
without m/r/g; no edema  RESP:clear anteriorly  ACCESS: R IJ TDC  gen-intubated   abd - distended, BS+    Data/  Recent Labs      06/28/18   0535  06/29/18   0527  06/30/18   0354   WBC  10.4  10.2  9.4   HGB  8.6*  8.0*  7.4*   HCT  25.7*  23.4*  21.8*   MCV  92.7  93.0  93.4   PLT  88*  102*  103*     Recent Labs      06/28/18   2030  06/29/18   0527  06/30/18   0354   NA  122*  123*  122*   K  3.8  3.9  3.2*   CL  88*  89*  89*   CO2  22  22  21   GLUCOSE  109*  97  97   PHOS   --   4.6  4.3   BUN  30*  37*  44*   CREATININE  2.5*  3.0*  3.9*   LABGLOM  23*  19*  14*   GFRAA  28*  23*  17*     CT chest/a/p on 6/12/18  Impression   Development of moderate diffuse ascites since the prior study.  Mosaic   appearance of the spleen either due to multiple splenic infarcts or possible   splenic abscesses.  Question of perihepatic and subhepatic adhesions. Chronic gallbladder disease.  Bibasilar pneumonias and pulmonary nodules   consistent with septic emboli.  Mild anasarca.       RECOMMENDATIONS:   NG tube should be pulled back about 4 cm since it is pressing against the   anterior stomach wall.       Gallbladder ultrasound suggested for evaluation of gallbladder disease.       Diagnostic Ultrasound-guided paracentesis may be helpful. Impression   Multiple scattered cavitary and solid nodules scattered throughout the lungs   suspected to be from septic emboli.  The emboli may be from recurrent   endocarditis, IV drug abuse, or infected DVT.  Scattered focal pneumonia in   the lingula, right middle lobe, and right lower lobe.  Trace bilateral   pleural effusions.  Tip of the endotracheal tube lying near the tee. Moderate ascites seen in the upper abdomen.  Possible multiple splenic   infarcts or abscesses.  Please refer to the dictation of the CT scan of the   abdomen and pelvis.       RECOMMENDATIONS:   Endotracheal tube should be pulled back 1-2 cm.          Assessment/Plan     1- End stage renal disease: Hemodialysis per [facility] while inpatient (MWF as outpatient).    -TDC on monday   -bicarb bath to 35    -HD 6/30    2- Acute GI bleed    - Hemoglobin stabilized s/p PRC transfusion    -followed by GI    3- MRSA, Streptococcus bacteremia, persistent endocarditis involving TV with septic embolizations to lungs and spleen   - On IV antibiotics per ID   -s/p HD tDC removed on 6/25  4- Acute resp failure    -s/p trach 6/27    5- Hyponatremia   - avoid volume as able   -s/p 3l and 2l UF on 6/28 n 6/29 respectively

## 2018-06-30 NOTE — PROGRESS NOTES
Patient started on spontaneous breathing trial of 5/5 at 0844. sp02 1005 on 30%. Tolerating well, will continue to monitor.

## 2018-06-30 NOTE — FLOWSHEET NOTE
Treatment time: 4hrs  Net UF: 3 liters    Pre weight: 67.5 kg  Post weight: 65.1kg bedscale  EDW: TBD    Access used: Rt fem vascath   Access function: Good after cathflo. Medications or blood products given: Hectorol 1mcg, vancomycin 600jmg, albumin 25gm. Regular outpatient schedule: TTS for now. Summary of response to treatment:      06/30/18 1303   Vital Signs   BP (!) 149/87   Temp 99.1 °F (37.3 °C)   Pulse 134   Weight 143 lb 8.3 oz (65.1 kg)   Weight Method Bed scale   Percent Weight Change -6.33   Post-Hemodialysis Assessment   Post-Treatment Procedures Blood returned;Catheter capped, clamped and heparinized x 2 ports   Machine Disinfection Process Acid/Vinegar Clean;Bleach; Exterior Machine Disinfection   Rinseback Volume (ml) 300 ml   Total Liters Processed (l/min) 65.1 l/min   Dialyzer Clearance Moderately streaked   Duration of Treatment (minutes) 240 minutes   Heparin amount administered during treatment (units) 0 units   Hemodialysis Intake (ml) 1000 ml   Hemodialysis Output (ml) 4000 ml   NET Removed (ml) 3000 ml   Tolerated Treatment Fair   Patient Response to Treatment Catheter issues  first hour. Worked well after cathflo w/lines reversed.  bpm highest. Pt restless. Unable to communicate needs at times. Repositioned pt as needed for comfort. Able to complete full hd. Physician Notified? Yes   Copy of dialysis treatment record placed in chart, to be scanned into EMR. Report given to VI Zeng. Pt in bed. Esvin.

## 2018-06-30 NOTE — PROGRESS NOTES
Updated Dr. Steve Rose regarding high output from PEG tube this AM.  Hold tube feed for now. Clamp tube for 3hrs then check residuals. If residuals are low restart tube feed at 20, if high return to suction.   Tiffanie Maya RN

## 2018-06-30 NOTE — PROGRESS NOTES
Patient bathed, repositioned for comfort. Patient alert, following commands. Indicates she wants restraints off. Patient agrees not to pull at any lines, tubes, or airway. Restraints DC'd, patient resting much more comfortably at present. Will monitor.

## 2018-07-01 LAB
ALBUMIN SERPL-MCNC: 2.5 G/DL (ref 3.4–5)
ANION GAP SERPL CALCULATED.3IONS-SCNC: 12 MMOL/L (ref 3–16)
BUN BLDV-MCNC: 28 MG/DL (ref 7–20)
CALCIUM SERPL-MCNC: 8.7 MG/DL (ref 8.3–10.6)
CHLORIDE BLD-SCNC: 93 MMOL/L (ref 99–110)
CO2: 22 MMOL/L (ref 21–32)
CREAT SERPL-MCNC: 2.9 MG/DL (ref 0.6–1.1)
GFR AFRICAN AMERICAN: 24
GFR NON-AFRICAN AMERICAN: 20
GLUCOSE BLD-MCNC: 101 MG/DL (ref 70–99)
GLUCOSE BLD-MCNC: 102 MG/DL (ref 70–99)
GLUCOSE BLD-MCNC: 108 MG/DL (ref 70–99)
GLUCOSE BLD-MCNC: 117 MG/DL (ref 70–99)
HCT VFR BLD CALC: 21.4 % (ref 36–48)
HEMOGLOBIN: 7.2 G/DL (ref 12–16)
MCH RBC QN AUTO: 31.1 PG (ref 26–34)
MCHC RBC AUTO-ENTMCNC: 33.5 G/DL (ref 31–36)
MCV RBC AUTO: 92.8 FL (ref 80–100)
PDW BLD-RTO: 16.6 % (ref 12.4–15.4)
PERFORMED ON: ABNORMAL
PHOSPHORUS: 3.2 MG/DL (ref 2.5–4.9)
PLATELET # BLD: 130 K/UL (ref 135–450)
PMV BLD AUTO: 7.4 FL (ref 5–10.5)
POTASSIUM SERPL-SCNC: 3.4 MMOL/L (ref 3.5–5.1)
RBC # BLD: 2.31 M/UL (ref 4–5.2)
SODIUM BLD-SCNC: 127 MMOL/L (ref 136–145)
WBC # BLD: 8.2 K/UL (ref 4–11)

## 2018-07-01 PROCEDURE — 9990 CHARGE CONVERSION

## 2018-07-01 PROCEDURE — 85027 COMPLETE CBC AUTOMATED: CPT

## 2018-07-01 PROCEDURE — 99233 SBSQ HOSP IP/OBS HIGH 50: CPT | Performed by: INTERNAL MEDICINE

## 2018-07-01 PROCEDURE — 94750 CHARGE CONVERSION: CPT

## 2018-07-01 PROCEDURE — 94761 N-INVAS EAR/PLS OXIMETRY MLT: CPT

## 2018-07-01 PROCEDURE — 80069 RENAL FUNCTION PANEL: CPT

## 2018-07-01 PROCEDURE — C9113 INJ PANTOPRAZOLE SODIUM, VIA: HCPCS

## 2018-07-01 PROCEDURE — 97530 THERAPEUTIC ACTIVITIES: CPT

## 2018-07-01 PROCEDURE — 94003 VENT MGMT INPAT SUBQ DAY: CPT

## 2018-07-01 PROCEDURE — 36592 COLLECT BLOOD FROM PICC: CPT

## 2018-07-01 PROCEDURE — 94640 AIRWAY INHALATION TREATMENT: CPT

## 2018-07-01 PROCEDURE — 97110 THERAPEUTIC EXERCISES: CPT

## 2018-07-01 RX ORDER — NYSTATIN 100000 U/G
CREAM TOPICAL 2 TIMES DAILY
Status: DISCONTINUED | OUTPATIENT
Start: 2018-07-01 | End: 2018-07-18 | Stop reason: HOSPADM

## 2018-07-01 RX ORDER — SODIUM CHLORIDE 9 MG/ML
INJECTION, SOLUTION INTRAVENOUS
Status: COMPLETED
Start: 2018-07-01 | End: 2018-07-01

## 2018-07-01 RX ORDER — FENTANYL 75 UG/H
3 PATCH TRANSDERMAL
Status: DISCONTINUED | OUTPATIENT
Start: 2018-07-02 | End: 2018-07-03

## 2018-07-01 RX ORDER — POTASSIUM CHLORIDE 20MEQ/15ML
20 LIQUID (ML) ORAL ONCE
Status: COMPLETED | OUTPATIENT
Start: 2018-07-01 | End: 2018-07-01

## 2018-07-01 RX ORDER — METOPROLOL TARTRATE 5 MG/5ML
2.5 INJECTION INTRAVENOUS ONCE
Status: COMPLETED | OUTPATIENT
Start: 2018-07-01 | End: 2018-07-01

## 2018-07-01 RX ORDER — IPRATROPIUM BROMIDE AND ALBUTEROL SULFATE 2.5; .5 MG/3ML; MG/3ML
1 SOLUTION RESPIRATORY (INHALATION) EVERY 4 HOURS PRN
Status: DISCONTINUED | OUTPATIENT
Start: 2018-07-01 | End: 2018-07-06

## 2018-07-01 RX ADMIN — LORAZEPAM 1 MG: 2 INJECTION INTRAMUSCULAR at 10:16

## 2018-07-01 RX ADMIN — LINEZOLID 600 MG: 2 INJECTION, SOLUTION INTRAVENOUS at 20:41

## 2018-07-01 RX ADMIN — LORAZEPAM 1 MG: 2 INJECTION INTRAMUSCULAR at 01:52

## 2018-07-01 RX ADMIN — IPRATROPIUM BROMIDE AND ALBUTEROL SULFATE 1 AMPULE: 2.5; .5 SOLUTION RESPIRATORY (INHALATION) at 12:00

## 2018-07-01 RX ADMIN — Medication 1 MG: at 10:16

## 2018-07-01 RX ADMIN — LORAZEPAM 1 MG: 2 INJECTION INTRAMUSCULAR at 06:30

## 2018-07-01 RX ADMIN — CHLORHEXIDINE GLUCONATE 0.12% ORAL RINSE 15 ML: 1.2 LIQUID ORAL at 07:46

## 2018-07-01 RX ADMIN — ACETAMINOPHEN 650 MG: 325 TABLET ORAL at 03:28

## 2018-07-01 RX ADMIN — DIAZEPAM 5 MG: 5 TABLET ORAL at 13:44

## 2018-07-01 RX ADMIN — ALBUTEROL SULFATE 6 PUFF: 90 AEROSOL, METERED RESPIRATORY (INHALATION) at 22:34

## 2018-07-01 RX ADMIN — LORAZEPAM 1 MG: 2 INJECTION INTRAMUSCULAR at 20:40

## 2018-07-01 RX ADMIN — Medication 1 MG: at 20:38

## 2018-07-01 RX ADMIN — LINEZOLID 600 MG: 2 INJECTION, SOLUTION INTRAVENOUS at 07:46

## 2018-07-01 RX ADMIN — HEPARIN SODIUM 5000 UNITS: 5000 INJECTION, SOLUTION INTRAVENOUS; SUBCUTANEOUS at 06:30

## 2018-07-01 RX ADMIN — METHADONE HYDROCHLORIDE 5 MG: 5 TABLET ORAL at 07:45

## 2018-07-01 RX ADMIN — LORAZEPAM 1 MG: 2 INJECTION INTRAMUSCULAR at 23:56

## 2018-07-01 RX ADMIN — METOPROLOL TARTRATE 2.5 MG: 5 INJECTION INTRAVENOUS at 01:52

## 2018-07-01 RX ADMIN — SODIUM CHLORIDE 250 ML: 9 INJECTION, SOLUTION INTRAVENOUS at 20:42

## 2018-07-01 RX ADMIN — DIAZEPAM 5 MG: 5 TABLET ORAL at 07:45

## 2018-07-01 RX ADMIN — ALBUTEROL SULFATE 6 PUFF: 90 AEROSOL, METERED RESPIRATORY (INHALATION) at 08:05

## 2018-07-01 RX ADMIN — LORAZEPAM 1 MG: 2 INJECTION INTRAMUSCULAR at 00:06

## 2018-07-01 RX ADMIN — Medication 1 MG: at 03:28

## 2018-07-01 RX ADMIN — DIAZEPAM 5 MG: 5 TABLET ORAL at 20:52

## 2018-07-01 RX ADMIN — IPRATROPIUM BROMIDE AND ALBUTEROL SULFATE 1 AMPULE: 2.5; .5 SOLUTION RESPIRATORY (INHALATION) at 15:42

## 2018-07-01 RX ADMIN — CHLORHEXIDINE GLUCONATE 0.12% ORAL RINSE 15 ML: 1.2 LIQUID ORAL at 20:41

## 2018-07-01 RX ADMIN — Medication 10 ML: at 20:38

## 2018-07-01 RX ADMIN — ACETAMINOPHEN 650 MG: 325 TABLET ORAL at 17:35

## 2018-07-01 RX ADMIN — Medication 1 MG: at 01:11

## 2018-07-01 RX ADMIN — NYSTATIN: 100000 CREAM TOPICAL at 13:45

## 2018-07-01 RX ADMIN — HYDROXYZINE PAMOATE 50 MG: 50 CAPSULE ORAL at 05:15

## 2018-07-01 RX ADMIN — Medication 20 MEQ: at 17:35

## 2018-07-01 RX ADMIN — Medication 10 ML: at 13:44

## 2018-07-01 RX ADMIN — NYSTATIN: 100000 CREAM TOPICAL at 21:00

## 2018-07-01 RX ADMIN — LORAZEPAM 1 MG: 2 INJECTION INTRAMUSCULAR at 22:15

## 2018-07-01 RX ADMIN — LORAZEPAM 1 MG: 2 INJECTION INTRAMUSCULAR at 03:59

## 2018-07-01 RX ADMIN — LORAZEPAM 1 MG: 2 INJECTION INTRAMUSCULAR at 16:50

## 2018-07-01 RX ADMIN — Medication 1 MG: at 13:44

## 2018-07-01 RX ADMIN — LORAZEPAM 1 MG: 2 INJECTION INTRAMUSCULAR at 07:45

## 2018-07-01 RX ADMIN — ALBUTEROL SULFATE 6 PUFF: 90 AEROSOL, METERED RESPIRATORY (INHALATION) at 02:47

## 2018-07-01 RX ADMIN — LORAZEPAM 1 MG: 2 INJECTION INTRAMUSCULAR at 13:44

## 2018-07-01 RX ADMIN — Medication 10 ML: at 07:45

## 2018-07-01 RX ADMIN — PROMETHAZINE HYDROCHLORIDE 25 MG: 25 TABLET ORAL at 04:07

## 2018-07-01 RX ADMIN — Medication 1 MG: at 06:30

## 2018-07-01 RX ADMIN — Medication 1 MG: at 16:50

## 2018-07-01 RX ADMIN — PANTOPRAZOLE SODIUM 40 MG: 40 INJECTION, POWDER, FOR SOLUTION INTRAVENOUS at 07:44

## 2018-07-01 RX ADMIN — LORAZEPAM 1 MG: 2 INJECTION INTRAMUSCULAR at 18:38

## 2018-07-01 ASSESSMENT — PAIN SCALES - GENERAL
PAINLEVEL_OUTOF10: 10
PAINLEVEL_OUTOF10: 0
PAINLEVEL_OUTOF10: 0
PAINLEVEL_OUTOF10: 10
PAINLEVEL_OUTOF10: 5
PAINLEVEL_OUTOF10: 0
PAINLEVEL_OUTOF10: 10
PAINLEVEL_OUTOF10: 2
PAINLEVEL_OUTOF10: 0

## 2018-07-01 ASSESSMENT — PULMONARY FUNCTION TESTS
PIF_VALUE: 11
PIF_VALUE: 13
PIF_VALUE: 13

## 2018-07-01 NOTE — PROGRESS NOTES
Patient tolerating trach collar well today, fio2 30%. Spoke with Dr. Yaquelin Covington and he said to place patient back on the ventilator tonight on spontaneous mode as she tolerates it and can use A/C if needed.

## 2018-07-01 NOTE — PROGRESS NOTES
PRN dilaudid administered per order d/t increased restlessness and facial grimacing. Will continue to monitor.   Lorenzo Maya RN

## 2018-07-01 NOTE — PROGRESS NOTES
07/01/18 0248   Vent Information   Vent Type 840   Vent Mode AC/VC   Vt Ordered 350 mL   Rate Set 18 bmp   Peak Flow 55 L/min   Pressure Support 0 cmH20   FiO2  30 %   Sensitivity 3   PEEP/CPAP 5   I Time/ I Time % 0 s   Humidification Source Heated wire   Humidification Temp Measured 37   Circuit Condensation Drained   Vent Patient Data   Vt Exhaled 381 mL   Rate Measured 27 br/min   Minute Volume 10.1 Liters   Peak Inspiratory Pressure 13 cmH2O   I:E Ratio 1:2.40   High Peep/I Pressure 0   Mean Airway Pressure 7.1 cmH20   Spontaneous Breathing Trial (SBT) RT Doc   Pulse 135   SpO2 100 %   Cough/Sputum   Sputum How Obtained Tracheal;Suctioned   Cough Productive   Sputum Amount Moderate   Sputum Color Tan   Tenacity Thick   Breath Sounds   Right Upper Lobe Rhonchi   Right Middle Lobe Rhonchi   Right Lower Lobe Diminished   Left Upper Lobe Rhonchi   Left Lower Lobe Diminished   Additional Respiratory  Assessments   Resp 23   End Tidal CO2 30 (%)   Position Semi-Lucero's   Alarm Settings   High Pressure Alarm 50 cmH2O   Low Minute Volume Alarm 2.5 L/min   High Respiratory Rate 45 br/min   Low Exhaled Vt  250 mL   Patient Observation   Observations Ambu bag at bedside. Surgical Airway (trach) Portex Cuffed   Placement Date/Time: 06/27/18 0746   Timeout: Patient;Procedure;Site/Side;Appropriate Equipment; Consent Confirmed;Sterile Technique using full body drape;Site prepped with chlorhexidine;Correct Position  Placed By: In surgery  Surgical Airway Type: Tr...    Status Secured   Site Assessment Dry

## 2018-07-01 NOTE — PROGRESS NOTES
07/01/18 1905   Vent Information   Vent Type 840   Vent Mode PS   Vt Ordered 350 mL   Rate Set 0 bmp   Peak Flow 55 L/min   Pressure Support 5 cmH20   FiO2  30 %   Sensitivity 3   PEEP/CPAP 5   I Time/ I Time % 0 s   Vent Patient Data   Vt Exhaled 664 mL   Rate Measured 21 br/min   Minute Volume 13.4 Liters   Peak Inspiratory Pressure 13 cmH2O   I:E Ratio 1:1.70   High Peep/I Pressure 0   Mean Airway Pressure 7.5 cmH20   Spontaneous Breathing Trial (SBT) RT Doc   Pulse 140   SpO2 100 %   Cough/Sputum   Sputum How Obtained Suctioned   Cough Productive   Sputum Amount Moderate   Sputum Color Dark red;Clear   Tenacity Thick   Breath Sounds   Right Upper Lobe Rhonchi   Right Middle Lobe Diminished   Right Lower Lobe Diminished   Left Upper Lobe Diminished   Left Lower Lobe Diminished   Additional Respiratory  Assessments   Resp 26   Position Semi-Lucero's   Subglottic Suction Done? Yes   Alarm Settings   High Pressure Alarm 50 cmH2O   Low Minute Volume Alarm 2.5 L/min   High Respiratory Rate 45 br/min   Patient Observation   Observations # 8 portex   Surgical Airway (trach) Portex Cuffed   Placement Date/Time: 06/27/18 0746   Timeout: Patient;Procedure;Site/Side;Appropriate Equipment; Consent Confirmed;Sterile Technique using full body drape;Site prepped with chlorhexidine;Correct Position  Placed By: In surgery  Surgical Airway Type: Tr... Status Secured   Site Assessment Dry   Ties Assessment Secure; Intact

## 2018-07-01 NOTE — PROGRESS NOTES
[facility]       Progress Note           Subjective:   34y.o. year old female who we are seeing in consultation for ESRD management. TF has been resumed to goal   t max 102.3 on 6/30 1600 w intermittent low grade temp  Remains on D10NS gtt    HD on 6/30 interrupted by line dysfunction cath flow dwelled but completed 4h HD session w 3l UF, 67.5-->65.1 kg  UF on 6/29 w 2l UF over 2.5 h and post wt 68.3--> 66.1 kg  HD on 6/28 w 3l UF and 69-->66 kg  HD on 6/26 w 1l UF and using 130 na bath  D/t hyponatremia  Femoral line 6/26 as RIJ was occluded  rij TDC dced on 6/25    ROS: has trach from 6/27  Social: no family at bedside.     Scheduled Meds:   [START ON 7/2/2018] fentaNYL  3 patch Transdermal Q72H    nystatin   Topical BID    potassium chloride  20 mEq Oral Once    LORazepam  1 mg Intravenous Q2H    diazepam  5 mg Oral TID    chlorhexidine  15 mL Mouth/Throat BID    albuterol sulfate HFA  6 puff Inhalation Q4H    ipratropium  6 puff Inhalation Q4H    lidocaine  5 mL Intradermal Once    doxercalciferol  1 mcg Intravenous Once per day on Tue Thu Sat    heparin (porcine)  5,000 Units Subcutaneous 3 times per day    lidocaine 1 % injection  5 mL Intradermal Once    sodium chloride flush  10 mL Intravenous 2 times per day    linezolid  600 mg Intravenous Q12H    darbepoetin emi-polysorbate  100 mcg Intravenous Weekly - Thursday    And    darbepoetin emi-polysorbate  25 mcg Subcutaneous Weekly - Thursday    pantoprazole  40 mg Intravenous Daily     Continuous Infusions:   IV infusion builder 50 mL/hr at 06/30/18 2005    dextrose       PRN Meds:.ipratropium-albuterol, HYDROmorphone, heparin (porcine), sodium chloride flush, albumin human, dextrose, sodium phosphate IVPB **OR** [DISCONTINUED] sodium phosphate IVPB **OR** [DISCONTINUED] sodium phosphate IVPB **OR** [DISCONTINUED] sodium phosphate IVPB, ondansetron, glucose, glucagon (rDNA), dextrose, acetaminophen,

## 2018-07-01 NOTE — PLAN OF CARE
Problem: Falls - Risk of:  Goal: Will remain free from falls  Will remain free from falls   Outcome: Ongoing  [place] fall-risk light on outside door, fall precautions in place. Goal: Absence of physical injury  Absence of physical injury   Outcome: Ongoing      Problem: Risk for Impaired Skin Integrity  Goal: Tissue integrity - skin and mucous membranes  Structural intactness and normal physiological function of skin and  mucous membranes. Outcome: Ongoing  Patient turned/repositioned every 2 hours and as needed to maintain and improve skin integrity. Problem: Discharge Planning:  Goal: Discharged to appropriate level of care  Discharged to appropriate level of care    Outcome: Ongoing    Goal: Participates in care planning  Participates in care planning   Outcome: Ongoing      Problem:  Bowel Function - Altered:  Goal: Bowel elimination is within specified parameters  Bowel elimination is within specified parameters   Outcome: Ongoing      Problem: Fluid Volume - Imbalance:  Goal: Absence of imbalanced fluid volume signs and symptoms  Absence of imbalanced fluid volume signs and symptoms   Outcome: Ongoing    Goal: Will show no signs and symptoms of excessive bleeding  Will show no signs and symptoms of excessive bleeding   Outcome: Ongoing      Problem: Nutrition  Goal: Optimal nutrition therapy  Outcome: Ongoing    Goal: Understanding of nutritional guidelines  Outcome: Ongoing

## 2018-07-01 NOTE — PROGRESS NOTES
Patient updates given to Mukul Driscoll RN. Patient has been agitated, tachycardic, and C/O discomfort overnight. See MAR for PRN administrations. Care transferred.

## 2018-07-01 NOTE — PROGRESS NOTES
100 %   07/01/18 0700 (!) 161/93 - - 131 21 -   07/01/18 0600 (!) 156/96 99 °F (37.2 °C) Oral 140 15 100 %   07/01/18 0500 (!) 167/83 - - 140 23 -       CVP: CVP (Mean): 16 mmHg      Intake/Output Summary (Last 24 hours) at 07/01/18 0844  Last data filed at 07/01/18 0630   Gross per 24 hour   Intake             3370 ml   Output             4250 ml   Net             -880 ml       EXAM:    General: young female on vent. Trach +   Eyes: PERRL. No sclera icterus. No conjunctiva injected. ENT: No discharge. Neck: Trachea midline. Normal thyroid. trach in place . Resp: Diminished breath sounds. Bilateral diffuse rhonchi. CV: Regular rate and rhythm   GI:   distended. No masses. Bowel sounds present. Tolerating tube feeds. No hernia. PEG +   Skin: Warm and dry. No nodule on exposed extremities. No rash on exposed extremities  Lymph: No cervical LAD. No supraclavicular LAD. M/S: .  Diffuse 1+ edema in all extremities   Neuro: Opens eyes , following commands .  Non focal     Meds:   methadone  5 mg Oral BID    LORazepam  1 mg Intravenous Q2H    diazepam  5 mg Oral TID    fentaNYL  2 patch Transdermal Q72H    chlorhexidine  15 mL Mouth/Throat BID    albuterol sulfate HFA  6 puff Inhalation Q4H    ipratropium  6 puff Inhalation Q4H    lidocaine  5 mL Intradermal Once    doxercalciferol  1 mcg Intravenous Once per day on Tue Thu Sat    heparin (porcine)  5,000 Units Subcutaneous 3 times per day    lidocaine 1 % injection  5 mL Intradermal Once    sodium chloride flush  10 mL Intravenous 2 times per day    linezolid  600 mg Intravenous Q12H    darbepoetin emi-polysorbate  100 mcg Intravenous Weekly - Thursday    And    darbepoetin emi-polysorbate  25 mcg Subcutaneous Weekly - Thursday    pantoprazole  40 mg Intravenous Daily       PRN Meds:  HYDROmorphone, heparin (porcine), sodium chloride flush, albumin human, dextrose, sodium phosphate IVPB **OR** [DISCONTINUED] sodium phosphate IVPB **OR** [DISCONTINUED] sodium phosphate IVPB **OR** [DISCONTINUED] sodium phosphate IVPB, ondansetron, glucose, glucagon (rDNA), dextrose, acetaminophen, promethazine, hydrOXYzine    Results:  CBC:   Recent Labs      06/29/18   0527  06/30/18   0354  07/01/18   0642   WBC  10.2  9.4  8.2   HGB  8.0*  7.4*  7.2*   HCT  23.4*  21.8*  21.4*   MCV  93.0  93.4  92.8   PLT  102*  103*  130*     BMP:   Recent Labs      06/29/18   0527 06/30/18   0354  07/01/18   0641   NA  123*  122*  127*   K  3.9  3.2*  3.4*   CL  89*  89*  93*   CO2  22  21  22   PHOS  4.6  4.3  3.2   BUN  37*  44*  28*   CREATININE  3.0*  3.9*  2.9*     LIVER PROFILE:   No results for input(s): AST, ALT, LIPASE, BILIDIR, BILITOT, ALKPHOS in the last 72 hours. Invalid input(s): AMYLASE,  ALB  PT/INR:   Recent Labs      06/29/18   0527   PROTIME  16.2*   INR  1.42*     ECHO 6/6/18   Summary   LV systolic function is mildly reduced with LVEF estimated at 40-45%.   Mild global hypokinesis is present. There is mild systolic and diastolic   septal flattening c/w RV pressure and volume overload.   Normal left ventricular diastolic filling pressure.   The right atrium is mildly dilated.   There is trivial aortic regurgitation.   Moderate to large mobile, pedunculated vegetation (1.15 cm x 2.43 cm)   attached to base of TV leaflet c/w endocarditis.   Moderate tricuspid regurgitation.   Systolic pulmonary artery pressure (SPAP) estimated at 55 mmHg (RA pressure   3 mmHg), c/w moderate pulmonary hypertension.   Note TV endocarditis seen on prior March 2017 study but LV systolic function   is mildly decreased now. ECHO 6/8/18  Summary   This is a limited study s/p code.   LV systolic function is lower normal with EF visually estimated at 50%.   D-shaped septum c/w RV pressure and volume overload.   Large mobile, pedunculated vegetation on septal leaflet of tricuspid valve   c/w endocarditis.   No pericardial effusion noted.        Cultures:  Respiratory cultures -6/12/18 - MRSA . Repeat culture 6/20/18 MRSA   Blood culture: ON ADMISSION - 6/5/18 POSITIVE S. aureus, S pneumonia and Enterobacter  Recent blood cultures negative       Radiology    XR CHEST PORTABLE   Final Result   Stable chest         XR ABDOMEN (KUB) (SINGLE AP VIEW)   Final Result   Nondiagnostic due to paucity of small bowel gas         XR ABDOMEN (KUB) (SINGLE AP VIEW)   Final Result   Multiple mildly dilated loops of small bowel. Findings may represent ileus   or partial small bowel obstruction. XR CHEST PORTABLE   Final Result   Stable positioning of left central venous catheter. No pneumothorax. Slightly improved aeration with persistent ground-glass opacities in the left   mid lung. XR CHEST PORTABLE   Final Result   Retraction of the left internal jugular CVC with its tip in the expected   region of the brachiocephalic vein      No significant change in the cavitary nodules and bibasilar airspace disease         XR CHEST PORTABLE   Final Result   Tracheostomy tube placement. Left basilar airspace disease most consistent with atelectasis         IR NONTUNNELED VASCULAR CATHETER   Final Result   Successful ultrasound and fluoroscopy guided non-tunneled right femoral vein   temporary dialysis catheter placement. XR CHEST PORTABLE   Final Result   Improving bibasilar airspace disease. Stable cavitary lesions. CT Chest WO Contrast   Final Result   1. Minimal worsening partially cavitating pulmonary nodules and airspace   disease throughout the bilateral lungs consistent with septic emboli. 2. New trace bilateral pleural effusions. 3. Mediastinal adenopathy, likely reactive. 4. Heterogeneous splenomegaly. XR CHEST PORTABLE   Final Result   Stable appearing bibasilar airspace disease. Support tubes and lines as   above. XR CHEST PORTABLE   Final Result   No significant interval change in bilateral airspace disease as compared to   prior. Cavitary pulmonary nodules are again demonstrated. XR CHEST PORTABLE   Final Result   Left mid lung airspace disease is similar to minimally improved as compared   to prior. Persistent basilar atelectasis and cavitary right lung lesions. VL Extremity Venous Bilateral   Final Result      XR CHEST PORTABLE   Final Result   Left IJ central venous line in the superior vena cava with no pneumothorax         US GALLBLADDER RUQ   Final Result   1. Small amount of complex ascites concerning for hemorrhage. Consider   further evaluation with CT of the abdomen and pelvis. 2. Cholelithiases without evidence of acute cholecystitis. 3. Cirrhosis. XR CHEST PORTABLE   Final Result   Interval increase in right basilar atelectasis. No appreciable change in   appearance of septic pulmonary emboli         XR CHEST PORTABLE   Final Result   Slightly decreased right-sided airspace disease, otherwise stable chest.         XR CHEST PORTABLE   Final Result   Slight worsening of patchy airspace opacity right lower lobe over the past   few days. Relatively stable cavitary nodular lesions both lungs. Tubes and lines remain in good position. XR CHEST PORTABLE   Final Result   No significant change in the bony basilar airspace disease and suspected   septic emboli. XR CHEST PORTABLE   Final Result   1. Stable lines, tubes and support devices. 2. Stable cardiopulmonary status including bilateral airspace opacities. XR CHEST PORTABLE   Final Result   Stable life support devices. No acute interval change regarding bilateral   airspace disease. XR CHEST PORTABLE   Final Result   Stable life support device positioning. No substantial change in multifocal consolidative cavitary airspace disease. XR CHEST PORTABLE   Final Result   Stable chest         XR CHEST PORTABLE   Final Result   Improvement in focus of airspace disease in the left mid lung.   Persistent yesterday. RECOMMENDATION:   CT scan of the chest with contrast recommended for further evaluation. XR ABDOMEN (KUB) (SINGLE AP VIEW)   Final Result   Probable mild nonobstructive ileus. Moderate to large amount of stool in the   left colon. XR CHEST PORTABLE   Final Result   Improving bilateral airspace disease. XR CHEST PORTABLE   Final Result   Worsening bilateral airspace disease, probably pneumonia. XR CHEST PORTABLE   Final Result   Multifocal pneumonia, unchanged. XR CHEST 1 VW   Final Result   No change other than repositioning of the endotracheal tube. XR CHEST PORTABLE   Final Result   The endotracheal tube needs retracted approximately 3 cm. Satisfactory position right IJ tunnel catheter and left central venous   catheter. Increased size of the multifocal nodular lung opacities presumed multifocal   pneumonia. Findings were discussed with ICU nurse caring for the patient at 2:21 pm on   6/8/2018. XR CHEST PORTABLE   Final Result   Multifocal pneumonia. XR FOOT RIGHT (2 VIEWS)   Final Result   Lucency through the medial sesamoid most consistent with bipartite medial   sesamoid in the absence of history of trauma. This can be associated with   pain. CTA ABDOMEN PELVIS W WO CONTRAST   Final Result   No active gastrointestinal hemorrhage is identified. No vascular abnormality   is appreciated. Right lower lobe pneumonia as well as cavitary nodules. Septic emboli are   consideration. Compared with 11/26/2017, there is waxing and waning airspace   disease and pulmonary nodules. Organizing pneumonia is an alternative   possibility. Hepatosplenomegaly. Hepatomegaly is similar to 11/26/2017. Splenomegaly is   new. Hypodensity in the spleen, suggestive of acute to subacute embolic   infarction. Small amount of ascites, nonspecific. Hyperdensity of the gallbladder wall.   This may represent mural

## 2018-07-01 NOTE — PROGRESS NOTES
Pulmonary & Critical Care Medicine ICU Progress Note    CC: endocarditis    Events of Last 24 hours:   Fever 102.3    Invasive Lines:    L femoral CVC 18  R femoral HD 18     MV:  18 -   Vent Mode: AC/VC Rate Set: 18 bmp/Vt Ordered: 350 mL/ /FiO2 : 30 %  Recent Labs      18   0528   PHART  7.425   BSN7VIO  32.7*   PO2ART  142.0*     IV:   IV infusion builder 50 mL/hr at 18    dextrose         Vitals:  Blood pressure 136/85, pulse 139, temperature 100.3 °F (37.9 °C), temperature source Oral, resp. rate 15, height 5' 5\" (1.651 m), weight 147 lb 11.3 oz (67 kg), SpO2 100 %, not currently breastfeeding. on 30%  Temp  Av.3 °F (37.9 °C)  Min: 99.1 °F (37.3 °C)  Max: 102.3 °F (39.1 °C)    Intake/Output Summary (Last 24 hours) at 18 0606  Last data filed at 18 0410   Gross per 24 hour   Intake             3205 ml   Output             4250 ml   Net            -1045 ml     EXAM:  General: ill appearing but looks better day over day  Eyes: PERRL. No sclera icterus. No conjunctival injection. ENT: No discharge. 8 Portex tracheostomy tube  Neck: Trachea midline. Normal thyroid. Resp: No accessory muscle use. few crackles. No wheezing. No rhonchi. No dullness on percussion. CV: Regular rate. Regular rhythm. No mumur or rub. + edema   GI: Non-tender. Minimally distended. No masses. No organomegaly. Hypoactive bowel sounds. No hernia. PEG  Skin: Warm and dry. No nodule on exposed extremities. No rash on exposed extremities. Lymph: No cervical LAD. No supraclavicular LAD. M/S: No cyanosis. No joint deformity. No clubbing. Neuro: Regards & follows commands with all 4 extremities.     Psych: Unable to obtain because the patient is poorly communicative      Scheduled Meds:   methadone  5 mg Oral BID    LORazepam  1 mg Intravenous Q2H    diazepam  5 mg Oral TID    fentaNYL  2 patch Transdermal Q72H    chlorhexidine  15 mL Mouth/Throat BID    albuterol sulfate HFA  6 puff peritracheal lymph node measures 1.1 x 1.5 cm.       Lungs/pleura: Endotracheal tube is in situ.  The tracheobronchial tree is   patent. Alayne Blunt is no pneumothorax.  There are new trace bilateral pleural   effusions with compressive atelectasis.       No change in the majority of the partially cavitating pulmonary nodules   throughout the bilateral lungs due to septic emboli.  However, the previously   noted airspace disease within the anterior segment of the left upper lobe has   become cavitary.  There is improved aeration and re-expansion of the left   lower lobe.     Upper Abdomen: A nasogastric tube reaches the lower gastroesophageal   junction.  A low-attenuation lesion in the left hepatic lobe is unchanged,   but incompletely imaged and cannot be fully evaluated given the lack There is   heterogeneous enhancement of the enlarged spleen measuring 14 cm in length.       Soft Tissues/Bones: No change in the old compression fractures involving the   superior endplates of T6 through T9.  A small to moderate amount of anasarca   is present throughout the lateral thorax.           Impression   1. Minimal worsening partially cavitating pulmonary nodules and airspace   disease throughout the bilateral lungs consistent with septic emboli. 2. New trace bilateral pleural effusions. 3. Mediastinal adenopathy, likely reactive. 4. Heterogeneous splenomegaly.      ASSESSMENT:  · Acute ventilator-dependent hypoxemic respiratory failure  · Acute blood loss anemia, hemoglobin is trending down again  · Upper GI bleed secondary to angiodysplasia status post cauterization  · Transfused 6/27/18, 6/9/18, 6/8/18, 6/6/18   · Persistent fevers, fever curve better last 24 hours  · Recurrent tricuspid valve infectious endocarditis  · Polymicrobial bacteremia: MRSA, Streptococcal pneumonia, and Enterobacter  · Acute liver failure - improved  · Cavitary pneumonia due to septic emboli    · End-stage kidney disease on hemodialysis  · IV drug abuse  · Hepatitis C  · Hyponatremia    · S/P Trach/PEG on 6/27/18  · Thrombocytopenia is stable    PLAN:  Mechanical ventilation as per my orders. Pressure support trials have been started. Not able to tolerate TC yet. Has  225 mcg fentanyl patches, scheduled valium and PRN IV versed/dilaudid. Methadone off.   Tapering valium  Head of bed 30 degrees or higher at all times  · Vancomycin D#26, Cefepime D#14, Zyvox D#11; infectious disease is following   · Holding Left IJ vascular site for tunneled HD catheter  · When NPO needs to have close monitoring of FSBS q 1 hour  · PT/OT  · TF  · Prophylaxis: DVT - no SQ heparin with ongoing blood requirement, Protonix

## 2018-07-01 NOTE — PROGRESS NOTES
Cuff deflated and Patient placed on trach collar at 30% fio2 at 1001. sp02 remains 100%, HR remains 130, RR 20. Will continue to monitor.

## 2018-07-01 NOTE — PROGRESS NOTES
06/30/18 2250   Vent Information   Vent Type 840   Vent Mode AC/VC   Vt Ordered 350 mL   Rate Set 18 bmp   Peak Flow 55 L/min   Pressure Support 0 cmH20   FiO2  30 %   Sensitivity 3   PEEP/CPAP 5   I Time/ I Time % 0 s   Humidification Source Heated wire   Humidification Temp Measured 38.2   Circuit Condensation Drained   Vent Patient Data   Vt Exhaled 928 mL   Rate Measured 40 br/min   Minute Volume 16.1 Liters   Peak Inspiratory Pressure 38 cmH2O   I:E Ratio 1:1.50   High Peep/I Pressure 0   Mean Airway Pressure 11 cmH20   Spontaneous Breathing Trial (SBT) RT Doc   Pulse 142   SpO2 100 %   Cough/Sputum   Sputum How Obtained Tracheal;Suctioned   Cough Productive   Sputum Amount Moderate   Sputum Color Tan   Tenacity Thick   Breath Sounds   Right Upper Lobe Rhonchi   Right Middle Lobe Rhonchi   Right Lower Lobe Diminished   Left Upper Lobe Rhonchi   Additional Respiratory  Assessments   Resp 28   End Tidal CO2 28 (%)   Position Semi-Lucero's   Alarm Settings   High Pressure Alarm 50 cmH2O   Low Minute Volume Alarm 2.5 L/min   High Respiratory Rate 45 br/min   Low Exhaled Vt  250 mL   Patient Observation   Observations Ambu bag at bedside   Surgical Airway (trach) Portex Cuffed   Placement Date/Time: 06/27/18 0725   Timeout: Patient;Procedure;Site/Side;Appropriate Equipment; Consent Confirmed;Sterile Technique using full body drape;Site prepped with chlorhexidine;Correct Position  Placed By: In surgery  Surgical Airway Type: Tr...    Status Secured   Site Assessment Dry

## 2018-07-02 LAB
ALBUMIN SERPL-MCNC: 2.4 G/DL (ref 3.4–5)
ANION GAP SERPL CALCULATED.3IONS-SCNC: 14 MMOL/L (ref 3–16)
BUN BLDV-MCNC: 37 MG/DL (ref 7–20)
CALCIUM SERPL-MCNC: 9.2 MG/DL (ref 8.3–10.6)
CHLORIDE BLD-SCNC: 94 MMOL/L (ref 99–110)
CO2: 20 MMOL/L (ref 21–32)
CREAT SERPL-MCNC: 3.7 MG/DL (ref 0.6–1.1)
FINAL REPORT: NORMAL
GFR AFRICAN AMERICAN: 18
GFR NON-AFRICAN AMERICAN: 15
GLUCOSE BLD-MCNC: 102 MG/DL (ref 70–99)
GLUCOSE BLD-MCNC: 84 MG/DL (ref 70–99)
GLUCOSE BLD-MCNC: 86 MG/DL (ref 70–99)
HCT VFR BLD CALC: 21.7 % (ref 36–48)
HEMOGLOBIN: 7.4 G/DL (ref 12–16)
INR BLD: 1.41 (ref 0.86–1.14)
MCH RBC QN AUTO: 31.5 PG (ref 26–34)
MCHC RBC AUTO-ENTMCNC: 34 G/DL (ref 31–36)
MCV RBC AUTO: 92.7 FL (ref 80–100)
PDW BLD-RTO: 16.6 % (ref 12.4–15.4)
PERFORMED ON: ABNORMAL
PERFORMED ON: NORMAL
PHOSPHORUS: 3.4 MG/DL (ref 2.5–4.9)
PLATELET # BLD: 179 K/UL (ref 135–450)
PMV BLD AUTO: 7.6 FL (ref 5–10.5)
POTASSIUM SERPL-SCNC: 3.9 MMOL/L (ref 3.5–5.1)
PRELIMINARY: NORMAL
PROTHROMBIN TIME: 16.1 SEC (ref 9.8–13)
RBC # BLD: 2.34 M/UL (ref 4–5.2)
SODIUM BLD-SCNC: 128 MMOL/L (ref 136–145)
WBC # BLD: 9.6 K/UL (ref 4–11)

## 2018-07-02 PROCEDURE — 85610 PROTHROMBIN TIME: CPT

## 2018-07-02 PROCEDURE — 97530 THERAPEUTIC ACTIVITIES: CPT

## 2018-07-02 PROCEDURE — 80069 RENAL FUNCTION PANEL: CPT

## 2018-07-02 PROCEDURE — 85027 COMPLETE CBC AUTOMATED: CPT

## 2018-07-02 PROCEDURE — 99291 CRITICAL CARE FIRST HOUR: CPT | Performed by: INTERNAL MEDICINE

## 2018-07-02 PROCEDURE — 99232 SBSQ HOSP IP/OBS MODERATE 35: CPT | Performed by: INTERNAL MEDICINE

## 2018-07-02 PROCEDURE — 36592 COLLECT BLOOD FROM PICC: CPT

## 2018-07-02 PROCEDURE — 74022 RADEX COMPL AQT ABD SERIES: CPT

## 2018-07-02 PROCEDURE — 94003 VENT MGMT INPAT SUBQ DAY: CPT

## 2018-07-02 PROCEDURE — 99233 SBSQ HOSP IP/OBS HIGH 50: CPT | Performed by: INTERNAL MEDICINE

## 2018-07-02 PROCEDURE — 9990 CHARGE CONVERSION

## 2018-07-02 PROCEDURE — 94640 AIRWAY INHALATION TREATMENT: CPT

## 2018-07-02 PROCEDURE — 94750 CHARGE CONVERSION: CPT

## 2018-07-02 PROCEDURE — 97110 THERAPEUTIC EXERCISES: CPT

## 2018-07-02 PROCEDURE — C9113 INJ PANTOPRAZOLE SODIUM, VIA: HCPCS

## 2018-07-02 PROCEDURE — 94761 N-INVAS EAR/PLS OXIMETRY MLT: CPT

## 2018-07-02 RX ORDER — SODIUM CHLORIDE 9 MG/ML
INJECTION, SOLUTION INTRAVENOUS
Status: COMPLETED
Start: 2018-07-02 | End: 2018-07-02

## 2018-07-02 RX ORDER — LORAZEPAM 2 MG/ML
2 INJECTION INTRAMUSCULAR
Status: DISCONTINUED | OUTPATIENT
Start: 2018-07-02 | End: 2018-07-04

## 2018-07-02 RX ORDER — QUETIAPINE FUMARATE 25 MG/1
50 TABLET, FILM COATED ORAL DAILY
Status: DISCONTINUED | OUTPATIENT
Start: 2018-07-02 | End: 2018-07-03

## 2018-07-02 RX ORDER — MIDAZOLAM HYDROCHLORIDE 1 MG/ML
2 INJECTION INTRAMUSCULAR; INTRAVENOUS
Status: DISCONTINUED | OUTPATIENT
Start: 2018-07-02 | End: 2018-07-05

## 2018-07-02 RX ORDER — DEXTROSE AND SODIUM CHLORIDE 5; .9 G/100ML; G/100ML
INJECTION, SOLUTION INTRAVENOUS CONTINUOUS
Status: DISCONTINUED | OUTPATIENT
Start: 2018-07-02 | End: 2018-07-11

## 2018-07-02 RX ORDER — CLONIDINE HYDROCHLORIDE 0.1 MG/1
0.1 TABLET ORAL 3 TIMES DAILY
Status: DISCONTINUED | OUTPATIENT
Start: 2018-07-02 | End: 2018-07-18 | Stop reason: HOSPADM

## 2018-07-02 RX ORDER — DIAZEPAM 10 MG/1
10 TABLET ORAL EVERY 8 HOURS
Status: DISCONTINUED | OUTPATIENT
Start: 2018-07-02 | End: 2018-07-03

## 2018-07-02 RX ORDER — QUETIAPINE FUMARATE 100 MG/1
100 TABLET, FILM COATED ORAL NIGHTLY
Status: DISCONTINUED | OUTPATIENT
Start: 2018-07-02 | End: 2018-07-03

## 2018-07-02 RX ADMIN — Medication 1 MG: at 04:11

## 2018-07-02 RX ADMIN — CHLORHEXIDINE GLUCONATE 0.12% ORAL RINSE 15 ML: 1.2 LIQUID ORAL at 07:58

## 2018-07-02 RX ADMIN — Medication 10 ML: at 07:58

## 2018-07-02 RX ADMIN — CLONIDINE HYDROCHLORIDE 0.1 MG: 0.1 TABLET ORAL at 13:14

## 2018-07-02 RX ADMIN — Medication 10 ML: at 21:06

## 2018-07-02 RX ADMIN — Medication 1 MG: at 07:58

## 2018-07-02 RX ADMIN — IPRATROPIUM BROMIDE AND ALBUTEROL SULFATE 1 AMPULE: 2.5; .5 SOLUTION RESPIRATORY (INHALATION) at 14:52

## 2018-07-02 RX ADMIN — PANTOPRAZOLE SODIUM 40 MG: 40 INJECTION, POWDER, FOR SOLUTION INTRAVENOUS at 07:58

## 2018-07-02 RX ADMIN — DIAZEPAM 5 MG: 5 TABLET ORAL at 07:58

## 2018-07-02 RX ADMIN — IPRATROPIUM BROMIDE AND ALBUTEROL SULFATE 1 AMPULE: 2.5; .5 SOLUTION RESPIRATORY (INHALATION) at 11:35

## 2018-07-02 RX ADMIN — LORAZEPAM 1 MG: 2 INJECTION INTRAMUSCULAR at 10:01

## 2018-07-02 RX ADMIN — ALBUTEROL SULFATE 6 PUFF: 90 AEROSOL, METERED RESPIRATORY (INHALATION) at 02:36

## 2018-07-02 RX ADMIN — IPRATROPIUM BROMIDE AND ALBUTEROL SULFATE 1 AMPULE: 2.5; .5 SOLUTION RESPIRATORY (INHALATION) at 07:18

## 2018-07-02 RX ADMIN — QUETIAPINE FUMARATE 50 MG: 25 TABLET, FILM COATED ORAL at 13:14

## 2018-07-02 RX ADMIN — SODIUM CHLORIDE 250 ML: 9 INJECTION, SOLUTION INTRAVENOUS at 10:08

## 2018-07-02 RX ADMIN — Medication 1 MG: at 13:14

## 2018-07-02 RX ADMIN — Medication 10 ML: at 13:14

## 2018-07-02 RX ADMIN — HYDROXYZINE PAMOATE 50 MG: 50 CAPSULE ORAL at 10:01

## 2018-07-02 RX ADMIN — LORAZEPAM 1 MG: 2 INJECTION INTRAMUSCULAR at 02:03

## 2018-07-02 RX ADMIN — CLONIDINE HYDROCHLORIDE 0.1 MG: 0.1 TABLET ORAL at 21:05

## 2018-07-02 RX ADMIN — LINEZOLID 600 MG: 2 INJECTION, SOLUTION INTRAVENOUS at 21:05

## 2018-07-02 RX ADMIN — IPRATROPIUM BROMIDE AND ALBUTEROL SULFATE 1 AMPULE: 2.5; .5 SOLUTION RESPIRATORY (INHALATION) at 18:49

## 2018-07-02 RX ADMIN — DEXTROSE AND SODIUM CHLORIDE: 5; .9 INJECTION, SOLUTION INTRAVENOUS at 12:00

## 2018-07-02 RX ADMIN — Medication 1 MG: at 18:43

## 2018-07-02 RX ADMIN — Medication 1 MG: at 23:45

## 2018-07-02 RX ADMIN — CHLORHEXIDINE GLUCONATE 0.12% ORAL RINSE 15 ML: 1.2 LIQUID ORAL at 21:05

## 2018-07-02 RX ADMIN — NYSTATIN: 100000 CREAM TOPICAL at 21:06

## 2018-07-02 RX ADMIN — QUETIAPINE FUMARATE 100 MG: 100 TABLET, FILM COATED ORAL at 21:05

## 2018-07-02 RX ADMIN — ALBUTEROL SULFATE 6 PUFF: 90 AEROSOL, METERED RESPIRATORY (INHALATION) at 23:08

## 2018-07-02 RX ADMIN — LORAZEPAM 1 MG: 2 INJECTION INTRAMUSCULAR at 04:11

## 2018-07-02 RX ADMIN — HYDROXYZINE PAMOATE 50 MG: 50 CAPSULE ORAL at 23:45

## 2018-07-02 RX ADMIN — LINEZOLID 600 MG: 2 INJECTION, SOLUTION INTRAVENOUS at 07:58

## 2018-07-02 RX ADMIN — DIAZEPAM 10 MG: 10 TABLET ORAL at 23:45

## 2018-07-02 RX ADMIN — LORAZEPAM 1 MG: 2 INJECTION INTRAMUSCULAR at 06:10

## 2018-07-02 RX ADMIN — NYSTATIN: 100000 CREAM TOPICAL at 08:04

## 2018-07-02 RX ADMIN — Medication 1 MG: at 10:01

## 2018-07-02 RX ADMIN — Medication 1 MG: at 00:16

## 2018-07-02 RX ADMIN — LORAZEPAM 1 MG: 2 INJECTION INTRAMUSCULAR at 12:00

## 2018-07-02 RX ADMIN — LORAZEPAM 1 MG: 2 INJECTION INTRAMUSCULAR at 07:58

## 2018-07-02 RX ADMIN — DIAZEPAM 10 MG: 10 TABLET ORAL at 16:36

## 2018-07-02 ASSESSMENT — PAIN SCALES - GENERAL
PAINLEVEL_OUTOF10: 7
PAINLEVEL_OUTOF10: 9
PAINLEVEL_OUTOF10: 7
PAINLEVEL_OUTOF10: 0
PAINLEVEL_OUTOF10: 5
PAINLEVEL_OUTOF10: 0
PAINLEVEL_OUTOF10: 7
PAINLEVEL_OUTOF10: 7
PAINLEVEL_OUTOF10: 8
PAINLEVEL_OUTOF10: 7
PAINLEVEL_OUTOF10: 9

## 2018-07-02 ASSESSMENT — PAIN DESCRIPTION - ORIENTATION: ORIENTATION: MID

## 2018-07-02 ASSESSMENT — PULMONARY FUNCTION TESTS
PIF_VALUE: 13
PIF_VALUE: 12

## 2018-07-02 ASSESSMENT — PAIN DESCRIPTION - LOCATION: LOCATION: ABDOMEN;BACK

## 2018-07-02 NOTE — PROGRESS NOTES
Pt medicated at 2000, 0000, and 0400 with Dilaudid. Pt slept 12mn-3am. Pt started pulling at vent tubing to pull it off her trach every 5 minutes after 5 am. Pt instructed numerous times to leave lines alone and not to pull anything. CVC dressing was off upon entering room at 6 am to draw morning labs. Line out approximately 3 cm. Line carefully cleaned and dressing reapplied. Blood return from brown port. Pt pulled VasCath dressing loose while this RN was standing on that side of the bed. VasCath dressing reapplled,Pt placed in restraints for her own safety. Report to Roshan Cornell.

## 2018-07-02 NOTE — PROGRESS NOTES
Care rounds completed with Dr. Alta Marsh and multidisciplinary team. Reviewed labs, meds, VS, assessment, & plan of care for today. See dictated note and new orders for details. Dr. Alta Marsh aware R femoral CVC was pulled by 3 cm, stated it was okay to use. Plan to dc cefepime and vancomycin, start D5NS @ 50 ml/hr, considering starting Seroquel .

## 2018-07-02 NOTE — PROGRESS NOTES
Pt thrashing around. Pt repositioned for comfort. Medicated with Dilaudid 1 mg IVP for apparent pain.  Pt appears to be sleeping after Dilaudid

## 2018-07-02 NOTE — PROGRESS NOTES
RR 21-30 = 1    Breath Sounds: Diminshed bilaterally and/or crackles = 2    Sputum  Sputum Color: Tan, Yellow, Tenacity: Thick, Sputum How Obtained: Spontaneous cough  Cough: Strong, productive = 1    Vital Signs   /81   Pulse 109   Temp 100.7 °F (38.2 °C) (Axillary)   Resp 27   Ht 5' 5\" (1.651 m)   Wt 147 lb 11.3 oz (67 kg)   SpO2 100%   BMI 24.58 kg/m²   SPO2 (COPD values may differ): 90-91% on room air or greater than 92% on FiO2 24- 28% = 1    Peak Flow (asthma only): not applicable = 0    RSI: 14-01 = Q4WA (every four hours while awake) and Q4hrs PRN        Plan       Goals: mobilize retained secretions, volume expansion and improve oxygenation    Patient/caregiver was educated on the proper method of use for Respiratory Care Devices:  Yes      Level of patient/caregiver understanding able to:   [] Verbalize understanding   [] Demonstrate understanding       [] Teach back        [] Needs reinforcement       []  No available caregiver               []  Other:     Response to education:  Excellent     Is patient being placed on Home Treatment Regimen? NA     Does the patient have everything they need prior to discharge? NA     Comments: Chart and home meds reviewed    Plan of Care: Cont the patient on the current therapy    Electronically signed by Radha Ash RCP on 7/2/2018 at 3:52 PM    Respiratory Protocol Guidelines     1. Assessment and treatment by Respiratory Therapy will be initiated for medication and therapeutic interventions upon initiation of aerosolized medication. 2. Physician will be contacted for respiratory rate (RR) greater than 35 breaths per minute. Therapy will be held for heart rate (HR) greater than 140 beats per minute, pending direction from physician. 3. Bronchodilators will be administered via Metered Dose Inhaler (MDI) with spacer when the following criteria are met:  a.  Alert and cooperative     b. HR < 140 bpm  c. RR < 30 bpm                d. Can demonstrate a 23 second inspiratory hold  4. Bronchodilators will be administered via Hand Held Nebulizer [facility]) to patients when ANY of the following criteria are met  a. Incognizant or uncooperative          b. Patients treated with HHN at Home        c. Unable to demonstrate proper use of MDI with spacer     d. RR > 30 bpm   5. Bronchodilators will be delivered via Metered Dose Inhaler (MDI), HHN, Aerogen to intubated patients on mechanical ventilation. 6. Inhalation medication orders will be delivered and/or substituted as outlined below. Aerosolized Medications Ordering and Administration Guidelines:    1. All Medications will be ordered by a physician, and their frequency and/or modality will be adjusted as defined by the patients Respiratory Severity Index (RSI) score. 2. If the patient does not have documented COPD, consider discontinuing anticholinergics when RSI is less than 9.  3. If the bronchospasm worsens (increased RSI), then the bronchodilator frequency can be increased to a maximum of every 4 hours. If greater than every 4 hours is required, the physician will be contacted. 4. If the bronchospasm improves, the frequency of the bronchodilator can be decreased, based on the patient's RSI, but not less than home treatment regimen frequency. 5. Bronchodilator(s) will be discontinued if patient has a RSI less than 9 and has received no scheduled or as needed treatment for 72  Hrs. Patients Ordered on a Mucolytic Agent:    1. Must always be administered with a bronchodilator. 2. Discontinue if patient experiences worsened bronchospasm, or secretions have lessened to the point that the patient is able to clear them with a cough. Anti-inflammatory and Combination Medications:    1.  If the patient lacks prior history of lung disease, is not using inhaled anti-inflammatory medication at home, and lacks wheezing by examination or by history for at least 24 hours, contact physician for possible discontinuation.

## 2018-07-02 NOTE — PROGRESS NOTES
Pt very agitated and grimacing. Trying to pull everything off. Pt reoriented and redirected without success. Pt medicated with scheduled Ativan and Dilaudid prn. Pt starting to doze off after repositioned.

## 2018-07-02 NOTE — PROGRESS NOTES
Internal Medicine ICU Progress Note      Interval history   Date of admission    34year old white female who was admitted for GI bleed. Had an EGD   BC positive for strep pneumonia, staph aureus and gram-negative salazar. Echocardiogram showed tricuspid valve endocarditis. She's had this before. History of IV drug use  CT chest showed septic emboli and possible splenic infarct. - Transferred out of ICU on 18  - Transferred back to ICU on 18 for PEA arrest, intubated,  resuscitated with fluids  - Started on CRRT for acute renal failure, on 18  - Weaned off pressors on 18, and extubated  - Reintubated   - s.p bronch    - Off CRRT and started on hemodialysis  18  - Has been on antibiotics vancomycin and cefepime and Zyvox  - Patient remains on mechanical ventilation, continued fevers, on dialysis. - Followed by intensivist, ID, nephrologist.  - [facility] removed 18 due to persistent fevers   - S/P Trach and PEG on   - femoral vascath placed     - had emesis. TF held. Doing better. Did not tolerate trach collar. - she is very restless. She is tolerating her TF. Low grade fever. - somewhat sleepy. Low grade fever. Subjective  Pt remains on mechanical ventilation , has tracheostomy now . Awake, responding, Follows commands         No results for input(s): PHART, QUD2ATP, PO2ART in the last 72 hours.     MV Settings:  Vent Mode: PS Rate Set: 0 bmp/Vt Ordered: 350 mL/ /FiO2 : 30 %    IV:   sodium chloride      dextrose         Vitals:  Temp  Av.9 °F (37.7 °C)  Min: 98.8 °F (37.1 °C)  Max: 102 °F (38.9 °C)  Pulse  Av  Min: 115  Max: 144  BP  Min: 119/76  Max: 188/114  SpO2  Av %  Min: 99 %  Max: 100 %  FiO2   Av %  Min: 30 %  Max: 30 %  Patient Vitals for the past 4 hrs:   BP Temp Temp src Pulse Resp SpO2   18 0900 (!) 148/107 - - 135 21 100 %   18 0800 (!) 167/105 - - 125 - 100 %   18 0718 - - - - 20 100 %   18 acetaminophen, promethazine, hydrOXYzine    Results:  CBC:   Recent Labs      06/30/18   0354  07/01/18   0642  07/02/18   0615   WBC  9.4  8.2  9.6   HGB  7.4*  7.2*  7.4*   HCT  21.8*  21.4*  21.7*   MCV  93.4  92.8  92.7   PLT  103*  130*  179     BMP:   Recent Labs      06/30/18   0354  07/01/18   0641  07/02/18   0615   NA  122*  127*  128*   K  3.2*  3.4*  3.9   CL  89*  93*  94*   CO2  21  22  20*   PHOS  4.3  3.2  3.4   BUN  44*  28*  37*   CREATININE  3.9*  2.9*  3.7*     LIVER PROFILE:   No results for input(s): AST, ALT, LIPASE, BILIDIR, BILITOT, ALKPHOS in the last 72 hours. Invalid input(s): AMYLASE,  ALB  PT/INR:   Recent Labs      07/02/18   0925   PROTIME  16.1*   INR  1.41*     ECHO 6/6/18   Summary   LV systolic function is mildly reduced with LVEF estimated at 40-45%.   Mild global hypokinesis is present. There is mild systolic and diastolic   septal flattening c/w RV pressure and volume overload.   Normal left ventricular diastolic filling pressure.   The right atrium is mildly dilated.   There is trivial aortic regurgitation.   Moderate to large mobile, pedunculated vegetation (1.15 cm x 2.43 cm)   attached to base of TV leaflet c/w endocarditis.   Moderate tricuspid regurgitation.   Systolic pulmonary artery pressure (SPAP) estimated at 55 mmHg (RA pressure   3 mmHg), c/w moderate pulmonary hypertension.   Note TV endocarditis seen on prior March 2017 study but LV systolic function   is mildly decreased now. ECHO 6/8/18  Summary   This is a limited study s/p code.   LV systolic function is lower normal with EF visually estimated at 50%.   D-shaped septum c/w RV pressure and volume overload.   Large mobile, pedunculated vegetation on septal leaflet of tricuspid valve   c/w endocarditis.   No pericardial effusion noted. Cultures:  Respiratory cultures -6/12/18 - MRSA .   Repeat culture 6/20/18 MRSA   Blood culture: ON ADMISSION - 6/5/18 POSITIVE S. aureus, S pneumonia and

## 2018-07-02 NOTE — PROGRESS NOTES
Occupational Therapy Daily Treatment Note    Unit: ICU   Date:  7/2/2018  Patient Name:    Trinity Olivares  Admitting diagnosis:  GI BLEED  Admit Date:  6/6/2018  Precautions/Restrictions:  Fall risk, bed/chair alarm   Intubated 6/8-6/11 & re-intubated on 6/12-6/27; currently mechanically ventilated via tracheostomy (6/27/18), CRRT 6/8-6/23, now on HD; contact (MRSA), LIJ CVC, rectal tube, B soft wrist restraints, PEG  Coordinate with nursing to see pt during periods of reduced sedation; early AM most likely. Discharge Recommendations: LTAC  AM-PAC Score: 6  DME needs at discharge:none if going to LTAC    Treatment Time: 1330 - 1400  Treatment number:  3    Subjective:  Pt in the bed with UEs in soft restraints. Pt would open eyes briefly during treatment when her name was called. Pain:  Pt grimacing slightly during treatment     Bed Mobility:   Supine to Sit:  Sit to Supine:  Rolling:max  Assist of two for rolling to left and right   Scooting:max assist to scoot to the head of the bed    Transfer Training:   Sit to stand:NT  Stand to sit:NT  Bed to Chair/BSC:NT    ADL Training:   NA  Therapeutic Exercise:   Hand flex/ext: pt did squeeze therapist hand a couple of times with the left hand. Pt did not squeeze the rt hand. Pt actively moved the left UE with therapist assist.   Elbow flex/ext:10x  Shld flex/ext:10x   Pt given a squeeze sponge to grasp     Patient Education:   Instructed the pt in UE exercise. Positioning Needs:   Pillows under the left shoulder, trunk and buttocks due to pt leaning to the left in bed. Family Present:   none  Assessment:   Pt able to assist with some Left AAROM and able to squeeze therapist hand on the left a couple of time. Pt given a squeeze sponge to grasp to encourage movement. PROM performed on the rt UE. Pt did open eyes a few times when name was called however the pt only kept them open briefly.  Pillows under the left shoulder, trunk and buttocks due to pt leaning to the left in bed. Plan: cont with POC    At end of treatment, patient in  with call light and needs within reach.   Timed Code Treatment Minutes: 30   minutes  Total Treatment Time:  30 minutes    Signature, License #  Makayla Echevarria OTR/L [id number]

## 2018-07-02 NOTE — PROGRESS NOTES
Abdominal xray reading: Gaseous bowel distention greater in the central small bowel most likely ileus. Pt remains NPO, TF off. Perfect serve sent to Dr. Renee Downs to make aware.

## 2018-07-02 NOTE — PROGRESS NOTES
sedation. Pt able to participate in rolling in bed, but was unable to actively complete LE Jarrod. Pt requires max A x2 for bed mobility at this time. Continue to recommend LTACH upon discharge. Plan   Continue with plan of care. At end of treatment, patient in bed, alarm active, with call light and needs within reach.     Time Coded Treatment Minutes: 24  minutes  (6 min with OT only)  Total Treatment Time:  30 minutes    Sarahi Luciano, PT, DPT #[id number]

## 2018-07-02 NOTE — PROGRESS NOTES
[facility]       Progress Note           Subjective/   34y.o. year old female who we are seeing in consultation for ESRD. HPI:  Last HD on 6/30 interrupted by line dysfunction cath flow dwelled but completed 4h HD   session w 3 L UF, 67.5-> 65.1 kg.  BP's stable. ROS:  +fevers, on TF's. Objective/   GEN:  Chronically ill, /87   Pulse 120   Temp 100.7 °F (38.2 °C) (Axillary)   Resp 27   Ht 5' 5\" (1.651 m)   Wt 147 lb 11.3 oz (67 kg)   SpO2 100%   BMI 24.58 kg/m²   CV: S1, S2 without m/r/g; no edema  RESP: CTA B without w/r/r; breathing wnl, trached    Data/  Recent Labs      06/30/18   0354  07/01/18   0642  07/02/18   0615   WBC  9.4  8.2  9.6   HGB  7.4*  7.2*  7.4*   HCT  21.8*  21.4*  21.7*   MCV  93.4  92.8  92.7   PLT  103*  130*  179     Recent Labs      06/30/18   0354  07/01/18   0641  07/02/18   0615   NA  122*  127*  128*   K  3.2*  3.4*  3.9   CL  89*  93*  94*   CO2  21  22  20*   GLUCOSE  97  108*  86   PHOS  4.3  3.2  3.4   BUN  44*  28*  37*   CREATININE  3.9*  2.9*  3.7*   LABGLOM  14*  20*  15*   GFRAA  17*  24*  18*       Assessment/Plan     1- End stage renal disease: Hemodialysis per [facility]ThS while inpatient (MWF as outpatient).               - TDC on Monday 7/3              - bicarb bath to 32 and na bath to 132              - HD tomorrow after new line              - now has right femoral vasc cath            2- Acute GI bleed               - Hemoglobin stabilized s/p PRC transfusion               - followed by GI     3- MRSA, Streptococcus bacteremia, persistent endocarditis involving TV with septic   embolizations to lungs and spleen              - On IV antibiotics per ID              - s/p HD tDC removed on 6/25     4- Acute resp failure               - s/p trach 6/27     5- Hyponatremia              - avoid volume as able              - coming up with fluid removal              - D10 gtt has been stopped, now on D5NS 50 ml/hour

## 2018-07-02 NOTE — PROGRESS NOTES
nystatin   Topical BID    LORazepam  1 mg Intravenous Q2H    diazepam  5 mg Oral TID    chlorhexidine  15 mL Mouth/Throat BID    albuterol sulfate HFA  6 puff Inhalation Q4H    ipratropium  6 puff Inhalation Q4H    lidocaine  5 mL Intradermal Once    doxercalciferol  1 mcg Intravenous Once per day on Tue Thu Sat    lidocaine 1 % injection  5 mL Intradermal Once    sodium chloride flush  10 mL Intravenous 2 times per day    linezolid  600 mg Intravenous Q12H    darbepoetin emi-polysorbate  100 mcg Intravenous Weekly - Thursday    And    darbepoetin emi-polysorbate  25 mcg Subcutaneous Weekly - Thursday    pantoprazole  40 mg Intravenous Daily     PRN Meds:  ipratropium-albuterol, HYDROmorphone, heparin (porcine), sodium chloride flush, albumin human, dextrose, sodium phosphate IVPB **OR** [DISCONTINUED] sodium phosphate IVPB **OR** [DISCONTINUED] sodium phosphate IVPB **OR** [DISCONTINUED] sodium phosphate IVPB, ondansetron, glucose, glucagon (rDNA), dextrose, acetaminophen, promethazine, hydrOXYzine    Results:  CBC:   Recent Labs      06/30/18   0354  07/01/18   0642  07/02/18   0615   WBC  9.4  8.2  9.6   HGB  7.4*  7.2*  7.4*   HCT  21.8*  21.4*  21.7*   MCV  93.4  92.8  92.7   PLT  103*  130*  179     BMP:   Recent Labs      06/30/18   0354  07/01/18   0641  07/02/18   0615   NA  122*  127*  128*   K  3.2*  3.4*  3.9   CL  89*  93*  94*   CO2  21  22  20*   PHOS  4.3  3.2  3.4   BUN  44*  28*  37*   CREATININE  3.9*  2.9*  3.7*     LIVER PROFILE:   No results for input(s): AST, ALT, LIPASE, BILIDIR, BILITOT, ALKPHOS in the last 72 hours. Invalid input(s):   AMYLASE,  ALB    Cultures:  6/25/18 Blood NGTD  6/20/18 RESP MRSA  6/21/18 Blood NGTD  6/11/18 Blood NG  6/14/18 C dif negative  6/12/18 RESP MRSA  6/8/18 Blood NG  6/5/18 Blood MRSA and ENTEROBACTER CLOACAE       Films:  CHEST CT 6/25/18  FINDINGS:   Mediastinum:  Motion artifact degrades image quality.  The unenhanced heart   is

## 2018-07-02 NOTE — PROGRESS NOTES
Reassessment completed, pt sleeping with eyes closed, opens eyes to voice. Pt much less agitated and restless currently. VSS. Pt remains on trach collar. All ICU lines and monitoring remain in place. TF remains off, as waiting to hear if Vas Cath will be placed today and awaiting results of abdominal xray. D5 NS @ 50 ml/hr via L femoral CVC. Flexiseal in place, patent. Bilateral soft wrist restraints in place to prevent pt from pulling at lines and trach.

## 2018-07-02 NOTE — PROGRESS NOTES
Dr. Jessica Chapin at the bedside to examine pt. See progress not for details.  New abdominal x-ray d/t abdominal distention

## 2018-07-02 NOTE — PROGRESS NOTES
ID Follow-up NOTE    CC: tricuspid valve endocarditis    Subjective:     Patient sedated on vent. Seems to deny belly pain  Objective:     Patient Vitals for the past 24 hrs:   BP Temp Temp src Pulse Resp SpO2   18 0718 - - - - 20 100 %   18 0700 (!) 137/103 99.5 °F (37.5 °C) Oral 124 20 100 %   18 0503 119/76 - - 133 18 99 %   18 0400 135/85 100.2 °F (37.9 °C) Axillary 135 24 100 %   18 0237 - - - 121 27 -   18 0100 129/75 - - 120 21 -   18 0000 (!) 170/116 99.9 °F (37.7 °C) Oral 133 (!) 32 100 %   18 2300 (!) 188/114 - - 132 20 100 %   18 2234 - - - 115 23 100 %   18 2213 (!) 150/99 - - 119 20 100 %   18 2100 (!) 145/103 - - 123 18 100 %   18 2030 (!) 160/97 - - 130 24 100 %   18 2000 (!) 160/97 - - 137 22 100 %   18 1954 - - - 140 - -   18 1905 - - - 140 26 100 %   18 1900 (!) 123/106 99.2 °F (37.3 °C) Oral 144 18 100 %   18 1834 - 99.8 °F (37.7 °C) Oral - - -   18 1735 - 102 °F (38.9 °C) Oral - - -   18 1543 - - - - 18 100 %   18 1400 (!) 150/92 - - 130 18 100 %   18 1300 (!) 160/109 - - 126 25 100 %   18 1200 137/79 - - 133 26 100 %   18 1100 (!) 159/92 98.8 °F (37.1 °C) Oral 139 - 100 %   18 1000 (!) 153/85 - - 126 19 100 %   18 0900 (!) 154/74 - - 124 18 100 %     Temp (24hrs), Av.9 °F (37.7 °C), Min:98.8 °F (37.1 °C), Max:102 °F (38.9 °C)          EXAM:  General: on trach collar. Febrile intermittently; tmax lat 24h only 102; tachycardic as usual, responds a little but just received additional sedation. ENT:  pupils equal reactive  Neck: s nodes, fairly supple      LUNGS: coarse BS bilat; does not appear to be in respiratory distress   CV: RR c gd I syst M lower LSB     ABD: soft nontender, s mass; slightly distended. BS on the hypoactive side. EXT: without edema;Skin: no rash or other skin stigmata of endocarditis; icterus resolved. DP pulses full    Data Review:    Lab Results   Component Value Date    WBC 9.6 07/02/2018    HGB 7.4 (L) 07/02/2018    HCT 21.7 (L) 07/02/2018    MCV 92.7 07/02/2018     07/02/2018     Lab Results   Component Value Date    CREATININE 3.7 (H) 07/02/2018    BUN 37 (H) 07/02/2018     (L) 07/02/2018    K 3.9 07/02/2018    CL 94 (L) 07/02/2018    CO2 20 (L) 07/02/2018     Lab Results   Component Value Date    ALT 11 06/28/2018    AST 13 (L) 06/28/2018    ALKPHOS 173 (H) 06/28/2018    BILITOT 1.1 (H) 06/28/2018     MICRO: 6/5 blood cultures both grew MRSA. One culture also grew strep while the other culture grew Enterobacter sensitive to cefepime. 6/20 respiratory culture: MRSA vanc PANCHO 1  Most recent cultures (blood, line tip) are no growth so far    IMAGING: CXR: cavitary multifocal infiltrates presumably related to septic pulmonary emboli, stable  CT head: NAD;   CT chest: infiltrates more cavitary, but there are not much more in the way of foci of pulmonary disease. infiltrates and cavitary pulmonary nodules consistent with septic emboli. Assessment:     Active Problems:    IVDU (intravenous drug user)    Endocarditis of tricuspid valve    HCAP (healthcare-associated pneumonia)    Upper GI bleed    Sepsis due to Streptococcus pneumoniae (HCC)    Mild protein-calorie malnutrition (HCC)    PEA (Pulseless electrical activity) ([facility])    DIC (disseminated intravascular coagulation) ([address]    Acute respiratory failure with hypoxia ([facility])    Splenic infarction    Bacteremia    Mucus plugging of bronchi    Cavitary lung disease    Fever    Persistent fever    MRSA bacteremia    Hyponatremia    Acute blood loss anemia  Resolved Problems:    * No resolved hospital problems. *  Tricuspid valve endocarditis with septic pulmonary emboli (L sided endocarditis not ruled out) due to MRSA.  Strep and Enterobacter were also

## 2018-07-03 LAB
ALBUMIN SERPL-MCNC: 2.4 G/DL (ref 3.4–5)
ANION GAP SERPL CALCULATED.3IONS-SCNC: 14 MMOL/L (ref 3–16)
BASOPHILS ABSOLUTE: 0 K/UL (ref 0–0.2)
BASOPHILS RELATIVE PERCENT: 0.4 %
BUN BLDV-MCNC: 43 MG/DL (ref 7–20)
CALCIUM SERPL-MCNC: 8.9 MG/DL (ref 8.3–10.6)
CHLORIDE BLD-SCNC: 93 MMOL/L (ref 99–110)
CO2: 19 MMOL/L (ref 21–32)
CREAT SERPL-MCNC: 4.5 MG/DL (ref 0.6–1.1)
EOSINOPHILS ABSOLUTE: 0.5 K/UL (ref 0–0.6)
EOSINOPHILS RELATIVE PERCENT: 5.2 %
GFR AFRICAN AMERICAN: 14
GFR NON-AFRICAN AMERICAN: 12
GLUCOSE BLD-MCNC: 66 MG/DL (ref 70–99)
GLUCOSE BLD-MCNC: 79 MG/DL (ref 70–99)
GLUCOSE BLD-MCNC: 80 MG/DL (ref 70–99)
GLUCOSE BLD-MCNC: 82 MG/DL (ref 70–99)
GLUCOSE BLD-MCNC: 88 MG/DL (ref 70–99)
GLUCOSE BLD-MCNC: 88 MG/DL (ref 70–99)
HCT VFR BLD CALC: 22.6 % (ref 36–48)
HEMOGLOBIN: 7.4 G/DL (ref 12–16)
LYMPHOCYTES ABSOLUTE: 1 K/UL (ref 1–5.1)
LYMPHOCYTES RELATIVE PERCENT: 11.3 %
MCH RBC QN AUTO: 30.9 PG (ref 26–34)
MCHC RBC AUTO-ENTMCNC: 32.8 G/DL (ref 31–36)
MCV RBC AUTO: 94.4 FL (ref 80–100)
MONOCYTES ABSOLUTE: 0.8 K/UL (ref 0–1.3)
MONOCYTES RELATIVE PERCENT: 9.6 %
NEUTROPHILS ABSOLUTE: 6.4 K/UL (ref 1.7–7.7)
NEUTROPHILS RELATIVE PERCENT: 73.5 %
PDW BLD-RTO: 17.1 % (ref 12.4–15.4)
PERFORMED ON: ABNORMAL
PERFORMED ON: NORMAL
PHOSPHORUS: 4.1 MG/DL (ref 2.5–4.9)
PLATELET # BLD: 211 K/UL (ref 135–450)
PMV BLD AUTO: 7.6 FL (ref 5–10.5)
POTASSIUM SERPL-SCNC: 3.7 MMOL/L (ref 3.5–5.1)
RBC # BLD: 2.4 M/UL (ref 4–5.2)
SODIUM BLD-SCNC: 126 MMOL/L (ref 136–145)
WBC # BLD: 8.7 K/UL (ref 4–11)

## 2018-07-03 PROCEDURE — 94640 AIRWAY INHALATION TREATMENT: CPT

## 2018-07-03 PROCEDURE — P9047 ALBUMIN (HUMAN), 25%, 50ML: HCPCS

## 2018-07-03 PROCEDURE — 9990 CHARGE CONVERSION

## 2018-07-03 PROCEDURE — 80069 RENAL FUNCTION PANEL: CPT

## 2018-07-03 PROCEDURE — 85025 COMPLETE CBC W/AUTO DIFF WBC: CPT

## 2018-07-03 PROCEDURE — 99291 CRITICAL CARE FIRST HOUR: CPT | Performed by: INTERNAL MEDICINE

## 2018-07-03 PROCEDURE — 71045 X-RAY EXAM CHEST 1 VIEW: CPT

## 2018-07-03 PROCEDURE — 36415 COLL VENOUS BLD VENIPUNCTURE: CPT

## 2018-07-03 PROCEDURE — 94750 CHARGE CONVERSION: CPT

## 2018-07-03 PROCEDURE — 94003 VENT MGMT INPAT SUBQ DAY: CPT

## 2018-07-03 PROCEDURE — 94761 N-INVAS EAR/PLS OXIMETRY MLT: CPT

## 2018-07-03 PROCEDURE — 99232 SBSQ HOSP IP/OBS MODERATE 35: CPT | Performed by: INTERNAL MEDICINE

## 2018-07-03 PROCEDURE — 99233 SBSQ HOSP IP/OBS HIGH 50: CPT | Performed by: INTERNAL MEDICINE

## 2018-07-03 PROCEDURE — C9113 INJ PANTOPRAZOLE SODIUM, VIA: HCPCS

## 2018-07-03 RX ORDER — SENNA AND DOCUSATE SODIUM 50; 8.6 MG/1; MG/1
1 TABLET, FILM COATED ORAL DAILY
Status: DISCONTINUED | OUTPATIENT
Start: 2018-07-03 | End: 2018-07-18 | Stop reason: HOSPADM

## 2018-07-03 RX ORDER — PROPOFOL 10 MG/ML
10 INJECTION, EMULSION INTRAVENOUS
Status: DISCONTINUED | OUTPATIENT
Start: 2018-07-03 | End: 2018-07-04

## 2018-07-03 RX ORDER — QUETIAPINE FUMARATE 200 MG/1
200 TABLET, FILM COATED ORAL NIGHTLY
Status: DISCONTINUED | OUTPATIENT
Start: 2018-07-03 | End: 2018-07-09

## 2018-07-03 RX ORDER — FENTANYL 75 UG/H
2 PATCH TRANSDERMAL
Status: DISCONTINUED | OUTPATIENT
Start: 2018-07-03 | End: 2018-07-05

## 2018-07-03 RX ORDER — SODIUM CHLORIDE 9 MG/ML
INJECTION, SOLUTION INTRAVENOUS
Status: DISPENSED
Start: 2018-07-03 | End: 2018-07-04

## 2018-07-03 RX ORDER — FENTANYL 75 UG/H
2 PATCH TRANSDERMAL
Status: DISCONTINUED | OUTPATIENT
Start: 2018-07-05 | End: 2018-07-03

## 2018-07-03 RX ORDER — QUETIAPINE FUMARATE 25 MG/1
50 TABLET, FILM COATED ORAL ONCE
Status: COMPLETED | OUTPATIENT
Start: 2018-07-03 | End: 2018-07-03

## 2018-07-03 RX ORDER — QUETIAPINE FUMARATE 100 MG/1
100 TABLET, FILM COATED ORAL DAILY
Status: DISCONTINUED | OUTPATIENT
Start: 2018-07-04 | End: 2018-07-09

## 2018-07-03 RX ORDER — FENTANYL 25 UG/H
2 PATCH TRANSDERMAL
Status: DISCONTINUED | OUTPATIENT
Start: 2018-07-03 | End: 2018-07-03

## 2018-07-03 RX ORDER — DIAZEPAM 10 MG/1
10 TABLET ORAL EVERY 6 HOURS
Status: DISCONTINUED | OUTPATIENT
Start: 2018-07-03 | End: 2018-07-05

## 2018-07-03 RX ADMIN — DIAZEPAM 10 MG: 10 TABLET ORAL at 13:14

## 2018-07-03 RX ADMIN — NYSTATIN: 100000 CREAM TOPICAL at 23:24

## 2018-07-03 RX ADMIN — DEXTROSE AND SODIUM CHLORIDE: 5; .9 INJECTION, SOLUTION INTRAVENOUS at 06:26

## 2018-07-03 RX ADMIN — NYSTATIN: 100000 CREAM TOPICAL at 08:38

## 2018-07-03 RX ADMIN — DEXTROSE MONOHYDRATE 25 G: 25 INJECTION, SOLUTION INTRAVENOUS at 19:25

## 2018-07-03 RX ADMIN — DIAZEPAM 10 MG: 10 TABLET ORAL at 08:37

## 2018-07-03 RX ADMIN — DIAZEPAM 10 MG: 10 TABLET ORAL at 21:17

## 2018-07-03 RX ADMIN — CLONIDINE HYDROCHLORIDE 0.1 MG: 0.1 TABLET ORAL at 08:35

## 2018-07-03 RX ADMIN — QUETIAPINE FUMARATE 50 MG: 25 TABLET, FILM COATED ORAL at 12:49

## 2018-07-03 RX ADMIN — Medication 1 MG: at 06:25

## 2018-07-03 RX ADMIN — Medication 1 MG: at 16:10

## 2018-07-03 RX ADMIN — QUETIAPINE FUMARATE 50 MG: 25 TABLET, FILM COATED ORAL at 08:35

## 2018-07-03 RX ADMIN — CLONIDINE HYDROCHLORIDE 0.1 MG: 0.1 TABLET ORAL at 21:17

## 2018-07-03 RX ADMIN — Medication 10 ML: at 08:38

## 2018-07-03 RX ADMIN — LORAZEPAM 2 MG: 2 INJECTION INTRAMUSCULAR at 05:57

## 2018-07-03 RX ADMIN — CHLORHEXIDINE GLUCONATE 0.12% ORAL RINSE 15 ML: 1.2 LIQUID ORAL at 21:17

## 2018-07-03 RX ADMIN — Medication 10 ML: at 21:17

## 2018-07-03 RX ADMIN — ALBUTEROL SULFATE 6 PUFF: 90 AEROSOL, METERED RESPIRATORY (INHALATION) at 07:11

## 2018-07-03 RX ADMIN — IPRATROPIUM BROMIDE AND ALBUTEROL SULFATE 1 AMPULE: 2.5; .5 SOLUTION RESPIRATORY (INHALATION) at 22:47

## 2018-07-03 RX ADMIN — CHLORHEXIDINE GLUCONATE 0.12% ORAL RINSE 15 ML: 1.2 LIQUID ORAL at 08:38

## 2018-07-03 RX ADMIN — DOCUSATE SODIUM AND SENNOSIDES 1 TABLET: 8.6; 5 TABLET, FILM COATED ORAL at 12:48

## 2018-07-03 RX ADMIN — LORAZEPAM 2 MG: 2 INJECTION INTRAMUSCULAR at 18:35

## 2018-07-03 RX ADMIN — CLONIDINE HYDROCHLORIDE 0.1 MG: 0.1 TABLET ORAL at 13:14

## 2018-07-03 RX ADMIN — PROPOFOL 10 MCG/KG/MIN: 10 INJECTION, EMULSION INTRAVENOUS at 18:34

## 2018-07-03 RX ADMIN — ALBUTEROL SULFATE 6 PUFF: 90 AEROSOL, METERED RESPIRATORY (INHALATION) at 02:58

## 2018-07-03 RX ADMIN — MIDAZOLAM HYDROCHLORIDE 2 MG: 1 INJECTION INTRAMUSCULAR; INTRAVENOUS at 10:56

## 2018-07-03 RX ADMIN — Medication 1 MG: at 18:04

## 2018-07-03 RX ADMIN — MIDAZOLAM HYDROCHLORIDE 2 MG: 1 INJECTION INTRAMUSCULAR; INTRAVENOUS at 17:38

## 2018-07-03 RX ADMIN — DOXERCALCIFEROL 1 MCG: 2 INJECTION, SOLUTION INTRAVENOUS at 20:54

## 2018-07-03 RX ADMIN — LINEZOLID 600 MG: 2 INJECTION, SOLUTION INTRAVENOUS at 21:17

## 2018-07-03 RX ADMIN — IPRATROPIUM BROMIDE AND ALBUTEROL SULFATE 1 AMPULE: 2.5; .5 SOLUTION RESPIRATORY (INHALATION) at 15:13

## 2018-07-03 RX ADMIN — IPRATROPIUM BROMIDE AND ALBUTEROL SULFATE 1 AMPULE: 2.5; .5 SOLUTION RESPIRATORY (INHALATION) at 10:46

## 2018-07-03 RX ADMIN — LINEZOLID 600 MG: 2 INJECTION, SOLUTION INTRAVENOUS at 08:38

## 2018-07-03 RX ADMIN — PANTOPRAZOLE SODIUM 40 MG: 40 INJECTION, POWDER, FOR SOLUTION INTRAVENOUS at 08:38

## 2018-07-03 RX ADMIN — IPRATROPIUM BROMIDE AND ALBUTEROL SULFATE 1 AMPULE: 2.5; .5 SOLUTION RESPIRATORY (INHALATION) at 19:07

## 2018-07-03 RX ADMIN — Medication 1 MG: at 11:18

## 2018-07-03 RX ADMIN — QUETIAPINE FUMARATE 200 MG: 200 TABLET, FILM COATED ORAL at 21:17

## 2018-07-03 ASSESSMENT — PAIN SCALES - GENERAL: PAINLEVEL_OUTOF10: 7

## 2018-07-03 ASSESSMENT — PULMONARY FUNCTION TESTS
PIF_VALUE: 31
PIF_VALUE: 11
PIF_VALUE: 11

## 2018-07-03 ASSESSMENT — PAIN DESCRIPTION - ONSET: ONSET: PROGRESSIVE

## 2018-07-03 NOTE — PROGRESS NOTES
Spoke with Mickiel Schwab, RN in radiology- Dr. Ortiz Matamoros prefers to wait until Thursday for tunneled dialysis catheter placement & tunneled IJ CVC placement. Spoke with Dr. Vaibhav Mo & MD is in agreement with plan.

## 2018-07-03 NOTE — PROGRESS NOTES
Pt agitated. Writhing in bed back and forth. Pt medicated with Dilaudid 1 mg IVP. Pt also medicated with Vistaril 50 mg po for itching from rash.

## 2018-07-03 NOTE — PROGRESS NOTES
HD RN called RN to bedside.  Pt agitated, pt given prn versed per MD order see [facility]

## 2018-07-03 NOTE — PROGRESS NOTES
[facility]       Progress Note           Subjective/   34y.o. year old female who we are seeing in consultation for ESRD. HPI:  Last HD on 6/30 interrupted by line dysfunction cath flow dwelled but completed 4h HD   session w 3 L UF, 67.5-> 65.1 kg.  BP's stable. ROS:  -fevers, on TF's. Objective/   GEN:  Chronically ill, /73   Pulse 97   Temp 98.1 °F (36.7 °C) (Axillary)   Resp 14   Ht 5' 5\" (1.651 m)   Wt 149 lb 11.1 oz (67.9 kg)   SpO2 100%   BMI 24.91 kg/m²   CV: S1, S2 without m/r/g; ++ edema  RESP: CTA B without w/r/r; breathing wnl, trached    Data/  Recent Labs      07/01/18   0642  07/02/18   0615  07/03/18   0615   WBC  8.2  9.6  8.7   HGB  7.2*  7.4*  7.4*   HCT  21.4*  21.7*  22.6*   MCV  92.8  92.7  94.4   PLT  130*  179  211     Recent Labs      07/01/18   0641  07/02/18   0615  07/03/18   0615   NA  127*  128*  126*   K  3.4*  3.9  3.7   CL  93*  94*  93*   CO2  22  20*  19*   GLUCOSE  108*  86  88   PHOS  3.2  3.4  4.1   BUN  28*  37*  43*   CREATININE  2.9*  3.7*  4.5*   LABGLOM  20*  15*  12*   GFRAA  24*  18*  14*       Assessment/Plan     1- End stage renal disease: Hemodialysis per [facility] while inpatient (MWF as outpatient).               - TDC when able              - bicarb bath changed to 32 and na bath to 132              - HD today              - now has right femoral vasc cath            2- Acute GI bleed               - Hemoglobin stabilized s/p PRC transfusion, KI with HD              - followed by GI     3- MRSA, Streptococcus bacteremia, persistent endocarditis involving TV with septic   embolizations to lungs and spleen              - On IV antibiotics per ID              - s/p HD tDC removed on 6/25     4- Acute resp failure               - s/p trach 6/27     5- Hyponatremia              - avoid volume as able              - coming up with fluid removal              - D10 gtt has been stopped, now on D5NS 50 ml/hour

## 2018-07-03 NOTE — PROGRESS NOTES
Call to Dr. Lupe Recio to update on pt continued agitation. Per HD RN he is unable to dialyze pt if she remains agitated.

## 2018-07-03 NOTE — PROGRESS NOTES
07/03/18 1846   Vent Information   Vent Type 840   Vent Mode AC/VC   Vt Ordered 350 mL   Rate Set 18 bmp   Peak Flow 55 L/min   Pressure Support 0 cmH20   FiO2  30 %   Sensitivity 3   PEEP/CPAP 5   I Time/ I Time % 0 s   Vent Patient Data   Vt Exhaled 380 mL   Rate Measured 26 br/min   Minute Volume 8.4 Liters   Peak Inspiratory Pressure 31 cmH2O   I:E Ratio 1:3.0   High Peep/I Pressure 0   Mean Airway Pressure 7.2 cmH20   Spontaneous Breathing Trial (SBT) RT Doc   Pulse 116   Cough/Sputum   Sputum How Obtained Tracheal;Suctioned   Cough Productive   Sputum Amount Moderate   Sputum Color Brown   Tenacity Thick   Breath Sounds   Right Upper Lobe Diminished;Rhonchi   Right Middle Lobe Diminished   Right Lower Lobe Diminished   Left Upper Lobe Diminished;Rhonchi   Left Lower Lobe Diminished   Additional Respiratory  Assessments   Resp 22   Position Semi-Lucero's   Alarm Settings   High Pressure Alarm 50 cmH2O   Low Minute Volume Alarm 4 L/min   High Respiratory Rate 45 br/min   Patient Observation   Observations #8 portex   Surgical Airway (trach) Portex Cuffed   Placement Date/Time: 06/27/18 0746   Timeout: Patient;Procedure;Site/Side;Appropriate Equipment; Consent Confirmed;Sterile Technique using full body drape;Site prepped with chlorhexidine;Correct Position  Placed By: In surgery  Surgical Airway Type: Tr... Status Secured   Site Assessment Clean;Dry   Ties Assessment Secure; Intact

## 2018-07-03 NOTE — PROGRESS NOTES
Internal Medicine ICU Progress Note      Interval history   Date of admission    34year old white female who was admitted for GI bleed. Had an EGD   BC positive for strep pneumonia, staph aureus and gram-negative salazar. Echocardiogram showed tricuspid valve endocarditis. She's had this before. History of IV drug use  CT chest showed septic emboli and possible splenic infarct. - Transferred out of ICU on 18  - Transferred back to ICU on 18 for PEA arrest, intubated,  resuscitated with fluids  - Started on CRRT for acute renal failure, on 18  - Weaned off pressors on 18, and extubated  - Reintubated   - s.p bronch    - Off CRRT and started on hemodialysis  18  - Has been on antibiotics vancomycin and cefepime and Zyvox  - Patient remains on mechanical ventilation, continued fevers, on dialysis. - Followed by intensivist, ID, nephrologist.  - Katie Loo 85 removed 18 due to persistent fevers   - S/P Trach and PEG on   - femoral vascath placed     - had emesis. TF held. Doing better. Did not tolerate trach collar. - she is very restless. She is tolerating her TF. Low grade fever. - somewhat sleepy. Low grade fever. 7/3-  the fever is improved. She is on a trach collar. Tunneled dialysis catheter is to be placed. Her abdominal distention is not much improved. Per nursing her bowel sounds are diminished. NG tube will be placed. Subjective  Patient is somewhat sleepy. She is on a trach collar is present. No results for input(s): PHART, OLO3UOW, PO2ART in the last 72 hours.     MV Settings:  Vent Mode: PS Rate Set: 0 bmp/Vt Ordered: 350 mL/ /FiO2 : 30 %    IV:   dextrose 5 % and 0.9 % NaCl 50 mL/hr at 18 0626    dextrose         Vitals:  Temp  Av.6 °F (37 °C)  Min: 97.6 °F (36.4 °C)  Max: 100.7 °F (38.2 °C)  Pulse  Av.4  Min: 102  Max: 137  BP  Min: 110/65  Max: 154/100  SpO2  Av.6 %  Min: 97 %  Max: 100 %  FiO2   Av.9 % darbepoetin emi-polysorbate  25 mcg Subcutaneous Weekly - Thursday    pantoprazole  40 mg Intravenous Daily       PRN Meds:  midazolam, LORazepam, ipratropium-albuterol, HYDROmorphone, heparin (porcine), sodium chloride flush, albumin human, dextrose, sodium phosphate IVPB **OR** [DISCONTINUED] sodium phosphate IVPB **OR** [DISCONTINUED] sodium phosphate IVPB **OR** [DISCONTINUED] sodium phosphate IVPB, ondansetron, glucose, glucagon (rDNA), dextrose, acetaminophen, promethazine, hydrOXYzine    Results:  CBC:   Recent Labs      07/01/18   0642  07/02/18   0615  07/03/18   0615   WBC  8.2  9.6  8.7   HGB  7.2*  7.4*  7.4*   HCT  21.4*  21.7*  22.6*   MCV  92.8  92.7  94.4   PLT  130*  179  211     BMP:   Recent Labs      07/01/18   0641  07/02/18   0615  07/03/18   0615   NA  127*  128*  126*   K  3.4*  3.9  3.7   CL  93*  94*  93*   CO2  22  20*  19*   PHOS  3.2  3.4  4.1   BUN  28*  37*  43*   CREATININE  2.9*  3.7*  4.5*     LIVER PROFILE:   No results for input(s): AST, ALT, LIPASE, BILIDIR, BILITOT, ALKPHOS in the last 72 hours. Invalid input(s): AMYLASE,  ALB  PT/INR:   Recent Labs      07/02/18   0925   PROTIME  16.1*   INR  1.41*     ECHO 6/6/18   Summary   LV systolic function is mildly reduced with LVEF estimated at 40-45%.   Mild global hypokinesis is present. There is mild systolic and diastolic   septal flattening c/w RV pressure and volume overload.   Normal left ventricular diastolic filling pressure.   The right atrium is mildly dilated.   There is trivial aortic regurgitation.   Moderate to large mobile, pedunculated vegetation (1.15 cm x 2.43 cm)   attached to base of TV leaflet c/w endocarditis.   Moderate tricuspid regurgitation.   Systolic pulmonary artery pressure (SPAP) estimated at 55 mmHg (RA pressure   3 mmHg), c/w moderate pulmonary hypertension.   Note TV endocarditis seen on prior March 2017 study but LV systolic function   is mildly decreased now.      ECHO 6/8/18  Summary   This is a limited study s/p code.   LV systolic function is lower normal with EF visually estimated at 50%.   D-shaped septum c/w RV pressure and volume overload.   Large mobile, pedunculated vegetation on septal leaflet of tricuspid valve   c/w endocarditis.   No pericardial effusion noted. Cultures:  Respiratory cultures -6/12/18 - MRSA . Repeat culture 6/20/18 MRSA   Blood culture: ON ADMISSION - 6/5/18 POSITIVE S. aureus, S pneumonia and Enterobacter  Recent blood cultures negative       Radiology    XR Acute Abd Series Chest 1 VW   Final Result   Gaseous bowel distention greater in the central small bowel most likely ileus. XR CHEST PORTABLE   Final Result   Stable chest         XR ABDOMEN (KUB) (SINGLE AP VIEW)   Final Result   Nondiagnostic due to paucity of small bowel gas         XR ABDOMEN (KUB) (SINGLE AP VIEW)   Final Result   Multiple mildly dilated loops of small bowel. Findings may represent ileus   or partial small bowel obstruction. XR CHEST PORTABLE   Final Result   Stable positioning of left central venous catheter. No pneumothorax. Slightly improved aeration with persistent ground-glass opacities in the left   mid lung. XR CHEST PORTABLE   Final Result   Retraction of the left internal jugular CVC with its tip in the expected   region of the brachiocephalic vein      No significant change in the cavitary nodules and bibasilar airspace disease         XR CHEST PORTABLE   Final Result   Tracheostomy tube placement. Left basilar airspace disease most consistent with atelectasis         IR NONTUNNELED VASCULAR CATHETER   Final Result   Successful ultrasound and fluoroscopy guided non-tunneled right femoral vein   temporary dialysis catheter placement. XR CHEST PORTABLE   Final Result   Improving bibasilar airspace disease. Stable cavitary lesions. CT Chest WO Contrast   Final Result   1.  Minimal worsening partially cavitating pulmonary nodules and airspace   disease throughout the bilateral lungs consistent with septic emboli. 2. New trace bilateral pleural effusions. 3. Mediastinal adenopathy, likely reactive. 4. Heterogeneous splenomegaly. XR CHEST PORTABLE   Final Result   Stable appearing bibasilar airspace disease. Support tubes and lines as   above. XR CHEST PORTABLE   Final Result   No significant interval change in bilateral airspace disease as compared to   prior. Cavitary pulmonary nodules are again demonstrated. XR CHEST PORTABLE   Final Result   Left mid lung airspace disease is similar to minimally improved as compared   to prior. Persistent basilar atelectasis and cavitary right lung lesions. VL Extremity Venous Bilateral   Final Result      XR CHEST PORTABLE   Final Result   Left IJ central venous line in the superior vena cava with no pneumothorax         US GALLBLADDER RUQ   Final Result   1. Small amount of complex ascites concerning for hemorrhage. Consider   further evaluation with CT of the abdomen and pelvis. 2. Cholelithiases without evidence of acute cholecystitis. 3. Cirrhosis. XR CHEST PORTABLE   Final Result   Interval increase in right basilar atelectasis. No appreciable change in   appearance of septic pulmonary emboli         XR CHEST PORTABLE   Final Result   Slightly decreased right-sided airspace disease, otherwise stable chest.         XR CHEST PORTABLE   Final Result   Slight worsening of patchy airspace opacity right lower lobe over the past   few days. Relatively stable cavitary nodular lesions both lungs. Tubes and lines remain in good position. XR CHEST PORTABLE   Final Result   No significant change in the bony basilar airspace disease and suspected   septic emboli. XR CHEST PORTABLE   Final Result   1. Stable lines, tubes and support devices. 2. Stable cardiopulmonary status including bilateral airspace opacities.          XR CHEST PORTABLE emboli/endocarditis  Sustained PEA arrest x2  requiring multiple pressors, intubation   - weaned off pressors- levo. Vasopressin  - wbc improving from 60 K  - critical care and ID involved. WBC down to 8.7 K today. - fever curve better.  - Repeat CT chest on 6/25, showed worsening airspace disease-consistent with septic emboli  - tunneled dialysis catheter removed on 6/25/18  - abx per ID - see below  -  [facility] to be placed today. #Acute resp failure/cavitary pneumonia  Due to MRSA infection  - s.p PEA arrest, off vent  6/11- reintubated on 6/12/18,  for increasing abd distention and pain   - remains on mechanical ventilation, with spontaneous breathing trials. pulm managing  - ct chest with no new findings except for septic emboli and cavitory pneumonia. Repeat CT chest on 6/25/18 shows worsening of cavitating pulmonary nodules and airspacedisease consistent with septic emboli. Reactive mediastinal adenopathy noted  - ct abd with no acute findings   - Off precedex drip. methadone and valium oral added. Off Versed and fentanyl gtt . On fentanyl patch now. - IV antibiotics- ID has stopped vanc, cefepime. Continue Zyvox. - f/w BAL. Respiratory cultures growing MRSA  - S/P PEG and tracheostomy  6/27    #  Acute upper GI bleed. s/p  EGD -Three angiodysplastic spots at the junction of the body and  the fundus area that was cauterized during endoscopy.   - s/p multiple PRBC transfusions.  -  h/h stable. - RBC scan neg    #  Tricuspid valve endocarditis- recurrent    with bacteremia   - history of IV drug abuse. - She is growing MRSA, strep pneumoniae and Enterobacter cloacae. - ID involved  - Large pedunculated vegetation on septal leaflet of TV   Previously had MSSA TV endocarditis  - vancomycin and cefepime and zyvox was added   Improving wbc       #  End stage renal disease on HD  -Was on CRRT - from 6/8/18 to  6/22/18  -Now on HD since 6/23/18. -TDC removed 6/25/18 due to persistent fevers.   - has femoral

## 2018-07-03 NOTE — FLOWSHEET NOTE
07/03/18 1200   Vital Signs   Temp 98.1 °F (36.7 °C)   Temp Source Axillary   Pulse 103   Resp (!) 32   BP (!) 129/90   MAP (mmHg) 101   Level of Consciousness 0   MEWS Score 4   Oxygen Therapy   SpO2 100 %   O2 Device Trach mask   FiO2  28 %     Pt reassessed see doc flow.  on ICU bedside monitor. TC @ 28% SPO2 100% with CSPO2 monitoring. Pt resting in bed, follows commands. PERRL. L femoral CVC  WDL D5NS @  50 ml/hr. R femoral vascath w/ pigtail noted CDI. PEG noted. Left nare NG to LIWS. Flexiseal noted w/ loose brown stool. Pt repositioned for comfort.  Will continue to closely monitor.

## 2018-07-03 NOTE — FLOWSHEET NOTE
07/03/18 0903   Vital Signs   Pulse 118   Resp 18   BP (!) 140/95   MAP (mmHg) 109     Pt resting in bed, vitals per doc flow. Assessment complete see doc flow.  on ICU bedside monitor. TC @ 28% SPO2 100% with CSPO2 monitoring. Pt alert will follow commands. PERRL. L femoral CVC  WDL zyvox IVPG @ 300 ml/hr. R femoral vascath w/ pigtail noted CDI. PEG placement checked via air bolus, air bolus auscultated. Medications administered via PEG, flushed easily. Flexiseal noted w/ loose brown stool. Pt repositioned for comfort. Will continue to closely monitor.

## 2018-07-03 NOTE — PROGRESS NOTES
07/03/18 0301   Vent Information   Vent Type 840   Vent Mode PS   Vt Ordered 350 mL   Rate Set 0 bmp   Peak Flow 55 L/min   Pressure Support 5 cmH20   FiO2  30 %   Sensitivity 3   PEEP/CPAP 5   I Time/ I Time % 0 s   Vent Patient Data   Vt Exhaled 511 mL   Rate Measured 20 br/min   Minute Volume 11 Liters   Peak Inspiratory Pressure 11 cmH2O   I:E Ratio 1:2.90   High Peep/I Pressure 0   Mean Airway Pressure 7.6 cmH20   Spontaneous Breathing Trial (SBT) RT Doc   Pulse 115   Cough/Sputum   Sputum How Obtained Tracheal;Suctioned   Cough Productive   Sputum Amount Small   Sputum Color Tan   Tenacity Thick   Breath Sounds   Right Upper Lobe Rhonchi   Right Middle Lobe Diminished   Right Lower Lobe Diminished   Left Upper Lobe Rhonchi   Left Lower Lobe Diminished   Additional Respiratory  Assessments   Resp 16   Position Semi-Lucero's   Alarm Settings   High Pressure Alarm 50 cmH2O   Low Minute Volume Alarm 4 L/min   High Respiratory Rate 45 br/min   Surgical Airway (trach) Portex Cuffed   Placement Date/Time: 06/27/18 0746   Timeout: Patient;Procedure;Site/Side;Appropriate Equipment; Consent Confirmed;Sterile Technique using full body drape;Site prepped with chlorhexidine;Correct Position  Placed By: In surgery  Surgical Airway Type: Tr... Status Secured   Site Assessment Clean;Dry   Ties Assessment Secure; Intact

## 2018-07-03 NOTE — PROGRESS NOTES
Dr. Sarah De Dios notified that pt pulled NG out, per MD PEG to be to LIWS.  If unsuccessful RN to place NG

## 2018-07-03 NOTE — PROGRESS NOTES
NG placed to pt left nare 55 cm. Immediate return of gastric contents. Placed to LIWS. X ray to confirm placement. Pt became agitated during placement, grimacing attempting to pull lines. Pt looks uncomfortable.  Pt given prn IV pain medication per MD order see [facility]

## 2018-07-03 NOTE — PROGRESS NOTES
tachycardic; does not respond to verbal stim   ENT:  pupils equal reactive  Neck: s nodes, fairly supple      LUNGS: coarse BS bilat; does not appear to be in respiratory distress   CV: RR c gd I syst M lower LSB     ABD: soft nontender, s mass; more distended. BS on the hypoactive side. EXT: without edema;Skin: no rash or other skin stigmata of endocarditis; icterus resolved. DP pulses full    Data Review:    Lab Results   Component Value Date    WBC 8.7 07/03/2018    HGB 7.4 (L) 07/03/2018    HCT 22.6 (L) 07/03/2018    MCV 94.4 07/03/2018     07/03/2018     Lab Results   Component Value Date    CREATININE 4.5 (H) 07/03/2018    BUN 43 (H) 07/03/2018     (L) 07/03/2018    K 3.7 07/03/2018    CL 93 (L) 07/03/2018    CO2 19 (L) 07/03/2018     Lab Results   Component Value Date    ALT 11 06/28/2018    AST 13 (L) 06/28/2018    ALKPHOS 173 (H) 06/28/2018    BILITOT 1.1 (H) 06/28/2018     MICRO: 6/5 blood cultures both grew MRSA. One culture also grew strep while the other culture grew Enterobacter sensitive to cefepime. 6/20 respiratory culture: MRSA vanc PANCHO 1  Most recent cultures (blood, line tip) are no growth so far    IMAGING: CXR: cavitary multifocal infiltrates presumably related to septic pulmonary emboli, stable  CT head: NAD;   CT chest: infiltrates more cavitary, but there are not much more in the way of foci of pulmonary disease. infiltrates and cavitary pulmonary nodules consistent with septic emboli.     Assessment:     Active Problems:    IVDU (intravenous drug user)    Endocarditis of tricuspid valve    HCAP (healthcare-associated pneumonia)    Upper GI bleed    Sepsis due to Streptococcus pneumoniae (HCC)    Mild protein-calorie malnutrition (HCC)    PEA (Pulseless electrical activity) ([facility])    DIC (disseminated intravascular coagulation) ([facility] [address]    Acute respiratory failure with hypoxia ([facility])    Splenic infarction    Bacteremia    Mucus plugging of bronchi    Cavitary lung disease

## 2018-07-03 NOTE — PROGRESS NOTES
07/02/18 2308   Vent Information   Vent Type 840   Vent Mode PS   Vt Ordered 350 mL   Rate Set 0 bmp   Peak Flow 55 L/min   Pressure Support 5 cmH20   FiO2  30 %   Sensitivity 3   PEEP/CPAP 5   I Time/ I Time % 0 s   Vent Patient Data   Vt Exhaled 551 mL   Rate Measured 23 br/min   Minute Volume 14.5 Liters   Peak Inspiratory Pressure 12 cmH2O   I:E Ratio 1:2.40   High Peep/I Pressure 0   Mean Airway Pressure 7.4 cmH20   Spontaneous Breathing Trial (SBT) RT Doc   Pulse 121   Cough/Sputum   Sputum How Obtained Tracheal;Suctioned   Cough Non-productive   Sputum Amount None   Breath Sounds   Right Upper Lobe Diminished;Clear   Right Middle Lobe Diminished   Right Lower Lobe Diminished   Left Upper Lobe Diminished;Clear   Left Lower Lobe Diminished   Additional Respiratory  Assessments   Resp (!) 34   Position Semi-Lucero's   Alarm Settings   High Pressure Alarm 50 cmH2O   Low Minute Volume Alarm 4 L/min   High Respiratory Rate 45 br/min   Patient Observation   Observations #8 portex   Surgical Airway (trach) Portex Cuffed   Placement Date/Time: 06/27/18 0746   Timeout: Patient;Procedure;Site/Side;Appropriate Equipment; Consent Confirmed;Sterile Technique using full body drape;Site prepped with chlorhexidine;Correct Position  Placed By: In surgery  Surgical Airway Type: Tr... Status Secured   Site Assessment Clean;Dry   Ties Assessment Secure; Intact

## 2018-07-03 NOTE — PROGRESS NOTES
Pt remains very agitated, alert and kicking legs in bed, pt grimacing. pt given prn IV pain medication. Will monitor effectiveness.

## 2018-07-03 NOTE — PROGRESS NOTES
Pt awake and trashing. Able to throw her head around enough to dislodge the ventilator tubing numerous times. Productive of brown thick mucous with cough. Pt medicated at 6:16 with Ativan 2 mg IVP for restlessness and agitation. Pt was unable to tell me if she was in pain. Pt continued to be restless 15 min after Ativan. Pt medicated with Dilaudid 1 mg IVP for apparent pain. Pt resting at this time with Resp well over 10/minute. O2 sat remains 98/100% even when disconnected from ventilator.

## 2018-07-03 NOTE — FLOWSHEET NOTE
07/03/18 1600   Vital Signs   Temp 98.3 °F (36.8 °C)   Pulse 103   Resp 15   /77   MAP (mmHg) 90   Level of Consciousness 0   MEWS Score 2   Oxygen Therapy   SpO2 100 %   O2 Device Trach mask   FiO2  28 %     Pt reassessed see doc flow.  on ICU bedside monitor. TC @ 28% SPO2 100% with CSPO2 monitoring. Pt agitated, grimacing. Appears uncomfortable. Pt given prn IV pain medication per MD order see MAR. PERRL. L femoral CVC  WDL D5NS @  50 ml/hr. R femoral vascath w/ pigtail noted CDI.  PEG noted. Left nare NG to LIWS.  Flexiseal noted w/ loose brown stool. Pt repositioned for comfort. Will continue to closely monitor.

## 2018-07-03 NOTE — PROGRESS NOTES
status post cauterization  · Acute blood loss anemia  · Transfused 6/27/18, 6/9/18, 6/8/18, 6/6/18   · End-stage kidney disease on hemodialysis  · IV drug abuse  · Hepatitis C  · Hyponatremia    · S/P Trach/PEG on 6/27/18  · Thrombocytopenia is stable  · Partial Ileus  · Encephalopathy    PLAN:  Mechanical ventilation as per my orders. HOB to 30 degrees  ABG in the am  fentanyl patches decreased [id number], increased to valium q6 hr   PRN IV Ativan/versed/dilaudid. Wean sedation as able  Seroquel increased to 100mg am and 200mg PM  · Zyvox day# 13;  infectious disease is following; previously completed Cefepime x 114 days and Vancomycin for 26 days  · Renal is  planning for tunneled catheter  · Replace femoral CVC with PICC  · When NPO needs to have close monitoring of FSBS q 1 hour  · PT/OT  · Start Sennakot-S  · TF on hold  · Place NGT to LIWS  · Prophylaxis: DVT - no SQ heparin with ongoing blood requirement, Protonix  · Due to at least single organ failure and risk of rapid deterioration, I spent 32 minutes of Critical care time reviewing labs/films, examining patient, collaborating with other physicians. This does not include time performing critical procedures.

## 2018-07-03 NOTE — PROGRESS NOTES
Dr. Maricarmen White @ bedside assessing pt. Updated on pt abd distention and held TF, plan for VAS cath placement. Per MD RN to call nephrology to clarify if OK for PICC or tunneled IJ. Pt to have NG placed to LIWS.  Pt to stay on TC for 24 hours and ABG in AM.

## 2018-07-04 LAB
ALBUMIN SERPL-MCNC: 2.5 G/DL (ref 3.4–5)
ANION GAP SERPL CALCULATED.3IONS-SCNC: 13 MMOL/L (ref 3–16)
BASE EXCESS ARTERIAL: -6.3 MMOL/L (ref -3–3)
BLOOD CULTURE, ROUTINE: NORMAL
BUN BLDV-MCNC: 32 MG/DL (ref 7–20)
CALCIUM SERPL-MCNC: 8.5 MG/DL (ref 8.3–10.6)
CARBOXYHEMOGLOBIN ARTERIAL: 1.9 % (ref 0–1.5)
CHLORIDE BLD-SCNC: 95 MMOL/L (ref 99–110)
CO2: 19 MMOL/L (ref 21–32)
CREAT SERPL-MCNC: 3.6 MG/DL (ref 0.6–1.1)
GFR AFRICAN AMERICAN: 18
GFR NON-AFRICAN AMERICAN: 15
GLUCOSE BLD-MCNC: 101 MG/DL (ref 70–99)
GLUCOSE BLD-MCNC: 71 MG/DL (ref 70–99)
GLUCOSE BLD-MCNC: 72 MG/DL (ref 70–99)
GLUCOSE BLD-MCNC: 73 MG/DL (ref 70–99)
GLUCOSE BLD-MCNC: 76 MG/DL (ref 70–99)
GLUCOSE BLD-MCNC: 83 MG/DL (ref 70–99)
HCO3 ARTERIAL: 18.9 MMOL/L (ref 21–29)
HCT VFR BLD CALC: 22.5 % (ref 36–48)
HEMOGLOBIN, ART, EXTENDED: 7.7 G/DL (ref 12–16)
HEMOGLOBIN: 7.4 G/DL (ref 12–16)
MCH RBC QN AUTO: 31 PG (ref 26–34)
MCHC RBC AUTO-ENTMCNC: 32.9 G/DL (ref 31–36)
MCV RBC AUTO: 94.1 FL (ref 80–100)
METHEMOGLOBIN ARTERIAL: 0.4 %
O2 CONTENT ARTERIAL: 11 ML/DL
O2 SAT, ARTERIAL: 95.3 %
O2 THERAPY: ABNORMAL
PCO2 ARTERIAL: 36.4 MMHG (ref 35–45)
PDW BLD-RTO: 17.2 % (ref 12.4–15.4)
PERFORMED ON: NORMAL
PH ARTERIAL: 7.33 (ref 7.35–7.45)
PHOSPHORUS: 3.7 MG/DL (ref 2.5–4.9)
PLATELET # BLD: 192 K/UL (ref 135–450)
PMV BLD AUTO: 7.2 FL (ref 5–10.5)
PO2 ARTERIAL: 80.9 MMHG (ref 75–108)
POTASSIUM SERPL-SCNC: 3.9 MMOL/L (ref 3.5–5.1)
RBC # BLD: 2.39 M/UL (ref 4–5.2)
SODIUM BLD-SCNC: 127 MMOL/L (ref 136–145)
TCO2 ARTERIAL: 20.1 MMOL/L
WBC # BLD: 6.5 K/UL (ref 4–11)

## 2018-07-04 PROCEDURE — 82803 BLOOD GASES ANY COMBINATION: CPT

## 2018-07-04 PROCEDURE — 94640 AIRWAY INHALATION TREATMENT: CPT

## 2018-07-04 PROCEDURE — 85027 COMPLETE CBC AUTOMATED: CPT

## 2018-07-04 PROCEDURE — 36600 WITHDRAWAL OF ARTERIAL BLOOD: CPT

## 2018-07-04 PROCEDURE — 99233 SBSQ HOSP IP/OBS HIGH 50: CPT | Performed by: INTERNAL MEDICINE

## 2018-07-04 PROCEDURE — 80069 RENAL FUNCTION PANEL: CPT

## 2018-07-04 PROCEDURE — 9990 CHARGE CONVERSION

## 2018-07-04 PROCEDURE — 36592 COLLECT BLOOD FROM PICC: CPT

## 2018-07-04 PROCEDURE — 94761 N-INVAS EAR/PLS OXIMETRY MLT: CPT

## 2018-07-04 PROCEDURE — 99291 CRITICAL CARE FIRST HOUR: CPT | Performed by: INTERNAL MEDICINE

## 2018-07-04 PROCEDURE — C9113 INJ PANTOPRAZOLE SODIUM, VIA: HCPCS

## 2018-07-04 RX ORDER — LORAZEPAM 2 MG/ML
4 INJECTION INTRAMUSCULAR
Status: DISCONTINUED | OUTPATIENT
Start: 2018-07-04 | End: 2018-07-05

## 2018-07-04 RX ADMIN — DIAZEPAM 10 MG: 10 TABLET ORAL at 13:34

## 2018-07-04 RX ADMIN — CHLORHEXIDINE GLUCONATE 0.12% ORAL RINSE 15 ML: 1.2 LIQUID ORAL at 20:35

## 2018-07-04 RX ADMIN — Medication 10 ML: at 20:34

## 2018-07-04 RX ADMIN — DEXTROSE AND SODIUM CHLORIDE: 5; .9 INJECTION, SOLUTION INTRAVENOUS at 04:04

## 2018-07-04 RX ADMIN — Medication 10 ML: at 07:31

## 2018-07-04 RX ADMIN — Medication 1 MG: at 06:21

## 2018-07-04 RX ADMIN — Medication 1 MG: at 17:44

## 2018-07-04 RX ADMIN — CLONIDINE HYDROCHLORIDE 0.1 MG: 0.1 TABLET ORAL at 07:31

## 2018-07-04 RX ADMIN — IPRATROPIUM BROMIDE AND ALBUTEROL SULFATE 1 AMPULE: 2.5; .5 SOLUTION RESPIRATORY (INHALATION) at 03:00

## 2018-07-04 RX ADMIN — IPRATROPIUM BROMIDE AND ALBUTEROL SULFATE 1 AMPULE: 2.5; .5 SOLUTION RESPIRATORY (INHALATION) at 22:53

## 2018-07-04 RX ADMIN — Medication 1 MG: at 11:30

## 2018-07-04 RX ADMIN — CLONIDINE HYDROCHLORIDE 0.1 MG: 0.1 TABLET ORAL at 20:34

## 2018-07-04 RX ADMIN — IPRATROPIUM BROMIDE AND ALBUTEROL SULFATE 1 AMPULE: 2.5; .5 SOLUTION RESPIRATORY (INHALATION) at 06:38

## 2018-07-04 RX ADMIN — QUETIAPINE FUMARATE 100 MG: 100 TABLET, FILM COATED ORAL at 07:31

## 2018-07-04 RX ADMIN — LINEZOLID 600 MG: 2 INJECTION, SOLUTION INTRAVENOUS at 20:34

## 2018-07-04 RX ADMIN — NYSTATIN: 100000 CREAM TOPICAL at 07:44

## 2018-07-04 RX ADMIN — CLONIDINE HYDROCHLORIDE 0.1 MG: 0.1 TABLET ORAL at 13:34

## 2018-07-04 RX ADMIN — QUETIAPINE FUMARATE 200 MG: 200 TABLET, FILM COATED ORAL at 20:34

## 2018-07-04 RX ADMIN — IPRATROPIUM BROMIDE AND ALBUTEROL SULFATE 1 AMPULE: 2.5; .5 SOLUTION RESPIRATORY (INHALATION) at 18:49

## 2018-07-04 RX ADMIN — IPRATROPIUM BROMIDE AND ALBUTEROL SULFATE 1 AMPULE: 2.5; .5 SOLUTION RESPIRATORY (INHALATION) at 15:10

## 2018-07-04 RX ADMIN — DOCUSATE SODIUM AND SENNOSIDES 1 TABLET: 8.6; 5 TABLET, FILM COATED ORAL at 07:31

## 2018-07-04 RX ADMIN — CHLORHEXIDINE GLUCONATE 0.12% ORAL RINSE 15 ML: 1.2 LIQUID ORAL at 07:30

## 2018-07-04 RX ADMIN — IPRATROPIUM BROMIDE AND ALBUTEROL SULFATE 1 AMPULE: 2.5; .5 SOLUTION RESPIRATORY (INHALATION) at 11:49

## 2018-07-04 RX ADMIN — DIAZEPAM 10 MG: 10 TABLET ORAL at 04:04

## 2018-07-04 RX ADMIN — LINEZOLID 600 MG: 2 INJECTION, SOLUTION INTRAVENOUS at 07:31

## 2018-07-04 RX ADMIN — PANTOPRAZOLE SODIUM 40 MG: 40 INJECTION, POWDER, FOR SOLUTION INTRAVENOUS at 07:31

## 2018-07-04 RX ADMIN — DIAZEPAM 10 MG: 10 TABLET ORAL at 07:31

## 2018-07-04 RX ADMIN — DIAZEPAM 10 MG: 10 TABLET ORAL at 20:34

## 2018-07-04 RX ADMIN — NYSTATIN: 100000 CREAM TOPICAL at 20:35

## 2018-07-04 ASSESSMENT — PAIN SCALES - GENERAL
PAINLEVEL_OUTOF10: 4
PAINLEVEL_OUTOF10: 4
PAINLEVEL_OUTOF10: 10
PAINLEVEL_OUTOF10: 10
PAINLEVEL_OUTOF10: 8
PAINLEVEL_OUTOF10: 0
PAINLEVEL_OUTOF10: 0

## 2018-07-04 NOTE — PROGRESS NOTES
Pt repositioned in bed & c/o pain. Pt mouths \"my body hurts all over. \" Pt medicated w/ PRN dilaudid 1 mg IVP. Pt remains anuric. PEG remains on LIWS. No changes noted since previous assessment.   Nelly Epps RN, BSN

## 2018-07-04 NOTE — PROGRESS NOTES
PRN Meds:  HYDROmorphone, LORazepam, midazolam, ipratropium-albuterol, heparin (porcine), sodium chloride flush, albumin human, dextrose, sodium phosphate IVPB **OR** [DISCONTINUED] sodium phosphate IVPB **OR** [DISCONTINUED] sodium phosphate IVPB **OR** [DISCONTINUED] sodium phosphate IVPB, ondansetron, glucose, glucagon (rDNA), dextrose, acetaminophen, promethazine, hydrOXYzine    Results:  CBC:   Recent Labs      07/02/18 0615 07/03/18 0615 07/04/18   0500   WBC  9.6  8.7  6.5   HGB  7.4*  7.4*  7.4*   HCT  21.7*  22.6*  22.5*   MCV  92.7  94.4  94.1   PLT  179  211  192     BMP:   Recent Labs      07/02/18 0615 07/03/18 0615 07/04/18   0500   NA  128*  126*  127*   K  3.9  3.7  3.9   CL  94*  93*  95*   CO2  20*  19*  19*   PHOS  3.4  4.1  3.7   BUN  37*  43*  32*   CREATININE  3.7*  4.5*  3.6*     LIVER PROFILE:   No results for input(s): AST, ALT, LIPASE, BILIDIR, BILITOT, ALKPHOS in the last 72 hours. Invalid input(s): AMYLASE,  ALB  PT/INR:   Recent Labs      07/02/18   0925   PROTIME  16.1*   INR  1.41*     ECHO 6/6/18   Summary   LV systolic function is mildly reduced with LVEF estimated at 40-45%.   Mild global hypokinesis is present. There is mild systolic and diastolic   septal flattening c/w RV pressure and volume overload.   Normal left ventricular diastolic filling pressure.   The right atrium is mildly dilated.   There is trivial aortic regurgitation.   Moderate to large mobile, pedunculated vegetation (1.15 cm x 2.43 cm)   attached to base of TV leaflet c/w endocarditis.   Moderate tricuspid regurgitation.   Systolic pulmonary artery pressure (SPAP) estimated at 55 mmHg (RA pressure   3 mmHg), c/w moderate pulmonary hypertension.   Note TV endocarditis seen on prior March 2017 study but LV systolic function   is mildly decreased now.      ECHO 6/8/18  Summary   This is a limited study s/p code.   LV systolic function is lower normal with EF visually estimated at 50%.   D-shaped septum c/w RV pressure and volume overload.   Large mobile, pedunculated vegetation on septal leaflet of tricuspid valve   c/w endocarditis.   No pericardial effusion noted. Cultures:  Respiratory cultures -6/12/18 - MRSA . Repeat culture 6/20/18 MRSA   Blood culture: ON ADMISSION - 6/5/18 POSITIVE S. aureus, S pneumonia and Enterobacter  Recent blood cultures negative       Radiology    XR CHEST PORTABLE   Final Result   NG tube tip projects over the body of the stomach. XR Acute Abd Series Chest 1 VW   Final Result   Gaseous bowel distention greater in the central small bowel most likely ileus. XR CHEST PORTABLE   Final Result   Stable chest         XR ABDOMEN (KUB) (SINGLE AP VIEW)   Final Result   Nondiagnostic due to paucity of small bowel gas         XR ABDOMEN (KUB) (SINGLE AP VIEW)   Final Result   Multiple mildly dilated loops of small bowel. Findings may represent ileus   or partial small bowel obstruction. XR CHEST PORTABLE   Final Result   Stable positioning of left central venous catheter. No pneumothorax. Slightly improved aeration with persistent ground-glass opacities in the left   mid lung. XR CHEST PORTABLE   Final Result   Retraction of the left internal jugular CVC with its tip in the expected   region of the brachiocephalic vein      No significant change in the cavitary nodules and bibasilar airspace disease         XR CHEST PORTABLE   Final Result   Tracheostomy tube placement. Left basilar airspace disease most consistent with atelectasis         IR NONTUNNELED VASCULAR CATHETER   Final Result   Successful ultrasound and fluoroscopy guided non-tunneled right femoral vein   temporary dialysis catheter placement. XR CHEST PORTABLE   Final Result   Improving bibasilar airspace disease. Stable cavitary lesions. CT Chest WO Contrast   Final Result   1.  Minimal worsening partially cavitating pulmonary nodules and airspace   disease throughout the bilateral lungs consistent with septic emboli. 2. New trace bilateral pleural effusions. 3. Mediastinal adenopathy, likely reactive. 4. Heterogeneous splenomegaly. XR CHEST PORTABLE   Final Result   Stable appearing bibasilar airspace disease. Support tubes and lines as   above. XR CHEST PORTABLE   Final Result   No significant interval change in bilateral airspace disease as compared to   prior. Cavitary pulmonary nodules are again demonstrated. XR CHEST PORTABLE   Final Result   Left mid lung airspace disease is similar to minimally improved as compared   to prior. Persistent basilar atelectasis and cavitary right lung lesions. VL Extremity Venous Bilateral   Final Result      XR CHEST PORTABLE   Final Result   Left IJ central venous line in the superior vena cava with no pneumothorax         US GALLBLADDER RUQ   Final Result   1. Small amount of complex ascites concerning for hemorrhage. Consider   further evaluation with CT of the abdomen and pelvis. 2. Cholelithiases without evidence of acute cholecystitis. 3. Cirrhosis. XR CHEST PORTABLE   Final Result   Interval increase in right basilar atelectasis. No appreciable change in   appearance of septic pulmonary emboli         XR CHEST PORTABLE   Final Result   Slightly decreased right-sided airspace disease, otherwise stable chest.         XR CHEST PORTABLE   Final Result   Slight worsening of patchy airspace opacity right lower lobe over the past   few days. Relatively stable cavitary nodular lesions both lungs. Tubes and lines remain in good position. XR CHEST PORTABLE   Final Result   No significant change in the bony basilar airspace disease and suspected   septic emboli. XR CHEST PORTABLE   Final Result   1. Stable lines, tubes and support devices. 2. Stable cardiopulmonary status including bilateral airspace opacities.          XR CHEST PORTABLE   Final Result Stable life support devices. No acute interval change regarding bilateral   airspace disease. XR CHEST PORTABLE   Final Result   Stable life support device positioning. No substantial change in multifocal consolidative cavitary airspace disease. XR CHEST PORTABLE   Final Result   Stable chest         XR CHEST PORTABLE   Final Result   Improvement in focus of airspace disease in the left mid lung. Persistent   multifocal cavitary lesions compatible with septic emboli         XR CHEST PORTABLE   Final Result   No significant interval change. CT ABDOMEN PELVIS WO CONTRAST Additional Contrast? Radiologist Recommendation   Final Result   Development of moderate diffuse ascites since the prior study. Mosaic   appearance of the spleen either due to multiple splenic infarcts or possible   splenic abscesses. Question of perihepatic and subhepatic adhesions. Chronic gallbladder disease. Bibasilar pneumonias and pulmonary nodules   consistent with septic emboli. Mild anasarca. RECOMMENDATIONS:   NG tube should be pulled back about 4 cm since it is pressing against the   anterior stomach wall. Gallbladder ultrasound suggested for evaluation of gallbladder disease. Diagnostic Ultrasound-guided paracentesis may be helpful. CT Chest WO Contrast   Final Result   Multiple scattered cavitary and solid nodules scattered throughout the lungs   suspected to be from septic emboli. The emboli may be from recurrent   endocarditis, IV drug abuse, or infected DVT. Scattered focal pneumonia in   the lingula, right middle lobe, and right lower lobe. Trace bilateral   pleural effusions. Tip of the endotracheal tube lying near the tee. Moderate ascites seen in the upper abdomen. Possible multiple splenic   infarcts or abscesses. Please refer to the dictation of the CT scan of the   abdomen and pelvis. RECOMMENDATIONS:   Endotracheal tube should be pulled back 1-2 cm. Plan:    # Septic shock- sec to bacteremia /septic emboli/endocarditis  Sustained PEA arrest x2  requiring multiple pressors, intubation   - weaned off pressors- levo. Vasopressin  - wbc improving from 60 K  - critical care and ID involved. WBC down to 8.7 K today. - fever curve better.  - Repeat CT chest on 6/25, showed worsening airspace disease-consistent with septic emboli  - tunneled dialysis catheter removed on 6/25/18  - abx per ID - see below  -  [facility] to be placed only on Thursday    #Acute resp failure/cavitary pneumonia  Due to MRSA infection  - s.p PEA arrest, off vent  6/11- reintubated on 6/12/18,  for increasing abd distention and pain   - remains on mechanical ventilation, with spontaneous breathing trials. pulm managing  - ct chest with no new findings except for septic emboli and cavitory pneumonia. Repeat CT chest on 6/25/18 shows worsening of cavitating pulmonary nodules and airspacedisease consistent with septic emboli. Reactive mediastinal adenopathy noted  - ct abd with no acute findings   - Off precedex drip. methadone and valium oral added. Off Versed and fentanyl gtt . On fentanyl patch now. - IV antibiotics- ID has stopped vanc, cefepime. Continue Zyvox. - f/w BAL. Respiratory cultures growing MRSA  - S/P PEG and tracheostomy  6/27    #  Acute upper GI bleed. s/p  EGD -Three angiodysplastic spots at the junction of the body and  the fundus area that was cauterized during endoscopy.   - s/p multiple PRBC transfusions.  -  h/h stable. - RBC scan neg    #  Tricuspid valve endocarditis- recurrent    with bacteremia   - history of IV drug abuse. - She is growing MRSA, strep pneumoniae and Enterobacter cloacae.   - ID involved  - Large pedunculated vegetation on septal leaflet of TV   Previously had MSSA TV endocarditis  - vancomycin and cefepime and zyvox was added   Improving wbc       #  End stage renal disease on HD  -Was on CRRT - from 6/8/18 to  6/22/18  -Now on HD since

## 2018-07-04 NOTE — PROGRESS NOTES
[facility]       Progress Note           Subjective/   34y.o. year old female who we are seeing in consultation for ESRD. HPI:  Last HD on 7/3 with 2.5 liters removed, post-weight of 66.5 kg. Catheter functioned better after tPA dwell. BP's stable. ROS:  -fevers, on TF's. Objective/   GEN:  Chronically ill, /66   Pulse 105   Temp 98.3 °F (36.8 °C) (Axillary)   Resp 19   Ht 5' 5\" (1.651 m)   Wt 150 lb 5.7 oz (68.2 kg)   SpO2 100%   BMI 25.02 kg/m²   CV: S1, S2 without m/r/g; ++ edema  RESP: CTA B without w/r/r; breathing wnl, trached    Data/  Recent Labs      07/02/18   0615  07/03/18   0615  07/04/18   0500   WBC  9.6  8.7  6.5   HGB  7.4*  7.4*  7.4*   HCT  21.7*  22.6*  22.5*   MCV  92.7  94.4  94.1   PLT  179  211  192     Recent Labs      07/02/18   0615  07/03/18   0615  07/04/18   0500   NA  128*  126*  127*   K  3.9  3.7  3.9   CL  94*  93*  95*   CO2  20*  19*  19*   GLUCOSE  86  88  101*   PHOS  3.4  4.1  3.7   BUN  37*  43*  32*   CREATININE  3.7*  4.5*  3.6*   LABGLOM  15*  12*  15*   GFRAA  18*  14*  18*       Assessment/Plan     1- End stage renal disease: Hemodialysis per [facility] while inpatient (MWF as outpatient).               - TDC when able              - bicarb bath changed to 32 and na bath to 132              - HD tomorrow              - now has right femoral vasc cath            2- Acute GI bleed               - Hemoglobin stabilized s/p PRC transfusion, KI with HD              - followed by GI     3- MRSA, Streptococcus bacteremia, persistent endocarditis involving TV with septic   embolizations to lungs and spleen              - On IV antibiotics per ID              - s/p HD tDC removed on 6/25     4- Acute resp failure               - s/p trach 6/27     5- Hyponatremia              - avoid volume as able              - coming up with fluid removal              - D10 gtt has been stopped, now on D5NS 50 ml/hour

## 2018-07-04 NOTE — PROGRESS NOTES
Pt completed 3.5 hours of hemodialysis and removed 2.5 liters of fluid. Hypotension with the last hour. Catheter function was poor and required cathflo pre treatment. After dwell was removed, catheter function was excellent at 400ml/min blood rate. Dwelled with heparin post treatment.         07/03/18 1700 07/03/18 2019   Vital Signs   Temp 98 °F (36.7 °C) 98.1 °F (36.7 °C)   Pulse 102 112   Resp 14 16   /86 (!) 87/57   Height and Weight   Weight 151 lb 14.4 oz (68.9 kg) 146 lb 9.7 oz (66.5 kg)   Weight Method Bed scale Bed scale

## 2018-07-04 NOTE — PLAN OF CARE
Problem: Nutrition  Goal: Optimal nutrition therapy  Outcome: Ongoing  Nutrition Problem: Inadequate oral intake  Intervention: Food and/or Nutrient Delivery: Continue NPO  Nutritional Goals: once small bore feeding tube is placed, patient will tolerate Nepro at goal rate of 40 ml/hr x 20 hours without GI distress, without s/s of aspiration, and without additional BS/fluid/electrolyte abnormalities; weight will remain stable during remainder of admission

## 2018-07-04 NOTE — PROGRESS NOTES
Report given to [facility]  for transfer of pt care. Pt resting- in stable condition.   Evetta Phalen RN, BSN

## 2018-07-04 NOTE — PROGRESS NOTES
Patient care assumed, assessment completed as charted. Patient continues on ventilator, tracheostomy in place. HD treatment finishing up, right femoral vascath in place. Left femoral CVC in place, with D5%NS at 50mL/hr, and Diprivan at 20mcg/kg/min. Patient resting at present, but startles awake and becomes immediately agitated with any stimulation. PEG in place to LIWS, clamped at present for HS med administration. Bilateral soft wrist restraints in place for continued patient safety. No further needs assessed at this time. Will monitor.

## 2018-07-04 NOTE — PLAN OF CARE
to maintain a clear airway will improve   Outcome: Ongoing      Problem: Restraint Use - Nonviolent/Non-Self-Destructive Behavior:  Goal: Absence of restraint indications  Absence of restraint indications   Outcome: Ongoing  Bilateral soft wrist restraints remain in place for patient safety, and to protect all lines and airways.

## 2018-07-04 NOTE — PROGRESS NOTES
coccyx and L buttocks; unstageable venous ulcer on R side of nose; possible DTI on L inner thigh)  · Current Nutrition Therapies:  · Oral Diet Orders: NPO   · Oral Diet intake: NPO  · Oral Nutrition Supplement (ONS) Orders: None  · ONS intake: NPO  · Anthropometric Measures:  · Ht: 5' 5\" (165.1 cm)   · Current Body Wt: 150 lb (68 kg)  · Admission Body Wt: 130 lb 4 oz (59.1 kg)  · Usual Body Wt:  (120's; weight on 12/11/17 was 126# 8 oz)  · % Weight Change:  (20# gain since admit ),   (28 days )  · Ideal Body Wt: 125 lb (56.7 kg), % Ideal Body 116%  · Adjusted Body Wt:  , body weight adjusted for    · BMI Classification: BMI 25.0 - 29.9 Overweight  · Comparative Standards (Estimated Nutrition Needs):  · Estimated Daily Total Kcal: 1410 - 1645 kcals   · Estimated Daily Protein (g): 56 - 71 g protein     Estimated Intake vs Estimated Needs: Intake Less Than Needs    Nutrition Risk Level: Moderate    Nutrition Interventions:   Continue NPO  Continued Inpatient Monitoring, Coordination of Care, Coordination of Community Care    Nutrition Evaluation:   · Evaluation: No progress toward goals   · Goals: once small bore feeding tube is placed, patient will tolerate Nepro at goal rate of 40 ml/hr x 20 hours without GI distress, without s/s of aspiration, and without additional BS/fluid/electrolyte abnormalities; weight will remain stable during remainder of admission    · Monitoring: NPO Status, TF Intake, TF Tolerance, Skin Integrity, Wound Healing, Diarrhea, Pertinent Labs, Weight    See Adult Nutrition Doc Flowsheet for more detail.      Electronically signed by Addison Zimmer RD, LD on 7/4/18 at 1:57 PM    Contact Number: [phone]

## 2018-07-05 LAB
ALBUMIN SERPL-MCNC: 2.5 G/DL (ref 3.4–5)
ANION GAP SERPL CALCULATED.3IONS-SCNC: 13 MMOL/L (ref 3–16)
BASE EXCESS ARTERIAL: -6.6 MMOL/L (ref -3–3)
BUN BLDV-MCNC: 35 MG/DL (ref 7–20)
CALCIUM SERPL-MCNC: 8.9 MG/DL (ref 8.3–10.6)
CARBOXYHEMOGLOBIN ARTERIAL: 1.9 % (ref 0–1.5)
CHLORIDE BLD-SCNC: 95 MMOL/L (ref 99–110)
CO2: 19 MMOL/L (ref 21–32)
CREAT SERPL-MCNC: 4.5 MG/DL (ref 0.6–1.1)
GFR AFRICAN AMERICAN: 14
GFR NON-AFRICAN AMERICAN: 12
GLUCOSE BLD-MCNC: 85 MG/DL (ref 70–99)
GLUCOSE BLD-MCNC: 86 MG/DL (ref 70–99)
GLUCOSE BLD-MCNC: 86 MG/DL (ref 70–99)
GLUCOSE BLD-MCNC: 90 MG/DL (ref 70–99)
GLUCOSE BLD-MCNC: 95 MG/DL (ref 70–99)
HCO3 ARTERIAL: 18.6 MMOL/L (ref 21–29)
HCT VFR BLD CALC: 21.8 % (ref 36–48)
HEMOGLOBIN, ART, EXTENDED: 7.6 G/DL (ref 12–16)
HEMOGLOBIN: 7.2 G/DL (ref 12–16)
MCH RBC QN AUTO: 31 PG (ref 26–34)
MCHC RBC AUTO-ENTMCNC: 33 G/DL (ref 31–36)
MCV RBC AUTO: 93.8 FL (ref 80–100)
METHEMOGLOBIN ARTERIAL: 0.8 %
O2 CONTENT ARTERIAL: 11 ML/DL
O2 SAT, ARTERIAL: 98.3 %
O2 THERAPY: ABNORMAL
PCO2 ARTERIAL: 35.2 MMHG (ref 35–45)
PDW BLD-RTO: 17.4 % (ref 12.4–15.4)
PERFORMED ON: NORMAL
PH ARTERIAL: 7.34 (ref 7.35–7.45)
PHOSPHORUS: 3.9 MG/DL (ref 2.5–4.9)
PLATELET # BLD: 200 K/UL (ref 135–450)
PMV BLD AUTO: 7.1 FL (ref 5–10.5)
PO2 ARTERIAL: 125.1 MMHG (ref 75–108)
POTASSIUM SERPL-SCNC: 3.8 MMOL/L (ref 3.5–5.1)
RBC # BLD: 2.32 M/UL (ref 4–5.2)
SODIUM BLD-SCNC: 127 MMOL/L (ref 136–145)
TCO2 ARTERIAL: 19.6 MMOL/L
WBC # BLD: 6.2 K/UL (ref 4–11)

## 2018-07-05 PROCEDURE — 0JH63XZ INSERTION OF TUNNELED VASCULAR ACCESS DEVICE INTO CHEST SUBCUTANEOUS TISSUE AND FASCIA, PERCUTANEOUS APPROACH: ICD-10-PCS | Performed by: INTERNAL MEDICINE

## 2018-07-05 PROCEDURE — 99232 SBSQ HOSP IP/OBS MODERATE 35: CPT | Performed by: INTERNAL MEDICINE

## 2018-07-05 PROCEDURE — 87070 CULTURE OTHR SPECIMN AEROBIC: CPT

## 2018-07-05 PROCEDURE — 02H633Z INSERTION OF INFUSION DEVICE INTO RIGHT ATRIUM, PERCUTANEOUS APPROACH: ICD-10-PCS | Performed by: INTERNAL MEDICINE

## 2018-07-05 PROCEDURE — 82803 BLOOD GASES ANY COMBINATION: CPT

## 2018-07-05 PROCEDURE — 94762 N-INVAS EAR/PLS OXIMTRY CONT: CPT

## 2018-07-05 PROCEDURE — C9113 INJ PANTOPRAZOLE SODIUM, VIA: HCPCS

## 2018-07-05 PROCEDURE — 74018 RADEX ABDOMEN 1 VIEW: CPT

## 2018-07-05 PROCEDURE — 99233 SBSQ HOSP IP/OBS HIGH 50: CPT | Performed by: INTERNAL MEDICINE

## 2018-07-05 PROCEDURE — 94640 AIRWAY INHALATION TREATMENT: CPT

## 2018-07-05 PROCEDURE — 9990 CHARGE CONVERSION

## 2018-07-05 PROCEDURE — 36592 COLLECT BLOOD FROM PICC: CPT

## 2018-07-05 PROCEDURE — 76937 US GUIDE VASCULAR ACCESS: CPT

## 2018-07-05 PROCEDURE — C1750 CATH, HEMODIALYSIS,LONG-TERM: HCPCS

## 2018-07-05 PROCEDURE — 80069 RENAL FUNCTION PANEL: CPT

## 2018-07-05 PROCEDURE — 77001 FLUOROGUIDE FOR VEIN DEVICE: CPT

## 2018-07-05 PROCEDURE — P9047 ALBUMIN (HUMAN), 25%, 50ML: HCPCS

## 2018-07-05 PROCEDURE — 85027 COMPLETE CBC AUTOMATED: CPT

## 2018-07-05 PROCEDURE — 36600 WITHDRAWAL OF ARTERIAL BLOOD: CPT

## 2018-07-05 PROCEDURE — 36558 INSERT TUNNELED CV CATH: CPT

## 2018-07-05 PROCEDURE — 71045 X-RAY EXAM CHEST 1 VIEW: CPT

## 2018-07-05 RX ORDER — MIDAZOLAM HYDROCHLORIDE 5 MG/ML
INJECTION INTRAMUSCULAR; INTRAVENOUS
Status: COMPLETED | OUTPATIENT
Start: 2018-07-05 | End: 2018-07-05

## 2018-07-05 RX ORDER — LORAZEPAM 2 MG/ML
4 INJECTION INTRAMUSCULAR EVERY 4 HOURS PRN
Status: DISCONTINUED | OUTPATIENT
Start: 2018-07-05 | End: 2018-07-07

## 2018-07-05 RX ORDER — DIAZEPAM 10 MG/1
10 TABLET ORAL EVERY 8 HOURS
Status: DISCONTINUED | OUTPATIENT
Start: 2018-07-06 | End: 2018-07-06

## 2018-07-05 RX ORDER — LIDOCAINE HYDROCHLORIDE 10 MG/ML
INJECTION, SOLUTION EPIDURAL; INFILTRATION; INTRACAUDAL; PERINEURAL
Status: COMPLETED | OUTPATIENT
Start: 2018-07-05 | End: 2018-07-05

## 2018-07-05 RX ORDER — HEPARIN SODIUM 1000 [USP'U]/ML
3200 INJECTION, SOLUTION INTRAVENOUS; SUBCUTANEOUS PRN
Status: DISCONTINUED | OUTPATIENT
Start: 2018-07-05 | End: 2018-07-18 | Stop reason: HOSPADM

## 2018-07-05 RX ORDER — FENTANYL 75 UG/H
1 PATCH TRANSDERMAL
Status: DISCONTINUED | OUTPATIENT
Start: 2018-07-06 | End: 2018-07-08

## 2018-07-05 RX ORDER — FENTANYL CITRATE 50 UG/ML
INJECTION, SOLUTION INTRAMUSCULAR; INTRAVENOUS
Status: COMPLETED | OUTPATIENT
Start: 2018-07-05 | End: 2018-07-05

## 2018-07-05 RX ADMIN — LORAZEPAM 4 MG: 2 INJECTION INTRAMUSCULAR at 14:07

## 2018-07-05 RX ADMIN — CHLORHEXIDINE GLUCONATE 0.12% ORAL RINSE 15 ML: 1.2 LIQUID ORAL at 08:05

## 2018-07-05 RX ADMIN — ALBUMIN (HUMAN) 12.5 G: 12.5 SOLUTION INTRAVENOUS at 13:12

## 2018-07-05 RX ADMIN — Medication 1 MG: at 12:04

## 2018-07-05 RX ADMIN — DOCUSATE SODIUM AND SENNOSIDES 1 TABLET: 8.6; 5 TABLET, FILM COATED ORAL at 08:05

## 2018-07-05 RX ADMIN — IPRATROPIUM BROMIDE AND ALBUTEROL SULFATE 1 AMPULE: 2.5; .5 SOLUTION RESPIRATORY (INHALATION) at 02:41

## 2018-07-05 RX ADMIN — CLONIDINE HYDROCHLORIDE 0.1 MG: 0.1 TABLET ORAL at 21:07

## 2018-07-05 RX ADMIN — LINEZOLID 600 MG: 2 INJECTION, SOLUTION INTRAVENOUS at 08:04

## 2018-07-05 RX ADMIN — LORAZEPAM 4 MG: 2 INJECTION INTRAMUSCULAR at 21:32

## 2018-07-05 RX ADMIN — CHLORHEXIDINE GLUCONATE 0.12% ORAL RINSE 15 ML: 1.2 LIQUID ORAL at 21:07

## 2018-07-05 RX ADMIN — QUETIAPINE FUMARATE 200 MG: 200 TABLET, FILM COATED ORAL at 21:07

## 2018-07-05 RX ADMIN — IPRATROPIUM BROMIDE AND ALBUTEROL SULFATE 1 AMPULE: 2.5; .5 SOLUTION RESPIRATORY (INHALATION) at 15:38

## 2018-07-05 RX ADMIN — IPRATROPIUM BROMIDE AND ALBUTEROL SULFATE 1 AMPULE: 2.5; .5 SOLUTION RESPIRATORY (INHALATION) at 23:27

## 2018-07-05 RX ADMIN — IPRATROPIUM BROMIDE AND ALBUTEROL SULFATE 1 AMPULE: 2.5; .5 SOLUTION RESPIRATORY (INHALATION) at 07:09

## 2018-07-05 RX ADMIN — DIAZEPAM 10 MG: 10 TABLET ORAL at 08:05

## 2018-07-05 RX ADMIN — FENTANYL CITRATE 50 MCG: 50 INJECTION, SOLUTION INTRAMUSCULAR; INTRAVENOUS at 10:36

## 2018-07-05 RX ADMIN — LINEZOLID 600 MG: 2 INJECTION, SOLUTION INTRAVENOUS at 21:07

## 2018-07-05 RX ADMIN — NYSTATIN: 100000 CREAM TOPICAL at 08:06

## 2018-07-05 RX ADMIN — Medication 10 ML: at 08:05

## 2018-07-05 RX ADMIN — Medication 1 MG: at 08:20

## 2018-07-05 RX ADMIN — NYSTATIN: 100000 CREAM TOPICAL at 21:07

## 2018-07-05 RX ADMIN — DEXTROSE AND SODIUM CHLORIDE: 5; .9 INJECTION, SOLUTION INTRAVENOUS at 21:07

## 2018-07-05 RX ADMIN — PANTOPRAZOLE SODIUM 40 MG: 40 INJECTION, POWDER, FOR SOLUTION INTRAVENOUS at 08:05

## 2018-07-05 RX ADMIN — LIDOCAINE HYDROCHLORIDE 5 ML: 10 INJECTION, SOLUTION EPIDURAL; INFILTRATION; INTRACAUDAL; PERINEURAL at 10:44

## 2018-07-05 RX ADMIN — IPRATROPIUM BROMIDE AND ALBUTEROL SULFATE 1 AMPULE: 2.5; .5 SOLUTION RESPIRATORY (INHALATION) at 11:12

## 2018-07-05 RX ADMIN — DIAZEPAM 10 MG: 10 TABLET ORAL at 01:36

## 2018-07-05 RX ADMIN — DEXTROSE AND SODIUM CHLORIDE: 5; .9 INJECTION, SOLUTION INTRAVENOUS at 01:04

## 2018-07-05 RX ADMIN — LORAZEPAM 4 MG: 2 INJECTION INTRAMUSCULAR at 08:05

## 2018-07-05 RX ADMIN — Medication 1 MG: at 15:04

## 2018-07-05 RX ADMIN — Medication 1 MG: at 04:03

## 2018-07-05 RX ADMIN — HEPARIN SODIUM 3200 UNITS: 1000 INJECTION, SOLUTION INTRAVENOUS; SUBCUTANEOUS at 17:38

## 2018-07-05 RX ADMIN — DOXERCALCIFEROL 1 MCG: 2 INJECTION, SOLUTION INTRAVENOUS at 16:00

## 2018-07-05 RX ADMIN — ALBUMIN (HUMAN) 12.5 G: 12.5 SOLUTION INTRAVENOUS at 13:13

## 2018-07-05 RX ADMIN — Medication 10 ML: at 21:07

## 2018-07-05 RX ADMIN — IPRATROPIUM BROMIDE AND ALBUTEROL SULFATE 1 AMPULE: 2.5; .5 SOLUTION RESPIRATORY (INHALATION) at 19:22

## 2018-07-05 RX ADMIN — QUETIAPINE FUMARATE 100 MG: 100 TABLET, FILM COATED ORAL at 08:05

## 2018-07-05 RX ADMIN — MIDAZOLAM HYDROCHLORIDE 1 MG: 5 INJECTION INTRAMUSCULAR; INTRAVENOUS at 10:37

## 2018-07-05 RX ADMIN — CLONIDINE HYDROCHLORIDE 0.1 MG: 0.1 TABLET ORAL at 08:04

## 2018-07-05 RX ADMIN — LORAZEPAM 4 MG: 2 INJECTION INTRAMUSCULAR at 16:15

## 2018-07-05 ASSESSMENT — PAIN SCALES - GENERAL
PAINLEVEL_OUTOF10: 6
PAINLEVEL_OUTOF10: 0
PAINLEVEL_OUTOF10: 0

## 2018-07-05 NOTE — CARE COORDINATION
INTERDISCIPLINARY PLAN OF CARE CONFERENCE    Date/Time: 7/5/2018 9:29 AM  Completed by: Jemima Urbina Case Management      Patient Name:  Farhat Galloway  YOB: 1990  Admitting Diagnosis: GI BLEED     Admit Date/Time:  6/6/2018 12:35 AM    Chart reviewed. Interdisciplinary team met to discuss patient progress and discharge plans. Disciplines included Case Management, Nursing, and Dietitian. Current Status:ongoing, monitor vital signs,labs    Anticipated Discharge Date: TBD  Expected D/C Disposition:  LTACH  Confirmed plan with patient and/or family Yes  Discharge Plan Comments: Plan continues [facility]. Writer spoke with [facility] from [facility] and she states pre-cert was re-started this morning and she expects to hear back in the Am+bed,+eCOC. [facility] is aware that pt is d/c ready for 07/06/18 per Pulmonary. Following.            Home O2 in place on admit: to LTAC  Pt informed of need to bring portable home O2 tank on day of discharge for nursing to connect prior to leaving:  Not Indicated  Verbalized agreement/Understanding:  Not Indicated

## 2018-07-05 NOTE — PROGRESS NOTES
regurgitation.   Moderate to large mobile, pedunculated vegetation (1.15 cm x 2.43 cm)   attached to base of TV leaflet c/w endocarditis.   Moderate tricuspid regurgitation.   Systolic pulmonary artery pressure (SPAP) estimated at 55 mmHg (RA pressure   3 mmHg), c/w moderate pulmonary hypertension.   Note TV endocarditis seen on prior March 2017 study but LV systolic function   is mildly decreased now. ECHO 6/8/18  Summary   This is a limited study s/p code.   LV systolic function is lower normal with EF visually estimated at 50%.   D-shaped septum c/w RV pressure and volume overload.   Large mobile, pedunculated vegetation on septal leaflet of tricuspid valve   c/w endocarditis.   No pericardial effusion noted. Cultures:  Respiratory cultures -6/12/18 - MRSA . Repeat culture 6/20/18 MRSA   Blood culture: ON ADMISSION - 6/5/18 POSITIVE S. aureus, S pneumonia and Enterobacter  Recent blood cultures negative       Radiology    IR TUNNELED CATHETER PLACEMENT GREATER THAN 5 YEARS   Preliminary Result   Status post successful ultrasound/fluoroscopically guided placement of left   internal jugular tunneled dialysis catheter as described above. XR CHEST PORTABLE   Final Result   NG tube tip projects over the body of the stomach. XR Acute Abd Series Chest 1 VW   Final Result   Gaseous bowel distention greater in the central small bowel most likely ileus. XR CHEST PORTABLE   Final Result   Stable chest         XR ABDOMEN (KUB) (SINGLE AP VIEW)   Final Result   Nondiagnostic due to paucity of small bowel gas         XR ABDOMEN (KUB) (SINGLE AP VIEW)   Final Result   Multiple mildly dilated loops of small bowel. Findings may represent ileus   or partial small bowel obstruction. XR CHEST PORTABLE   Final Result   Stable positioning of left central venous catheter. No pneumothorax. Slightly improved aeration with persistent ground-glass opacities in the left   mid lung.          XR CHEST PORTABLE   Final Result   Retraction of the left internal jugular CVC with its tip in the expected   region of the brachiocephalic vein      No significant change in the cavitary nodules and bibasilar airspace disease         XR CHEST PORTABLE   Final Result   Tracheostomy tube placement. Left basilar airspace disease most consistent with atelectasis         IR NONTUNNELED VASCULAR CATHETER   Final Result   Successful ultrasound and fluoroscopy guided non-tunneled right femoral vein   temporary dialysis catheter placement. XR CHEST PORTABLE   Final Result   Improving bibasilar airspace disease. Stable cavitary lesions. CT Chest WO Contrast   Final Result   1. Minimal worsening partially cavitating pulmonary nodules and airspace   disease throughout the bilateral lungs consistent with septic emboli. 2. New trace bilateral pleural effusions. 3. Mediastinal adenopathy, likely reactive. 4. Heterogeneous splenomegaly. XR CHEST PORTABLE   Final Result   Stable appearing bibasilar airspace disease. Support tubes and lines as   above. XR CHEST PORTABLE   Final Result   No significant interval change in bilateral airspace disease as compared to   prior. Cavitary pulmonary nodules are again demonstrated. XR CHEST PORTABLE   Final Result   Left mid lung airspace disease is similar to minimally improved as compared   to prior. Persistent basilar atelectasis and cavitary right lung lesions. VL Extremity Venous Bilateral   Final Result      XR CHEST PORTABLE   Final Result   Left IJ central venous line in the superior vena cava with no pneumothorax         US GALLBLADDER RUQ   Final Result   1. Small amount of complex ascites concerning for hemorrhage. Consider   further evaluation with CT of the abdomen and pelvis. 2. Cholelithiases without evidence of acute cholecystitis. 3. Cirrhosis.          XR CHEST PORTABLE   Final Result   Interval increase in right basilar atelectasis. No appreciable change in   appearance of septic pulmonary emboli         XR CHEST PORTABLE   Final Result   Slightly decreased right-sided airspace disease, otherwise stable chest.         XR CHEST PORTABLE   Final Result   Slight worsening of patchy airspace opacity right lower lobe over the past   few days. Relatively stable cavitary nodular lesions both lungs. Tubes and lines remain in good position. XR CHEST PORTABLE   Final Result   No significant change in the bony basilar airspace disease and suspected   septic emboli. XR CHEST PORTABLE   Final Result   1. Stable lines, tubes and support devices. 2. Stable cardiopulmonary status including bilateral airspace opacities. XR CHEST PORTABLE   Final Result   Stable life support devices. No acute interval change regarding bilateral   airspace disease. XR CHEST PORTABLE   Final Result   Stable life support device positioning. No substantial change in multifocal consolidative cavitary airspace disease. XR CHEST PORTABLE   Final Result   Stable chest         XR CHEST PORTABLE   Final Result   Improvement in focus of airspace disease in the left mid lung. Persistent   multifocal cavitary lesions compatible with septic emboli         XR CHEST PORTABLE   Final Result   No significant interval change. CT ABDOMEN PELVIS WO CONTRAST Additional Contrast? Radiologist Recommendation   Final Result   Development of moderate diffuse ascites since the prior study. Mosaic   appearance of the spleen either due to multiple splenic infarcts or possible   splenic abscesses. Question of perihepatic and subhepatic adhesions. Chronic gallbladder disease. Bibasilar pneumonias and pulmonary nodules   consistent with septic emboli. Mild anasarca. RECOMMENDATIONS:   NG tube should be pulled back about 4 cm since it is pressing against the   anterior stomach wall.       Gallbladder ultrasound suggested for evaluation of gallbladder disease. Diagnostic Ultrasound-guided paracentesis may be helpful. CT Chest WO Contrast   Final Result   Multiple scattered cavitary and solid nodules scattered throughout the lungs   suspected to be from septic emboli. The emboli may be from recurrent   endocarditis, IV drug abuse, or infected DVT. Scattered focal pneumonia in   the lingula, right middle lobe, and right lower lobe. Trace bilateral   pleural effusions. Tip of the endotracheal tube lying near the tee. Moderate ascites seen in the upper abdomen. Possible multiple splenic   infarcts or abscesses. Please refer to the dictation of the CT scan of the   abdomen and pelvis. RECOMMENDATIONS:   Endotracheal tube should be pulled back 1-2 cm. CT Head WO Contrast   Final Result   No acute intracranial abnormality. XR CHEST PORTABLE   Final Result   Moderate lingular opacity favored to be pneumonia. Moderate opacity in the   right lung base probably also pneumonia. Development of multiple pulmonary   nodules presumed to be of an infectious or least inflammatory etiology. Low   lung volumes. Some improvement in the appearance of the chest since   yesterday. RECOMMENDATION:   CT scan of the chest with contrast recommended for further evaluation. XR ABDOMEN (KUB) (SINGLE AP VIEW)   Final Result   Probable mild nonobstructive ileus. Moderate to large amount of stool in the   left colon. XR CHEST PORTABLE   Final Result   Improving bilateral airspace disease. XR CHEST PORTABLE   Final Result   Worsening bilateral airspace disease, probably pneumonia. XR CHEST PORTABLE   Final Result   Multifocal pneumonia, unchanged. XR CHEST 1 VW   Final Result   No change other than repositioning of the endotracheal tube. XR CHEST PORTABLE   Final Result   The endotracheal tube needs retracted approximately 3 cm.       Satisfactory position right IJ tunnel catheter and left central venous   catheter. Increased size of the multifocal nodular lung opacities presumed multifocal   pneumonia. Findings were discussed with ICU nurse caring for the patient at 2:21 pm on   6/8/2018. XR CHEST PORTABLE   Final Result   Multifocal pneumonia. XR FOOT RIGHT (2 VIEWS)   Final Result   Lucency through the medial sesamoid most consistent with bipartite medial   sesamoid in the absence of history of trauma. This can be associated with   pain. CTA ABDOMEN PELVIS W WO CONTRAST   Final Result   No active gastrointestinal hemorrhage is identified. No vascular abnormality   is appreciated. Right lower lobe pneumonia as well as cavitary nodules. Septic emboli are   consideration. Compared with 11/26/2017, there is waxing and waning airspace   disease and pulmonary nodules. Organizing pneumonia is an alternative   possibility. Hepatosplenomegaly. Hepatomegaly is similar to 11/26/2017. Splenomegaly is   new. Hypodensity in the spleen, suggestive of acute to subacute embolic   infarction. Small amount of ascites, nonspecific. Hyperdensity of the gallbladder wall. This may represent mural   calcification. Etiology is unclear. There is variable association with   gallbladder wall calcification and risk for gallbladder carcinoma. Stable cardiomegaly. Lumbar spine and visualized osseous structures appear intact. NM GI BLOOD LOSS   Final Result   No evidence of active GI bleeding during acquisition. RECOMMENDATIONS:   If the patient shows hemodynamic signs of an active bleed in the next 20   hours, additional images can be acquired. XR CHEST PORTABLE   Final Result   Patchy airspace disease in the right lung, pneumonia versus atelectasis   versus less likely edema. That should be followed to resolution. Borderline pulmonary vascular congestion.          IR TUNNELED CVC PLACE WO SQ stopped vanc, cefepime. Continue Zyvox. - f/w BAL. Respiratory cultures growing MRSA  - S/P PEG and tracheostomy  6/27    #  Acute upper GI bleed. s/p  EGD -Three angiodysplastic spots at the junction of the body and  the fundus area that was cauterized during endoscopy.   - s/p multiple PRBC transfusions.  -  h/h stable. - RBC scan neg    #  Tricuspid valve endocarditis- recurrent    with bacteremia   - history of IV drug abuse. - She is growing MRSA, strep pneumoniae and Enterobacter cloacae. - ID involved  - Large pedunculated vegetation on septal leaflet of TV   Previously had MSSA TV endocarditis  - vancomycin and cefepime and zyvox was added   Improving wbc       #  End stage renal disease on HD  -Was on CRRT - from 6/8/18 to  6/22/18  -Now on HD since 6/23/18. -TDC removed 6/25/18 due to persistent fevers. - has femoral vas cath . Next HD Mike. . Plan [facility]  Soon     # DIC   -Sec to severe sepsis , off steroids  -Hematology f/w     # Anemia   - sec to sepsis, iatrogenic, GI bleed  -Prn transfusions    #  Her abdomen is distended again. TF held. Distension some better. Repeat xary of the abdomen.      LTAC referral     Kelsy Cooper MD 7/5/2018 11:55 AM

## 2018-07-05 NOTE — FLOWSHEET NOTE
07/05/18 1700   Vital Signs   Temp 98.4 °F (36.9 °C)   Pulse 110   Resp 15   /77   MAP (mmHg) 92   Level of Consciousness 1   MEWS Score 3   Height and Weight   Weight 147 lb 11.3 oz (67 kg)   Weight Method Bed scale   BMI (Calculated) 24.6   Pain Assessment   Pain Assessment 0-10   Pain Level 0     Pt reassessed, vitals per doc flow.  on ICU bedside monitor. TC @ 28%  SPO2 100% with CSPO2 monitoring.  PERRL. L femoral CVC WDL D5 NS @ 50 ml/hr. R femoral vas cath WDL. LIJ tunneled vascath w/ small amount of blood noted on dressing. HD complete, pt tolerated well. PEG to ILWS with green bowel contents noted in tubing. Pt repositioned for comfort.  Will continue to closely monitor

## 2018-07-05 NOTE — PROGRESS NOTES
[facility]       Progress Note           Subjective/   34y.o. year old female who we are seeing in consultation for ESRD. HPI:  Last HD on 7/3 with 2.5 liters removed, post-weight of 66.5 kg. Catheter functioned better after tPA dwell. BP's stable. ROS:  -fevers, on TF's. Objective/   GEN:  Chronically ill, BP (!) 152/81   Pulse 126   Temp 98.9 °F (37.2 °C) (Oral)   Resp 22   Ht 5' 5\" (1.651 m)   Wt 154 lb 5.2 oz (70 kg)   SpO2 100%   BMI 25.68 kg/m²   CV: S1, S2 tachycardic without m/r/g; ++ edema  RESP: CTA B without w/r/r; breathing wnl, trached  ACCESS: Left IJ TDC (7/5)    Data/  Recent Labs      07/03/18   0615  07/04/18   0500  07/05/18   0528   WBC  8.7  6.5  6.2   HGB  7.4*  7.4*  7.2*   HCT  22.6*  22.5*  21.8*   MCV  94.4  94.1  93.8   PLT  211  192  200     Recent Labs      07/03/18   0615  07/04/18   0500  07/05/18   0528   NA  126*  127*  127*   K  3.7  3.9  3.8   CL  93*  95*  95*   CO2  19*  19*  19*   GLUCOSE  88  101*  95   PHOS  4.1  3.7  3.9   BUN  43*  32*  35*   CREATININE  4.5*  3.6*  4.5*   LABGLOM  12*  15*  12*   GFRAA  14*  18*  14*       Assessment/Plan     1- End stage renal disease: Hemodialysis per [facility] while inpatient (MWF as outpatient).               - HD today with TDC with UF as tolerated with albumin assistance              - Remove Right femoral vasc cath if [facility] working well            2- Acute GI bleed               - Hemoglobin stabilized s/p PRC transfusion, KI with HD today              - followed by GI     3- MRSA, Streptococcus bacteremia, persistent endocarditis involving TV with septic   embolizations to lungs and spleen              - On IV antibiotics per ID              - s/p HD tDC removed on 6/25     4- Acute resp failure               - s/p trach 6/27     5- Hyponatremia              - avoid volume as able              - coming up with fluid removal              - D10 gtt has been stopped, now on D5NS 50 ml/hour

## 2018-07-05 NOTE — PROGRESS NOTES
Nutrition Assessment    Type and Reason for Visit: Reassess    Nutrition Recommendations:   1. Continue to hold TF until KUB is completed and results are back. 2. When appropriate, re-start Nepro TF at rate of 20 ml/hr x 20 hours. Water flushes, per nephrology. 3. Monitor nutrition status and plan of care. 4. Monitor for additional in-patient weight changes during remainder of admission. 5. Monitor nutrition-related labs, bowel activity/regimen, and fluid/electrolyte management. Malnutrition Assessment:  · Malnutrition Status: Mild Malnutrition  · Context: Acute illness or injury  · Findings of the 6 clinical characteristics of malnutrition (Minimum of 2 out of 6 clinical characteristics is required to make the diagnosis of moderate or severe Protein Calorie Malnutrition based on AND/ASPEN Guidelines):  1. Energy Intake-Greater than 75%, greater than or equal to 1 month    2. Weight Loss-No significant weight loss (+ 24# weight gain ), in 1 month  3. Fat Loss-No significant subcutaneous fat loss, Orbital, Triceps  4. Muscle Loss-Mild muscle mass loss (hard to assess d/t patient has some edema present; BLE/BUE appear to be puffy), Temples (temporalis muscle)  5. Fluid Accumulation-Mild fluid accumulation, Extremities (BLE + 1 edema )  6.   Strength-Not measured    Nutrition Diagnosis:   · Problem: Inadequate oral intake  · Etiology: related to Impaired respiratory function-inability to consume food, Alteration in GI function, Nutrition support - EN, Psychological cause/life stress, Renal dysfunction     Signs and symptoms:  as evidenced by NPO status due to medical condition, Nutrition support - EN, Lab values, Known losses from dialysis, GI abnormality    Nutrition Assessment:  · Subjective Assessment: patient had approx 550 ml output from PEG (to suction) in last 24 hours; RN described contents as greenish-bluish bile output; Dr. Kulwant Baez ordered a KUB today; abdomen remains somewhat distended today; + dialysis today; patient may possibly be ready for d/c to LTAC on  - case management is working on pre-cert documentation today since last pre-cert  on 7/3; D5 NS at 50 ml/hr  · Nutrition-Focused Physical Findings: patient was calm this am during rounds but RN reported that patient went from being okay and answering simple questions to crying inconsolably overnight and this am - she required PRN medications for agitation; patient was awake this am and she followed commands during rounds; + bilateral soft wrist restraints in place for patient's safety; abdomen remains distended despite output from PEG with PEG to suction; CBW is 154# 5 oz; admission weight was 130# 4 oz; skin color is dusky; BLE + 1 edema noted  · Wound Type: Stage II, Pressure Ulcer, Multiple, Unstageable, Deep Tissue Injury (stage II on R coccyx and L buttocks; unstageable venous ulcer on R side of nose; possible DTI on L inner thigh)  · Current Nutrition Therapies:  · Oral Diet Orders: NPO   · Oral Diet intake: NPO  · Oral Nutrition Supplement (ONS) Orders: None  · ONS intake: NPO  · Tube Feeding (TF) Orders:   · Feeding Route: Gastrostomy  · Formula: Renal  · Rate (ml/hr):20 ml/hr     · Volume (ml/day): 400 ml   · Duration:  (x 20 hours)  · Additives/Modulars:  (none)  · TF Residuals: Not applicable  · Water Flushes:  (per nephrology)  · Current TF & Flush Orders Provides: TF held at this time d/t s/s of GI distress, partial ileus - KUB ordered for today  · Goal TF & Flush Orders Provides: Nepro TF at 20 ml/hr x 20 hours = 400 ml TV, 720 kcals, 32 g protein, and 291 ml free water  · Additional Calories: none at this time - TF was held d/t abdominal distention, partial ileus, and output from PEG when hooked to suction  · Anthropometric Measures:  · Ht: 5' 5\" (165.1 cm)   · Current Body Wt: 154 lb 5 oz (70 kg)  · Admission Body Wt: 130 lb 4 oz (59.1 kg)  · Usual Body Wt:  (120's; weight on 17 was 126# 8 oz)  · % Weight Change: + 24# weight gain ,  x 1 month  · Ideal Body Wt: 125 lb (56.7 kg), % Ideal Body 123%  · BMI Classification: BMI 25.0 - 29.9 Overweight  · Comparative Standards (Estimated Nutrition Needs):  · Estimated Daily Total Kcal: 1400 - 1750 kcals   · Estimated Daily Protein (g): 68 - 78 g protein   · Estimated Daily Fluid (ml/day): 1400 - 1750 ml     Estimated Intake vs Estimated Needs: Intake Less Than Needs    Nutrition Risk Level: High    Nutrition Interventions:   Continue NPO  Continued Inpatient Monitoring, Coordination of Care, Coordination of Community Care    Nutrition Evaluation:   · Evaluation: Progress towards goals declining   · Goals: when appropriate (patient to have KUB completed today), patient will tolerate Nepro TF at goal rate of 20 ml/hr x 20 hours without GI distress/abdominal distention; weight will rmain stable during remainder of admission (+ 24# weight gain x 1 month admission)   · Monitoring: NPO Status, TF Intake, TF Tolerance, Skin Integrity, Ascites/Edema, Mental Status/Confusion, Weight, Pertinent Labs    See Adult Nutrition Doc Flowsheet for more detail.      Electronically signed by My Ayala RD, LD on 7/5/18 at 11:15 AM    Contact Number: [phone]

## 2018-07-05 NOTE — FLOWSHEET NOTE
07/05/18 0800   Vital Signs   Pulse 138   Resp (!) 33   BP (!) 150/89   MAP (mmHg) 109   Oxygen Therapy   SpO2 99 %   Pt resting in bed, vitals per doc flow. Assessment complete see doc flow.  on ICU bedside monitor. TC @ 28%  SPO2 100% with CSPO2 monitoring. Pt alert and anxious, pt given prn ativan per MD order see MAR. Pt remains agitated, pt given prn dilaudid per MD order see MAR. Pt currently resting w/ eyes closed however agitated w/ stimulation. PERRL. L femoral CVC WDL D5 NS @ 50 ml/hr. R femoral vas cath WDL. PEG placement checked via air bolus, air bolus clearly auscultated, on ILWS with green bowel contents noted in tubing. Medications administered via PEG, clamped. Flexi seal noted w/ brown loose stool. Pt repositioned for comfort. Will continue to closely monitor.

## 2018-07-05 NOTE — PROGRESS NOTES
RADIOLOGY:  Patient status post ultrasound/fluoroscopically guided placement of left internal jugular tunneled dialysis catheter. Patient tolerated the procedure well. Full report to follow.

## 2018-07-05 NOTE — PROGRESS NOTES
Report given to [facility]  for transfer of pt care. Pt in stable condition.   Aminata Askew RN, BSN

## 2018-07-05 NOTE — FLOWSHEET NOTE
07/05/18 1205   Vital Signs   Pulse 110   Resp 22   /86   MAP (mmHg) 100     Pt reassessed, vitals per doc flow.  on ICU bedside monitor. TC @ 28%  SPO2 99% with CSPO2 monitoring. Pt alert and grimacing, pt given prn dilaudid per MD order see MAR. PERRL. L femoral CVC WDL D5 NS @ 50 ml/hr. R femoral vas cath WDL. LIJ tunneled vascath w/ small amount of blood noted on dressing. PEG to ILWS with green bowel contents noted in tubing. Pt repositioned for comfort.  Will continue to closely monitor

## 2018-07-05 NOTE — PROGRESS NOTES
ID Follow-up NOTE    CC: tricuspid valve endocarditis    Subjective:     Patient on trach collar. Seems to deny belly pain  Objective:     Patient Vitals for the past 24 hrs:   BP Temp Temp src Pulse Resp SpO2 Weight   18 0900 114/66 - - 115 - 100 % -   18 0833 - - - 125 - - -   18 0804 (!) 150/89 - - - - - -   18 0800 (!) 150/89 - - 138 (!) 33 99 % -   18 0700 (!) 138/93 98.7 °F (37.1 °C) Oral 128 18 100 % -   18 0600 126/71 - - 107 19 100 % -   18 0500 138/79 - - 126 18 100 % -   18 0400 108/87 - - 123 25 100 % -   18 0353 - 98.1 °F (36.7 °C) Oral - - - 154 lb 5.2 oz (70 kg)   18 0300 120/75 - - 105 19 100 % -   18 0242 - - - - - 100 % -   18 0200 117/73 - - 106 14 100 % -   18 0100 128/78 - - 105 20 100 % -   18 0000 131/85 98.4 °F (36.9 °C) Oral 132 29 100 % -   18 2300 117/71 - - 113 16 100 % -   18 2254 - - - - - 100 % -   18 2200 (!) 107/93 - - 120 22 100 % -   18 2100 114/65 - - 123 19 100 % -   18 2000 111/60 98.2 °F (36.8 °C) Oral 106 13 100 % -   18 1900 115/74 - - 109 28 100 % -   18 1850 - - - - - 100 % -   18 1800 124/77 - - 112 24 100 % -   18 1700 109/66 - - 105 19 100 % -   18 1600 110/75 98.3 °F (36.8 °C) Axillary 105 20 100 % -   18 1500 111/66 - - 110 26 94 % -   18 1400 115/78 - - 105 14 100 % -   18 1300 119/68 - - 102 (!) 31 100 % -   18 1200 131/82 - - 119 20 - -   18 1150 - - - - 17 100 % -   18 1100 105/73 - - 108 (!) 41 - -   18 1000 117/80 - - 125 27 - -     Temp (24hrs), Av.3 °F (36.8 °C), Min:98.1 °F (36.7 °C), Max:98.7 °F (37.1 °C)          EXAM:  General: on trach collar.  Afebrile; does shake head in response to simple questions  ENT:  pupils equal reactive  Neck: s nodes,       LUNGS:

## 2018-07-05 NOTE — PROGRESS NOTES
R vascath removed per MD order per policy and procedure. Pressure held to site for 5 min, occlusive dressing in place CDI. Pt tolerated well.

## 2018-07-05 NOTE — PROGRESS NOTES
sennosides-docusate sodium  1 tablet Oral Daily    fentaNYL  2 patch Transdermal Q72H    fentanyl  1 patch Transdermal Q72H    cloNIDine  0.1 mg Oral TID    nystatin   Topical BID    chlorhexidine  15 mL Mouth/Throat BID    albuterol sulfate HFA  6 puff Inhalation Q4H    ipratropium  6 puff Inhalation Q4H    lidocaine  5 mL Intradermal Once    doxercalciferol  1 mcg Intravenous Once per day on Tue Thu Sat    lidocaine 1 % injection  5 mL Intradermal Once    sodium chloride flush  10 mL Intravenous 2 times per day    linezolid  600 mg Intravenous Q12H    darbepoetin emi-polysorbate  100 mcg Intravenous Weekly - Thursday    And    darbepoetin emi-polysorbate  25 mcg Subcutaneous Weekly - Thursday    pantoprazole  40 mg Intravenous Daily     PRN Meds:  HYDROmorphone, LORazepam, midazolam, ipratropium-albuterol, heparin (porcine), sodium chloride flush, albumin human, dextrose, sodium phosphate IVPB **OR** [DISCONTINUED] sodium phosphate IVPB **OR** [DISCONTINUED] sodium phosphate IVPB **OR** [DISCONTINUED] sodium phosphate IVPB, ondansetron, glucose, glucagon (rDNA), dextrose, acetaminophen, promethazine, hydrOXYzine    Results:  CBC:   Recent Labs      07/03/18   0615  07/04/18   0500  07/05/18   0528   WBC  8.7  6.5  6.2   HGB  7.4*  7.4*  7.2*   HCT  22.6*  22.5*  21.8*   MCV  94.4  94.1  93.8   PLT  211  192  200     BMP:   Recent Labs      07/03/18   0615  07/04/18   0500  07/05/18   0528   NA  126*  127*  127*   K  3.7  3.9  3.8   CL  93*  95*  95*   CO2  19*  19*  19*   PHOS  4.1  3.7  3.9   BUN  43*  32*  35*   CREATININE  4.5*  3.6*  4.5*     LIVER PROFILE:   No results for input(s): AST, ALT, LIPASE, BILIDIR, BILITOT, ALKPHOS in the last 72 hours. Invalid input(s):   AMYLASE,  ALB    Cultures:  6/29 Blood cx NG6/25/18 Blood NGTD  6/20/18 RESP MRSA  6/21/18 Blood NGTD  6/11/18 Blood NG  6/14/18 C dif negative  6/12/18 RESP MRSA  6/8/18 Blood NG  6/5/18 Blood MRSA and ENTEROBACTER CLOACAE Films:  CHEST CT 6/25/18  FINDINGS:   Mediastinum:  Motion artifact degrades image quality.  The unenhanced heart   is unremarkable.  The left internal jugular catheter terminates in the right   atrium.       There are new mildly enlarged mediastinal lymph nodes, likely reactive.  A   right peritracheal lymph node measures 1.1 x 1.5 cm.       Lungs/pleura: Endotracheal tube is in situ.  The tracheobronchial tree is   patent.  There is no pneumothorax.  There are new trace bilateral pleural   effusions with compressive atelectasis.       No change in the majority of the partially cavitating pulmonary nodules   throughout the bilateral lungs due to septic emboli.  However, the previously   noted airspace disease within the anterior segment of the left upper lobe has   become cavitary.  There is improved aeration and re-expansion of the left   lower lobe.     Upper Abdomen: A nasogastric tube reaches the lower gastroesophageal   junction.  A low-attenuation lesion in the left hepatic lobe is unchanged,   but incompletely imaged and cannot be fully evaluated given the lack There is   heterogeneous enhancement of the enlarged spleen measuring 14 cm in length.       Soft Tissues/Bones: No change in the old compression fractures involving the   superior endplates of T6 through T9.  A small to moderate amount of anasarca   is present throughout the lateral thorax.           Impression   1. Minimal worsening partially cavitating pulmonary nodules and airspace   disease throughout the bilateral lungs consistent with septic emboli. 2. New trace bilateral pleural effusions. 3. Mediastinal adenopathy, likely reactive. 4. Heterogeneous splenomegaly.      ASSESSMENT:  · Acute hypoxemic respiratory failure  · Persistent fevers  · Recurrent tricuspid valve infectious endocarditis  · Polymicrobial bacteremia: MRSA, Streptococcal pneumonia, and Enterobacter  · Cavitary pneumonia due to septic emboli    · DIC -improved  · Upper GI bleed secondary to angiodysplasia status post cauterization  · Acute blood loss anemia  · Transfused 6/27/18, 6/9/18, 6/8/18, 6/6/18   · End-stage kidney disease on hemodialysis  · IV drug abuse  · Hepatitis C  · Hyponatremia    · S/P Trach/PEG on 6/27/18  · Thrombocytopenia is stable  · Partial Ileus  · Encephalopathy    PLAN:  CATC 28%  KUB  HOB to 30 degrees  fentanyl patch 150mcg; valium 10mg q6 hr   PRN IV Ativan and dilaudid.     Wean sedation as able  Seroquel 100mg am and 200mg PM  · Zyvox day# 15;  infectious disease is following; previously completed Cefepime x 114 days and Vancomycin for 26 days  · Renal is  planning for tunneled catheter  · Replace femoral CVC with tunneled IJ CVC  · D5 /12 NS @ 50/hr  · Sennakot-S  · TF on hold  · PT/OT  · PEG or NGT to LIWS  · Prophylaxis: DVT - no SQ heparin with ongoing blood requirement, Protonix  · DC planning for LTAC

## 2018-07-05 NOTE — PLAN OF CARE
Problem: Nutrition  Goal: Optimal nutrition therapy  Outcome: Not Met This Shift  Nutrition Problem: Inadequate oral intake  Intervention: Food and/or Nutrient Delivery: Continue NPO  Nutritional Goals: when appropriate (patient to have KUB completed today), patient will tolerate Nepro TF at goal rate of 20 ml/hr x 20 hours without GI distress/abdominal distention; weight will rmain stable during remainder of admission (+ 24# weight gain x 1 month admission)

## 2018-07-06 LAB
ABO/RH: NORMAL
ALBUMIN SERPL-MCNC: 2.6 G/DL (ref 3.4–5)
ANION GAP SERPL CALCULATED.3IONS-SCNC: 11 MMOL/L (ref 3–16)
ANTIBODY SCREEN: NORMAL
BASE EXCESS VENOUS: -1.6 MMOL/L (ref -3–3)
BLOOD BANK DISPENSE STATUS: NORMAL
BLOOD BANK PRODUCT CODE: NORMAL
BPU ID: NORMAL
BUN BLDV-MCNC: 14 MG/DL (ref 7–20)
CALCIUM SERPL-MCNC: 8.3 MG/DL (ref 8.3–10.6)
CARBOXYHEMOGLOBIN: 1.7 % (ref 0–1.5)
CHLORIDE BLD-SCNC: 100 MMOL/L (ref 99–110)
CO2: 22 MMOL/L (ref 21–32)
CREAT SERPL-MCNC: 2.5 MG/DL (ref 0.6–1.1)
DESCRIPTION BLOOD BANK: NORMAL
GFR AFRICAN AMERICAN: 28
GFR NON-AFRICAN AMERICAN: 23
GLUCOSE BLD-MCNC: 82 MG/DL (ref 70–99)
GLUCOSE BLD-MCNC: 84 MG/DL (ref 70–99)
GLUCOSE BLD-MCNC: 85 MG/DL (ref 70–99)
GLUCOSE BLD-MCNC: 88 MG/DL (ref 70–99)
HCO3 VENOUS: 23.4 MMOL/L (ref 23–29)
HCT VFR BLD CALC: 19.8 % (ref 36–48)
HCT VFR BLD CALC: 23.5 % (ref 36–48)
HEMOGLOBIN: 6.5 G/DL (ref 12–16)
HEMOGLOBIN: 7.8 G/DL (ref 12–16)
MCH RBC QN AUTO: 30.5 PG (ref 26–34)
MCH RBC QN AUTO: 30.6 PG (ref 26–34)
MCHC RBC AUTO-ENTMCNC: 32.9 G/DL (ref 31–36)
MCHC RBC AUTO-ENTMCNC: 33.2 G/DL (ref 31–36)
MCV RBC AUTO: 91.9 FL (ref 80–100)
MCV RBC AUTO: 93.2 FL (ref 80–100)
METHEMOGLOBIN VENOUS: 0.8 %
O2 CONTENT, VEN: 9 VOL %
O2 SAT, VEN: 79 %
O2 THERAPY: ABNORMAL
PCO2, VEN: 41.1 MMHG (ref 40–50)
PDW BLD-RTO: 16.9 % (ref 12.4–15.4)
PDW BLD-RTO: 17.1 % (ref 12.4–15.4)
PERFORMED ON: NORMAL
PH VENOUS: 7.37 (ref 7.35–7.45)
PHOSPHORUS: 2.5 MG/DL (ref 2.5–4.9)
PLATELET # BLD: 172 K/UL (ref 135–450)
PLATELET # BLD: 179 K/UL (ref 135–450)
PMV BLD AUTO: 6.8 FL (ref 5–10.5)
PMV BLD AUTO: 7.3 FL (ref 5–10.5)
PO2, VEN: 44.2 MMHG (ref 25–40)
POTASSIUM SERPL-SCNC: 3.8 MMOL/L (ref 3.5–5.1)
RBC # BLD: 2.13 M/UL (ref 4–5.2)
RBC # BLD: 2.56 M/UL (ref 4–5.2)
SODIUM BLD-SCNC: 133 MMOL/L (ref 136–145)
TCO2 CALC VENOUS: 25 MMOL/L
WBC # BLD: 5 K/UL (ref 4–11)
WBC # BLD: 6.2 K/UL (ref 4–11)

## 2018-07-06 PROCEDURE — 36592 COLLECT BLOOD FROM PICC: CPT

## 2018-07-06 PROCEDURE — 76937 US GUIDE VASCULAR ACCESS: CPT

## 2018-07-06 PROCEDURE — 86850 RBC ANTIBODY SCREEN: CPT

## 2018-07-06 PROCEDURE — 80069 RENAL FUNCTION PANEL: CPT

## 2018-07-06 PROCEDURE — 36598 INJ W/FLUOR EVAL CV DEVICE: CPT

## 2018-07-06 PROCEDURE — 94640 AIRWAY INHALATION TREATMENT: CPT

## 2018-07-06 PROCEDURE — 71045 X-RAY EXAM CHEST 1 VIEW: CPT

## 2018-07-06 PROCEDURE — 86900 BLOOD TYPING SEROLOGIC ABO: CPT

## 2018-07-06 PROCEDURE — C9113 INJ PANTOPRAZOLE SODIUM, VIA: HCPCS

## 2018-07-06 PROCEDURE — 36569 INSJ PICC 5 YR+ W/O IMAGING: CPT

## 2018-07-06 PROCEDURE — C1751 CATH, INF, PER/CENT/MIDLINE: HCPCS

## 2018-07-06 PROCEDURE — 77001 FLUOROGUIDE FOR VEIN DEVICE: CPT

## 2018-07-06 PROCEDURE — P9016 RBC LEUKOCYTES REDUCED: HCPCS

## 2018-07-06 PROCEDURE — 36556 INSERT NON-TUNNEL CV CATH: CPT | Performed by: INTERNAL MEDICINE

## 2018-07-06 PROCEDURE — 94762 N-INVAS EAR/PLS OXIMTRY CONT: CPT

## 2018-07-06 PROCEDURE — 76000 FLUOROSCOPY <1 HR PHYS/QHP: CPT

## 2018-07-06 PROCEDURE — 86923 COMPATIBILITY TEST ELECTRIC: CPT

## 2018-07-06 PROCEDURE — 99232 SBSQ HOSP IP/OBS MODERATE 35: CPT | Performed by: INTERNAL MEDICINE

## 2018-07-06 PROCEDURE — 82803 BLOOD GASES ANY COMBINATION: CPT

## 2018-07-06 PROCEDURE — 86901 BLOOD TYPING SEROLOGIC RH(D): CPT

## 2018-07-06 PROCEDURE — 9990 CHARGE CONVERSION

## 2018-07-06 PROCEDURE — C1769 GUIDE WIRE: HCPCS

## 2018-07-06 PROCEDURE — 36415 COLL VENOUS BLD VENIPUNCTURE: CPT

## 2018-07-06 PROCEDURE — 76937 US GUIDE VASCULAR ACCESS: CPT | Performed by: INTERNAL MEDICINE

## 2018-07-06 PROCEDURE — 05H533Z INSERTION OF INFUSION DEVICE INTO RIGHT SUBCLAVIAN VEIN, PERCUTANEOUS APPROACH: ICD-10-PCS | Performed by: INTERNAL MEDICINE

## 2018-07-06 PROCEDURE — 85027 COMPLETE CBC AUTOMATED: CPT

## 2018-07-06 PROCEDURE — 99233 SBSQ HOSP IP/OBS HIGH 50: CPT | Performed by: INTERNAL MEDICINE

## 2018-07-06 RX ORDER — IPRATROPIUM BROMIDE AND ALBUTEROL SULFATE 2.5; .5 MG/3ML; MG/3ML
1 SOLUTION RESPIRATORY (INHALATION) EVERY 4 HOURS
Status: DISCONTINUED | OUTPATIENT
Start: 2018-07-06 | End: 2018-07-18 | Stop reason: HOSPADM

## 2018-07-06 RX ORDER — MIDAZOLAM HYDROCHLORIDE 1 MG/ML
5 INJECTION INTRAMUSCULAR; INTRAVENOUS ONCE
Status: COMPLETED | OUTPATIENT
Start: 2018-07-06 | End: 2018-07-06

## 2018-07-06 RX ORDER — MIDAZOLAM HYDROCHLORIDE 5 MG/ML
INJECTION INTRAMUSCULAR; INTRAVENOUS
Status: DISCONTINUED
Start: 2018-07-06 | End: 2018-07-06

## 2018-07-06 RX ORDER — 0.9 % SODIUM CHLORIDE 0.9 %
250 INTRAVENOUS SOLUTION INTRAVENOUS ONCE
Status: COMPLETED | OUTPATIENT
Start: 2018-07-06 | End: 2018-07-06

## 2018-07-06 RX ORDER — DIAZEPAM 5 MG/1
5 TABLET ORAL EVERY 8 HOURS
Status: DISCONTINUED | OUTPATIENT
Start: 2018-07-07 | End: 2018-07-09

## 2018-07-06 RX ORDER — SODIUM CHLORIDE 0.9 % (FLUSH) 0.9 %
10 SYRINGE (ML) INJECTION PRN
Status: DISCONTINUED | OUTPATIENT
Start: 2018-07-06 | End: 2018-07-18 | Stop reason: HOSPADM

## 2018-07-06 RX ORDER — SODIUM CHLORIDE 0.9 % (FLUSH) 0.9 %
10 SYRINGE (ML) INJECTION EVERY 12 HOURS SCHEDULED
Status: DISCONTINUED | OUTPATIENT
Start: 2018-07-06 | End: 2018-07-18 | Stop reason: HOSPADM

## 2018-07-06 RX ORDER — LIDOCAINE HYDROCHLORIDE 10 MG/ML
5 INJECTION, SOLUTION INFILTRATION; PERINEURAL ONCE
Status: DISCONTINUED | OUTPATIENT
Start: 2018-07-06 | End: 2018-07-09

## 2018-07-06 RX ADMIN — LINEZOLID 600 MG: 2 INJECTION, SOLUTION INTRAVENOUS at 10:30

## 2018-07-06 RX ADMIN — QUETIAPINE FUMARATE 200 MG: 200 TABLET, FILM COATED ORAL at 21:38

## 2018-07-06 RX ADMIN — DIAZEPAM 10 MG: 10 TABLET ORAL at 10:21

## 2018-07-06 RX ADMIN — IPRATROPIUM BROMIDE AND ALBUTEROL SULFATE 1 AMPULE: 2.5; .5 SOLUTION RESPIRATORY (INHALATION) at 03:42

## 2018-07-06 RX ADMIN — LINEZOLID 600 MG: 2 INJECTION, SOLUTION INTRAVENOUS at 21:38

## 2018-07-06 RX ADMIN — QUETIAPINE FUMARATE 100 MG: 100 TABLET, FILM COATED ORAL at 10:22

## 2018-07-06 RX ADMIN — Medication 10 ML: at 10:22

## 2018-07-06 RX ADMIN — CLONIDINE HYDROCHLORIDE 0.1 MG: 0.1 TABLET ORAL at 10:21

## 2018-07-06 RX ADMIN — CLONIDINE HYDROCHLORIDE 0.1 MG: 0.1 TABLET ORAL at 14:52

## 2018-07-06 RX ADMIN — PANTOPRAZOLE SODIUM 40 MG: 40 INJECTION, POWDER, FOR SOLUTION INTRAVENOUS at 10:22

## 2018-07-06 RX ADMIN — IPRATROPIUM BROMIDE AND ALBUTEROL SULFATE 1 AMPULE: 2.5; .5 SOLUTION RESPIRATORY (INHALATION) at 07:48

## 2018-07-06 RX ADMIN — IPRATROPIUM BROMIDE AND ALBUTEROL SULFATE 1 AMPULE: .5; 3 SOLUTION RESPIRATORY (INHALATION) at 23:12

## 2018-07-06 RX ADMIN — IPRATROPIUM BROMIDE AND ALBUTEROL SULFATE 1 AMPULE: 2.5; .5 SOLUTION RESPIRATORY (INHALATION) at 11:53

## 2018-07-06 RX ADMIN — DEXTROSE AND SODIUM CHLORIDE: 5; .9 INJECTION, SOLUTION INTRAVENOUS at 17:28

## 2018-07-06 RX ADMIN — CLONIDINE HYDROCHLORIDE 0.1 MG: 0.1 TABLET ORAL at 21:37

## 2018-07-06 RX ADMIN — DIAZEPAM 10 MG: 10 TABLET ORAL at 17:26

## 2018-07-06 RX ADMIN — NYSTATIN: 100000 CREAM TOPICAL at 22:03

## 2018-07-06 RX ADMIN — MIDAZOLAM HYDROCHLORIDE 5 MG: 1 INJECTION INTRAMUSCULAR; INTRAVENOUS at 10:49

## 2018-07-06 RX ADMIN — CHLORHEXIDINE GLUCONATE 0.12% ORAL RINSE 15 ML: 1.2 LIQUID ORAL at 22:02

## 2018-07-06 RX ADMIN — MIDAZOLAM HYDROCHLORIDE 5 MG: 1 INJECTION INTRAMUSCULAR; INTRAVENOUS at 11:43

## 2018-07-06 RX ADMIN — CHLORHEXIDINE GLUCONATE 0.12% ORAL RINSE 15 ML: 1.2 LIQUID ORAL at 10:22

## 2018-07-06 RX ADMIN — DOCUSATE SODIUM AND SENNOSIDES 1 TABLET: 8.6; 5 TABLET, FILM COATED ORAL at 10:21

## 2018-07-06 RX ADMIN — IPRATROPIUM BROMIDE AND ALBUTEROL SULFATE 1 AMPULE: 2.5; .5 SOLUTION RESPIRATORY (INHALATION) at 19:12

## 2018-07-06 RX ADMIN — Medication 1 MG: at 10:23

## 2018-07-06 RX ADMIN — NYSTATIN: 100000 CREAM TOPICAL at 10:30

## 2018-07-06 RX ADMIN — DIAZEPAM 10 MG: 10 TABLET ORAL at 01:53

## 2018-07-06 RX ADMIN — Medication 1 MG: at 11:09

## 2018-07-06 RX ADMIN — Medication 250 ML: at 06:19

## 2018-07-06 ASSESSMENT — PAIN SCALES - GENERAL
PAINLEVEL_OUTOF10: 8
PAINLEVEL_OUTOF10: 5

## 2018-07-06 NOTE — PROGRESS NOTES
D: Spoke with Dr. Grove Session and updated him on the pt. Verbal orders to remove femoral line once the pt's picc is able to be used.  Bora Pearson

## 2018-07-06 NOTE — PROGRESS NOTES
D: Spoke with Dr. Tracey George. He stated that he would prefer a tunneled CVC but if this is not able to be obtained a PICC is ok. Spoke with Radiology and they stated that the pt is not able to have PICC. Radiology is going to try and re-position the pt's Right chest CVC.  Seferino Uribe

## 2018-07-06 NOTE — PROGRESS NOTES
ID Follow-up NOTE    CC: tricuspid valve endocarditis    Subjective:     Patient on trach collar.  Seems to deny belly pain  Objective:     Patient Vitals for the past 24 hrs:   BP Temp Temp src Pulse Resp SpO2 Weight   07/06/18 1300 122/88 - - 109 - 100 % -   07/06/18 1200 (!) 132/92 - - 108 18 100 % -   07/06/18 1154 - - - - 16 - -   07/06/18 1100 (!) 150/126 99.4 °F (37.4 °C) Oral 110 13 100 % -   07/06/18 1000 (!) 162/99 - - 119 - 100 % -   07/06/18 0940 (!) 164/121 98.7 °F (37.1 °C) Oral 120 18 100 % -   07/06/18 0900 (!) 159/99 99.5 °F (37.5 °C) - 120 26 100 % -   07/06/18 0800 119/69 99.4 °F (37.4 °C) - 116 16 100 % -   07/06/18 0749 - - - - 20 100 % -   07/06/18 0745 (!) 156/98 99.2 °F (37.3 °C) - 125 25 - -   07/06/18 0723 (!) 161/96 99.7 °F (37.6 °C) Oral 121 25 100 % -   07/06/18 0700 (!) 157/107 - - 124 25 100 % -   07/06/18 0608 - - - - - - 152 lb 8.9 oz (69.2 kg)   07/06/18 0600 (!) 161/94 - - 127 24 100 % -   07/06/18 0500 138/84 - - 115 15 100 % -   07/06/18 0400 130/80 99.2 °F (37.3 °C) Oral 123 18 100 % -   07/06/18 0344 - - - - 15 100 % -   07/06/18 0300 129/82 - - 112 18 100 % -   07/06/18 0200 (!) 142/92 - - 124 17 100 % -   07/06/18 0100 123/64 - - 110 17 100 % -   07/06/18 0000 130/73 98 °F (36.7 °C) Oral 112 17 100 % -   07/05/18 2330 - - - - 22 100 % -   07/05/18 2300 131/70 - - 114 18 100 % -   07/05/18 2200 (!) 142/78 - - 119 19 99 % -   07/05/18 2107 - 99.4 °F (37.4 °C) Oral - - - -   07/05/18 2100 121/80 - - 108 21 99 % -   07/05/18 2000 132/86 - - 107 - 99 % -   07/05/18 1923 - - - - 15 95 % -   07/05/18 1900 124/71 - - 110 - 100 % -   07/05/18 1740 - - - 124 - - -   07/05/18 1700 135/77 98.4 °F (36.9 °C) - 110 15 - 147 lb 11.3 oz (67 kg)   07/05/18 1600 130/71 - - 124 18 - -   07/05/18 1532 - 98.4 °F (36.9 °C) Axillary - - - -   07/05/18 1400 (!) 160/101 - - 121 23 100 % - failure with hypoxia (HCC)    Splenic infarction    Bacteremia    Mucus plugging of bronchi    Cavitary lung disease    Fever    Persistent fever    MRSA bacteremia    Hyponatremia    Acute blood loss anemia    Ileus (HCC)  Resolved Problems:    * No resolved hospital problems. *  Tricuspid valve endocarditis with septic pulmonary emboli (L sided endocarditis not ruled out) due to MRSA. Strep and Enterobacter were also isolated from admission blood cultures, significance uncertain: I would think this illness is due to MRSA. possible splenic infarct or abscess. s/p arrest x2. Leukocytosis resolved  Fevers may be resolved. Abdomen still somewhat distended: possible ileus; not having diarrhea. New tunneled line inserted L IJ; R femoral line removed. Has L femoral CVC; R subclavian CVC placed but tip apparently malpositioned. Plan:   Continue linezolid; would like to see 11 more days or so, but that is negotiable. If GI status improves pt might be able to take it orally at some point.      WBC down to 5, will check differential in am

## 2018-07-06 NOTE — PROGRESS NOTES
Vitals:    07/06/18 0500 07/06/18 0600 07/06/18 0608 07/06/18 0700   BP: 138/84 (!) 161/94  (!) 157/107   Pulse: 115 127  124   Resp: 15 24  25   Temp:       TempSrc:       SpO2: 100% 100%  100%   Weight:   152 lb 8.9 oz (69.2 kg)    Height:           Recent Labs      07/04/18   0500  07/05/18   0528  07/06/18   0520   NA  127*  127*  133*   K  3.9  3.8  3.8   CO2  19*  19*  22   BUN  32*  35*  14   CREATININE  3.6*  4.5*  2.5*   PHOS  3.7  3.9  2.5   CALCIUM  8.5  8.9  8.3   GLUCOSE  101*  95  88   WBC  6.5  6.2  5.0   HGB  7.4*  7.2*  6.5*   HCT  22.5*  21.8*  19.8*          Intake/Output Summary (Last 24 hours) at 07/06/18 0719  Last data filed at 07/06/18 9999   Gross per 24 hour   Intake             2319 ml   Output             4050 ml   Net            -1731 ml       dextrose 5 % and 0.9 % NaCl Last Rate: 50 mL/hr at 07/05/18 2107    dextrose    Pt is seen lying in bed. They are trach collar in place. Pt is non verbal. . vRASS is 0  The pt is currently on trach. See EMAR for current infusions and rates. The pt is anuric. Spoke with HD nurse. She will call Dr. Pato Holland and see if he still wants HD today. See flowsheets for assessment.   Jennie Gallardo RN

## 2018-07-06 NOTE — PROCEDURES
Procedure: central venous access with TLC, right subclavian    Indication: invasive hemodynamic monitoring, frequent blood draws, ensure stable IV access     Informed Consent: was obtained from POA    Time Out: taken   Procedure: Sterile prep with chlorhexidine. Full maximum sterile field/barrier technique was followed (with cap and mask and sterile gown and large sterile sheet and hand hygeine and 2% chlorhexidine). Aqueous lidocaine anesthetic. I performed direct ultrasound visualization of the Right subclavian vein and it appeared patent and compressible at the level of the clavicle. Placement of central venous catheter using modified seldinger technique. The wire easily advanced to 18-20cm and then stopped. The TLC passed easily until 15cm and then stopped. Good dark venous blood return from the proximal 2 ports. The distal port flushed easily but could not draw blood back. CXR pending. No immediate complication.    Recommendation: remove central line at earliest time feasible to mitigate infectious risks

## 2018-07-06 NOTE — PROGRESS NOTES
Dr. Jerene Dakin at bedside and gave verbal orders for the following for sedation      1041: 1 mg diluadid   1049 5 mg of versed   1101 5 mg ov versed. 1108 1 mg diluadid   1122 VBG obtained from site. SPO2 on VBG returned at 79% SPO2.   1147 Chest x-ray order to confirm position.

## 2018-07-06 NOTE — PLAN OF CARE
Problem: Restraint Use - Nonviolent/Non-Self-Destructive Behavior:  Goal: Free from restraint events  Outcome: Ongoing

## 2018-07-06 NOTE — PROGRESS NOTES
(37.6 °C)  Pulse  Av.3  Min: 104  Max: 127  BP  Min: 119/69  Max: 164/121  SpO2  Av.7 %  Min: 95 %  Max: 100 %  FiO2   Av %  Min: 28 %  Max: 28 %  Patient Vitals for the past 4 hrs:   BP Temp Temp src Pulse Resp SpO2   18 1154 - - - - 16 -   18 1100 (!) 150/126 99.4 °F (37.4 °C) Oral 110 13 100 %   18 1000 (!) 162/99 - - 119 - 100 %   18 0940 (!) 164/121 98.7 °F (37.1 °C) Oral 120 18 100 %   18 0900 (!) 159/99 99.5 °F (37.5 °C) - 120 26 100 %   18 0800 119/69 99.4 °F (37.4 °C) - 116 16 100 %       CVP: CVP (Mean): 16 mmHg      Intake/Output Summary (Last 24 hours) at 18 1158  Last data filed at 18 1021   Gross per 24 hour   Intake           2743.5 ml   Output             4050 ml   Net          -1306.5 ml       EXAM:    General: young female on vent. Trach +   Eyes: PERRL. No sclera icterus. No conjunctiva injected. ENT: No discharge. Neck: Trachea midline. Normal thyroid. trach in place . Resp: Diminished breath sounds. Bilateral diffuse rhonchi. CV: Regular rate and rhythm   GI:   distended. No masses. Bowel sounds diminished. off tube feeds. No hernia. PEG +   Skin: Warm and dry. No nodule on exposed extremities. No rash on exposed extremities  Lymph: No cervical LAD. No supraclavicular LAD. M/S: .  Diffuse 1+ edema in all extremities   Neuro: Opens eyes , following commands .  Non focal     Meds:   sodium chloride  250 mL Intravenous Once    midazolam        midazolam        fentaNYL  1 patch Transdermal Q72H    diazepam  10 mg Oral Q8H    QUEtiapine  200 mg Oral Nightly    QUEtiapine  100 mg Oral Daily    sennosides-docusate sodium  1 tablet Oral Daily    fentanyl  1 patch Transdermal Q72H    cloNIDine  0.1 mg Oral TID    nystatin   Topical BID    chlorhexidine  15 mL Mouth/Throat BID    albuterol sulfate HFA  6 puff Inhalation Q4H    ipratropium  6 puff Inhalation Q4H    lidocaine  5 mL Intradermal Once    regurgitation.   Systolic pulmonary artery pressure (SPAP) estimated at 55 mmHg (RA pressure   3 mmHg), c/w moderate pulmonary hypertension.   Note TV endocarditis seen on prior March 2017 study but LV systolic function   is mildly decreased now. ECHO 6/8/18  Summary   This is a limited study s/p code.   LV systolic function is lower normal with EF visually estimated at 50%.   D-shaped septum c/w RV pressure and volume overload.   Large mobile, pedunculated vegetation on septal leaflet of tricuspid valve   c/w endocarditis.   No pericardial effusion noted. Cultures:  Respiratory cultures -6/12/18 - MRSA . Repeat culture 6/20/18 MRSA   Blood culture: ON ADMISSION - 6/5/18 POSITIVE S. aureus, S pneumonia and Enterobacter  Recent blood cultures negative       Radiology    XR ABDOMEN (KUB) (SINGLE AP VIEW)   Final Result   Gaseous prominence of bowel some of which appears to be colonic, but some may   be small bowel. Overall bowel gas pattern is nonspecific, but not   definitively obstructive, may reflect ileus. Progress radiographs may be of   further diagnostic aid, as indicated. IR TUNNELED CATHETER PLACEMENT GREATER THAN 5 YEARS   Preliminary Result   Status post successful ultrasound/fluoroscopically guided placement of left   internal jugular tunneled dialysis catheter as described above. XR CHEST PORTABLE   Final Result   1. Line placement without complicating feature. 2. Worsening pulmonary edema and/or superimposed pneumonia. XR CHEST PORTABLE   Final Result   NG tube tip projects over the body of the stomach. XR Acute Abd Series Chest 1 VW   Final Result   Gaseous bowel distention greater in the central small bowel most likely ileus.          XR CHEST PORTABLE   Final Result   Stable chest         XR ABDOMEN (KUB) (SINGLE AP VIEW)   Final Result   Nondiagnostic due to paucity of small bowel gas         XR ABDOMEN (KUB) (SINGLE AP VIEW)   Final Result   Multiple Question of perihepatic and subhepatic adhesions. Chronic gallbladder disease. Bibasilar pneumonias and pulmonary nodules   consistent with septic emboli. Mild anasarca. RECOMMENDATIONS:   NG tube should be pulled back about 4 cm since it is pressing against the   anterior stomach wall. Gallbladder ultrasound suggested for evaluation of gallbladder disease. Diagnostic Ultrasound-guided paracentesis may be helpful. CT Chest WO Contrast   Final Result   Multiple scattered cavitary and solid nodules scattered throughout the lungs   suspected to be from septic emboli. The emboli may be from recurrent   endocarditis, IV drug abuse, or infected DVT. Scattered focal pneumonia in   the lingula, right middle lobe, and right lower lobe. Trace bilateral   pleural effusions. Tip of the endotracheal tube lying near the tee. Moderate ascites seen in the upper abdomen. Possible multiple splenic   infarcts or abscesses. Please refer to the dictation of the CT scan of the   abdomen and pelvis. RECOMMENDATIONS:   Endotracheal tube should be pulled back 1-2 cm. CT Head WO Contrast   Final Result   No acute intracranial abnormality. XR CHEST PORTABLE   Final Result   Moderate lingular opacity favored to be pneumonia. Moderate opacity in the   right lung base probably also pneumonia. Development of multiple pulmonary   nodules presumed to be of an infectious or least inflammatory etiology. Low   lung volumes. Some improvement in the appearance of the chest since   yesterday. RECOMMENDATION:   CT scan of the chest with contrast recommended for further evaluation. XR ABDOMEN (KUB) (SINGLE AP VIEW)   Final Result   Probable mild nonobstructive ileus. Moderate to large amount of stool in the   left colon. XR CHEST PORTABLE   Final Result   Improving bilateral airspace disease.          XR CHEST PORTABLE   Final Result   Worsening bilateral airspace disease, probably pneumonia. XR CHEST PORTABLE   Final Result   Multifocal pneumonia, unchanged. XR CHEST 1 VW   Final Result   No change other than repositioning of the endotracheal tube. XR CHEST PORTABLE   Final Result   The endotracheal tube needs retracted approximately 3 cm. Satisfactory position right IJ tunnel catheter and left central venous   catheter. Increased size of the multifocal nodular lung opacities presumed multifocal   pneumonia. Findings were discussed with ICU nurse caring for the patient at 2:21 pm on   6/8/2018. XR CHEST PORTABLE   Final Result   Multifocal pneumonia. XR FOOT RIGHT (2 VIEWS)   Final Result   Lucency through the medial sesamoid most consistent with bipartite medial   sesamoid in the absence of history of trauma. This can be associated with   pain. CTA ABDOMEN PELVIS W WO CONTRAST   Final Result   No active gastrointestinal hemorrhage is identified. No vascular abnormality   is appreciated. Right lower lobe pneumonia as well as cavitary nodules. Septic emboli are   consideration. Compared with 11/26/2017, there is waxing and waning airspace   disease and pulmonary nodules. Organizing pneumonia is an alternative   possibility. Hepatosplenomegaly. Hepatomegaly is similar to 11/26/2017. Splenomegaly is   new. Hypodensity in the spleen, suggestive of acute to subacute embolic   infarction. Small amount of ascites, nonspecific. Hyperdensity of the gallbladder wall. This may represent mural   calcification. Etiology is unclear. There is variable association with   gallbladder wall calcification and risk for gallbladder carcinoma. Stable cardiomegaly. Lumbar spine and visualized osseous structures appear intact. NM GI BLOOD LOSS   Final Result   No evidence of active GI bleeding during acquisition.       RECOMMENDATIONS:   If the patient shows hemodynamic signs of an active bleed in

## 2018-07-07 LAB
ALBUMIN SERPL-MCNC: 2.7 G/DL (ref 3.4–5)
ANION GAP SERPL CALCULATED.3IONS-SCNC: 13 MMOL/L (ref 3–16)
BASOPHILS ABSOLUTE: 0.1 K/UL (ref 0–0.2)
BASOPHILS RELATIVE PERCENT: 0.8 %
BUN BLDV-MCNC: 18 MG/DL (ref 7–20)
CALCIUM SERPL-MCNC: 9.1 MG/DL (ref 8.3–10.6)
CHLORIDE BLD-SCNC: 98 MMOL/L (ref 99–110)
CO2: 21 MMOL/L (ref 21–32)
CREAT SERPL-MCNC: 3.3 MG/DL (ref 0.6–1.1)
EOSINOPHILS ABSOLUTE: 0.1 K/UL (ref 0–0.6)
EOSINOPHILS RELATIVE PERCENT: 1.3 %
GFR AFRICAN AMERICAN: 20
GFR NON-AFRICAN AMERICAN: 17
GLUCOSE BLD-MCNC: 88 MG/DL (ref 70–99)
GLUCOSE BLD-MCNC: 97 MG/DL (ref 70–99)
HCT VFR BLD CALC: 24.2 % (ref 36–48)
HEMOGLOBIN: 8.1 G/DL (ref 12–16)
LYMPHOCYTES ABSOLUTE: 0.8 K/UL (ref 1–5.1)
LYMPHOCYTES RELATIVE PERCENT: 11.8 %
MCH RBC QN AUTO: 30.6 PG (ref 26–34)
MCHC RBC AUTO-ENTMCNC: 33.5 G/DL (ref 31–36)
MCV RBC AUTO: 91.5 FL (ref 80–100)
MONOCYTES ABSOLUTE: 0.6 K/UL (ref 0–1.3)
MONOCYTES RELATIVE PERCENT: 8.2 %
NEUTROPHILS ABSOLUTE: 5.6 K/UL (ref 1.7–7.7)
NEUTROPHILS RELATIVE PERCENT: 77.9 %
PDW BLD-RTO: 16.7 % (ref 12.4–15.4)
PERFORMED ON: NORMAL
PHOSPHORUS: 3.5 MG/DL (ref 2.5–4.9)
PLATELET # BLD: 147 K/UL (ref 135–450)
PMV BLD AUTO: 6.8 FL (ref 5–10.5)
POTASSIUM SERPL-SCNC: 3.8 MMOL/L (ref 3.5–5.1)
RBC # BLD: 2.64 M/UL (ref 4–5.2)
SODIUM BLD-SCNC: 132 MMOL/L (ref 136–145)
WBC # BLD: 7.2 K/UL (ref 4–11)

## 2018-07-07 PROCEDURE — P9047 ALBUMIN (HUMAN), 25%, 50ML: HCPCS

## 2018-07-07 PROCEDURE — 97530 THERAPEUTIC ACTIVITIES: CPT

## 2018-07-07 PROCEDURE — 9990 CHARGE CONVERSION

## 2018-07-07 PROCEDURE — 97110 THERAPEUTIC EXERCISES: CPT

## 2018-07-07 PROCEDURE — 99024 POSTOP FOLLOW-UP VISIT: CPT | Performed by: INTERNAL MEDICINE

## 2018-07-07 PROCEDURE — 80069 RENAL FUNCTION PANEL: CPT

## 2018-07-07 PROCEDURE — 94640 AIRWAY INHALATION TREATMENT: CPT

## 2018-07-07 PROCEDURE — C9113 INJ PANTOPRAZOLE SODIUM, VIA: HCPCS

## 2018-07-07 PROCEDURE — 94762 N-INVAS EAR/PLS OXIMTRY CONT: CPT

## 2018-07-07 PROCEDURE — 87040 BLOOD CULTURE FOR BACTERIA: CPT

## 2018-07-07 PROCEDURE — 99232 SBSQ HOSP IP/OBS MODERATE 35: CPT | Performed by: INTERNAL MEDICINE

## 2018-07-07 PROCEDURE — 85025 COMPLETE CBC W/AUTO DIFF WBC: CPT

## 2018-07-07 PROCEDURE — 99233 SBSQ HOSP IP/OBS HIGH 50: CPT | Performed by: INTERNAL MEDICINE

## 2018-07-07 RX ORDER — LORAZEPAM 2 MG/ML
2 INJECTION INTRAMUSCULAR EVERY 4 HOURS PRN
Status: DISCONTINUED | OUTPATIENT
Start: 2018-07-07 | End: 2018-07-09

## 2018-07-07 RX ADMIN — LINEZOLID 600 MG: 2 INJECTION, SOLUTION INTRAVENOUS at 13:03

## 2018-07-07 RX ADMIN — DOCUSATE SODIUM AND SENNOSIDES 1 TABLET: 8.6; 5 TABLET, FILM COATED ORAL at 08:13

## 2018-07-07 RX ADMIN — CHLORHEXIDINE GLUCONATE 0.12% ORAL RINSE 15 ML: 1.2 LIQUID ORAL at 22:10

## 2018-07-07 RX ADMIN — IPRATROPIUM BROMIDE AND ALBUTEROL SULFATE 1 AMPULE: .5; 3 SOLUTION RESPIRATORY (INHALATION) at 07:26

## 2018-07-07 RX ADMIN — DIAZEPAM 5 MG: 5 TABLET ORAL at 08:13

## 2018-07-07 RX ADMIN — NYSTATIN: 100000 CREAM TOPICAL at 22:22

## 2018-07-07 RX ADMIN — DIAZEPAM 5 MG: 5 TABLET ORAL at 02:21

## 2018-07-07 RX ADMIN — QUETIAPINE FUMARATE 200 MG: 200 TABLET, FILM COATED ORAL at 22:09

## 2018-07-07 RX ADMIN — Medication 1 MG: at 16:20

## 2018-07-07 RX ADMIN — IPRATROPIUM BROMIDE AND ALBUTEROL SULFATE 1 AMPULE: .5; 3 SOLUTION RESPIRATORY (INHALATION) at 22:58

## 2018-07-07 RX ADMIN — IPRATROPIUM BROMIDE AND ALBUTEROL SULFATE 1 AMPULE: .5; 3 SOLUTION RESPIRATORY (INHALATION) at 15:30

## 2018-07-07 RX ADMIN — NYSTATIN: 100000 CREAM TOPICAL at 08:13

## 2018-07-07 RX ADMIN — CHLORHEXIDINE GLUCONATE 0.12% ORAL RINSE 15 ML: 1.2 LIQUID ORAL at 08:13

## 2018-07-07 RX ADMIN — QUETIAPINE FUMARATE 100 MG: 100 TABLET, FILM COATED ORAL at 08:13

## 2018-07-07 RX ADMIN — CLONIDINE HYDROCHLORIDE 0.1 MG: 0.1 TABLET ORAL at 13:47

## 2018-07-07 RX ADMIN — IPRATROPIUM BROMIDE AND ALBUTEROL SULFATE 1 AMPULE: .5; 3 SOLUTION RESPIRATORY (INHALATION) at 19:08

## 2018-07-07 RX ADMIN — Medication 1 MG: at 13:02

## 2018-07-07 RX ADMIN — CLONIDINE HYDROCHLORIDE 0.1 MG: 0.1 TABLET ORAL at 08:13

## 2018-07-07 RX ADMIN — CLONIDINE HYDROCHLORIDE 0.1 MG: 0.1 TABLET ORAL at 22:09

## 2018-07-07 RX ADMIN — HEPARIN SODIUM 3200 UNITS: 1000 INJECTION, SOLUTION INTRAVENOUS; SUBCUTANEOUS at 12:14

## 2018-07-07 RX ADMIN — DEXTROSE AND SODIUM CHLORIDE: 5; .9 INJECTION, SOLUTION INTRAVENOUS at 15:15

## 2018-07-07 RX ADMIN — IPRATROPIUM BROMIDE AND ALBUTEROL SULFATE 1 AMPULE: .5; 3 SOLUTION RESPIRATORY (INHALATION) at 03:06

## 2018-07-07 RX ADMIN — ACETAMINOPHEN 650 MG: 325 TABLET ORAL at 19:02

## 2018-07-07 RX ADMIN — DIAZEPAM 5 MG: 5 TABLET ORAL at 17:41

## 2018-07-07 RX ADMIN — Medication 10 ML: at 22:22

## 2018-07-07 RX ADMIN — DOXERCALCIFEROL 1 MCG: 2 INJECTION, SOLUTION INTRAVENOUS at 11:58

## 2018-07-07 RX ADMIN — Medication 10 ML: at 08:13

## 2018-07-07 RX ADMIN — PANTOPRAZOLE SODIUM 40 MG: 40 INJECTION, POWDER, FOR SOLUTION INTRAVENOUS at 08:13

## 2018-07-07 RX ADMIN — Medication 1 MG: at 07:40

## 2018-07-07 ASSESSMENT — PAIN SCALES - GENERAL
PAINLEVEL_OUTOF10: 0
PAINLEVEL_OUTOF10: 8
PAINLEVEL_OUTOF10: 8
PAINLEVEL_OUTOF10: 5
PAINLEVEL_OUTOF10: 0
PAINLEVEL_OUTOF10: 10

## 2018-07-07 NOTE — PROGRESS NOTES
reclined position, SCDs donned, PRAFO boots donned, alarm active, patient in midline. Activity Tolerance     SPO2 98, HR 120s and 130s with coughing at times, needed suctioning during treatment (called nursing to suction). ROM  Able to achieve 0 degrees DF neutral R ankle, lacking 5 degrees from neutral L ankle passively. Assessment   Patient more alert for therapy and following commands to participate in ROM exercises. More communicative, but weak to write more than a few words. Suggested to her mom a laptop to type her words. Easily frustrated when trying to communicate as she tried to mouth words that could not be understood. Elevated heart rate with weak, sustained coughing spells during this session, needing suctioning and mouth care by nursing at one point. Recommend LTAC. Try supine to sit next visit if continues to be more alert. Plan   Continue with plan of care. At end of treatment, patient in bed, alarm active, with call light and needs within reach.     Time Coded Treatment Minutes:  50 minutes  Total Treatment Time:  50 minutes    Larry Lopez, PT  #[id number]

## 2018-07-07 NOTE — PROGRESS NOTES
0035 (!) 168/94 100 °F (37.8 °C) - 112 18 - 140 lb 10.5 oz (63.8 kg)   07/07/18 0745 (!) 166/95 100.3 °F (37.9 °C) Oral 115 18 99 % -   07/07/18 0732 - - - - - 100 % -       CVP: CVP (Mean): 16 mmHg      Intake/Output Summary (Last 24 hours) at 07/07/18 1130  Last data filed at 07/07/18 0348   Gross per 24 hour   Intake             1579 ml   Output              650 ml   Net              929 ml       EXAM:    General: young female on vent. Trach +   Awake, responding   Eyes: PERRL. No sclera icterus. No conjunctiva injected. ENT: No discharge. Neck: Trachea midline. Normal thyroid. trach in place . Resp: Diminished breath sounds. Bilateral diffuse rhonchi. CV: tachycardic   GI:   distended. No masses. Bowel sounds diminished. off tube feeds. No hernia. PEG +   Skin: Warm and dry. No nodule on exposed extremities. No rash on exposed extremities  Lymph: No cervical LAD. No supraclavicular LAD. M/S: .  Diffuse 1+ edema in all extremities   Neuro: Opens eyes , following commands .  Non focal     Meds:   lidocaine 1 % injection  5 mL Intradermal Once    sodium chloride flush  10 mL Intravenous 2 times per day    diazepam  5 mg Oral Q8H    ipratropium-albuterol  1 ampule Inhalation Q4H    fentaNYL  1 patch Transdermal Q72H    QUEtiapine  200 mg Oral Nightly    QUEtiapine  100 mg Oral Daily    sennosides-docusate sodium  1 tablet Oral Daily    fentanyl  1 patch Transdermal Q72H    cloNIDine  0.1 mg Oral TID    nystatin   Topical BID    chlorhexidine  15 mL Mouth/Throat BID    doxercalciferol  1 mcg Intravenous Once per day on Tue Thu Sat    linezolid  600 mg Intravenous Q12H    darbepoetin emi-polysorbate  100 mcg Intravenous Weekly - Thursday    And    darbepoetin emi-polysorbate  25 mcg Subcutaneous Weekly - Thursday    pantoprazole  40 mg Intravenous Daily       PRN Meds:  LORazepam, sodium chloride flush, heparin (porcine), HYDROmorphone, albumin human, dextrose, sodium phosphate IVPB CHEST PORTABLE   Final Result   Stable chest         XR ABDOMEN (KUB) (SINGLE AP VIEW)   Final Result   Nondiagnostic due to paucity of small bowel gas         XR ABDOMEN (KUB) (SINGLE AP VIEW)   Final Result   Multiple mildly dilated loops of small bowel. Findings may represent ileus   or partial small bowel obstruction. XR CHEST PORTABLE   Final Result   Stable positioning of left central venous catheter. No pneumothorax. Slightly improved aeration with persistent ground-glass opacities in the left   mid lung. XR CHEST PORTABLE   Final Result   Retraction of the left internal jugular CVC with its tip in the expected   region of the brachiocephalic vein      No significant change in the cavitary nodules and bibasilar airspace disease         XR CHEST PORTABLE   Final Result   Tracheostomy tube placement. Left basilar airspace disease most consistent with atelectasis         IR NONTUNNELED VASCULAR CATHETER   Final Result   Successful ultrasound and fluoroscopy guided non-tunneled right femoral vein   temporary dialysis catheter placement. XR CHEST PORTABLE   Final Result   Improving bibasilar airspace disease. Stable cavitary lesions. CT Chest WO Contrast   Final Result   1. Minimal worsening partially cavitating pulmonary nodules and airspace   disease throughout the bilateral lungs consistent with septic emboli. 2. New trace bilateral pleural effusions. 3. Mediastinal adenopathy, likely reactive. 4. Heterogeneous splenomegaly. XR CHEST PORTABLE   Final Result   Stable appearing bibasilar airspace disease. Support tubes and lines as   above. XR CHEST PORTABLE   Final Result   No significant interval change in bilateral airspace disease as compared to   prior. Cavitary pulmonary nodules are again demonstrated. XR CHEST PORTABLE   Final Result   Left mid lung airspace disease is similar to minimally improved as compared   to prior.   Persistent 6/12/18,  for increasing abd distention and pain   - remains on mechanical ventilation, with spontaneous breathing trials. pulm managing  - ct chest with no new findings except for septic emboli and cavitory pneumonia. Repeat CT chest on 6/25/18 shows worsening of cavitating pulmonary nodules and airspacedisease consistent with septic emboli. Reactive mediastinal adenopathy noted  - ct abd with no acute findings   - Off precedex drip. methadone and valium oral added. Off Versed and fentanyl gtt . On fentanyl patch now. - IV antibiotics- ID has stopped vanc, cefepime. Continue Zyvox. - f/w BAL. Respiratory cultures growing MRSA  - S/P PEG and tracheostomy  6/27. Stable now on Trach collar    #  Acute upper GI bleed. - s/p  EGD -Three angiodysplastic spots at the junction of the body and the fundus area that was cauterized during endoscopy.   - s/p multiple PRBC transfusions.  -  h/h stable. - RBC scan neg    #  Tricuspid valve endocarditis- recurrent    with bacteremia   - history of IV drug abuse. - She is growing MRSA, strep pneumoniae and Enterobacter cloacae. - ID involved  - Large pedunculated vegetation on septal leaflet of TV   Previously had MSSA TV endocarditis  - vancomycin and cefepime and zyvox was added   - Improving wbc   ID has stopped vanc, cefepime. Continue Zyvox. Still with low grade fevers      #  End stage renal disease on HD  -Was on CRRT - from 6/8/18 to  6/22/18  -Now on HD since 6/23/18. -TDC removed 6/25/18 due to persistent fevers. New TDC placed on 7/5/18  - Continue HD     # DIC   -Sec to severe sepsis , off steroids  -Hematology f/w     # Anemia   - sec to sepsis, iatrogenic, GI bleed  -Prn transfusions    #   ileus   - Her abdomen is distended again. TF held. - Monitor     LTAC referral . Likely discharged to LTAC in a.m.  .    Full Code      Shannan Quezada MD 7/7/2018 11:30 AM

## 2018-07-07 NOTE — PROGRESS NOTES
diarrhea. New tunneled line inserted L IJ 2 days ago, as well as L brachial PICC yesterday. Other lines have been removed.   Plan:   Continue linezolid    Platelet count is falling once again, possibly due to linezolid  Repeat blood cultures if temps go any higher

## 2018-07-07 NOTE — FLOWSHEET NOTE
Treatment time:4hours  Net UF: 3000 ml     Pre weight: 63.8 kg   Post weight: 60.7 kg  EDW: TBD kg     Access used: LIJ CVC  Access function: GOOD with  ml/min     Medications or blood products given: hectorol, heparin     Regular outpatient schedule: MWF (TTS in hospital)     Summary of response to treatment: HD tx completd in full, VSS, pt agitated when alert, does not follow commands well, is trying to mouth words but unable to understand d/t trach collar, tolerated tx well w/o complications, heparin dwelled, CVC capped and clamped, report given to RN, pt returned to room 313     Copy of dialysis treatment record placed in chart, to be scanned into EMR.     07/07/18 0835 07/07/18 1235   Vital Signs   BP (!) 168/94 (!) 155/84   Temp 100 °F (37.8 °C) 99.9 °F (37.7 °C)   Pulse 112 112   Weight 140 lb 10.5 oz (63.8 kg) 133 lb 13.1 oz (60.7 kg)   Weight Method Bed scale Bed scale   Percent Weight Change 0 -4.86   Pain Assessment   Pain Assessment Faces Faces   Pain Level 5 0   Response to Pain Intervention --  Asleep with RR greater than 10   Post-Hemodialysis Assessment   Post-Treatment Procedures --  Blood returned;Catheter capped, clamped and heparinized x 2 ports   Machine Disinfection Process --  Acid/Vinegar Clean;Heat Disinfect; Exterior Machine Disinfection   Rinseback Volume (ml) --  400 ml   Total Liters Processed (l/min) --  89.7 l/min   Dialyzer Clearance --  Moderately streaked   Duration of Treatment (minutes) --  240 minutes   Heparin amount administered during treatment (units) --  0 units   Hemodialysis Intake (ml) --  400 ml   Hemodialysis Output (ml) --  3400 ml   NET Removed (ml) --  3000 ml   Tolerated Treatment --  Good   Bilateral Breath Sounds Rhonchi Rhonchi   Edema Generalized;Right upper extremity; Left upper extremity;Right lower extremity; Left lower extremity Generalized;Right upper extremity; Left upper extremity;Right lower extremity; Left lower extremity   Edema Generalized Trace Trace   RUE Edema Trace Trace   LUE Edema Trace Trace   RLE Edema +1 +1   LLE Edema +1 +1

## 2018-07-07 NOTE — PROGRESS NOTES
AM assessment completed. See flowsheet. A/O x 2. Pt only able to nod head yes and no and mouth words. Lungs sounds rhonchi in upper lobes, diminished in bases. Bowel sounds hypoactive. St per monitor. Generalized trace edema noted . Anuric. HDU today. Labs reviewed. Cont to monitor.

## 2018-07-07 NOTE — PROGRESS NOTES
Oral tmep 102.5. Per Dr. Debbie Mckinney order, blood cultures x2 ordered. 1st set drawn at 1915 from PICC line.  Informed oncoming RN to draw 2nd set at 80

## 2018-07-07 NOTE — PROGRESS NOTES
Pulmonary & Critical Care Medicine ICU Progress Note    CC: endocarditis    Events of Last 24 hours: intermittent agitation improving. HD today    MV:  18 - ; trach   Vent Mode: AC/VC Rate Set: 18 bmp/Vt Ordered: 350 mL/ /FiO2 : 28 %  Recent Labs      18   0525   PHART  7.340*   [id number]  35.2   PO2ART  125.1*     IV:   dextrose 5 % and 0.9 % NaCl 50 mL/hr at 18 1728    dextrose       Vitals:  Blood pressure (!) 168/94, pulse 112, temperature 100 °F (37.8 °C), resp. rate 18, height 5' 5\" (1.651 m), weight 140 lb 10.5 oz (63.8 kg), SpO2 99 %, not currently breastfeeding. on 30% CATC  Temp  Av.5 °F (37.5 °C)  Min: 98.6 °F (37 °C)  Max: 100.3 °F (37.9 °C)    Intake/Output Summary (Last 24 hours) at 18 1059  Last data filed at 18 0348   Gross per 24 hour   Intake             1579 ml   Output              650 ml   Net              929 ml     EXAM:  General: no acute distress  Eyes: PERRL. No sclera icterus. No conjunctival injection. ENT: No discharge. 8 Portex tracheostomy tube  Neck: Trachea midline. Normal thyroid. Resp: No accessory muscle use. few crackles. No wheezing. No rhonchi. CV: Regular rate. Regular rhythm. No mumur or rub. + edema   GI: Non-tender. + distended. No masses. No organomegaly. Hypoactive bowel sounds. No hernia. PEG  Lymph: No cervical LAD. No supraclavicular LAD. M/S: No cyanosis. No joint deformity. No clubbing. Neuro: Regards & follows commands with all 4 extremities.       Scheduled Meds:   lidocaine 1 % injection  5 mL Intradermal Once    sodium chloride flush  10 mL Intravenous 2 times per day    diazepam  5 mg Oral Q8H    ipratropium-albuterol  1 ampule Inhalation Q4H    fentaNYL  1 patch Transdermal Q72H    QUEtiapine  200 mg Oral Nightly    QUEtiapine  100 mg Oral Daily    sennosides-docusate sodium  1 tablet Oral Daily    fentanyl  1 patch Transdermal Q72H    cloNIDine  0.1 mg Oral TID    nystatin   Topical BID    no

## 2018-07-08 LAB
ALBUMIN SERPL-MCNC: 2.6 G/DL (ref 3.4–5)
ANION GAP SERPL CALCULATED.3IONS-SCNC: 12 MMOL/L (ref 3–16)
BASOPHILS ABSOLUTE: 0 K/UL (ref 0–0.2)
BASOPHILS RELATIVE PERCENT: 0.7 %
BUN BLDV-MCNC: 10 MG/DL (ref 7–20)
CALCIUM SERPL-MCNC: 8.8 MG/DL (ref 8.3–10.6)
CHLORIDE BLD-SCNC: 97 MMOL/L (ref 99–110)
CO2: 23 MMOL/L (ref 21–32)
CREAT SERPL-MCNC: 2.4 MG/DL (ref 0.6–1.1)
CULTURE CATHETER TIP: NORMAL
EOSINOPHILS ABSOLUTE: 0.1 K/UL (ref 0–0.6)
EOSINOPHILS RELATIVE PERCENT: 1.2 %
GFR AFRICAN AMERICAN: 30
GFR NON-AFRICAN AMERICAN: 24
GLUCOSE BLD-MCNC: 88 MG/DL (ref 70–99)
GLUCOSE BLD-MCNC: 89 MG/DL (ref 70–99)
GLUCOSE BLD-MCNC: 90 MG/DL (ref 70–99)
GLUCOSE BLD-MCNC: 91 MG/DL (ref 70–99)
GLUCOSE BLD-MCNC: 98 MG/DL (ref 70–99)
HCT VFR BLD CALC: 23.7 % (ref 36–48)
HEMOGLOBIN: 8 G/DL (ref 12–16)
LYMPHOCYTES ABSOLUTE: 1.1 K/UL (ref 1–5.1)
LYMPHOCYTES RELATIVE PERCENT: 15.8 %
MCH RBC QN AUTO: 31.1 PG (ref 26–34)
MCHC RBC AUTO-ENTMCNC: 33.9 G/DL (ref 31–36)
MCV RBC AUTO: 91.7 FL (ref 80–100)
MONOCYTES ABSOLUTE: 0.7 K/UL (ref 0–1.3)
MONOCYTES RELATIVE PERCENT: 9.6 %
NEUTROPHILS ABSOLUTE: 5.2 K/UL (ref 1.7–7.7)
NEUTROPHILS RELATIVE PERCENT: 72.7 %
PDW BLD-RTO: 16.7 % (ref 12.4–15.4)
PERFORMED ON: NORMAL
PHOSPHORUS: 2.8 MG/DL (ref 2.5–4.9)
PLATELET # BLD: 126 K/UL (ref 135–450)
PMV BLD AUTO: 6.5 FL (ref 5–10.5)
POTASSIUM SERPL-SCNC: 3.8 MMOL/L (ref 3.5–5.1)
RBC # BLD: 2.58 M/UL (ref 4–5.2)
SODIUM BLD-SCNC: 132 MMOL/L (ref 136–145)
WBC # BLD: 7.2 K/UL (ref 4–11)

## 2018-07-08 PROCEDURE — 9990 CHARGE CONVERSION

## 2018-07-08 PROCEDURE — 36592 COLLECT BLOOD FROM PICC: CPT

## 2018-07-08 PROCEDURE — 94762 N-INVAS EAR/PLS OXIMTRY CONT: CPT

## 2018-07-08 PROCEDURE — 80069 RENAL FUNCTION PANEL: CPT

## 2018-07-08 PROCEDURE — 99232 SBSQ HOSP IP/OBS MODERATE 35: CPT | Performed by: INTERNAL MEDICINE

## 2018-07-08 PROCEDURE — 94640 AIRWAY INHALATION TREATMENT: CPT

## 2018-07-08 PROCEDURE — C9113 INJ PANTOPRAZOLE SODIUM, VIA: HCPCS

## 2018-07-08 PROCEDURE — 71045 X-RAY EXAM CHEST 1 VIEW: CPT

## 2018-07-08 PROCEDURE — 99233 SBSQ HOSP IP/OBS HIGH 50: CPT | Performed by: INTERNAL MEDICINE

## 2018-07-08 PROCEDURE — 85025 COMPLETE CBC W/AUTO DIFF WBC: CPT

## 2018-07-08 PROCEDURE — 74018 RADEX ABDOMEN 1 VIEW: CPT

## 2018-07-08 RX ORDER — FENTANYL 50 UG/H
1 PATCH TRANSDERMAL
Status: DISCONTINUED | OUTPATIENT
Start: 2018-07-08 | End: 2018-07-10

## 2018-07-08 RX ADMIN — CLONIDINE HYDROCHLORIDE 0.1 MG: 0.1 TABLET ORAL at 09:08

## 2018-07-08 RX ADMIN — IPRATROPIUM BROMIDE AND ALBUTEROL SULFATE 1 AMPULE: .5; 3 SOLUTION RESPIRATORY (INHALATION) at 16:08

## 2018-07-08 RX ADMIN — DEXTROSE AND SODIUM CHLORIDE: 5; .9 INJECTION, SOLUTION INTRAVENOUS at 09:08

## 2018-07-08 RX ADMIN — IPRATROPIUM BROMIDE AND ALBUTEROL SULFATE 1 AMPULE: .5; 3 SOLUTION RESPIRATORY (INHALATION) at 11:57

## 2018-07-08 RX ADMIN — ACETAMINOPHEN 650 MG: 325 TABLET ORAL at 20:12

## 2018-07-08 RX ADMIN — LINEZOLID 600 MG: 2 INJECTION, SOLUTION INTRAVENOUS at 12:23

## 2018-07-08 RX ADMIN — ONDANSETRON 4 MG: 2 INJECTION, SOLUTION INTRAMUSCULAR; INTRAVENOUS at 15:39

## 2018-07-08 RX ADMIN — PANTOPRAZOLE SODIUM 40 MG: 40 INJECTION, POWDER, FOR SOLUTION INTRAVENOUS at 09:08

## 2018-07-08 RX ADMIN — QUETIAPINE FUMARATE 200 MG: 200 TABLET, FILM COATED ORAL at 20:12

## 2018-07-08 RX ADMIN — CLONIDINE HYDROCHLORIDE 0.1 MG: 0.1 TABLET ORAL at 12:32

## 2018-07-08 RX ADMIN — IPRATROPIUM BROMIDE AND ALBUTEROL SULFATE 1 AMPULE: .5; 3 SOLUTION RESPIRATORY (INHALATION) at 19:08

## 2018-07-08 RX ADMIN — NYSTATIN: 100000 CREAM TOPICAL at 09:13

## 2018-07-08 RX ADMIN — CLONIDINE HYDROCHLORIDE 0.1 MG: 0.1 TABLET ORAL at 20:11

## 2018-07-08 RX ADMIN — DOCUSATE SODIUM AND SENNOSIDES 1 TABLET: 8.6; 5 TABLET, FILM COATED ORAL at 09:08

## 2018-07-08 RX ADMIN — IPRATROPIUM BROMIDE AND ALBUTEROL SULFATE 1 AMPULE: .5; 3 SOLUTION RESPIRATORY (INHALATION) at 07:33

## 2018-07-08 RX ADMIN — DIAZEPAM 5 MG: 5 TABLET ORAL at 09:08

## 2018-07-08 RX ADMIN — ACETAMINOPHEN 650 MG: 325 TABLET ORAL at 09:08

## 2018-07-08 RX ADMIN — IPRATROPIUM BROMIDE AND ALBUTEROL SULFATE 1 AMPULE: .5; 3 SOLUTION RESPIRATORY (INHALATION) at 02:53

## 2018-07-08 RX ADMIN — QUETIAPINE FUMARATE 100 MG: 100 TABLET, FILM COATED ORAL at 09:08

## 2018-07-08 RX ADMIN — LINEZOLID 600 MG: 2 INJECTION, SOLUTION INTRAVENOUS at 00:25

## 2018-07-08 RX ADMIN — IPRATROPIUM BROMIDE AND ALBUTEROL SULFATE 1 AMPULE: .5; 3 SOLUTION RESPIRATORY (INHALATION) at 22:48

## 2018-07-08 RX ADMIN — DIAZEPAM 5 MG: 5 TABLET ORAL at 03:11

## 2018-07-08 RX ADMIN — Medication 10 ML: at 09:42

## 2018-07-08 RX ADMIN — Medication 1 MG: at 15:46

## 2018-07-08 ASSESSMENT — PAIN SCALES - GENERAL: PAINLEVEL_OUTOF10: 8

## 2018-07-08 NOTE — PROGRESS NOTES
Patient report given to Rosa Lerma [facility]. Patient has rested comfortably overnight without acute changes in assessment noted. Care transferred.

## 2018-07-08 NOTE — PROGRESS NOTES
Absent bilaterally and/or with wheezes = 3    Sputum  Sputum Color: Creamy, Tenacity: Thick, Sputum How Obtained: None  Cough: Strong, productive = 1    Vital Signs   /82   Pulse 122   Temp 99.7 °F (37.6 °C) (Axillary)   Resp 20   Ht 5' 5\" (1.651 m)   Wt 142 lb 13.7 oz (64.8 kg)   SpO2 91%   BMI 23.77 kg/m²   SPO2 (COPD values may differ): Greater than or equal to 92% on room air = 0    Peak Flow (asthma only): not applicable = 0    RSI: 85-45 = Q4 (every four hours)        Plan       Goals: medication delivery, mobilize retained secretions, volume expansion and improve oxygenation    Patient/caregiver was educated on the proper method of use for Respiratory Care Devices:  No: N/A     Level of patient/caregiver understanding able to:   [x] Verbalize understanding   [] Demonstrate understanding       [] Teach back        [] Needs reinforcement       []  No available caregiver               []  Other:     Response to education:  NA     Is patient being placed on Home Treatment Regimen? No     Does the patient have everything they need prior to discharge? NA     Comments: Patient assessed and chart reviewed     Plan of Care: Duoneb Q4 and Q4 PRN, CATC on RA    Electronically signed by Yazmin Mejias RCP on 7/8/2018 at 1:35 PM    Respiratory Protocol Guidelines     1. Assessment and treatment by Respiratory Therapy will be initiated for medication and therapeutic interventions upon initiation of aerosolized medication. 2. Physician will be contacted for respiratory rate (RR) greater than 35 breaths per minute. Therapy will be held for heart rate (HR) greater than 140 beats per minute, pending direction from physician. 3. Bronchodilators will be administered via Metered Dose Inhaler (MDI) with spacer when the following criteria are met:  a. Alert and cooperative     b. HR < 140 bpm  c. RR < 30 bpm                d. Can demonstrate a 23 second inspiratory hold  4.  Bronchodilators will be administered via

## 2018-07-08 NOTE — PROGRESS NOTES
Internal Medicine Progress Note      Interval history   Date of admission    34year old white female who was admitted for GI bleed. Had an EGD   BC positive for strep pneumonia, staph aureus and gram-negative salazar. Echocardiogram showed tricuspid valve endocarditis. She's had this before. History of IV drug use  CT chest showed septic emboli and possible splenic infarct. - Transferred out of ICU on 18  - Transferred back to ICU on 18 for PEA arrest, intubated,  resuscitated with fluids  - Started on CRRT for acute renal failure, on 18  - Weaned off pressors on 18, and extubated  - Reintubated   - s.p bronch    - Off CRRT and started on hemodialysis  18  - Has been on antibiotics vancomycin and cefepime and Zyvox  - Patient remains on mechanical ventilation, continued fevers, on dialysis. - Followed by intensivist, ID, nephrologist.  - S/P Trach and PEG on   - TDC removed 18 due to persistent fevers , femoral vascath placed for HD . - TDC placed. Vas cath  pulled  out.  - Stable now on trach collar. - Has developed ileus,  abdomen distended, tube  feedings on hold  - persistent fevers . Subjective   pt spiked temp to 102 F last night . Repeat cultures sent . Persistent ileus, PEG tube to suction, significant bilious output    stable on trach collar. awake, and resoponding    Remains tachycardic. Having some chills. No results for input(s): PHART, SEW5HYN, PO2ART in the last 72 hours.       IV:   dextrose 5 % and 0.9 % NaCl 50 mL/hr at 18 0908    dextrose         Vitals:  Temp  Av.6 °F (38.1 °C)  Min: 98.2 °F (36.8 °C)  Max: 102.9 °F (39.4 °C)  Pulse  Av.5  Min: 112  Max: 132  BP  Min: 128/83  Max: 179/102  SpO2  Av.8 %  Min: 91 %  Max: 100 %  FiO2   Av %  Min: 28 %  Max: 28 %  Patient Vitals for the past 4 hrs:   BP Temp Temp src Pulse Resp SpO2   07/08/18 1230 135/82 99.7 °F (37.6 °C) Axillary 122 20 91 %   18 [DISCONTINUED] sodium phosphate IVPB **OR** [DISCONTINUED] sodium phosphate IVPB, ondansetron, glucose, glucagon (rDNA), dextrose, acetaminophen, promethazine, hydrOXYzine    Results:  CBC:   Recent Labs      07/06/18   1350  07/07/18   0714  07/08/18   0525   WBC  6.2  7.2  7.2   HGB  7.8*  8.1*  8.0*   HCT  23.5*  24.2*  23.7*   MCV  91.9  91.5  91.7   PLT  179  147  126*     BMP:   Recent Labs      07/06/18   0520  07/07/18   0714  07/08/18   0525   NA  133*  132*  132*   K  3.8  3.8  3.8   CL  100  98*  97*   CO2  22  21  23   PHOS  2.5  3.5  2.8   BUN  14  18  10   CREATININE  2.5*  3.3*  2.4*     LIVER PROFILE:   No results for input(s): AST, ALT, LIPASE, BILIDIR, BILITOT, ALKPHOS in the last 72 hours. Invalid input(s): AMYLASE,  ALB  PT/INR:   No results for input(s): PROTIME, INR in the last 72 hours. ECHO 6/6/18   Summary   LV systolic function is mildly reduced with LVEF estimated at 40-45%.   Mild global hypokinesis is present. There is mild systolic and diastolic   septal flattening c/w RV pressure and volume overload.   Normal left ventricular diastolic filling pressure.   The right atrium is mildly dilated.   There is trivial aortic regurgitation.   Moderate to large mobile, pedunculated vegetation (1.15 cm x 2.43 cm)   attached to base of TV leaflet c/w endocarditis.   Moderate tricuspid regurgitation.   Systolic pulmonary artery pressure (SPAP) estimated at 55 mmHg (RA pressure   3 mmHg), c/w moderate pulmonary hypertension.   Note TV endocarditis seen on prior March 2017 study but LV systolic function   is mildly decreased now. ECHO 6/8/18  Summary   This is a limited study s/p code.   LV systolic function is lower normal with EF visually estimated at 50%.   D-shaped septum c/w RV pressure and volume overload.   Large mobile, pedunculated vegetation on septal leaflet of tricuspid valve   c/w endocarditis.   No pericardial effusion noted.        Cultures:  Respiratory cultures -6/12/18 - MRSA pulmonary edema and/or superimposed pneumonia. XR CHEST PORTABLE   Final Result   NG tube tip projects over the body of the stomach. XR Acute Abd Series Chest 1 VW   Final Result   Gaseous bowel distention greater in the central small bowel most likely ileus. XR CHEST PORTABLE   Final Result   Stable chest         XR ABDOMEN (KUB) (SINGLE AP VIEW)   Final Result   Nondiagnostic due to paucity of small bowel gas         XR ABDOMEN (KUB) (SINGLE AP VIEW)   Final Result   Multiple mildly dilated loops of small bowel. Findings may represent ileus   or partial small bowel obstruction. XR CHEST PORTABLE   Final Result   Stable positioning of left central venous catheter. No pneumothorax. Slightly improved aeration with persistent ground-glass opacities in the left   mid lung. XR CHEST PORTABLE   Final Result   Retraction of the left internal jugular CVC with its tip in the expected   region of the brachiocephalic vein      No significant change in the cavitary nodules and bibasilar airspace disease         XR CHEST PORTABLE   Final Result   Tracheostomy tube placement. Left basilar airspace disease most consistent with atelectasis         IR NONTUNNELED VASCULAR CATHETER   Final Result   Successful ultrasound and fluoroscopy guided non-tunneled right femoral vein   temporary dialysis catheter placement. XR CHEST PORTABLE   Final Result   Improving bibasilar airspace disease. Stable cavitary lesions. CT Chest WO Contrast   Final Result   1. Minimal worsening partially cavitating pulmonary nodules and airspace   disease throughout the bilateral lungs consistent with septic emboli. 2. New trace bilateral pleural effusions. 3. Mediastinal adenopathy, likely reactive. 4. Heterogeneous splenomegaly. XR CHEST PORTABLE   Final Result   Stable appearing bibasilar airspace disease. Support tubes and lines as   above.          XR CHEST PORTABLE   Final Result   No significant interval change in bilateral airspace disease as compared to   prior. Cavitary pulmonary nodules are again demonstrated. XR CHEST PORTABLE   Final Result   Left mid lung airspace disease is similar to minimally improved as compared   to prior. Persistent basilar atelectasis and cavitary right lung lesions. VL Extremity Venous Bilateral   Final Result      XR CHEST PORTABLE   Final Result   Left IJ central venous line in the superior vena cava with no pneumothorax         US GALLBLADDER RUQ   Final Result   1. Small amount of complex ascites concerning for hemorrhage. Consider   further evaluation with CT of the abdomen and pelvis. 2. Cholelithiases without evidence of acute cholecystitis. 3. Cirrhosis. XR CHEST PORTABLE   Final Result   Interval increase in right basilar atelectasis. No appreciable change in   appearance of septic pulmonary emboli         XR CHEST PORTABLE   Final Result   Slightly decreased right-sided airspace disease, otherwise stable chest.         XR CHEST PORTABLE   Final Result   Slight worsening of patchy airspace opacity right lower lobe over the past   few days. Relatively stable cavitary nodular lesions both lungs. Tubes and lines remain in good position. XR CHEST PORTABLE   Final Result   No significant change in the bony basilar airspace disease and suspected   septic emboli. XR CHEST PORTABLE   Final Result   1. Stable lines, tubes and support devices. 2. Stable cardiopulmonary status including bilateral airspace opacities. XR CHEST PORTABLE   Final Result   Stable life support devices. No acute interval change regarding bilateral   airspace disease. XR CHEST PORTABLE   Final Result   Stable life support device positioning. No substantial change in multifocal consolidative cavitary airspace disease.          XR CHEST PORTABLE   Final Result   Stable chest         XR CHEST PORTABLE Repeat CT chest on 6/25, showed worsening airspace disease-consistent with septic emboli  - tunneled dialysis catheter removed on 6/25/18  - abx per ID - see below  -  [facility] was placed. -  CVC placed. #Acute resp failure/cavitary pneumonia  Due to MRSA infection  - s.p PEA arrest, off vent  6/11- reintubated on 6/12/18,  for increasing abd distention and pain   - remains on mechanical ventilation, with spontaneous breathing trials. pulm managing  - ct chest with no new findings except for septic emboli and cavitory pneumonia. Repeat CT chest on 6/25/18 shows worsening of cavitating pulmonary nodules and airspacedisease consistent with septic emboli. Reactive mediastinal adenopathy noted  - ct abd with no acute findings   - Off precedex drip. methadone and valium oral added. Off Versed and fentanyl gtt . On fentanyl patch now. - IV antibiotics- ID has stopped vanc, cefepime. Continue Zyvox. - f/w BAL. Respiratory cultures growing MRSA  - S/P PEG and tracheostomy  6/27. Stable now on Trach collar    #  Acute upper GI bleed. - s/p  EGD -Three angiodysplastic spots at the junction of the body and the fundus area that was cauterized during endoscopy.   - s/p multiple PRBC transfusions.  -  h/h stable. - RBC scan neg    #  Tricuspid valve endocarditis- recurrent    with bacteremia   - history of IV drug abuse. - She is growing MRSA, strep pneumoniae and Enterobacter cloacae. - ID involved  - Large pedunculated vegetation on septal leaflet of TV   Previously had MSSA TV endocarditis  - vancomycin and cefepime and zyvox was added   - Improving wbc   ID has stopped vanc, cefepime. Continue Zyvox. Spiked temps again      #  End stage renal disease on HD  -Was on CRRT - from 6/8/18 to  6/22/18  -Now on HD since 6/23/18. -TDC removed 6/25/18 due to persistent fevers.   New TDC placed on 7/5/18  - Continue HD     # DIC   -Sec to severe sepsis , off steroids  -Hematology f/w     # Anemia   - sec to sepsis, iatrogenic, GI bleed  -Prn transfusions    #  Ileus   - Her abdomen is distended again. TF held. - Monitor  - continue G tube suction     LTAC referral. Had precert.  But with recurrent high fevers, persistent ileus, pt not discharged       Full Code      Violet Walker MD 7/8/2018 1:51 PM

## 2018-07-08 NOTE — PROGRESS NOTES
Shift assessment completed. Complete linen change, bath, berenice care and oral care completed. Changed PEG dressing with NS cleanse. Noted Stage II to right buttock, left buttock, and mid coccyx mepilex placed. Patient repositioned to right side utilizing pillow support. Call light in reach. Bed alarm on. Will continue to monitor.

## 2018-07-08 NOTE — PROGRESS NOTES
Pulmonary & Critical Care Medicine ICU Progress Note    CC: endocarditis    Events of Last 24 hours: intermittent agitation improving. Fever 102. Cx sent. Still has bilious output from PEG to suction. MV:  6/12/18 - 7/4; trach 6/27  IV:   dextrose 5 % and 0.9 % NaCl 50 mL/hr at 07/08/18 0908    dextrose       Vitals:  Blood pressure (!) 167/99, pulse 128, temperature 100.6 °F (38.1 °C), temperature source Axillary, resp. rate 20, height 5' 5\" (1.651 m), weight 142 lb 13.7 oz (64.8 kg), SpO2 97 %, not currently breastfeeding. on 28% CATC  EXAM:  General: no acute distress  Eyes: PERRL. No sclera icterus. No conjunctival injection. ENT: No discharge. 8 Portex tracheostomy tube  Neck: Trachea midline. Normal thyroid. Resp: No accessory muscle use. few crackles. No wheezing. No rhonchi. CV: Regular rate. Regular rhythm. No mumur or rub. + edema   GI: Non-tender. + distended. No masses. No organomegaly. Hypoactive bowel sounds. No hernia. PEG  Lymph: No cervical LAD. No supraclavicular LAD. M/S: No cyanosis. No joint deformity. No clubbing. Neuro: Regards & follows commands with all 4 extremities.       Scheduled Meds:   lidocaine 1 % injection  5 mL Intradermal Once    sodium chloride flush  10 mL Intravenous 2 times per day    diazepam  5 mg Oral Q8H    ipratropium-albuterol  1 ampule Inhalation Q4H    fentaNYL  1 patch Transdermal Q72H    QUEtiapine  200 mg Oral Nightly    QUEtiapine  100 mg Oral Daily    sennosides-docusate sodium  1 tablet Oral Daily    fentanyl  1 patch Transdermal Q72H    cloNIDine  0.1 mg Oral TID    nystatin   Topical BID    chlorhexidine  15 mL Mouth/Throat BID    doxercalciferol  1 mcg Intravenous Once per day on Tue Thu Sat    linezolid  600 mg Intravenous Q12H    darbepoetin emi-polysorbate  100 mcg Intravenous Weekly - Thursday    And    darbepoetin emi-polysorbate  25 mcg Subcutaneous Weekly - Thursday    pantoprazole  40 mg Intravenous Daily     PRN

## 2018-07-08 NOTE — PROGRESS NOTES
End stage renal disease: Hemodialysis per [facility] while inpatient (MWF as outpatient)   Left TDC   Last post weight of 60.7 kg   Labs are stable                           Acute GI bleed               - Hemoglobin stable s/p transfusion, KI with HD weekly               - followed by GI     MRSA, Streptococcus bacteremia, persistent endocarditis involving TV with septic   embolizations to lungs and spleen              - On IV antibiotics per ID              - s/p HD tDC removed on 6/25     Acute resp failure               - s/p trach 6/27     Hyponatremia              - avoid volume as able              - coming up with fluid removal    Hepatitis C    IV drug Abuse    S/p Trach/PEG on 6/27      Plan/     Dialysis next on Tuesday  UF to target of 61 kg as tolerated  Follow labs  IV antibiotics  -----------------------------  Rexine Hatchet, M.D.   [facility]

## 2018-07-09 PROBLEM — E44.0 MODERATE PROTEIN-CALORIE MALNUTRITION (HCC): Status: ACTIVE | Noted: 2018-06-07

## 2018-07-09 LAB
ALBUMIN SERPL-MCNC: 2.3 G/DL (ref 3.4–5)
ALP BLD-CCNC: 153 U/L (ref 40–129)
ALT SERPL-CCNC: <5 U/L (ref 10–40)
ANION GAP SERPL CALCULATED.3IONS-SCNC: 14 MMOL/L (ref 3–16)
AST SERPL-CCNC: 8 U/L (ref 15–37)
BACTERIA: ABNORMAL /HPF
BASOPHILS ABSOLUTE: 0 K/UL (ref 0–0.2)
BASOPHILS RELATIVE PERCENT: 0.5 %
BILIRUB SERPL-MCNC: 1 MG/DL (ref 0–1)
BILIRUBIN DIRECT: 0.6 MG/DL (ref 0–0.3)
BILIRUBIN URINE: NEGATIVE
BILIRUBIN, INDIRECT: 0.4 MG/DL (ref 0–1)
BLOOD, URINE: ABNORMAL
BUN BLDV-MCNC: 15 MG/DL (ref 7–20)
CALCIUM SERPL-MCNC: 8.6 MG/DL (ref 8.3–10.6)
CHLORIDE BLD-SCNC: 99 MMOL/L (ref 99–110)
CLARITY: ABNORMAL
CO2: 21 MMOL/L (ref 21–32)
COLOR: ABNORMAL
CREAT SERPL-MCNC: 3.5 MG/DL (ref 0.6–1.1)
EOSINOPHILS ABSOLUTE: 0.1 K/UL (ref 0–0.6)
EOSINOPHILS RELATIVE PERCENT: 1.6 %
EPITHELIAL CELLS, UA: ABNORMAL /HPF
GFR AFRICAN AMERICAN: 19
GFR NON-AFRICAN AMERICAN: 16
GLUCOSE BLD-MCNC: 107 MG/DL (ref 70–99)
GLUCOSE BLD-MCNC: 79 MG/DL (ref 70–99)
GLUCOSE BLD-MCNC: 80 MG/DL (ref 70–99)
GLUCOSE BLD-MCNC: 83 MG/DL (ref 70–99)
GLUCOSE BLD-MCNC: 89 MG/DL (ref 70–99)
GLUCOSE BLD-MCNC: 91 MG/DL (ref 70–99)
GLUCOSE URINE: NEGATIVE MG/DL
HCT VFR BLD CALC: 23.2 % (ref 36–48)
HEMOGLOBIN: 7.8 G/DL (ref 12–16)
KETONES, URINE: NEGATIVE MG/DL
LEUKOCYTE ESTERASE, URINE: ABNORMAL
LYMPHOCYTES ABSOLUTE: 1.1 K/UL (ref 1–5.1)
LYMPHOCYTES RELATIVE PERCENT: 16.4 %
MAGNESIUM: 1.7 MG/DL (ref 1.8–2.4)
MCH RBC QN AUTO: 30.9 PG (ref 26–34)
MCHC RBC AUTO-ENTMCNC: 33.4 G/DL (ref 31–36)
MCV RBC AUTO: 92.3 FL (ref 80–100)
MICROSCOPIC EXAMINATION: YES
MONOCYTES ABSOLUTE: 0.7 K/UL (ref 0–1.3)
MONOCYTES RELATIVE PERCENT: 11 %
NEUTROPHILS ABSOLUTE: 4.6 K/UL (ref 1.7–7.7)
NEUTROPHILS RELATIVE PERCENT: 70.5 %
NITRITE, URINE: NEGATIVE
OTHER OBSERVATIONS UA: ABNORMAL
PDW BLD-RTO: 17 % (ref 12.4–15.4)
PERFORMED ON: ABNORMAL
PERFORMED ON: NORMAL
PH UA: 8.5
PHOSPHORUS: 3.2 MG/DL (ref 2.5–4.9)
PLATELET # BLD: 116 K/UL (ref 135–450)
PMV BLD AUTO: 7 FL (ref 5–10.5)
POTASSIUM SERPL-SCNC: 3.6 MMOL/L (ref 3.5–5.1)
PROTEIN UA: >=300 MG/DL
RBC # BLD: 2.51 M/UL (ref 4–5.2)
RBC UA: ABNORMAL /HPF (ref 0–2)
SODIUM BLD-SCNC: 134 MMOL/L (ref 136–145)
SPECIFIC GRAVITY UA: 1.01
TOTAL PROTEIN: 5.9 G/DL (ref 6.4–8.2)
URINE TYPE: ABNORMAL
UROBILINOGEN, URINE: 0.2 E.U./DL
WBC # BLD: 6.6 K/UL (ref 4–11)
WBC UA: >100 /HPF (ref 0–5)

## 2018-07-09 PROCEDURE — 80048 BASIC METABOLIC PNL TOTAL CA: CPT

## 2018-07-09 PROCEDURE — 85025 COMPLETE CBC W/AUTO DIFF WBC: CPT

## 2018-07-09 PROCEDURE — 94640 AIRWAY INHALATION TREATMENT: CPT

## 2018-07-09 PROCEDURE — 84100 ASSAY OF PHOSPHORUS: CPT

## 2018-07-09 PROCEDURE — 74018 RADEX ABDOMEN 1 VIEW: CPT

## 2018-07-09 PROCEDURE — 51702 INSERT TEMP BLADDER CATH: CPT

## 2018-07-09 PROCEDURE — 83735 ASSAY OF MAGNESIUM: CPT

## 2018-07-09 PROCEDURE — 97110 THERAPEUTIC EXERCISES: CPT

## 2018-07-09 PROCEDURE — 51798 US URINE CAPACITY MEASURE: CPT

## 2018-07-09 PROCEDURE — 71045 X-RAY EXAM CHEST 1 VIEW: CPT

## 2018-07-09 PROCEDURE — C9113 INJ PANTOPRAZOLE SODIUM, VIA: HCPCS

## 2018-07-09 PROCEDURE — 94761 N-INVAS EAR/PLS OXIMETRY MLT: CPT

## 2018-07-09 PROCEDURE — 9990 CHARGE CONVERSION

## 2018-07-09 PROCEDURE — 97535 SELF CARE MNGMENT TRAINING: CPT

## 2018-07-09 PROCEDURE — 99232 SBSQ HOSP IP/OBS MODERATE 35: CPT | Performed by: INTERNAL MEDICINE

## 2018-07-09 PROCEDURE — 80076 HEPATIC FUNCTION PANEL: CPT

## 2018-07-09 PROCEDURE — 87040 BLOOD CULTURE FOR BACTERIA: CPT

## 2018-07-09 PROCEDURE — 36592 COLLECT BLOOD FROM PICC: CPT

## 2018-07-09 PROCEDURE — 81001 URINALYSIS AUTO W/SCOPE: CPT

## 2018-07-09 RX ORDER — LORAZEPAM 2 MG/ML
1 INJECTION INTRAMUSCULAR EVERY 4 HOURS PRN
Status: DISCONTINUED | OUTPATIENT
Start: 2018-07-09 | End: 2018-07-10

## 2018-07-09 RX ORDER — QUETIAPINE FUMARATE 25 MG/1
50 TABLET, FILM COATED ORAL DAILY
Status: DISCONTINUED | OUTPATIENT
Start: 2018-07-09 | End: 2018-07-09

## 2018-07-09 RX ORDER — DIAZEPAM 2 MG/1
2 TABLET ORAL EVERY 8 HOURS
Status: DISCONTINUED | OUTPATIENT
Start: 2018-07-09 | End: 2018-07-11

## 2018-07-09 RX ORDER — MAGNESIUM SULFATE 1 G/100ML
1 INJECTION INTRAVENOUS ONCE
Status: COMPLETED | OUTPATIENT
Start: 2018-07-09 | End: 2018-07-09

## 2018-07-09 RX ORDER — POLYETHYLENE GLYCOL 3350 17 G/17G
17 POWDER, FOR SOLUTION ORAL DAILY
Status: DISCONTINUED | OUTPATIENT
Start: 2018-07-09 | End: 2018-07-18 | Stop reason: HOSPADM

## 2018-07-09 RX ORDER — QUETIAPINE FUMARATE 100 MG/1
100 TABLET, FILM COATED ORAL NIGHTLY
Status: DISCONTINUED | OUTPATIENT
Start: 2018-07-09 | End: 2018-07-10

## 2018-07-09 RX ADMIN — NYSTATIN: 100000 CREAM TOPICAL at 01:07

## 2018-07-09 RX ADMIN — CLONIDINE HYDROCHLORIDE 0.1 MG: 0.1 TABLET ORAL at 09:48

## 2018-07-09 RX ADMIN — CHLORHEXIDINE GLUCONATE 0.12% ORAL RINSE 15 ML: 1.2 LIQUID ORAL at 09:58

## 2018-07-09 RX ADMIN — LORAZEPAM 1 MG: 2 INJECTION INTRAMUSCULAR at 23:09

## 2018-07-09 RX ADMIN — IPRATROPIUM BROMIDE AND ALBUTEROL SULFATE 1 AMPULE: .5; 3 SOLUTION RESPIRATORY (INHALATION) at 15:28

## 2018-07-09 RX ADMIN — MAGNESIUM SULFATE 1 G: 1 INJECTION INTRAVENOUS at 09:49

## 2018-07-09 RX ADMIN — DIAZEPAM 2 MG: 2 TABLET ORAL at 09:49

## 2018-07-09 RX ADMIN — IPRATROPIUM BROMIDE AND ALBUTEROL SULFATE 1 AMPULE: .5; 3 SOLUTION RESPIRATORY (INHALATION) at 11:25

## 2018-07-09 RX ADMIN — Medication 10 ML: at 09:49

## 2018-07-09 RX ADMIN — PANTOPRAZOLE SODIUM 40 MG: 40 INJECTION, POWDER, FOR SOLUTION INTRAVENOUS at 09:48

## 2018-07-09 RX ADMIN — CLONIDINE HYDROCHLORIDE 0.1 MG: 0.1 TABLET ORAL at 12:28

## 2018-07-09 RX ADMIN — IPRATROPIUM BROMIDE AND ALBUTEROL SULFATE 1 AMPULE: .5; 3 SOLUTION RESPIRATORY (INHALATION) at 02:59

## 2018-07-09 RX ADMIN — IPRATROPIUM BROMIDE AND ALBUTEROL SULFATE 1 AMPULE: .5; 3 SOLUTION RESPIRATORY (INHALATION) at 07:57

## 2018-07-09 RX ADMIN — Medication 10 ML: at 03:52

## 2018-07-09 RX ADMIN — DEXTROSE AND SODIUM CHLORIDE: 5; .9 INJECTION, SOLUTION INTRAVENOUS at 04:58

## 2018-07-09 RX ADMIN — Medication 1 MG: at 03:52

## 2018-07-09 RX ADMIN — CLONIDINE HYDROCHLORIDE 0.1 MG: 0.1 TABLET ORAL at 20:24

## 2018-07-09 RX ADMIN — NYSTATIN: 100000 CREAM TOPICAL at 09:58

## 2018-07-09 RX ADMIN — DIAZEPAM 5 MG: 5 TABLET ORAL at 01:07

## 2018-07-09 RX ADMIN — ACETAMINOPHEN 650 MG: 325 TABLET ORAL at 20:24

## 2018-07-09 RX ADMIN — Medication 1 MG: at 09:49

## 2018-07-09 RX ADMIN — IPRATROPIUM BROMIDE AND ALBUTEROL SULFATE 1 AMPULE: .5; 3 SOLUTION RESPIRATORY (INHALATION) at 19:24

## 2018-07-09 RX ADMIN — Medication 1 MG: at 20:24

## 2018-07-09 RX ADMIN — Medication 10 ML: at 20:24

## 2018-07-09 RX ADMIN — DOCUSATE SODIUM AND SENNOSIDES 1 TABLET: 8.6; 5 TABLET, FILM COATED ORAL at 09:48

## 2018-07-09 RX ADMIN — QUETIAPINE FUMARATE 100 MG: 100 TABLET, FILM COATED ORAL at 20:24

## 2018-07-09 RX ADMIN — IPRATROPIUM BROMIDE AND ALBUTEROL SULFATE 1 AMPULE: .5; 3 SOLUTION RESPIRATORY (INHALATION) at 23:46

## 2018-07-09 RX ADMIN — DIAZEPAM 2 MG: 2 TABLET ORAL at 17:29

## 2018-07-09 RX ADMIN — ACETAMINOPHEN 650 MG: 325 TABLET ORAL at 09:48

## 2018-07-09 RX ADMIN — POLYETHYLENE GLYCOL (3350) 17 G: 17 POWDER, FOR SOLUTION ORAL at 12:15

## 2018-07-09 RX ADMIN — LINEZOLID 600 MG: 2 INJECTION, SOLUTION INTRAVENOUS at 01:07

## 2018-07-09 RX ADMIN — LINEZOLID 600 MG: 2 INJECTION, SOLUTION INTRAVENOUS at 12:15

## 2018-07-09 RX ADMIN — NYSTATIN: 100000 CREAM TOPICAL at 20:39

## 2018-07-09 ASSESSMENT — PAIN SCALES - GENERAL
PAINLEVEL_OUTOF10: 8
PAINLEVEL_OUTOF10: 7
PAINLEVEL_OUTOF10: 7

## 2018-07-09 NOTE — PROGRESS NOTES
Pt interacting appropriately with staff. She agrees not to pull at lines and equipment. Restraints untied at this time.

## 2018-07-09 NOTE — CARE COORDINATION
INTERDISCIPLINARY PLAN OF CARE CONFERENCE    Date/Time: 7/9/2018 10:45 AM  Completed by: Gisselle Rain Case Management      Patient Name:  Sarah Hernandez  YOB: 1990  Admitting Diagnosis: GI BLEED     Admit Date/Time:  6/6/2018 12:35 AM    Chart reviewed. Interdisciplinary team met to discuss patient progress and discharge plans. Disciplines included Case Management, Nursing, and Dietitian. Current Status: Monitoring    Anticipated Discharge Date: TBD  Expected D/C Disposition:  [facility]  Confirmed plan with patient and/or family Yes  Discharge Plan Comments: Reviewed chart. CM spoke with Dr. Dakota Cho who states CM should not restart pre-cert due to pt recurrent fevers and ileus. TC made to [facility] with [facility]; VM left regarding pt status.       Home O2 in place on admit: ECF  Pt informed of need to bring portable home O2 tank on day of discharge for nursing to connect prior to leaving:  Not Indicated  Verbalized agreement/Understanding:  Not Indicated

## 2018-07-09 NOTE — PROGRESS NOTES
Report given to Obey Cai, [facility]. Care transferred at this time.     Omi Rojo RN

## 2018-07-09 NOTE — PROGRESS NOTES
Internal Medicine Progress Note      Interval history     Date of admission 18     34year old white female who was admitted for GI bleed. Had an EGD   BC positive for strep pneumonia, staph aureus and gram-negative salazar. Echocardiogram showed tricuspid valve endocarditis. She's had this before. History of IV drug use  CT chest showed septic emboli and possible splenic infarct. - Transferred out of ICU on 18  - Transferred back to ICU on 18 for PEA arrest, intubated,  resuscitated with fluids  - Started on CRRT for acute renal failure, on 18  - Weaned off pressors on 18, and extubated  - Reintubated   - s.p bronch    - Off CRRT and started on hemodialysis  18  - Has been on antibiotics vancomycin and cefepime and Zyvox  - Patient remains on mechanical ventilation, continued fevers, on dialysis. - Followed by intensivist, ID, nephrologist.  - S/P Trach and PEG on   - TDC removed 18 due to persistent fevers , femoral vascath placed for HD . - TDC placed. Vas cath  pulled  Out.    - Stable now on trach collar.  Still with fevers    - Has developed ileus,  abdomen distended, tube  feedings on hold      Subjective    Pt seen very fatigued , c/o lower back pain, no abd pain   NG output 400 ml overnight  No significant diarrhea         IV:   dextrose 5 % and 0.9 % NaCl 50 mL/hr at 18 0458    dextrose         Vitals:  Temp  Av.5 °F (38.1 °C)  Min: 99.5 °F (37.5 °C)  Max: 102.8 °F (39.3 °C)  Pulse  Av.5  Min: 102  Max: 128  BP  Min: 135/82  Max: 141/92  SpO2  Av.4 %  Min: 91 %  Max: 100 %  Patient Vitals for the past 4 hrs:   BP Temp Temp src Pulse Resp SpO2 Weight   18 0427 (!) 141/92 99.5 °F (37.5 °C) Axillary 102 18 100 % 142 lb 3.2 oz (64.5 kg)       CVP: CVP (Mean): 16 mmHg      Intake/Output Summary (Last 24 hours) at 18 0732  Last data filed at 18 0359   Gross per 24 hour   Intake             1052 ml   Output              410 bowel most likely ileus. XR CHEST PORTABLE   Final Result   Stable chest         XR ABDOMEN (KUB) (SINGLE AP VIEW)   Final Result   Nondiagnostic due to paucity of small bowel gas         XR ABDOMEN (KUB) (SINGLE AP VIEW)   Final Result   Multiple mildly dilated loops of small bowel. Findings may represent ileus   or partial small bowel obstruction. XR CHEST PORTABLE   Final Result   Stable positioning of left central venous catheter. No pneumothorax. Slightly improved aeration with persistent ground-glass opacities in the left   mid lung. XR CHEST PORTABLE   Final Result   Retraction of the left internal jugular CVC with its tip in the expected   region of the brachiocephalic vein      No significant change in the cavitary nodules and bibasilar airspace disease         XR CHEST PORTABLE   Final Result   Tracheostomy tube placement. Left basilar airspace disease most consistent with atelectasis         IR NONTUNNELED VASCULAR CATHETER   Final Result   Successful ultrasound and fluoroscopy guided non-tunneled right femoral vein   temporary dialysis catheter placement. XR CHEST PORTABLE   Final Result   Improving bibasilar airspace disease. Stable cavitary lesions. CT Chest WO Contrast   Final Result   1. Minimal worsening partially cavitating pulmonary nodules and airspace   disease throughout the bilateral lungs consistent with septic emboli. 2. New trace bilateral pleural effusions. 3. Mediastinal adenopathy, likely reactive. 4. Heterogeneous splenomegaly. XR CHEST PORTABLE   Final Result   Stable appearing bibasilar airspace disease. Support tubes and lines as   above. XR CHEST PORTABLE   Final Result   No significant interval change in bilateral airspace disease as compared to   prior. Cavitary pulmonary nodules are again demonstrated.          XR CHEST PORTABLE   Final Result   Left mid lung airspace disease is similar to minimally improved as compared   to prior. Persistent basilar atelectasis and cavitary right lung lesions. VL Extremity Venous Bilateral   Final Result      XR CHEST PORTABLE   Final Result   Left IJ central venous line in the superior vena cava with no pneumothorax         US GALLBLADDER RUQ   Final Result   1. Small amount of complex ascites concerning for hemorrhage. Consider   further evaluation with CT of the abdomen and pelvis. 2. Cholelithiases without evidence of acute cholecystitis. 3. Cirrhosis. XR CHEST PORTABLE   Final Result   Interval increase in right basilar atelectasis. No appreciable change in   appearance of septic pulmonary emboli         XR CHEST PORTABLE   Final Result   Slightly decreased right-sided airspace disease, otherwise stable chest.         XR CHEST PORTABLE   Final Result   Slight worsening of patchy airspace opacity right lower lobe over the past   few days. Relatively stable cavitary nodular lesions both lungs. Tubes and lines remain in good position. XR CHEST PORTABLE   Final Result   No significant change in the bony basilar airspace disease and suspected   septic emboli. XR CHEST PORTABLE   Final Result   1. Stable lines, tubes and support devices. 2. Stable cardiopulmonary status including bilateral airspace opacities. XR CHEST PORTABLE   Final Result   Stable life support devices. No acute interval change regarding bilateral   airspace disease. XR CHEST PORTABLE   Final Result   Stable life support device positioning. No substantial change in multifocal consolidative cavitary airspace disease. XR CHEST PORTABLE   Final Result   Stable chest         XR CHEST PORTABLE   Final Result   Improvement in focus of airspace disease in the left mid lung. Persistent   multifocal cavitary lesions compatible with septic emboli         XR CHEST PORTABLE   Final Result   No significant interval change.          CT ABDOMEN PELVIS WO CONTRAST Additional Contrast? Radiologist Recommendation   Final Result   Development of moderate diffuse ascites since the prior study. Mosaic   appearance of the spleen either due to multiple splenic infarcts or possible   splenic abscesses. Question of perihepatic and subhepatic adhesions. Chronic gallbladder disease. Bibasilar pneumonias and pulmonary nodules   consistent with septic emboli. Mild anasarca. RECOMMENDATIONS:   NG tube should be pulled back about 4 cm since it is pressing against the   anterior stomach wall. Gallbladder ultrasound suggested for evaluation of gallbladder disease. Diagnostic Ultrasound-guided paracentesis may be helpful. CT Chest WO Contrast   Final Result   Multiple scattered cavitary and solid nodules scattered throughout the lungs   suspected to be from septic emboli. The emboli may be from recurrent   endocarditis, IV drug abuse, or infected DVT. Scattered focal pneumonia in   the lingula, right middle lobe, and right lower lobe. Trace bilateral   pleural effusions. Tip of the endotracheal tube lying near the tee. Moderate ascites seen in the upper abdomen. Possible multiple splenic   infarcts or abscesses. Please refer to the dictation of the CT scan of the   abdomen and pelvis. RECOMMENDATIONS:   Endotracheal tube should be pulled back 1-2 cm. CT Head WO Contrast   Final Result   No acute intracranial abnormality. XR CHEST PORTABLE   Final Result   Moderate lingular opacity favored to be pneumonia. Moderate opacity in the   right lung base probably also pneumonia. Development of multiple pulmonary   nodules presumed to be of an infectious or least inflammatory etiology. Low   lung volumes. Some improvement in the appearance of the chest since   yesterday. RECOMMENDATION:   CT scan of the chest with contrast recommended for further evaluation.          XR ABDOMEN (KUB) (SINGLE AP VIEW)   Final Result   Probable mild nonobstructive ileus. Moderate to large amount of stool in the   left colon. XR CHEST PORTABLE   Final Result   Improving bilateral airspace disease. XR CHEST PORTABLE   Final Result   Worsening bilateral airspace disease, probably pneumonia. XR CHEST PORTABLE   Final Result   Multifocal pneumonia, unchanged. XR CHEST 1 VW   Final Result   No change other than repositioning of the endotracheal tube. XR CHEST PORTABLE   Final Result   The endotracheal tube needs retracted approximately 3 cm. Satisfactory position right IJ tunnel catheter and left central venous   catheter. Increased size of the multifocal nodular lung opacities presumed multifocal   pneumonia. Findings were discussed with ICU nurse caring for the patient at 2:21 pm on   6/8/2018. XR CHEST PORTABLE   Final Result   Multifocal pneumonia. XR FOOT RIGHT (2 VIEWS)   Final Result   Lucency through the medial sesamoid most consistent with bipartite medial   sesamoid in the absence of history of trauma. This can be associated with   pain. CTA ABDOMEN PELVIS W WO CONTRAST   Final Result   No active gastrointestinal hemorrhage is identified. No vascular abnormality   is appreciated. Right lower lobe pneumonia as well as cavitary nodules. Septic emboli are   consideration. Compared with 11/26/2017, there is waxing and waning airspace   disease and pulmonary nodules. Organizing pneumonia is an alternative   possibility. Hepatosplenomegaly. Hepatomegaly is similar to 11/26/2017. Splenomegaly is   new. Hypodensity in the spleen, suggestive of acute to subacute embolic   infarction. Small amount of ascites, nonspecific. Hyperdensity of the gallbladder wall. This may represent mural   calcification. Etiology is unclear. There is variable association with   gallbladder wall calcification and risk for gallbladder carcinoma. Stable cardiomegaly. Lumbar spine and visualized osseous structures appear intact. NM GI BLOOD LOSS   Final Result   No evidence of active GI bleeding during acquisition. RECOMMENDATIONS:   If the patient shows hemodynamic signs of an active bleed in the next 20   hours, additional images can be acquired. XR CHEST PORTABLE   Final Result   Patchy airspace disease in the right lung, pneumonia versus atelectasis   versus less likely edema. That should be followed to resolution. Borderline pulmonary vascular congestion. IR TUNNELED CVC PLACE WO SQ PORT/PUMP > 5 YEARS    (Results Pending)   XR ABDOMEN (KUB) (SINGLE AP VIEW)    (Results Pending)         Assessment:    Active Problems:    IVDU (intravenous drug user)    Endocarditis of tricuspid valve    HCAP (healthcare-associated pneumonia)    Upper GI bleed    Sepsis due to Streptococcus pneumoniae (HCC)    Mild protein-calorie malnutrition (HCC)    PEA (Pulseless electrical activity) ([address]    DIC (disseminated intravascular coagulation) ([address]    Acute respiratory failure with hypoxia (HCC)    Splenic infarction    Bacteremia    Mucus plugging of bronchi    Cavitary lung disease    Fever    Persistent fever    MRSA bacteremia    Hyponatremia    Acute blood loss anemia    Ileus (HCC)  Resolved Problems:    * No resolved hospital problems. *         Plan:    # Septic shock- sec to bacteremia /septic emboli/endocarditis    Sustained PEA arrest x2  requiring multiple pressors, intubation   - weaned off pressors- levo. Vasopressin  - wbc improved from 60 K  - critical care and ID involved. WBC down to 6 K today.   - fevers intermittent still persistent    -  CT chest on 6/25, showed worsening airspace disease-consistent with septic emboli  - tunneled dialysis catheter removed on 6/25/18, new one placed  - abx per ID - see below      #Acute resp failure/cavitary pneumonia      Due to MRSA infection  - s.p PEA arrest, off vent  6/11- reintubated on 6/12/18,

## 2018-07-09 NOTE — PROGRESS NOTES
Type and screen currently good until MN tonight 7/9. Order placed for Type and screen to be completed tomorrow am per MD order.

## 2018-07-09 NOTE — PROGRESS NOTES
Occupational Therapy Daily Treatment Note    Unit:  PCU   Date:  7/9/2018  Patient Name:    Aidee Christian  Admitting diagnosis:  GI BLEED  Admit Date:  6/6/2018  Precautions/Restrictions:  Fall risk, bed/chair alarm,  Intubated 6/8-6/11 & re-intubated on 6/12-6/27; currently mechanically ventilated via tracheostomy (6/27/18), CRRT 6/8-6/23, now on HD; contact (MRSA), LIJ CVC, rectal tube, B soft wrist restraints, PEG    Discharge Recommendations: LTAC  AM-PAC Score:  6  DME needs at discharge:  None if going to SNF     Treatment Time: 7943-7868  Treatment number: 4    Subjective:  Pt in the bed and asking for the use of the phone. Pain:  No complaints of pain    Bed Mobility: NA    Transfer Training: NA  Sit to stand:  Stand to sit:  Bed to Chair/BSC:    ADL Training:   NA- pt declines   Therapeutic Exercise:   Hand flex/ext:10x with use of exercise sponge   Elbow flex/ext:10x   Shld flex/ext:10x     Patient Education:   Adrien Stacy in the need to participate in OT services and explained the reason why the pt should participate  Positioning Needs: In the bed with restraints off per the nurse  Family Present:   none  Assessment:   The pt completed min amount of UE exercises and became agitated and stated I am done and I am not doing any more. Instructed in the need to participate in OT services and explained the reason why the pt should participate. The pt asks for the phone and that she wanted to call her mom. Pt able to push the buttons on the phone however unable to get a hold of her mother. The pt became agitated and stated she was done. Plan: cont with POC    At end of treatment, patient in  with call light and needs within reach.   Timed Code Treatment Minutes: 25   minutes  Total Treatment Time:25  minutes    Signature, License #  Yanely Roberts OTR/L [id number]

## 2018-07-09 NOTE — PROGRESS NOTES
Max: 22  PULSE RANGE: Pulse  Av.1  Min: 102  Max: 125  BLOOD PRESSURE RANGE:  Systolic (90DKA), FHP:245 , Min:135 , GI   ; Diastolic (08JEG), PDS:85, Min:82, Max:94    24HR INTAKE/OUTPUT:    Intake/Output Summary (Last 24 hours) at 18 1040  Last data filed at 18 0816   Gross per 24 hour   Intake             1864 ml   Output              310 ml   Net             1554 ml       General appearance: alert, appears stated age and cooperative  Head: Normocephalic, without obvious abnormality, atraumatic  Eyes: PERRL, EOM's intact. Nose: Nares normal.  No drainage or sinus tenderness. Neck: no adenopathy, no carotid bruit, no JVD, supple, symmetrical, trachea midline and thyroid not enlarged, symmetric, no tenderness/mass/nodules  Lungs: clear to auscultation bilaterally  Heart: regular rate and rhythm, S1, S2 normal, no murmur, click, rub or gallop  Abdomen: soft, non-tender; bowel sounds normal; no masses,  no organomegaly. distended  Extremities: extremities normal, atraumatic, no cyanosis.  1-2+ edema  Skin: Skin color, texture, turgor normal. No rashes or lesions  Neurologic: Grossly normal    Access Exam:  TDC  LAB DATA:    CBC: Lab Results   Component Value Date    WBC 6.6 2018    RBC 2.51 2018    HGB 7.8 2018    HCT 23.2 2018    MCV 92.3 2018    MCH 30.9 2018    MCHC 33.4 2018    RDW 17.0 2018     2018    MPV 7.0 2018     BMP:  Lab Results   Component Value Date     2018    K 3.6 2018    K 5.9 2018    CL 99 2018    CO2 21 2018    BUN 15 2018    CREATININE 3.5 2018    CALCIUM 8.6 2018    GFRAA 19 2018    LABGLOM 16 2018    GLUCOSE 91 2018     Ionized Calcium:  No results found for: IONCA  Magnesium:    Lab Results   Component Value Date    MG 1.70 2018     Phosphorus:    Lab Results   Component Value Date    PHOS 3.2 2018     U/A:  Lab Results

## 2018-07-09 NOTE — PROGRESS NOTES
Inpatient Physical Therapy Daily Treatment Note/Progress Note    Unit: PCU  Date:  7/9/2018  Patient Name:    Lucille New  Admitting diagnosis:  GI BLEED  Admit Date:  6/6/2018  Precautions/Restrictions:  Cardiac arrest with intubation 6/8-6/11 & re-intubated on 6/12-6/27; tracheostomy (6/27/18), CRRT 6/8-6/23, now on [facility] HD; contact (MRSA), LIJ CVC, L femoral vas cath, rectal tube, PEG (6/27/18), trach collar with 6L O2, L PICC    Discharge Recommendations: LTACH  AM-PAC Score: 8  DME needs at discharge: Defer to facility     Treatment Time: 7117-9287  Treatment Number:  6    Subjective    Pt. Supine in bed with family present at end of treatment session. Pt. agreeable to therapy treatment and LE exercises. Pt able to mouth phrases and respond to therapist's questions. Pt alert and able to follow 1 step commands for AAROM. Pain    Denies    Bed Mobility Deferred this date  Supine to Sit:   Sit to Supine:   Rolling:   Scooting:     Required max A to raise chest/head and assist of 2nd to reposition pillow behind head to promote neutral posturing of neck. Transfer Training Deferred this date  Sit to stand:   Stand to sit:   Bed to Chair:     Gait Training Deferred this date  Patient ambulated with     Therapeutic Exercise AAROM bilaterally  Ankle Pumps: x 20 LLE, x 10 RLE  Heel Slides: x 10     Patient Education       Role of acute care PT, progress in therapy thus far, encouraged ankle pumps    Positioning Needs       Pt supine in bed with HOB elevated, alarm activated and needs/call light within reach. Pillow behind head repositioned to promote neutral cervical positioning, pillow under bilateral LEs to elevated heels and SCDs donned at end of treatment session. Activity Tolerance     SpO2: % on 6L O2 via trach collar  HR: 102-109 bpm     No c/o of shortness of breath  Bout of coughing at end of treatment session, pt declined need for suctioning.  RN notified at end of treatment session. Assessment   Patient progressing well with command following and AAROM. Pt able to complete 10 reps on bilateral LEs. Pt fatigues quickly and easily frustrated at times with mobility, stating \"I am done,\" following LE exercises and repositioning. Pt would benefit from attempting sitting EOB with increased ability to follow commands and participate in AAROM exercises. Continue to recommend LTACH at discharge. Walking [id number]  To be met in 3 visits:  1). Tolerate B LE AAROM 10 reps - MET, Updated 7/9: BLE AROM 10 reps     To be met in 6 visits:  1). Supine to/from sit: assess ability when appropriate - Updated 7/9: Max A of 2 persons  2). Sit to/from stand: assess ability when appropriate - Continue goal  3). Bed to chair: assess ability when appropriate  - Continue goal  4). Roll in bed: assess ability when appropriate Updated 7/9: Max A of 1 persons  5). Tolerate B LE AAROM 15 reps - Not met, continue goal  6). New goal 7/9: tolerated sitting EOB ~ 1-3 min with max A of 2 persons    Plan   Continue with plan of care. At end of treatment, patient in  bed  with call light and needs within reach.     Time Coded Treatment Minutes:   22 minutes    Total Treatment Time:   22 minutes    Russ Patel, PT, DPT #[id number]

## 2018-07-09 NOTE — PROGRESS NOTES
ID Follow-up NOTE    CC: tricuspid valve endocarditis    Subjective:     Patient on trach collar. Seems to deny belly pain, chest pain or headache. Not having diarrhea. Indicates that she feels better. Objective:     Patient Vitals for the past 24 hrs:   BP Temp Temp src Pulse Resp SpO2 Weight   18 1126 - - - - 20 97 % -   18 0816 (!) 142/94 101.9 °F (38.8 °C) Oral 108 20 100 % -   18 0800 - - - - 20 100 % -   18 0427 (!) 141/92 99.5 °F (37.5 °C) Axillary 102 18 100 % 142 lb 3.2 oz (64.5 kg)   18 0230 - - - 122 20 - -   18 0030 - - - 117 20 - -   18 2323 138/83 99.7 °F (37.6 °C) Axillary 111 18 96 % -   18 2200 - - - 115 20 - -   18 2036 - - - 121 20 - -   18 1934 138/89 102.8 °F (39.3 °C) Axillary 121 18 98 % -   18 1755 - - - 120 18 - -   18 1608 - - - 115 16 91 % -   18 1500 - - - 113 20 - -   18 1400 - - - 124 22 - -   18 1230 135/82 99.7 °F (37.6 °C) Axillary 122 20 91 % -   18 1200 - - - 120 22 - -   18 1158 - - - - 18 91 % -     Temp (24hrs), Av.7 °F (38.2 °C), Min:99.5 °F (37.5 °C), Max:102.8 °F (39.3 °C)          EXAM:  General: awake, follows commands somewhat on trach collar. Temp 99.9 on dialysis at present  ENT:  pupils equal reactive  Neck: s nodes,       LUNGS: coarse BS bilat; does not appear to be in respiratory distress   CV: RR c gd I syst M lower LSB     ABD: soft nontender, s mass; slightly distended. BS on the hypoactive side.     EXT: without edema;Skin: no rash or other skin stigmata of endocarditis    Data Review:    Lab Results   Component Value Date    WBC 6.6 2018    HGB 7.8 (L) 2018    HCT 23.2 (L) 2018    MCV 92.3 2018     (L) 2018     Lab Results   Component Value Date    CREATININE 3.5 (H) 2018    BUN 15 2018     (L) 07/09/2018    K 3.6 07/09/2018    CL 99 07/09/2018    CO2 21 07/09/2018     Lab Results   Component Value Date    ALT <5 (L) 07/09/2018    AST 8 (L) 07/09/2018    ALKPHOS 153 (H) 07/09/2018    BILITOT 1.0 07/09/2018     MICRO: 6/5 blood cultures both grew MRSA. One culture also grew strep while the other culture grew Enterobacter sensitive to cefepime. 6/20 respiratory culture: MRSA vanc PANCHO 1; multiple blood cultures after that have been negative. Blood cultures from 7/7/18 are NGSF; 7/5 line tip culture negative; fungus blood culture negative   IMAGING: CXR: cavitary multifocal infiltrates presumably related to septic pulmonary emboli  Last CT chest: infiltrates more cavitary, but there are not much more in the way of new foci of pulmonary disease. 7/8/ CXR: worsened L basilar opacity  Assessment:     Active Problems:    IVDU (intravenous drug user)    Endocarditis of tricuspid valve    HCAP (healthcare-associated pneumonia)    Upper GI bleed    Sepsis due to Streptococcus pneumoniae (HCC)    Mild protein-calorie malnutrition (HCC)    PEA (Pulseless electrical activity) (HCC)    DIC (disseminated intravascular coagulation) ([address]    Acute respiratory failure with hypoxia (HCC)    Splenic infarction    Bacteremia    Mucus plugging of bronchi    Cavitary lung disease    Fever    Persistent fever    MRSA bacteremia    Hyponatremia    Acute blood loss anemia    Ileus (HCC)  Resolved Problems:    * No resolved hospital problems. *  Tricuspid valve endocarditis with septic pulmonary emboli (L sided endocarditis not ruled out) due to MRSA. Strep and Enterobacter were also isolated from admission blood cultures, significance uncertain: I would think this illness is due to MRSA. possible splenic infarct or abscess. s/p arrest x2. Leukocytosis resolved but fevers have returned. Pt more alert: looks better    New tunneled line inserted L IJ 2 days ago, as well as L brachial PICC.    New fevers have developed

## 2018-07-09 NOTE — PROGRESS NOTES
Nutrition Assessment    Type and Reason for Visit: Reassess    Nutrition Recommendations:   1. Continue NPO status until patient is medically cleared for EN to resume (patient currently has an ileus + abdominal distention). 2. Monitor abdominal distention, output from PEG tube, and when TF can be resumed. 3. Monitor for additional in-patient weight changes during remainder of admission. 4. Monitor nutrition-related labs, bowel activity, and fluid/electrolyte management. Malnutrition Assessment:  · Malnutrition Status: Meets the criteria for moderate malnutrition  · Context: Acute illness or injury  · Findings of the 6 clinical characteristics of malnutrition (Minimum of 2 out of 6 clinical characteristics is required to make the diagnosis of moderate or severe Protein Calorie Malnutrition based on AND/ASPEN Guidelines):  1. Energy Intake-Less than or equal to 50%, greater than 7 days    2. Weight Loss-No significant weight loss (+ 12# weight gain (using admission weight and CBW)), in 1 month  3. Fat Loss-Mild subcutaneous fat loss, Orbital  4. Muscle Loss-Moderate muscle mass loss, Temples (temporalis muscle)  5. Fluid Accumulation-Moderate to severe fluid accumulation,  (facial + 2 edema )  6.  Strength-Not measured    Nutrition Diagnosis:   · Problem:  Moderate malnutrition  · Etiology: related to Impaired respiratory function-inability to consume food, Insufficient energy/nutrient consumption, Difficulty swallowing, Psychological cause/life stress, Alteration in GI function, Nutrition support - EN, Renal dysfunction     Signs and symptoms:  as evidenced by NPO status due to medical condition, Nutrition support - EN, Diet history of poor intake, Presence of wounds, Moderate muscle loss, Lab values, Known losses from dialysis, GI abnormality    Nutrition Assessment:  · Subjective Assessment: most recent LTAC referral was good thru 7/7 - 7/8 but patient has had persistent fevers, ileus, and Calories: none  · Anthropometric Measures:  · Ht: 5' 5\" (165.1 cm)   · Current Body Wt: 142 lb 3 oz (64.5 kg)  · Admission Body Wt: 130 lb 5 oz (59.1 kg)  · Usual Body Wt:  (120's; weight on 12/11/17 was 126# 8 oz)  · % Weight Change: + 12# weight gain (using admission weight and CBW),  x 1 month  · Ideal Body Wt: 125 lb (56.7 kg), % Ideal Body 114%  · BMI Classification: BMI 18.5 - 24.9 Normal Weight  · Comparative Standards (Estimated Nutrition Needs):  · Estimated Daily Total Kcal: 1625 - 1950 kcals   · Estimated Daily Protein (g): 78 - 98 g protein   · Estimated Daily Fluid (ml/day): 1625 - 1950 ml     Estimated Intake vs Estimated Needs: Intake Less Than Needs    Nutrition Risk Level: High    Nutrition Interventions:   Continue NPO  Continued Inpatient Monitoring, Coordination of Care, Coordination of Community Care    Nutrition Evaluation:   · Evaluation: Goals set   · Goals: patient will remain NPO until medically cleared for EN to resume (+ abdominal distention and ileus currently); weight will remain stable during remainder of admission   · Monitoring: NPO Status, Skin Integrity, Wound Healing, Ascites/Edema, Weight, Mental Status/Confusion, Pertinent Labs    See Adult Nutrition Doc Flowsheet for more detail.      Electronically signed by Lou Emmanuel RD, IRMA on 7/9/18 at 2:43 PM    Contact Number: [phone]

## 2018-07-09 NOTE — PROGRESS NOTES
Gave tylenol for pt's fever of >102 F. Pt resting quietly in bed. No apparent distress at this time. Will continue to monitor.     Ramon Mathews RN

## 2018-07-09 NOTE — PROGRESS NOTES
Shift assessment completed with no new findings. Pt resting quietly. Restraints in place. Gastrostomy to LIWS.

## 2018-07-10 LAB
ABO/RH: NORMAL
ALBUMIN SERPL-MCNC: 2.5 G/DL (ref 3.4–5)
ANION GAP SERPL CALCULATED.3IONS-SCNC: 16 MMOL/L (ref 3–16)
ANTIBODY SCREEN: NORMAL
BASOPHILS ABSOLUTE: 0 K/UL (ref 0–0.2)
BASOPHILS RELATIVE PERCENT: 0.5 %
BUN BLDV-MCNC: 20 MG/DL (ref 7–20)
CALCIUM SERPL-MCNC: 9 MG/DL (ref 8.3–10.6)
CHLORIDE BLD-SCNC: 96 MMOL/L (ref 99–110)
CO2: 18 MMOL/L (ref 21–32)
CREAT SERPL-MCNC: 4.2 MG/DL (ref 0.6–1.1)
EOSINOPHILS ABSOLUTE: 0.1 K/UL (ref 0–0.6)
EOSINOPHILS RELATIVE PERCENT: 0.8 %
GFR AFRICAN AMERICAN: 15
GFR NON-AFRICAN AMERICAN: 13
GLUCOSE BLD-MCNC: 81 MG/DL (ref 70–99)
GLUCOSE BLD-MCNC: 87 MG/DL (ref 70–99)
GLUCOSE BLD-MCNC: 88 MG/DL (ref 70–99)
GLUCOSE BLD-MCNC: 89 MG/DL (ref 70–99)
GLUCOSE BLD-MCNC: 94 MG/DL (ref 70–99)
GLUCOSE BLD-MCNC: 95 MG/DL (ref 70–99)
HCT VFR BLD CALC: 23 % (ref 36–48)
HEMOGLOBIN: 7.6 G/DL (ref 12–16)
LYMPHOCYTES ABSOLUTE: 1.2 K/UL (ref 1–5.1)
LYMPHOCYTES RELATIVE PERCENT: 16.1 %
MCH RBC QN AUTO: 30.8 PG (ref 26–34)
MCHC RBC AUTO-ENTMCNC: 33.3 G/DL (ref 31–36)
MCV RBC AUTO: 92.7 FL (ref 80–100)
MONOCYTES ABSOLUTE: 0.8 K/UL (ref 0–1.3)
MONOCYTES RELATIVE PERCENT: 10.1 %
NEUTROPHILS ABSOLUTE: 5.6 K/UL (ref 1.7–7.7)
NEUTROPHILS RELATIVE PERCENT: 72.5 %
PDW BLD-RTO: 16.8 % (ref 12.4–15.4)
PERFORMED ON: NORMAL
PHOSPHORUS: 3.9 MG/DL (ref 2.5–4.9)
PLATELET # BLD: 105 K/UL (ref 135–450)
PMV BLD AUTO: 7.3 FL (ref 5–10.5)
POTASSIUM SERPL-SCNC: 3.8 MMOL/L (ref 3.5–5.1)
RBC # BLD: 2.48 M/UL (ref 4–5.2)
SODIUM BLD-SCNC: 130 MMOL/L (ref 136–145)
WBC # BLD: 7.7 K/UL (ref 4–11)

## 2018-07-10 PROCEDURE — 80069 RENAL FUNCTION PANEL: CPT

## 2018-07-10 PROCEDURE — C9113 INJ PANTOPRAZOLE SODIUM, VIA: HCPCS

## 2018-07-10 PROCEDURE — 36592 COLLECT BLOOD FROM PICC: CPT

## 2018-07-10 PROCEDURE — 94640 AIRWAY INHALATION TREATMENT: CPT

## 2018-07-10 PROCEDURE — 85025 COMPLETE CBC W/AUTO DIFF WBC: CPT

## 2018-07-10 PROCEDURE — 9990 CHARGE CONVERSION

## 2018-07-10 PROCEDURE — 87205 SMEAR GRAM STAIN: CPT

## 2018-07-10 PROCEDURE — 99232 SBSQ HOSP IP/OBS MODERATE 35: CPT | Performed by: INTERNAL MEDICINE

## 2018-07-10 PROCEDURE — 87070 CULTURE OTHR SPECIMN AEROBIC: CPT

## 2018-07-10 PROCEDURE — 86901 BLOOD TYPING SEROLOGIC RH(D): CPT

## 2018-07-10 PROCEDURE — 86900 BLOOD TYPING SEROLOGIC ABO: CPT

## 2018-07-10 PROCEDURE — 86850 RBC ANTIBODY SCREEN: CPT

## 2018-07-10 PROCEDURE — 94761 N-INVAS EAR/PLS OXIMETRY MLT: CPT

## 2018-07-10 PROCEDURE — 87040 BLOOD CULTURE FOR BACTERIA: CPT

## 2018-07-10 PROCEDURE — P9047 ALBUMIN (HUMAN), 25%, 50ML: HCPCS

## 2018-07-10 RX ORDER — FENTANYL 25 UG/H
1 PATCH TRANSDERMAL
Status: DISCONTINUED | OUTPATIENT
Start: 2018-07-10 | End: 2018-07-18 | Stop reason: HOSPADM

## 2018-07-10 RX ORDER — QUETIAPINE FUMARATE 25 MG/1
50 TABLET, FILM COATED ORAL NIGHTLY
Status: DISCONTINUED | OUTPATIENT
Start: 2018-07-10 | End: 2018-07-18 | Stop reason: HOSPADM

## 2018-07-10 RX ORDER — LORAZEPAM 2 MG/ML
0.5 INJECTION INTRAMUSCULAR EVERY 6 HOURS PRN
Status: DISCONTINUED | OUTPATIENT
Start: 2018-07-10 | End: 2018-07-11

## 2018-07-10 RX ADMIN — DIAZEPAM 2 MG: 2 TABLET ORAL at 18:01

## 2018-07-10 RX ADMIN — CLONIDINE HYDROCHLORIDE 0.1 MG: 0.1 TABLET ORAL at 21:54

## 2018-07-10 RX ADMIN — IPRATROPIUM BROMIDE AND ALBUTEROL SULFATE 1 AMPULE: .5; 3 SOLUTION RESPIRATORY (INHALATION) at 03:28

## 2018-07-10 RX ADMIN — CHLORHEXIDINE GLUCONATE 0.12% ORAL RINSE 15 ML: 1.2 LIQUID ORAL at 14:19

## 2018-07-10 RX ADMIN — NYSTATIN: 100000 CREAM TOPICAL at 21:57

## 2018-07-10 RX ADMIN — MEROPENEM 250 MG: 500 INJECTION, POWDER, FOR SOLUTION INTRAVENOUS at 22:22

## 2018-07-10 RX ADMIN — LINEZOLID 600 MG: 2 INJECTION, SOLUTION INTRAVENOUS at 14:18

## 2018-07-10 RX ADMIN — QUETIAPINE FUMARATE 50 MG: 25 TABLET ORAL at 21:54

## 2018-07-10 RX ADMIN — Medication 1 MG: at 09:32

## 2018-07-10 RX ADMIN — CHLORHEXIDINE GLUCONATE 0.12% ORAL RINSE 15 ML: 1.2 LIQUID ORAL at 21:55

## 2018-07-10 RX ADMIN — IPRATROPIUM BROMIDE AND ALBUTEROL SULFATE 1 AMPULE: .5; 3 SOLUTION RESPIRATORY (INHALATION) at 15:22

## 2018-07-10 RX ADMIN — ONDANSETRON 4 MG: 2 INJECTION, SOLUTION INTRAMUSCULAR; INTRAVENOUS at 16:10

## 2018-07-10 RX ADMIN — Medication 10 ML: at 14:18

## 2018-07-10 RX ADMIN — IPRATROPIUM BROMIDE AND ALBUTEROL SULFATE 1 AMPULE: .5; 3 SOLUTION RESPIRATORY (INHALATION) at 07:38

## 2018-07-10 RX ADMIN — ACETAMINOPHEN 650 MG: 325 TABLET ORAL at 18:01

## 2018-07-10 RX ADMIN — Medication 10 ML: at 21:55

## 2018-07-10 RX ADMIN — PANTOPRAZOLE SODIUM 40 MG: 40 INJECTION, POWDER, FOR SOLUTION INTRAVENOUS at 14:19

## 2018-07-10 RX ADMIN — IPRATROPIUM BROMIDE AND ALBUTEROL SULFATE 1 AMPULE: .5; 3 SOLUTION RESPIRATORY (INHALATION) at 19:38

## 2018-07-10 RX ADMIN — CLONIDINE HYDROCHLORIDE 0.1 MG: 0.1 TABLET ORAL at 14:19

## 2018-07-10 RX ADMIN — DIAZEPAM 2 MG: 2 TABLET ORAL at 02:19

## 2018-07-10 RX ADMIN — MEROPENEM 250 MG: 500 INJECTION, POWDER, FOR SOLUTION INTRAVENOUS at 17:26

## 2018-07-10 RX ADMIN — Medication 0.5 MG: at 18:02

## 2018-07-10 RX ADMIN — HEPARIN SODIUM 3200 UNITS: 1000 INJECTION, SOLUTION INTRAVENOUS; SUBCUTANEOUS at 11:47

## 2018-07-10 RX ADMIN — LINEZOLID 600 MG: 2 INJECTION, SOLUTION INTRAVENOUS at 02:24

## 2018-07-10 RX ADMIN — DOXERCALCIFEROL 1 MCG: 2 INJECTION, SOLUTION INTRAVENOUS at 11:02

## 2018-07-10 RX ADMIN — DIAZEPAM 2 MG: 2 TABLET ORAL at 14:19

## 2018-07-10 RX ADMIN — Medication 1 MG: at 05:37

## 2018-07-10 RX ADMIN — IPRATROPIUM BROMIDE AND ALBUTEROL SULFATE 1 AMPULE: .5; 3 SOLUTION RESPIRATORY (INHALATION) at 23:30

## 2018-07-10 RX ADMIN — DOCUSATE SODIUM AND SENNOSIDES 1 TABLET: 8.6; 5 TABLET, FILM COATED ORAL at 14:19

## 2018-07-10 RX ADMIN — POLYETHYLENE GLYCOL (3350) 17 G: 17 POWDER, FOR SOLUTION ORAL at 14:18

## 2018-07-10 ASSESSMENT — PAIN DESCRIPTION - PROGRESSION: CLINICAL_PROGRESSION: NOT CHANGED

## 2018-07-10 ASSESSMENT — PAIN DESCRIPTION - LOCATION: LOCATION: GENERALIZED

## 2018-07-10 ASSESSMENT — PAIN DESCRIPTION - PAIN TYPE: TYPE: ACUTE PAIN;CHRONIC PAIN

## 2018-07-10 ASSESSMENT — PAIN SCALES - GENERAL
PAINLEVEL_OUTOF10: 0
PAINLEVEL_OUTOF10: 7
PAINLEVEL_OUTOF10: 8
PAINLEVEL_OUTOF10: 0

## 2018-07-10 ASSESSMENT — PAIN DESCRIPTION - ONSET: ONSET: GRADUAL

## 2018-07-10 ASSESSMENT — PAIN DESCRIPTION - FREQUENCY: FREQUENCY: INTERMITTENT

## 2018-07-10 ASSESSMENT — PAIN DESCRIPTION - ORIENTATION: ORIENTATION: MID

## 2018-07-10 ASSESSMENT — PAIN DESCRIPTION - DESCRIPTORS: DESCRIPTORS: ACHING;CONSTANT

## 2018-07-10 NOTE — PROGRESS NOTES
Bedside report given to [facility]. Morning medications not received nor morning assessment d/t patient in HD. On coming nurse aware.

## 2018-07-10 NOTE — PROGRESS NOTES
[id number]                                                               [address] [facility] [address]                                                       Nicki Hubbard                                                Phone Number: [phone]                                                  Fax Number: [phone]    Nephrology   Progress Note      SUBJECTIVE:  We are following this patient for ESRD complication. On trach. Denies any complaints. No new issues over the night. Seen on HD. Tolerating well. Scheduled Meds:   meropenem  250 mg Intravenous Q8H    QUEtiapine  100 mg Oral Nightly    diazepam  2 mg Oral Q8H    polyethylene glycol  17 g Oral Daily    fentanyl  1 patch Transdermal Q72H    fentaNYL  1 patch Transdermal Q72H    sodium chloride flush  10 mL Intravenous 2 times per day    ipratropium-albuterol  1 ampule Inhalation Q4H    sennosides-docusate sodium  1 tablet Oral Daily    cloNIDine  0.1 mg Oral TID    nystatin   Topical BID    chlorhexidine  15 mL Mouth/Throat BID    doxercalciferol  1 mcg Intravenous Once per day on  Sat    linezolid  600 mg Intravenous Q12H    darbepoetin emi-polysorbate  100 mcg Intravenous Weekly - Thursday    And    darbepoetin emi-polysorbate  25 mcg Subcutaneous Weekly - Thursday    pantoprazole  40 mg Intravenous Daily     Continuous Infusions:   dextrose 5 % and 0.9 % NaCl 50 mL/hr at 18 0458    dextrose       PRN Meds:. LORazepam, HYDROmorphone, sodium chloride flush, heparin (porcine), albumin human, dextrose, sodium phosphate IVPB **OR** [DISCONTINUED] sodium phosphate IVPB **OR** [DISCONTINUED] sodium phosphate IVPB **OR** [DISCONTINUED] sodium phosphate IVPB, ondansetron, glucose, glucagon (rDNA), dextrose, acetaminophen, promethazine    Physical Exam:    TEMPERATURE:  Current - Temp: 99.5 °F (37.5 °C);  Max - Temp  Av.5 °F (38.1 °C)  Min: 99.5 °F (37.5 °C)  Max: 101.3 °F (38.5 °C)  RESPIRATIONS RANGE: Resp  Av.5  Min: 16  Max: 21  PULSE RANGE: Pulse  Av.6  Min: 103  Max: 113  BLOOD PRESSURE RANGE:  Systolic (77TRO), CKR:731 , Min:133 , SLOAN:111   ; Diastolic (66ULR), HAJ:78, Min:83, Max:98    24HR INTAKE/OUTPUT:      Intake/Output Summary (Last 24 hours) at 07/10/18 1137  Last data filed at 07/10/18 0931   Gross per 24 hour   Intake             1124 ml   Output              410 ml   Net              714 ml       General appearance: alert, appears stated age and cooperative  Head: Normocephalic, without obvious abnormality, atraumatic  Eyes: PERRL, EOM's intact. Nose: Nares normal.  No drainage or sinus tenderness. Neck:s/p trach  Lungs: clear to auscultation bilaterally  Heart: regular rate and rhythm, S1, S2 normal, no murmur, click, rub or gallop  Abdomen: soft, non-tender; bowel sounds normal; no masses,  no organomegaly. distended  Extremities: extremities normal, atraumatic, no cyanosis.  1-2+ edema  Skin: Skin color, texture, turgor normal. No rashes or lesions  Neurologic: Grossly normal    Access Exam:  TDC  LAB DATA:    CBC:   Lab Results   Component Value Date    WBC 7.7 07/10/2018    RBC 2.48 07/10/2018    HGB 7.6 07/10/2018    HCT 23.0 07/10/2018    MCV 92.7 07/10/2018    MCH 30.8 07/10/2018    MCHC 33.3 07/10/2018    RDW 16.8 07/10/2018     07/10/2018    MPV 7.3 07/10/2018     BMP:    Lab Results   Component Value Date     07/10/2018    K 3.8 07/10/2018    K 5.9 2018    CL 96 07/10/2018    CO2 18 07/10/2018    BUN 20 07/10/2018    CREATININE 4.2 07/10/2018    CALCIUM 9.0 07/10/2018    GFRAA 15 07/10/2018    LABGLOM 13 07/10/2018    GLUCOSE 87 07/10/2018     Ionized Calcium:  No results found for: IONCA  Magnesium:    Lab Results   Component Value Date    MG 1.70 2018     Phosphorus:    Lab Results   Component Value Date    PHOS 3.9 07/10/2018     U/A:    Lab Results   Component Value Date    COLORU BROWN 2018    PHUR 8.5 07/09/2018    LABCAST 5-10 Waxy 03/19/2017    WBCUA >100 07/09/2018    RBCUA see below 07/09/2018    YEAST Present 03/19/2017    BACTERIA see below 07/09/2018    CLARITYU CLOUDY 07/09/2018    SPECGRAV 1.015 07/09/2018    LEUKOCYTESUR LARGE 07/09/2018    UROBILINOGEN 0.2 07/09/2018    BILIRUBINUR Negative 07/09/2018    BLOODU LARGE 07/09/2018    GLUCOSEU Negative 07/09/2018     Urine Culture:  No components found for: CURINE  Blood Culture:  No components found for: CBLOOD, CFUNGUSBL  Blood Culture from Central Line:  No components found for: CBLOODLN    IMPRESSION/RECOMMENDATIONS:      End stage renal disease: Hemodialysis per [facility] while inpatient (MWF as outpatient)              Left TDC               change to 3k to 2K. Tolerating well   Pus near exit site. Sent blood and pus culture. still having fever.              Acute GI bleed               - Hemoglobin stable s/p transfusion, KI with HD weekly               - followed by GI   - HGB stable   - r/o vaginal bleed     MRSA, Streptococcus bacteremia, persistent endocarditis involving TV with septic   embolizations to lungs and spleen              - On IV antibiotics per ID              - s/p HD tDC removed on 6/25     Acute resp failure               - s/p trach 6/27     Hyponatremia              - avoid excessive fluid intake                 Hepatitis C     IV drug Abuse     S/p Trach/PEG on 6/27       Katelynn Barbosa MD,MS

## 2018-07-10 NOTE — PROGRESS NOTES
Pulmonary Progress Note    CC: Respiratory failure    Subjective:   Still febrile  FiO2 28%  Abdomen slightly more distended        Intake/Output Summary (Last 24 hours) at 07/10/18 0735  Last data filed at 07/10/18 0510   Gross per 24 hour   Intake              940 ml   Output              410 ml   Net              530 ml       Exam:   BP (!) 155/98   Pulse 111   Temp 101 °F (38.3 °C) (Axillary)   Resp 17   Ht 5' 5\" (1.651 m)   Wt 155 lb 6.8 oz (70.5 kg)   SpO2 100%   BMI 25.86 kg/m²  on 28%  Gen: No distress. Alert. Ill-appearing  Eyes: PERRL. No sclera icterus. No conjunctival injection. ENT: No discharge. Pharynx clear. Neck: Trachea midline. Normal thyroid. Resp: No accessory muscle use. No crackles. No wheezes. Few rhonchi. No dullness on percussion. CV: Tachycardia. Regular rhythm. No murmur or rub. No edema. GI: Non-tender. +Distention. No masses. No organomegaly. Normal bowel sounds. No hernia. Skin: Warm and dry. No nodule on exposed extremities. No rash on exposed extremities. Lymph: No cervical LAD. No supraclavicular LAD.        Scheduled Meds:   meropenem  500 mg Intravenous Q6H    QUEtiapine  100 mg Oral Nightly    diazepam  2 mg Oral Q8H    polyethylene glycol  17 g Oral Daily    fentanyl  1 patch Transdermal Q72H    fentaNYL  1 patch Transdermal Q72H    sodium chloride flush  10 mL Intravenous 2 times per day    ipratropium-albuterol  1 ampule Inhalation Q4H    sennosides-docusate sodium  1 tablet Oral Daily    cloNIDine  0.1 mg Oral TID    nystatin   Topical BID    chlorhexidine  15 mL Mouth/Throat BID    doxercalciferol  1 mcg Intravenous Once per day on Tue Thu Sat    linezolid  600 mg Intravenous Q12H    darbepoetin emi-polysorbate  100 mcg Intravenous Weekly - Thursday    And    darbepoetin emi-polysorbate  25 mcg Subcutaneous Weekly - Thursday    pantoprazole  40 mg Intravenous Daily     Continuous Infusions:   dextrose 5 % and 0.9 % NaCl 50 mL/hr at

## 2018-07-10 NOTE — PROGRESS NOTES
Spoke with Dr. Emily Hanley about patient condition, no new orders, temperature trending down. Hallucinations per family, states she sees bugs crawling on walls, MD states nightly medication Seroquel should help. If temperature does not trend down will notify Dr. Emily Hanley for further orders.

## 2018-07-10 NOTE — PROGRESS NOTES
Called Dr. Mayte Miner per nursing communication order to notify  of pt fever greater than 101. Could not reach  left message regarding pt fever of 102.0 and 102.4 see flowsheets. Tylenol given. Will monitor.

## 2018-07-10 NOTE — PLAN OF CARE
Problem: Falls - Risk of:  Goal: Will remain free from falls  Will remain free from falls   Outcome: Ongoing    Goal: Absence of physical injury  Absence of physical injury   Outcome: Ongoing      Problem: Risk for Impaired Skin Integrity  Goal: Tissue integrity - skin and mucous membranes  Structural intactness and normal physiological function of skin and  mucous membranes. Outcome: Ongoing      Problem: Discharge Planning:  Goal: Discharged to appropriate level of care  Discharged to appropriate level of care    Outcome: Ongoing    Goal: Participates in care planning  Participates in care planning   Outcome: Ongoing      Problem:  Bowel Function - Altered:  Goal: Bowel elimination is within specified parameters  Bowel elimination is within specified parameters   Outcome: Ongoing      Problem: Airway Clearance - Ineffective:  Goal: Clear lung sounds  Clear lung sounds   Outcome: Ongoing    Goal: Ability to maintain a clear airway will improve  Ability to maintain a clear airway will improve   Outcome: Ongoing      Problem: Infection - Central Venous Catheter-Associated Bloodstream Infection:  Goal: Will show no infection signs and symptoms  Will show no infection signs and symptoms   Outcome: Ongoing      Problem: Pain:  Goal: Pain level will decrease  Pain level will decrease   Outcome: Ongoing    Goal: Control of acute pain  Control of acute pain   Outcome: Ongoing

## 2018-07-10 NOTE — PROGRESS NOTES
Patient medicated with 1mg PRN ativan for increased agitation and hallucinations. Will continue to monitor.  Primitivo Khalil RN

## 2018-07-10 NOTE — PROGRESS NOTES
Internal Medicine Progress Note      Interval history     Date of admission 18     34year old white female who was admitted for GI bleed. Had an EGD   BC positive for strep pneumonia, staph aureus and gram-negative salazar. Echocardiogram showed tricuspid valve endocarditis. She's had this before. History of IV drug use  CT chest showed septic emboli and possible splenic infarct. - Transferred out of ICU on 18  - Transferred back to ICU on 18 for PEA arrest, intubated,  resuscitated with fluids  - Started on CRRT for acute renal failure, on 18  - Weaned off pressors on 18, and extubated  - Reintubated   - s.p bronch    - Off CRRT and started on hemodialysis  18  - Has been on antibiotics vancomycin and cefepime and Zyvox  - Patient remains on mechanical ventilation, continued fevers, on dialysis. - Followed by intensivist, ID, nephrologist.  - S/P Trach and PEG on   - TDC removed 18 due to persistent fevers , femoral vascath placed for HD . - TDC placed. Vas cath  pulled  Out.    - Stable now on trach collar.  Still with fevers upto 101  - started TF and now tolerating slowly  No vomiting        Subjective    Pt seen very fatigued , seen post HD  Doing some better per pt  No sob, abd remains distended but no pain     No significant diarrhea         IV:   dextrose 5 % and 0.9 % NaCl 50 mL/hr at 18 0458    dextrose         Vitals:  Temp  Av.8 °F (38.2 °C)  Min: 99.5 °F (37.5 °C)  Max: 101.9 °F (38.8 °C)  Pulse  Av.9  Min: 103  Max: 113  BP  Min: 133/95  Max: 155/98  SpO2  Av.1 %  Min: 97 %  Max: 100 %  FiO2   Av %  Min: 28 %  Max: 28 %  Patient Vitals for the past 4 hrs:   BP Temp Temp src Pulse Resp SpO2 Weight   07/10/18 0739 - - - - - 100 % -   07/10/18 0730 (!) 155/98 101 °F (38.3 °C) Axillary 111 - 100 % -   07/10/18 0510 (!) 135/95 99.5 °F (37.5 °C) Axillary 103 17 100 % 155 lb 6.8 oz (70.5 kg)       CVP: CVP (Mean): 16 mmHg      Intake/Output Summary (Last 24 hours) at 07/10/18 0754  Last data filed at 07/10/18 0510   Gross per 24 hour   Intake              940 ml   Output              410 ml   Net              530 ml       EXAM:    General: young female with trach collar. Tracheostomy+   Awake, alert and fairly oriented  Very weak and tired  Eyes: PERRL. No sclera icterus. No conjunctiva injected. ENT: No discharge. Neck: Trachea midline. Normal thyroid. trach in place . Resp: Diminished breath sounds. Bilateral diffuse rhonchi. CV: tachycardic   GI:  + distended. abd wall edema   No masses. Bowel sounds diminished. Mild diffuse tendereness . No hernia. PEG +   Skin: Warm and dry. No nodule on exposed extremities. No rash on exposed extremities  Lymph: No cervical LAD. No supraclavicular LAD.    M/S: .  Diffuse 1+ edema in all extremities   Neuro - non focal , very weak and lethargic     Meds:   meropenem  500 mg Intravenous Q6H    QUEtiapine  100 mg Oral Nightly    diazepam  2 mg Oral Q8H    polyethylene glycol  17 g Oral Daily    fentanyl  1 patch Transdermal Q72H    fentaNYL  1 patch Transdermal Q72H    sodium chloride flush  10 mL Intravenous 2 times per day    ipratropium-albuterol  1 ampule Inhalation Q4H    sennosides-docusate sodium  1 tablet Oral Daily    cloNIDine  0.1 mg Oral TID    nystatin   Topical BID    chlorhexidine  15 mL Mouth/Throat BID    doxercalciferol  1 mcg Intravenous Once per day on Tue Thu Sat    linezolid  600 mg Intravenous Q12H    darbepoetin emi-polysorbate  100 mcg Intravenous Weekly - Thursday    And    darbepoetin emi-polysorbate  25 mcg Subcutaneous Weekly - Thursday    pantoprazole  40 mg Intravenous Daily       PRN Meds:  LORazepam, HYDROmorphone, sodium chloride flush, heparin (porcine), albumin human, dextrose, sodium phosphate IVPB **OR** [DISCONTINUED] sodium phosphate IVPB **OR** [DISCONTINUED] sodium phosphate IVPB **OR** [DISCONTINUED] sodium phosphate IVPB, ondansetron, glucose, glucagon (rDNA), dextrose, acetaminophen, promethazine    Results:  CBC:   Recent Labs      07/08/18   0525  07/09/18   0503  07/10/18   0553   WBC  7.2  6.6  7.7   HGB  8.0*  7.8*  7.6*   HCT  23.7*  23.2*  23.0*   MCV  91.7  92.3  92.7   PLT  126*  116*  105*     BMP:   Recent Labs      07/08/18   0525  07/09/18   0503  07/10/18   0553   NA  132*  134*  130*   K  3.8  3.6  3.8   CL  97*  99  96*   CO2  23  21  18*   PHOS  2.8  3.2  3.9   BUN  10  15  20   CREATININE  2.4*  3.5*  4.2*     LIVER PROFILE:   Recent Labs      07/09/18   0503   AST  8*   ALT  <5*   BILIDIR  0.6*   BILITOT  1.0   ALKPHOS  153*     ECHO 6/6/18   Summary   LV systolic function is mildly reduced with LVEF estimated at 40-45%.   Mild global hypokinesis is present. There is mild systolic and diastolic   septal flattening c/w RV pressure and volume overload.   Normal left ventricular diastolic filling pressure.   The right atrium is mildly dilated.   There is trivial aortic regurgitation.   Moderate to large mobile, pedunculated vegetation (1.15 cm x 2.43 cm)   attached to base of TV leaflet c/w endocarditis.   Moderate tricuspid regurgitation.   Systolic pulmonary artery pressure (SPAP) estimated at 55 mmHg (RA pressure   3 mmHg), c/w moderate pulmonary hypertension.   Note TV endocarditis seen on prior March 2017 study but LV systolic function   is mildly decreased now. ECHO 6/8/18  Summary   This is a limited study s/p code.   LV systolic function is lower normal with EF visually estimated at 50%.   D-shaped septum c/w RV pressure and volume overload.   Large mobile, pedunculated vegetation on septal leaflet of tricuspid valve   c/w endocarditis.   No pericardial effusion noted. Cultures:  Respiratory cultures -6/12/18 - MRSA .   Repeat culture 6/20/18 MRSA     Blood culture: ON ADMISSION - 6/5/18 POSITIVE S. aureus, S pneumonia and Enterobacter    Recent blood cultures negative       Radiology    XR CHEST PORTABLE   Final Result   Increasing right basilar and stable left basilar airspace disease. XR ABDOMEN (KUB) (SINGLE AP VIEW)   Final Result   Unchanged gaseous distention of several bowel loops compared to the prior   study. Unchanged linear density overlying the pubic symphysis compared to   07/05/2018, possibly external to the patient. XR CHEST PORTABLE   Final Result   1. Interval worsening in left basilar pulmonary opacity. Unchanged right   lung base pulmonary opacity. Unchanged trace bilateral pleural effusions. Recommend radiographic follow-up to complete resolution. 2. Tip of the left upper extremity PICC terminates over the cavoatrial   junction. XR ABDOMEN (KUB) (SINGLE AP VIEW)   Final Result   Unremarkable KUB. IR PICC EQUAL OR GREATER THAN 5 YEARS   Final Result   Successful placement of left upper extremity PICC line. This projects at the   right heart border in good position. Occlusion of the right brachiocephalic vein near the confluence precluded   placement of right upper extremity line. IR RESPOSITION PRV CVC W FLUORO   Final Result   Successful placement of left upper extremity PICC line. This projects at the   right heart border in good position. Occlusion of the right brachiocephalic vein near the confluence precluded   placement of right upper extremity line. IR TUNNELED CATHETER PLACEMENT GREATER THAN 5 YEARS   Final Result   Status post successful ultrasound/fluoroscopically guided placement of left   internal jugular tunneled dialysis catheter as described above. XR CHEST PORTABLE   Final Result   Malpositioned right subclavian line. Recommend repositioning. Findings related with patient's nurse Debra Kapadia at 07/06/2018 at 12:47   p.m. XR ABDOMEN (KUB) (SINGLE AP VIEW)   Final Result   Gaseous prominence of bowel some of which appears to be colonic, but some may   be small bowel.   Overall airspace disease. XR CHEST PORTABLE   Final Result   Stable life support device positioning. No substantial change in multifocal consolidative cavitary airspace disease. XR CHEST PORTABLE   Final Result   Stable chest         XR CHEST PORTABLE   Final Result   Improvement in focus of airspace disease in the left mid lung. Persistent   multifocal cavitary lesions compatible with septic emboli         XR CHEST PORTABLE   Final Result   No significant interval change. CT ABDOMEN PELVIS WO CONTRAST Additional Contrast? Radiologist Recommendation   Final Result   Development of moderate diffuse ascites since the prior study. Mosaic   appearance of the spleen either due to multiple splenic infarcts or possible   splenic abscesses. Question of perihepatic and subhepatic adhesions. Chronic gallbladder disease. Bibasilar pneumonias and pulmonary nodules   consistent with septic emboli. Mild anasarca. RECOMMENDATIONS:   NG tube should be pulled back about 4 cm since it is pressing against the   anterior stomach wall. Gallbladder ultrasound suggested for evaluation of gallbladder disease. Diagnostic Ultrasound-guided paracentesis may be helpful. CT Chest WO Contrast   Final Result   Multiple scattered cavitary and solid nodules scattered throughout the lungs   suspected to be from septic emboli. The emboli may be from recurrent   endocarditis, IV drug abuse, or infected DVT. Scattered focal pneumonia in   the lingula, right middle lobe, and right lower lobe. Trace bilateral   pleural effusions. Tip of the endotracheal tube lying near the tee. Moderate ascites seen in the upper abdomen. Possible multiple splenic   infarcts or abscesses. Please refer to the dictation of the CT scan of the   abdomen and pelvis. RECOMMENDATIONS:   Endotracheal tube should be pulled back 1-2 cm.          CT Head WO Contrast   Final Result   No acute intracranial intubation   - weaned off pressors- levo. Vasopressin  - wbc improved from 60 K  - critical care and ID involved. WBC down to 6 K today. - fevers intermittent still persistent    -  CT chest on 6/25, showed worsening airspace disease-consistent with septic emboli  - tunneled dialysis catheter removed on 6/25/18, new one placed  - abx per ID - see below      #Acute resp failure/cavitary pneumonia      Due to MRSA infection  - s.p PEA arrest, off vent  6/11- reintubated on 6/12/18,  for increasing abd distention and pain   - remains on mechanical ventilation, with spontaneous breathing trials. pulm managing  - ct chest with no new findings except for septic emboli and cavitory pneumonia. Repeat CT chest on 6/25/18 shows worsening of cavitating pulmonary nodules and airspacedisease consistent with septic emboli. Reactive mediastinal adenopathy noted  - ct abd with no acute findings   - Off precedex drip. Off Versed and fentanyl gtt . On fentanyl patch now. - IV antibiotics- ID has stopped vanc, cefepime. Continue Zyvox. - f/w BAL. Respiratory cultures showed MRSA  - S/P PEG and tracheostomy  6/27. Stable now on Trach collar  - wean oxygen as able    #  Tricuspid valve endocarditis- recurrent    with bacteremia     - history of IV drug abuse. - She is growing MRSA, strep pneumoniae and Enterobacter cloacae. - ID involved  - Large pedunculated vegetation on septal leaflet of TV   Previously had MSSA TV endocarditis  - vancomycin and cefepime and zyvox was added   - Improving wbc   ID has stopped vanc, cefepime. Continue Zyvox. Fevers still persistent   - nurse noted purulent drainage at HD site, repeated cx and now added merrem    #  End stage renal disease on HD    -Was on CRRT - from 6/8/18 to  6/22/18  -Now on HD since 6/23/18. -TDC removed 6/25/18 due to persistent fevers.   New TDC placed on 7/5/18  - Continue HD     # DIC     -Sec to severe sepsis , off steroids  -Hematology f/w , improved hb and platelets, #  Acute upper GI bleed    - s/p  EGD -Three angiodysplastic spots at the junction of the body and the fundus area that was cauterized during endoscopy.   - s/p multiple PRBC transfusions.  -  h/h stable. - RBC scan neg      # Anemia     - sec to sepsis, iatrogenic, GI bleed  -Prn mulitple transfusions given    #  Ileus   - improved with supportive care  - resumed TF. Increase to goal      LTAC referral. Had precert.  But with recurrent high fevers, persistent ileus, pt not discharged       Full Code      Joan Bhatt MD 7/10/2018 7:54 AM

## 2018-07-11 LAB
ALBUMIN SERPL-MCNC: 2.5 G/DL (ref 3.4–5)
ANION GAP SERPL CALCULATED.3IONS-SCNC: 12 MMOL/L (ref 3–16)
BASOPHILS ABSOLUTE: 0.1 K/UL (ref 0–0.2)
BASOPHILS RELATIVE PERCENT: 0.7 %
BUN BLDV-MCNC: 12 MG/DL (ref 7–20)
CALCIUM SERPL-MCNC: 8.7 MG/DL (ref 8.3–10.6)
CHLORIDE BLD-SCNC: 98 MMOL/L (ref 99–110)
CO2: 23 MMOL/L (ref 21–32)
CREAT SERPL-MCNC: 2.9 MG/DL (ref 0.6–1.1)
EOSINOPHILS ABSOLUTE: 0 K/UL (ref 0–0.6)
EOSINOPHILS RELATIVE PERCENT: 0.5 %
GFR AFRICAN AMERICAN: 24
GFR NON-AFRICAN AMERICAN: 20
GLUCOSE BLD-MCNC: 100 MG/DL (ref 70–99)
GLUCOSE BLD-MCNC: 109 MG/DL (ref 70–99)
GLUCOSE BLD-MCNC: 97 MG/DL (ref 70–99)
GLUCOSE BLD-MCNC: 99 MG/DL (ref 70–99)
GLUCOSE BLD-MCNC: 99 MG/DL (ref 70–99)
HCT VFR BLD CALC: 22.5 % (ref 36–48)
HEMOGLOBIN: 7.5 G/DL (ref 12–16)
LYMPHOCYTES ABSOLUTE: 1.3 K/UL (ref 1–5.1)
LYMPHOCYTES RELATIVE PERCENT: 14.3 %
MCH RBC QN AUTO: 30.7 PG (ref 26–34)
MCHC RBC AUTO-ENTMCNC: 33.5 G/DL (ref 31–36)
MCV RBC AUTO: 91.7 FL (ref 80–100)
MONOCYTES ABSOLUTE: 0.9 K/UL (ref 0–1.3)
MONOCYTES RELATIVE PERCENT: 10.1 %
NEUTROPHILS ABSOLUTE: 6.5 K/UL (ref 1.7–7.7)
NEUTROPHILS RELATIVE PERCENT: 74.4 %
PDW BLD-RTO: 16.6 % (ref 12.4–15.4)
PERFORMED ON: ABNORMAL
PERFORMED ON: NORMAL
PHOSPHORUS: 2.5 MG/DL (ref 2.5–4.9)
PLATELET # BLD: 113 K/UL (ref 135–450)
PMV BLD AUTO: 7.6 FL (ref 5–10.5)
POTASSIUM SERPL-SCNC: 3.9 MMOL/L (ref 3.5–5.1)
RBC # BLD: 2.45 M/UL (ref 4–5.2)
SODIUM BLD-SCNC: 133 MMOL/L (ref 136–145)
VANCOMYCIN RANDOM: 27 UG/ML
WBC # BLD: 8.8 K/UL (ref 4–11)

## 2018-07-11 PROCEDURE — 99233 SBSQ HOSP IP/OBS HIGH 50: CPT | Performed by: INTERNAL MEDICINE

## 2018-07-11 PROCEDURE — 9990 CHARGE CONVERSION

## 2018-07-11 PROCEDURE — 80069 RENAL FUNCTION PANEL: CPT

## 2018-07-11 PROCEDURE — 94761 N-INVAS EAR/PLS OXIMETRY MLT: CPT

## 2018-07-11 PROCEDURE — 99232 SBSQ HOSP IP/OBS MODERATE 35: CPT | Performed by: INTERNAL MEDICINE

## 2018-07-11 PROCEDURE — 80202 ASSAY OF VANCOMYCIN: CPT

## 2018-07-11 PROCEDURE — C9113 INJ PANTOPRAZOLE SODIUM, VIA: HCPCS

## 2018-07-11 PROCEDURE — 74176 CT ABD & PELVIS W/O CONTRAST: CPT

## 2018-07-11 PROCEDURE — 85025 COMPLETE CBC W/AUTO DIFF WBC: CPT

## 2018-07-11 PROCEDURE — 87205 SMEAR GRAM STAIN: CPT

## 2018-07-11 PROCEDURE — 71250 CT THORAX DX C-: CPT

## 2018-07-11 PROCEDURE — 94640 AIRWAY INHALATION TREATMENT: CPT

## 2018-07-11 PROCEDURE — 87070 CULTURE OTHR SPECIMN AEROBIC: CPT

## 2018-07-11 RX ORDER — DIAZEPAM 2 MG/1
2 TABLET ORAL EVERY 8 HOURS PRN
Status: DISCONTINUED | OUTPATIENT
Start: 2018-07-11 | End: 2018-07-18 | Stop reason: HOSPADM

## 2018-07-11 RX ADMIN — Medication 0.5 MG: at 14:22

## 2018-07-11 RX ADMIN — Medication 10 ML: at 09:58

## 2018-07-11 RX ADMIN — DOCUSATE SODIUM AND SENNOSIDES 1 TABLET: 8.6; 5 TABLET, FILM COATED ORAL at 09:58

## 2018-07-11 RX ADMIN — CHLORHEXIDINE GLUCONATE 0.12% ORAL RINSE 15 ML: 1.2 LIQUID ORAL at 21:30

## 2018-07-11 RX ADMIN — Medication 0.5 MG: at 06:54

## 2018-07-11 RX ADMIN — DIAZEPAM 2 MG: 2 TABLET ORAL at 09:58

## 2018-07-11 RX ADMIN — IPRATROPIUM BROMIDE AND ALBUTEROL SULFATE 1 AMPULE: .5; 3 SOLUTION RESPIRATORY (INHALATION) at 11:21

## 2018-07-11 RX ADMIN — ONDANSETRON 4 MG: 2 INJECTION, SOLUTION INTRAMUSCULAR; INTRAVENOUS at 11:03

## 2018-07-11 RX ADMIN — MEROPENEM 250 MG: 500 INJECTION, POWDER, FOR SOLUTION INTRAVENOUS at 14:36

## 2018-07-11 RX ADMIN — IPRATROPIUM BROMIDE AND ALBUTEROL SULFATE 1 AMPULE: .5; 3 SOLUTION RESPIRATORY (INHALATION) at 19:32

## 2018-07-11 RX ADMIN — Medication 10 ML: at 01:38

## 2018-07-11 RX ADMIN — NYSTATIN: 100000 CREAM TOPICAL at 10:10

## 2018-07-11 RX ADMIN — ACETAMINOPHEN 650 MG: 325 TABLET ORAL at 14:22

## 2018-07-11 RX ADMIN — CHLORHEXIDINE GLUCONATE 0.12% ORAL RINSE 15 ML: 1.2 LIQUID ORAL at 09:58

## 2018-07-11 RX ADMIN — Medication 0.5 MG: at 01:38

## 2018-07-11 RX ADMIN — IPRATROPIUM BROMIDE AND ALBUTEROL SULFATE 1 AMPULE: .5; 3 SOLUTION RESPIRATORY (INHALATION) at 15:58

## 2018-07-11 RX ADMIN — DIAZEPAM 2 MG: 2 TABLET ORAL at 01:50

## 2018-07-11 RX ADMIN — CLONIDINE HYDROCHLORIDE 0.1 MG: 0.1 TABLET ORAL at 14:22

## 2018-07-11 RX ADMIN — MICAFUNGIN SODIUM 100 MG: 20 INJECTION, POWDER, LYOPHILIZED, FOR SOLUTION INTRAVENOUS at 09:58

## 2018-07-11 RX ADMIN — MEROPENEM 250 MG: 500 INJECTION, POWDER, FOR SOLUTION INTRAVENOUS at 06:46

## 2018-07-11 RX ADMIN — MICAFUNGIN SODIUM 100 MG: 20 INJECTION, POWDER, LYOPHILIZED, FOR SOLUTION INTRAVENOUS at 00:00

## 2018-07-11 RX ADMIN — CLONIDINE HYDROCHLORIDE 0.1 MG: 0.1 TABLET ORAL at 09:58

## 2018-07-11 RX ADMIN — IPRATROPIUM BROMIDE AND ALBUTEROL SULFATE 1 AMPULE: .5; 3 SOLUTION RESPIRATORY (INHALATION) at 03:55

## 2018-07-11 RX ADMIN — IPRATROPIUM BROMIDE AND ALBUTEROL SULFATE 1 AMPULE: .5; 3 SOLUTION RESPIRATORY (INHALATION) at 07:38

## 2018-07-11 RX ADMIN — Medication 0.5 MG: at 22:19

## 2018-07-11 RX ADMIN — PANTOPRAZOLE SODIUM 40 MG: 40 INJECTION, POWDER, FOR SOLUTION INTRAVENOUS at 09:58

## 2018-07-11 RX ADMIN — IPRATROPIUM BROMIDE AND ALBUTEROL SULFATE 1 AMPULE: .5; 3 SOLUTION RESPIRATORY (INHALATION) at 23:36

## 2018-07-11 RX ADMIN — CLONIDINE HYDROCHLORIDE 0.1 MG: 0.1 TABLET ORAL at 21:29

## 2018-07-11 RX ADMIN — NYSTATIN: 100000 CREAM TOPICAL at 21:30

## 2018-07-11 RX ADMIN — QUETIAPINE FUMARATE 50 MG: 25 TABLET ORAL at 21:29

## 2018-07-11 RX ADMIN — Medication 10 ML: at 21:30

## 2018-07-11 RX ADMIN — POLYETHYLENE GLYCOL (3350) 17 G: 17 POWDER, FOR SOLUTION ORAL at 09:58

## 2018-07-11 RX ADMIN — MEROPENEM 250 MG: 500 INJECTION, POWDER, FOR SOLUTION INTRAVENOUS at 22:23

## 2018-07-11 ASSESSMENT — PAIN SCALES - GENERAL
PAINLEVEL_OUTOF10: 7
PAINLEVEL_OUTOF10: 8
PAINLEVEL_OUTOF10: 7
PAINLEVEL_OUTOF10: 7

## 2018-07-11 NOTE — PROGRESS NOTES
Internal Medicine Progress Note      Interval history     Date of admission 18     34year old white female who was admitted for GI bleed. Had an EGD   BC positive for strep pneumonia, staph aureus and gram-negative salazar. Echocardiogram showed tricuspid valve endocarditis. She's had this before. History of IV drug use  CT chest showed septic emboli and possible splenic infarct. - Transferred out of ICU on 18  - Transferred back to ICU on 18 for PEA arrest, intubated,  resuscitated with fluids  - Started on CRRT for acute renal failure, on 18  - Weaned off pressors on 18, and extubated  - Reintubated   - s.p bronch    - Off CRRT and started on hemodialysis  18  - Has been on antibiotics vancomycin and cefepime and Zyvox  - Patient remains on mechanical ventilation, continued fevers, on dialysis. - Followed by intensivist, ID, nephrologist.  - S/P Trach and PEG on   - TDC removed 18 due to persistent fevers , femoral vascath placed for HD . - TDC placed. Vas cath  pulled  Out.    - 7/10 -  Stable now on trach collar.  Still with fevers upto 103 last night with some delirium  - started on micafungin  - started TF and now tolerating slowly  - had one episode of vomiting        Subjective    Pt seen very fatigued , No sob, abd remains distended but no pain   Denies any chest pain   No significant diarrhea         IV:   dextrose 5 % and 0.9 % NaCl 50 mL/hr at 18 0458    dextrose         Vitals:  Temp  Av.9 °F (38.3 °C)  Min: 99 °F (37.2 °C)  Max: 103 °F (39.4 °C)  Pulse  Av.6  Min: 105  Max: 123  BP  Min: 129/89  Max: 158/97  SpO2  Av.7 %  Min: 95 %  Max: 100 %  FiO2   Av %  Min: 28 %  Max: 28 %  Patient Vitals for the past 4 hrs:   BP Temp Temp src Pulse Resp SpO2   18 0741 - - - - - 95 %   18 0710 (!) 155/102 102.3 °F (39.1 °C) Oral 123 16 -   18 0402 - - - - 14 100 %       CVP: CVP (Mean): 16 mmHg      Intake/Output Enterobacter    Recent blood cultures negative       Radiology    XR CHEST PORTABLE   Final Result   Increasing right basilar and stable left basilar airspace disease. XR ABDOMEN (KUB) (SINGLE AP VIEW)   Final Result   Unchanged gaseous distention of several bowel loops compared to the prior   study. Unchanged linear density overlying the pubic symphysis compared to   07/05/2018, possibly external to the patient. XR CHEST PORTABLE   Final Result   1. Interval worsening in left basilar pulmonary opacity. Unchanged right   lung base pulmonary opacity. Unchanged trace bilateral pleural effusions. Recommend radiographic follow-up to complete resolution. 2. Tip of the left upper extremity PICC terminates over the cavoatrial   junction. XR ABDOMEN (KUB) (SINGLE AP VIEW)   Final Result   Unremarkable KUB. IR PICC EQUAL OR GREATER THAN 5 YEARS   Final Result   Successful placement of left upper extremity PICC line. This projects at the   right heart border in good position. Occlusion of the right brachiocephalic vein near the confluence precluded   placement of right upper extremity line. IR RESPOSITION PRV CVC W FLUORO   Final Result   Successful placement of left upper extremity PICC line. This projects at the   right heart border in good position. Occlusion of the right brachiocephalic vein near the confluence precluded   placement of right upper extremity line. IR TUNNELED CATHETER PLACEMENT GREATER THAN 5 YEARS   Final Result   Status post successful ultrasound/fluoroscopically guided placement of left   internal jugular tunneled dialysis catheter as described above. XR CHEST PORTABLE   Final Result   Malpositioned right subclavian line. Recommend repositioning. Findings related with patient's nurse Maddi Parada at 07/06/2018 at 12:47   p.m.          XR ABDOMEN (KUB) (SINGLE AP VIEW)   Final Result   Gaseous prominence of bowel throughout the bilateral lungs consistent with septic emboli. 2. New trace bilateral pleural effusions. 3. Mediastinal adenopathy, likely reactive. 4. Heterogeneous splenomegaly. XR CHEST PORTABLE   Final Result   Stable appearing bibasilar airspace disease. Support tubes and lines as   above. XR CHEST PORTABLE   Final Result   No significant interval change in bilateral airspace disease as compared to   prior. Cavitary pulmonary nodules are again demonstrated. XR CHEST PORTABLE   Final Result   Left mid lung airspace disease is similar to minimally improved as compared   to prior. Persistent basilar atelectasis and cavitary right lung lesions. VL Extremity Venous Bilateral   Final Result      XR CHEST PORTABLE   Final Result   Left IJ central venous line in the superior vena cava with no pneumothorax         US GALLBLADDER RUQ   Final Result   1. Small amount of complex ascites concerning for hemorrhage. Consider   further evaluation with CT of the abdomen and pelvis. 2. Cholelithiases without evidence of acute cholecystitis. 3. Cirrhosis. XR CHEST PORTABLE   Final Result   Interval increase in right basilar atelectasis. No appreciable change in   appearance of septic pulmonary emboli         XR CHEST PORTABLE   Final Result   Slightly decreased right-sided airspace disease, otherwise stable chest.         XR CHEST PORTABLE   Final Result   Slight worsening of patchy airspace opacity right lower lobe over the past   few days. Relatively stable cavitary nodular lesions both lungs. Tubes and lines remain in good position. XR CHEST PORTABLE   Final Result   No significant change in the bony basilar airspace disease and suspected   septic emboli. XR CHEST PORTABLE   Final Result   1. Stable lines, tubes and support devices. 2. Stable cardiopulmonary status including bilateral airspace opacities.          XR CHEST PORTABLE   Final Result Stable life support devices. No acute interval change regarding bilateral   airspace disease. XR CHEST PORTABLE   Final Result   Stable life support device positioning. No substantial change in multifocal consolidative cavitary airspace disease. XR CHEST PORTABLE   Final Result   Stable chest         XR CHEST PORTABLE   Final Result   Improvement in focus of airspace disease in the left mid lung. Persistent   multifocal cavitary lesions compatible with septic emboli         XR CHEST PORTABLE   Final Result   No significant interval change. CT ABDOMEN PELVIS WO CONTRAST Additional Contrast? Radiologist Recommendation   Final Result   Development of moderate diffuse ascites since the prior study. Mosaic   appearance of the spleen either due to multiple splenic infarcts or possible   splenic abscesses. Question of perihepatic and subhepatic adhesions. Chronic gallbladder disease. Bibasilar pneumonias and pulmonary nodules   consistent with septic emboli. Mild anasarca. RECOMMENDATIONS:   NG tube should be pulled back about 4 cm since it is pressing against the   anterior stomach wall. Gallbladder ultrasound suggested for evaluation of gallbladder disease. Diagnostic Ultrasound-guided paracentesis may be helpful. CT Chest WO Contrast   Final Result   Multiple scattered cavitary and solid nodules scattered throughout the lungs   suspected to be from septic emboli. The emboli may be from recurrent   endocarditis, IV drug abuse, or infected DVT. Scattered focal pneumonia in   the lingula, right middle lobe, and right lower lobe. Trace bilateral   pleural effusions. Tip of the endotracheal tube lying near the tee. Moderate ascites seen in the upper abdomen. Possible multiple splenic   infarcts or abscesses. Please refer to the dictation of the CT scan of the   abdomen and pelvis. RECOMMENDATIONS:   Endotracheal tube should be pulled back 1-2 cm. abscess    #  End stage renal disease on HD    -Was on CRRT - from 6/8/18 to  6/22/18  -Now on HD since 6/23/18. -TDC removed 6/25/18 due to persistent fevers. New TDC placed on 7/5/18  - Continue HD   - purulent drainage from catheter site, cx pending    # DIC     -Sec to severe sepsis , off steroids  -Hematology f/w , improved hb and platelets,     #  Acute upper GI bleed    - s/p  EGD -Three angiodysplastic spots at the junction of the body and the fundus area that was cauterized during endoscopy.   - s/p multiple PRBC transfusions.  -  h/h stable. - RBC scan neg      # Anemia     - sec to sepsis, iatrogenic, GI bleed  -Prn mulitple transfusions given    #  Ileus   - improved with supportive care  - resumed TF. Increase to goal      LTAC referral. Had precert.  But with recurrent high fevers, persistent ileus, pt not discharged       Full Code     Grave prognosis     Elaina Rouse MD 7/11/2018 7:59 AM

## 2018-07-11 NOTE — PROGRESS NOTES
RESPIRATORY THERAPY ASSESSMENT    Name:  [address] [place] [address],[address] Record Number:  [id number]  Age: 34 y.o. Gender: female  : 1990  Today's Date:  2018  Room:  [place] 079323/0313-01    Assessment     Is the patient being admitted for a COPD or Asthma exacerbation? No   (If yes the patient will be seen every 4 hours for the first 24 hours and then reassessed)    Patient Admission Diagnosis      Allergies  No Known Allergies    Minimum Predicted Vital Capacity:     850          Actual Vital Capacity:      Pt unable to perform          Pulmonary History:Asthma  Home Oxygen Therapy:  room air  Home Respiratory Therapy:None   Current Respiratory Therapy:  Duoneb Q4H  Treatment Type: HHN  Medications: Albuterol/Ipratropium    Respiratory Severity Index(RSI)   Patients with orders for inhalation medications, oxygen, or any therapeutic treatment modality will be placed on Respiratory Protocol. They will be assessed with the first treatment and at least every 72 hours thereafter. The following severity scale will be used to determine frequency of treatment intervention.     Smoking History: Pulmonary Disease or Smoking History, Greater than 15 pack year = 2    Social History  Social History   Substance Use Topics    Smoking status: Current Every Day Smoker     Packs/day: 0.25     Years: 4.00     Types: Cigarettes    Smokeless tobacco: Never Used      Comment: pt states that she only smokes 2 cigarettes per day    Alcohol use No       Recent Surgical History: Thoracic or Upper Airway = 3  Past Surgical History  Past Surgical History:   Procedure Laterality Date    TONSILLECTOMY      UPPER GASTROINTESTINAL ENDOSCOPY  2018    Gastric Angiodysplasia    UPPER GASTROINTESTINAL ENDOSCOPY  2018    Peg Placement       Level of Consciousness: Alert, Oriented, and Cooperative = 0    Level of Activity: Bedridden, unresponsive or quadriplegic = 4    Respiratory Pattern: Dyspnea with exertion;Irregular pattern;or RR less than 6 = 2    Breath Sounds: Absent bilaterally and/or with wheezes = 3    Sputum  Sputum Color: Dark Yellow, Tenacity: Thick, Sputum How Obtained: None  Cough: Strong, productive = 1    Vital Signs   BP (!) 155/102   Pulse 123   Temp 102.3 °F (39.1 °C) (Oral)   Resp 16   Ht 5' 5\" (1.651 m)   Wt 133 lb 9.6 oz (60.6 kg)   SpO2 100%   BMI 22.23 kg/m²   SPO2 (COPD values may differ): 90-91% on room air or greater than 92% on FiO2 24- 28% = 1    Peak Flow (asthma only): not applicable = 0    RSI: 34-04 = Q4 (every four hours)        Plan       Goals: medication delivery    Patient/caregiver was educated on the proper method of use for Respiratory Care Devices:  Yes      Level of patient/caregiver understanding able to:   [x] Verbalize understanding   [x] Demonstrate understanding       [] Teach back        [] Needs reinforcement       []  No available caregiver               []  Other:     Response to education:  Excellent     Is patient being placed on Home Treatment Regimen? No     Does the patient have everything they need prior to discharge? Yes     Comments: pt assessed, interviewed/chart reviewed    Plan of Care: Duoneb Q4H    Electronically signed by Markell Willis RCP on 7/11/2018 at 4:40 PM    Respiratory Protocol Guidelines     1. Assessment and treatment by Respiratory Therapy will be initiated for medication and therapeutic interventions upon initiation of aerosolized medication. 2. Physician will be contacted for respiratory rate (RR) greater than 35 breaths per minute. Therapy will be held for heart rate (HR) greater than 140 beats per minute, pending direction from physician. 3. Bronchodilators will be administered via Metered Dose Inhaler (MDI) with spacer when the following criteria are met:  a. Alert and cooperative     b. HR < 140 bpm  c. RR < 30 bpm                d. Can demonstrate a 23 second inspiratory hold  4.  Bronchodilators will be administered via Hand Held

## 2018-07-11 NOTE — PROGRESS NOTES
[address]                                                               [address] [facility] [address]                                                       Nicki Hubbard                                                Phone Number: [phone]                                                  Fax Number: [phone]    Nephrology   Progress Note      SUBJECTIVE:  We are following this patient for ESRD complication. On trach. Denies any complaints. No new issues over the night. Still having fever  Scheduled Meds:   meropenem  250 mg Intravenous Q8H    [START ON 2018] fentanyl  1 patch Transdermal Q72H    fentaNYL  1 patch Transdermal Q72H    QUEtiapine  50 mg Oral Nightly    micafungin  100 mg Intravenous Daily    polyethylene glycol  17 g Oral Daily    fentanyl  1 patch Transdermal Q72H    sodium chloride flush  10 mL Intravenous 2 times per day    ipratropium-albuterol  1 ampule Inhalation Q4H    sennosides-docusate sodium  1 tablet Oral Daily    cloNIDine  0.1 mg Oral TID    nystatin   Topical BID    chlorhexidine  15 mL Mouth/Throat BID    doxercalciferol  1 mcg Intravenous Once per day on Tue Thu Sat    darbepoetin emi-polysorbate  100 mcg Intravenous Weekly - Thursday    And    darbepoetin emi-polysorbate  25 mcg Subcutaneous Weekly - Thursday    pantoprazole  40 mg Intravenous Daily     Continuous Infusions:   dextrose       PRN Meds:.diazepam, HYDROmorphone, sodium chloride flush, heparin (porcine), albumin human, dextrose, sodium phosphate IVPB **OR** [DISCONTINUED] sodium phosphate IVPB **OR** [DISCONTINUED] sodium phosphate IVPB **OR** [DISCONTINUED] sodium phosphate IVPB, ondansetron, glucose, glucagon (rDNA), dextrose, acetaminophen, promethazine    Physical Exam:    TEMPERATURE:  Current - Temp: 102.3 °F (39.1 °C);  Max - Temp  Av.2 °F (38.4 °C)  Min: 99 °F (37.2 °C)  Max: 103 °F (39.4 °C)  RESPIRATIONS RANGE: Resp  Avg: 15.4  Min: 14  Max: 18  PULSE RANGE: Pulse  Av.8  Min: 110  Max: 123  BLOOD PRESSURE RANGE:  Systolic (48BPO), STEPHAN:984 , Min:129 , JPA:507   ; Diastolic (24FWE), BDO:16, Min:79, Max:102    24HR INTAKE/OUTPUT:      Intake/Output Summary (Last 24 hours) at 18 1425  Last data filed at 18 1829   Gross per 24 hour   Intake             2589 ml   Output                0 ml   Net             2589 ml       General appearance: alert, appears stated age and cooperative  Head: Normocephalic, without obvious abnormality, atraumatic  Eyes: PERRL, EOM's intact. Nose: Nares normal.  No drainage or sinus tenderness. Neck:s/p trach  Lungs: clear to auscultation bilaterally  Heart: regular rate and rhythm, S1, S2 normal, no murmur, click, rub or gallop  Abdomen: soft, non-tender; bowel sounds normal; no masses,  no organomegaly. distended  Extremities: extremities normal, atraumatic, no cyanosis.  1-2+ edema  Skin: Skin color, texture, turgor normal. No rashes or lesions  Neurologic: Grossly normal     Access Exam:  left TDC    LAB DATA:    CBC:   Lab Results   Component Value Date    WBC 8.8 2018    RBC 2.45 2018    HGB 7.5 2018    HCT 22.5 2018    MCV 91.7 2018    MCH 30.7 2018    MCHC 33.5 2018    RDW 16.6 2018     2018    MPV 7.6 2018     BMP:    Lab Results   Component Value Date     2018    K 3.9 2018    K 5.9 2018    CL 98 2018    CO2 23 2018    BUN 12 2018    CREATININE 2.9 2018    CALCIUM 8.7 2018    GFRAA 24 2018    LABGLOM 20 2018    GLUCOSE 109 2018     Ionized Calcium:  No results found for: IONCA  Magnesium:    Lab Results   Component Value Date    MG 1.70 2018     Phosphorus:    Lab Results   Component Value Date    PHOS 2.5 2018     U/A:    Lab Results   Component Value Date    COLORU BROWN 2018    PHUR 8.5 2018    LABCAST 5-10 Waxy 03/19/2017    WBCUA >100 07/09/2018    RBCUA see below 07/09/2018    YEAST Present 03/19/2017    BACTERIA see below 07/09/2018    CLARITYU CLOUDY 07/09/2018    SPECGRAV 1.015 07/09/2018    LEUKOCYTESUR LARGE 07/09/2018    UROBILINOGEN 0.2 07/09/2018    BILIRUBINUR Negative 07/09/2018    BLOODU LARGE 07/09/2018    GLUCOSEU Negative 07/09/2018     Urine Culture:  No components found for: CURINE  Blood Culture:  No components found for: CBLOOD, CFUNGUSBL  Blood Culture from Central Line:  No components found for: CBLOODLN    IMPRESSION/RECOMMENDATIONS:      End stage renal disease: Hemodialysis per [facility] while inpatient (MWF as outpatient)              Left TDC              Got HD yesterday. No need of HD today. Will do HD in am.             Acute GI bleed               - Hemoglobin stable s/p transfusion, KI with HD weekly               - followed by GI   - HGB stable   - r/o vaginal bleed     MRSA, Streptococcus bacteremia, persistent endocarditis involving TV with septic   embolizations to lungs and spleen              - On IV antibiotics per ID. On randy + vanco + micofungin              - s/p HD tDC removed on 6/25    - still having ever. Resent blood culture negative so far. Linezolid d/serina    Acute resp failure               - s/p trach 6/27     Hyponatremia              - avoid excessive fluid intake.  Decrease free water to 20 cc/hr                 Hepatitis C     IV drug Abuse     S/p Trach/PEG on 6/27       Amanda Alvarez MD,MS

## 2018-07-11 NOTE — PROGRESS NOTES
chest x-ray  · TV infectious endocarditis  · Polymicrobial bacteremia: MRSA, streptococcal pneumonia and Enterobacter  · Cavitary pneumonia due to septic emboli  · Acute blood loss anemia - angiodysplasia post catheterization  · ESRD on HD  · IV drug abuse, hepatitis C  · S/P Trach/PEG 6/27/18  · Review HONEY PICC placed 7/6/18          PLAN:  · Supplemental oxygen to maintain SaO2 >92%; wean as tolerated  · Vancomycin and meropenem and micafungin per ID  · Follow-up repeat respiratory culture  · CT chest and abdomen/pelvis  · PTOT

## 2018-07-11 NOTE — PROGRESS NOTES
Shift assessment complete see flow sheet respirations easy and even. Patient denies any pain or needs at this time. Bed is locked in the lowest position with call light within reach.

## 2018-07-12 LAB
ALBUMIN SERPL-MCNC: 2.3 G/DL (ref 3.4–5)
ANION GAP SERPL CALCULATED.3IONS-SCNC: 13 MMOL/L (ref 3–16)
APPEARANCE FLUID: NORMAL
BASOPHILS ABSOLUTE: 0.1 K/UL (ref 0–0.2)
BASOPHILS RELATIVE PERCENT: 0.7 %
BLOOD BANK DISPENSE STATUS: NORMAL
BLOOD BANK DISPENSE STATUS: NORMAL
BLOOD BANK PRODUCT CODE: NORMAL
BLOOD BANK PRODUCT CODE: NORMAL
BLOOD CULTURE, ROUTINE: NORMAL
BPU ID: NORMAL
BPU ID: NORMAL
BUN BLDV-MCNC: 21 MG/DL (ref 7–20)
CALCIUM SERPL-MCNC: 8.8 MG/DL (ref 8.3–10.6)
CELL COUNT FLUID TYPE: NORMAL
CHLORIDE BLD-SCNC: 95 MMOL/L (ref 99–110)
CLOT EVALUATION: NORMAL
CO2: 23 MMOL/L (ref 21–32)
COLOR FLUID: NORMAL
CREAT SERPL-MCNC: 3.7 MG/DL (ref 0.6–1.1)
CULTURE, BLOOD 2: NORMAL
DESCRIPTION BLOOD BANK: NORMAL
DESCRIPTION BLOOD BANK: NORMAL
EOSINOPHILS ABSOLUTE: 0.1 K/UL (ref 0–0.6)
EOSINOPHILS RELATIVE PERCENT: 1.1 %
GFR AFRICAN AMERICAN: 18
GFR NON-AFRICAN AMERICAN: 15
GLUCOSE BLD-MCNC: 104 MG/DL (ref 70–99)
GLUCOSE BLD-MCNC: 106 MG/DL (ref 70–99)
GLUCOSE BLD-MCNC: 107 MG/DL (ref 70–99)
GLUCOSE BLD-MCNC: 76 MG/DL (ref 70–99)
GLUCOSE BLD-MCNC: 92 MG/DL (ref 70–99)
GLUCOSE BLD-MCNC: 94 MG/DL (ref 70–99)
GLUCOSE BLD-MCNC: 97 MG/DL (ref 70–99)
HCT VFR BLD CALC: 21.1 % (ref 36–48)
HEMOGLOBIN: 7 G/DL (ref 12–16)
LV EF: 55 %
LVEF MODALITY: NORMAL
LYMPHOCYTES ABSOLUTE: 1.2 K/UL (ref 1–5.1)
LYMPHOCYTES RELATIVE PERCENT: 13.3 %
LYMPHOCYTES, BODY FLUID: 5 %
MACROPHAGE FLUID: 18 %
MCH RBC QN AUTO: 30.6 PG (ref 26–34)
MCHC RBC AUTO-ENTMCNC: 33.2 G/DL (ref 31–36)
MCV RBC AUTO: 92 FL (ref 80–100)
MONOCYTE, FLUID: 2 %
MONOCYTES ABSOLUTE: 0.8 K/UL (ref 0–1.3)
MONOCYTES RELATIVE PERCENT: 8.6 %
NEUTROPHIL, FLUID: 75 %
NEUTROPHILS ABSOLUTE: 6.6 K/UL (ref 1.7–7.7)
NEUTROPHILS RELATIVE PERCENT: 76.3 %
NUCLEATED CELLS FLUID: NORMAL /CUMM
NUMBER OF CELLS COUNTED FLUID: 100
PDW BLD-RTO: 17 % (ref 12.4–15.4)
PERFORMED ON: ABNORMAL
PERFORMED ON: NORMAL
PHOSPHORUS: 2.9 MG/DL (ref 2.5–4.9)
PLATELET # BLD: 102 K/UL (ref 135–450)
PMV BLD AUTO: 8 FL (ref 5–10.5)
POTASSIUM SERPL-SCNC: 3.5 MMOL/L (ref 3.5–5.1)
RBC # BLD: 2.29 M/UL (ref 4–5.2)
RBC FLUID: NORMAL /CUMM
SODIUM BLD-SCNC: 131 MMOL/L (ref 136–145)
VANCOMYCIN RANDOM: 21.9 UG/ML
WBC # BLD: 8.7 K/UL (ref 4–11)

## 2018-07-12 PROCEDURE — 99233 SBSQ HOSP IP/OBS HIGH 50: CPT | Performed by: INTERNAL MEDICINE

## 2018-07-12 PROCEDURE — 0W9G3ZZ DRAINAGE OF PERITONEAL CAVITY, PERCUTANEOUS APPROACH: ICD-10-PCS | Performed by: RADIOLOGY

## 2018-07-12 PROCEDURE — 80069 RENAL FUNCTION PANEL: CPT

## 2018-07-12 PROCEDURE — 99232 SBSQ HOSP IP/OBS MODERATE 35: CPT | Performed by: INTERNAL MEDICINE

## 2018-07-12 PROCEDURE — 94761 N-INVAS EAR/PLS OXIMETRY MLT: CPT

## 2018-07-12 PROCEDURE — 86923 COMPATIBILITY TEST ELECTRIC: CPT

## 2018-07-12 PROCEDURE — 87102 FUNGUS ISOLATION CULTURE: CPT

## 2018-07-12 PROCEDURE — C9113 INJ PANTOPRAZOLE SODIUM, VIA: HCPCS

## 2018-07-12 PROCEDURE — 87205 SMEAR GRAM STAIN: CPT

## 2018-07-12 PROCEDURE — 97110 THERAPEUTIC EXERCISES: CPT

## 2018-07-12 PROCEDURE — 89051 BODY FLUID CELL COUNT: CPT

## 2018-07-12 PROCEDURE — 80202 ASSAY OF VANCOMYCIN: CPT

## 2018-07-12 PROCEDURE — 87075 CULTR BACTERIA EXCEPT BLOOD: CPT

## 2018-07-12 PROCEDURE — 93306 TTE W/DOPPLER COMPLETE: CPT

## 2018-07-12 PROCEDURE — 87070 CULTURE OTHR SPECIMN AEROBIC: CPT

## 2018-07-12 PROCEDURE — 49083 ABD PARACENTESIS W/IMAGING: CPT

## 2018-07-12 PROCEDURE — 94640 AIRWAY INHALATION TREATMENT: CPT

## 2018-07-12 PROCEDURE — 85025 COMPLETE CBC W/AUTO DIFF WBC: CPT

## 2018-07-12 PROCEDURE — P9016 RBC LEUKOCYTES REDUCED: HCPCS

## 2018-07-12 PROCEDURE — 9990 CHARGE CONVERSION

## 2018-07-12 RX ORDER — HYDROCODONE BITARTRATE AND ACETAMINOPHEN 5; 325 MG/1; MG/1
1 TABLET ORAL EVERY 6 HOURS PRN
Status: DISCONTINUED | OUTPATIENT
Start: 2018-07-12 | End: 2018-07-13

## 2018-07-12 RX ORDER — SODIUM CHLORIDE 9 MG/ML
INJECTION, SOLUTION INTRAVENOUS
Status: COMPLETED
Start: 2018-07-12 | End: 2018-07-12

## 2018-07-12 RX ADMIN — POLYETHYLENE GLYCOL (3350) 17 G: 17 POWDER, FOR SOLUTION ORAL at 08:27

## 2018-07-12 RX ADMIN — DIAZEPAM 2 MG: 2 TABLET ORAL at 16:03

## 2018-07-12 RX ADMIN — IPRATROPIUM BROMIDE AND ALBUTEROL SULFATE 1 AMPULE: .5; 3 SOLUTION RESPIRATORY (INHALATION) at 15:47

## 2018-07-12 RX ADMIN — MEROPENEM 250 MG: 500 INJECTION, POWDER, FOR SOLUTION INTRAVENOUS at 20:57

## 2018-07-12 RX ADMIN — MICAFUNGIN SODIUM 100 MG: 20 INJECTION, POWDER, LYOPHILIZED, FOR SOLUTION INTRAVENOUS at 15:55

## 2018-07-12 RX ADMIN — DIAZEPAM 2 MG: 2 TABLET ORAL at 02:22

## 2018-07-12 RX ADMIN — SODIUM CHLORIDE 250 ML: 9 INJECTION, SOLUTION INTRAVENOUS at 16:07

## 2018-07-12 RX ADMIN — Medication 10 ML: at 08:26

## 2018-07-12 RX ADMIN — CLONIDINE HYDROCHLORIDE 0.1 MG: 0.1 TABLET ORAL at 21:08

## 2018-07-12 RX ADMIN — NYSTATIN: 100000 CREAM TOPICAL at 22:16

## 2018-07-12 RX ADMIN — HYDROCODONE BITARTRATE AND ACETAMINOPHEN 1 TABLET: 5; 325 TABLET ORAL at 23:55

## 2018-07-12 RX ADMIN — DOXERCALCIFEROL 1 MCG: 2 INJECTION, SOLUTION INTRAVENOUS at 12:32

## 2018-07-12 RX ADMIN — Medication 0.5 MG: at 04:01

## 2018-07-12 RX ADMIN — PANTOPRAZOLE SODIUM 40 MG: 40 INJECTION, POWDER, FOR SOLUTION INTRAVENOUS at 08:26

## 2018-07-12 RX ADMIN — HEPARIN SODIUM 3200 UNITS: 1000 INJECTION, SOLUTION INTRAVENOUS; SUBCUTANEOUS at 13:28

## 2018-07-12 RX ADMIN — IPRATROPIUM BROMIDE AND ALBUTEROL SULFATE 1 AMPULE: .5; 3 SOLUTION RESPIRATORY (INHALATION) at 19:38

## 2018-07-12 RX ADMIN — MEROPENEM 250 MG: 500 INJECTION, POWDER, FOR SOLUTION INTRAVENOUS at 15:56

## 2018-07-12 RX ADMIN — IPRATROPIUM BROMIDE AND ALBUTEROL SULFATE 1 AMPULE: .5; 3 SOLUTION RESPIRATORY (INHALATION) at 08:10

## 2018-07-12 RX ADMIN — NYSTATIN: 100000 CREAM TOPICAL at 08:27

## 2018-07-12 RX ADMIN — QUETIAPINE FUMARATE 50 MG: 25 TABLET ORAL at 21:07

## 2018-07-12 RX ADMIN — HYDROCODONE BITARTRATE AND ACETAMINOPHEN 1 TABLET: 5; 325 TABLET ORAL at 12:33

## 2018-07-12 RX ADMIN — ACETAMINOPHEN 650 MG: 325 TABLET ORAL at 04:02

## 2018-07-12 RX ADMIN — Medication 0.5 MG: at 08:26

## 2018-07-12 RX ADMIN — PROMETHAZINE HYDROCHLORIDE 25 MG: 25 TABLET ORAL at 21:08

## 2018-07-12 RX ADMIN — Medication 10 ML: at 21:00

## 2018-07-12 RX ADMIN — IPRATROPIUM BROMIDE AND ALBUTEROL SULFATE 1 AMPULE: .5; 3 SOLUTION RESPIRATORY (INHALATION) at 23:58

## 2018-07-12 RX ADMIN — IPRATROPIUM BROMIDE AND ALBUTEROL SULFATE 1 AMPULE: .5; 3 SOLUTION RESPIRATORY (INHALATION) at 03:22

## 2018-07-12 RX ADMIN — HYDROCODONE BITARTRATE AND ACETAMINOPHEN 1 TABLET: 5; 325 TABLET ORAL at 18:20

## 2018-07-12 RX ADMIN — CHLORHEXIDINE GLUCONATE 0.12% ORAL RINSE 15 ML: 1.2 LIQUID ORAL at 08:26

## 2018-07-12 RX ADMIN — CLONIDINE HYDROCHLORIDE 0.1 MG: 0.1 TABLET ORAL at 15:49

## 2018-07-12 RX ADMIN — DOCUSATE SODIUM AND SENNOSIDES 1 TABLET: 8.6; 5 TABLET, FILM COATED ORAL at 08:26

## 2018-07-12 RX ADMIN — MEROPENEM 250 MG: 500 INJECTION, POWDER, FOR SOLUTION INTRAVENOUS at 05:47

## 2018-07-12 RX ADMIN — ACETAMINOPHEN 650 MG: 325 TABLET ORAL at 21:08

## 2018-07-12 ASSESSMENT — PAIN SCALES - GENERAL
PAINLEVEL_OUTOF10: 9
PAINLEVEL_OUTOF10: 0
PAINLEVEL_OUTOF10: 2
PAINLEVEL_OUTOF10: 0
PAINLEVEL_OUTOF10: 8
PAINLEVEL_OUTOF10: 0
PAINLEVEL_OUTOF10: 8
PAINLEVEL_OUTOF10: 0
PAINLEVEL_OUTOF10: 8
PAINLEVEL_OUTOF10: 6
PAINLEVEL_OUTOF10: 0

## 2018-07-12 ASSESSMENT — PAIN DESCRIPTION - ORIENTATION: ORIENTATION: MID

## 2018-07-12 ASSESSMENT — PAIN DESCRIPTION - DESCRIPTORS
DESCRIPTORS: ACHING

## 2018-07-12 ASSESSMENT — PAIN DESCRIPTION - LOCATION
LOCATION: GENERALIZED

## 2018-07-12 ASSESSMENT — PAIN DESCRIPTION - FREQUENCY
FREQUENCY: CONTINUOUS

## 2018-07-12 ASSESSMENT — PAIN DESCRIPTION - PAIN TYPE
TYPE: CHRONIC PAIN

## 2018-07-12 ASSESSMENT — PAIN DESCRIPTION - ONSET
ONSET: ON-GOING

## 2018-07-12 ASSESSMENT — PAIN DESCRIPTION - PROGRESSION
CLINICAL_PROGRESSION: GRADUALLY WORSENING
CLINICAL_PROGRESSION: GRADUALLY WORSENING

## 2018-07-12 NOTE — PROGRESS NOTES
Inpatient Physical Therapy Daily Treatment Note    Unit: PCU  Date:  7/12/2018  Patient Name:    Clair Hogue  Admitting diagnosis:  GI BLEED  Admit Date:  6/6/2018  Precautions/Restrictions:  Cardiac arrest with intubation 6/8-6/11 & re-intubated on 6/12-6/27; tracheostomy (6/27/18), CRRT 6/8-6/23, now on [facility] HD; contact (MRSA), LIJ CVC, L femoral vas cath, rectal tube, PEG (6/27/18), trach collar with 6L O2, L PICC    Discharge Recommendations: LTACH  AM-PAC Score: 8  DME needs at discharge: Defer to facility     Treatment Time: 3386-4785  Treatment Number:  7    Subjective    Pt. Supine in bed with no family present. Pt. agreeable to therapy treatment this PM.  Pt able to participate in conversation appropriately (mouthing responses) and follow 2 step directions for LE AROM. Pain    Denies    Bed Mobility Deferred  Supine to Sit:   Sit to Supine:   Rolling:   Scooting:     Transfer Training Deferred, not appropriate this date  Sit to stand:   Stand to sit:   Bed to Chair:     Gait Training Deferred, not appropriate this date  Patient ambulated with     Therapeutic Exercise AAROM, bilaterally  Ankle Pumps: x 15  SAQ: 2 x 10  Heel Slides: 2 x 10  Hip ABD/ADD: 2 x 10    Patient Education       Role of acute care PT, POC, proper execution of LE exercises    Positioning Needs       Pt supine in bed with needs/call light within reach. Respiratory going to activate alarm upon exit of room. Activity Tolerance     SpO2: 98% on 6L O2 via trach collar, HR: 100 bpm following LE exercises  Pt requesting respiratory therapy during treatment session with coughing. Respiratory present and deferred further exercises to allow for suctioning. Assessment   Patient tolerating LE exercises well. Assist provided to facilitate proper execution of exercise, pt able to actively perform all exercises. Continue to progress pt's mobility as able to tolerate. Recommend LTACH at discharge.     Plan   Continue with plan of

## 2018-07-12 NOTE — PROGRESS NOTES
Patient returned from testing at this time. TF restarted at 40mL/hr. Rectal tube reinserted as it had fallen out in dialysis. Salcedo catheter removed per order.

## 2018-07-12 NOTE — PROGRESS NOTES
Patient is refusing dressing changes at this time. Pt informed of need to change dressings to help prevent infection. Pt states \"not right now\" will continue to monitor pt.  Abner Rosas RN

## 2018-07-12 NOTE — PROGRESS NOTES
Patient medicated with 2mg PRN valium for agitation and restlessness. Will continue to monitor.  Bridgett Stallworth RN

## 2018-07-12 NOTE — PROGRESS NOTES
Internal Medicine Progress Note      Interval history     Date of admission 18     34year old white female who was admitted for GI bleed. Had an EGD   BC positive for strep pneumonia, staph aureus and gram-negative salazar. Echocardiogram showed tricuspid valve endocarditis. She's had this before. History of IV drug use  CT chest showed septic emboli and possible splenic infarct. - Transferred out of ICU on 18  - Transferred back to ICU on 18 for PEA arrest, intubated,  resuscitated with fluids  - Started on CRRT for acute renal failure, on 18  - Weaned off pressors on 18, and extubated  - Reintubated   - s.p bronch    - Off CRRT and started on hemodialysis  18  - Has been on antibiotics vancomycin and cefepime and Zyvox  - Patient remains on mechanical ventilation, continued fevers, on dialysis. - Followed by intensivist, ID, nephrologist.  - S/P Trach and PEG on   - TDC removed 18 due to persistent fevers , femoral vascath placed for HD . - TDC placed. Vas cath  pulled  Out.    -  -  Stable now on trach collar.  Still with fevers upto 101 last night   Mentation stable        Subjective    Pt seen more oriented and more energetic today , No sob, abd remains less distended but no pain   Denies any chest pain   No significant diarrhea         IV:   dextrose         Vitals:  Temp  Av °F (37.8 °C)  Min: 97.6 °F (36.4 °C)  Max: 101.5 °F (38.6 °C)  Pulse  Av  Min: 86  Max: 103  BP  Min: 132/99  Max: 147/103  SpO2  Av.2 %  Min: 93 %  Max: 100 %  FiO2   Av %  Min: 28 %  Max: 28 %  Patient Vitals for the past 4 hrs:   BP Temp Temp src Pulse Resp SpO2   18 0710 (!) 147/103 100.4 °F (38 °C) Oral 103 18 100 %   18 0500 - 100.4 °F (38 °C) Oral - - -   18 0401 (!) 140/92 101.5 °F (38.6 °C) Oral 86 16 99 %       CVP: CVP (Mean): 16 mmHg      Intake/Output Summary (Last 24 hours) at 18 0728  Last data filed at 18 3024   Gross the abdomen and pelvis. Heterogeneous appearance of the spleen is again seen and may be related to   infection/abscesses, given the process within the chest.  Alternatively,   these may represent areas of infarct. Low-density area within the left   hepatic lobe likely is a similar process to the spleen and appears improved   from the previous study. CT ABDOMEN PELVIS WO CONTRAST Additional Contrast? Oral   Final Result   Overall improved appearance of the chest with decreasing areas of cavitation. Small pleural effusions. Large amount free fluid within the abdomen and pelvis. Heterogeneous appearance of the spleen is again seen and may be related to   infection/abscesses, given the process within the chest.  Alternatively,   these may represent areas of infarct. Low-density area within the left   hepatic lobe likely is a similar process to the spleen and appears improved   from the previous study. XR CHEST PORTABLE   Final Result   Increasing right basilar and stable left basilar airspace disease. XR ABDOMEN (KUB) (SINGLE AP VIEW)   Final Result   Unchanged gaseous distention of several bowel loops compared to the prior   study. Unchanged linear density overlying the pubic symphysis compared to   07/05/2018, possibly external to the patient. XR CHEST PORTABLE   Final Result   1. Interval worsening in left basilar pulmonary opacity. Unchanged right   lung base pulmonary opacity. Unchanged trace bilateral pleural effusions. Recommend radiographic follow-up to complete resolution. 2. Tip of the left upper extremity PICC terminates over the cavoatrial   junction. XR ABDOMEN (KUB) (SINGLE AP VIEW)   Final Result   Unremarkable KUB. IR PICC EQUAL OR GREATER THAN 5 YEARS   Final Result   Successful placement of left upper extremity PICC line. This projects at the   right heart border in good position.       Occlusion of the right PORTABLE   Final Result   The endotracheal tube needs retracted approximately 3 cm. Satisfactory position right IJ tunnel catheter and left central venous   catheter. Increased size of the multifocal nodular lung opacities presumed multifocal   pneumonia. Findings were discussed with ICU nurse caring for the patient at 2:21 pm on   6/8/2018. XR CHEST PORTABLE   Final Result   Multifocal pneumonia. XR FOOT RIGHT (2 VIEWS)   Final Result   Lucency through the medial sesamoid most consistent with bipartite medial   sesamoid in the absence of history of trauma. This can be associated with   pain. CTA ABDOMEN PELVIS W WO CONTRAST   Final Result   No active gastrointestinal hemorrhage is identified. No vascular abnormality   is appreciated. Right lower lobe pneumonia as well as cavitary nodules. Septic emboli are   consideration. Compared with 11/26/2017, there is waxing and waning airspace   disease and pulmonary nodules. Organizing pneumonia is an alternative   possibility. Hepatosplenomegaly. Hepatomegaly is similar to 11/26/2017. Splenomegaly is   new. Hypodensity in the spleen, suggestive of acute to subacute embolic   infarction. Small amount of ascites, nonspecific. Hyperdensity of the gallbladder wall. This may represent mural   calcification. Etiology is unclear. There is variable association with   gallbladder wall calcification and risk for gallbladder carcinoma. Stable cardiomegaly. Lumbar spine and visualized osseous structures appear intact. NM GI BLOOD LOSS   Final Result   No evidence of active GI bleeding during acquisition. RECOMMENDATIONS:   If the patient shows hemodynamic signs of an active bleed in the next 20   hours, additional images can be acquired. XR CHEST PORTABLE   Final Result   Patchy airspace disease in the right lung, pneumonia versus atelectasis   versus less likely edema.   That should be followed to resolution. Borderline pulmonary vascular congestion. IR TUNNELED CVC PLACE WO SQ PORT/PUMP > 5 YEARS    (Results Pending)         Assessment:    Active Problems:    IVDU (intravenous drug user)    Endocarditis of tricuspid valve    HCAP (healthcare-associated pneumonia)    Upper GI bleed    Sepsis due to Streptococcus pneumoniae (HCC)    Moderate protein-calorie malnutrition (HCC)    PEA (Pulseless electrical activity) ([address]    DIC (disseminated intravascular coagulation) ([address]    Acute respiratory failure with hypoxia (HCC)    Splenic infarction    Bacteremia    Mucus plugging of bronchi    Cavitary lung disease    Fever    Persistent fever    MRSA bacteremia    Hyponatremia    Acute blood loss anemia    Ileus (HCC)  Resolved Problems:    * No resolved hospital problems. *         Plan:    # Septic shock- sec to bacteremia /septic emboli/endocarditis    Sustained PEA arrest x2  requiring multiple pressors, intubation   - weaned off pressors- levo. Vasopressin  - wbc improved from 60 K  - critical care and ID involved. WBC down to 6 K today. - fevers intermittent still persistent    -  CT chest on 6/25, showed worsening airspace disease-consistent with septic emboli  - tunneled dialysis catheter removed on 6/25/18, new one placed  - abx per ID - see below      #Acute resp failure/cavitary pneumonia      Due to MRSA infection  - s.p PEA arrest, off vent  6/11- reintubated on 6/12/18,  for increasing abd distention and pain   - remains on mechanical ventilation, with spontaneous breathing trials. pulm managing  - ct chest with no new findings except for septic emboli and cavitory pneumonia. Repeat CT chest on 6/25/18 shows worsening of cavitating pulmonary nodules and airspacedisease consistent with septic emboli. Reactive mediastinal adenopathy noted  - ct abd with no acute findings   - Off precedex drip. Off Versed and fentanyl gtt . On fentanyl patch now.    - IV antibiotics- was treated with vanc, cefepime. later Zyvox. - BAL. Respiratory cultures showed MRSA  - S/P PEG and tracheostomy  6/27. Stable now on Trach collar  - wean oxygen as able     #  Tricuspid valve endocarditis- recurrent    with bacteremia     - history of IV drug abuse. - She is growing MRSA, strep pneumoniae and Enterobacter cloacae. - ID involved  - Large pedunculated vegetation on septal leaflet of TV   Previously had MSSA TV endocarditis  - vancomycin and cefepime and zyvox was added   - Improving wbc   ID has stopped vanc, cefepime. Continue Zyvox. Fevers still persistent , added merrem and micafungin  - nurse noted purulent drainage at HD site, repeated  cx and now added merrem  - ct chest repeated , improving pneumonia  - ct abd repeated with increasing ascites , no drainable abscess- for paracentesis today      #  End stage renal disease on HD    -Was on CRRT - from 6/8/18 to  6/22/18  -Now on HD since 6/23/18. -TDC removed 6/25/18 due to persistent fevers. New TDC placed on 7/5/18  - Continue HD   - purulent drainage from catheter site, cx pending    # DIC     -Sec to severe sepsis , off steroids  -Hematology f/w , improved hb and platelets,     #  Acute upper GI bleed    - s/p  EGD -Three angiodysplastic spots at the junction of the body and the fundus area that was cauterized during endoscopy.   - s/p multiple PRBC transfusions.  -  h/h stable. - RBC scan neg      # Anemia     - sec to sepsis, iatrogenic, GI bleed  -Prn mulitple transfusions given    #  Ileus   - improved with supportive care  - resumed TF. Increase to goal      LTAC referral. Had precert.  But with recurrent high fevers, persistent ileus, pt not discharged       Full Code     Poor prognosis    Dahlia Duverney, MD 7/12/2018 7:47 AM

## 2018-07-12 NOTE — FLOWSHEET NOTE
Treatment time:4 hours  Net UF: 3000 ml     Pre weight: 59.5 kg   Post weight: 56.5 kg  EDW: tbd kg     Access used: LIJ CVC  Access function: good with  ml/min     Medications or blood products given: 2units RBC, aranesp, hectorol, heparin     Regular outpatient schedule: MWF     Summary of response to treatment:HD tx completed in full, VSS, pt alert no distress, tolerated tx well w/o complications, 2unit RBC transfused w/o complications, heparin dwelled, CVC capped and clamped, report given to RN, pt returned to room 313     Copy of dialysis treatment record placed in chart, to be scanned into EMR.     07/12/18 0930 07/12/18 1330   Vital Signs   BP (!) 171/99 (!) 141/91   Temp 99.9 °F (37.7 °C) 99.8 °F (37.7 °C)   Pulse 104 99   Weight 131 lb 2.8 oz (59.5 kg) 124 lb 9 oz (56.5 kg)   Weight Method Bed scale Bed scale   Percent Weight Change -1.82 -5.04   Pain Assessment   Pain Assessment 0-10 0-10   Pain Level 0 0   Response to Pain Intervention --  Patient Satisfied   Post-Hemodialysis Assessment   Post-Treatment Procedures --  Blood returned;Catheter capped, clamped and heparinized x 2 ports   Machine Disinfection Process --  Acid/Vinegar Clean;Heat Disinfect   Rinseback Volume (ml) --  400 ml   Total Liters Processed (l/min) --  89 l/min   Dialyzer Clearance --  Heavily streaked   Duration of Treatment (minutes) --  240 minutes   Heparin amount administered during treatment (units) --  0 units   Hemodialysis Intake (ml) --  1100 ml   Hemodialysis Output (ml) --  4100 ml   NET Removed (ml) --  3000 ml   Tolerated Treatment --  Good   Bilateral Breath Sounds Rhonchi Rhonchi   Edema Generalized;Right upper extremity; Left upper extremity;Right lower extremity; Left lower extremity Generalized;Right upper extremity; Left upper extremity;Right lower extremity; Left lower extremity   Edema Generalized +1;Non-pitting Trace   RUE Edema +1 +1   LUE Edema +1;Non-pitting +1   RLE Edema Trace Trace   LLE Edema Trace

## 2018-07-12 NOTE — PROGRESS NOTES
[address]                                                               [address] [facility] [address]                                                       Nicki Hubbard                                                Phone Number: [phone]                                                  Fax Number: [phone]    Nephrology   Progress Note      SUBJECTIVE:  We are following this patient for ESRD complication. On trach. Denies any complaints. No new issues over the night. Doing ok. Denies nausea, vomiting, chest pain, sob or abdominal pain. Scheduled Meds:   meropenem  250 mg Intravenous Q8H    [START ON 2018] fentanyl  1 patch Transdermal Q72H    fentaNYL  1 patch Transdermal Q72H    QUEtiapine  50 mg Oral Nightly    micafungin  100 mg Intravenous Daily    polyethylene glycol  17 g Oral Daily    fentanyl  1 patch Transdermal Q72H    sodium chloride flush  10 mL Intravenous 2 times per day    ipratropium-albuterol  1 ampule Inhalation Q4H    sennosides-docusate sodium  1 tablet Oral Daily    cloNIDine  0.1 mg Oral TID    nystatin   Topical BID    chlorhexidine  15 mL Mouth/Throat BID    doxercalciferol  1 mcg Intravenous Once per day on Tue Thu Sat    darbepoetin emi-polysorbate  100 mcg Intravenous Weekly - Thursday    And    darbepoetin emi-polysorbate  25 mcg Subcutaneous Weekly - Thursday    pantoprazole  40 mg Intravenous Daily     Continuous Infusions:   dextrose       PRN Meds:.diazepam, HYDROmorphone, sodium chloride flush, heparin (porcine), albumin human, dextrose, sodium phosphate IVPB **OR** [DISCONTINUED] sodium phosphate IVPB **OR** [DISCONTINUED] sodium phosphate IVPB **OR** [DISCONTINUED] sodium phosphate IVPB, ondansetron, glucose, glucagon (rDNA), dextrose, acetaminophen, promethazine    Physical Exam:    TEMPERATURE:  Current - Temp: 100.4 °F (38 °C);  Max - Temp  Av °F (37.8 °C)  Min: 97.6 °F (36.4 °C)  Max: 101.5 °F (38.6 °C)  RESPIRATIONS RANGE: Resp  Av.3  Min: 14  Max: 20  PULSE RANGE: Pulse  Av  Min: 86  Max: 103  BLOOD PRESSURE RANGE:  Systolic (00FEM), YZL:089 , Min:132 , NXU:734   ; Diastolic (48RJX), CAX:00, Min:92, Max:103    24HR INTAKE/OUTPUT:      Intake/Output Summary (Last 24 hours) at 18 3910  Last data filed at 18 0401   Gross per 24 hour   Intake              120 ml   Output                0 ml   Net              120 ml       General appearance: alert, appears stated age and cooperative  Head: Normocephalic, without obvious abnormality, atraumatic  Eyes: PERRL, EOM's intact. Nose: Nares normal.  No drainage or sinus tenderness. Neck:s/p trach  Lungs: clear to auscultation bilaterally  Heart: regular rate and rhythm, S1, S2 normal, no murmur, click, rub or gallop  Abdomen: soft, non-tender; bowel sounds normal; no masses,  no organomegaly. distended  Extremities: extremities normal, atraumatic, no cyanosis.  1-2+ edema  Skin: Skin color, texture, turgor normal. No rashes or lesions  Neurologic: Grossly normal     Access Exam:  left TDC    LAB DATA:    CBC:   Lab Results   Component Value Date    WBC 8.7 2018    RBC 2.29 2018    HGB 7.0 2018    HCT 21.1 2018    MCV 92.0 2018    MCH 30.6 2018    MCHC 33.2 2018    RDW 17.0 2018     2018    MPV 8.0 2018     BMP:    Lab Results   Component Value Date     2018    K 3.5 2018    K 5.9 2018    CL 95 2018    CO2 23 2018    BUN 21 2018    CREATININE 3.7 2018    CALCIUM 8.8 2018    GFRAA 18 2018    LABGLOM 15 2018    GLUCOSE 97 2018     Ionized Calcium:  No results found for: IONCA  Magnesium:    Lab Results   Component Value Date    MG 1.70 2018     Phosphorus:    Lab Results   Component Value Date    PHOS 2.9 2018     U/A:    Lab Results   Component Value Date    COLORU BROWN 07/09/2018    PHUR 8.5 07/09/2018    LABCAST 5-10 Waxy 03/19/2017    WBCUA >100 07/09/2018    RBCUA see below 07/09/2018    YEAST Present 03/19/2017    BACTERIA see below 07/09/2018    CLARITYU CLOUDY 07/09/2018    SPECGRAV 1.015 07/09/2018    LEUKOCYTESUR LARGE 07/09/2018    UROBILINOGEN 0.2 07/09/2018    BILIRUBINUR Negative 07/09/2018    BLOODU LARGE 07/09/2018    GLUCOSEU Negative 07/09/2018     Urine Culture:  No components found for: CURINE  Blood Culture:  No components found for: CBLOOD, CFUNGUSBL  Blood Culture from Central Line:  No components found for: CBLOODLN    IMPRESSION/RECOMMENDATIONS:      End stage renal disease: Hemodialysis per [facility]Th[facility] while inpatient (MWF as outpatient)              Left TDC              will get HD today.            Acute GI bleed               - Hemoglobin stable s/p transfusion, KI with HD weekly               - followed by GI   - HGB Trending down. Will replace 2 units of PRBC on HD.     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 On randy + vanco + micofungin              - s/p HD tDC removed on 6/25    - still having ever. Resent blood culture negative so far. Linezolid d/serina    Acute resp failure               - s/p trach 6/27     Hyponatremia              - avoid excessive fluid intake.  Decreased free water to 20 cc/hr                 Hepatitis C     IV drug Abuse     S/p Trach/PEG on 6/27       Sanjuanita Barnes MD,MS

## 2018-07-12 NOTE — PROGRESS NOTES
Assessment completed and documented in flowsheet. A&O tp person, place, and situation. VSS. Respirations regular, normal, unlabored with diminished breath sounds bilaterally. Pt is has trach. Cardiac rate and rhythm-ST.+1 edema noted in RLE and facial.  Abdomen distended. Flexiseal and PEG tube in place. Weakness noted in BLE. Pt denies further needs at this time. Will continue to monitor.  Primitivo Khalil RN

## 2018-07-13 PROBLEM — E43 SEVERE PROTEIN-CALORIE MALNUTRITION (HCC): Status: ACTIVE | Noted: 2018-06-07

## 2018-07-13 LAB
ABO/RH: NORMAL
ALBUMIN SERPL-MCNC: 2.3 G/DL (ref 3.4–5)
ANION GAP SERPL CALCULATED.3IONS-SCNC: 10 MMOL/L (ref 3–16)
ANTIBODY SCREEN: NORMAL
BASOPHILS ABSOLUTE: 0.1 K/UL (ref 0–0.2)
BASOPHILS RELATIVE PERCENT: 0.8 %
BUN BLDV-MCNC: 17 MG/DL (ref 7–20)
CALCIUM SERPL-MCNC: 8.7 MG/DL (ref 8.3–10.6)
CHLORIDE BLD-SCNC: 101 MMOL/L (ref 99–110)
CO2: 25 MMOL/L (ref 21–32)
CREAT SERPL-MCNC: 2.8 MG/DL (ref 0.6–1.1)
CULTURE, RESPIRATORY: NORMAL
EOSINOPHILS ABSOLUTE: 0.1 K/UL (ref 0–0.6)
EOSINOPHILS RELATIVE PERCENT: 0.9 %
GFR AFRICAN AMERICAN: 25
GFR NON-AFRICAN AMERICAN: 20
GLUCOSE BLD-MCNC: 103 MG/DL (ref 70–99)
GLUCOSE BLD-MCNC: 107 MG/DL (ref 70–99)
GLUCOSE BLD-MCNC: 110 MG/DL (ref 70–99)
GLUCOSE BLD-MCNC: 111 MG/DL (ref 70–99)
GRAM STAIN RESULT: NORMAL
GRAM STAIN RESULT: NORMAL
HCG QUALITATIVE: NEGATIVE
HCT VFR BLD CALC: 30 % (ref 36–48)
HEMOGLOBIN: 10 G/DL (ref 12–16)
LYMPHOCYTES ABSOLUTE: 1.3 K/UL (ref 1–5.1)
LYMPHOCYTES RELATIVE PERCENT: 15.6 %
MCH RBC QN AUTO: 29.6 PG (ref 26–34)
MCHC RBC AUTO-ENTMCNC: 33.4 G/DL (ref 31–36)
MCV RBC AUTO: 88.4 FL (ref 80–100)
MONOCYTES ABSOLUTE: 1.3 K/UL (ref 0–1.3)
MONOCYTES RELATIVE PERCENT: 15.3 %
NEUTROPHILS ABSOLUTE: 5.7 K/UL (ref 1.7–7.7)
NEUTROPHILS RELATIVE PERCENT: 67.4 %
PDW BLD-RTO: 17.3 % (ref 12.4–15.4)
PERFORMED ON: ABNORMAL
PHOSPHORUS: 2.5 MG/DL (ref 2.5–4.9)
PLATELET # BLD: 91 K/UL (ref 135–450)
PMV BLD AUTO: 7.8 FL (ref 5–10.5)
POTASSIUM SERPL-SCNC: 3.5 MMOL/L (ref 3.5–5.1)
RBC # BLD: 3.39 M/UL (ref 4–5.2)
SODIUM BLD-SCNC: 136 MMOL/L (ref 136–145)
VANCOMYCIN RANDOM: 23.5 UG/ML
WBC # BLD: 8.5 K/UL (ref 4–11)
WOUND/ABSCESS: NORMAL

## 2018-07-13 PROCEDURE — 86850 RBC ANTIBODY SCREEN: CPT

## 2018-07-13 PROCEDURE — 49020 DRAINAGE ABDOM ABSCESS OPEN: CPT | Performed by: SURGERY

## 2018-07-13 PROCEDURE — 86901 BLOOD TYPING SEROLOGIC RH(D): CPT

## 2018-07-13 PROCEDURE — 80202 ASSAY OF VANCOMYCIN: CPT

## 2018-07-13 PROCEDURE — 0W9G00Z DRAINAGE OF PERITONEAL CAVITY WITH DRAINAGE DEVICE, OPEN APPROACH: ICD-10-PCS | Performed by: SURGERY

## 2018-07-13 PROCEDURE — 99233 SBSQ HOSP IP/OBS HIGH 50: CPT | Performed by: INTERNAL MEDICINE

## 2018-07-13 PROCEDURE — 86900 BLOOD TYPING SEROLOGIC ABO: CPT

## 2018-07-13 PROCEDURE — 85025 COMPLETE CBC W/AUTO DIFF WBC: CPT

## 2018-07-13 PROCEDURE — 87075 CULTR BACTERIA EXCEPT BLOOD: CPT

## 2018-07-13 PROCEDURE — 87205 SMEAR GRAM STAIN: CPT

## 2018-07-13 PROCEDURE — 99232 SBSQ HOSP IP/OBS MODERATE 35: CPT | Performed by: INTERNAL MEDICINE

## 2018-07-13 PROCEDURE — 99253 IP/OBS CNSLTJ NEW/EST LOW 45: CPT | Performed by: SURGERY

## 2018-07-13 PROCEDURE — 80069 RENAL FUNCTION PANEL: CPT

## 2018-07-13 PROCEDURE — 87070 CULTURE OTHR SPECIMN AEROBIC: CPT

## 2018-07-13 PROCEDURE — 9990 CHARGE CONVERSION

## 2018-07-13 PROCEDURE — C9113 INJ PANTOPRAZOLE SODIUM, VIA: HCPCS

## 2018-07-13 PROCEDURE — 94761 N-INVAS EAR/PLS OXIMETRY MLT: CPT

## 2018-07-13 PROCEDURE — 94640 AIRWAY INHALATION TREATMENT: CPT

## 2018-07-13 PROCEDURE — 84703 CHORIONIC GONADOTROPIN ASSAY: CPT

## 2018-07-13 PROCEDURE — 36415 COLL VENOUS BLD VENIPUNCTURE: CPT

## 2018-07-13 PROCEDURE — 36592 COLLECT BLOOD FROM PICC: CPT

## 2018-07-13 RX ORDER — SODIUM CHLORIDE 9 MG/ML
INJECTION, SOLUTION INTRAVENOUS CONTINUOUS
Status: DISCONTINUED | OUTPATIENT
Start: 2018-07-13 | End: 2018-07-15

## 2018-07-13 RX ORDER — ONDANSETRON 2 MG/ML
4 INJECTION INTRAMUSCULAR; INTRAVENOUS
Status: DISCONTINUED | OUTPATIENT
Start: 2018-07-13 | End: 2018-07-13 | Stop reason: HOSPADM

## 2018-07-13 RX ORDER — OXYCODONE HCL 5 MG/5 ML
5 SOLUTION, ORAL ORAL EVERY 4 HOURS PRN
Status: DISCONTINUED | OUTPATIENT
Start: 2018-07-13 | End: 2018-07-15

## 2018-07-13 RX ORDER — DIPHENHYDRAMINE HYDROCHLORIDE 50 MG/ML
12.5 INJECTION INTRAMUSCULAR; INTRAVENOUS
Status: DISCONTINUED | OUTPATIENT
Start: 2018-07-13 | End: 2018-07-13 | Stop reason: HOSPADM

## 2018-07-13 RX ORDER — HYDRALAZINE HYDROCHLORIDE 20 MG/ML
5 INJECTION INTRAMUSCULAR; INTRAVENOUS
Status: DISCONTINUED | OUTPATIENT
Start: 2018-07-13 | End: 2018-07-13 | Stop reason: HOSPADM

## 2018-07-13 RX ORDER — LABETALOL HYDROCHLORIDE 5 MG/ML
5 INJECTION, SOLUTION INTRAVENOUS EVERY 10 MIN PRN
Status: DISCONTINUED | OUTPATIENT
Start: 2018-07-13 | End: 2018-07-13 | Stop reason: HOSPADM

## 2018-07-13 RX ORDER — SODIUM CHLORIDE 9 MG/ML
INJECTION, SOLUTION INTRAVENOUS
Status: DISPENSED
Start: 2018-07-13 | End: 2018-07-13

## 2018-07-13 RX ADMIN — IPRATROPIUM BROMIDE AND ALBUTEROL SULFATE 1 AMPULE: .5; 3 SOLUTION RESPIRATORY (INHALATION) at 03:28

## 2018-07-13 RX ADMIN — ONDANSETRON 4 MG: 2 INJECTION, SOLUTION INTRAMUSCULAR; INTRAVENOUS at 06:58

## 2018-07-13 RX ADMIN — NYSTATIN: 100000 CREAM TOPICAL at 09:11

## 2018-07-13 RX ADMIN — MEROPENEM 250 MG: 500 INJECTION, POWDER, FOR SOLUTION INTRAVENOUS at 14:10

## 2018-07-13 RX ADMIN — HYDROCODONE BITARTRATE AND ACETAMINOPHEN 1 TABLET: 5; 325 TABLET ORAL at 06:19

## 2018-07-13 RX ADMIN — PROMETHAZINE HYDROCHLORIDE 25 MG: 25 TABLET ORAL at 20:37

## 2018-07-13 RX ADMIN — CLONIDINE HYDROCHLORIDE 0.1 MG: 0.1 TABLET ORAL at 09:10

## 2018-07-13 RX ADMIN — Medication 10 ML: at 09:10

## 2018-07-13 RX ADMIN — OXYCODONE HYDROCHLORIDE 5 MG: 5 SOLUTION ORAL at 09:10

## 2018-07-13 RX ADMIN — Medication 10 ML: at 20:38

## 2018-07-13 RX ADMIN — OXYCODONE HYDROCHLORIDE 5 MG: 5 SOLUTION ORAL at 21:53

## 2018-07-13 RX ADMIN — Medication 0.5 MG: at 12:53

## 2018-07-13 RX ADMIN — ACETAMINOPHEN 650 MG: 325 TABLET ORAL at 19:41

## 2018-07-13 RX ADMIN — CHLORHEXIDINE GLUCONATE 0.12% ORAL RINSE 15 ML: 1.2 LIQUID ORAL at 20:38

## 2018-07-13 RX ADMIN — HYDROMORPHONE HYDROCHLORIDE 1 MG: 1 INJECTION, SOLUTION INTRAMUSCULAR; INTRAVENOUS; SUBCUTANEOUS at 14:39

## 2018-07-13 RX ADMIN — CLONIDINE HYDROCHLORIDE 0.1 MG: 0.1 TABLET ORAL at 14:10

## 2018-07-13 RX ADMIN — IPRATROPIUM BROMIDE AND ALBUTEROL SULFATE 1 AMPULE: .5; 3 SOLUTION RESPIRATORY (INHALATION) at 22:55

## 2018-07-13 RX ADMIN — HYDROMORPHONE HYDROCHLORIDE 1 MG: 1 INJECTION, SOLUTION INTRAMUSCULAR; INTRAVENOUS; SUBCUTANEOUS at 18:43

## 2018-07-13 RX ADMIN — Medication 0.25 MG: at 13:03

## 2018-07-13 RX ADMIN — Medication 0.5 MG: at 12:35

## 2018-07-13 RX ADMIN — MICAFUNGIN SODIUM 100 MG: 20 INJECTION, POWDER, LYOPHILIZED, FOR SOLUTION INTRAVENOUS at 09:10

## 2018-07-13 RX ADMIN — OXYCODONE HYDROCHLORIDE 5 MG: 5 SOLUTION ORAL at 17:45

## 2018-07-13 RX ADMIN — IPRATROPIUM BROMIDE AND ALBUTEROL SULFATE 1 AMPULE: .5; 3 SOLUTION RESPIRATORY (INHALATION) at 15:34

## 2018-07-13 RX ADMIN — PROMETHAZINE HYDROCHLORIDE 25 MG: 25 TABLET ORAL at 09:10

## 2018-07-13 RX ADMIN — MEROPENEM 250 MG: 500 INJECTION, POWDER, FOR SOLUTION INTRAVENOUS at 20:38

## 2018-07-13 RX ADMIN — Medication 0.25 MG: at 13:08

## 2018-07-13 RX ADMIN — IPRATROPIUM BROMIDE AND ALBUTEROL SULFATE 1 AMPULE: .5; 3 SOLUTION RESPIRATORY (INHALATION) at 07:51

## 2018-07-13 RX ADMIN — Medication 0.25 MG: at 13:13

## 2018-07-13 RX ADMIN — IPRATROPIUM BROMIDE AND ALBUTEROL SULFATE 1 AMPULE: .5; 3 SOLUTION RESPIRATORY (INHALATION) at 19:14

## 2018-07-13 RX ADMIN — CHLORHEXIDINE GLUCONATE 0.12% ORAL RINSE 15 ML: 1.2 LIQUID ORAL at 09:10

## 2018-07-13 RX ADMIN — MEROPENEM 250 MG: 500 INJECTION, POWDER, FOR SOLUTION INTRAVENOUS at 05:16

## 2018-07-13 RX ADMIN — PANTOPRAZOLE SODIUM 40 MG: 40 INJECTION, POWDER, FOR SOLUTION INTRAVENOUS at 09:10

## 2018-07-13 RX ADMIN — QUETIAPINE FUMARATE 50 MG: 25 TABLET ORAL at 20:37

## 2018-07-13 RX ADMIN — DIAZEPAM 2 MG: 2 TABLET ORAL at 05:16

## 2018-07-13 RX ADMIN — SODIUM CHLORIDE 1000 ML: 9 INJECTION, SOLUTION INTRAVENOUS at 11:26

## 2018-07-13 RX ADMIN — Medication 0.5 MG: at 12:40

## 2018-07-13 RX ADMIN — NYSTATIN: 100000 CREAM TOPICAL at 20:38

## 2018-07-13 RX ADMIN — PROMETHAZINE HYDROCHLORIDE 25 MG: 25 TABLET ORAL at 14:39

## 2018-07-13 RX ADMIN — Medication 0.5 MG: at 12:48

## 2018-07-13 RX ADMIN — CLONIDINE HYDROCHLORIDE 0.1 MG: 0.1 TABLET ORAL at 20:37

## 2018-07-13 ASSESSMENT — PAIN DESCRIPTION - ONSET
ONSET: ON-GOING

## 2018-07-13 ASSESSMENT — PAIN SCALES - GENERAL
PAINLEVEL_OUTOF10: 10
PAINLEVEL_OUTOF10: 4
PAINLEVEL_OUTOF10: 10
PAINLEVEL_OUTOF10: 7
PAINLEVEL_OUTOF10: 5
PAINLEVEL_OUTOF10: 6
PAINLEVEL_OUTOF10: 10
PAINLEVEL_OUTOF10: 9
PAINLEVEL_OUTOF10: 9
PAINLEVEL_OUTOF10: 6
PAINLEVEL_OUTOF10: 6
PAINLEVEL_OUTOF10: 9
PAINLEVEL_OUTOF10: 6
PAINLEVEL_OUTOF10: 7
PAINLEVEL_OUTOF10: 10
PAINLEVEL_OUTOF10: 0
PAINLEVEL_OUTOF10: 7

## 2018-07-13 ASSESSMENT — PAIN DESCRIPTION - PROGRESSION
CLINICAL_PROGRESSION: GRADUALLY WORSENING

## 2018-07-13 ASSESSMENT — PAIN DESCRIPTION - LOCATION
LOCATION: ABDOMEN

## 2018-07-13 ASSESSMENT — PAIN DESCRIPTION - PAIN TYPE
TYPE: ACUTE PAIN

## 2018-07-13 ASSESSMENT — PAIN DESCRIPTION - DESCRIPTORS
DESCRIPTORS: CONSTANT
DESCRIPTORS: CONSTANT
DESCRIPTORS: CRAMPING;ACHING;CONSTANT

## 2018-07-13 ASSESSMENT — PAIN DESCRIPTION - ORIENTATION
ORIENTATION: RIGHT;LEFT

## 2018-07-13 ASSESSMENT — PAIN DESCRIPTION - FREQUENCY
FREQUENCY: CONTINUOUS

## 2018-07-13 NOTE — ANESTHESIA PRE-OP
(MYCAMINE) 100 mg in sodium chloride 0.9 % 100 mL IVPB (mini-bag)  100 mg Intravenous Daily Iris Cabrera  mL/hr at 07/13/18 0910 100 mg at 07/13/18 0910    polyethylene glycol (GLYCOLAX) packet 17 g  17 g Oral Daily Chester Eden MD   17 g at 07/12/18 0827    fentanyl (DURAGESIC) patch REMOVAL  1 patch Transdermal Q72H Noel Cartagena MD   1 patch at 07/11/18 1200    sodium chloride flush 0.9 % injection 10 mL  10 mL Intravenous 2 times per day Licha Schuster MD   10 mL at 07/13/18 0910    sodium chloride flush 0.9 % injection 10 mL  10 mL Intravenous PRN Licha Schuster MD   10 mL at 07/11/18 0138    ipratropium-albuterol (DUONEB) nebulizer solution 1 ampule  1 ampule Inhalation Q4H Adolfo Ashby MD   1 ampule at 07/13/18 0751    heparin (porcine) injection 3,200 Units  3,200 Units Intercatheter PRN Stefanie Gray MD   3,200 Units at 07/12/18 1328    sennosides-docusate sodium (SENOKOT-S) 8.6-50 MG tablet 1 tablet  1 tablet Oral Daily Licha Schuster MD   1 tablet at 07/12/18 1262    cloNIDine (CATAPRES) tablet 0.1 mg  0.1 mg Oral TID Licha Schuster MD   0.1 mg at 07/13/18 5833    nystatin (MYCOSTATIN) cream   Topical BID Krystle Ramirez MD        chlorhexidine (PERIDEX) 0.12 % solution 15 mL  15 mL Mouth/Throat BID Krystle Ramirez MD   15 mL at 07/13/18 0910    doxercalciferol (HECTOROL) injection 1 mcg  1 mcg Intravenous Once per day on Tue Thu Sat Abril Gauthier MD   1 mcg at 07/12/18 1232    darbepoetin emi-polysorbate (ARANESP) injection 100 mcg  100 mcg Intravenous Weekly - Thursday Adolfo Ashby MD   100 mcg at 07/12/18 1231    And    darbepoetin emi-polysorbate (ARANESP) injection 25 mcg  25 mcg Subcutaneous Weekly - Thursday Adolfo Ashby MD   25 mcg at 07/12/18 1232    albumin human 25 % solution 12.5 g  12.5 g Intravenous PRN Handy Umanzor MD   12.5 g at 07/05/18 1313    pantoprazole (PROTONIX) injection 40 mg  40 mg Intravenous Daily Anjelica Wallace MD   40 mg at 07/13/18 0910    GI bleed K92.2    Sepsis due to Streptococcus pneumoniae ([facility]) A40.3    Moderate protein-calorie malnutrition ([facility]) E44.0    PEA (Pulseless electrical activity) ([facility]) I46.9    DIC (disseminated intravascular coagulation) ([facility] [address] D65    Acute respiratory failure with hypoxia ([facility]) J96.01    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    Bacteremia R78.81    Mucus plugging of bronchi J98.09    Cavitary lung disease J98.4    Fever R50.9    Persistent fever R50.9    MRSA bacteremia R78.81    Hyponatremia E87.1    Acute blood loss anemia D62    Ileus ([facility]) K56.7    Other ascites R18.8    Splenic infarct D73.5    Intra-abdominal abscess ([facility]) K65.1       Past Medical History:        Diagnosis Date    Asthma     Cardiac arrest ([facility] [address] 06/08/2018    Depression     Endocarditis     ESRD (end stage renal disease) on dialysis ([facility])     M/W/F    Hepatitis C antibody positive in blood 03/22/2017    History of blood transfusion     Hypertension     IV drug abuse     Liver disease     MRSA (methicillin resistant staph aureus) culture positive 6/5/18, 03/12/2017    +blood culture     MRSA (methicillin resistant staph aureus) culture positive 6/12/18, 07/31/2017    tracheal aspirate, nasal screen    PTSD (post-traumatic stress disorder)     Seizures ([facility] [address]     8/8/2017       Past Surgical History:        Procedure Laterality Date    TONSILLECTOMY      UPPER GASTROINTESTINAL ENDOSCOPY  06/06/2018    Gastric Angiodysplasia    UPPER GASTROINTESTINAL ENDOSCOPY  06/27/2018    Peg Placement       Social History:    Social History   Substance Use Topics    Smoking status: Current Every Day Smoker     Packs/day: 0.25     Years: 4.00     Types: Cigarettes    Smokeless tobacco: Never Used      Comment: pt states that she only smokes 2 cigarettes per day    Alcohol use No                                Ready to quit: Not Answered  Counseling given: Not Answered      Vital Signs (Current):   Vitals:    07/13/18 4400 07/13/18 0454 answered the patient agrees with the plan)  Induction: intravenous. Anesthetic plan and risks discussed with patient. Plan discussed with CRNA.                   Maximo Rushing MD   7/13/2018

## 2018-07-13 NOTE — CONSULTS
NG/GT:1400]  Out: 4125 [Urine:25]  No intake/output data recorded. CONSTITUTIONAL:  alert, no apparent distress and normal weight  EYES:  PERRL, sclera clear  ENT:  Normocepalic,atraumatic, without obvious abnormality  NECK:  supple, symmetrical, trachea midline  LUNGS: Resp effort easy and unlabored-has trach  CARDIOVASCULAR:  NO JVD, regular rate and rhythm  ABDOMEN:  hypoactive bowel sounds, soft, distended, tenderness noted in the right lower quadrant, voluntary guarding absent, no masses palpated  MUSCULOSKELETAL: No clubbing or cyanosis, 1+ pitting edema lower extremities  NEUROLOGIC:  Mental Status Exam:  Level of Alertness:   awake  SKIN:  Warm and dry    DATA:    CBC:   Recent Labs      07/11/18 0645 07/12/18 0545  07/13/18   0515   WBC  8.8  8.7  8.5   HGB  7.5*  7.0*  10.0*   HCT  22.5*  21.1*  30.0*   PLT  113*  102*  91*     BMP:    Recent Labs      07/11/18 0645 07/12/18 0545  07/13/18 0515   NA  133*  131*  136   K  3.9  3.5  3.5   CL  98*  95*  101   CO2  23  23  25   BUN  12  21*  17   CREATININE  2.9*  3.7*  2.8*   GLUCOSE  109*  97  110*     Hepatic: No results for input(s): AST, ALT, ALB, BILITOT, ALKPHOS in the last 72 hours. Mag:    No results for input(s): MG in the last 72 hours. Phos:     Recent Labs      07/11/18 0645 07/12/18 0545 07/13/18 0515   PHOS  2.5  2.9  2.5      INR: No results for input(s): INR in the last 72 hours. Radiology Review: Images personally reviewed by me. CT shows large intra-abdominal fluid collection. IR US from drainage procedure documents internal septations despite the fact that it looks contiguous on CT      IMPRESSION/RECOMMENDATIONS:    Intra-abdominal abscess. Plan for exploratory laparotomy with drainage of intra-abdominal abscess. I explained the procedure including risks, benefits, and alternatives. Questions were answered and the patient agrees to proceed.      [address] [facility] Surgery  [address]

## 2018-07-13 NOTE — PROGRESS NOTES
[address]                                                               [address] [facility] [address]                                                       Nicki Hubbard                                                Phone Number: [phone]                                                  Fax Number: [phone]    Nephrology   Progress Note      SUBJECTIVE:  We are following this patient for ESRD complication. On trach. Denies any complaints. No new issues over the night. Doing ok. Denies nausea, vomiting, chest pain, sob or abdominal pain. Scheduled Meds:   meropenem  250 mg Intravenous Q8H    fentanyl  1 patch Transdermal Q72H    fentaNYL  1 patch Transdermal Q72H    QUEtiapine  50 mg Oral Nightly    micafungin  100 mg Intravenous Daily    polyethylene glycol  17 g Oral Daily    fentanyl  1 patch Transdermal Q72H    sodium chloride flush  10 mL Intravenous 2 times per day    ipratropium-albuterol  1 ampule Inhalation Q4H    sennosides-docusate sodium  1 tablet Oral Daily    cloNIDine  0.1 mg Oral TID    nystatin   Topical BID    chlorhexidine  15 mL Mouth/Throat BID    doxercalciferol  1 mcg Intravenous Once per day on Tue Thu Sat    darbepoetin emi-polysorbate  100 mcg Intravenous Weekly - Thursday    And    darbepoetin emi-polysorbate  25 mcg Subcutaneous Weekly - Thursday    pantoprazole  40 mg Intravenous Daily     Continuous Infusions:   dextrose       PRN Meds:.oxyCODONE, diazepam, sodium chloride flush, heparin (porcine), albumin human, dextrose, sodium phosphate IVPB **OR** [DISCONTINUED] sodium phosphate IVPB **OR** [DISCONTINUED] sodium phosphate IVPB **OR** [DISCONTINUED] sodium phosphate IVPB, ondansetron, glucose, glucagon (rDNA), dextrose, acetaminophen, promethazine    Physical Exam:    TEMPERATURE:  Current - Temp: 98.5 °F (36.9 °C);  Max - Temp  Av.7 °F (37.6 °C)  Min: 97.3 °F (36.3 °C)  Max: 100.9 °F (38.3 °C)  RESPIRATIONS RANGE: Resp  Av.3  Min: 14  Max: 18  PULSE RANGE: Pulse  Av.6  Min: 84  Max: 112  BLOOD PRESSURE RANGE:  Systolic (68RMX), BELIA:897 , Min:138 , IUQ:569   ; Diastolic (66MYV), QBC:005, Min:91, Max:115    24HR INTAKE/OUTPUT:      Intake/Output Summary (Last 24 hours) at 18 4013  Last data filed at 18 0524   Gross per 24 hour   Intake             2700 ml   Output             4125 ml   Net            -1425 ml       General appearance: alert, appears stated age and cooperative  Head: Normocephalic, without obvious abnormality, atraumatic  Eyes: PERRL, EOM's intact. Nose: Nares normal.  No drainage or sinus tenderness. Neck:s/p trach  Lungs: clear to auscultation bilaterally  Heart: regular rate and rhythm, S1, S2 normal, holo systolic murmur. Abdomen: soft, tenderness +nt; bowel sounds normal; no masses,  no organomegaly. distended  Extremities: extremities normal, atraumatic, no cyanosis.  1-2+ edema  Skin: Skin color, texture, turgor normal. No rashes or lesions  Neurologic: Grossly normal     Access Exam:  left TDC    LAB DATA:    CBC:   Lab Results   Component Value Date    WBC 8.5 2018    RBC 3.39 2018    HGB 10.0 2018    HCT 30.0 2018    MCV 88.4 2018    MCH 29.6 2018    MCHC 33.4 2018    RDW 17.3 2018    PLT 91 2018    MPV 7.8 2018     BMP:    Lab Results   Component Value Date     2018    K 3.5 2018    K 5.9 2018     2018    CO2 25 2018    BUN 17 2018    CREATININE 2.8 2018    CALCIUM 8.7 2018    GFRAA 25 2018    LABGLOM 20 2018    GLUCOSE 110 2018     Ionized Calcium:  No results found for: IONCA  Magnesium:    Lab Results   Component Value Date    MG 1.70 2018     Phosphorus:    Lab Results   Component Value Date    PHOS 2.5 2018     U/A:    Lab Results   Component Value Date    COLORU BROWN 2018

## 2018-07-13 NOTE — PLAN OF CARE
Problem: Falls - Risk of:  Goal: Will remain free from falls  Will remain free from falls   Outcome: Ongoing  Bed in lowest position. Side rails x3. Nonskid socks on when out of bed. Room free of clutter. Call light in reach. Video monitor and bed alarm on for safety. Problem: Risk for Impaired Skin Integrity  Goal: Tissue integrity - skin and mucous membranes  Structural intactness and normal physiological function of skin and  mucous membranes. Outcome: Ongoing  Foam dressing to coccyx changed. Skin care completed after each incontinent episode and PRN. Pt repositioned Q2H.

## 2018-07-13 NOTE — PROGRESS NOTES
Pulmonary Progress Note    CC: Respiratory failure    Subjective:   Post paracentesis  Appears comfortable  Stable O2  Fever improving  Complex peritoneal fluid with thick septation on ultrasound        Intake/Output Summary (Last 24 hours) at 07/13/18 0721  Last data filed at 07/13/18 0524   Gross per 24 hour   Intake             2700 ml   Output             4125 ml   Net            -1425 ml       Exam:   Blood pressure (!) 175/115, pulse 96, temperature 98.5 °F (36.9 °C), temperature source Oral, resp. rate 18, height 5' 5\" (1.651 m), weight 124 lb 9 oz (56.5 kg), SpO2 100 %, not currently breastfeeding.' on 28%  Gen: No distress. Ill-appearing and cachectic  Eyes: PERRL. No sclera icterus. No conjunctival injection. ENT: No discharge. Pharynx clear. Trach in place  Neck: Trachea midline. No obvious mass. Resp: No accessory muscle use. No crackles. No wheezes. Few rhonchi. No dullness on percussion. CV: Tachycardia. Regular rhythm. No murmur or rub. No edema. GI: Non-tender. +Distention. No hernia. Skin: Warm and dry. No nodule on exposed extremities. Lymph: No cervical LAD. No supraclavicular LAD. M/S: No cyanosis. No joint deformity. No clubbing. Muscle wasting  Neuro: Awake. Alert. Moves all four extremities.    Psych: No agitation      Scheduled Meds:   meropenem  250 mg Intravenous Q8H    fentanyl  1 patch Transdermal Q72H    fentaNYL  1 patch Transdermal Q72H    QUEtiapine  50 mg Oral Nightly    micafungin  100 mg Intravenous Daily    polyethylene glycol  17 g Oral Daily    fentanyl  1 patch Transdermal Q72H    sodium chloride flush  10 mL Intravenous 2 times per day    ipratropium-albuterol  1 ampule Inhalation Q4H    sennosides-docusate sodium  1 tablet Oral Daily    cloNIDine  0.1 mg Oral TID    nystatin   Topical BID    chlorhexidine  15 mL Mouth/Throat BID    doxercalciferol  1 mcg Intravenous Once per day on Tue Thu Sat    darbepoetin emi-polysorbate  100 mcg Intravenous Weekly - Thursday    And    darbepoetin emi-polysorbate  25 mcg Subcutaneous Weekly - Thursday    pantoprazole  40 mg Intravenous Daily     Continuous Infusions:   dextrose       PRN Meds:  HYDROcodone 5 mg - acetaminophen, diazepam, sodium chloride flush, heparin (porcine), albumin human, dextrose, sodium phosphate IVPB **OR** [DISCONTINUED] sodium phosphate IVPB **OR** [DISCONTINUED] sodium phosphate IVPB **OR** [DISCONTINUED] sodium phosphate IVPB, ondansetron, glucose, glucagon (rDNA), dextrose, acetaminophen, promethazine    Labs:  CBC:   Recent Labs      07/11/18 0645 07/12/18 0545  07/13/18   0515   WBC  8.8  8.7  8.5   HGB  7.5*  7.0*  10.0*   HCT  22.5*  21.1*  30.0*   MCV  91.7  92.0  88.4   PLT  113*  102*  91*     BMP:   Recent Labs      07/11/18 0645 07/12/18 0545  07/13/18   0515   NA  133*  131*  136   K  3.9  3.5  3.5   CL  98*  95*  101   CO2  23  23  25   PHOS  2.5  2.9  2.5   BUN  12  21*  17   CREATININE  2.9*  3.7*  2.8*     LIVER PROFILE:   No results for input(s): AST, ALT, LIPASE, BILIDIR, BILITOT, ALKPHOS in the last 72 hours. Invalid input(s): AMYLASE,  ALB  PT/INR: No results for input(s): PROTIME, INR in the last 72 hours. APTT: No results for input(s): APTT in the last 72 hours. UA:  No results for input(s): NITRITE, COLORU, PHUR, LABCAST, WBCUA, RBCUA, MUCUS, TRICHOMONAS, YEAST, BACTERIA, CLARITYU, SPECGRAV, LEUKOCYTESUR, UROBILINOGEN, BILIRUBINUR, BLOODU, GLUCOSEU, AMORPHOUS in the last 72 hours. Invalid input(s): KETONESU  No results for input(s): PHART, AOY6GCI, PO2ART in the last 72 hours.   Cultures:   Blood culture 7/10 negative  Blood culture 7/7 negative  Repeat Respiratory 7/11 culture Normal respiratory gino  Peritoneal fluid 7/12 gram-positive cocci      Films:  CXR 7/9 reviewed by me and showed low lung volume worsening right basilar airspace disease    CT chest/abdomen 7/11/18  Overall improved appearance of the chest with decreasing areas of cavitation. Small pleural effusions. Large amount free fluid within the abdomen and pelvis. Heterogeneous appearance of the spleen is again seen and may be related to infection/abscesses, given the process within the chest.  Alternatively, these may represent areas of infarct.    Low-density area within the left hepatic lobe likely is a similar process to the spleen and appears improved  from the previous study. ASSESSMENT:  · Acute hypoxemic respiratory failure   · Fever with no leukocytosis  · Peritonitis- complex peritoneal fluid with thick septation throughout the abdomen on ultrasound. Paracentesis 7/12/18 510 mL removed. Fluid positive gram-positive cocci.   Suspecting infected intra-abdominal bleed  · Probable splenic infarct   · TV infectious endocarditis  · Polymicrobial bacteremia: MRSA, streptococcal pneumonia and Enterobacter  · Cavitary pneumonia due to septic emboli  · Acute blood loss anemia - angiodysplasia post catheterization  · ESRD on HD  · IV drug abuse, hepatitis C  · S/P Trach/PEG 6/27/18  · HONEY PICC placed 7/6/18          PLAN:  · Supplemental oxygen to maintain SaO2 >92%; wean as tolerated  · Vancomycin and meropenem and micafungin per ID  · Plan for exploratory laparotomy with drainage per surgery  · Follow-up cultures  · PTOT  · Discussed with internal medicine and surgery

## 2018-07-13 NOTE — PROGRESS NOTES
Internal Medicine Progress Note      Interval history     Date of admission 18     34year old white female who was admitted for GI bleed. Had an EGD   BC positive for strep pneumonia, staph aureus and gram-negative salazar. Echocardiogram showed tricuspid valve endocarditis. She's had this before. History of IV drug use  CT chest showed septic emboli and possible splenic infarct. - Transferred out of ICU on 18  - Transferred back to ICU on 18 for PEA arrest, intubated,  resuscitated with fluids  - Started on CRRT for acute renal failure, on 18  - Weaned off pressors on 18, and extubated  - Reintubated   - s.p bronch    - Off CRRT and started on hemodialysis  18  - Has been on antibiotics vancomycin and cefepime and Zyvox  - Patient remains on mechanical ventilation, continued fevers, on dialysis. - Followed by intensivist, ID, nephrologist.  - S/P Trach and PEG on   - TDC removed 18 due to persistent fevers , femoral vascath placed for HD . - TDC placed. Vas cath  pulled  Out.    -  -  Stable now on trach collar. Still with fevers upto 101 last night   Mentation stable    .  Improved fevers  - s.p ascitic tap with gram +ve cocci  - for laparoscopic eval today      Subjective    Pt seen up in bed,  No sob, abd remains less distended but increasing  pain today in right quadrant  Denies any chest pain   No significant diarrhea         IV:   dextrose         Vitals:  Temp  Av.7 °F (37.6 °C)  Min: 97.3 °F (36.3 °C)  Max: 100.9 °F (38.3 °C)  Pulse  Av.6  Min: 84  Max: 112  BP  Min: 138/92  Max: 190/114  SpO2  Av.8 %  Min: 96 %  Max: 100 %  FiO2   Av %  Min: 28 %  Max: 28 %  Patient Vitals for the past 4 hrs:   BP Temp Temp src Pulse Resp SpO2 Weight   18 0659 (!) 175/115 98.5 °F (36.9 °C) Oral 96 18 100 % -   18 0454 (!) 143/97 97.3 °F (36.3 °C) Tympanic 84 16 99 % 124 lb 9 oz (56.5 kg)       CVP: CVP (Mean): 16 leaflet,   Moderate-to-severe tricuspid regurgitation.   Systolic pulmonary artery pressure (SPAP) estimated at 55 mmHg (right atrial   pressure 8 mmHg), consistent with moderate pulmonary hypertension.  Lurlean Mario is a small left pleural effusion. Cultures:  Respiratory cultures -6/12/18 - MRSA . Repeat culture 6/20/18 MRSA     Blood culture: ON ADMISSION - 6/5/18 POSITIVE S. aureus, S pneumonia and Enterobacter    Recent blood cultures negative      abd fluid cultures - pending. Gram +ve cocci       Radiology    US GUIDED PARACENTESIS   Final Result   1. Complex peritoneal fluid with thick septations throughout the abdomen in a   pattern that would suggest underlying peritonitis. 2. Fluid is not amenable to complete drainage by paracentesis, nor by   CT-guided drainage. Based upon results of fluid analysis, treatment may   require further antibiotics or surgical consultation. 3. Successful collection of approximately 510 mL of thin, dark brown fluid   sent to the lab for culture and analysis. CT Chest WO Contrast   Final Result   Overall improved appearance of the chest with decreasing areas of cavitation. Small pleural effusions. Large amount free fluid within the abdomen and pelvis. Heterogeneous appearance of the spleen is again seen and may be related to   infection/abscesses, given the process within the chest.  Alternatively,   these may represent areas of infarct. Low-density area within the left   hepatic lobe likely is a similar process to the spleen and appears improved   from the previous study. CT ABDOMEN PELVIS WO CONTRAST Additional Contrast? Oral   Final Result   Overall improved appearance of the chest with decreasing areas of cavitation. Small pleural effusions. Large amount free fluid within the abdomen and pelvis.       Heterogeneous appearance of the spleen is again seen and may be related to   infection/abscesses, given the process within the patient's nurse Eduardo Byrd at 07/06/2018 at 12:47   p.m. XR ABDOMEN (KUB) (SINGLE AP VIEW)   Final Result   Gaseous prominence of bowel some of which appears to be colonic, but some may   be small bowel. Overall bowel gas pattern is nonspecific, but not   definitively obstructive, may reflect ileus. Progress radiographs may be of   further diagnostic aid, as indicated. XR CHEST PORTABLE   Final Result   1. Line placement without complicating feature. 2. Worsening pulmonary edema and/or superimposed pneumonia. XR CHEST PORTABLE   Final Result   NG tube tip projects over the body of the stomach. XR Acute Abd Series Chest 1 VW   Final Result   Gaseous bowel distention greater in the central small bowel most likely ileus. XR CHEST PORTABLE   Final Result   Stable chest         XR ABDOMEN (KUB) (SINGLE AP VIEW)   Final Result   Nondiagnostic due to paucity of small bowel gas         XR ABDOMEN (KUB) (SINGLE AP VIEW)   Final Result   Multiple mildly dilated loops of small bowel. Findings may represent ileus   or partial small bowel obstruction. XR CHEST PORTABLE   Final Result   Stable positioning of left central venous catheter. No pneumothorax. Slightly improved aeration with persistent ground-glass opacities in the left   mid lung. XR CHEST PORTABLE   Final Result   Retraction of the left internal jugular CVC with its tip in the expected   region of the brachiocephalic vein      No significant change in the cavitary nodules and bibasilar airspace disease         XR CHEST PORTABLE   Final Result   Tracheostomy tube placement. Left basilar airspace disease most consistent with atelectasis         IR NONTUNNELED VASCULAR CATHETER   Final Result   Successful ultrasound and fluoroscopy guided non-tunneled right femoral vein   temporary dialysis catheter placement. XR CHEST PORTABLE   Final Result   Improving bibasilar airspace disease.   Stable tubes and support devices. 2. Stable cardiopulmonary status including bilateral airspace opacities. XR CHEST PORTABLE   Final Result   Stable life support devices. No acute interval change regarding bilateral   airspace disease. XR CHEST PORTABLE   Final Result   Stable life support device positioning. No substantial change in multifocal consolidative cavitary airspace disease. XR CHEST PORTABLE   Final Result   Stable chest         XR CHEST PORTABLE   Final Result   Improvement in focus of airspace disease in the left mid lung. Persistent   multifocal cavitary lesions compatible with septic emboli         XR CHEST PORTABLE   Final Result   No significant interval change. CT ABDOMEN PELVIS WO CONTRAST Additional Contrast? Radiologist Recommendation   Final Result   Development of moderate diffuse ascites since the prior study. Mosaic   appearance of the spleen either due to multiple splenic infarcts or possible   splenic abscesses. Question of perihepatic and subhepatic adhesions. Chronic gallbladder disease. Bibasilar pneumonias and pulmonary nodules   consistent with septic emboli. Mild anasarca. RECOMMENDATIONS:   NG tube should be pulled back about 4 cm since it is pressing against the   anterior stomach wall. Gallbladder ultrasound suggested for evaluation of gallbladder disease. Diagnostic Ultrasound-guided paracentesis may be helpful. CT Chest WO Contrast   Final Result   Multiple scattered cavitary and solid nodules scattered throughout the lungs   suspected to be from septic emboli. The emboli may be from recurrent   endocarditis, IV drug abuse, or infected DVT. Scattered focal pneumonia in   the lingula, right middle lobe, and right lower lobe. Trace bilateral   pleural effusions. Tip of the endotracheal tube lying near the tee. Moderate ascites seen in the upper abdomen. Possible multiple splenic   infarcts or abscesses. Please refer to the dictation of the CT scan of the   abdomen and pelvis. RECOMMENDATIONS:   Endotracheal tube should be pulled back 1-2 cm. CT Head WO Contrast   Final Result   No acute intracranial abnormality. XR CHEST PORTABLE   Final Result   Moderate lingular opacity favored to be pneumonia. Moderate opacity in the   right lung base probably also pneumonia. Development of multiple pulmonary   nodules presumed to be of an infectious or least inflammatory etiology. Low   lung volumes. Some improvement in the appearance of the chest since   yesterday. RECOMMENDATION:   CT scan of the chest with contrast recommended for further evaluation. XR ABDOMEN (KUB) (SINGLE AP VIEW)   Final Result   Probable mild nonobstructive ileus. Moderate to large amount of stool in the   left colon. XR CHEST PORTABLE   Final Result   Improving bilateral airspace disease. XR CHEST PORTABLE   Final Result   Worsening bilateral airspace disease, probably pneumonia. XR CHEST PORTABLE   Final Result   Multifocal pneumonia, unchanged. XR CHEST 1 VW   Final Result   No change other than repositioning of the endotracheal tube. XR CHEST PORTABLE   Final Result   The endotracheal tube needs retracted approximately 3 cm. Satisfactory position right IJ tunnel catheter and left central venous   catheter. Increased size of the multifocal nodular lung opacities presumed multifocal   pneumonia. Findings were discussed with ICU nurse caring for the patient at 2:21 pm on   6/8/2018. XR CHEST PORTABLE   Final Result   Multifocal pneumonia. XR FOOT RIGHT (2 VIEWS)   Final Result   Lucency through the medial sesamoid most consistent with bipartite medial   sesamoid in the absence of history of trauma. This can be associated with   pain. CTA ABDOMEN PELVIS W WO CONTRAST   Final Result   No active gastrointestinal hemorrhage is identified.

## 2018-07-13 NOTE — PROGRESS NOTES
ID Follow-up NOTE    CC: tricuspid valve endocarditis    Subjective:     Patient on trach collar. Seems to deny belly pain, chest pain or headache. Denies SOB (communication suboptimal given trach . Objective:     Patient Vitals for the past 24 hrs:   BP Temp Temp src Pulse Resp SpO2 Height Weight   18 1421 - - - - - - 5' 5\" (1.651 m) -   18 1345 (!) 166/107 - - 103 17 98 % - -   18 1330 (!) 157/98 100.2 °F (37.9 °C) Temporal 99 13 97 % - -   18 1315 (!) 164/101 - - 98 24 100 % - -   18 1300 (!) 155/103 - - 98 22 100 % - -   18 1248 (!) 156/107 - - 102 25 - - -   18 1233 (!) 150/93 - - 107 26 100 % - -   18 1228 (!) 150/93 - Temporal 118 23 98 % - -   18 1223 (!) 147/91 - - 120 29 96 % - -   18 1218 (!) 154/97 99.5 °F (37.5 °C) Temporal 122 23 98 % - -   18 1053 (!) 155/107 - - 100 17 96 % - -   18 0751 - - - - 18 100 % - -   18 0659 (!) 175/115 98.5 °F (36.9 °C) Oral 96 18 100 % - -   18 0454 (!) 143/97 97.3 °F (36.3 °C) Tympanic 84 16 99 % - 124 lb 9 oz (56.5 kg)   18 0334 - - - - 15 98 % - -   18 0012 - - - - 15 100 % - -   18 2332 (!) 138/92 97.9 °F (36.6 °C) Oral 85 16 100 % - -   18 - 99.9 °F (37.7 °C) Temporal - - - - -   18 - - - - 14 97 % - -   18 1910 (!) 149/101 100.9 °F (38.3 °C) Temporal 103 18 99 % - -   18 1548 - - - - 18 100 % - -     Temp (24hrs), Av.2 °F (37.3 °C), Min:97.3 °F (36.3 °C), Max:100.9 °F (38.3 °C)          EXAM:  General: awake, follows commands on trach collar  ENT:  pupils equal reactive; no icterus  Neck: s nodes,       LUNGS: coarse BS bilat; few rhonchi; does not appear to be in respiratory distress   CV: RR c gd III syst M lower LSB     ABD: soft nontender, s mass; slightly distended. BS on the hypoactive side.     EXT: without edema;Skin: no rash or other skin stigmata of endocarditis. DP pulses intact     Data Review:    Lab Results   Component Value Date    WBC 8.5 2018    HGB 10.0 (L) 2018    HCT 30.0 (L) 2018    MCV 88.4 2018    PLT 91 (L) 2018     Lab Results   Component Value Date    CREATININE 2.8 (H) 2018    BUN 17 2018     2018    K 3.5 2018     2018    CO2 25 2018     Lab Results   Component Value Date    ALT <5 (L) 2018    AST 8 (L) 2018    ALKPHOS 153 (H) 2018    BILITOT 1.0 2018   vanc random level 23    MICRO:  blood cultures both grew MRSA. One culture also grew strep while the other culture grew Enterobacter sensitive to cefepime. Blood cultures over the last 5 days are NGSF. fungus blood culture negative; line tip culture negative from . Respiratory culture pending; ascitic fluid bloody with 20,000 WBC and gm pos cocci, consistent with infected hematoma. Culture in progress  IMAGIN/11 chest CT: less cavitation   abd CT: ascites. Question of splenic abscess or infarct. Assessment:     Tricuspid valve endocarditis with septic pulmonary emboli (L sided endocarditis not ruled out) due to MRSA. Strep and Enterobacter were also isolated from admission blood cultures, significance uncertain: I would think this illness is due to MRSA. Moderate to severe tricuspid regurgitation with vegetation. Exploratory lap findings consistent with infected intrabdominal hematoma. possible splenic infarct or abscess. s/p arrest x2. No leukocytosis but still having occasional fever.    Her systolic M is louder: moderate to severe tricuspid regurg with vegetation  Pt alert an looks somewhat better following her surgery    Tunneled L IJ, PICC L arm   mild thrombocytopenia    Plan:   linezolid switched back to vancomycin to rule out drug fever from linezolid (I have not seen drug fever from linezolid previously that I

## 2018-07-13 NOTE — OP NOTE
Germaine [facility]   6/27/2018  Esophagogastroduodenoscopy  A pre-procedure re-evaluation was performed immediately prior to the procedure. Preprocedure Dx: dysphagia  Postprocedure Dx: retained gastric contents, successful placement of 20F Endovive PEG tube  Medications: Procedural sedation with Versed & Fentanyl  Complications: None  Estimated Blood Loss: <5cc  Specimens: were not obtained  Recommendation  1. Return to ICU care  2. Start meds and water flushes today  3. Irrigate with 60mL free water every 6h and after use  4. Keep HOB elevated during PEG use  5. Tubefeeds tomorrow  6.  Keep PEG site clean and dry    Usama Erickson
[address]                  [address] [address]                                 OPERATIVE REPORT    PATIENT NAME: Marybeth March                    :        1990  MED REC NO:   [id number]                          ROOM:       3008  ACCOUNT NO:   [de-identified]                          ADMIT DATE: 2018  PROVIDER:     Spencer Perera MD    DATE OF PROCEDURE:  2018    SURGEON:  Spencer Perera MD    INDICATIONS:  A 32-year-old female patient was brought down to the  endoscopy suite for upper GI endoscopy because of hematemesis and also  passing some melanotic stools. She has a very complicated history in that  she has end-stage renal disease. She has had previous history of  endocarditis. She has had problems with drug abuse. The patient also  denies having some fever. PHYSICAL EXAMINATION:  GENERAL:  She was pale, running a fever. CARDIOVASCULAR SYSTEM:  S1, S2 heard, no murmurs. RESPIRATORY SYSTEM: Clear. ABDOMEN:  Soft with no masses. No tenderness. Because of patient's bleed,  EGD is being done. Informed consent was obtained. Initially Fentanyl, Versed and Benadryl was given. Esophagus appeared to be normal.  Regarding the stomach, I could see some  old blood but I could not really get her calm down to do the procedure at  this point. I had to pull the scope out. We got in touch with the  anesthesiologist and she was sedated by monitored anesthesia care. After  the patient was given adequate sedation by monitored anesthesia, we decided  go ahead with EGD. FINDINGS:  ESOPHAGUS:  Upper, middle and lower esophagus normal.  No evidence of  lesion. STOMACH:  Cardiac and fundus normal.  There was some old blood in the  stomach it looked like fresh to old. At this point, I went straight into  the duodenum, which appeared to be normal.  There was no blood in the  duodenum.   I evaluated the duodenum completely and
running suture  of #1 Prolene and the fascia. The subcutaneous tissues were irrigated. The skin was closed with staples. The drains were secured with silk  sutures. Dry dressings were applied. All sponge, needle and instrument  counts were correct at the end of case. The patient tolerated the  procedure well and was taken to recovery area in stable condition.         Landon Cuenca MD    D: 07/13/2018 12:38:17       T: 07/13/2018 12:41:54     [email or web address]  Job#: [id number]     Doc#: [id number]    CC:  Giorgio Holly

## 2018-07-13 NOTE — CARE COORDINATION
INTERDISCIPLINARY PLAN OF CARE CONFERENCE    Date/Time: 7/13/2018 4:27 PM  Completed by: Rita Quinonez Case Management      Patient Name:  Trinity Olivares  YOB: 1990  Admitting Diagnosis: GI BLEED     Admit Date/Time:  6/6/2018 12:35 AM    Chart reviewed. Interdisciplinary team met to discuss patient progress and discharge plans. Disciplines included Case Management, Nursing, and Dietitian. Current Status: Monitoring    Anticipated Discharge Date: TBD  Expected D/C Disposition:  LTACH  Confirmed plan with patient and/or family No  Discharge Plan Comments: Reviewed chart and noted pt plans to [facility] rehab. Per MD, pt will be here until next week with the plan to start pre-cert at beginning of week.       Home O2 in place on admit: ECF  Pt informed of need to bring portable home O2 tank on day of discharge for nursing to connect prior to leaving:  Not Indicated  Verbalized agreement/Understanding:  Not Indicated

## 2018-07-13 NOTE — FLOWSHEET NOTE
Attempted to follow up with Pt and/or family. Pt asleep. No family present. Left Spiritual Care note informing them of our availability for support. [id number] [facility]       07/13/18 1606   Encounter Summary   Services provided to: Patient not available   Referral/Consult From: [address] [facility] Parent; Family members   Continue Visiting (7/13 attempted follow up, no family, Pt asleep, BC)

## 2018-07-14 LAB
ABO/RH: NORMAL
ALBUMIN SERPL-MCNC: 2.1 G/DL (ref 3.4–5)
ANION GAP SERPL CALCULATED.3IONS-SCNC: 10 MMOL/L (ref 3–16)
ANISOCYTOSIS: ABNORMAL
ANTIBODY SCREEN: NORMAL
ATYPICAL LYMPHOCYTE RELATIVE PERCENT: 3 % (ref 0–6)
BASOPHILIC STIPPLING: ABNORMAL
BASOPHILS ABSOLUTE: 0 K/UL (ref 0–0.2)
BASOPHILS RELATIVE PERCENT: 0 %
BLOOD BANK DISPENSE STATUS: NORMAL
BLOOD BANK DISPENSE STATUS: NORMAL
BLOOD BANK PRODUCT CODE: NORMAL
BLOOD BANK PRODUCT CODE: NORMAL
BLOOD CULTURE, ROUTINE: NORMAL
BPU ID: NORMAL
BPU ID: NORMAL
BUN BLDV-MCNC: 27 MG/DL (ref 7–20)
CALCIUM SERPL-MCNC: 8.6 MG/DL (ref 8.3–10.6)
CHLORIDE BLD-SCNC: 102 MMOL/L (ref 99–110)
CO2: 24 MMOL/L (ref 21–32)
CREAT SERPL-MCNC: 3.9 MG/DL (ref 0.6–1.1)
CULTURE, BLOOD 2: NORMAL
DESCRIPTION BLOOD BANK: NORMAL
DESCRIPTION BLOOD BANK: NORMAL
EOSINOPHILS ABSOLUTE: 0.1 K/UL (ref 0–0.6)
EOSINOPHILS RELATIVE PERCENT: 1 %
GFR AFRICAN AMERICAN: 17
GFR NON-AFRICAN AMERICAN: 14
GLUCOSE BLD-MCNC: 110 MG/DL (ref 70–99)
GLUCOSE BLD-MCNC: 112 MG/DL (ref 70–99)
GLUCOSE BLD-MCNC: 115 MG/DL (ref 70–99)
GLUCOSE BLD-MCNC: 119 MG/DL (ref 70–99)
GLUCOSE BLD-MCNC: 121 MG/DL (ref 70–99)
GLUCOSE BLD-MCNC: 99 MG/DL (ref 70–99)
HCT VFR BLD CALC: 13.9 % (ref 36–48)
HCT VFR BLD CALC: 14.4 % (ref 36–48)
HCT VFR BLD CALC: 24.2 % (ref 36–48)
HEMATOLOGY PATH CONSULT: YES
HEMOGLOBIN: 4.7 G/DL (ref 12–16)
HEMOGLOBIN: 4.8 G/DL (ref 12–16)
HEMOGLOBIN: 8.4 G/DL (ref 12–16)
HYPOCHROMIA: ABNORMAL
INR BLD: 1.49 (ref 0.86–1.14)
LYMPHOCYTES ABSOLUTE: 1.3 K/UL (ref 1–5.1)
LYMPHOCYTES RELATIVE PERCENT: 8 %
MCH RBC QN AUTO: 29.6 PG (ref 26–34)
MCHC RBC AUTO-ENTMCNC: 33.6 G/DL (ref 31–36)
MCV RBC AUTO: 88.1 FL (ref 80–100)
METAMYELOCYTES RELATIVE PERCENT: 2 %
MONOCYTES ABSOLUTE: 1.7 K/UL (ref 0–1.3)
MONOCYTES RELATIVE PERCENT: 15 %
MYELOCYTE PERCENT: 1 %
NEUTROPHILS ABSOLUTE: 8.4 K/UL (ref 1.7–7.7)
NEUTROPHILS RELATIVE PERCENT: 70 %
PDW BLD-RTO: 17.2 % (ref 12.4–15.4)
PERFORMED ON: ABNORMAL
PERFORMED ON: NORMAL
PHOSPHORUS: 3.4 MG/DL (ref 2.5–4.9)
PLATELET # BLD: 118 K/UL (ref 135–450)
PLATELET SLIDE REVIEW: ABNORMAL
PMV BLD AUTO: 8.9 FL (ref 5–10.5)
POIKILOCYTES: ABNORMAL
POLYCHROMASIA: ABNORMAL
POTASSIUM SERPL-SCNC: 4 MMOL/L (ref 3.5–5.1)
PROTHROMBIN TIME: 17 SEC (ref 9.8–13)
RBC # BLD: 1.63 M/UL (ref 4–5.2)
SLIDE REVIEW: ABNORMAL
SODIUM BLD-SCNC: 136 MMOL/L (ref 136–145)
WBC # BLD: 11.5 K/UL (ref 4–11)

## 2018-07-14 PROCEDURE — 85018 HEMOGLOBIN: CPT

## 2018-07-14 PROCEDURE — 85014 HEMATOCRIT: CPT

## 2018-07-14 PROCEDURE — 2580000003 HC RX 258: Performed by: INTERNAL MEDICINE

## 2018-07-14 PROCEDURE — 94761 N-INVAS EAR/PLS OXIMETRY MLT: CPT

## 2018-07-14 PROCEDURE — 90937 HEMODIALYSIS REPEATED EVAL: CPT

## 2018-07-14 PROCEDURE — 80069 RENAL FUNCTION PANEL: CPT

## 2018-07-14 PROCEDURE — 2700000000 HC OXYGEN THERAPY PER DAY

## 2018-07-14 PROCEDURE — 94640 AIRWAY INHALATION TREATMENT: CPT

## 2018-07-14 PROCEDURE — 6370000000 HC RX 637 (ALT 250 FOR IP): Performed by: INTERNAL MEDICINE

## 2018-07-14 PROCEDURE — 86923 COMPATIBILITY TEST ELECTRIC: CPT

## 2018-07-14 PROCEDURE — 6360000002 HC RX W HCPCS: Performed by: INTERNAL MEDICINE

## 2018-07-14 PROCEDURE — 6360000002 HC RX W HCPCS: Performed by: HOSPITALIST

## 2018-07-14 PROCEDURE — 1200000000 HC SEMI PRIVATE

## 2018-07-14 PROCEDURE — 99233 SBSQ HOSP IP/OBS HIGH 50: CPT | Performed by: INTERNAL MEDICINE

## 2018-07-14 PROCEDURE — 85025 COMPLETE CBC W/AUTO DIFF WBC: CPT

## 2018-07-14 PROCEDURE — 9990 CHARGE CONVERSION

## 2018-07-14 PROCEDURE — 94664 DEMO&/EVAL PT USE INHALER: CPT

## 2018-07-14 PROCEDURE — 99024 POSTOP FOLLOW-UP VISIT: CPT | Performed by: SURGERY

## 2018-07-14 PROCEDURE — P9016 RBC LEUKOCYTES REDUCED: HCPCS

## 2018-07-14 PROCEDURE — 94667 MNPJ CHEST WALL 1ST: CPT

## 2018-07-14 PROCEDURE — C9113 INJ PANTOPRAZOLE SODIUM, VIA: HCPCS | Performed by: INTERNAL MEDICINE

## 2018-07-14 PROCEDURE — 85610 PROTHROMBIN TIME: CPT

## 2018-07-14 RX ORDER — 0.9 % SODIUM CHLORIDE 0.9 %
250 INTRAVENOUS SOLUTION INTRAVENOUS ONCE
Status: COMPLETED | OUTPATIENT
Start: 2018-07-14 | End: 2018-07-14

## 2018-07-14 RX ORDER — PANTOPRAZOLE SODIUM 40 MG/10ML
40 INJECTION, POWDER, LYOPHILIZED, FOR SOLUTION INTRAVENOUS 2 TIMES DAILY
Status: DISCONTINUED | OUTPATIENT
Start: 2018-07-14 | End: 2018-07-18 | Stop reason: HOSPADM

## 2018-07-14 RX ADMIN — PROMETHAZINE HYDROCHLORIDE 25 MG: 25 TABLET ORAL at 09:18

## 2018-07-14 RX ADMIN — IPRATROPIUM BROMIDE AND ALBUTEROL SULFATE 1 AMPULE: .5; 3 SOLUTION RESPIRATORY (INHALATION) at 11:30

## 2018-07-14 RX ADMIN — HYDROMORPHONE HYDROCHLORIDE 1 MG: 1 INJECTION, SOLUTION INTRAMUSCULAR; INTRAVENOUS; SUBCUTANEOUS at 16:30

## 2018-07-14 RX ADMIN — PROMETHAZINE HYDROCHLORIDE 25 MG: 25 TABLET ORAL at 16:30

## 2018-07-14 RX ADMIN — IPRATROPIUM BROMIDE AND ALBUTEROL SULFATE 1 AMPULE: .5; 3 SOLUTION RESPIRATORY (INHALATION) at 03:17

## 2018-07-14 RX ADMIN — HEPARIN SODIUM 3200 UNITS: 1000 INJECTION, SOLUTION INTRAVENOUS; SUBCUTANEOUS at 16:00

## 2018-07-14 RX ADMIN — OXYCODONE HYDROCHLORIDE 5 MG: 5 SOLUTION ORAL at 09:17

## 2018-07-14 RX ADMIN — MEROPENEM 250 MG: 500 INJECTION, POWDER, FOR SOLUTION INTRAVENOUS at 20:33

## 2018-07-14 RX ADMIN — Medication 10 ML: at 20:32

## 2018-07-14 RX ADMIN — MEROPENEM 250 MG: 500 INJECTION, POWDER, FOR SOLUTION INTRAVENOUS at 05:06

## 2018-07-14 RX ADMIN — CHLORHEXIDINE GLUCONATE 0.12% ORAL RINSE 15 ML: 1.2 LIQUID ORAL at 20:33

## 2018-07-14 RX ADMIN — OXYCODONE HYDROCHLORIDE 5 MG: 5 SOLUTION ORAL at 03:45

## 2018-07-14 RX ADMIN — PANTOPRAZOLE SODIUM 40 MG: 40 INJECTION, POWDER, FOR SOLUTION INTRAVENOUS at 09:18

## 2018-07-14 RX ADMIN — IPRATROPIUM BROMIDE AND ALBUTEROL SULFATE 1 AMPULE: .5; 3 SOLUTION RESPIRATORY (INHALATION) at 19:25

## 2018-07-14 RX ADMIN — QUETIAPINE FUMARATE 50 MG: 25 TABLET ORAL at 20:33

## 2018-07-14 RX ADMIN — PANTOPRAZOLE SODIUM 40 MG: 40 INJECTION, POWDER, FOR SOLUTION INTRAVENOUS at 20:32

## 2018-07-14 RX ADMIN — VANCOMYCIN HYDROCHLORIDE 750 MG: 10 INJECTION, POWDER, LYOPHILIZED, FOR SOLUTION INTRAVENOUS at 18:44

## 2018-07-14 RX ADMIN — HYDROMORPHONE HYDROCHLORIDE 1 MG: 1 INJECTION, SOLUTION INTRAMUSCULAR; INTRAVENOUS; SUBCUTANEOUS at 06:24

## 2018-07-14 RX ADMIN — OXYCODONE HYDROCHLORIDE 5 MG: 5 SOLUTION ORAL at 20:33

## 2018-07-14 RX ADMIN — NYSTATIN: 100000 CREAM TOPICAL at 09:19

## 2018-07-14 RX ADMIN — DOXERCALCIFEROL 1 MCG: 2 INJECTION, SOLUTION INTRAVENOUS at 17:43

## 2018-07-14 RX ADMIN — CHLORHEXIDINE GLUCONATE 0.12% ORAL RINSE 15 ML: 1.2 LIQUID ORAL at 09:18

## 2018-07-14 RX ADMIN — IPRATROPIUM BROMIDE AND ALBUTEROL SULFATE 1 AMPULE: .5; 3 SOLUTION RESPIRATORY (INHALATION) at 23:06

## 2018-07-14 RX ADMIN — Medication 10 ML: at 09:18

## 2018-07-14 RX ADMIN — CLONIDINE HYDROCHLORIDE 0.1 MG: 0.1 TABLET ORAL at 20:33

## 2018-07-14 RX ADMIN — IPRATROPIUM BROMIDE AND ALBUTEROL SULFATE 1 AMPULE: .5; 3 SOLUTION RESPIRATORY (INHALATION) at 07:59

## 2018-07-14 RX ADMIN — MICAFUNGIN SODIUM 100 MG: 20 INJECTION, POWDER, LYOPHILIZED, FOR SOLUTION INTRAVENOUS at 09:17

## 2018-07-14 RX ADMIN — HYDROMORPHONE HYDROCHLORIDE 1 MG: 1 INJECTION, SOLUTION INTRAMUSCULAR; INTRAVENOUS; SUBCUTANEOUS at 11:05

## 2018-07-14 RX ADMIN — SODIUM CHLORIDE 250 ML: 9 INJECTION, SOLUTION INTRAVENOUS at 09:18

## 2018-07-14 RX ADMIN — HYDROMORPHONE HYDROCHLORIDE 1 MG: 1 INJECTION, SOLUTION INTRAMUSCULAR; INTRAVENOUS; SUBCUTANEOUS at 00:45

## 2018-07-14 RX ADMIN — NYSTATIN: 100000 CREAM TOPICAL at 20:32

## 2018-07-14 RX ADMIN — ONDANSETRON 4 MG: 2 INJECTION, SOLUTION INTRAMUSCULAR; INTRAVENOUS at 05:13

## 2018-07-14 RX ADMIN — OXYCODONE HYDROCHLORIDE 5 MG: 5 SOLUTION ORAL at 13:22

## 2018-07-14 ASSESSMENT — PAIN SCALES - GENERAL
PAINLEVEL_OUTOF10: 6
PAINLEVEL_OUTOF10: 3
PAINLEVEL_OUTOF10: 6
PAINLEVEL_OUTOF10: 0
PAINLEVEL_OUTOF10: 8
PAINLEVEL_OUTOF10: 7
PAINLEVEL_OUTOF10: 9
PAINLEVEL_OUTOF10: 0
PAINLEVEL_OUTOF10: 8
PAINLEVEL_OUTOF10: 10
PAINLEVEL_OUTOF10: 6
PAINLEVEL_OUTOF10: 6
PAINLEVEL_OUTOF10: 0

## 2018-07-14 ASSESSMENT — PAIN DESCRIPTION - ORIENTATION
ORIENTATION: LEFT;RIGHT
ORIENTATION: LEFT;RIGHT

## 2018-07-14 ASSESSMENT — PAIN DESCRIPTION - ONSET
ONSET: GRADUAL
ONSET: GRADUAL

## 2018-07-14 ASSESSMENT — PAIN DESCRIPTION - DESCRIPTORS
DESCRIPTORS: ACHING;CRAMPING;DISCOMFORT
DESCRIPTORS: ACHING

## 2018-07-14 ASSESSMENT — PAIN DESCRIPTION - PAIN TYPE
TYPE: ACUTE PAIN
TYPE: ACUTE PAIN

## 2018-07-14 ASSESSMENT — PAIN DESCRIPTION - FREQUENCY
FREQUENCY: CONTINUOUS
FREQUENCY: CONTINUOUS

## 2018-07-14 ASSESSMENT — PAIN DESCRIPTION - PROGRESSION: CLINICAL_PROGRESSION: GRADUALLY WORSENING

## 2018-07-14 ASSESSMENT — PAIN DESCRIPTION - LOCATION
LOCATION: ABDOMEN
LOCATION: ABDOMEN

## 2018-07-14 NOTE — PROGRESS NOTES
inspiratory hold  4. Bronchodilators will be administered via Hand Held Nebulizer [facility]) to patients when ANY of the following criteria are met  a. Incognizant or uncooperative          b. Patients treated with HHN at Home        c. Unable to demonstrate proper use of MDI with spacer     d. RR > 30 bpm   5. Bronchodilators will be delivered via Metered Dose Inhaler (MDI), HHN, Aerogen to intubated patients on mechanical ventilation. 6. Inhalation medication orders will be delivered and/or substituted as outlined below. Aerosolized Medications Ordering and Administration Guidelines:    1. All Medications will be ordered by a physician, and their frequency and/or modality will be adjusted as defined by the patients Respiratory Severity Index (RSI) score. 2. If the patient does not have documented COPD, consider discontinuing anticholinergics when RSI is less than 9.  3. If the bronchospasm worsens (increased RSI), then the bronchodilator frequency can be increased to a maximum of every 4 hours. If greater than every 4 hours is required, the physician will be contacted. 4. If the bronchospasm improves, the frequency of the bronchodilator can be decreased, based on the patient's RSI, but not less than home treatment regimen frequency. 5. Bronchodilator(s) will be discontinued if patient has a RSI less than 9 and has received no scheduled or as needed treatment for 72  Hrs. Patients Ordered on a Mucolytic Agent:    1. Must always be administered with a bronchodilator. 2. Discontinue if patient experiences worsened bronchospasm, or secretions have lessened to the point that the patient is able to clear them with a cough. Anti-inflammatory and Combination Medications:    1. If the patient lacks prior history of lung disease, is not using inhaled anti-inflammatory medication at home, and lacks wheezing by examination or by history for at least 24 hours, contact physician for possible discontinuation.

## 2018-07-14 NOTE — PROGRESS NOTES
[facility] GENERAL SURGERY    Surgery Progress Note           POD # 1    PATIENT NAME: Katie Purcell     TODAY'S DATE: 7/14/2018    INTERVAL HISTORY:    Pt  Awake and alert. C/O incisional pain. OBJECTIVE:   VITALS:  /83   Pulse 110   Temp 99.4 °F (37.4 °C) (Oral)   Resp 18   Ht 5' 5\" (1.651 m)   Wt 127 lb 14.4 oz (58 kg)   SpO2 99%   BMI 21.28 kg/m²     INTAKE/OUTPUT:    I/O last 3 completed shifts: In: 2040 [I.V.:1195; NG/GT:323]  Out: 350 [Drains:230; Other:120]  No intake/output data recorded. CONSTITUTIONAL:  awake and alert  ABDOMEN:   normal bowel sounds, soft, distended, tenderness noted in the incision. CHRISTOFER x 2 with old bloody drainage   INCISION: dressed    Data:  CBC:   Recent Labs      07/12/18   0545  07/13/18   0515  07/14/18   0630  07/14/18   0720   WBC  8.7  8.5  11.5*   --    HGB  7.0*  10.0*  4.8*  4.7*   HCT  21.1*  30.0*  14.4*  13.9*   PLT  102*  91*  118*   --      BMP:    Recent Labs      07/12/18   0545  07/13/18   0515  07/14/18   0630   NA  131*  136  136   K  3.5  3.5  4.0   CL  95*  101  102   CO2  23  25  24   BUN  21*  17  27*   CREATININE  3.7*  2.8*  3.9*   GLUCOSE  97  110*  110*     Hepatic: No results for input(s): AST, ALT, ALB, BILITOT, ALKPHOS in the last 72 hours. Mag:    No results for input(s): MG in the last 72 hours. Phos:     Recent Labs      07/12/18   0545  07/13/18   0515  07/14/18   0630   PHOS  2.9  2.5  3.4      INR: No results for input(s): INR in the last 72 hours. Micro Review:  GPC on GS. Nothing growing yet    ASSESSMENT AND PLAN:  34 y.o. female status post ex lap, drainage intra-abdominal abscess/infected hematoma. Continue antibiotics. Await culture results.  Transfuse blood for acute post-op anemia     MEETA NICHOLS

## 2018-07-14 NOTE — PROGRESS NOTES
Pulmonary Progress Note    CC: Respiratory failure    Subjective:   Post ex laparotomy with drainage of intra-abdominal abscess  Significant drop in hemoglobin  No signs of active bleed  Stable O2        Intake/Output Summary (Last 24 hours) at 07/14/18 0748  Last data filed at 07/14/18 0519   Gross per 24 hour   Intake             1518 ml   Output              350 ml   Net             1168 ml       Exam:   Blood pressure 116/72, pulse 103, temperature 98.9 °F (37.2 °C), temperature source Oral, resp. rate 16, height 5' 5\" (1.651 m), weight 127 lb 14.4 oz (58 kg), SpO2 98 %, not currently breastfeeding.' on 28%  Gen: No distress. Ill-appearing and cachectic  Eyes: PERRL. No sclera icterus. No conjunctival injection. ENT: No discharge. Pharynx clear. Trach in place-No significant secretions  Neck: Trachea midline. No obvious mass. Resp: No accessory muscle use. No crackles. No wheezes. Few rhonchi. No dullness on percussion. CV: Tachycardia. Regular rhythm. No murmur or rub. No edema. GI: Mildly tender. Less distended. No hernia. Bilateral CHRISTOFER drain  Skin: Warm and dry. No nodule on exposed extremities. Lymph: No cervical LAD. No supraclavicular LAD. M/S: No cyanosis. No joint deformity. No clubbing. Muscle wasting  Neuro: Awake. Alert. Moves all four extremities.    Psych: No agitation      Scheduled Meds:   sodium chloride  250 mL Intravenous Once    vancomycin  750 mg Intravenous Once    meropenem  250 mg Intravenous Q8H    fentanyl  1 patch Transdermal Q72H    fentaNYL  1 patch Transdermal Q72H    QUEtiapine  50 mg Oral Nightly    micafungin  100 mg Intravenous Daily    polyethylene glycol  17 g Oral Daily    sodium chloride flush  10 mL Intravenous 2 times per day    ipratropium-albuterol  1 ampule Inhalation Q4H    sennosides-docusate sodium  1 tablet Oral Daily    cloNIDine  0.1 mg Oral TID    nystatin   Topical BID    chlorhexidine  15 mL Mouth/Throat BID    doxercalciferol  1 mcg Intravenous Once per day on Tue Thu Sat    darbepoetin emi-polysorbate  100 mcg Intravenous Weekly - Thursday    And    darbepoetin emi-polysorbate  25 mcg Subcutaneous Weekly - Thursday    pantoprazole  40 mg Intravenous Daily     Continuous Infusions:   sodium chloride 1,000 mL (07/13/18 1126)    dextrose       PRN Meds:  oxyCODONE, HYDROmorphone, diazepam, sodium chloride flush, heparin (porcine), albumin human, dextrose, sodium phosphate IVPB **OR** [DISCONTINUED] sodium phosphate IVPB **OR** [DISCONTINUED] sodium phosphate IVPB **OR** [DISCONTINUED] sodium phosphate IVPB, ondansetron, glucose, glucagon (rDNA), dextrose, acetaminophen, promethazine    Labs:  CBC:   Recent Labs      07/12/18 0545 07/13/18   0515  07/14/18   0630  07/14/18   0720   WBC  8.7  8.5  11.5*   --    HGB  7.0*  10.0*  4.8*  4.7*   HCT  21.1*  30.0*  14.4*  13.9*   MCV  92.0  88.4  88.1   --    PLT  102*  91*  118*   --      BMP:   Recent Labs      07/12/18   0545  07/13/18   0515  07/14/18   0630   NA  131*  136  136   K  3.5  3.5  4.0   CL  95*  101  102   CO2  23  25  24   PHOS  2.9  2.5  3.4   BUN  21*  17  27*   CREATININE  3.7*  2.8*  3.9*     LIVER PROFILE:   No results for input(s): AST, ALT, LIPASE, BILIDIR, BILITOT, ALKPHOS in the last 72 hours. Invalid input(s): AMYLASE,  ALB  PT/INR: No results for input(s): PROTIME, INR in the last 72 hours. APTT: No results for input(s): APTT in the last 72 hours. UA:  No results for input(s): NITRITE, COLORU, PHUR, LABCAST, WBCUA, RBCUA, MUCUS, TRICHOMONAS, YEAST, BACTERIA, CLARITYU, SPECGRAV, LEUKOCYTESUR, UROBILINOGEN, BILIRUBINUR, BLOODU, GLUCOSEU, AMORPHOUS in the last 72 hours. Invalid input(s): KETONESU  No results for input(s): PHART, ORQ7LQD, PO2ART in the last 72 hours.   Cultures:   Blood culture 7/10 negative  Blood culture 7/7 negative  Repeat Respiratory 7/11 culture Normal respiratory gino  Peritoneal fluid 7/12 gram-positive cocci  Peritoneal fluid 7/13/18 1+ gram-positive cocci      Films:  CXR 7/9  low lung volume worsening right basilar airspace disease    CT chest/abdomen 7/11/18  Overall improved appearance of the chest with decreasing areas of cavitation. Small pleural effusions. Large amount free fluid within the abdomen and pelvis. Heterogeneous appearance of the spleen is again seen and may be related to infection/abscesses, given the process within the chest.  Alternatively, these may represent areas of infarct.    Low-density area within the left hepatic lobe likely is a similar process to the spleen and appears improved  from the previous study. ASSESSMENT:  · Acute hypoxemic respiratory failure   · Severe anemia- Intra-abdominal source versus GI  · Fever with no leukocytosis  · Peritonitis- complex peritoneal fluid with thick septation throughout the abdomen on ultrasound. Paracentesis 7/12/18 510 mL removed. Fluid positive gram-positive cocci. Suspecting infected intra-abdominal hematoma.   Post ex laparotomy with drainage 7/13/18 with now bilateral CHRISTOFER drain  · Probable splenic infarct   · TV infectious endocarditis  · Polymicrobial bacteremia: MRSA, streptococcal pneumonia and Enterobacter  · Cavitary pneumonia due to septic emboli  · Acute blood loss anemia - angiodysplasia post catheterization  · ESRD on HD  · IV drug abuse, hepatitis C  · S/P Trach/PEG 6/27/18  · HONEY PICC placed 7/6/18          PLAN:  · Supplemental oxygen to maintain SaO2 >92%; wean as tolerated  · Vancomycin and meropenem and micafungin per ID  · Surgery following  · Blood transfusion-target hemoglobin > 7  · H&H every 6  · Check INR  · Protonix twice a day  · Follow-up cultures  · PTOT  · Hold anticoagulation

## 2018-07-14 NOTE — FLOWSHEET NOTE
Treatment time: 4 hours  Net UF: 2000 ml     Pre weight: 55 kg   Post weight: 53.8 kg  EDW: 56.5 kg     Access used: L TDC  Access function: Well with  ml/min     Medications or blood products given: 1 unit of PRBCs     Regular outpatient schedule: Mert HOOPER     Summary of response to treatment: Well and without complications.      Copy of dialysis treatment record placed in chart, to be scanned into EMR.     07/14/18 1349 07/14/18 1806   Vital Signs   BP (!) 145/82 (!) 156/91   Temp 98.8 °F (37.1 °C) 99.9 °F (37.7 °C)   Height --  5' 2\" (1.575 m)   Weight 121 lb 4.1 oz (55 kg) 118 lb 9.7 oz (53.8 kg)   Weight Method Bed scale Actual;Bed scale   Percent Weight Change -5.2 -2.18

## 2018-07-14 NOTE — PROGRESS NOTES
Patient c/o pain in abdomen and nausea at this time. PRN phenergan and oxy given. Shift assessment complete, see flow sheet. Denies further needs. Aware of plan for 2u PRBC at this time. Will monitor.

## 2018-07-15 LAB
ALBUMIN SERPL-MCNC: 2.1 G/DL (ref 3.4–5)
ANION GAP SERPL CALCULATED.3IONS-SCNC: 8 MMOL/L (ref 3–16)
ANISOCYTOSIS: ABNORMAL
ATYPICAL LYMPHOCYTE RELATIVE PERCENT: 3 % (ref 0–6)
BANDED NEUTROPHILS RELATIVE PERCENT: 1 % (ref 0–7)
BASOPHILIC STIPPLING: ABNORMAL
BASOPHILS ABSOLUTE: 0 K/UL (ref 0–0.2)
BASOPHILS RELATIVE PERCENT: 0 %
BLOOD CULTURE, ROUTINE: NORMAL
BODY FLUID CULTURE, STERILE: NORMAL
BUN BLDV-MCNC: 10 MG/DL (ref 7–20)
CALCIUM SERPL-MCNC: 8.3 MG/DL (ref 8.3–10.6)
CHLORIDE BLD-SCNC: 95 MMOL/L (ref 99–110)
CO2: 27 MMOL/L (ref 21–32)
CREAT SERPL-MCNC: 2.1 MG/DL (ref 0.6–1.1)
CULTURE, BLOOD 2: NORMAL
EOSINOPHILS ABSOLUTE: 0 K/UL (ref 0–0.6)
EOSINOPHILS RELATIVE PERCENT: 0 %
GFR AFRICAN AMERICAN: 34
GFR NON-AFRICAN AMERICAN: 28
GLUCOSE BLD-MCNC: 100 MG/DL (ref 70–99)
GLUCOSE BLD-MCNC: 91 MG/DL (ref 70–99)
GLUCOSE BLD-MCNC: 93 MG/DL (ref 70–99)
GLUCOSE BLD-MCNC: 97 MG/DL (ref 70–99)
GLUCOSE BLD-MCNC: 98 MG/DL (ref 70–99)
GRAM STAIN RESULT: NORMAL
HCT VFR BLD CALC: 20.9 % (ref 36–48)
HCT VFR BLD CALC: 20.9 % (ref 36–48)
HCT VFR BLD CALC: 21 % (ref 36–48)
HCT VFR BLD CALC: 21.6 % (ref 36–48)
HEMATOLOGY PATH CONSULT: YES
HEMOGLOBIN: 7.1 G/DL (ref 12–16)
HEMOGLOBIN: 7.2 G/DL (ref 12–16)
HEMOGLOBIN: 7.3 G/DL (ref 12–16)
HEMOGLOBIN: 7.4 G/DL (ref 12–16)
LYMPHOCYTES ABSOLUTE: 2.8 K/UL (ref 1–5.1)
LYMPHOCYTES RELATIVE PERCENT: 16 %
MCH RBC QN AUTO: 30.4 PG (ref 26–34)
MCHC RBC AUTO-ENTMCNC: 34.8 G/DL (ref 31–36)
MCV RBC AUTO: 87.5 FL (ref 80–100)
METAMYELOCYTES RELATIVE PERCENT: 2 %
MONOCYTES ABSOLUTE: 1.8 K/UL (ref 0–1.3)
MONOCYTES RELATIVE PERCENT: 12 %
MONONUCLEAR UNIDENTIFIED CELLS: 1 %
MYELOCYTE PERCENT: 5 %
NEUTROPHILS ABSOLUTE: 10.1 K/UL (ref 1.7–7.7)
NEUTROPHILS RELATIVE PERCENT: 60 %
NUCLEATED RED BLOOD CELLS: 2 /100 WBC
PDW BLD-RTO: 16.3 % (ref 12.4–15.4)
PERFORMED ON: ABNORMAL
PERFORMED ON: NORMAL
PHOSPHORUS: 1.6 MG/DL (ref 2.5–4.9)
PLATELET # BLD: 213 K/UL (ref 135–450)
PLATELET SLIDE REVIEW: ADEQUATE
PMV BLD AUTO: 7.8 FL (ref 5–10.5)
POIKILOCYTES: ABNORMAL
POLYCHROMASIA: ABNORMAL
POTASSIUM SERPL-SCNC: 3.4 MMOL/L (ref 3.5–5.1)
PROCALCITONIN: 3.06 NG/ML (ref 0–0.15)
RBC # BLD: 2.4 M/UL (ref 4–5.2)
SLIDE REVIEW: ABNORMAL
SODIUM BLD-SCNC: 130 MMOL/L (ref 136–145)
WBC # BLD: 14.8 K/UL (ref 4–11)

## 2018-07-15 PROCEDURE — 99232 SBSQ HOSP IP/OBS MODERATE 35: CPT | Performed by: INTERNAL MEDICINE

## 2018-07-15 PROCEDURE — 6370000000 HC RX 637 (ALT 250 FOR IP): Performed by: INTERNAL MEDICINE

## 2018-07-15 PROCEDURE — 2500000003 HC RX 250 WO HCPCS: Performed by: INTERNAL MEDICINE

## 2018-07-15 PROCEDURE — 2700000000 HC OXYGEN THERAPY PER DAY

## 2018-07-15 PROCEDURE — 2580000003 HC RX 258: Performed by: INTERNAL MEDICINE

## 2018-07-15 PROCEDURE — 94640 AIRWAY INHALATION TREATMENT: CPT

## 2018-07-15 PROCEDURE — 1200000000 HC SEMI PRIVATE

## 2018-07-15 PROCEDURE — 36415 COLL VENOUS BLD VENIPUNCTURE: CPT

## 2018-07-15 PROCEDURE — 6360000002 HC RX W HCPCS: Performed by: INTERNAL MEDICINE

## 2018-07-15 PROCEDURE — 85018 HEMOGLOBIN: CPT

## 2018-07-15 PROCEDURE — 6360000002 HC RX W HCPCS: Performed by: HOSPITALIST

## 2018-07-15 PROCEDURE — 80069 RENAL FUNCTION PANEL: CPT

## 2018-07-15 PROCEDURE — 99233 SBSQ HOSP IP/OBS HIGH 50: CPT | Performed by: INTERNAL MEDICINE

## 2018-07-15 PROCEDURE — C9113 INJ PANTOPRAZOLE SODIUM, VIA: HCPCS | Performed by: INTERNAL MEDICINE

## 2018-07-15 PROCEDURE — 84145 PROCALCITONIN (PCT): CPT

## 2018-07-15 PROCEDURE — 36592 COLLECT BLOOD FROM PICC: CPT

## 2018-07-15 PROCEDURE — 85014 HEMATOCRIT: CPT

## 2018-07-15 PROCEDURE — 94761 N-INVAS EAR/PLS OXIMETRY MLT: CPT

## 2018-07-15 PROCEDURE — 99024 POSTOP FOLLOW-UP VISIT: CPT | Performed by: SURGERY

## 2018-07-15 PROCEDURE — 85025 COMPLETE CBC W/AUTO DIFF WBC: CPT

## 2018-07-15 PROCEDURE — 2580000003 HC RX 258

## 2018-07-15 RX ORDER — SODIUM CHLORIDE 9 MG/ML
INJECTION, SOLUTION INTRAVENOUS
Status: COMPLETED
Start: 2018-07-15 | End: 2018-07-15

## 2018-07-15 RX ORDER — OXYCODONE HCL 5 MG/5 ML
5 SOLUTION, ORAL ORAL EVERY 6 HOURS PRN
Status: DISCONTINUED | OUTPATIENT
Start: 2018-07-15 | End: 2018-07-18 | Stop reason: HOSPADM

## 2018-07-15 RX ADMIN — OXYCODONE HYDROCHLORIDE 5 MG: 5 SOLUTION ORAL at 15:40

## 2018-07-15 RX ADMIN — PROMETHAZINE HYDROCHLORIDE 25 MG: 25 TABLET ORAL at 12:32

## 2018-07-15 RX ADMIN — ACETAMINOPHEN 650 MG: 325 TABLET ORAL at 12:27

## 2018-07-15 RX ADMIN — HYDROMORPHONE HYDROCHLORIDE 1 MG: 1 INJECTION, SOLUTION INTRAMUSCULAR; INTRAVENOUS; SUBCUTANEOUS at 18:20

## 2018-07-15 RX ADMIN — PROMETHAZINE HYDROCHLORIDE 25 MG: 25 TABLET ORAL at 06:25

## 2018-07-15 RX ADMIN — Medication 10 ML: at 21:56

## 2018-07-15 RX ADMIN — CLONIDINE HYDROCHLORIDE 0.1 MG: 0.1 TABLET ORAL at 21:59

## 2018-07-15 RX ADMIN — PROMETHAZINE HYDROCHLORIDE 25 MG: 25 TABLET ORAL at 18:20

## 2018-07-15 RX ADMIN — OXYCODONE HYDROCHLORIDE 5 MG: 5 SOLUTION ORAL at 21:56

## 2018-07-15 RX ADMIN — QUETIAPINE FUMARATE 50 MG: 25 TABLET ORAL at 21:56

## 2018-07-15 RX ADMIN — Medication 10 ML: at 08:09

## 2018-07-15 RX ADMIN — MEROPENEM 250 MG: 500 INJECTION, POWDER, FOR SOLUTION INTRAVENOUS at 05:10

## 2018-07-15 RX ADMIN — Medication 10 ML: at 18:21

## 2018-07-15 RX ADMIN — IPRATROPIUM BROMIDE AND ALBUTEROL SULFATE 1 AMPULE: .5; 3 SOLUTION RESPIRATORY (INHALATION) at 23:07

## 2018-07-15 RX ADMIN — Medication 10 ML: at 18:20

## 2018-07-15 RX ADMIN — CLONIDINE HYDROCHLORIDE 0.1 MG: 0.1 TABLET ORAL at 08:08

## 2018-07-15 RX ADMIN — IPRATROPIUM BROMIDE AND ALBUTEROL SULFATE 1 AMPULE: .5; 3 SOLUTION RESPIRATORY (INHALATION) at 03:13

## 2018-07-15 RX ADMIN — IPRATROPIUM BROMIDE AND ALBUTEROL SULFATE 1 AMPULE: .5; 3 SOLUTION RESPIRATORY (INHALATION) at 11:35

## 2018-07-15 RX ADMIN — SODIUM CHLORIDE 250 ML: 9 INJECTION, SOLUTION INTRAVENOUS at 08:07

## 2018-07-15 RX ADMIN — OXYCODONE HYDROCHLORIDE 5 MG: 5 SOLUTION ORAL at 05:10

## 2018-07-15 RX ADMIN — MEROPENEM 250 MG: 500 INJECTION, POWDER, FOR SOLUTION INTRAVENOUS at 21:56

## 2018-07-15 RX ADMIN — HYDROMORPHONE HYDROCHLORIDE 1 MG: 1 INJECTION, SOLUTION INTRAMUSCULAR; INTRAVENOUS; SUBCUTANEOUS at 12:27

## 2018-07-15 RX ADMIN — CLONIDINE HYDROCHLORIDE 0.1 MG: 0.1 TABLET ORAL at 15:39

## 2018-07-15 RX ADMIN — DOCUSATE SODIUM AND SENNOSIDES 1 TABLET: 8.6; 5 TABLET, FILM COATED ORAL at 08:07

## 2018-07-15 RX ADMIN — OXYCODONE HYDROCHLORIDE 5 MG: 5 SOLUTION ORAL at 09:51

## 2018-07-15 RX ADMIN — MEROPENEM 250 MG: 500 INJECTION, POWDER, FOR SOLUTION INTRAVENOUS at 14:00

## 2018-07-15 RX ADMIN — HYDROMORPHONE HYDROCHLORIDE 1 MG: 1 INJECTION, SOLUTION INTRAMUSCULAR; INTRAVENOUS; SUBCUTANEOUS at 08:08

## 2018-07-15 RX ADMIN — POTASSIUM PHOSPHATE, MONOBASIC AND POTASSIUM PHOSPHATE, DIBASIC 10 MMOL: 224; 236 INJECTION, SOLUTION INTRAVENOUS at 12:32

## 2018-07-15 RX ADMIN — HYDROMORPHONE HYDROCHLORIDE 1 MG: 1 INJECTION, SOLUTION INTRAMUSCULAR; INTRAVENOUS; SUBCUTANEOUS at 02:27

## 2018-07-15 RX ADMIN — IPRATROPIUM BROMIDE AND ALBUTEROL SULFATE 1 AMPULE: .5; 3 SOLUTION RESPIRATORY (INHALATION) at 07:19

## 2018-07-15 RX ADMIN — MICAFUNGIN SODIUM 100 MG: 20 INJECTION, POWDER, LYOPHILIZED, FOR SOLUTION INTRAVENOUS at 08:08

## 2018-07-15 RX ADMIN — IPRATROPIUM BROMIDE AND ALBUTEROL SULFATE 1 AMPULE: .5; 3 SOLUTION RESPIRATORY (INHALATION) at 19:29

## 2018-07-15 RX ADMIN — POLYETHYLENE GLYCOL (3350) 17 G: 17 POWDER, FOR SOLUTION ORAL at 08:08

## 2018-07-15 RX ADMIN — CHLORHEXIDINE GLUCONATE 0.12% ORAL RINSE 15 ML: 1.2 LIQUID ORAL at 08:08

## 2018-07-15 RX ADMIN — PANTOPRAZOLE SODIUM 40 MG: 40 INJECTION, POWDER, FOR SOLUTION INTRAVENOUS at 21:56

## 2018-07-15 RX ADMIN — IPRATROPIUM BROMIDE AND ALBUTEROL SULFATE 1 AMPULE: .5; 3 SOLUTION RESPIRATORY (INHALATION) at 15:34

## 2018-07-15 RX ADMIN — PANTOPRAZOLE SODIUM 40 MG: 40 INJECTION, POWDER, FOR SOLUTION INTRAVENOUS at 08:07

## 2018-07-15 RX ADMIN — NYSTATIN: 100000 CREAM TOPICAL at 08:09

## 2018-07-15 ASSESSMENT — PAIN DESCRIPTION - LOCATION
LOCATION: ABDOMEN

## 2018-07-15 ASSESSMENT — PAIN DESCRIPTION - PAIN TYPE
TYPE: ACUTE PAIN

## 2018-07-15 ASSESSMENT — PAIN SCALES - GENERAL
PAINLEVEL_OUTOF10: 6
PAINLEVEL_OUTOF10: 7
PAINLEVEL_OUTOF10: 4
PAINLEVEL_OUTOF10: 6
PAINLEVEL_OUTOF10: 6
PAINLEVEL_OUTOF10: 4
PAINLEVEL_OUTOF10: 7
PAINLEVEL_OUTOF10: 4
PAINLEVEL_OUTOF10: 4
PAINLEVEL_OUTOF10: 9
PAINLEVEL_OUTOF10: 7
PAINLEVEL_OUTOF10: 6

## 2018-07-15 ASSESSMENT — PAIN DESCRIPTION - DESCRIPTORS
DESCRIPTORS: ACHING

## 2018-07-15 ASSESSMENT — PAIN DESCRIPTION - ORIENTATION
ORIENTATION: LEFT;RIGHT

## 2018-07-15 ASSESSMENT — PAIN DESCRIPTION - ONSET: ONSET: GRADUAL

## 2018-07-15 ASSESSMENT — PAIN DESCRIPTION - FREQUENCY
FREQUENCY: CONTINUOUS

## 2018-07-15 ASSESSMENT — PAIN DESCRIPTION - PROGRESSION: CLINICAL_PROGRESSION: GRADUALLY WORSENING

## 2018-07-15 NOTE — PLAN OF CARE
Problem: Pain:  Goal: Pain level will decrease  Pain level will decrease   Outcome: Ongoing  Pain is assessed q shift and PRN using the 0-10 pain scale. Goal: Control of acute pain  Control of acute pain   Outcome: Ongoing  Patient verbalizes having adequate relief from her pain.

## 2018-07-15 NOTE — PLAN OF CARE
Problem: Falls - Risk of:  Goal: Will remain free from falls  Will remain free from falls   Outcome: Ongoing  Bed in lowest position. Side rails x2. Nonskid socks on when out of bed. Room free of clutter. Call light in reach. Video monitor and bed alarm on for safety. Problem: Pain:  Goal: Pain level will decrease  Pain level will decrease   Outcome: Ongoing  PRN pain medication given for c/o abd pain, see mar.

## 2018-07-15 NOTE — PROGRESS NOTES
Results for Ahsan Bennett (MRN [id number]) as of 7/15/2018 19:54   Ref. Range 7/15/2018 18:27   Hemoglobin Quant Latest Ref Range: 12.0 - 16.0 g/dL 7.1 (L)   Hematocrit Latest Ref Range: 36.0 - 48.0 % 20.9 (LL)       Perfect serve sent to Dr. Tono Marshall for advice.

## 2018-07-15 NOTE — PROGRESS NOTES
[address]                                                               [address] [address]                                                       Nicki Hubbard                                                Phone Number: [phone]                                                  Fax Number: [phone]    Nephrology   Progress Note      SUBJECTIVE:  We are following this patient for ESRD complication. HPI:  Last had HD on  with 2 liters removed, post-weight of 53.8 kg. Denies any sob, on trach. ROS:  On tube feeds, no fevers. Scheduled Meds:   potassium phosphate IVPB  10 mmol Intravenous Daily    pantoprazole  40 mg Intravenous BID    meropenem  250 mg Intravenous Q8H    fentanyl  1 patch Transdermal Q72H    fentaNYL  1 patch Transdermal Q72H    QUEtiapine  50 mg Oral Nightly    micafungin  100 mg Intravenous Daily    polyethylene glycol  17 g Oral Daily    sodium chloride flush  10 mL Intravenous 2 times per day    ipratropium-albuterol  1 ampule Inhalation Q4H    sennosides-docusate sodium  1 tablet Oral Daily    cloNIDine  0.1 mg Oral TID    nystatin   Topical BID    chlorhexidine  15 mL Mouth/Throat BID    doxercalciferol  1 mcg Intravenous Once per day on  Sat    darbepoetin emi-polysorbate  100 mcg Intravenous Weekly - Thursday    And    darbepoetin emi-polysorbate  25 mcg Subcutaneous Weekly - Thursday     Continuous Infusions:   dextrose       PRN Meds:. HYDROmorphone, oxyCODONE, diazepam, sodium chloride flush, heparin (porcine), albumin human, dextrose, ondansetron, glucose, glucagon (rDNA), dextrose, acetaminophen, promethazine    Physical Exam:    TEMPERATURE:  Current - Temp: 101 °F (38.3 °C);  Max - Temp  Av.5 °F (37.5 °C)  Min: 98.2 °F (36.8 °C)  Max: 101 °F (38.3 °C)  RESPIRATIONS RANGE: Resp  Av.3  Min: 16  Max: 20  PULSE RANGE: Pulse  Av.6  Min: 101  Max: 118  BLOOD PRESSURE RANGE:  Systolic (87MGS), DSB:401 , Min:123 , AYQ:903   ; Diastolic (89OPU), BFC:76, Min:59, Max:91    24HR INTAKE/OUTPUT:      Intake/Output Summary (Last 24 hours) at 07/15/18 1252  Last data filed at 07/15/18 1443   Gross per 24 hour   Intake           938.08 ml   Output              120 ml   Net           818.08 ml       General appearance: alert, appears stated age and cooperative  Head: Normocephalic, without obvious abnormality, atraumatic  Eyes: PERRL, EOM's intact. Nose: Nares normal.  No drainage or sinus tenderness. Neck:s/p trach  Lungs: clear to auscultation bilaterally  Heart: regular rate and rhythm, S1, S2 tachycardic, holo systolic murmur. Abdomen: soft, tenderness +nt; bowel sounds normal; no masses,  no organomegaly. distended  Extremities: extremities normal, atraumatic, no cyanosis.  1-2+ edema  Skin: Skin color, texture, turgor normal. No rashes or lesions  Neurologic: Grossly normal     Access Exam:  left TDC    LAB DATA:    CBC:   Lab Results   Component Value Date    WBC 14.8 07/15/2018    RBC 2.40 07/15/2018    HGB 7.4 07/15/2018    HCT 21.6 07/15/2018    MCV 87.5 07/15/2018    MCH 30.4 07/15/2018    MCHC 34.8 07/15/2018    RDW 16.3 07/15/2018     07/15/2018    MPV 7.8 07/15/2018     BMP:    Lab Results   Component Value Date     07/15/2018    K 3.4 07/15/2018    K 5.9 06/12/2018    CL 95 07/15/2018    CO2 27 07/15/2018    BUN 10 07/15/2018    CREATININE 2.1 07/15/2018    CALCIUM 8.3 07/15/2018    GFRAA 34 07/15/2018    LABGLOM 28 07/15/2018    GLUCOSE 97 07/15/2018     Ionized Calcium:  No results found for: IONCA  Magnesium:    Lab Results   Component Value Date    MG 1.70 07/09/2018     Phosphorus:    Lab Results   Component Value Date    PHOS 1.6 07/15/2018     U/A:    Lab Results   Component Value Date    COLORU BROWN 07/09/2018    PHUR 8.5 07/09/2018    LABCAST 5-10 Waxy 03/19/2017    WBCUA >100 07/09/2018    RBCUA see below 07/09/2018    YEAST Present

## 2018-07-15 NOTE — PROGRESS NOTES
Continuous Infusions:   dextrose       PRN Meds:  oxyCODONE, HYDROmorphone, diazepam, sodium chloride flush, heparin (porcine), albumin human, dextrose, sodium phosphate IVPB **OR** [DISCONTINUED] sodium phosphate IVPB **OR** [DISCONTINUED] sodium phosphate IVPB **OR** [DISCONTINUED] sodium phosphate IVPB, ondansetron, glucose, glucagon (rDNA), dextrose, acetaminophen, promethazine    Labs:  CBC: Recent Labs      07/13/18   0515  07/14/18   0630   07/14/18   1845  07/15/18   0001  07/15/18   0620   WBC  8.5  11.5*   --    --    --   14.8*   HGB  10.0*  4.8*   < >  8.4*  7.2*  7.3*   HCT  30.0*  14.4*   < >  24.2*  20.9*  21.0*   MCV  88.4  88.1   --    --    --   87.5   PLT  91*  118*   --    --    --   213    < > = values in this interval not displayed. BMP: Recent Labs      07/13/18   0515  07/14/18   0630  07/15/18   0620   NA  136  136  130*   K  3.5  4.0  3.4*   CL  101  102  95*   CO2  25  24  27   PHOS  2.5  3.4  1.6*   BUN  17  27*  10   CREATININE  2.8*  3.9*  2.1*     LIVER PROFILE: No results for input(s): AST, ALT, LIPASE, BILIDIR, BILITOT, ALKPHOS in the last 72 hours. Invalid input(s): AMYLASE,  ALB  PT/INR:   Recent Labs      07/14/18   1230   PROTIME  17.0*   INR  1.49*     APTT: No results for input(s): APTT in the last 72 hours. UA:No results for input(s): NITRITE, COLORU, PHUR, LABCAST, WBCUA, RBCUA, MUCUS, TRICHOMONAS, YEAST, BACTERIA, CLARITYU, SPECGRAV, LEUKOCYTESUR, UROBILINOGEN, BILIRUBINUR, BLOODU, GLUCOSEU, AMORPHOUS in the last 72 hours. Invalid input(s): KETONESU  No results for input(s): PHART, DJI9PRQ, PO2ART in the last 72 hours.   Cultures:   Blood culture 7/10 negative  Blood culture 7/7 negative  Repeat Respiratory 7/11 culture Normal respiratory gino  Peritoneal fluid 7/12 gram-positive cocci  Peritoneal fluid 7/13/18 1+ gram-positive cocci      Films:  No new    Assessment:  · Acute respiratory failure with hypoxemia  · Tricuspid valve infectious endocarditisMRSA  · Postop day #3 from drainage of abdominal abscess  · Polymicrobial bacteremia: MRSA, streptococcus and Enterobacter  · Cavitary pneumonia secondary to septic emboli  · Severe anemia secondary to GI blood loss  · ESRD on HD  · IV drug abuse  · Hepatitis C  · Status post tracheostomy/PEG on 6/27/18  · Left upper lobe PICC placed 7/6/18      Plan:  · Supplemental oxygen to maintain SaO2 >92%; wean as tolerated   · Antibiotics per infectious diseasevancomycin and Merrem  · Surgery following  · Transfuse packed red blood cells per hemoglobin less than 7  · Following serial H&H  · Protonix twice daily  · PT/OT  · Holding anticoagulation

## 2018-07-16 LAB
ALBUMIN SERPL-MCNC: 2.1 G/DL (ref 3.4–5)
ANION GAP SERPL CALCULATED.3IONS-SCNC: 10 MMOL/L (ref 3–16)
ANISOCYTOSIS: ABNORMAL
BANDED NEUTROPHILS RELATIVE PERCENT: 2 % (ref 0–7)
BASOPHILS ABSOLUTE: 0 K/UL (ref 0–0.2)
BASOPHILS RELATIVE PERCENT: 0 %
BUN BLDV-MCNC: 17 MG/DL (ref 7–20)
CALCIUM SERPL-MCNC: 8.7 MG/DL (ref 8.3–10.6)
CHLORIDE BLD-SCNC: 96 MMOL/L (ref 99–110)
CO2: 27 MMOL/L (ref 21–32)
CREAT SERPL-MCNC: 3.1 MG/DL (ref 0.6–1.1)
EOSINOPHILS ABSOLUTE: 0 K/UL (ref 0–0.6)
EOSINOPHILS RELATIVE PERCENT: 0 %
GFR AFRICAN AMERICAN: 22
GFR NON-AFRICAN AMERICAN: 18
GLUCOSE BLD-MCNC: 101 MG/DL (ref 70–99)
GLUCOSE BLD-MCNC: 103 MG/DL (ref 70–99)
GLUCOSE BLD-MCNC: 77 MG/DL (ref 70–99)
GLUCOSE BLD-MCNC: 90 MG/DL (ref 70–99)
GLUCOSE BLD-MCNC: 99 MG/DL (ref 70–99)
HCT VFR BLD CALC: 20.8 % (ref 36–48)
HCT VFR BLD CALC: 21.5 % (ref 36–48)
HCT VFR BLD CALC: 21.8 % (ref 36–48)
HCT VFR BLD CALC: 23.2 % (ref 36–48)
HEMATOLOGY PATH CONSULT: NORMAL
HEMATOLOGY PATH CONSULT: NORMAL
HEMOGLOBIN: 7 G/DL (ref 12–16)
HEMOGLOBIN: 7.2 G/DL (ref 12–16)
HEMOGLOBIN: 7.4 G/DL (ref 12–16)
HEMOGLOBIN: 7.8 G/DL (ref 12–16)
LYMPHOCYTES ABSOLUTE: 4 K/UL (ref 1–5.1)
LYMPHOCYTES RELATIVE PERCENT: 25 %
MCH RBC QN AUTO: 30.5 PG (ref 26–34)
MCHC RBC AUTO-ENTMCNC: 34.4 G/DL (ref 31–36)
MCV RBC AUTO: 88.8 FL (ref 80–100)
MONOCYTES ABSOLUTE: 1.6 K/UL (ref 0–1.3)
MONOCYTES RELATIVE PERCENT: 10 %
NEUTROPHILS ABSOLUTE: 10.4 K/UL (ref 1.7–7.7)
NEUTROPHILS RELATIVE PERCENT: 63 %
PDW BLD-RTO: 15.8 % (ref 12.4–15.4)
PERFORMED ON: ABNORMAL
PERFORMED ON: NORMAL
PHOSPHORUS: 2.4 MG/DL (ref 2.5–4.9)
PLATELET # BLD: 244 K/UL (ref 135–450)
PLATELET SLIDE REVIEW: ADEQUATE
PMV BLD AUTO: 6.9 FL (ref 5–10.5)
POLYCHROMASIA: ABNORMAL
POTASSIUM SERPL-SCNC: 3.5 MMOL/L (ref 3.5–5.1)
RBC # BLD: 2.42 M/UL (ref 4–5.2)
SLIDE REVIEW: ABNORMAL
SODIUM BLD-SCNC: 133 MMOL/L (ref 136–145)
WBC # BLD: 16 K/UL (ref 4–11)

## 2018-07-16 PROCEDURE — 85025 COMPLETE CBC W/AUTO DIFF WBC: CPT

## 2018-07-16 PROCEDURE — 6360000002 HC RX W HCPCS: Performed by: HOSPITALIST

## 2018-07-16 PROCEDURE — 36592 COLLECT BLOOD FROM PICC: CPT

## 2018-07-16 PROCEDURE — 2580000003 HC RX 258: Performed by: INTERNAL MEDICINE

## 2018-07-16 PROCEDURE — 94761 N-INVAS EAR/PLS OXIMETRY MLT: CPT

## 2018-07-16 PROCEDURE — 6360000002 HC RX W HCPCS: Performed by: INTERNAL MEDICINE

## 2018-07-16 PROCEDURE — 99024 POSTOP FOLLOW-UP VISIT: CPT | Performed by: NURSE PRACTITIONER

## 2018-07-16 PROCEDURE — 6370000000 HC RX 637 (ALT 250 FOR IP): Performed by: INTERNAL MEDICINE

## 2018-07-16 PROCEDURE — 80069 RENAL FUNCTION PANEL: CPT

## 2018-07-16 PROCEDURE — 94640 AIRWAY INHALATION TREATMENT: CPT

## 2018-07-16 PROCEDURE — 85014 HEMATOCRIT: CPT

## 2018-07-16 PROCEDURE — 85018 HEMOGLOBIN: CPT

## 2018-07-16 PROCEDURE — 2700000000 HC OXYGEN THERAPY PER DAY

## 2018-07-16 PROCEDURE — 1200000000 HC SEMI PRIVATE

## 2018-07-16 PROCEDURE — 90937 HEMODIALYSIS REPEATED EVAL: CPT

## 2018-07-16 PROCEDURE — C9113 INJ PANTOPRAZOLE SODIUM, VIA: HCPCS | Performed by: INTERNAL MEDICINE

## 2018-07-16 PROCEDURE — 99232 SBSQ HOSP IP/OBS MODERATE 35: CPT | Performed by: INTERNAL MEDICINE

## 2018-07-16 PROCEDURE — 99233 SBSQ HOSP IP/OBS HIGH 50: CPT | Performed by: INTERNAL MEDICINE

## 2018-07-16 PROCEDURE — 83970 ASSAY OF PARATHORMONE: CPT

## 2018-07-16 RX ADMIN — PROMETHAZINE HYDROCHLORIDE 25 MG: 25 TABLET ORAL at 09:52

## 2018-07-16 RX ADMIN — CLONIDINE HYDROCHLORIDE 0.1 MG: 0.1 TABLET ORAL at 19:57

## 2018-07-16 RX ADMIN — Medication 10 ML: at 05:49

## 2018-07-16 RX ADMIN — QUETIAPINE FUMARATE 50 MG: 25 TABLET ORAL at 19:56

## 2018-07-16 RX ADMIN — PANTOPRAZOLE SODIUM 40 MG: 40 INJECTION, POWDER, FOR SOLUTION INTRAVENOUS at 19:57

## 2018-07-16 RX ADMIN — MEROPENEM 250 MG: 500 INJECTION, POWDER, FOR SOLUTION INTRAVENOUS at 05:49

## 2018-07-16 RX ADMIN — IPRATROPIUM BROMIDE AND ALBUTEROL SULFATE 1 AMPULE: .5; 3 SOLUTION RESPIRATORY (INHALATION) at 03:14

## 2018-07-16 RX ADMIN — MEROPENEM 250 MG: 500 INJECTION, POWDER, FOR SOLUTION INTRAVENOUS at 15:11

## 2018-07-16 RX ADMIN — DOXERCALCIFEROL 1 MCG: 2 INJECTION, SOLUTION INTRAVENOUS at 11:45

## 2018-07-16 RX ADMIN — Medication 10 ML: at 01:02

## 2018-07-16 RX ADMIN — Medication 10 ML: at 22:18

## 2018-07-16 RX ADMIN — CLONIDINE HYDROCHLORIDE 0.1 MG: 0.1 TABLET ORAL at 13:17

## 2018-07-16 RX ADMIN — IPRATROPIUM BROMIDE AND ALBUTEROL SULFATE 1 AMPULE: .5; 3 SOLUTION RESPIRATORY (INHALATION) at 16:11

## 2018-07-16 RX ADMIN — OXYCODONE HYDROCHLORIDE 5 MG: 5 SOLUTION ORAL at 03:46

## 2018-07-16 RX ADMIN — Medication 10 ML: at 13:02

## 2018-07-16 RX ADMIN — PANTOPRAZOLE SODIUM 40 MG: 40 INJECTION, POWDER, FOR SOLUTION INTRAVENOUS at 13:02

## 2018-07-16 RX ADMIN — OXYCODONE HYDROCHLORIDE 5 MG: 5 SOLUTION ORAL at 13:18

## 2018-07-16 RX ADMIN — MEROPENEM 250 MG: 500 INJECTION, POWDER, FOR SOLUTION INTRAVENOUS at 22:18

## 2018-07-16 RX ADMIN — PROMETHAZINE HYDROCHLORIDE 25 MG: 25 TABLET ORAL at 16:06

## 2018-07-16 RX ADMIN — Medication 10 ML: at 19:57

## 2018-07-16 RX ADMIN — ACETAMINOPHEN 650 MG: 325 TABLET ORAL at 19:55

## 2018-07-16 RX ADMIN — HYDROMORPHONE HYDROCHLORIDE 1 MG: 1 INJECTION, SOLUTION INTRAMUSCULAR; INTRAVENOUS; SUBCUTANEOUS at 01:01

## 2018-07-16 RX ADMIN — HYDROMORPHONE HYDROCHLORIDE 1 MG: 1 INJECTION, SOLUTION INTRAMUSCULAR; INTRAVENOUS; SUBCUTANEOUS at 22:24

## 2018-07-16 RX ADMIN — HYDROMORPHONE HYDROCHLORIDE 1 MG: 1 INJECTION, SOLUTION INTRAMUSCULAR; INTRAVENOUS; SUBCUTANEOUS at 15:22

## 2018-07-16 RX ADMIN — IPRATROPIUM BROMIDE AND ALBUTEROL SULFATE 1 AMPULE: .5; 3 SOLUTION RESPIRATORY (INHALATION) at 19:52

## 2018-07-16 RX ADMIN — HYDROMORPHONE HYDROCHLORIDE 1 MG: 1 INJECTION, SOLUTION INTRAMUSCULAR; INTRAVENOUS; SUBCUTANEOUS at 07:51

## 2018-07-16 RX ADMIN — HEPARIN SODIUM 3200 UNITS: 1000 INJECTION, SOLUTION INTRAVENOUS; SUBCUTANEOUS at 12:00

## 2018-07-16 RX ADMIN — Medication 10 ML: at 07:51

## 2018-07-16 RX ADMIN — NYSTATIN: 100000 CREAM TOPICAL at 19:56

## 2018-07-16 RX ADMIN — OXYCODONE HYDROCHLORIDE 5 MG: 5 SOLUTION ORAL at 19:55

## 2018-07-16 RX ADMIN — ACETAMINOPHEN 650 MG: 325 TABLET ORAL at 03:42

## 2018-07-16 RX ADMIN — IPRATROPIUM BROMIDE AND ALBUTEROL SULFATE 1 AMPULE: .5; 3 SOLUTION RESPIRATORY (INHALATION) at 23:24

## 2018-07-16 RX ADMIN — PROMETHAZINE HYDROCHLORIDE 25 MG: 25 TABLET ORAL at 03:43

## 2018-07-16 ASSESSMENT — PAIN SCALES - GENERAL
PAINLEVEL_OUTOF10: 8
PAINLEVEL_OUTOF10: 6
PAINLEVEL_OUTOF10: 7
PAINLEVEL_OUTOF10: 8
PAINLEVEL_OUTOF10: 4
PAINLEVEL_OUTOF10: 7
PAINLEVEL_OUTOF10: 8
PAINLEVEL_OUTOF10: 9
PAINLEVEL_OUTOF10: 9
PAINLEVEL_OUTOF10: 6
PAINLEVEL_OUTOF10: 8
PAINLEVEL_OUTOF10: 8
PAINLEVEL_OUTOF10: 7
PAINLEVEL_OUTOF10: 4

## 2018-07-16 ASSESSMENT — PAIN DESCRIPTION - ONSET
ONSET: ON-GOING

## 2018-07-16 ASSESSMENT — PAIN DESCRIPTION - LOCATION
LOCATION: ABDOMEN
LOCATION: ABDOMEN
LOCATION: BACK
LOCATION: ABDOMEN

## 2018-07-16 ASSESSMENT — PAIN DESCRIPTION - DESCRIPTORS
DESCRIPTORS: ACHING;CONSTANT
DESCRIPTORS: ACHING;CONSTANT
DESCRIPTORS: ACHING

## 2018-07-16 ASSESSMENT — PAIN DESCRIPTION - PROGRESSION
CLINICAL_PROGRESSION: NOT CHANGED
CLINICAL_PROGRESSION: GRADUALLY IMPROVING
CLINICAL_PROGRESSION: NOT CHANGED

## 2018-07-16 ASSESSMENT — PAIN DESCRIPTION - PAIN TYPE
TYPE: ACUTE PAIN
TYPE: ACUTE PAIN;SURGICAL PAIN
TYPE: ACUTE PAIN
TYPE: ACUTE PAIN;SURGICAL PAIN
TYPE: ACUTE PAIN;SURGICAL PAIN

## 2018-07-16 ASSESSMENT — PAIN DESCRIPTION - FREQUENCY
FREQUENCY: CONTINUOUS

## 2018-07-16 ASSESSMENT — PAIN DESCRIPTION - ORIENTATION: ORIENTATION: RIGHT;LEFT

## 2018-07-16 NOTE — PROGRESS NOTES
Physical Therapy/Occupational Therapy  Attempting PT/OT tx this AM but is currently off the floor. Will follow up later this date as appropriate. No charge.      Felecia Chung, PT, DPT #[id number]  Penelope Harden OTR/L [id number]

## 2018-07-16 NOTE — PROGRESS NOTES
Shift assessment complete. See flow sheet. Patient resting comfortably in bed. Roxicodone given for pain. Respirations easy and even on trach with 28% FiO2. Medications given per MAR. Mepilex on buttock changed. Pt states no issues or complaints. Call light and bedside table in reach. Will follow.

## 2018-07-16 NOTE — PROGRESS NOTES
Shift assessment completed, see flow sheet. Pt a/o x 4, c/o 9/10 pain to abdomen- medicated with PRN Roxicodone. Pt has a #8 Portex trach- moderate amount of frothy white sputum- tracheal suctioned at this time. Rhythm ST- 106, /96, SpO2 98% on B[place]-by with 28% FiO2. Respirations are easy, even, and unlabored. Bilateral lung sounds diminished. PEG WNL- TF restarted at 30 mL/hr. PICC WNL. CHRISTOFER x2 WNL. Flexi-seal WNL. Abdomen tender, with mild distention, + nausea. Pt does not want SCDs placed back on- pt educated on use and importance of SCDs- Pt continues to refuse. Pt denies any other needs at this time. Call light and bedside table within reach. Will continue to monitor.

## 2018-07-16 NOTE — PROGRESS NOTES
Dr. Kylie Sandoval note states to stop Micafungin since blood cultures were negative for yeast. This order was not discontinued and micafungin was due at 0900. Micafungin held at that time, Dr Lexie Carvajal called for clarification. Telephone order to discontinue Micafungin.

## 2018-07-16 NOTE — PROGRESS NOTES
Pulmonary Progress Note    CC: hypoxemia    Subjective:   Patient has had some frothy sputum production. Intake/Output Summary (Last 24 hours) at 07/16/18 1024  Last data filed at 07/16/18 7497   Gross per 24 hour   Intake             1010 ml   Output               45 ml   Net              965 ml       Exam:   BP (!) 152/97   Pulse 89   Temp 98.7 °F (37.1 °C)   Resp 18   Ht 5' 2\" (1.575 m)   Wt 122 lb 9.2 oz (55.6 kg)   SpO2 98%   BMI 22.42 kg/m²  on 7L   Gen: No distress. Alert. Cachetic. Eyes: PERRL. No sclera icterus. No conjunctival injection. ENT: No discharge. Pharynx clear. Neck: Trachea midline. Normal thyroid. Resp: No accessory muscle use. few crackles. No wheezes. few rhonchi. No dullness on percussion. CV: Regular rate. Regular rhythm. No murmur or rub. No edema. GI: Non-tender. Non-distended. No masses. No organomegaly. Normal bowel sounds. No hernia. Skin: Warm and dry. No nodule on exposed extremities. No rash on exposed extremities. Lymph: No cervical LAD. No supraclavicular LAD. M/S: No cyanosis. No joint deformity. No clubbing. Neuro: Awake. Moves all extremities. CN grossly intact.       Scheduled Meds:   [START ON 7/18/2018] darbepoetin emi-polysorbate  200 mcg Intravenous Weekly    pantoprazole  40 mg Intravenous BID    meropenem  250 mg Intravenous Q8H    fentanyl  1 patch Transdermal Q72H    fentaNYL  1 patch Transdermal Q72H    QUEtiapine  50 mg Oral Nightly    micafungin  100 mg Intravenous Daily    polyethylene glycol  17 g Oral Daily    sodium chloride flush  10 mL Intravenous 2 times per day    ipratropium-albuterol  1 ampule Inhalation Q4H    sennosides-docusate sodium  1 tablet Oral Daily    cloNIDine  0.1 mg Oral TID    nystatin   Topical BID    chlorhexidine  15 mL Mouth/Throat BID    doxercalciferol  1 mcg Intravenous Once per day on Tue Thu Sat     Continuous Infusions:   dextrose       PRN Meds:  HYDROmorphone, oxyCODONE, diazepam, sodium chloride flush, heparin (porcine), albumin human, dextrose, ondansetron, glucose, glucagon (rDNA), dextrose, acetaminophen, promethazine    Labs:  CBC:   Recent Labs      07/14/18   0630   07/15/18   0620   07/15/18   1827  07/15/18   2340  07/16/18   0552   WBC  11.5*   --   14.8*   --    --    --   16.0*   HGB  4.8*   < >  7.3*   < >  7.1*  7.0*  7.4*   HCT  14.4*   < >  21.0*   < >  20.9*  20.8*  21.5*   MCV  88.1   --   87.5   --    --    --   88.8   PLT  118*   --   213   --    --    --   244    < > = values in this interval not displayed. BMP:   Recent Labs      07/14/18   0630  07/15/18   0620  07/16/18   0552   NA  136  130*  133*   K  4.0  3.4*  3.5   CL  102  95*  96*   CO2  24  27  27   PHOS  3.4  1.6*  2.4*   BUN  27*  10  17   CREATININE  3.9*  2.1*  3.1*     LIVER PROFILE: No results for input(s): AST, ALT, LIPASE, BILIDIR, BILITOT, ALKPHOS in the last 72 hours. Invalid input(s): AMYLASE,  ALB  PT/INR:   Recent Labs      07/14/18   1230   PROTIME  17.0*   INR  1.49*     APTT: No results for input(s): APTT in the last 72 hours. UA:No results for input(s): NITRITE, COLORU, PHUR, LABCAST, WBCUA, RBCUA, MUCUS, TRICHOMONAS, YEAST, BACTERIA, CLARITYU, SPECGRAV, LEUKOCYTESUR, UROBILINOGEN, BILIRUBINUR, BLOODU, GLUCOSEU, AMORPHOUS in the last 72 hours. Invalid input(s): KETONESU  No results for input(s): PHART, ICM3TQW, PO2ART in the last 72 hours.   Cultures:   Blood culture 7/10 negative  Blood culture 7/7 negative  Repeat Respiratory 7/11 culture Normal respiratory gino  Peritoneal fluid 7/12 gram-positive cocci  Peritoneal fluid 7/13/18 1+ gram-positive cocci      Films:  No new    Assessment:  · Acute respiratory failure with hypoxemia  · Tricuspid valve infectious endocarditisMRSA  · Postop day #4 from drainage of abdominal abscess  · Polymicrobial bacteremia: MRSA, streptococcus and Enterobacter  · Cavitary pneumonia secondary to septic emboli  · Severe anemia secondary to GI blood

## 2018-07-16 NOTE — PROGRESS NOTES
Shift handoff given to Emperatriz Turpin, [facility]. Pt denies any needs at this time. Care transferred.

## 2018-07-16 NOTE — PROGRESS NOTES
Report received from Carson Ambriz, [address] [facility]. Pt expresses no needs at this time. Care transferred.

## 2018-07-16 NOTE — CARE COORDINATION
INTERDISCIPLINARY PLAN OF CARE CONFERENCE    Date/Time: 7/16/2018 3:00 PM  Completed by: Jemima Urbina, Case Management      Patient Name:  Farhat Galloway  YOB: 1990  Admitting Diagnosis: GI BLEED     Admit Date/Time:  6/6/2018 12:35 AM    Chart reviewed. Interdisciplinary team met to discuss patient progress and discharge plans. Disciplines included Case Management, Nursing, and Dietitian. Current Status:Stable    Anticipated Discharge Date: TBD  Expected D/C Disposition:  LTACH  Confirmed plan with patient and/or family Yes  Discharge Plan Comments: Reviewed chart. Plan continues LTAC -[facility]. Writer spoke with Phong Rodriguez from [facility] and she states she will re-start pre-cert today per Dr. Marilin Donohue request. Harriett Crowe.            Home O2 in place on admit: to [facility]  Pt informed of need to bring portable home O2 tank on day of discharge for nursing to connect prior to leaving:  Not Indicated  Verbalized agreement/Understanding:  Not Indicated

## 2018-07-16 NOTE — PROGRESS NOTES
Panic lab result called. Hematocrit 20.8. FYI Perfect serve sent to Dr Ruben Moreira. Hemoglobin 7.0.

## 2018-07-16 NOTE — PROGRESS NOTES
[address]                                                               [facility] [address]                                                       Nicki Hubbard                                                Phone Number: [phone]                                                  Fax Number: [phone]    Nephrology   Progress Note      SUBJECTIVE:  We are following this patient for ESRD complication. HPI:  Recurrent fevers  Seen at HD on  , toleralating fine  HD on  with 2 liters removed, post-weight of 53.8 kg. Denies any sob, on trach. ROS:  On tube feeds    Scheduled Meds:   [START ON 2018] darbepoetin emi-polysorbate  200 mcg Intravenous Weekly    pantoprazole  40 mg Intravenous BID    meropenem  250 mg Intravenous Q8H    fentanyl  1 patch Transdermal Q72H    fentaNYL  1 patch Transdermal Q72H    QUEtiapine  50 mg Oral Nightly    micafungin  100 mg Intravenous Daily    polyethylene glycol  17 g Oral Daily    sodium chloride flush  10 mL Intravenous 2 times per day    ipratropium-albuterol  1 ampule Inhalation Q4H    sennosides-docusate sodium  1 tablet Oral Daily    cloNIDine  0.1 mg Oral TID    nystatin   Topical BID    chlorhexidine  15 mL Mouth/Throat BID    doxercalciferol  1 mcg Intravenous Once per day on Tue Thu Sat     Continuous Infusions:   dextrose       PRN Meds:. HYDROmorphone, oxyCODONE, diazepam, sodium chloride flush, heparin (porcine), albumin human, dextrose, ondansetron, glucose, glucagon (rDNA), dextrose, acetaminophen, promethazine    Physical Exam:    TEMPERATURE:  Current - Temp: 98.7 °F (37.1 °C);  Max - Temp  Av.8 °F (37.7 °C)  Min: 97 °F (36.1 °C)  Max: 101.2 °F (38.4 °C)  RESPIRATIONS RANGE: Resp  Av.3  Min: 16  Max: 20  PULSE RANGE: Pulse  Av.8  Min: 89  Max: 113  BLOOD PRESSURE RANGE:  Systolic (60NII), JQX:017 , Min:137 , LSN:992   ; Diastolic (46SOY),

## 2018-07-16 NOTE — PROGRESS NOTES
wall edema + drain +  No masses.  Bowel sounds diminished. Mild diffuse tendereness . No hernia. PEG +   Skin: Warm and dry. No nodule on exposed extremities. No rash on exposed extremities  Lymph: No cervical LAD. No supraclavicular LAD. M/S: .  Diffuse edema in all extremities   Neuro - non focal , very weak    Labs:   Recent Labs      07/14/18   0630   07/15/18   0620   07/15/18   1827  07/15/18   2340  07/16/18   0552   WBC  11.5*   --   14.8*   --    --    --   16.0*   HGB  4.8*   < >  7.3*   < >  7.1*  7.0*  7.4*   HCT  14.4*   < >  21.0*   < >  20.9*  20.8*  21.5*   PLT  118*   --   213   --    --    --   244    < > = values in this interval not displayed. Recent Labs      07/14/18   0630  07/15/18   0620  07/16/18   0552   NA  136  130*  133*   K  4.0  3.4*  3.5   CL  102  95*  96*   CO2  24  27  27   BUN  27*  10  17   CREATININE  3.9*  2.1*  3.1*   CALCIUM  8.6  8.3  8.7   PHOS  3.4  1.6*  2.4*     No results for input(s): AST, ALT, BILIDIR, BILITOT, ALKPHOS in the last 72 hours. Recent Labs      07/14/18   1230   INR  1.49*     No results for input(s): Angelika Schaffer in the last 72 hours. Urinalysis:      Lab Results   Component Value Date    NITRU Negative 07/09/2018    WBCUA >100 07/09/2018    BACTERIA see below 07/09/2018    RBCUA see below 07/09/2018    BLOODU LARGE 07/09/2018    SPECGRAV 1.015 07/09/2018    GLUCOSEU Negative 07/09/2018       Radiology:  US GUIDED PARACENTESIS   Final Result   1. Complex peritoneal fluid with thick septations throughout the abdomen in a   pattern that would suggest underlying peritonitis. 2. Fluid is not amenable to complete drainage by paracentesis, nor by   CT-guided drainage. Based upon results of fluid analysis, treatment may   require further antibiotics or surgical consultation. 3. Successful collection of approximately 510 mL of thin, dark brown fluid   sent to the lab for culture and analysis.          CT Chest WO Contrast   Final Result Successful placement of left upper extremity PICC line. This projects at the   right heart border in good position. Occlusion of the right brachiocephalic vein near the confluence precluded   placement of right upper extremity line. IR RESPOSITION PRV CVC W FLUORO   Final Result   Successful placement of left upper extremity PICC line. This projects at the   right heart border in good position. Occlusion of the right brachiocephalic vein near the confluence precluded   placement of right upper extremity line. IR TUNNELED CATHETER PLACEMENT GREATER THAN 5 YEARS   Final Result   Status post successful ultrasound/fluoroscopically guided placement of left   internal jugular tunneled dialysis catheter as described above. XR CHEST PORTABLE   Final Result   Malpositioned right subclavian line. Recommend repositioning. Findings related with patient's nurse Calin Sun at 07/06/2018 at 12:47   p.m. XR ABDOMEN (KUB) (SINGLE AP VIEW)   Final Result   Gaseous prominence of bowel some of which appears to be colonic, but some may   be small bowel. Overall bowel gas pattern is nonspecific, but not   definitively obstructive, may reflect ileus. Progress radiographs may be of   further diagnostic aid, as indicated. XR CHEST PORTABLE   Final Result   1. Line placement without complicating feature. 2. Worsening pulmonary edema and/or superimposed pneumonia. XR CHEST PORTABLE   Final Result   NG tube tip projects over the body of the stomach. XR Acute Abd Series Chest 1 VW   Final Result   Gaseous bowel distention greater in the central small bowel most likely ileus. XR CHEST PORTABLE   Final Result   Stable chest         XR ABDOMEN (KUB) (SINGLE AP VIEW)   Final Result   Nondiagnostic due to paucity of small bowel gas         XR ABDOMEN (KUB) (SINGLE AP VIEW)   Final Result   Multiple mildly dilated loops of small bowel.   Findings may represent ileus   or partial small bowel obstruction. XR CHEST PORTABLE   Final Result   Stable positioning of left central venous catheter. No pneumothorax. Slightly improved aeration with persistent ground-glass opacities in the left   mid lung. XR CHEST PORTABLE   Final Result   Retraction of the left internal jugular CVC with its tip in the expected   region of the brachiocephalic vein      No significant change in the cavitary nodules and bibasilar airspace disease         XR CHEST PORTABLE   Final Result   Tracheostomy tube placement. Left basilar airspace disease most consistent with atelectasis         IR NONTUNNELED VASCULAR CATHETER   Final Result   Successful ultrasound and fluoroscopy guided non-tunneled right femoral vein   temporary dialysis catheter placement. XR CHEST PORTABLE   Final Result   Improving bibasilar airspace disease. Stable cavitary lesions. CT Chest WO Contrast   Final Result   1. Minimal worsening partially cavitating pulmonary nodules and airspace   disease throughout the bilateral lungs consistent with septic emboli. 2. New trace bilateral pleural effusions. 3. Mediastinal adenopathy, likely reactive. 4. Heterogeneous splenomegaly. XR CHEST PORTABLE   Final Result   Stable appearing bibasilar airspace disease. Support tubes and lines as   above. XR CHEST PORTABLE   Final Result   No significant interval change in bilateral airspace disease as compared to   prior. Cavitary pulmonary nodules are again demonstrated. XR CHEST PORTABLE   Final Result   Left mid lung airspace disease is similar to minimally improved as compared   to prior. Persistent basilar atelectasis and cavitary right lung lesions. VL Extremity Venous Bilateral   Final Result      XR CHEST PORTABLE   Final Result   Left IJ central venous line in the superior vena cava with no pneumothorax         US GALLBLADDER RUQ   Final Result   1.  Small amount of complex ascites concerning for hemorrhage. Consider   further evaluation with CT of the abdomen and pelvis. 2. Cholelithiases without evidence of acute cholecystitis. 3. Cirrhosis. XR CHEST PORTABLE   Final Result   Interval increase in right basilar atelectasis. No appreciable change in   appearance of septic pulmonary emboli         XR CHEST PORTABLE   Final Result   Slightly decreased right-sided airspace disease, otherwise stable chest.         XR CHEST PORTABLE   Final Result   Slight worsening of patchy airspace opacity right lower lobe over the past   few days. Relatively stable cavitary nodular lesions both lungs. Tubes and lines remain in good position. XR CHEST PORTABLE   Final Result   No significant change in the bony basilar airspace disease and suspected   septic emboli. XR CHEST PORTABLE   Final Result   1. Stable lines, tubes and support devices. 2. Stable cardiopulmonary status including bilateral airspace opacities. XR CHEST PORTABLE   Final Result   Stable life support devices. No acute interval change regarding bilateral   airspace disease. XR CHEST PORTABLE   Final Result   Stable life support device positioning. No substantial change in multifocal consolidative cavitary airspace disease. XR CHEST PORTABLE   Final Result   Stable chest         XR CHEST PORTABLE   Final Result   Improvement in focus of airspace disease in the left mid lung. Persistent   multifocal cavitary lesions compatible with septic emboli         XR CHEST PORTABLE   Final Result   No significant interval change. CT ABDOMEN PELVIS WO CONTRAST Additional Contrast? Radiologist Recommendation   Final Result   Development of moderate diffuse ascites since the prior study. Mosaic   appearance of the spleen either due to multiple splenic infarcts or possible   splenic abscesses. Question of perihepatic and subhepatic adhesions.    Chronic gallbladder disease. Bibasilar pneumonias and pulmonary nodules   consistent with septic emboli. Mild anasarca. RECOMMENDATIONS:   NG tube should be pulled back about 4 cm since it is pressing against the   anterior stomach wall. Gallbladder ultrasound suggested for evaluation of gallbladder disease. Diagnostic Ultrasound-guided paracentesis may be helpful. CT Chest WO Contrast   Final Result   Multiple scattered cavitary and solid nodules scattered throughout the lungs   suspected to be from septic emboli. The emboli may be from recurrent   endocarditis, IV drug abuse, or infected DVT. Scattered focal pneumonia in   the lingula, right middle lobe, and right lower lobe. Trace bilateral   pleural effusions. Tip of the endotracheal tube lying near the tee. Moderate ascites seen in the upper abdomen. Possible multiple splenic   infarcts or abscesses. Please refer to the dictation of the CT scan of the   abdomen and pelvis. RECOMMENDATIONS:   Endotracheal tube should be pulled back 1-2 cm. CT Head WO Contrast   Final Result   No acute intracranial abnormality. XR CHEST PORTABLE   Final Result   Moderate lingular opacity favored to be pneumonia. Moderate opacity in the   right lung base probably also pneumonia. Development of multiple pulmonary   nodules presumed to be of an infectious or least inflammatory etiology. Low   lung volumes. Some improvement in the appearance of the chest since   yesterday. RECOMMENDATION:   CT scan of the chest with contrast recommended for further evaluation. XR ABDOMEN (KUB) (SINGLE AP VIEW)   Final Result   Probable mild nonobstructive ileus. Moderate to large amount of stool in the   left colon. XR CHEST PORTABLE   Final Result   Improving bilateral airspace disease. XR CHEST PORTABLE   Final Result   Worsening bilateral airspace disease, probably pneumonia.          XR CHEST PORTABLE   Final Result  for increasing abd distention and pain   - remains on mechanical ventilation, with spontaneous breathing trials. pulm managing  - ct chest with no new findings except for septic emboli and cavitory pneumonia. Repeat CT chest on 6/25/18 shows worsening of cavitating pulmonary nodules and airspacedisease consistent with septic emboli. Reactive mediastinal adenopathy noted  - ct abd with no acute findings   - Off precedex drip.  Off Versed and fentanyl gtt . On fentanyl patch now. - IV antibiotics- currently on merrem/micafungin. Stop micafungin  - BAL. Respiratory cultures showed MRSA  - S/P PEG and tracheostomy  6/27. Stable now on Trach collar  - wean oxygen as able   Now on IV vanc and meropenem        #  Tricuspid valve endocarditis- recurrent    with bacteremia   - history of IV drug abuse. - She is growing MRSA, strep pneumoniae and Enterobacter cloacae. - ID involved  - Large pedunculated vegetation on septal leaflet of TV   Previously had MSSA TV endocarditis  - vancomycin and cefepime and zyvox was added   - Improving wbc   Abx per ID    Fevers still persistent , added merrem and micafungin. micafungin d/c ed   - ct chest repeated , improving pneumonia    #Abd abscess vs infected hematoma  - ct abd repeated with increasing ascites , cx from abd fluid s/o abscess  - s/p ex - lap 7/13/18 by surgery, appears to have infected hematoma vs abscess  - cx sent, await final result     #  End stage renal disease on HD  -Was on CRRT - from 6/8/18 to  6/22/18  -Now on HD since 6/23/18.   -TDC removed 6/25/18 due to persistent fevers.  New TDC placed on 7/5/18  - Continue HD         # DIC   -Sec to severe sepsis , off steroids  -Hematology f/w , improved hb and platelets,      #  Acute upper GI bleed  - s/p EGD -Three angiodysplastic spots at the junction of the body and the fundus area that was cauterized during endoscopy.   - s/p multiple PRBC transfusions.  -  h/h dropped from 10-->4.8 post OR for exploratory

## 2018-07-17 LAB
ALBUMIN SERPL-MCNC: 2.1 G/DL (ref 3.4–5)
ANAEROBIC CULTURE: NORMAL
ANION GAP SERPL CALCULATED.3IONS-SCNC: 9 MMOL/L (ref 3–16)
ANISOCYTOSIS: ABNORMAL
ATYPICAL LYMPHOCYTE RELATIVE PERCENT: 0 % (ref 0–6)
BANDED NEUTROPHILS RELATIVE PERCENT: 1 % (ref 0–7)
BASOPHILS ABSOLUTE: 0 K/UL (ref 0–0.2)
BASOPHILS RELATIVE PERCENT: 0 %
BUN BLDV-MCNC: 13 MG/DL (ref 7–20)
CALCIUM SERPL-MCNC: 8.2 MG/DL (ref 8.3–10.6)
CHLORIDE BLD-SCNC: 101 MMOL/L (ref 99–110)
CO2: 23 MMOL/L (ref 21–32)
CREAT SERPL-MCNC: 2 MG/DL (ref 0.6–1.1)
EOSINOPHILS ABSOLUTE: 0.1 K/UL (ref 0–0.6)
EOSINOPHILS RELATIVE PERCENT: 1 %
GFR AFRICAN AMERICAN: 36
GFR NON-AFRICAN AMERICAN: 30
GLUCOSE BLD-MCNC: 101 MG/DL (ref 70–99)
GLUCOSE BLD-MCNC: 106 MG/DL (ref 70–99)
GLUCOSE BLD-MCNC: 106 MG/DL (ref 70–99)
GLUCOSE BLD-MCNC: 90 MG/DL (ref 70–99)
GLUCOSE BLD-MCNC: 96 MG/DL (ref 70–99)
GLUCOSE BLD-MCNC: 98 MG/DL (ref 70–99)
GRAM STAIN RESULT: ABNORMAL
HCT VFR BLD CALC: 21.5 % (ref 36–48)
HCT VFR BLD CALC: 22.7 % (ref 36–48)
HCT VFR BLD CALC: 22.9 % (ref 36–48)
HEMOGLOBIN: 7.1 G/DL (ref 12–16)
HEMOGLOBIN: 7.5 G/DL (ref 12–16)
HEMOGLOBIN: 7.6 G/DL (ref 12–16)
LYMPHOCYTES ABSOLUTE: 2.3 K/UL (ref 1–5.1)
LYMPHOCYTES RELATIVE PERCENT: 17 %
MCH RBC QN AUTO: 33.3 PG (ref 26–34)
MCHC RBC AUTO-ENTMCNC: 33.5 G/DL (ref 31–36)
MCV RBC AUTO: 99.4 FL (ref 80–100)
MONOCYTES ABSOLUTE: 1.3 K/UL (ref 0–1.3)
MONOCYTES RELATIVE PERCENT: 10 %
NEUTROPHILS ABSOLUTE: 9.6 K/UL (ref 1.7–7.7)
NEUTROPHILS RELATIVE PERCENT: 71 %
NUCLEATED RED BLOOD CELLS: 1 /100 WBC
ORGANISM: ABNORMAL
PARATHYROID HORMONE INTACT: 34.6 PG/ML (ref 14–72)
PDW BLD-RTO: 16.3 % (ref 12.4–15.4)
PERFORMED ON: ABNORMAL
PERFORMED ON: NORMAL
PERFORMED ON: NORMAL
PHOSPHORUS: 2.7 MG/DL (ref 2.5–4.9)
PLATELET # BLD: 291 K/UL (ref 135–450)
PLATELET SLIDE REVIEW: ADEQUATE
PMV BLD AUTO: 7.6 FL (ref 5–10.5)
POIKILOCYTES: ABNORMAL
POLYCHROMASIA: ABNORMAL
POTASSIUM SERPL-SCNC: 4.4 MMOL/L (ref 3.5–5.1)
RBC # BLD: 2.29 M/UL (ref 4–5.2)
SLIDE REVIEW: ABNORMAL
SODIUM BLD-SCNC: 133 MMOL/L (ref 136–145)
VANCOMYCIN RANDOM: 13.6 UG/ML
WBC # BLD: 13.3 K/UL (ref 4–11)
WOUND/ABSCESS: ABNORMAL
WOUND/ABSCESS: ABNORMAL

## 2018-07-17 PROCEDURE — 85018 HEMOGLOBIN: CPT

## 2018-07-17 PROCEDURE — 6370000000 HC RX 637 (ALT 250 FOR IP): Performed by: INTERNAL MEDICINE

## 2018-07-17 PROCEDURE — 6360000002 HC RX W HCPCS: Performed by: INTERNAL MEDICINE

## 2018-07-17 PROCEDURE — 80069 RENAL FUNCTION PANEL: CPT

## 2018-07-17 PROCEDURE — 97530 THERAPEUTIC ACTIVITIES: CPT

## 2018-07-17 PROCEDURE — 2700000000 HC OXYGEN THERAPY PER DAY

## 2018-07-17 PROCEDURE — 94762 N-INVAS EAR/PLS OXIMTRY CONT: CPT

## 2018-07-17 PROCEDURE — 2580000003 HC RX 258: Performed by: INTERNAL MEDICINE

## 2018-07-17 PROCEDURE — 99024 POSTOP FOLLOW-UP VISIT: CPT | Performed by: NURSE PRACTITIONER

## 2018-07-17 PROCEDURE — 36415 COLL VENOUS BLD VENIPUNCTURE: CPT

## 2018-07-17 PROCEDURE — C9113 INJ PANTOPRAZOLE SODIUM, VIA: HCPCS | Performed by: INTERNAL MEDICINE

## 2018-07-17 PROCEDURE — 99233 SBSQ HOSP IP/OBS HIGH 50: CPT | Performed by: INTERNAL MEDICINE

## 2018-07-17 PROCEDURE — 80202 ASSAY OF VANCOMYCIN: CPT

## 2018-07-17 PROCEDURE — 97535 SELF CARE MNGMENT TRAINING: CPT

## 2018-07-17 PROCEDURE — 94640 AIRWAY INHALATION TREATMENT: CPT

## 2018-07-17 PROCEDURE — 36592 COLLECT BLOOD FROM PICC: CPT

## 2018-07-17 PROCEDURE — 99232 SBSQ HOSP IP/OBS MODERATE 35: CPT | Performed by: INTERNAL MEDICINE

## 2018-07-17 PROCEDURE — 85025 COMPLETE CBC W/AUTO DIFF WBC: CPT

## 2018-07-17 PROCEDURE — 6360000002 HC RX W HCPCS: Performed by: HOSPITALIST

## 2018-07-17 PROCEDURE — 85014 HEMATOCRIT: CPT

## 2018-07-17 PROCEDURE — 1200000000 HC SEMI PRIVATE

## 2018-07-17 RX ORDER — QUETIAPINE FUMARATE 50 MG/1
50 TABLET, FILM COATED ORAL NIGHTLY
Qty: 30 TABLET | Refills: 0
Start: 2018-07-17

## 2018-07-17 RX ORDER — OXYCODONE HCL 5 MG/5 ML
5 SOLUTION, ORAL ORAL EVERY 6 HOURS PRN
Qty: 20 ML | Refills: 0 | Status: SHIPPED | OUTPATIENT
Start: 2018-07-17 | End: 2018-07-19

## 2018-07-17 RX ADMIN — IPRATROPIUM BROMIDE AND ALBUTEROL SULFATE 1 AMPULE: .5; 3 SOLUTION RESPIRATORY (INHALATION) at 07:09

## 2018-07-17 RX ADMIN — CLONIDINE HYDROCHLORIDE 0.1 MG: 0.1 TABLET ORAL at 09:16

## 2018-07-17 RX ADMIN — OXYCODONE HYDROCHLORIDE 5 MG: 5 SOLUTION ORAL at 22:41

## 2018-07-17 RX ADMIN — Medication 10 ML: at 12:46

## 2018-07-17 RX ADMIN — Medication 10 ML: at 09:16

## 2018-07-17 RX ADMIN — OXYCODONE HYDROCHLORIDE 5 MG: 5 SOLUTION ORAL at 09:16

## 2018-07-17 RX ADMIN — IPRATROPIUM BROMIDE AND ALBUTEROL SULFATE 1 AMPULE: .5; 3 SOLUTION RESPIRATORY (INHALATION) at 15:28

## 2018-07-17 RX ADMIN — PROMETHAZINE HYDROCHLORIDE 25 MG: 25 TABLET ORAL at 15:27

## 2018-07-17 RX ADMIN — IPRATROPIUM BROMIDE AND ALBUTEROL SULFATE 1 AMPULE: .5; 3 SOLUTION RESPIRATORY (INHALATION) at 11:10

## 2018-07-17 RX ADMIN — NYSTATIN: 100000 CREAM TOPICAL at 09:29

## 2018-07-17 RX ADMIN — VANCOMYCIN HYDROCHLORIDE 500 MG: 500 INJECTION, POWDER, LYOPHILIZED, FOR SOLUTION INTRAVENOUS at 11:16

## 2018-07-17 RX ADMIN — NYSTATIN: 100000 CREAM TOPICAL at 23:14

## 2018-07-17 RX ADMIN — Medication 10 ML: at 17:36

## 2018-07-17 RX ADMIN — OXYCODONE HYDROCHLORIDE 5 MG: 5 SOLUTION ORAL at 02:34

## 2018-07-17 RX ADMIN — CLONIDINE HYDROCHLORIDE 0.1 MG: 0.1 TABLET ORAL at 14:00

## 2018-07-17 RX ADMIN — MEROPENEM 250 MG: 500 INJECTION, POWDER, FOR SOLUTION INTRAVENOUS at 22:40

## 2018-07-17 RX ADMIN — CHLORHEXIDINE GLUCONATE 0.12% ORAL RINSE 15 ML: 1.2 LIQUID ORAL at 09:29

## 2018-07-17 RX ADMIN — MEROPENEM 250 MG: 500 INJECTION, POWDER, FOR SOLUTION INTRAVENOUS at 05:24

## 2018-07-17 RX ADMIN — ACETAMINOPHEN 650 MG: 325 TABLET ORAL at 18:55

## 2018-07-17 RX ADMIN — PANTOPRAZOLE SODIUM 40 MG: 40 INJECTION, POWDER, FOR SOLUTION INTRAVENOUS at 22:41

## 2018-07-17 RX ADMIN — HYDROMORPHONE HYDROCHLORIDE 1 MG: 1 INJECTION, SOLUTION INTRAMUSCULAR; INTRAVENOUS; SUBCUTANEOUS at 11:31

## 2018-07-17 RX ADMIN — CHLORHEXIDINE GLUCONATE 0.12% ORAL RINSE 15 ML: 1.2 LIQUID ORAL at 22:41

## 2018-07-17 RX ADMIN — Medication 10 ML: at 11:31

## 2018-07-17 RX ADMIN — IPRATROPIUM BROMIDE AND ALBUTEROL SULFATE 1 AMPULE: .5; 3 SOLUTION RESPIRATORY (INHALATION) at 19:02

## 2018-07-17 RX ADMIN — CLONIDINE HYDROCHLORIDE 0.1 MG: 0.1 TABLET ORAL at 22:40

## 2018-07-17 RX ADMIN — PANTOPRAZOLE SODIUM 40 MG: 40 INJECTION, POWDER, FOR SOLUTION INTRAVENOUS at 09:16

## 2018-07-17 RX ADMIN — HYDROMORPHONE HYDROCHLORIDE 1 MG: 1 INJECTION, SOLUTION INTRAMUSCULAR; INTRAVENOUS; SUBCUTANEOUS at 05:24

## 2018-07-17 RX ADMIN — Medication 10 ML: at 22:41

## 2018-07-17 RX ADMIN — OXYCODONE HYDROCHLORIDE 5 MG: 5 SOLUTION ORAL at 15:24

## 2018-07-17 RX ADMIN — MEROPENEM 250 MG: 500 INJECTION, POWDER, FOR SOLUTION INTRAVENOUS at 15:22

## 2018-07-17 RX ADMIN — QUETIAPINE FUMARATE 50 MG: 25 TABLET ORAL at 22:40

## 2018-07-17 RX ADMIN — Medication 1 MG: at 17:36

## 2018-07-17 RX ADMIN — IPRATROPIUM BROMIDE AND ALBUTEROL SULFATE 1 AMPULE: .5; 3 SOLUTION RESPIRATORY (INHALATION) at 03:25

## 2018-07-17 RX ADMIN — Medication 10 ML: at 05:24

## 2018-07-17 ASSESSMENT — PAIN DESCRIPTION - FREQUENCY
FREQUENCY: CONTINUOUS

## 2018-07-17 ASSESSMENT — PAIN DESCRIPTION - ORIENTATION
ORIENTATION: LOWER

## 2018-07-17 ASSESSMENT — PAIN SCALES - GENERAL
PAINLEVEL_OUTOF10: 8
PAINLEVEL_OUTOF10: 10
PAINLEVEL_OUTOF10: 8
PAINLEVEL_OUTOF10: 8
PAINLEVEL_OUTOF10: 7
PAINLEVEL_OUTOF10: 8
PAINLEVEL_OUTOF10: 7
PAINLEVEL_OUTOF10: 10
PAINLEVEL_OUTOF10: 7
PAINLEVEL_OUTOF10: 4

## 2018-07-17 ASSESSMENT — PAIN DESCRIPTION - PROGRESSION
CLINICAL_PROGRESSION: NOT CHANGED
CLINICAL_PROGRESSION: NOT CHANGED
CLINICAL_PROGRESSION: GRADUALLY IMPROVING
CLINICAL_PROGRESSION: GRADUALLY WORSENING

## 2018-07-17 ASSESSMENT — PAIN DESCRIPTION - DESCRIPTORS
DESCRIPTORS: ACHING

## 2018-07-17 ASSESSMENT — PAIN DESCRIPTION - PAIN TYPE
TYPE: ACUTE PAIN

## 2018-07-17 ASSESSMENT — PAIN DESCRIPTION - LOCATION
LOCATION: ABDOMEN;BACK

## 2018-07-17 ASSESSMENT — PAIN DESCRIPTION - ONSET
ONSET: ON-GOING

## 2018-07-17 NOTE — PROGRESS NOTES
Inpatient Physical Therapy Daily Treatment Note    Unit: PCU   Date:  7/17/2018  Patient Name:    Mychal Powers  Admitting diagnosis:  GI BLEED  Admit Date:  6/6/2018  Precautions/Restrictions:  Cardiac arrest with intubation 6/8-6/11 & re-intubated on 6/12-6/27; tracheostomy (6/27/18), CRRT 6/8-6/23, now on MWF HD; contact (MRSA), LIJ CVC, rectal tube, PEG (6/27/18), trach collar with 8L O2, L PICC, fall risk     Discharge Recommendations: LTACH  AM-PAC Score: 11  DME needs at discharge: Defer to facility     Treatment Time: 14:00 - 14:45  Treatment Number:  8    Subjective    Pt. Supine in bed with no family present. Pt. agreeable to this PT/OT tx with encouragement. Pt's RN present providing suction prior to initiating tx. Pt remained motivated through tx to complete all intended tasks. Pain    C/o pain in LEs when attempting to stand; appears comfortable in bed at end of tx. Bed Mobility   Supine to Sit: mod A x2 with HOB raised and use of side rail  Sit to Supine: mod A x2  Rolling: CGA to each side with use of side rail  Scooting: min A at EOB; max A x2 to [facility]  Bridging: attempted but unable to lift up    Transfer Training   Sit to stand: mod A x2 from elevated EOB, 2 attempts; unable to achieved full upright posture  Stand to sit: mod A x2 for controlled descent to EOB  Bed to Chair: NT; pt was able to slightly pivot on B feet to position hips further up in bed prior to sitting down, with mod Ax2    Gait Training deferred, not appropriate at this time    Therapeutic Exercise   LAQ: x8 B    Balance     Sitting: good (-), pt sat at EOB appox. 15 min with SBA  Standing: poor, pt stood for 2 brief periods, <15 sec. Each, with mod A x2  Patient Education       PT role, POC, safety  Positioning Needs       Pt in bed, alarm active, call light and needs in reach. Activity Tolerance     Pt c/o fatigue and pain as tx progressed but appeared to tolerate the activity well.    Assessment   Patient

## 2018-07-17 NOTE — PROGRESS NOTES
Nutrition Assessment    Type and Reason for Visit: Reassess    Nutrition Recommendations:   1. Continue to titrate Nepro towards goal rate of 50 ml/hr x 20 hours, as tolerated by patient, until goal rate can be achieved and maintained. Water flushes, per nephrology. 2. Monitor TF rate, intake, and tolerance + water flushes. 3. Monitor respiratory status, GI distress symptoms, and plan of care (LTAC pre-cert process started again on 7/16). 4. Monitor for additional in-patient weight changes during remainder of admission. 5. Monitor nutrition-related labs, rectal tube output, CHRISTOFER x 2 drainage output, and fluid/electrolyte management. Malnutrition Assessment:  · Malnutrition Status: Meets the criteria for severe malnutrition  · Context: Acute illness or injury  · Findings of the 6 clinical characteristics of malnutrition (Minimum of 2 out of 6 clinical characteristics is required to make the diagnosis of moderate or severe Protein Calorie Malnutrition based on AND/ASPEN Guidelines):  1. Energy Intake-Less than or equal to 50%, greater than 7 days    2. Weight Loss-7.5% loss or greater (- 10# or 7.7% weight loss),  (x 41 days admission)  3. Fat Loss-Mild subcutaneous fat loss, Orbital, Triceps  4. Muscle Loss-Moderate muscle mass loss, Temples (temporalis muscle)  5. Fluid Accumulation-No significant fluid accumulation, Generalized (trace edema)  6.   Strength-Not measured    Nutrition Diagnosis:   · Problem: Severe malnutrition  · Etiology: related to Impaired respiratory function-inability to consume food, Insufficient energy/nutrient consumption, Alteration in GI function, Psychological cause/life stress, Renal dysfunction, Nutrition support - EN     Signs and symptoms:  as evidenced by NPO status due to medical condition, Nutrition support - EN, Diet history of poor intake, Presence of wounds, Moderate muscle loss, Lab values, GI abnormality    Nutrition Assessment:  · Subjective Assessment:

## 2018-07-17 NOTE — PROGRESS NOTES
Shift handoff given to Feliciano Awan, RN. Pt resting with eyes closed. No s/s of distress noted. Care transferred.

## 2018-07-17 NOTE — PROGRESS NOTES
ID Follow-up NOTE    CC: tricuspid valve endocarditis    Subjective:     Patient on trach collar. Seems to deny belly pain, chest pain or headache. Denies SOB. Is taking ice chips. Objective:     Patient Vitals for the past 24 hrs:   BP Temp Temp src Pulse Resp SpO2 Weight   18 0734 (!) 146/97 100.1 °F (37.8 °C) Oral 101 18 99 % -   18 0710 - - - - 18 100 % -   18 0459 139/79 97.9 °F (36.6 °C) Axillary 89 20 99 % 120 lb 9.5 oz (54.7 kg)   18 0325 - - - - 20 100 % -   18 0045 132/89 98.1 °F (36.7 °C) Axillary 97 20 99 % -   18 2325 - - - - - 98 % -   18 1953 - - - - - 96 % -   18 1944 (!) 155/78 100.2 °F (37.9 °C) Oral 97 20 99 % -   18 1612 - - - - - 99 % -   18 1524 (!) 155/103 99.4 °F (37.4 °C) Oral 93 20 99 % -   18 1300 (!) 153/96 99.7 °F (37.6 °C) Oral 106 18 98 % -   18 1210 (!) 157/90 98.8 °F (37.1 °C) - 107 - - 114 lb 10.2 oz (52 kg)     Temp (24hrs), Av.2 °F (37.3 °C), Min:97.9 °F (36.6 °C), Max:100.2 °F (37.9 °C)          EXAM:  General: awake, follows commands on trach collar; occasional low grade fever; writes questions and answers to questions on paper pad. ENT:  pupils equal reactive; no icterus  Neck: s nodes,       LUNGS: coarse BS bilat; few rhonchi; does not appear to be in respiratory distress   CV: RR c gd III syst M lower LSB     ABD: soft; very mild R sided tenderness. EXT: R foot mildly edematous.  DP pulses intact     Data Review:    Lab Results   Component Value Date    WBC 13.3 (H) 2018    HGB 7.6 (L) 2018    HCT 22.7 (L) 2018    MCV 99.4 2018     2018     Lab Results   Component Value Date    CREATININE 2.0 (H) 2018    BUN 13 2018     (L) 2018    K 4.4 2018     2018    CO2 23 2018     Lab Results   Component Value Date    ALT <5 (L) 2018    AST 8 (L) 2018    ALKPHOS 153 (H) 2018    BILITOT 1.0 2018 vanc random level 23    MICRO:  blood cultures both grew MRSA. One culture also grew strep while the other culture grew Enterobacter sensitive to cefepime. Blood cultures over the last 5 days are NGSF. fungus blood culture negative; line tip culture negative from . Respiratory culture pending; ascitic fluid bloody with 20,000 WBC and gm pos cocci, consistent with infected hematoma. Culture from OR: rare CNS  IMAGIN/11 chest CT: less cavitation   abd CT: ascites. Question of splenic abscess or infarct. Assessment:     Tricuspid valve endocarditis with septic pulmonary emboli (L sided endocarditis not ruled out) due to MRSA. Strep and Enterobacter were also isolated from admission blood cultures, significance uncertain: I would think this illness is due to MRSA. Moderate to severe tricuspid regurgitation with vegetation. Exploratory lap findings consistent with infected intrabdominal hematoma. possible splenic infarct or abscess. s/p arrest x2. Still has slight leukocytosis, and low grade fever. Coagulase negative Staph cultured from infected hematoma: presumably this is a contaminant since it seems more likely that MRSA was the true cause of the patient's intrabdominal infection. Plan:   Continue vancomycin: 500 mg at end of dialysis three times a week for 4 more doses  Pt has been switched to MWF dialysis  Precert for LTAC pending.  .   Continue meropenem till tomorrow to complete one week course of therapy, then stop.   micafungin stopped

## 2018-07-17 NOTE — PROGRESS NOTES
Result   Stable positioning of left central venous catheter. No pneumothorax. Slightly improved aeration with persistent ground-glass opacities in the left   mid lung. XR CHEST PORTABLE   Final Result   Retraction of the left internal jugular CVC with its tip in the expected   region of the brachiocephalic vein      No significant change in the cavitary nodules and bibasilar airspace disease         XR CHEST PORTABLE   Final Result   Tracheostomy tube placement. Left basilar airspace disease most consistent with atelectasis         IR NONTUNNELED VASCULAR CATHETER   Final Result   Successful ultrasound and fluoroscopy guided non-tunneled right femoral vein   temporary dialysis catheter placement. XR CHEST PORTABLE   Final Result   Improving bibasilar airspace disease. Stable cavitary lesions. CT Chest WO Contrast   Final Result   1. Minimal worsening partially cavitating pulmonary nodules and airspace   disease throughout the bilateral lungs consistent with septic emboli. 2. New trace bilateral pleural effusions. 3. Mediastinal adenopathy, likely reactive. 4. Heterogeneous splenomegaly. XR CHEST PORTABLE   Final Result   Stable appearing bibasilar airspace disease. Support tubes and lines as   above. XR CHEST PORTABLE   Final Result   No significant interval change in bilateral airspace disease as compared to   prior. Cavitary pulmonary nodules are again demonstrated. XR CHEST PORTABLE   Final Result   Left mid lung airspace disease is similar to minimally improved as compared   to prior. Persistent basilar atelectasis and cavitary right lung lesions. VL Extremity Venous Bilateral   Final Result      XR CHEST PORTABLE   Final Result   Left IJ central venous line in the superior vena cava with no pneumothorax         US GALLBLADDER RUQ   Final Result   1. Small amount of complex ascites concerning for hemorrhage.   Consider   further evaluation with CT of the abdomen and pelvis. 2. Cholelithiases without evidence of acute cholecystitis. 3. Cirrhosis. XR CHEST PORTABLE   Final Result   Interval increase in right basilar atelectasis. No appreciable change in   appearance of septic pulmonary emboli         XR CHEST PORTABLE   Final Result   Slightly decreased right-sided airspace disease, otherwise stable chest.         XR CHEST PORTABLE   Final Result   Slight worsening of patchy airspace opacity right lower lobe over the past   few days. Relatively stable cavitary nodular lesions both lungs. Tubes and lines remain in good position. XR CHEST PORTABLE   Final Result   No significant change in the bony basilar airspace disease and suspected   septic emboli. XR CHEST PORTABLE   Final Result   1. Stable lines, tubes and support devices. 2. Stable cardiopulmonary status including bilateral airspace opacities. XR CHEST PORTABLE   Final Result   Stable life support devices. No acute interval change regarding bilateral   airspace disease. XR CHEST PORTABLE   Final Result   Stable life support device positioning. No substantial change in multifocal consolidative cavitary airspace disease. XR CHEST PORTABLE   Final Result   Stable chest         XR CHEST PORTABLE   Final Result   Improvement in focus of airspace disease in the left mid lung. Persistent   multifocal cavitary lesions compatible with septic emboli         XR CHEST PORTABLE   Final Result   No significant interval change. CT ABDOMEN PELVIS WO CONTRAST Additional Contrast? Radiologist Recommendation   Final Result   Development of moderate diffuse ascites since the prior study. Mosaic   appearance of the spleen either due to multiple splenic infarcts or possible   splenic abscesses. Question of perihepatic and subhepatic adhesions. Chronic gallbladder disease.   Bibasilar pneumonias and pulmonary nodules   consistent with septic than repositioning of the endotracheal tube. XR CHEST PORTABLE   Final Result   The endotracheal tube needs retracted approximately 3 cm. Satisfactory position right IJ tunnel catheter and left central venous   catheter. Increased size of the multifocal nodular lung opacities presumed multifocal   pneumonia. Findings were discussed with ICU nurse caring for the patient at 2:21 pm on   6/8/2018. XR CHEST PORTABLE   Final Result   Multifocal pneumonia. XR FOOT RIGHT (2 VIEWS)   Final Result   Lucency through the medial sesamoid most consistent with bipartite medial   sesamoid in the absence of history of trauma. This can be associated with   pain. CTA ABDOMEN PELVIS W WO CONTRAST   Final Result   No active gastrointestinal hemorrhage is identified. No vascular abnormality   is appreciated. Right lower lobe pneumonia as well as cavitary nodules. Septic emboli are   consideration. Compared with 11/26/2017, there is waxing and waning airspace   disease and pulmonary nodules. Organizing pneumonia is an alternative   possibility. Hepatosplenomegaly. Hepatomegaly is similar to 11/26/2017. Splenomegaly is   new. Hypodensity in the spleen, suggestive of acute to subacute embolic   infarction. Small amount of ascites, nonspecific. Hyperdensity of the gallbladder wall. This may represent mural   calcification. Etiology is unclear. There is variable association with   gallbladder wall calcification and risk for gallbladder carcinoma. Stable cardiomegaly. Lumbar spine and visualized osseous structures appear intact. NM GI BLOOD LOSS   Final Result   No evidence of active GI bleeding during acquisition. RECOMMENDATIONS:   If the patient shows hemodynamic signs of an active bleed in the next 20   hours, additional images can be acquired.          XR CHEST PORTABLE   Final Result   Patchy airspace disease in the right lung, pneumonia versus atelectasis   versus less likely edema. That should be followed to resolution. Borderline pulmonary vascular congestion. 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]     Ileus ([address] [K56.7]     Acute blood loss anemia [D62]     Hyponatremia [E87.1]     MRSA bacteremia [R78.81]     Persistent fever [R50.9]     Fever [R50.9]     Mucus plugging of bronchi [J98.09]     Cavitary lung disease [J98.4]     Bacteremia [R78.81]     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] 06/14/2018    Acute respiratory failure with hypoxia (HCC) [J96.01] 06/11/2018    DIC (disseminated intravascular coagulation) ([address] [D65]     PEA (Pulseless electrical activity) ([address] [I46.9]     Severe protein-calorie malnutrition ([address] [E43] 06/07/2018    HCAP (healthcare-associated pneumonia) [J18.9] 06/06/2018    Upper GI bleed [K92.2] 06/06/2018    Sepsis due to Streptococcus pneumoniae ([address] [A40.3]     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 Vasopressin  - wbc improved from 60 K  - critical care and ID involved. WBC down to 6 K today.   - fevers intermittent still persistent  -  CT chest on 6/25, showed worsening airspace disease-consistent with septic emboli  - tunneled dialysis catheter removed on 6/25/18, new one placed  - abx per ID - see below         #Acute resp failure/cavitary pneumonia    Due to MRSA infection  - s.p PEA arrest, off vent  6/11- reintubated on 6/12/18,  for increasing abd distention and pain   - rmonitor h         # Acute on chr Anemia   - sec to sepsis, iatrogenic, GI bleed  -Prn mulitple transfusions given  - received blood transfusion and hb improved from 4.8-->8.4 post blood transfusion-->7.4 today, cont to monitor  - transfuse for hb <7       #  Ileus   - improved with supportive care  - resumed TF. Increase to goal          LTAC referral. Had precert. But with recurrent high fevers, persistent ileus, pt not discharged, hopefully this coming week      Diet: DIET TUBE FEED CONTINUOUS/CYCLIC NPO; Renal Formula (Nepro);  Orogastric; Continuous; 20; 55  Code Status: Full Code    PT/OT Eval Status: ordered    Dispo - cont care  D/c Chetan Henao MD

## 2018-07-17 NOTE — PLAN OF CARE
Problem: Nutrition  Goal: Optimal nutrition therapy  Outcome: Ongoing  Nutrition Problem: Severe malnutrition  Intervention: Food and/or Nutrient Delivery: Continue NPO, Continue current Tube Feeding  Nutritional Goals: patient will tolerate Nepro TF at goal rate of 50 ml/hr x 20 hours without GI distress and without additional BS/fluid/electrolyte abnormalities; weight will remain stable during remainder of admission

## 2018-07-17 NOTE — PROGRESS NOTES
--    < >  4.4   CL   --    < >  101   CO2   --    < >  23   BUN   --    < >  13   CREATININE   --    < >  2.0*   PHOS   --    < >  2.7   CALCIUM   --    < >  8.2*   INR  1.49*   --    --     < > = values in this interval not displayed.    CBC with Differential:    Lab Results   Component Value Date    WBC 13.3 07/17/2018    RBC 2.29 07/17/2018    HGB 7.6 07/17/2018    HCT 22.7 07/17/2018     07/17/2018    MCV 99.4 07/17/2018    MCH 33.3 07/17/2018    MCHC 33.5 07/17/2018    RDW 16.3 07/17/2018    NRBC 1 07/17/2018    BANDSPCT 1 07/17/2018    METASPCT 2 07/15/2018    LYMPHOPCT 17.0 07/17/2018    MONOPCT 10.0 07/17/2018    MYELOPCT 5 07/15/2018    BASOPCT 0.0 07/17/2018    MONOSABS 1.3 07/17/2018    LYMPHSABS 2.3 07/17/2018    EOSABS 0.1 07/17/2018    BASOSABS 0.0 07/17/2018     CMP:    Lab Results   Component Value Date     07/17/2018    K 4.4 07/17/2018    K 5.9 06/12/2018     07/17/2018    CO2 23 07/17/2018    BUN 13 07/17/2018    CREATININE 2.0 07/17/2018    GFRAA 36 07/17/2018    AGRATIO 0.4 06/28/2018    LABGLOM 30 07/17/2018    GLUCOSE 96 07/17/2018    PROT 5.9 07/09/2018    LABALBU 2.1 07/17/2018    CALCIUM 8.2 07/17/2018    BILITOT 1.0 07/09/2018    ALKPHOS 153 07/09/2018    AST 8 07/09/2018    ALT <5 07/09/2018         Thelma Dimas  Electronically signed 7/17/2018 at 12:24 PM

## 2018-07-17 NOTE — PROGRESS NOTES
SPECGRAV 1.015 07/09/2018    LEUKOCYTESUR LARGE 07/09/2018    UROBILINOGEN 0.2 07/09/2018    BILIRUBINUR Negative 07/09/2018    BLOODU LARGE 07/09/2018    GLUCOSEU Negative 07/09/2018     Urine Culture:  No components found for: CURINE  Blood Culture:  No components found for: CBLOOD, CFUNGUSBL  Blood Culture from Central Line:  No components found for: CBLOODLN    IMPRESSION/RECOMMENDATIONS:      End stage renal disease: Hemodialysis per MWF               Left TDC              HD on 7/18            Acute GI bleed               - Hemoglobin stable s/p transfusion, KI with HD weekly --> increased to 200 mcg weekly at HD from next HD              - followed by GI   - HGB Trending down, receiving prbc's prn     MRSA, Streptococcus bacteremia, persistent endocarditis involving TV with septic   embolizations to lungs and spleen.                - On IV antibiotics per ID.             - s/p HD tDC removed on 6/25    Intraabdominal abscess with Peritonitis: paracentesis done 7/12. s/p exp lap and drainage on 7/13/18. Acute resp failure               - s/p trach 6/27     Hyponatremia              - avoid excessive fluid intake. Decreased free water to 20 cc/hr.  Sr Na better                 Hepatitis C     IV drug Abuse     S/p Trach/PEG on 6/27

## 2018-07-17 NOTE — PROGRESS NOTES
Bedridden, unresponsive or quadriplegic = 4    Respiratory Pattern: Regular Pattern; RR 8-20 = 0    Breath Sounds: Clear = 0    Sputum  Sputum Color: White, Tenacity: Thin, Sputum How Obtained: Suctioned  Cough: Strong, spontaneous, non-productive = 0    Vital Signs   BP (!) 161/112   Pulse 104   Temp 99.5 °F (37.5 °C) (Oral)   Resp 18   Ht 5' 2\" (1.575 m)   Wt 120 lb 9.5 oz (54.7 kg)   SpO2 98%   BMI 22.06 kg/m²   SPO2 (COPD values may differ): 90-91% on room air or greater than 92% on FiO2 24- 28% = 1    Peak Flow (asthma only): not applicable = 0    RSI: 7-86 = TID (three times daily) and Q4hr PRN for dyspnea        Plan       Goals: medication delivery    Patient/caregiver was educated on the proper method of use for Respiratory Care Devices:  Yes      Level of patient/caregiver understanding able to:   [] Verbalize understanding   [] Demonstrate understanding       [] Teach back        [] Needs reinforcement       []  No available caregiver               []  Other:     Response to education:  Good     Is patient being placed on Home Treatment Regimen? No     Does the patient have everything they need prior to discharge? NA     Comments: chart reviewed, pt assessed    Plan of Care: continue current regimen duoneb q4    Electronically signed by Kristie Grover RCP on 7/17/2018 at 4:13 PM    Respiratory Protocol Guidelines     1. Assessment and treatment by Respiratory Therapy will be initiated for medication and therapeutic interventions upon initiation of aerosolized medication. 2. Physician will be contacted for respiratory rate (RR) greater than 35 breaths per minute. Therapy will be held for heart rate (HR) greater than 140 beats per minute, pending direction from physician. 3. Bronchodilators will be administered via Metered Dose Inhaler (MDI) with spacer when the following criteria are met:  a.  Alert and cooperative     b. HR < 140 bpm  c. RR < 30 bpm                d. Can demonstrate a 23 second inspiratory hold  4. Bronchodilators will be administered via Hand Held Nebulizer [facility]) to patients when ANY of the following criteria are met  a. Incognizant or uncooperative          b. Patients treated with HHN at Home        c. Unable to demonstrate proper use of MDI with spacer     d. RR > 30 bpm   5. Bronchodilators will be delivered via Metered Dose Inhaler (MDI), HHN, Aerogen to intubated patients on mechanical ventilation. 6. Inhalation medication orders will be delivered and/or substituted as outlined below. Aerosolized Medications Ordering and Administration Guidelines:    1. All Medications will be ordered by a physician, and their frequency and/or modality will be adjusted as defined by the patients Respiratory Severity Index (RSI) score. 2. If the patient does not have documented COPD, consider discontinuing anticholinergics when RSI is less than 9.  3. If the bronchospasm worsens (increased RSI), then the bronchodilator frequency can be increased to a maximum of every 4 hours. If greater than every 4 hours is required, the physician will be contacted. 4. If the bronchospasm improves, the frequency of the bronchodilator can be decreased, based on the patient's RSI, but not less than home treatment regimen frequency. 5. Bronchodilator(s) will be discontinued if patient has a RSI less than 9 and has received no scheduled or as needed treatment for 72  Hrs. Patients Ordered on a Mucolytic Agent:    1. Must always be administered with a bronchodilator. 2. Discontinue if patient experiences worsened bronchospasm, or secretions have lessened to the point that the patient is able to clear them with a cough. Anti-inflammatory and Combination Medications:    1. If the patient lacks prior history of lung disease, is not using inhaled anti-inflammatory medication at home, and lacks wheezing by examination or by history for at least 24 hours, contact physician for possible discontinuation.

## 2018-07-18 VITALS
SYSTOLIC BLOOD PRESSURE: 138 MMHG | RESPIRATION RATE: 18 BRPM | WEIGHT: 114.64 LBS | HEIGHT: 62 IN | TEMPERATURE: 99.1 F | BODY MASS INDEX: 21.1 KG/M2 | DIASTOLIC BLOOD PRESSURE: 94 MMHG | HEART RATE: 98 BPM | OXYGEN SATURATION: 99 %

## 2018-07-18 LAB
ALBUMIN SERPL-MCNC: 2.1 G/DL (ref 3.4–5)
ANAEROBIC CULTURE: NORMAL
ANION GAP SERPL CALCULATED.3IONS-SCNC: 11 MMOL/L (ref 3–16)
ANISOCYTOSIS: ABNORMAL
BASOPHILS ABSOLUTE: 0.1 K/UL (ref 0–0.2)
BASOPHILS RELATIVE PERCENT: 1 %
BUN BLDV-MCNC: 28 MG/DL (ref 7–20)
CALCIUM SERPL-MCNC: 8.2 MG/DL (ref 8.3–10.6)
CHLORIDE BLD-SCNC: 99 MMOL/L (ref 99–110)
CO2: 22 MMOL/L (ref 21–32)
CREAT SERPL-MCNC: 3 MG/DL (ref 0.6–1.1)
EOSINOPHILS ABSOLUTE: 0.1 K/UL (ref 0–0.6)
EOSINOPHILS RELATIVE PERCENT: 1 %
GFR AFRICAN AMERICAN: 23
GFR NON-AFRICAN AMERICAN: 19
GLUCOSE BLD-MCNC: 100 MG/DL (ref 70–99)
GLUCOSE BLD-MCNC: 86 MG/DL (ref 70–99)
GLUCOSE BLD-MCNC: 89 MG/DL (ref 70–99)
GLUCOSE BLD-MCNC: 94 MG/DL (ref 70–99)
GLUCOSE BLD-MCNC: 94 MG/DL (ref 70–99)
GLUCOSE BLD-MCNC: 95 MG/DL (ref 70–99)
HCT VFR BLD CALC: 23.4 % (ref 36–48)
HEMOGLOBIN: 8 G/DL (ref 12–16)
LYMPHOCYTES ABSOLUTE: 2.4 K/UL (ref 1–5.1)
LYMPHOCYTES RELATIVE PERCENT: 21 %
MCH RBC QN AUTO: 35.9 PG (ref 26–34)
MCHC RBC AUTO-ENTMCNC: 34.1 G/DL (ref 31–36)
MCV RBC AUTO: 105.2 FL (ref 80–100)
MONOCYTES ABSOLUTE: 1.2 K/UL (ref 0–1.3)
MONOCYTES RELATIVE PERCENT: 10 %
MYELOCYTE PERCENT: 2 %
NEUTROPHILS ABSOLUTE: 7.8 K/UL (ref 1.7–7.7)
NEUTROPHILS RELATIVE PERCENT: 65 %
NUCLEATED RED BLOOD CELLS: 1 /100 WBC
PDW BLD-RTO: 21.4 % (ref 12.4–15.4)
PERFORMED ON: NORMAL
PHOSPHORUS: 3.4 MG/DL (ref 2.5–4.9)
PLATELET # BLD: 385 K/UL (ref 135–450)
PLATELET SLIDE REVIEW: ADEQUATE
PMV BLD AUTO: 7.1 FL (ref 5–10.5)
POLYCHROMASIA: ABNORMAL
POTASSIUM SERPL-SCNC: 4.1 MMOL/L (ref 3.5–5.1)
RBC # BLD: 2.23 M/UL (ref 4–5.2)
SLIDE REVIEW: ABNORMAL
SODIUM BLD-SCNC: 132 MMOL/L (ref 136–145)
VANCOMYCIN RANDOM: 21.7 UG/ML
WBC # BLD: 11.6 K/UL (ref 4–11)

## 2018-07-18 PROCEDURE — 6360000002 HC RX W HCPCS: Performed by: INTERNAL MEDICINE

## 2018-07-18 PROCEDURE — 6360000002 HC RX W HCPCS: Performed by: HOSPITALIST

## 2018-07-18 PROCEDURE — 94640 AIRWAY INHALATION TREATMENT: CPT

## 2018-07-18 PROCEDURE — G9174 SPEECH LANG CURRENT STATUS: HCPCS

## 2018-07-18 PROCEDURE — 6370000000 HC RX 637 (ALT 250 FOR IP): Performed by: INTERNAL MEDICINE

## 2018-07-18 PROCEDURE — 97535 SELF CARE MNGMENT TRAINING: CPT

## 2018-07-18 PROCEDURE — 85025 COMPLETE CBC W/AUTO DIFF WBC: CPT

## 2018-07-18 PROCEDURE — 2580000003 HC RX 258: Performed by: INTERNAL MEDICINE

## 2018-07-18 PROCEDURE — 90937 HEMODIALYSIS REPEATED EVAL: CPT

## 2018-07-18 PROCEDURE — 99024 POSTOP FOLLOW-UP VISIT: CPT | Performed by: NURSE PRACTITIONER

## 2018-07-18 PROCEDURE — 99232 SBSQ HOSP IP/OBS MODERATE 35: CPT | Performed by: INTERNAL MEDICINE

## 2018-07-18 PROCEDURE — 97530 THERAPEUTIC ACTIVITIES: CPT

## 2018-07-18 PROCEDURE — L8501 TRACHEOSTOMY SPEAKING VALVE: HCPCS

## 2018-07-18 PROCEDURE — 36415 COLL VENOUS BLD VENIPUNCTURE: CPT

## 2018-07-18 PROCEDURE — 80202 ASSAY OF VANCOMYCIN: CPT

## 2018-07-18 PROCEDURE — 80069 RENAL FUNCTION PANEL: CPT

## 2018-07-18 PROCEDURE — C9113 INJ PANTOPRAZOLE SODIUM, VIA: HCPCS | Performed by: INTERNAL MEDICINE

## 2018-07-18 PROCEDURE — 2580000003 HC RX 258

## 2018-07-18 PROCEDURE — G9175 SPEECH LANG GOAL STATUS: HCPCS

## 2018-07-18 PROCEDURE — 99239 HOSP IP/OBS DSCHRG MGMT >30: CPT | Performed by: INTERNAL MEDICINE

## 2018-07-18 RX ORDER — SODIUM CHLORIDE 9 MG/ML
INJECTION, SOLUTION INTRAVENOUS
Status: COMPLETED
Start: 2018-07-18 | End: 2018-07-18

## 2018-07-18 RX ADMIN — PROMETHAZINE HYDROCHLORIDE 25 MG: 25 TABLET ORAL at 13:41

## 2018-07-18 RX ADMIN — NYSTATIN: 100000 CREAM TOPICAL at 08:50

## 2018-07-18 RX ADMIN — OXYCODONE HYDROCHLORIDE 5 MG: 5 SOLUTION ORAL at 08:50

## 2018-07-18 RX ADMIN — MEROPENEM 250 MG: 500 INJECTION, POWDER, FOR SOLUTION INTRAVENOUS at 06:28

## 2018-07-18 RX ADMIN — OXYCODONE HYDROCHLORIDE 5 MG: 5 SOLUTION ORAL at 15:05

## 2018-07-18 RX ADMIN — PROMETHAZINE HYDROCHLORIDE 25 MG: 25 TABLET ORAL at 03:38

## 2018-07-18 RX ADMIN — IPRATROPIUM BROMIDE AND ALBUTEROL SULFATE 1 AMPULE: .5; 3 SOLUTION RESPIRATORY (INHALATION) at 07:10

## 2018-07-18 RX ADMIN — ACETAMINOPHEN 650 MG: 325 TABLET ORAL at 13:41

## 2018-07-18 RX ADMIN — SODIUM CHLORIDE 500 ML: 9 INJECTION, SOLUTION INTRAVENOUS at 02:33

## 2018-07-18 RX ADMIN — HEPARIN SODIUM 3200 UNITS: 1000 INJECTION, SOLUTION INTRAVENOUS; SUBCUTANEOUS at 12:04

## 2018-07-18 RX ADMIN — IPRATROPIUM BROMIDE AND ALBUTEROL SULFATE 1 AMPULE: .5; 3 SOLUTION RESPIRATORY (INHALATION) at 15:22

## 2018-07-18 RX ADMIN — PANTOPRAZOLE SODIUM 40 MG: 40 INJECTION, POWDER, FOR SOLUTION INTRAVENOUS at 08:50

## 2018-07-18 RX ADMIN — Medication 1 MG: at 06:43

## 2018-07-18 RX ADMIN — IPRATROPIUM BROMIDE AND ALBUTEROL SULFATE 1 AMPULE: .5; 3 SOLUTION RESPIRATORY (INHALATION) at 03:32

## 2018-07-18 RX ADMIN — Medication 10 ML: at 08:50

## 2018-07-18 RX ADMIN — Medication 1 MG: at 00:29

## 2018-07-18 RX ADMIN — CLONIDINE HYDROCHLORIDE 0.1 MG: 0.1 TABLET ORAL at 15:05

## 2018-07-18 RX ADMIN — IPRATROPIUM BROMIDE AND ALBUTEROL SULFATE 1 AMPULE: .5; 3 SOLUTION RESPIRATORY (INHALATION) at 00:05

## 2018-07-18 ASSESSMENT — PAIN SCALES - GENERAL
PAINLEVEL_OUTOF10: 7
PAINLEVEL_OUTOF10: 9
PAINLEVEL_OUTOF10: 8
PAINLEVEL_OUTOF10: 0
PAINLEVEL_OUTOF10: 10
PAINLEVEL_OUTOF10: 0
PAINLEVEL_OUTOF10: 10
PAINLEVEL_OUTOF10: 7

## 2018-07-18 ASSESSMENT — PAIN - FUNCTIONAL ASSESSMENT: PAIN_FUNCTIONAL_ASSESSMENT: 0-10

## 2018-07-18 NOTE — PROGRESS NOTES
[id number]   07/17/18   0538  07/17/18   1255  07/18/18   0625   WBC  16.0*   --   13.3*   --   11.6*   HGB  7.4*   < >  7.6*  7.5*  8.0*   HCT  21.5*   < >  22.7*  22.9*  23.4*   MCV  88.8   --   99.4   --   105.2*   PLT  244   --   291   --   385    < > = values in this interval not displayed. BMP:   Recent Labs      07/16/18   0552  07/17/18   0538  07/18/18   0625   NA  133*  133*  132*   K  3.5  4.4  4.1   CL  96*  101  99   CO2  27  23  22   PHOS  2.4*  2.7  3.4   BUN  17  13  28*   CREATININE  3.1*  2.0*  3.0*     LIVER PROFILE: No results for input(s): AST, ALT, LIPASE, BILIDIR, BILITOT, ALKPHOS in the last 72 hours. Invalid input(s): AMYLASE,  ALB  PT/INR:   No results for input(s): PROTIME, INR in the last 72 hours. APTT: No results for input(s): APTT in the last 72 hours. UA:No results for input(s): NITRITE, COLORU, PHUR, LABCAST, WBCUA, RBCUA, MUCUS, TRICHOMONAS, YEAST, BACTERIA, CLARITYU, SPECGRAV, LEUKOCYTESUR, UROBILINOGEN, BILIRUBINUR, BLOODU, GLUCOSEU, AMORPHOUS in the last 72 hours. Invalid input(s): KETONESU  No results for input(s): PHART, YTP4VKJ, PO2ART in the last 72 hours.   Cultures:   Blood culture 7/10 negative  Blood culture 7/7 negative  Repeat Respiratory 7/11 culture Normal respiratory gino  Peritoneal fluid 7/12 gram-positive cocci  Peritoneal fluid 7/13/18 1+ gram-positive cocci      Films:  No new    Assessment:  · Acute respiratory failure with hypoxemia  · Tricuspid valve infectious endocarditisMRSA  · Postop day #5 from drainage of abdominal abscess  · Polymicrobial bacteremia: MRSA, streptococcus and Enterobacter  · Cavitary pneumonia secondary to septic emboli  · Severe anemia secondary to GI blood loss  · ESRD on HD  · IV drug abuse  · Hepatitis C  · Status post tracheostomy/PEG on 6/27/18  · Left upper lobe PICC placed 7/6/18      Plan:  · Supplemental oxygen to maintain SaO2 >92%; wean as tolerated  · Try PM valve as able  · Antibiotics per infectious diseasevancomycin and Merrem  · Surgery following  · Transfuse packed red blood cells if hemoglobin less than 7  · Protonix twice daily  · PT/OT  · Holding anticoagulation

## 2018-07-18 NOTE — PROGRESS NOTES
ID Follow-up NOTE    CC: tricuspid valve endocarditis    Subjective:     Patient on trach collar. Seems to deny belly pain, chest pain or headache. Denies SOB. Says she feels cold. Is about to be taken off of today's dialysis. Objective:     Patient Vitals for the past 24 hrs:   BP Temp Temp src Pulse Resp SpO2 Weight   18 0825 (!) 158/107 99.6 °F (37.6 °C) - 98 18 - 122 lb 5.7 oz (55.5 kg)   18 0715 (!) 147/106 99.5 °F (37.5 °C) Oral 89 20 98 % -   18 0711 - - - - 18 98 % -   18 0335 - - - - - 99 % -   18 0322 (!) 144/99 97.4 °F (36.3 °C) Oral 82 18 100 % 122 lb 4.8 oz (55.5 kg)   18 0006 - - - - 18 100 % -   18 2315 (!) 140/99 97.3 °F (36.3 °C) Oral 85 18 99 % -   18 1903 - - - - 20 98 % -   18 1845 (!) 162/107 100.9 °F (38.3 °C) Oral 101 18 99 % -   18 1611 (!) 158/99 99.2 °F (37.3 °C) Oral 104 18 99 % -   18 1529 - - - - 18 98 % -     Temp (24hrs), Av °F (37.2 °C), Min:97.3 °F (36.3 °C), Max:100.9 °F (38.3 °C)          EXAM:  General: awake, follows commands on trach collar; occasional low grade fever; writes questions and answers to questions on paper pad. ENT:  pupils equal reactive; no icterus  Neck: s nodes,       LUNGS: clear; does not appear to be in any respiratory distress   CV: RR c gd I syst M lower LSB     ABD: soft; very minimal tenderness. Incision looks ok. EXT: R foot mildly edematous   CHRISTOFER draining sanguinous fluid.   Data Review:    Lab Results   Component Value Date    WBC 11.6 (H) 2018    HGB 8.0 (L) 2018    HCT 23.4 (L) 2018    .2 (H) 2018     2018     Lab Results   Component Value Date    CREATININE 3.0 (H) 2018    BUN 28 (H) 2018     (L) 2018    K 4.1 2018    CL 99 2018    CO2 22 2018     Lab Results   Component Value Date    ALT <5 (L) 2018    AST 8 (L) 2018    ALKPHOS 153 (H) 2018    BILITOT 1.0 2018   vanc

## 2018-07-18 NOTE — PROGRESS NOTES
Pt discharged with personal belongings by ambulance to [facility] in stable condition.

## 2018-07-18 NOTE — PROGRESS NOTES
Hospitalist Progress Note      PCP: SHREYA Denise CNP    Date of Admission: 6/6/2018    Subjective: hb improved post transfusion, pain fairly controlled  Still having fevers    7/17  Low grade fevers    7/18  Low grade fevers    Medications:  Reviewed    Infusion Medications    dextrose       Scheduled Medications    darbepoetin emi-polysorbate  200 mcg Intravenous Weekly    pantoprazole  40 mg Intravenous BID    meropenem  250 mg Intravenous Q8H    fentanyl  1 patch Transdermal Q72H    fentaNYL  1 patch Transdermal Q72H    QUEtiapine  50 mg Oral Nightly    polyethylene glycol  17 g Oral Daily    sodium chloride flush  10 mL Intravenous 2 times per day    ipratropium-albuterol  1 ampule Inhalation Q4H    sennosides-docusate sodium  1 tablet Oral Daily    cloNIDine  0.1 mg Oral TID    nystatin   Topical BID    chlorhexidine  15 mL Mouth/Throat BID    doxercalciferol  1 mcg Intravenous Once per day on Tue Thu Sat     PRN Meds: HYDROmorphone, oxyCODONE, diazepam, sodium chloride flush, heparin (porcine), albumin human, dextrose, ondansetron, glucose, glucagon (rDNA), dextrose, acetaminophen, promethazine      Intake/Output Summary (Last 24 hours) at 07/18/18 0825  Last data filed at 07/18/18 0631   Gross per 24 hour   Intake             1894 ml   Output              355 ml   Net             1539 ml       Physical Exam Performed:    BP (!) 147/106   Pulse 89   Temp 99.5 °F (37.5 °C) (Oral)   Resp 20   Ht 5' 2\" (1.575 m)   Wt 122 lb 4.8 oz (55.5 kg)   SpO2 98%   BMI 22.37 kg/m²     General: young female with trach collar. Tracheostomy+   Awake, alert and oriented  Very weak and tired  Eyes: PERRL. No sclera icterus. No conjunctiva injected. ENT: No discharge. Neck: Trachea midline. Normal thyroid.  Andrew Coventry in place . Resp: Diminished breath sounds. CV: tachycardic + ESM in tricuspid area  GI:  + distended. abd wall edema + drain +  No masses.  Bowel sounds diminished.  Mild diffuse tendereness . No hernia. PEG +   Skin: Warm and dry. No nodule on exposed extremities. No rash on exposed extremities  Lymph: No cervical LAD. No supraclavicular LAD. M/S: .  Diffuse edema in all extremities   Neuro - non focal , very weak    Labs:   Recent Labs      07/16/18   0552   07/17/18   0538  07/17/18   1255  07/18/18   0625   WBC  16.0*   --   13.3*   --   11.6*   HGB  7.4*   < >  7.6*  7.5*  8.0*   HCT  21.5*   < >  22.7*  22.9*  23.4*   PLT  244   --   291   --   385    < > = values in this interval not displayed. Recent Labs      07/16/18   0552  07/17/18   0538  07/18/18   0625   NA  133*  133*  132*   K  3.5  4.4  4.1   CL  96*  101  99   CO2  27  23  22   BUN  17  13  28*   CREATININE  3.1*  2.0*  3.0*   CALCIUM  8.7  8.2*  8.2*   PHOS  2.4*  2.7  3.4     No results for input(s): AST, ALT, BILIDIR, BILITOT, ALKPHOS in the last 72 hours. No results for input(s): INR in the last 72 hours. No results for input(s): Meena Nap in the last 72 hours. Urinalysis:      Lab Results   Component Value Date    NITRU Negative 07/09/2018    WBCUA >100 07/09/2018    BACTERIA see below 07/09/2018    RBCUA see below 07/09/2018    BLOODU LARGE 07/09/2018    SPECGRAV 1.015 07/09/2018    GLUCOSEU Negative 07/09/2018       Radiology:  US GUIDED PARACENTESIS   Final Result   1. Complex peritoneal fluid with thick septations throughout the abdomen in a   pattern that would suggest underlying peritonitis. 2. Fluid is not amenable to complete drainage by paracentesis, nor by   CT-guided drainage. Based upon results of fluid analysis, treatment may   require further antibiotics or surgical consultation. 3. Successful collection of approximately 510 mL of thin, dark brown fluid   sent to the lab for culture and analysis. CT Chest WO Contrast   Final Result   Overall improved appearance of the chest with decreasing areas of cavitation. Small pleural effusions.       Large amount free fluid within the abdomen and pelvis. Heterogeneous appearance of the spleen is again seen and may be related to   infection/abscesses, given the process within the chest.  Alternatively,   these may represent areas of infarct. Low-density area within the left   hepatic lobe likely is a similar process to the spleen and appears improved   from the previous study. CT ABDOMEN PELVIS WO CONTRAST Additional Contrast? Oral   Final Result   Overall improved appearance of the chest with decreasing areas of cavitation. Small pleural effusions. Large amount free fluid within the abdomen and pelvis. Heterogeneous appearance of the spleen is again seen and may be related to   infection/abscesses, given the process within the chest.  Alternatively,   these may represent areas of infarct. Low-density area within the left   hepatic lobe likely is a similar process to the spleen and appears improved   from the previous study. XR CHEST PORTABLE   Final Result   Increasing right basilar and stable left basilar airspace disease. XR ABDOMEN (KUB) (SINGLE AP VIEW)   Final Result   Unchanged gaseous distention of several bowel loops compared to the prior   study. Unchanged linear density overlying the pubic symphysis compared to   07/05/2018, possibly external to the patient. XR CHEST PORTABLE   Final Result   1. Interval worsening in left basilar pulmonary opacity. Unchanged right   lung base pulmonary opacity. Unchanged trace bilateral pleural effusions. Recommend radiographic follow-up to complete resolution. 2. Tip of the left upper extremity PICC terminates over the cavoatrial   junction. XR ABDOMEN (KUB) (SINGLE AP VIEW)   Final Result   Unremarkable KUB. IR PICC EQUAL OR GREATER THAN 5 YEARS   Final Result   Successful placement of left upper extremity PICC line. This projects at the   right heart border in good position.       Occlusion of the be pulled back about 4 cm since it is pressing against the   anterior stomach wall. Gallbladder ultrasound suggested for evaluation of gallbladder disease. Diagnostic Ultrasound-guided paracentesis may be helpful. CT Chest WO Contrast   Final Result   Multiple scattered cavitary and solid nodules scattered throughout the lungs   suspected to be from septic emboli. The emboli may be from recurrent   endocarditis, IV drug abuse, or infected DVT. Scattered focal pneumonia in   the lingula, right middle lobe, and right lower lobe. Trace bilateral   pleural effusions. Tip of the endotracheal tube lying near the tee. Moderate ascites seen in the upper abdomen. Possible multiple splenic   infarcts or abscesses. Please refer to the dictation of the CT scan of the   abdomen and pelvis. RECOMMENDATIONS:   Endotracheal tube should be pulled back 1-2 cm. CT Head WO Contrast   Final Result   No acute intracranial abnormality. XR CHEST PORTABLE   Final Result   Moderate lingular opacity favored to be pneumonia. Moderate opacity in the   right lung base probably also pneumonia. Development of multiple pulmonary   nodules presumed to be of an infectious or least inflammatory etiology. Low   lung volumes. Some improvement in the appearance of the chest since   yesterday. RECOMMENDATION:   CT scan of the chest with contrast recommended for further evaluation. XR ABDOMEN (KUB) (SINGLE AP VIEW)   Final Result   Probable mild nonobstructive ileus. Moderate to large amount of stool in the   left colon. XR CHEST PORTABLE   Final Result   Improving bilateral airspace disease. XR CHEST PORTABLE   Final Result   Worsening bilateral airspace disease, probably pneumonia. XR CHEST PORTABLE   Final Result   Multifocal pneumonia, unchanged. XR CHEST 1 VW   Final Result   No change other than repositioning of the endotracheal tube.          XR CHEST followed to resolution. Borderline pulmonary vascular congestion. 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]     Ileus ([address] [K56.7]     Acute blood loss anemia [D62]     Hyponatremia [E87.1]     MRSA bacteremia [R78.81]     Persistent fever [R50.9]     Fever [R50.9]     Mucus plugging of bronchi [J98.09]     Cavitary lung disease [J98.4]     Bacteremia [R78.81]     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] 06/14/2018    Acute respiratory failure with hypoxia (HCC) [J96.01] 06/11/2018    DIC (disseminated intravascular coagulation) ([address] [D65]     PEA (Pulseless electrical activity) ([address] [I46.9]     Severe protein-calorie malnutrition ([address] [E43] 06/07/2018    HCAP (healthcare-associated pneumonia) [J18.9] 06/06/2018    Upper GI bleed [K92.2] 06/06/2018    Sepsis due to Streptococcus pneumoniae ([address] [A40.3]     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 Vasopressin  - wbc improved from 60 K  - critical care and ID involved. WBC down to 6 K today. - fevers intermittent still persistent  -  CT chest on 6/25, showed worsening airspace disease-consistent with septic emboli  - tunneled dialysis catheter removed on 6/25/18, new one placed  - abx per ID - see below         #Acute resp failure/cavitary pneumonia    Due to MRSA infection  - s.p PEA arrest, off vent  6/11- reintubated on 6/12/18,  for increasing abd distention and pain   - remains on mechanical ventilation, with spontaneous breathing trials.

## 2018-07-18 NOTE — PROGRESS NOTES
Occupational Therapy/Physical Therapy  Attempted to see patient this am, off floor at dialysis. Will continue to follow.   Noah Dc, OTR/L [address] [facility], PT, DPT #[id number]

## 2018-07-18 NOTE — PROGRESS NOTES
Pt transported to dialysis in stable condition at 0755  Pt returned to [id number] in stable condition at [phone]

## 2018-07-18 NOTE — PROGRESS NOTES
Speech Language Pathology  Attempted SLP/PMV Eval    Order for SLP eval/tx ordered, to see if pt would benefit from [place] Valve placement. Pt currently at dialysis, expected to return to floor around 12:30p. Will re-attempt as able.      Thank you,   Sima Fernando MA, CCC-SLP

## 2018-07-18 NOTE — PROGRESS NOTES
motivated by being able to do this. At this time, pt will greatly benefit from continued rehab to safely progress IND with functional mobility. Plan   Continue with plan of care. At end of treatment, patient in bed, receiving nursing care.      Time Coded Treatment Minutes: 40  minutes    Total Treatment Time:   40 minutes    Delma Wright PT, DPT #[id number]

## 2018-07-18 NOTE — PLAN OF CARE
Problem: Falls - Risk of:  Goal: Absence of physical injury  Absence of physical injury   Outcome: Ongoing      Problem: Risk for Impaired Skin Integrity  Goal: Tissue integrity - skin and mucous membranes  Structural intactness and normal physiological function of skin and  mucous membranes. Outcome: Ongoing    Intervention: PRESSURE ULCER PREVENTION  Foam bandage to coccyx      Problem: Discharge Planning:  Goal: Discharged to appropriate level of care  Discharged to appropriate level of care    Outcome: Ongoing    Goal: Participates in care planning  Participates in care planning   Outcome: Ongoing      Problem:  Bowel Function - Altered:  Goal: Bowel elimination is within specified parameters  Bowel elimination is within specified parameters   Outcome: Ongoing  Flexiseal in place    Problem: Nausea/Vomiting:  Goal: Able to drink  Able to drink   Outcome: Ongoing  TPN  Goal: Able to eat  Able to eat   Outcome: Ongoing  TPN  Goal: Ability to achieve adequate nutritional intake will improve  Ability to achieve adequate nutritional intake will improve   Outcome: Ongoing  TPN    Problem: Airway Clearance - Ineffective:  Goal: Ability to maintain a clear airway will improve  Ability to maintain a clear airway will improve   Outcome: Ongoing  Pt able to cough up some secretions    Problem: Infection - Central Venous Catheter-Associated Bloodstream Infection:  Goal: Will show no infection signs and symptoms  Will show no infection signs and symptoms   Outcome: Ongoing      Problem: ABCDS Injury Assessment  Goal: Absence of physical injury  Outcome: Met This Shift      Problem: Pain:  Goal: Control of chronic pain  Control of chronic pain   Outcome: Ongoing  PRN roxicodone and dilaudid q 6 hrs

## 2018-07-19 NOTE — DISCHARGE SUMMARY
Name:  Anand Guerrier  Room:  /1405-45  MRN:    [id number]    Discharge Summary      This discharge summary is in conjunction with a complete physical exam done on the day of discharge. Discharging Physician: Dr. Deshpande Po: 6/6/2018  Discharge:  7/18/2018    HPI taken from admission H&P:      The patient is a 74-year-old  female who has got a history of multiple medical problems, has got end-stage renal disease and is on hemodialysis, presents to the hospital with chief complaint of two to three days of what she describes as generalized weakness with increasing fatigue, bilateral leg pain, abdominal pain, sharp chest pain, and increasing shortness of breath with fevers, with chills, that she noted were as high as 102 to 104 at home. The patient does not have any other constitutional symptoms. No hemoptysis and hematemesis. No melena or hematochezia.     While in the emergency room, the patient had suddenly started having two episodes of massive hematemesis with passage of blood clots and also bright red blood and also one episode of passage of large amount of bright red blood in her stool, after which she has been feeling very weak and diaphoretic. Diagnoses this Admission and Hospital Course     Septic shock  - sec to bacteremia /septic emboli/endocarditis  -  Sustained PEA arrest x2  requiring multiple pressors, intubation   - weaned off pressors - levo. Vasopressin  - wbc improved from 60 K  - critical care and ID involved.  WBC down to 11.6 K   - fevers intermittent still persistent  -  CT chest on 6/25, showed worsening airspace disease-consistent with septic emboli  - tunneled dialysis catheter removed on 6/25/18, new one placed  - abx per ID - see below   - remains w/ low grade fevers, BP stable  - d/c'd to LTAC with abx as below     Acute Hypoxic Respiratory Failure  Cavitary pneumonia   - Due to MRSA infection  - s/p PEA arrest, off vent  6/11- reintubated on 6/12/18, the spleen is again seen and may be related to   infection/abscesses, given the process within the chest.  Alternatively,   these may represent areas of infarct. Low-density area within the left   hepatic lobe likely is a similar process to the spleen and appears improved   from the previous study. CT ABDOMEN PELVIS WO CONTRAST Additional Contrast? Oral 7/11/2018   Final Result   Overall improved appearance of the chest with decreasing areas of cavitation. Small pleural effusions. Large amount free fluid within the abdomen and pelvis. Heterogeneous appearance of the spleen is again seen and may be related to   infection/abscesses, given the process within the chest.  Alternatively,   these may represent areas of infarct. Low-density area within the left   hepatic lobe likely is a similar process to the spleen and appears improved   from the previous study. XR CHEST PORTABLE 7/9/2018   Final Result   Increasing right basilar and stable left basilar airspace disease. XR ABDOMEN (KUB) (SINGLE AP VIEW) 7/9/2018   Final Result   Unchanged gaseous distention of several bowel loops compared to the prior   study. Unchanged linear density overlying the pubic symphysis compared to   07/05/2018, possibly external to the patient. XR CHEST PORTABLE 7/8/2018   Final Result   1. Interval worsening in left basilar pulmonary opacity. Unchanged right   lung base pulmonary opacity. Unchanged trace bilateral pleural effusions. Recommend radiographic follow-up to complete resolution. 2. Tip of the left upper extremity PICC terminates over the cavoatrial   junction. XR ABDOMEN (KUB) (SINGLE AP VIEW) 7/8/2018   Final Result   Unremarkable KUB. IR PICC EQUAL OR GREATER THAN 5 YEARS 7/6/2018   Final Result   Successful placement of left upper extremity PICC line. This projects at the   right heart border in good position.       Occlusion of the right brachiocephalic vein near the confluence precluded   placement of right upper extremity line. IR RESPOSITION PRV CVC W FLUORO 7/6/2018   Final Result   Successful placement of left upper extremity PICC line. This projects at the   right heart border in good position. Occlusion of the right brachiocephalic vein near the confluence precluded   placement of right upper extremity line. IR TUNNELED CATHETER PLACEMENT GREATER THAN 5 YEARS 7/6/2018   Final Result   Status post successful ultrasound/fluoroscopically guided placement of left   internal jugular tunneled dialysis catheter as described above. XR CHEST PORTABLE 7/6/2018   Final Result   Malpositioned right subclavian line. Recommend repositioning. Findings related with patient's nurse Jia Troy at 07/06/2018 at 12:47   p.m. XR ABDOMEN (KUB) (SINGLE AP VIEW) 7/5/2018   Final Result   Gaseous prominence of bowel some of which appears to be colonic, but some may   be small bowel. Overall bowel gas pattern is nonspecific, but not   definitively obstructive, may reflect ileus. Progress radiographs may be of   further diagnostic aid, as indicated. XR CHEST PORTABLE 7/5/2018   Final Result   1. Line placement without complicating feature. 2. Worsening pulmonary edema and/or superimposed pneumonia. XR CHEST PORTABLE 7/3/2018   Final Result   NG tube tip projects over the body of the stomach. XR Acute Abd Series Chest 1 VW 7/2/2018   Final Result   Gaseous bowel distention greater in the central small bowel most likely ileus. XR CHEST PORTABLE 6/30/2018   Final Result   Stable chest         XR ABDOMEN (KUB) (SINGLE AP VIEW) 6/30/2018   Final Result   Nondiagnostic due to paucity of small bowel gas         XR ABDOMEN (KUB) (SINGLE AP VIEW) 6/29/2018   Final Result   Multiple mildly dilated loops of small bowel. Findings may represent ileus   or partial small bowel obstruction.          XR CHEST PORTABLE 6/28/2018   Final Result   Stable positioning of left central venous catheter. No pneumothorax. Slightly improved aeration with persistent ground-glass opacities in the left   mid lung. XR CHEST PORTABLE 6/28/2018   Final Result   Retraction of the left internal jugular CVC with its tip in the expected   region of the brachiocephalic vein      No significant change in the cavitary nodules and bibasilar airspace disease         XR CHEST PORTABLE 6/27/2018   Final Result   Tracheostomy tube placement. Left basilar airspace disease most consistent with atelectasis         IR NONTUNNELED VASCULAR CATHETER 6/26/2018   Final Result   Successful ultrasound and fluoroscopy guided non-tunneled right femoral vein   temporary dialysis catheter placement. XR CHEST PORTABLE 6/26/2018   Final Result   Improving bibasilar airspace disease. Stable cavitary lesions. CT Chest WO Contrast 6/25/2018   Final Result   1. Minimal worsening partially cavitating pulmonary nodules and airspace   disease throughout the bilateral lungs consistent with septic emboli. 2. New trace bilateral pleural effusions. 3. Mediastinal adenopathy, likely reactive. 4. Heterogeneous splenomegaly. XR CHEST PORTABLE 6/25/2018   Final Result   Stable appearing bibasilar airspace disease. Support tubes and lines as   above. XR CHEST PORTABLE 6/24/2018   Final Result   No significant interval change in bilateral airspace disease as compared to   prior. Cavitary pulmonary nodules are again demonstrated. XR CHEST PORTABLE 6/23/2018   Final Result   Left mid lung airspace disease is similar to minimally improved as compared   to prior. Persistent basilar atelectasis and cavitary right lung lesions. VL Extremity Venous Bilateral 6/22/2018   Final Result   There is no evidence of deep or superficial venous thrombosis involving the   lower extremities bilaterally.    XR CHEST Radiologist Recommendation 6/12/2018   Final Result   Development of moderate diffuse ascites since the prior study. Mosaic   appearance of the spleen either due to multiple splenic infarcts or possible   splenic abscesses. Question of perihepatic and subhepatic adhesions. Chronic gallbladder disease. Bibasilar pneumonias and pulmonary nodules   consistent with septic emboli. Mild anasarca. RECOMMENDATIONS:   NG tube should be pulled back about 4 cm since it is pressing against the   anterior stomach wall. Gallbladder ultrasound suggested for evaluation of gallbladder disease. Diagnostic Ultrasound-guided paracentesis may be helpful. CT Chest WO Contrast 6/12/2018   Final Result   Multiple scattered cavitary and solid nodules scattered throughout the lungs   suspected to be from septic emboli. The emboli may be from recurrent   endocarditis, IV drug abuse, or infected DVT. Scattered focal pneumonia in   the lingula, right middle lobe, and right lower lobe. Trace bilateral   pleural effusions. Tip of the endotracheal tube lying near the tee. Moderate ascites seen in the upper abdomen. Possible multiple splenic   infarcts or abscesses. Please refer to the dictation of the CT scan of the   abdomen and pelvis. RECOMMENDATIONS:   Endotracheal tube should be pulled back 1-2 cm. CT Head WO Contrast 6/12/2018   Final Result   No acute intracranial abnormality. XR CHEST PORTABLE 6/12/2018   Final Result   Moderate lingular opacity favored to be pneumonia. Moderate opacity in the   right lung base probably also pneumonia. Development of multiple pulmonary   nodules presumed to be of an infectious or least inflammatory etiology. Low   lung volumes. Some improvement in the appearance of the chest since   yesterday. RECOMMENDATION:   CT scan of the chest with contrast recommended for further evaluation.          XR ABDOMEN (KUB) (SINGLE AP VIEW) 6/12/2018 Final Result   Probable mild nonobstructive ileus. Moderate to large amount of stool in the   left colon. XR CHEST PORTABLE 6/11/2018   Final Result   Improving bilateral airspace disease. XR CHEST PORTABLE 6/10/2018   Final Result   Worsening bilateral airspace disease, probably pneumonia. XR CHEST PORTABLE 6/9/2018   Final Result   Multifocal pneumonia, unchanged. XR CHEST 1 VW 6/9/2018   Final Result   No change other than repositioning of the endotracheal tube. XR CHEST PORTABLE 6/8/2018   Final Result   The endotracheal tube needs retracted approximately 3 cm. Satisfactory position right IJ tunnel catheter and left central venous   catheter. Increased size of the multifocal nodular lung opacities presumed multifocal   pneumonia. Findings were discussed with ICU nurse caring for the patient at 2:21 pm on   6/8/2018. XR CHEST PORTABLE 6/8/2018   Final Result   Multifocal pneumonia. XR FOOT RIGHT (2 VIEWS) 6/7/2018   Final Result   Lucency through the medial sesamoid most consistent with bipartite medial   sesamoid in the absence of history of trauma. This can be associated with   pain. CTA ABDOMEN PELVIS W WO CONTRAST 6/6/2018   Final Result   No active gastrointestinal hemorrhage is identified. No vascular abnormality   is appreciated. Right lower lobe pneumonia as well as cavitary nodules. Septic emboli are   consideration. Compared with 11/26/2017, there is waxing and waning airspace   disease and pulmonary nodules. Organizing pneumonia is an alternative   possibility. Hepatosplenomegaly. Hepatomegaly is similar to 11/26/2017. Splenomegaly is   new. Hypodensity in the spleen, suggestive of acute to subacute embolic   infarction. Small amount of ascites, nonspecific. Hyperdensity of the gallbladder wall. This may represent mural   calcification. Etiology is unclear.   There is variable association with normal.   There is mild concentric left ventricular hypertrophy.   Diastolic dysfunction grade and filing pressure are indeterminate.   1cm vegetation on the septal leaflet,   Moderate-to-severe tricuspid regurgitation.   Systolic pulmonary artery pressure (SPAP) estimated at 55 mmHg (right atrial   pressure 8 mmHg), consistent with moderate pulmonary hypertension.  Lindalee Nikole is a small left pleural effusion. Discharge Medications     Medication List      START taking these medications    oxyCODONE 5 MG/5ML solution  Commonly known as:  ROXICODONE  Take 5 mLs by mouth every 6 hours as needed for Pain for up to 2 days. Rafia Preston QUEtiapine 50 MG tablet  Commonly known as:  SEROQUEL  Take 1 tablet by mouth nightly     vancomycin infusion  Commonly known as:  VANCOCIN  Infuse 500 mg intravenously every 48 hours for 4 doses Compound per protocol. CONTINUE taking these medications    acetaminophen 325 MG tablet  Commonly known as:  TYLENOL     cloNIDine 0.1 MG tablet  Commonly known as:  CATAPRES     folic acid 1 MG tablet  Commonly known as:  FOLVITE     sevelamer 800 MG tablet  Commonly known as:  RENVELA        STOP taking these medications    azithromycin 250 MG tablet  Commonly known as:  ZITHROMAX     isosorbide mononitrate 60 MG extended release tablet  Commonly known as:  IMDUR     labetalol 100 MG tablet  Commonly known as:  NORMODYNE     losartan 100 MG tablet  Commonly known as:  COZAAR           Where to Get Your Medications      You can get these medications from any pharmacy    Bring a paper prescription for each of these medications  · oxyCODONE 5 MG/5ML solution     Information about where to get these medications is not yet available    Ask your nurse or doctor about these medications  · QUEtiapine 50 MG tablet  · vancomycin infusion           Discharged in stable condition to [facility]. Follow Up: Follow up with physician at [place] [id number].       Total time spent on discharge is 35

## 2018-07-23 LAB
FUNGUS (MYCOLOGY) CULTURE: ABNORMAL
FUNGUS (MYCOLOGY) CULTURE: ABNORMAL
FUNGUS (MYCOLOGY) CULTURE: NORMAL
FUNGUS STAIN: ABNORMAL
FUNGUS STAIN: NORMAL
ORGANISM: ABNORMAL

## 2018-07-30 LAB — CULTURE, FUNGUS BLOOD: NORMAL

## 2018-08-07 LAB
AFB CULTURE (MYCOBACTERIA): NORMAL
AFB CULTURE (MYCOBACTERIA): NORMAL
AFB SMEAR: NORMAL
AFB SMEAR: NORMAL

## 2018-08-13 LAB
FUNGUS (MYCOLOGY) CULTURE: NORMAL
FUNGUS STAIN: NORMAL

## 2018-08-29 ENCOUNTER — OFFICE VISIT (OUTPATIENT)
Dept: CARDIOTHORACIC SURGERY | Age: 28
End: 2018-08-29

## 2018-08-29 VITALS
WEIGHT: 111 LBS | SYSTOLIC BLOOD PRESSURE: 114 MMHG | OXYGEN SATURATION: 93 % | DIASTOLIC BLOOD PRESSURE: 80 MMHG | TEMPERATURE: 99.4 F | HEIGHT: 62 IN | HEART RATE: 92 BPM | BODY MASS INDEX: 20.43 KG/M2

## 2018-08-29 DIAGNOSIS — Z86.79 H/O ENDOCARDITIS: Primary | ICD-10-CM

## 2018-08-29 PROCEDURE — 99212 OFFICE O/P EST SF 10 MIN: CPT | Performed by: THORACIC SURGERY (CARDIOTHORACIC VASCULAR SURGERY)

## 2018-08-29 RX ORDER — CARVEDILOL 25 MG/1
25 TABLET ORAL 2 TIMES DAILY WITH MEALS
COMMUNITY
End: 2021-03-25

## 2018-08-29 RX ORDER — NIFEDIPINE 30 MG/1
30 TABLET, FILM COATED, EXTENDED RELEASE ORAL 2 TIMES DAILY
COMMUNITY

## 2018-08-29 RX ORDER — OXYCODONE HYDROCHLORIDE 5 MG/1
5 TABLET ORAL EVERY 6 HOURS PRN
Status: ON HOLD | COMMUNITY
End: 2018-09-30 | Stop reason: ALTCHOICE

## 2018-08-29 RX ORDER — PANTOPRAZOLE SODIUM 40 MG/1
40 TABLET, DELAYED RELEASE ORAL DAILY
COMMUNITY
End: 2021-03-25

## 2018-09-30 ENCOUNTER — APPOINTMENT (OUTPATIENT)
Dept: CT IMAGING | Age: 28
DRG: 347 | End: 2018-09-30
Payer: MEDICARE

## 2018-09-30 ENCOUNTER — HOSPITAL ENCOUNTER (INPATIENT)
Age: 28
LOS: 1 days | Discharge: LEFT BEFORE DISCHARGE | DRG: 347 | End: 2018-10-01
Attending: EMERGENCY MEDICINE | Admitting: INTERNAL MEDICINE
Payer: MEDICARE

## 2018-09-30 ENCOUNTER — APPOINTMENT (OUTPATIENT)
Dept: GENERAL RADIOLOGY | Age: 28
DRG: 347 | End: 2018-09-30
Payer: MEDICARE

## 2018-09-30 DIAGNOSIS — M46.46 DISCITIS OF LUMBAR REGION: Primary | ICD-10-CM

## 2018-09-30 DIAGNOSIS — K56.609 SMALL BOWEL OBSTRUCTION (HCC): ICD-10-CM

## 2018-09-30 PROBLEM — M46.26 OSTEOMYELITIS OF LUMBAR VERTEBRA (HCC): Status: ACTIVE | Noted: 2018-09-30

## 2018-09-30 LAB
A/G RATIO: 0.7 (ref 1.1–2.2)
ALBUMIN SERPL-MCNC: 3.5 G/DL (ref 3.4–5)
ALP BLD-CCNC: 204 U/L (ref 40–129)
ALT SERPL-CCNC: 11 U/L (ref 10–40)
ANION GAP SERPL CALCULATED.3IONS-SCNC: 18 MMOL/L (ref 3–16)
AST SERPL-CCNC: 18 U/L (ref 15–37)
BASOPHILS ABSOLUTE: 0.1 K/UL (ref 0–0.2)
BASOPHILS RELATIVE PERCENT: 0.8 %
BILIRUB SERPL-MCNC: 0.3 MG/DL (ref 0–1)
BUN BLDV-MCNC: 36 MG/DL (ref 7–20)
CALCIUM SERPL-MCNC: 9.6 MG/DL (ref 8.3–10.6)
CHLORIDE BLD-SCNC: 94 MMOL/L (ref 99–110)
CO2: 22 MMOL/L (ref 21–32)
CREAT SERPL-MCNC: 5.9 MG/DL (ref 0.6–1.1)
EOSINOPHILS ABSOLUTE: 0.2 K/UL (ref 0–0.6)
EOSINOPHILS RELATIVE PERCENT: 2.5 %
GFR AFRICAN AMERICAN: 10
GFR NON-AFRICAN AMERICAN: 9
GLOBULIN: 4.8 G/DL
GLUCOSE BLD-MCNC: 122 MG/DL (ref 70–99)
HCG QUALITATIVE: NEGATIVE
HCT VFR BLD CALC: 31.1 % (ref 36–48)
HEMOGLOBIN: 10.1 G/DL (ref 12–16)
LACTIC ACID: 1.8 MMOL/L (ref 0.4–2)
LYMPHOCYTES ABSOLUTE: 2.5 K/UL (ref 1–5.1)
LYMPHOCYTES RELATIVE PERCENT: 28 %
MCH RBC QN AUTO: 29.3 PG (ref 26–34)
MCHC RBC AUTO-ENTMCNC: 32.4 G/DL (ref 31–36)
MCV RBC AUTO: 90.3 FL (ref 80–100)
MONOCYTES ABSOLUTE: 0.7 K/UL (ref 0–1.3)
MONOCYTES RELATIVE PERCENT: 7.6 %
NEUTROPHILS ABSOLUTE: 5.4 K/UL (ref 1.7–7.7)
NEUTROPHILS RELATIVE PERCENT: 61.1 %
PDW BLD-RTO: 20.7 % (ref 12.4–15.4)
PLATELET # BLD: 300 K/UL (ref 135–450)
PMV BLD AUTO: 6.2 FL (ref 5–10.5)
POTASSIUM SERPL-SCNC: 4.2 MMOL/L (ref 3.5–5.1)
RBC # BLD: 3.44 M/UL (ref 4–5.2)
SODIUM BLD-SCNC: 134 MMOL/L (ref 136–145)
TOTAL PROTEIN: 8.3 G/DL (ref 6.4–8.2)
WBC # BLD: 8.9 K/UL (ref 4–11)

## 2018-09-30 PROCEDURE — 74176 CT ABD & PELVIS W/O CONTRAST: CPT

## 2018-09-30 PROCEDURE — 6370000000 HC RX 637 (ALT 250 FOR IP): Performed by: INTERNAL MEDICINE

## 2018-09-30 PROCEDURE — 84703 CHORIONIC GONADOTROPIN ASSAY: CPT

## 2018-09-30 PROCEDURE — 6360000002 HC RX W HCPCS: Performed by: INTERNAL MEDICINE

## 2018-09-30 PROCEDURE — 83605 ASSAY OF LACTIC ACID: CPT

## 2018-09-30 PROCEDURE — 6370000000 HC RX 637 (ALT 250 FOR IP): Performed by: PHYSICIAN ASSISTANT

## 2018-09-30 PROCEDURE — 96375 TX/PRO/DX INJ NEW DRUG ADDON: CPT

## 2018-09-30 PROCEDURE — 99285 EMERGENCY DEPT VISIT HI MDM: CPT

## 2018-09-30 PROCEDURE — 36415 COLL VENOUS BLD VENIPUNCTURE: CPT

## 2018-09-30 PROCEDURE — 1800000000 HC LEAVE OF ABSENCE

## 2018-09-30 PROCEDURE — 6360000002 HC RX W HCPCS: Performed by: EMERGENCY MEDICINE

## 2018-09-30 PROCEDURE — 2580000003 HC RX 258: Performed by: INTERNAL MEDICINE

## 2018-09-30 PROCEDURE — 87040 BLOOD CULTURE FOR BACTERIA: CPT

## 2018-09-30 PROCEDURE — 93005 ELECTROCARDIOGRAM TRACING: CPT | Performed by: EMERGENCY MEDICINE

## 2018-09-30 PROCEDURE — 80053 COMPREHEN METABOLIC PANEL: CPT

## 2018-09-30 PROCEDURE — 96365 THER/PROPH/DIAG IV INF INIT: CPT

## 2018-09-30 PROCEDURE — 85025 COMPLETE CBC W/AUTO DIFF WBC: CPT

## 2018-09-30 PROCEDURE — 2580000003 HC RX 258: Performed by: EMERGENCY MEDICINE

## 2018-09-30 PROCEDURE — 1200000000 HC SEMI PRIVATE

## 2018-09-30 PROCEDURE — 71045 X-RAY EXAM CHEST 1 VIEW: CPT

## 2018-09-30 PROCEDURE — 96366 THER/PROPH/DIAG IV INF ADDON: CPT

## 2018-09-30 PROCEDURE — 93010 ELECTROCARDIOGRAM REPORT: CPT | Performed by: INTERNAL MEDICINE

## 2018-09-30 PROCEDURE — 99223 1ST HOSP IP/OBS HIGH 75: CPT | Performed by: INTERNAL MEDICINE

## 2018-09-30 RX ORDER — QUETIAPINE FUMARATE 25 MG/1
50 TABLET, FILM COATED ORAL NIGHTLY
Status: DISCONTINUED | OUTPATIENT
Start: 2018-09-30 | End: 2018-10-01 | Stop reason: HOSPADM

## 2018-09-30 RX ORDER — SODIUM CHLORIDE 0.9 % (FLUSH) 0.9 %
10 SYRINGE (ML) INJECTION PRN
Status: DISCONTINUED | OUTPATIENT
Start: 2018-09-30 | End: 2018-10-01 | Stop reason: HOSPADM

## 2018-09-30 RX ORDER — NIFEDIPINE 30 MG/1
30 TABLET, EXTENDED RELEASE ORAL 2 TIMES DAILY
Status: DISCONTINUED | OUTPATIENT
Start: 2018-09-30 | End: 2018-10-01 | Stop reason: HOSPADM

## 2018-09-30 RX ORDER — SEVELAMER CARBONATE 800 MG/1
800 TABLET, FILM COATED ORAL
Status: DISCONTINUED | OUTPATIENT
Start: 2018-09-30 | End: 2018-10-01 | Stop reason: HOSPADM

## 2018-09-30 RX ORDER — CARVEDILOL 25 MG/1
25 TABLET ORAL 2 TIMES DAILY WITH MEALS
Status: DISCONTINUED | OUTPATIENT
Start: 2018-09-30 | End: 2018-10-01 | Stop reason: HOSPADM

## 2018-09-30 RX ORDER — HYDROMORPHONE HCL 110MG/55ML
1 PATIENT CONTROLLED ANALGESIA SYRINGE INTRAVENOUS EVERY 4 HOURS PRN
Status: DISCONTINUED | OUTPATIENT
Start: 2018-09-30 | End: 2018-10-01

## 2018-09-30 RX ORDER — ONDANSETRON 2 MG/ML
4 INJECTION INTRAMUSCULAR; INTRAVENOUS EVERY 6 HOURS PRN
Status: DISCONTINUED | OUTPATIENT
Start: 2018-09-30 | End: 2018-10-01 | Stop reason: HOSPADM

## 2018-09-30 RX ORDER — CHOLECALCIFEROL (VITAMIN D3) 10 MCG
1 TABLET ORAL WEEKLY
COMMUNITY

## 2018-09-30 RX ORDER — TRAMADOL HYDROCHLORIDE 50 MG/1
100 TABLET ORAL EVERY 12 HOURS PRN
Status: DISCONTINUED | OUTPATIENT
Start: 2018-09-30 | End: 2018-10-01 | Stop reason: HOSPADM

## 2018-09-30 RX ORDER — ONDANSETRON 2 MG/ML
4 INJECTION INTRAMUSCULAR; INTRAVENOUS ONCE
Status: COMPLETED | OUTPATIENT
Start: 2018-09-30 | End: 2018-09-30

## 2018-09-30 RX ORDER — IBUPROFEN 800 MG/1
800 TABLET ORAL EVERY 6 HOURS PRN
COMMUNITY
End: 2019-06-04

## 2018-09-30 RX ORDER — PANTOPRAZOLE SODIUM 40 MG/1
40 TABLET, DELAYED RELEASE ORAL DAILY
Status: DISCONTINUED | OUTPATIENT
Start: 2018-09-30 | End: 2018-10-01 | Stop reason: HOSPADM

## 2018-09-30 RX ORDER — SODIUM CHLORIDE 0.9 % (FLUSH) 0.9 %
10 SYRINGE (ML) INJECTION EVERY 12 HOURS SCHEDULED
Status: DISCONTINUED | OUTPATIENT
Start: 2018-09-30 | End: 2018-10-01 | Stop reason: HOSPADM

## 2018-09-30 RX ADMIN — QUETIAPINE FUMARATE 50 MG: 25 TABLET ORAL at 20:36

## 2018-09-30 RX ADMIN — Medication 1 MG: at 05:31

## 2018-09-30 RX ADMIN — TRAMADOL HYDROCHLORIDE 100 MG: 50 TABLET, FILM COATED ORAL at 11:13

## 2018-09-30 RX ADMIN — NIFEDIPINE 30 MG: 30 TABLET, FILM COATED, EXTENDED RELEASE ORAL at 20:36

## 2018-09-30 RX ADMIN — HYDROMORPHONE HYDROCHLORIDE 1 MG: 2 INJECTION INTRAMUSCULAR; INTRAVENOUS; SUBCUTANEOUS at 22:00

## 2018-09-30 RX ADMIN — PANTOPRAZOLE SODIUM 40 MG: 40 TABLET, DELAYED RELEASE ORAL at 09:34

## 2018-09-30 RX ADMIN — Medication 1 MG: at 03:00

## 2018-09-30 RX ADMIN — SEVELAMER CARBONATE 800 MG: 800 TABLET, FILM COATED ORAL at 09:34

## 2018-09-30 RX ADMIN — ONDANSETRON 4 MG: 2 INJECTION INTRAMUSCULAR; INTRAVENOUS at 03:01

## 2018-09-30 RX ADMIN — SEVELAMER CARBONATE 800 MG: 800 TABLET, FILM COATED ORAL at 17:19

## 2018-09-30 RX ADMIN — VANCOMYCIN HYDROCHLORIDE 750 MG: 1 INJECTION, POWDER, LYOPHILIZED, FOR SOLUTION INTRAVENOUS at 05:21

## 2018-09-30 RX ADMIN — HYDROMORPHONE HYDROCHLORIDE 1 MG: 2 INJECTION INTRAMUSCULAR; INTRAVENOUS; SUBCUTANEOUS at 17:16

## 2018-09-30 RX ADMIN — NIFEDIPINE 30 MG: 30 TABLET, FILM COATED, EXTENDED RELEASE ORAL at 09:34

## 2018-09-30 RX ADMIN — CARVEDILOL 25 MG: 25 TABLET, FILM COATED ORAL at 09:34

## 2018-09-30 RX ADMIN — CARVEDILOL 25 MG: 25 TABLET, FILM COATED ORAL at 17:19

## 2018-09-30 RX ADMIN — Medication 10 ML: at 20:37

## 2018-09-30 ASSESSMENT — PAIN DESCRIPTION - LOCATION
LOCATION: BACK

## 2018-09-30 ASSESSMENT — PAIN SCALES - GENERAL
PAINLEVEL_OUTOF10: 5
PAINLEVEL_OUTOF10: 7
PAINLEVEL_OUTOF10: 9
PAINLEVEL_OUTOF10: 10
PAINLEVEL_OUTOF10: 2
PAINLEVEL_OUTOF10: 10
PAINLEVEL_OUTOF10: 9
PAINLEVEL_OUTOF10: 7

## 2018-09-30 ASSESSMENT — PAIN DESCRIPTION - PAIN TYPE
TYPE: ACUTE PAIN

## 2018-09-30 NOTE — CONSULTS
Smokeless tobacco: Never Used      Comment: 8/29/18- pt states not using any more    Alcohol use No    Drug use: Yes     Types: Marijuana      Comment: 8/29/18- occasional marijuana. No more IV Drugs since 6/2018    Sexual activity: Not Currently     Other Topics Concern    None     Social History Narrative    None       Physical Exam     Blood pressure 124/77, pulse 65, temperature 97.7 °F (36.5 °C), temperature source Oral, resp. rate 16, height 5' 3\" (1.6 m), weight 123 lb 12.8 oz (56.2 kg), last menstrual period 09/30/2017, SpO2 95 %, not currently breastfeeding. General:  NAD, A+Ox3  HEENT:  PERRL, EOMI  Neck:  Supple, normal range of movement  Chest:  CTAB, good respiratory effort, good air movement  CV:  Regular, no rub   Abdomen:  NTND, soft, +BS, no hepatosplenomegaly  Extremities:  No peripheral edema  Neurological:  Moving all four extremities, CN II-XII grossly intact  Lymphatics:  No palpable lymph nodes  Skin:  No rash, no jaundice  Psychiatric:  Normal insight and judgement, good recall  Access:  Left [facility]    Data     Recent Labs      09/30/18   0204   WBC  8.9   HGB  10.1*   HCT  31.1*   MCV  90.3   PLT  300     Recent Labs      09/30/18   0204   NA  134*   K  4.2   CL  94*   CO2  22   GLUCOSE  122*   BUN  36*   CREATININE  5.9*   LABGLOM  9*   GFRAA  10*       Assessment:    ESRD:  Has TDC and MWF, labs are stable.   No fluid overload  Anemia of chronic disease-CKD:  Hb at target  Back Pain:  MRI pending    Plan:    HD tomorrow  Follow labs    -----------------------------  Yu Ochoa M.D.   [facility]

## 2018-09-30 NOTE — ED PROVIDER NOTES
Triage Chief Complaint:   Back Pain (pt states that she has had back pain for approx: 1 week) and Flank Pain (pt states that she thinks she has a kidney infection; pt states that she is in kidney failure)      JOHNATHAN Cardoso is a 34 y.o. female that presents with left flank pain. Patient has had pain in her left back for the past week. Pain was not midline and she did not have radiation into her legs. She is not vomiting or febrile. Patient was concerned that she might have urinary tract infection but does not make urine and is a Monday Wednesday Friday dialysis patient. She has a long history of IV drug use and coated several times on an admission in June requiring tracheostomy and a prolonged inpatient care. She has a new shunt in her right arm that is not being used as she had in her left subclavian central line for dialysis. 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 She denies active drug use since her June admission.   She has not had any fecal incontinence    ROS:  Review of systems was reviewed for 10 systems and is otherwise negative except as in the [address]    Past Medical History:   Diagnosis Date    Asthma     Cardiac arrest ([facility] 06/08/2018    Depression     Endocarditis     ESRD (end stage renal disease) on dialysis ([facility] [facility]     M/W/F    Hepatitis C antibody positive in blood 03/22/2017    History of blood transfusion     Hypertension     IV drug abuse     Liver disease     MRSA (methicillin resistant staph aureus) culture positive 6/5/18, 03/12/2017    +blood culture     MRSA (methicillin resistant staph aureus) culture positive 6/12/18, 07/31/2017    tracheal aspirate, nasal screen    PTSD (post-traumatic stress disorder)     Seizures ([facility]     8/8/2017     Past Surgical History:   Procedure Laterality Date    ABDOMINAL EXPLORATION SURGERY N/A 07/13/2018    WITH DRAINAGE INTRA ABDOMINAL ABSCESS    DIALYSIS FISTULA CREATION Right 08/17/2018     bowel in the central abdomen on   the left versus loculated ascites. 3. New erosive changes in the lower lumbar spine raise the possibility of   discitis and osteomyelitis. MRI of the lumbar spine could be obtained for   further evaluation. 4. Suspected healing splenic infarcts. XR CHEST PORTABLE   Final Result   Bibasilar atelectasis and/or pneumonia. [] Discussed with Radiologist:     [] The following radiograph was interpreted by myself in the absence of a radiologist:     EKG (if obtained): (All EKG's are interpreted by myself in the absence of a cardiologist)  EKG demonstrates a normal sinus rhythm with a rightward axis and nonspecific ST-T wave changes but no acute ischemic changes or arrhythmia identified heart rate is 64 and much like her previous EKG    MDM:   Patient with multiple medical problems presents to the ER with severe back pain. Her labs were not domestically abnormal for renal failure patient. She is not hyperkalemic. Her EKG is unchanged to test is negative. CT scan demonstrates hepatomegaly with increasing ascites, multiple splenic infarcts, a dilated loop of small bowel with bowel wall edema. Lumbar spine demonstrated erosive changes consistent with discitis versus osteomyelitis and an MRI was recommended. I have started vancomycin in the emergency department I will arrange to get her admitted for an MRI to better delineate her lumbar disease. Final Impression:  1. Discitis of lumbar region    2. Small bowel obstruction [facility])        Critical Care:       Disposition referral (if applicable):  No follow-up provider specified. Disposition medications (if applicable):  New Prescriptions    No medications on file       Comment: Please note this report has been produced using speech recognition software and may contain errors related to that system including errors in grammar, punctuation, and spelling, as well as words and phrases that may be inappropriate.  If

## 2018-09-30 NOTE — FLOWSHEET NOTE
09/30/18 0839   Vital Signs   Temp 97.8 °F (36.6 °C)   Temp Source Oral   Pulse 72   Heart Rate Source Monitor   Resp 16   BP (!) 150/107   BP Location Left upper arm   BP Upper/Lower Upper   Patient Position Semi fowlers   Level of Consciousness 0   MEWS Score 1   Patient Currently in Pain Denies   Oxygen Therapy   SpO2 95 %   O2 Device None (Room air)   Pt A/O. Assessment complete, pt reports pain to lumbar area- no prns available at this time. Pt is a dialysis pt, has a new fistula to R AC and a L vas cath. Pt is weak, assist of 2 to the bathroom. VSS- hypertensive but AM BP meds given will reassess shortly. Reviewed care plan and pt verbalizes understanding. No new meds to review. No needs voiced at this time. Will continue to monitor.

## 2018-09-30 NOTE — H&P
aureus) culture positive 6/5/18, 03/12/2017    +blood culture     MRSA (methicillin resistant staph aureus) culture positive 6/12/18, 07/31/2017    tracheal aspirate, nasal screen    PTSD (post-traumatic stress disorder)     Seizures ([facility] [address]     8/8/2017       Past Surgical History:        Procedure Laterality Date    ABDOMINAL EXPLORATION SURGERY N/A 07/13/2018    WITH DRAINAGE INTRA ABDOMINAL ABSCESS    DIALYSIS FISTULA CREATION Right 08/17/2018    Dr. [facility] at [facility]    TONSILLECTOMY      UPPER GASTROINTESTINAL ENDOSCOPY  06/06/2018    Gastric Angiodysplasia    UPPER GASTROINTESTINAL ENDOSCOPY  06/27/2018    Peg Placement       Medications Prior to Admission:    Prior to Admission medications    Medication Sig Start Date End Date Taking? Authorizing Provider   ibuprofen (ADVIL;MOTRIN) 800 MG tablet Take 800 mg by mouth every 6 hours as needed for Pain   Yes Historical Provider, MD   b complex-BELGICA-folic acid (NEPHROCAPS) 1 MG capsule Take 1 capsule by mouth daily   Yes Historical Provider, MD   carvedilol (COREG) 25 MG tablet Take 25 mg by mouth 2 times daily (with meals)   Yes Historical Provider, MD   NIFEdipine (ADALAT CC) 30 MG extended release tablet Take 30 mg by mouth 2 times daily   Yes Historical Provider, MD   pantoprazole (PROTONIX) 40 MG tablet Take 40 mg by mouth daily   Yes Historical Provider, MD   QUEtiapine (SEROQUEL) 50 MG tablet Take 1 tablet by mouth nightly 7/17/18  Yes Lesa Eric MD   acetaminophen (TYLENOL) 325 MG tablet Take 650 mg by mouth every 6 hours as needed for Pain   Yes Historical Provider, MD   cloNIDine (CATAPRES) 0.1 MG tablet Take 0.1 mg by mouth three times daily   Yes Historical Provider, MD   sevelamer (RENVELA) 800 MG tablet Take 1 tablet by mouth 3 times daily (with meals)   Yes Historical Provider, MD       Allergies:  Patient has no known allergies.     Social History:  The patient currently lives at home with her mother     TOBACCO: fluctuant area palpated left flank that is tender   Neuro: Awake. Grossly nonfocal     Patellar and achilles reflexes at 2+ bilaterally  Strength is 5/5 in BLE   Psych: Oriented x 3. No anxiety or agitation. CBC:   Recent Labs      09/30/18   0204   WBC  8.9   HGB  10.1*   HCT  31.1*   MCV  90.3   PLT  300     BMP:   Recent Labs      09/30/18   0204   NA  134*   K  4.2   CL  94*   CO2  22   BUN  36*   CREATININE  5.9*     LIVER PROFILE:   Recent Labs      09/30/18   0204   AST  18   ALT  11   BILITOT  0.3   ALKPHOS  204*       U/A:    Lab Results   Component Value Date    COLORU BROWN 07/09/2018    WBCUA >100 07/09/2018    RBCUA see below 07/09/2018    BACTERIA see below 07/09/2018    CLARITYU CLOUDY 07/09/2018    SPECGRAV 1.015 07/09/2018    LEUKOCYTESUR LARGE 07/09/2018    BLOODU LARGE 07/09/2018    GLUCOSEU Negative 07/09/2018       ABG    Lab Results   Component Value Date    ONZ5VQW 18.6 07/05/2018    BEART -6.6 07/05/2018    X5RPIMBS 98.3 07/05/2018    PHART 7.340 07/05/2018    JET1RJI 35.2 07/05/2018    PO2ART 125.1 07/05/2018    FPT2KBA 19.6 07/05/2018       CULTURES  Blood: ordered     RADIOLOGY  CT ABDOMEN PELVIS WO CONTRAST   Final Result   1. Large volume of loculated ascites. 2. There is a short segment of dilated small bowel in the central abdomen on   the left versus loculated ascites. 3. New erosive changes in the lower lumbar spine raise the possibility of   discitis and osteomyelitis. MRI of the lumbar spine could be obtained for   further evaluation. 4. Suspected healing splenic infarcts. XR CHEST PORTABLE   Final Result   Bibasilar atelectasis and/or pneumonia. MRI LUMBAR SPINE W WO CONTRAST    (Results Pending)         -- >   MARCO Andrade have reviewed the chart on Germ[facility] and personally interviewed and examined patient, reviewed the data (labs and imaging) and after discussion with my PA formulated the plan.   Agree with note with the following in Nov of 2018 and consideration of valve replacement if remains clean from drug use     ESRD on HD  - has dialysis cath in place  - AVF created at [address]- August 2018  - nephro consulted for management     Anemia of chronic disease  - h/h stable. Monitor    HTN  - Uncontrolled on admit. Resume home medications and monitor     Hep C    - without evidence of liver failure    History of IVDA  - reports no IV drug use since June of 2018    Cannabis abuse  - recommended cessation    DVT Prophylaxis: Heparin   Diet: Diet NPO Effective Now  Code Status: Full Code    Salo Kidd PA-C  9/30/2018 10:01 AM      - agree with above.    await MRI spine   continue IV abx  - add IV dilaudid for pain control    Electronically signed by Paulina Rivera MD on 9/30/2018 3:22 PM

## 2018-10-01 ENCOUNTER — APPOINTMENT (OUTPATIENT)
Dept: MRI IMAGING | Age: 28
DRG: 347 | End: 2018-10-01
Payer: MEDICARE

## 2018-10-01 VITALS
TEMPERATURE: 98.8 F | WEIGHT: 115.96 LBS | DIASTOLIC BLOOD PRESSURE: 96 MMHG | OXYGEN SATURATION: 96 % | BODY MASS INDEX: 20.55 KG/M2 | RESPIRATION RATE: 16 BRPM | HEIGHT: 63 IN | SYSTOLIC BLOOD PRESSURE: 149 MMHG | HEART RATE: 81 BPM

## 2018-10-01 PROBLEM — B19.20 HEPATITIS C VIRUS INFECTION WITHOUT HEPATIC COMA: Status: ACTIVE | Noted: 2017-03-20

## 2018-10-01 PROBLEM — M46.26 OSTEOMYELITIS OF LUMBAR VERTEBRA (HCC): Status: RESOLVED | Noted: 2018-09-30 | Resolved: 2018-10-01

## 2018-10-01 LAB
ALBUMIN SERPL-MCNC: 3 G/DL (ref 3.4–5)
ANION GAP SERPL CALCULATED.3IONS-SCNC: 18 MMOL/L (ref 3–16)
BUN BLDV-MCNC: 45 MG/DL (ref 7–20)
CALCIUM SERPL-MCNC: 9.4 MG/DL (ref 8.3–10.6)
CHLORIDE BLD-SCNC: 94 MMOL/L (ref 99–110)
CO2: 21 MMOL/L (ref 21–32)
CREAT SERPL-MCNC: 7.1 MG/DL (ref 0.6–1.1)
GFR AFRICAN AMERICAN: 8
GFR NON-AFRICAN AMERICAN: 7
GLUCOSE BLD-MCNC: 86 MG/DL (ref 70–99)
HCT VFR BLD CALC: 33.5 % (ref 36–48)
HEMOGLOBIN: 10.8 G/DL (ref 12–16)
MCH RBC QN AUTO: 28.9 PG (ref 26–34)
MCHC RBC AUTO-ENTMCNC: 32.3 G/DL (ref 31–36)
MCV RBC AUTO: 89.4 FL (ref 80–100)
PDW BLD-RTO: 20.4 % (ref 12.4–15.4)
PHOSPHORUS: 5.8 MG/DL (ref 2.5–4.9)
PLATELET # BLD: 294 K/UL (ref 135–450)
PMV BLD AUTO: 6.6 FL (ref 5–10.5)
POTASSIUM SERPL-SCNC: 5.3 MMOL/L (ref 3.5–5.1)
RBC # BLD: 3.74 M/UL (ref 4–5.2)
SODIUM BLD-SCNC: 133 MMOL/L (ref 136–145)
VANCOMYCIN RANDOM: 17.1 UG/ML
WBC # BLD: 7.4 K/UL (ref 4–11)

## 2018-10-01 PROCEDURE — 6360000002 HC RX W HCPCS: Performed by: INTERNAL MEDICINE

## 2018-10-01 PROCEDURE — 5A1D70Z PERFORMANCE OF URINARY FILTRATION, INTERMITTENT, LESS THAN 6 HOURS PER DAY: ICD-10-PCS | Performed by: INTERNAL MEDICINE

## 2018-10-01 PROCEDURE — 36415 COLL VENOUS BLD VENIPUNCTURE: CPT

## 2018-10-01 PROCEDURE — 85027 COMPLETE CBC AUTOMATED: CPT

## 2018-10-01 PROCEDURE — 90937 HEMODIALYSIS REPEATED EVAL: CPT

## 2018-10-01 PROCEDURE — 6370000000 HC RX 637 (ALT 250 FOR IP): Performed by: PHYSICIAN ASSISTANT

## 2018-10-01 PROCEDURE — 1800000000 HC LEAVE OF ABSENCE

## 2018-10-01 PROCEDURE — 80202 ASSAY OF VANCOMYCIN: CPT

## 2018-10-01 PROCEDURE — 2580000003 HC RX 258: Performed by: INTERNAL MEDICINE

## 2018-10-01 PROCEDURE — 80069 RENAL FUNCTION PANEL: CPT

## 2018-10-01 PROCEDURE — 72148 MRI LUMBAR SPINE W/O DYE: CPT

## 2018-10-01 PROCEDURE — 6370000000 HC RX 637 (ALT 250 FOR IP): Performed by: INTERNAL MEDICINE

## 2018-10-01 PROCEDURE — 2580000003 HC RX 258: Performed by: PHYSICIAN ASSISTANT

## 2018-10-01 PROCEDURE — 6360000002 HC RX W HCPCS: Performed by: PHYSICIAN ASSISTANT

## 2018-10-01 PROCEDURE — 99232 SBSQ HOSP IP/OBS MODERATE 35: CPT | Performed by: PHYSICIAN ASSISTANT

## 2018-10-01 RX ORDER — HEPARIN SODIUM 1000 [USP'U]/ML
3800 INJECTION, SOLUTION INTRAVENOUS; SUBCUTANEOUS
Status: DISCONTINUED | OUTPATIENT
Start: 2018-10-01 | End: 2018-10-01 | Stop reason: HOSPADM

## 2018-10-01 RX ORDER — PREDNISONE 20 MG/1
40 TABLET ORAL DAILY
Status: DISCONTINUED | OUTPATIENT
Start: 2018-10-01 | End: 2018-10-01 | Stop reason: HOSPADM

## 2018-10-01 RX ORDER — METHOCARBAMOL 750 MG/1
750 TABLET, FILM COATED ORAL 3 TIMES DAILY
Status: DISCONTINUED | OUTPATIENT
Start: 2018-10-01 | End: 2018-10-01 | Stop reason: HOSPADM

## 2018-10-01 RX ORDER — PREDNISONE 20 MG/1
20 TABLET ORAL DAILY
Status: DISCONTINUED | OUTPATIENT
Start: 2018-10-01 | End: 2018-10-01

## 2018-10-01 RX ADMIN — CARVEDILOL 25 MG: 25 TABLET, FILM COATED ORAL at 17:05

## 2018-10-01 RX ADMIN — TRAMADOL HYDROCHLORIDE 100 MG: 50 TABLET, FILM COATED ORAL at 06:32

## 2018-10-01 RX ADMIN — SEVELAMER CARBONATE 800 MG: 800 TABLET, FILM COATED ORAL at 09:20

## 2018-10-01 RX ADMIN — NIFEDIPINE 30 MG: 30 TABLET, FILM COATED, EXTENDED RELEASE ORAL at 09:20

## 2018-10-01 RX ADMIN — VANCOMYCIN HYDROCHLORIDE 500 MG: 500 INJECTION, POWDER, LYOPHILIZED, FOR SOLUTION INTRAVENOUS at 12:59

## 2018-10-01 RX ADMIN — HEPARIN SODIUM 3800 UNITS: 1000 INJECTION, SOLUTION INTRAVENOUS; SUBCUTANEOUS at 14:02

## 2018-10-01 RX ADMIN — CARVEDILOL 25 MG: 25 TABLET, FILM COATED ORAL at 09:20

## 2018-10-01 RX ADMIN — SEVELAMER CARBONATE 800 MG: 800 TABLET, FILM COATED ORAL at 17:05

## 2018-10-01 RX ADMIN — HYDROMORPHONE HYDROCHLORIDE 1 MG: 2 INJECTION INTRAMUSCULAR; INTRAVENOUS; SUBCUTANEOUS at 14:01

## 2018-10-01 RX ADMIN — METHOCARBAMOL 750 MG: 750 TABLET, FILM COATED ORAL at 17:49

## 2018-10-01 RX ADMIN — HYDROMORPHONE HYDROCHLORIDE 1 MG: 2 INJECTION INTRAMUSCULAR; INTRAVENOUS; SUBCUTANEOUS at 07:45

## 2018-10-01 RX ADMIN — PANTOPRAZOLE SODIUM 40 MG: 40 TABLET, DELAYED RELEASE ORAL at 09:20

## 2018-10-01 RX ADMIN — DARBEPOETIN ALFA 25 MCG: 25 INJECTION, SOLUTION INTRAVENOUS; SUBCUTANEOUS at 13:31

## 2018-10-01 RX ADMIN — HYDROMORPHONE HYDROCHLORIDE 1 MG: 2 INJECTION INTRAMUSCULAR; INTRAVENOUS; SUBCUTANEOUS at 02:36

## 2018-10-01 RX ADMIN — Medication 10 ML: at 09:31

## 2018-10-01 RX ADMIN — PREDNISONE 40 MG: 20 TABLET ORAL at 17:49

## 2018-10-01 ASSESSMENT — PAIN SCALES - GENERAL
PAINLEVEL_OUTOF10: 5
PAINLEVEL_OUTOF10: 7
PAINLEVEL_OUTOF10: 6
PAINLEVEL_OUTOF10: 7
PAINLEVEL_OUTOF10: 0
PAINLEVEL_OUTOF10: 7
PAINLEVEL_OUTOF10: 8
PAINLEVEL_OUTOF10: 8
PAINLEVEL_OUTOF10: 7

## 2018-10-01 ASSESSMENT — PAIN DESCRIPTION - FREQUENCY: FREQUENCY: CONTINUOUS

## 2018-10-01 ASSESSMENT — PAIN DESCRIPTION - ONSET: ONSET: ON-GOING

## 2018-10-01 ASSESSMENT — PAIN DESCRIPTION - DESCRIPTORS: DESCRIPTORS: ACHING

## 2018-10-01 ASSESSMENT — PAIN DESCRIPTION - PAIN TYPE
TYPE: ACUTE PAIN

## 2018-10-01 ASSESSMENT — PAIN DESCRIPTION - PROGRESSION: CLINICAL_PROGRESSION: NOT CHANGED

## 2018-10-01 ASSESSMENT — PAIN DESCRIPTION - LOCATION
LOCATION: BACK

## 2018-10-01 NOTE — PLAN OF CARE
Problem: Falls - Risk of:  Goal: Will remain free from falls  Will remain free from falls   Outcome: Ongoing    Goal: Absence of physical injury  Absence of physical injury   Outcome: Ongoing      Problem: Pain:  Goal: Pain level will decrease  Pain level will decrease   Outcome: Ongoing    Goal: Control of acute pain  Control of acute pain   Outcome: Ongoing    Goal: Control of chronic pain  Control of chronic pain   Outcome: Ongoing      Problem: Infection:  Goal: Will remain free from infection  Will remain free from infection   Outcome: Ongoing      Problem: Safety:  Goal: Free from accidental physical injury  Free from accidental physical injury   Outcome: Ongoing

## 2018-10-01 NOTE — PLAN OF CARE
Problem: Airway Clearance - Ineffective:  Goal: Clear lung sounds  Clear lung sounds  Outcome: Ongoing    Goal: Ability to maintain a clear airway will improve  Ability to maintain a clear airway will improve  Outcome: Ongoing      Problem: Gas Exchange - Impaired:  Goal: Levels of oxygenation will improve  Levels of oxygenation will improve  Outcome: Ongoing

## 2018-10-02 PROCEDURE — 1800000000 HC LEAVE OF ABSENCE

## 2018-10-02 NOTE — PROGRESS NOTES
Bedside report given to Sebastian LEBRON at this time. Pt in stable condition and has no needs at this time.  Miles Elizondo RN
Pt's family physician office called, Vanesa DUBON requested verification that pt had been receiving prednisone and robaxin during last admission. I spoke with Rebekah DUBON reviewed information and chart, info given to PCP office.
TID WC    sodium chloride flush  10 mL Intravenous 2 times per day    vancomycin (VANCOCIN) intermittent dosing (placeholder)   Other RX Placeholder       Continuous Infusions:      PRN Meds:  vancomycin, vancomycin, sodium chloride flush, ondansetron, traMADol      Data:  CBC: Recent Labs      09/30/18   0204  10/01/18   0612   WBC  8.9  7.4   HGB  10.1*  10.8*   HCT  31.1*  33.5*   MCV  90.3  89.4   PLT  300  294     BMP: Recent Labs      09/30/18   0204  10/01/18   0612   NA  134*  133*   K  4.2  5.3*   CL  94*  94*   CO2  22  21   PHOS   --   5.8*   BUN  36*  45*   CREATININE  5.9*  7.1*     LIVER PROFILE:   Recent Labs      09/30/18 0204   AST  18   ALT  11   BILITOT  0.3   ALKPHOS  204*     PT/INR: No results for input(s): PROTIME, INR in the last 72 hours. CULTURES  Blood: NGTD     RADIOLOGY  MRI LUMBAR SPINE WO CONTRAST   Final Result   Mild endplate edema at S2-8 and L4-5, corresponding to erosive change on CT   imaging. Relative hypointensity within the disc spaces favors hemodialysis   spondyloarthropathy. Osteomyelitis is not excluded. Motion artifact limited exam.  No evidence of epidural abscess. Diffuse marrow signal alteration likely relates to end-stage renal disease. CT ABDOMEN PELVIS WO CONTRAST   Final Result   1. Large volume of loculated ascites. 2. There is a short segment of dilated small bowel in the central abdomen on   the left versus loculated ascites. 3. New erosive changes in the lower lumbar spine raise the possibility of   discitis and osteomyelitis. MRI of the lumbar spine could be obtained for   further evaluation. 4. Suspected healing splenic infarcts. XR CHEST PORTABLE   Final Result   Bibasilar atelectasis and/or pneumonia.                Assessment/Plan:    Low back pain  - abnormal CT as above with concern for osteomyelitis and discitis of lumbar spine   - started vanc with HD on admission   - MRI of L spine WO contrast ordered- see

## 2018-10-03 PROCEDURE — 1800000000 HC LEAVE OF ABSENCE

## 2018-10-04 PROCEDURE — 1800000000 HC LEAVE OF ABSENCE

## 2018-10-05 LAB
BLOOD CULTURE, ROUTINE: NORMAL
CULTURE, BLOOD 2: NORMAL
EKG ATRIAL RATE: 64 BPM
EKG DIAGNOSIS: NORMAL
EKG P AXIS: 95 DEGREES
EKG P-R INTERVAL: 186 MS
EKG Q-T INTERVAL: 402 MS
EKG QRS DURATION: 80 MS
EKG QTC CALCULATION (BAZETT): 414 MS
EKG R AXIS: 109 DEGREES
EKG T AXIS: 111 DEGREES
EKG VENTRICULAR RATE: 64 BPM

## 2018-10-05 PROCEDURE — 1800000000 HC LEAVE OF ABSENCE

## 2018-10-06 PROCEDURE — 1800000000 HC LEAVE OF ABSENCE

## 2018-10-07 PROCEDURE — 1800000000 HC LEAVE OF ABSENCE

## 2018-10-08 PROCEDURE — 1800000000 HC LEAVE OF ABSENCE

## 2018-10-09 ENCOUNTER — HOSPITAL ENCOUNTER (INPATIENT)
Age: 28
LOS: 4 days | Discharge: HOME | DRG: 347 | End: 2018-10-13
Attending: EMERGENCY MEDICINE | Admitting: INTERNAL MEDICINE
Payer: MEDICARE

## 2018-10-09 DIAGNOSIS — Z99.2 STAGE 5 CHRONIC KIDNEY DISEASE ON CHRONIC DIALYSIS (HCC): ICD-10-CM

## 2018-10-09 DIAGNOSIS — R11.2 NAUSEA AND VOMITING, INTRACTABILITY OF VOMITING NOT SPECIFIED, UNSPECIFIED VOMITING TYPE: ICD-10-CM

## 2018-10-09 DIAGNOSIS — R18.8 OTHER ASCITES: ICD-10-CM

## 2018-10-09 DIAGNOSIS — N18.6 STAGE 5 CHRONIC KIDNEY DISEASE ON CHRONIC DIALYSIS (HCC): ICD-10-CM

## 2018-10-09 DIAGNOSIS — R10.84 GENERALIZED ABDOMINAL PAIN: ICD-10-CM

## 2018-10-09 DIAGNOSIS — E87.5 HYPERKALEMIA: Primary | ICD-10-CM

## 2018-10-09 LAB
A/G RATIO: 0.6 (ref 1.1–2.2)
ALBUMIN SERPL-MCNC: 2.9 G/DL (ref 3.4–5)
ALBUMIN SERPL-MCNC: 3.1 G/DL (ref 3.4–5)
ALP BLD-CCNC: 499 U/L (ref 40–129)
ALT SERPL-CCNC: 29 U/L (ref 10–40)
ANION GAP SERPL CALCULATED.3IONS-SCNC: 21 MMOL/L (ref 3–16)
ANION GAP SERPL CALCULATED.3IONS-SCNC: 22 MMOL/L (ref 3–16)
AST SERPL-CCNC: 27 U/L (ref 15–37)
BASOPHILS ABSOLUTE: 0.2 K/UL (ref 0–0.2)
BASOPHILS RELATIVE PERCENT: 1.3 %
BILIRUB SERPL-MCNC: 0.5 MG/DL (ref 0–1)
BUN BLDV-MCNC: 90 MG/DL (ref 7–20)
BUN BLDV-MCNC: 97 MG/DL (ref 7–20)
CALCIUM SERPL-MCNC: 9.1 MG/DL (ref 8.3–10.6)
CALCIUM SERPL-MCNC: 9.4 MG/DL (ref 8.3–10.6)
CHLORIDE BLD-SCNC: 92 MMOL/L (ref 99–110)
CHLORIDE BLD-SCNC: 93 MMOL/L (ref 99–110)
CO2: 16 MMOL/L (ref 21–32)
CO2: 17 MMOL/L (ref 21–32)
CREAT SERPL-MCNC: 8.7 MG/DL (ref 0.6–1.1)
CREAT SERPL-MCNC: 9.2 MG/DL (ref 0.6–1.1)
EOSINOPHILS ABSOLUTE: 0.1 K/UL (ref 0–0.6)
EOSINOPHILS RELATIVE PERCENT: 1.2 %
GFR AFRICAN AMERICAN: 6
GFR AFRICAN AMERICAN: 7
GFR NON-AFRICAN AMERICAN: 5
GFR NON-AFRICAN AMERICAN: 6
GLOBULIN: 5 G/DL
GLUCOSE BLD-MCNC: 104 MG/DL (ref 70–99)
GLUCOSE BLD-MCNC: 109 MG/DL (ref 70–99)
GLUCOSE BLD-MCNC: 92 MG/DL (ref 70–99)
HCT VFR BLD CALC: 36.2 % (ref 36–48)
HEMOGLOBIN: 11.9 G/DL (ref 12–16)
LIPASE: 15 U/L (ref 13–60)
LYMPHOCYTES ABSOLUTE: 1.9 K/UL (ref 1–5.1)
LYMPHOCYTES RELATIVE PERCENT: 15.5 %
MCH RBC QN AUTO: 28.9 PG (ref 26–34)
MCHC RBC AUTO-ENTMCNC: 32.8 G/DL (ref 31–36)
MCV RBC AUTO: 88.2 FL (ref 80–100)
MONOCYTES ABSOLUTE: 0.8 K/UL (ref 0–1.3)
MONOCYTES RELATIVE PERCENT: 6.5 %
NEUTROPHILS ABSOLUTE: 9.1 K/UL (ref 1.7–7.7)
NEUTROPHILS RELATIVE PERCENT: 75.5 %
PDW BLD-RTO: 19.4 % (ref 12.4–15.4)
PERFORMED ON: NORMAL
PHOSPHORUS: 8.4 MG/DL (ref 2.5–4.9)
PLATELET # BLD: 328 K/UL (ref 135–450)
PMV BLD AUTO: 6.9 FL (ref 5–10.5)
POTASSIUM REFLEX MAGNESIUM: 7.2 MMOL/L (ref 3.5–5.1)
POTASSIUM SERPL-SCNC: 6.7 MMOL/L (ref 3.5–5.1)
RBC # BLD: 4.1 M/UL (ref 4–5.2)
SODIUM BLD-SCNC: 130 MMOL/L (ref 136–145)
SODIUM BLD-SCNC: 131 MMOL/L (ref 136–145)
TOTAL PROTEIN: 8.1 G/DL (ref 6.4–8.2)
WBC # BLD: 12 K/UL (ref 4–11)

## 2018-10-09 PROCEDURE — S0028 INJECTION, FAMOTIDINE, 20 MG: HCPCS | Performed by: EMERGENCY MEDICINE

## 2018-10-09 PROCEDURE — 2580000003 HC RX 258: Performed by: EMERGENCY MEDICINE

## 2018-10-09 PROCEDURE — 99285 EMERGENCY DEPT VISIT HI MDM: CPT

## 2018-10-09 PROCEDURE — 2580000003 HC RX 258: Performed by: INTERNAL MEDICINE

## 2018-10-09 PROCEDURE — 2000000000 HC ICU R&B

## 2018-10-09 PROCEDURE — 6360000002 HC RX W HCPCS

## 2018-10-09 PROCEDURE — 6360000002 HC RX W HCPCS: Performed by: INTERNAL MEDICINE

## 2018-10-09 PROCEDURE — 6370000000 HC RX 637 (ALT 250 FOR IP): Performed by: INTERNAL MEDICINE

## 2018-10-09 PROCEDURE — 2580000003 HC RX 258

## 2018-10-09 PROCEDURE — 93005 ELECTROCARDIOGRAM TRACING: CPT | Performed by: NURSE PRACTITIONER

## 2018-10-09 PROCEDURE — 6370000000 HC RX 637 (ALT 250 FOR IP): Performed by: NURSE PRACTITIONER

## 2018-10-09 PROCEDURE — 85025 COMPLETE CBC W/AUTO DIFF WBC: CPT

## 2018-10-09 PROCEDURE — 2500000003 HC RX 250 WO HCPCS: Performed by: EMERGENCY MEDICINE

## 2018-10-09 PROCEDURE — 83690 ASSAY OF LIPASE: CPT

## 2018-10-09 PROCEDURE — 36415 COLL VENOUS BLD VENIPUNCTURE: CPT

## 2018-10-09 PROCEDURE — 80053 COMPREHEN METABOLIC PANEL: CPT

## 2018-10-09 PROCEDURE — 6360000002 HC RX W HCPCS: Performed by: EMERGENCY MEDICINE

## 2018-10-09 RX ORDER — DOCUSATE SODIUM 100 MG/1
100 CAPSULE, LIQUID FILLED ORAL 2 TIMES DAILY
Status: DISCONTINUED | OUTPATIENT
Start: 2018-10-09 | End: 2018-10-13 | Stop reason: HOSPADM

## 2018-10-09 RX ORDER — OXYCODONE AND ACETAMINOPHEN 7.5; 325 MG/1; MG/1
1 TABLET ORAL EVERY 4 HOURS PRN
Status: DISCONTINUED | OUTPATIENT
Start: 2018-10-09 | End: 2018-10-10

## 2018-10-09 RX ORDER — PANTOPRAZOLE SODIUM 40 MG/1
40 TABLET, DELAYED RELEASE ORAL DAILY
Status: DISCONTINUED | OUTPATIENT
Start: 2018-10-10 | End: 2018-10-13 | Stop reason: HOSPADM

## 2018-10-09 RX ORDER — ONDANSETRON 2 MG/ML
4 INJECTION INTRAMUSCULAR; INTRAVENOUS EVERY 30 MIN PRN
Status: DISCONTINUED | OUTPATIENT
Start: 2018-10-09 | End: 2018-10-09 | Stop reason: ALTCHOICE

## 2018-10-09 RX ORDER — QUETIAPINE FUMARATE 25 MG/1
50 TABLET, FILM COATED ORAL NIGHTLY
Status: DISCONTINUED | OUTPATIENT
Start: 2018-10-09 | End: 2018-10-13 | Stop reason: HOSPADM

## 2018-10-09 RX ORDER — SODIUM CHLORIDE 0.9 % (FLUSH) 0.9 %
10 SYRINGE (ML) INJECTION EVERY 12 HOURS SCHEDULED
Status: DISCONTINUED | OUTPATIENT
Start: 2018-10-09 | End: 2018-10-13 | Stop reason: HOSPADM

## 2018-10-09 RX ORDER — SEVELAMER CARBONATE 800 MG/1
800 TABLET, FILM COATED ORAL
Status: DISCONTINUED | OUTPATIENT
Start: 2018-10-10 | End: 2018-10-13 | Stop reason: HOSPADM

## 2018-10-09 RX ORDER — MORPHINE SULFATE 4 MG/ML
4 INJECTION, SOLUTION INTRAMUSCULAR; INTRAVENOUS
Status: DISCONTINUED | OUTPATIENT
Start: 2018-10-09 | End: 2018-10-09 | Stop reason: HOSPADM

## 2018-10-09 RX ORDER — SODIUM CHLORIDE 0.9 % (FLUSH) 0.9 %
10 SYRINGE (ML) INJECTION PRN
Status: DISCONTINUED | OUTPATIENT
Start: 2018-10-09 | End: 2018-10-13 | Stop reason: HOSPADM

## 2018-10-09 RX ORDER — ONDANSETRON 2 MG/ML
4 INJECTION INTRAMUSCULAR; INTRAVENOUS EVERY 6 HOURS PRN
Status: DISCONTINUED | OUTPATIENT
Start: 2018-10-09 | End: 2018-10-13 | Stop reason: HOSPADM

## 2018-10-09 RX ORDER — CARVEDILOL 25 MG/1
25 TABLET ORAL 2 TIMES DAILY WITH MEALS
Status: DISCONTINUED | OUTPATIENT
Start: 2018-10-10 | End: 2018-10-13 | Stop reason: HOSPADM

## 2018-10-09 RX ORDER — 0.9 % SODIUM CHLORIDE 0.9 %
1000 INTRAVENOUS SOLUTION INTRAVENOUS ONCE
Status: COMPLETED | OUTPATIENT
Start: 2018-10-09 | End: 2018-10-09

## 2018-10-09 RX ORDER — CLONIDINE HYDROCHLORIDE 0.1 MG/1
0.1 TABLET ORAL 3 TIMES DAILY
Status: DISCONTINUED | OUTPATIENT
Start: 2018-10-09 | End: 2018-10-13 | Stop reason: HOSPADM

## 2018-10-09 RX ORDER — HEPARIN SODIUM 5000 [USP'U]/ML
5000 INJECTION, SOLUTION INTRAVENOUS; SUBCUTANEOUS EVERY 8 HOURS SCHEDULED
Status: DISCONTINUED | OUTPATIENT
Start: 2018-10-09 | End: 2018-10-13 | Stop reason: HOSPADM

## 2018-10-09 RX ORDER — HYDRALAZINE HYDROCHLORIDE 20 MG/ML
10 INJECTION INTRAMUSCULAR; INTRAVENOUS EVERY 6 HOURS PRN
Status: DISCONTINUED | OUTPATIENT
Start: 2018-10-09 | End: 2018-10-13 | Stop reason: HOSPADM

## 2018-10-09 RX ORDER — LIDOCAINE 50 MG/G
1 PATCH TOPICAL DAILY
Status: DISCONTINUED | OUTPATIENT
Start: 2018-10-09 | End: 2018-10-13 | Stop reason: HOSPADM

## 2018-10-09 RX ORDER — NIFEDIPINE 30 MG/1
30 TABLET, EXTENDED RELEASE ORAL 2 TIMES DAILY
Status: DISCONTINUED | OUTPATIENT
Start: 2018-10-09 | End: 2018-10-13 | Stop reason: HOSPADM

## 2018-10-09 RX ADMIN — CLONIDINE HYDROCHLORIDE 0.1 MG: 0.1 TABLET ORAL at 22:43

## 2018-10-09 RX ADMIN — MORPHINE SULFATE 4 MG: 4 INJECTION INTRAVENOUS at 21:41

## 2018-10-09 RX ADMIN — NIFEDIPINE 30 MG: 30 TABLET, FILM COATED, EXTENDED RELEASE ORAL at 22:43

## 2018-10-09 RX ADMIN — QUETIAPINE FUMARATE 50 MG: 25 TABLET ORAL at 22:43

## 2018-10-09 RX ADMIN — CALCIUM GLUCONATE 2 G: 98 INJECTION, SOLUTION INTRAVENOUS at 23:33

## 2018-10-09 RX ADMIN — Medication 10 ML: at 22:43

## 2018-10-09 RX ADMIN — FAMOTIDINE 20 MG: 10 INJECTION, SOLUTION INTRAVENOUS at 21:41

## 2018-10-09 RX ADMIN — SODIUM CHLORIDE 1000 ML: 9 INJECTION, SOLUTION INTRAVENOUS at 21:51

## 2018-10-09 RX ADMIN — ONDANSETRON 4 MG: 2 INJECTION INTRAMUSCULAR; INTRAVENOUS at 21:41

## 2018-10-09 RX ADMIN — HEPARIN SODIUM 5000 UNITS: 5000 INJECTION, SOLUTION INTRAVENOUS; SUBCUTANEOUS at 22:43

## 2018-10-09 ASSESSMENT — PAIN SCALES - GENERAL
PAINLEVEL_OUTOF10: 10
PAINLEVEL_OUTOF10: 5
PAINLEVEL_OUTOF10: 10

## 2018-10-09 ASSESSMENT — PAIN DESCRIPTION - ORIENTATION: ORIENTATION: LOWER

## 2018-10-09 ASSESSMENT — PAIN DESCRIPTION - PAIN TYPE
TYPE: ACUTE PAIN
TYPE: CHRONIC PAIN

## 2018-10-09 ASSESSMENT — ENCOUNTER SYMPTOMS
BACK PAIN: 1
ABDOMINAL PAIN: 1
SHORTNESS OF BREATH: 0

## 2018-10-09 ASSESSMENT — PAIN DESCRIPTION - LOCATION: LOCATION: BACK

## 2018-10-10 PROBLEM — J18.9 HCAP (HEALTHCARE-ASSOCIATED PNEUMONIA): Status: RESOLVED | Noted: 2018-06-06 | Resolved: 2018-10-10

## 2018-10-10 PROBLEM — J96.01 ACUTE RESPIRATORY FAILURE WITH HYPOXIA (HCC): Status: RESOLVED | Noted: 2018-06-11 | Resolved: 2018-10-10

## 2018-10-10 LAB
ALBUMIN SERPL-MCNC: 3 G/DL (ref 3.4–5)
ANION GAP SERPL CALCULATED.3IONS-SCNC: 17 MMOL/L (ref 3–16)
BUN BLDV-MCNC: 40 MG/DL (ref 7–20)
CALCIUM SERPL-MCNC: 9 MG/DL (ref 8.3–10.6)
CHLORIDE BLD-SCNC: 93 MMOL/L (ref 99–110)
CO2: 21 MMOL/L (ref 21–32)
CREAT SERPL-MCNC: 5.3 MG/DL (ref 0.6–1.1)
GFR AFRICAN AMERICAN: 12
GFR NON-AFRICAN AMERICAN: 10
GLUCOSE BLD-MCNC: 136 MG/DL (ref 70–99)
PHOSPHORUS: 4.4 MG/DL (ref 2.5–4.9)
POTASSIUM SERPL-SCNC: 4.7 MMOL/L (ref 3.5–5.1)
SODIUM BLD-SCNC: 131 MMOL/L (ref 136–145)

## 2018-10-10 PROCEDURE — 1200000000 HC SEMI PRIVATE

## 2018-10-10 PROCEDURE — 5A1D70Z PERFORMANCE OF URINARY FILTRATION, INTERMITTENT, LESS THAN 6 HOURS PER DAY: ICD-10-PCS | Performed by: INTERNAL MEDICINE

## 2018-10-10 PROCEDURE — 80069 RENAL FUNCTION PANEL: CPT

## 2018-10-10 PROCEDURE — 6370000000 HC RX 637 (ALT 250 FOR IP): Performed by: INTERNAL MEDICINE

## 2018-10-10 PROCEDURE — 2580000003 HC RX 258: Performed by: INTERNAL MEDICINE

## 2018-10-10 PROCEDURE — 99223 1ST HOSP IP/OBS HIGH 75: CPT | Performed by: INTERNAL MEDICINE

## 2018-10-10 PROCEDURE — 6360000002 HC RX W HCPCS: Performed by: INTERNAL MEDICINE

## 2018-10-10 PROCEDURE — 6370000000 HC RX 637 (ALT 250 FOR IP): Performed by: NURSE PRACTITIONER

## 2018-10-10 PROCEDURE — 93010 ELECTROCARDIOGRAM REPORT: CPT | Performed by: INTERNAL MEDICINE

## 2018-10-10 PROCEDURE — 36415 COLL VENOUS BLD VENIPUNCTURE: CPT

## 2018-10-10 PROCEDURE — 99232 SBSQ HOSP IP/OBS MODERATE 35: CPT | Performed by: INTERNAL MEDICINE

## 2018-10-10 PROCEDURE — 90937 HEMODIALYSIS REPEATED EVAL: CPT

## 2018-10-10 RX ORDER — KETOROLAC TROMETHAMINE 30 MG/ML
15 INJECTION, SOLUTION INTRAMUSCULAR; INTRAVENOUS EVERY 6 HOURS PRN
Status: DISCONTINUED | OUTPATIENT
Start: 2018-10-10 | End: 2018-10-13 | Stop reason: HOSPADM

## 2018-10-10 RX ORDER — HEPARIN SODIUM 1000 [USP'U]/ML
3800 INJECTION, SOLUTION INTRAVENOUS; SUBCUTANEOUS PRN
Status: DISCONTINUED | OUTPATIENT
Start: 2018-10-10 | End: 2018-10-13 | Stop reason: HOSPADM

## 2018-10-10 RX ORDER — CYCLOBENZAPRINE HCL 10 MG
10 TABLET ORAL 3 TIMES DAILY PRN
Status: DISCONTINUED | OUTPATIENT
Start: 2018-10-10 | End: 2018-10-13 | Stop reason: HOSPADM

## 2018-10-10 RX ADMIN — HEPARIN SODIUM 5000 UNITS: 5000 INJECTION, SOLUTION INTRAVENOUS; SUBCUTANEOUS at 05:39

## 2018-10-10 RX ADMIN — HEPARIN SODIUM 5000 UNITS: 5000 INJECTION, SOLUTION INTRAVENOUS; SUBCUTANEOUS at 13:47

## 2018-10-10 RX ADMIN — Medication 10 ML: at 17:58

## 2018-10-10 RX ADMIN — QUETIAPINE FUMARATE 50 MG: 25 TABLET ORAL at 20:47

## 2018-10-10 RX ADMIN — KETOROLAC TROMETHAMINE 15 MG: 30 INJECTION, SOLUTION INTRAMUSCULAR at 17:58

## 2018-10-10 RX ADMIN — SEVELAMER CARBONATE 800 MG: 800 TABLET, FILM COATED ORAL at 17:05

## 2018-10-10 RX ADMIN — CLONIDINE HYDROCHLORIDE 0.1 MG: 0.1 TABLET ORAL at 20:47

## 2018-10-10 RX ADMIN — HYDRALAZINE HYDROCHLORIDE 10 MG: 20 INJECTION INTRAMUSCULAR; INTRAVENOUS at 05:38

## 2018-10-10 RX ADMIN — CARVEDILOL 25 MG: 25 TABLET, FILM COATED ORAL at 07:45

## 2018-10-10 RX ADMIN — Medication 10 ML: at 07:55

## 2018-10-10 RX ADMIN — CYCLOBENZAPRINE HYDROCHLORIDE 10 MG: 10 TABLET, FILM COATED ORAL at 05:50

## 2018-10-10 RX ADMIN — PANTOPRAZOLE SODIUM 40 MG: 40 TABLET, DELAYED RELEASE ORAL at 05:39

## 2018-10-10 RX ADMIN — SEVELAMER CARBONATE 800 MG: 800 TABLET, FILM COATED ORAL at 11:58

## 2018-10-10 RX ADMIN — CLONIDINE HYDROCHLORIDE 0.1 MG: 0.1 TABLET ORAL at 13:47

## 2018-10-10 RX ADMIN — MUPIROCIN: 20 OINTMENT TOPICAL at 11:58

## 2018-10-10 RX ADMIN — OXYCODONE HYDROCHLORIDE AND ACETAMINOPHEN 1 TABLET: 7.5; 325 TABLET ORAL at 07:45

## 2018-10-10 RX ADMIN — NIFEDIPINE 30 MG: 30 TABLET, FILM COATED, EXTENDED RELEASE ORAL at 20:47

## 2018-10-10 RX ADMIN — CLONIDINE HYDROCHLORIDE 0.1 MG: 0.1 TABLET ORAL at 07:46

## 2018-10-10 RX ADMIN — OXYCODONE HYDROCHLORIDE AND ACETAMINOPHEN 1 TABLET: 7.5; 325 TABLET ORAL at 03:41

## 2018-10-10 RX ADMIN — HEPARIN SODIUM 3800 UNITS: 1000 INJECTION, SOLUTION INTRAVENOUS; SUBCUTANEOUS at 05:10

## 2018-10-10 RX ADMIN — SEVELAMER CARBONATE 800 MG: 800 TABLET, FILM COATED ORAL at 07:45

## 2018-10-10 RX ADMIN — CYCLOBENZAPRINE HYDROCHLORIDE 10 MG: 10 TABLET, FILM COATED ORAL at 21:03

## 2018-10-10 RX ADMIN — Medication 10 ML: at 20:48

## 2018-10-10 RX ADMIN — NIFEDIPINE 30 MG: 30 TABLET, FILM COATED, EXTENDED RELEASE ORAL at 07:46

## 2018-10-10 RX ADMIN — CARVEDILOL 25 MG: 25 TABLET, FILM COATED ORAL at 17:05

## 2018-10-10 RX ADMIN — KETOROLAC TROMETHAMINE 15 MG: 30 INJECTION, SOLUTION INTRAMUSCULAR at 11:58

## 2018-10-10 ASSESSMENT — PAIN DESCRIPTION - LOCATION: LOCATION: BACK;LEG

## 2018-10-10 ASSESSMENT — PAIN DESCRIPTION - ONSET: ONSET: ON-GOING

## 2018-10-10 ASSESSMENT — PAIN DESCRIPTION - DESCRIPTORS: DESCRIPTORS: ACHING;SHARP

## 2018-10-10 ASSESSMENT — PAIN DESCRIPTION - PAIN TYPE: TYPE: CHRONIC PAIN

## 2018-10-10 ASSESSMENT — PAIN DESCRIPTION - ORIENTATION: ORIENTATION: LOWER

## 2018-10-10 ASSESSMENT — PAIN SCALES - GENERAL
PAINLEVEL_OUTOF10: 9
PAINLEVEL_OUTOF10: 6
PAINLEVEL_OUTOF10: 4
PAINLEVEL_OUTOF10: 8
PAINLEVEL_OUTOF10: 7

## 2018-10-10 ASSESSMENT — PAIN DESCRIPTION - FREQUENCY: FREQUENCY: CONTINUOUS

## 2018-10-10 NOTE — ED PROVIDER NOTES
ESRD (end stage renal disease) on dialysis ([facility]     M/W/F    Hepatitis C antibody positive in blood 03/22/2017    History of blood transfusion     Hypertension     IV drug abuse (HCC)     Liver disease     MRSA (methicillin resistant staph aureus) culture positive 6/5/18, 03/12/2017    +blood culture     MRSA (methicillin resistant staph aureus) culture positive 6/12/18, 07/31/2017    tracheal aspirate, nasal screen    PTSD (post-traumatic stress disorder)     Seizures ([facility]     8/8/2017         SURGICAL HISTORY       Past Surgical History:   Procedure Laterality Date    ABDOMINAL EXPLORATION SURGERY N/A 07/13/2018    WITH DRAINAGE INTRA ABDOMINAL ABSCESS    DIALYSIS FISTULA CREATION Right 08/17/2018    Dr. Noel Meyer - at [facility] UPPER GASTROINTESTINAL ENDOSCOPY  06/06/2018    Gastric Angiodysplasia    UPPER GASTROINTESTINAL ENDOSCOPY  06/27/2018    Peg Placement         CURRENT MEDICATIONS       Current Discharge Medication List      CONTINUE these medications which have NOT CHANGED    Details   ibuprofen (ADVIL;MOTRIN) 800 MG tablet Take 800 mg by mouth every 6 hours as needed for Pain      b complex-C-folic acid (NEPHROCAPS) 1 MG capsule Take 1 capsule by mouth daily      carvedilol (COREG) 25 MG tablet Take 25 mg by mouth 2 times daily (with meals)      NIFEdipine (ADALAT CC) 30 MG extended release tablet Take 30 mg by mouth 2 times daily      pantoprazole (PROTONIX) 40 MG tablet Take 40 mg by mouth daily      QUEtiapine (SEROQUEL) 50 MG tablet Take 1 tablet by mouth nightly  Qty: 30 tablet, Refills: 0      acetaminophen (TYLENOL) 325 MG tablet Take 650 mg by mouth every 6 hours as needed for Pain      cloNIDine (CATAPRES) 0.1 MG tablet Take 0.1 mg by mouth three times daily      sevelamer (RENVELA) 800 MG tablet Take 1 tablet by mouth 3 times daily (with meals)               ALLERGIES     Patient has no known allergies. FAMILY HISTORY     History reviewed.  No pertinent

## 2018-10-11 LAB
ALBUMIN SERPL-MCNC: 2.7 G/DL (ref 3.4–5)
ANION GAP SERPL CALCULATED.3IONS-SCNC: 15 MMOL/L (ref 3–16)
BUN BLDV-MCNC: 54 MG/DL (ref 7–20)
CALCIUM SERPL-MCNC: 9.1 MG/DL (ref 8.3–10.6)
CHLORIDE BLD-SCNC: 98 MMOL/L (ref 99–110)
CO2: 22 MMOL/L (ref 21–32)
CREAT SERPL-MCNC: 7.1 MG/DL (ref 0.6–1.1)
EKG ATRIAL RATE: 66 BPM
EKG DIAGNOSIS: NORMAL
EKG P AXIS: 72 DEGREES
EKG P-R INTERVAL: 182 MS
EKG Q-T INTERVAL: 406 MS
EKG QRS DURATION: 74 MS
EKG QTC CALCULATION (BAZETT): 425 MS
EKG R AXIS: 113 DEGREES
EKG T AXIS: 78 DEGREES
EKG VENTRICULAR RATE: 66 BPM
GFR AFRICAN AMERICAN: 8
GFR NON-AFRICAN AMERICAN: 7
GLUCOSE BLD-MCNC: 86 MG/DL (ref 70–99)
HCT VFR BLD CALC: 30.5 % (ref 36–48)
HEMOGLOBIN: 9.8 G/DL (ref 12–16)
MCH RBC QN AUTO: 28.4 PG (ref 26–34)
MCHC RBC AUTO-ENTMCNC: 32.1 G/DL (ref 31–36)
MCV RBC AUTO: 88.6 FL (ref 80–100)
PDW BLD-RTO: 19.3 % (ref 12.4–15.4)
PHOSPHORUS: 6.4 MG/DL (ref 2.5–4.9)
PLATELET # BLD: 279 K/UL (ref 135–450)
PMV BLD AUTO: 7.1 FL (ref 5–10.5)
POTASSIUM SERPL-SCNC: 5.4 MMOL/L (ref 3.5–5.1)
RBC # BLD: 3.44 M/UL (ref 4–5.2)
SODIUM BLD-SCNC: 135 MMOL/L (ref 136–145)
WBC # BLD: 7.3 K/UL (ref 4–11)

## 2018-10-11 PROCEDURE — 6360000002 HC RX W HCPCS: Performed by: INTERNAL MEDICINE

## 2018-10-11 PROCEDURE — 6370000000 HC RX 637 (ALT 250 FOR IP): Performed by: INTERNAL MEDICINE

## 2018-10-11 PROCEDURE — 85027 COMPLETE CBC AUTOMATED: CPT

## 2018-10-11 PROCEDURE — 1200000000 HC SEMI PRIVATE

## 2018-10-11 PROCEDURE — 36415 COLL VENOUS BLD VENIPUNCTURE: CPT

## 2018-10-11 PROCEDURE — 80069 RENAL FUNCTION PANEL: CPT

## 2018-10-11 PROCEDURE — 2580000003 HC RX 258: Performed by: INTERNAL MEDICINE

## 2018-10-11 PROCEDURE — 6370000000 HC RX 637 (ALT 250 FOR IP): Performed by: NURSE PRACTITIONER

## 2018-10-11 PROCEDURE — 90937 HEMODIALYSIS REPEATED EVAL: CPT

## 2018-10-11 PROCEDURE — 99254 IP/OBS CNSLTJ NEW/EST MOD 60: CPT | Performed by: INTERNAL MEDICINE

## 2018-10-11 PROCEDURE — 99233 SBSQ HOSP IP/OBS HIGH 50: CPT | Performed by: INTERNAL MEDICINE

## 2018-10-11 PROCEDURE — 99232 SBSQ HOSP IP/OBS MODERATE 35: CPT | Performed by: INTERNAL MEDICINE

## 2018-10-11 RX ADMIN — CARVEDILOL 25 MG: 25 TABLET, FILM COATED ORAL at 17:03

## 2018-10-11 RX ADMIN — HEPARIN SODIUM 3800 UNITS: 1000 INJECTION, SOLUTION INTRAVENOUS; SUBCUTANEOUS at 12:35

## 2018-10-11 RX ADMIN — Medication 10 ML: at 20:37

## 2018-10-11 RX ADMIN — CARVEDILOL 25 MG: 25 TABLET, FILM COATED ORAL at 08:35

## 2018-10-11 RX ADMIN — NIFEDIPINE 30 MG: 30 TABLET, FILM COATED, EXTENDED RELEASE ORAL at 08:35

## 2018-10-11 RX ADMIN — PANTOPRAZOLE SODIUM 40 MG: 40 TABLET, DELAYED RELEASE ORAL at 05:43

## 2018-10-11 RX ADMIN — KETOROLAC TROMETHAMINE 15 MG: 30 INJECTION, SOLUTION INTRAMUSCULAR at 19:06

## 2018-10-11 RX ADMIN — DOCUSATE SODIUM 100 MG: 100 CAPSULE, LIQUID FILLED ORAL at 08:35

## 2018-10-11 RX ADMIN — KETOROLAC TROMETHAMINE 15 MG: 30 INJECTION, SOLUTION INTRAMUSCULAR at 12:22

## 2018-10-11 RX ADMIN — SEVELAMER CARBONATE 800 MG: 800 TABLET, FILM COATED ORAL at 08:35

## 2018-10-11 RX ADMIN — NIFEDIPINE 30 MG: 30 TABLET, FILM COATED, EXTENDED RELEASE ORAL at 20:36

## 2018-10-11 RX ADMIN — MUPIROCIN: 20 OINTMENT TOPICAL at 08:37

## 2018-10-11 RX ADMIN — Medication 10 ML: at 08:37

## 2018-10-11 RX ADMIN — DARBEPOETIN ALFA 25 MCG: 25 INJECTION, SOLUTION INTRAVENOUS; SUBCUTANEOUS at 12:10

## 2018-10-11 RX ADMIN — Medication 10 ML: at 19:06

## 2018-10-11 RX ADMIN — MUPIROCIN: 20 OINTMENT TOPICAL at 20:38

## 2018-10-11 RX ADMIN — CLONIDINE HYDROCHLORIDE 0.1 MG: 0.1 TABLET ORAL at 20:36

## 2018-10-11 RX ADMIN — CLONIDINE HYDROCHLORIDE 0.1 MG: 0.1 TABLET ORAL at 14:31

## 2018-10-11 RX ADMIN — KETOROLAC TROMETHAMINE 15 MG: 30 INJECTION, SOLUTION INTRAMUSCULAR at 05:50

## 2018-10-11 RX ADMIN — QUETIAPINE FUMARATE 50 MG: 25 TABLET ORAL at 20:36

## 2018-10-11 RX ADMIN — CLONIDINE HYDROCHLORIDE 0.1 MG: 0.1 TABLET ORAL at 08:35

## 2018-10-11 RX ADMIN — SEVELAMER CARBONATE 800 MG: 800 TABLET, FILM COATED ORAL at 17:03

## 2018-10-11 RX ADMIN — DOCUSATE SODIUM 100 MG: 100 CAPSULE, LIQUID FILLED ORAL at 20:36

## 2018-10-11 ASSESSMENT — PAIN DESCRIPTION - LOCATION
LOCATION: BACK
LOCATION: BACK

## 2018-10-11 ASSESSMENT — PAIN SCALES - GENERAL
PAINLEVEL_OUTOF10: 7
PAINLEVEL_OUTOF10: 8
PAINLEVEL_OUTOF10: 0
PAINLEVEL_OUTOF10: 5
PAINLEVEL_OUTOF10: 7
PAINLEVEL_OUTOF10: 10

## 2018-10-11 NOTE — CONSULTS
[address]                [address] [place] [address]                                 CONSULTATION    PATIENT NAME: Abril Nelson                    :         1990  MED REC NO:   [id number]                          ROOM:       0842  ACCOUNT NO:   [de-identified]                           ADMIT DATE:  10/09/2018  PROVIDER:     Lyudmila Augustine MD    CONSULT DATE:  10/11/2018    INFECTIOUS DISEASE CONSULTATION    ATTENDING PROVIDER:  Juan Delacruz MD    ROOM # [id number]. HISTORY OF PRESENT ILLNESS:  The patient is a 53-year-old female, who  has a long history of IV drug abuse. On account of her IV drug use,  she has had two episodes of endocarditis. I believe the first episode  was associated with end-stage renal disease due to sepsis. 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 At that time, 2/2 admission blood  cultures grew MRSA. In addition, one of the blood cultures grew a  streptococcus, which could not be identified. The other culture did  not grow the streptococcus, but instead, grew an Enterobacter, which  was sensitive to ciprofloxacin, ceftriaxone and cefepime. The patient  had a long and complicated clinical course during that admission,  which included cardiac arrest.  The patient also had pulmonary  infarcts or abscesses and had an intraabdominal hematoma drained,  which was suspected to be infected. The patient came to the hospital  because of low back pain about 10 days ago. She signed out AMA. Her  imaging studies were suggestive of possible vertebral osteomyelitis  and discitis at L3-4 and L4-5. She came back to the hospital and was  readmitted. Her imaging studies showed similar findings at present. The patient states that she has had the back pain for two to three  weeks. The pain is in the midline of her lumbar spine. It radiates  into both sides.   The pain is

## 2018-10-12 ENCOUNTER — APPOINTMENT (OUTPATIENT)
Dept: NUCLEAR MEDICINE | Age: 28
DRG: 347 | End: 2018-10-12
Payer: MEDICARE

## 2018-10-12 LAB
ALBUMIN SERPL-MCNC: 3 G/DL (ref 3.4–5)
ANION GAP SERPL CALCULATED.3IONS-SCNC: 12 MMOL/L (ref 3–16)
BUN BLDV-MCNC: 33 MG/DL (ref 7–20)
CALCIUM SERPL-MCNC: 9.4 MG/DL (ref 8.3–10.6)
CHLORIDE BLD-SCNC: 97 MMOL/L (ref 99–110)
CO2: 26 MMOL/L (ref 21–32)
CREAT SERPL-MCNC: 5 MG/DL (ref 0.6–1.1)
GFR AFRICAN AMERICAN: 13
GFR NON-AFRICAN AMERICAN: 10
GLUCOSE BLD-MCNC: 91 MG/DL (ref 70–99)
HCT VFR BLD CALC: 31.9 % (ref 36–48)
HEMOGLOBIN: 10.4 G/DL (ref 12–16)
MCH RBC QN AUTO: 28.4 PG (ref 26–34)
MCHC RBC AUTO-ENTMCNC: 32.4 G/DL (ref 31–36)
MCV RBC AUTO: 87.4 FL (ref 80–100)
PDW BLD-RTO: 18.7 % (ref 12.4–15.4)
PHOSPHORUS: 5.3 MG/DL (ref 2.5–4.9)
PLATELET # BLD: 306 K/UL (ref 135–450)
PMV BLD AUTO: 6.7 FL (ref 5–10.5)
POTASSIUM SERPL-SCNC: 4.9 MMOL/L (ref 3.5–5.1)
RBC # BLD: 3.65 M/UL (ref 4–5.2)
SODIUM BLD-SCNC: 135 MMOL/L (ref 136–145)
WBC # BLD: 7.9 K/UL (ref 4–11)

## 2018-10-12 PROCEDURE — 99232 SBSQ HOSP IP/OBS MODERATE 35: CPT | Performed by: INTERNAL MEDICINE

## 2018-10-12 PROCEDURE — 6370000000 HC RX 637 (ALT 250 FOR IP): Performed by: INTERNAL MEDICINE

## 2018-10-12 PROCEDURE — 1200000000 HC SEMI PRIVATE

## 2018-10-12 PROCEDURE — 3430000000 HC RX DIAGNOSTIC RADIOPHARMACEUTICAL: Performed by: PHYSICIAN ASSISTANT

## 2018-10-12 PROCEDURE — 2580000003 HC RX 258: Performed by: INTERNAL MEDICINE

## 2018-10-12 PROCEDURE — 90937 HEMODIALYSIS REPEATED EVAL: CPT

## 2018-10-12 PROCEDURE — 6360000002 HC RX W HCPCS: Performed by: INTERNAL MEDICINE

## 2018-10-12 PROCEDURE — 80069 RENAL FUNCTION PANEL: CPT

## 2018-10-12 PROCEDURE — A9503 TC99M MEDRONATE: HCPCS | Performed by: PHYSICIAN ASSISTANT

## 2018-10-12 PROCEDURE — 78306 BONE IMAGING WHOLE BODY: CPT

## 2018-10-12 PROCEDURE — 2580000003 HC RX 258

## 2018-10-12 PROCEDURE — 36415 COLL VENOUS BLD VENIPUNCTURE: CPT

## 2018-10-12 PROCEDURE — 85027 COMPLETE CBC AUTOMATED: CPT

## 2018-10-12 RX ORDER — SODIUM CHLORIDE 9 MG/ML
INJECTION, SOLUTION INTRAVENOUS
Status: COMPLETED
Start: 2018-10-12 | End: 2018-10-12

## 2018-10-12 RX ORDER — TC 99M MEDRONATE 20 MG/10ML
25.1 INJECTION, POWDER, LYOPHILIZED, FOR SOLUTION INTRAVENOUS
Status: COMPLETED | OUTPATIENT
Start: 2018-10-12 | End: 2018-10-12

## 2018-10-12 RX ADMIN — CEFEPIME 2 G: 2 INJECTION, POWDER, FOR SOLUTION INTRAVENOUS at 17:03

## 2018-10-12 RX ADMIN — NIFEDIPINE 30 MG: 30 TABLET, FILM COATED, EXTENDED RELEASE ORAL at 20:13

## 2018-10-12 RX ADMIN — DOCUSATE SODIUM 100 MG: 100 CAPSULE, LIQUID FILLED ORAL at 20:13

## 2018-10-12 RX ADMIN — SEVELAMER CARBONATE 800 MG: 800 TABLET, FILM COATED ORAL at 11:54

## 2018-10-12 RX ADMIN — CYCLOBENZAPRINE HYDROCHLORIDE 10 MG: 10 TABLET, FILM COATED ORAL at 11:54

## 2018-10-12 RX ADMIN — Medication 10 ML: at 17:03

## 2018-10-12 RX ADMIN — KETOROLAC TROMETHAMINE 15 MG: 30 INJECTION, SOLUTION INTRAMUSCULAR at 19:47

## 2018-10-12 RX ADMIN — HEPARIN SODIUM 3800 UNITS: 1000 INJECTION, SOLUTION INTRAVENOUS; SUBCUTANEOUS at 16:11

## 2018-10-12 RX ADMIN — CLONIDINE HYDROCHLORIDE 0.1 MG: 0.1 TABLET ORAL at 20:13

## 2018-10-12 RX ADMIN — MUPIROCIN: 20 OINTMENT TOPICAL at 11:55

## 2018-10-12 RX ADMIN — QUETIAPINE FUMARATE 50 MG: 25 TABLET ORAL at 20:13

## 2018-10-12 RX ADMIN — CARVEDILOL 25 MG: 25 TABLET, FILM COATED ORAL at 17:05

## 2018-10-12 RX ADMIN — PANTOPRAZOLE SODIUM 40 MG: 40 TABLET, DELAYED RELEASE ORAL at 06:14

## 2018-10-12 RX ADMIN — CYCLOBENZAPRINE HYDROCHLORIDE 10 MG: 10 TABLET, FILM COATED ORAL at 17:16

## 2018-10-12 RX ADMIN — VANCOMYCIN HYDROCHLORIDE 750 MG: 1 INJECTION, POWDER, LYOPHILIZED, FOR SOLUTION INTRAVENOUS at 15:19

## 2018-10-12 RX ADMIN — Medication 25.1 MILLICURIE: at 06:40

## 2018-10-12 RX ADMIN — Medication 10 ML: at 19:47

## 2018-10-12 RX ADMIN — SEVELAMER CARBONATE 800 MG: 800 TABLET, FILM COATED ORAL at 17:04

## 2018-10-12 RX ADMIN — KETOROLAC TROMETHAMINE 15 MG: 30 INJECTION, SOLUTION INTRAMUSCULAR at 13:46

## 2018-10-12 RX ADMIN — KETOROLAC TROMETHAMINE 15 MG: 30 INJECTION, SOLUTION INTRAMUSCULAR at 01:22

## 2018-10-12 RX ADMIN — CLONIDINE HYDROCHLORIDE 0.1 MG: 0.1 TABLET ORAL at 17:03

## 2018-10-12 RX ADMIN — KETOROLAC TROMETHAMINE 15 MG: 30 INJECTION, SOLUTION INTRAMUSCULAR at 07:42

## 2018-10-12 RX ADMIN — SODIUM CHLORIDE 250 ML: 9 INJECTION, SOLUTION INTRAVENOUS at 17:16

## 2018-10-12 ASSESSMENT — PAIN SCALES - GENERAL
PAINLEVEL_OUTOF10: 7
PAINLEVEL_OUTOF10: 7
PAINLEVEL_OUTOF10: 8
PAINLEVEL_OUTOF10: 7
PAINLEVEL_OUTOF10: 5
PAINLEVEL_OUTOF10: 8
PAINLEVEL_OUTOF10: 7
PAINLEVEL_OUTOF10: 6

## 2018-10-12 ASSESSMENT — PAIN DESCRIPTION - LOCATION
LOCATION: BACK

## 2018-10-12 ASSESSMENT — PAIN DESCRIPTION - ONSET
ONSET: ON-GOING
ONSET: ON-GOING

## 2018-10-12 ASSESSMENT — PAIN DESCRIPTION - FREQUENCY
FREQUENCY: CONTINUOUS
FREQUENCY: CONTINUOUS

## 2018-10-12 ASSESSMENT — PAIN DESCRIPTION - PROGRESSION
CLINICAL_PROGRESSION: GRADUALLY WORSENING
CLINICAL_PROGRESSION: GRADUALLY WORSENING

## 2018-10-12 NOTE — PROGRESS NOTES
4 Eyes Skin Assessment     The patient is being assess for   Admission    I agree that 2 RN's have performed a thorough Head to Toe Skin Assessment on the patient. ALL assessment sites listed below have been assessed. Areas assessed by both nurses:   [x]   Head, Face, and Ears   [x]   Shoulders, Back, and Chest, Abdomen  [x]   Arms, Elbows, and Hands   [x]   Coccyx, Sacrum, and Ischium  [x]   Legs, Feet, and Heels        Recent scat to trach site, lower abdomen and R antecubital area for fistula creation. Tunneled hemodialysis cathater to L subclavian. **SHARE this note so that the co-signing nurse is able to place an eSignature**    Co-signer eSignature: Electronically signed by Radames Benson RN on 10/9/18 at 11:54 PM    Does the Patient have Skin Breakdown?   No          Hipolito Prevention initiated:  NA   Wound Care Orders initiated:  No      WOC nurse consulted for Pressure Injury (Stage 3,4, Unstageable, DTI, NWPT, Complex wounds)and New or Established Ostomies:  No      Primary Nurse eSignature: Electronically signed by Madeline Cloud RN on 10/9/18 at 10:05 PM
ID Follow-up NOTE    CC: probably lumbar discitis and vertebral osteo. Subjective:     Patient denies nausea, abdominal pain or diarrhea, cough, SOB, fever; denies sensory deficit paresthesias, saddle anaesthesia. Continues to have severe back pain which makes ambulation quite difficult. Objective:     Patient Vitals for the past 24 hrs:   BP Temp Temp src Pulse Resp SpO2 Weight   10/12/18 1240 (!) 143/93 97.9 °F (36.6 °C) - 64 16 - 121 lb 14.6 oz (55.3 kg)   10/12/18 0830 110/74 97.7 °F (36.5 °C) Oral 62 16 97 % -   10/12/18 0325 107/69 97.2 °F (36.2 °C) Oral 68 16 96 % 122 lb (55.3 kg)   10/11/18 2322 126/86 97.5 °F (36.4 °C) Oral 68 16 96 % -   10/11/18 1927 126/85 98.8 °F (37.1 °C) Oral 71 16 96 % -   10/11/18 1703 121/79 - - 74 - - -   10/11/18 1420 126/83 97.4 °F (36.3 °C) Oral 77 16 97 % -     Temp (24hrs), Av.8 °F (36.6 °C), Min:97.2 °F (36.2 °C), Max:98.8 °F (37.1 °C)          EXAM:  General: alert appropriate afebrile      LUNGS: Resp unlabored; clear to auscultation     CV: RR s M     ABD: soft, mild nonlocalized abdominal tenderness. without mass     EXT: without edema   Skin: no rash    Neuro. Sensory exam in LEs negative. Strength in LEs normal though patient has considerable muscle atrophy. Data Review:    Lab Results   Component Value Date    WBC 7.9 10/12/2018    HGB 10.4 (L) 10/12/2018    HCT 31.9 (L) 10/12/2018    MCV 87.4 10/12/2018     10/12/2018     Lab Results   Component Value Date    CREATININE 5.0 (H) 10/12/2018    BUN 33 (H) 10/12/2018     (L) 10/12/2018    K 4.9 10/12/2018    CL 97 (L) 10/12/2018    CO2 26 10/12/2018     Lab Results   Component Value Date    ALT 29 10/09/2018    AST 27 10/09/2018    ALKPHOS 499 (H) 10/09/2018    BILITOT 0.5 10/09/2018         MICRO: blood cultures in  grew, MRSA, Enterobacter, Streptococcus                 Blood cultures obtained on  were negative. IMAGING: CT: possible lumbar vertebral osteo.      Assessment:
ID note (consult to be dictated):    A: This patient presents with severe midline lumbar pain x 2 weeks associated with erosive changes at L3-4 and L4-5 with a history of polymicrobial bacteremia due to MRSA, Enterobacter, and Streptococcus in June of this year, with MRSA appearing to be the most significant blood culture isolate. This history is suggestive of lumbar discitis and vertebral osteomyelitis at the levels noted above. The patient has a history of hepatitis C.    Rec: I would favor having IR perform a disc space aspiration for culture, to see if a pathogen can be identified. MRSA would appear to be the most likely pathogen. Hold antibiotics for now. The patient could be started on vancomycin plus Cefepime or Cipro following aspiration of the disc.
PRN Torodol given per order, see MAR/flowsheets. Pt remains in dialysis.
Patient placed on tele #27 for transport to 218 - tolerated 50% of lunch.   Verbalizing adequate relief of pain
Physical Therapy/Occupational Therapy  Attempting PT/OT evaluations this AM, but pt currently off the floor for HD. Will follow up later today as appropriate. No charge.      Luna Alvarez, PT, DPT #[id number]  Syed Florez OTR/L #[id number]
Progress Note    Admit Date:  10/9/2018    Subjective:  Ms. Nory Muñiz c/o low back pain. Underwent HD last night    Objective:   Patient Vitals for the past 4 hrs:   BP Pulse Resp SpO2   10/10/18 1100 112/63 73 16 97 %   10/10/18 1000 118/74 79 16 98 %   10/10/18 0900 (!) 139/98 86 18 97 %   10/10/18 0800 (!) 150/95 93 20 98 %   10/10/18 0743 (!) 144/103 95 19 97 %          Intake/Output Summary (Last 24 hours) at 10/10/18 1118  Last data filed at 10/10/18 0530   Gross per 24 hour   Intake             2033 ml   Output             4000 ml   Net            -1967 ml       Physical Exam:    General appearance:  No apparent distress, appears stated age and cooperative. HEENT:  Normal cephalic, atraumatic without obvious deformity. Pupils equal, round, and reactive to light. Extra ocular muscles intact. Conjunctivae/corneas clear. Neck: Supple, with full range of motion. No jugular venous distention. Trachea midline. Respiratory:  Normal respiratory effort. Clear to auscultation, bilaterally without Rales/Wheezes/Rhonchi. Cardiovascular:  Regular rate and rhythm with flow murmur   Abdomen: Soft, non-tender, non-distended with normal bowel sounds. Musculoskeletal:  No clubbing, cyanosis or edema bilaterally. Full range of motion without deformity. Skin: Skin color, texture, turgor normal.  No rashes or lesions. Neurologic:  pain related weakness both LE.    Psychiatric:  Alert and oriented, thought content appropriate, normal insight      Scheduled Meds:   mupirocin   Nasal BID    lidocaine  1 patch Transdermal Daily    sevelamer  800 mg Oral TID     QUEtiapine  50 mg Oral Nightly    carvedilol  25 mg Oral BID     cloNIDine  0.1 mg Oral TID    NIFEdipine  30 mg Oral BID    pantoprazole  40 mg Oral Daily    sodium chloride flush  10 mL Intravenous 2 times per day    docusate sodium  100 mg Oral BID    heparin (porcine)  5,000 Units Subcutaneous 3 times per day       Continuous Infusions:      PRN
Pt off unit for NM body bone scan.
Pulmonary Progress Note    CC: Hyperkalemia/ESRD    Subjective:   Appears comfortable  On room air  No sputum production    IV line:      Intake/Output Summary (Last 24 hours) at 10/11/18 0818  Last data filed at 10/10/18 1824   Gross per 24 hour   Intake              360 ml   Output                0 ml   Net              360 ml       Exam:   /69   Pulse 57   Temp 97 °F (36.1 °C) (Oral)   Resp 16   Ht 5' 3\" (1.6 m)   Wt 131 lb (59.4 kg)   LMP 09/30/2017 (Approximate)   SpO2 96%   BMI 23.21 kg/m²  on Room air  Gen: No distress. Alert. Eyes: PERRL. No sclera icterus. No conjunctival injection. ENT: No discharge. Pharynx clear. Neck: Trachea midline. Normal thyroid. Resp: No accessory muscle use. No crackles. No wheezes. No rhonchi. No dullness on percussion. CV: Regular rate. Regular rhythm. No murmur or rub. No edema. GI: Non-tender. Non-distended. No masses. No organomegaly. Normal bowel sounds. No hernia.          Scheduled Meds:   mupirocin   Nasal BID    lidocaine  1 patch Transdermal Daily    sevelamer  800 mg Oral TID WC    QUEtiapine  50 mg Oral Nightly    carvedilol  25 mg Oral BID WC    cloNIDine  0.1 mg Oral TID    NIFEdipine  30 mg Oral BID    pantoprazole  40 mg Oral Daily    sodium chloride flush  10 mL Intravenous 2 times per day    docusate sodium  100 mg Oral BID    heparin (porcine)  5,000 Units Subcutaneous 3 times per day     Continuous Infusions:    PRN Meds:  heparin (porcine), cyclobenzaprine, ketorolac, sodium chloride flush, ondansetron, hydrALAZINE    Labs:  CBC: Recent Labs      10/09/18   2000   WBC  12.0*   HGB  11.9*   HCT  36.2   MCV  88.2   PLT  328     BMP: Recent Labs      10/09/18   2321  10/10/18   0820  10/11/18   0540   NA  130*  131*  135*   K  6.7*  4.7  5.4*   CL  93*  93*  98*   CO2  16*  21  22   PHOS  8.4*  4.4  6.4*   BUN  90*  40*  54*   CREATININE  8.7*  5.3*  7.1*     LIVER PROFILE:   Recent Labs      10/09/18   2050   AST  27   ALT  29
Shift assessment complete. See flow sheet. Evening medications administered. See MAR. Vital signs stable. Patient is alert and oriented X 4. Pt denies any present needs/concerns. Call light explained and in reach. Will continue to monitor.
msg left on dr Marcia Levine voice mail reg. Consult.
Meds:  heparin (porcine), cyclobenzaprine, ketorolac, sodium chloride flush, ondansetron, hydrALAZINE      Data:  CBC:   Recent Labs      10/09/18   2000  10/11/18   0540  10/12/18   0536   WBC  12.0*  7.3  7.9   HGB  11.9*  9.8*  10.4*   HCT  36.2  30.5*  31.9*   MCV  88.2  88.6  87.4   PLT  328  279  306     BMP:   Recent Labs      10/10/18   0820  10/11/18   0540  10/12/18   0536   NA  131*  135*  135*   K  4.7  5.4*  4.9   CL  93*  98*  97*   CO2  21  22  26   PHOS  4.4  6.4*  5.3*   BUN  40*  54*  33*   CREATININE  5.3*  7.1*  5.0*     LIVER PROFILE:   Recent Labs      10/09/18   2050   AST  27   ALT  29   LIPASE  15.0   BILITOT  0.5   ALKPHOS  499*       CULTURES  N/A     RADIOLOGY  NM BONE SCAN WHOLE BODY   Final Result   There is only mild lumbar spinal activity at L4. The finding is most   commonly degenerative. Osteomyelitis cannot be excluded in the appropriate   clinical setting. Nonvisualization of kidneys and bladder, compatible with patient's history of   renal disease. Focal activity within the mid sternum may be degenerative or related to prior   trauma. If patient is asymptomatic, metastatic disease or osteomyelitis at   the site is less favored. I Wayland Rinne have reviewed the chart on [id number] [place] and personally interviewed and examined patient, reviewed the data (labs and imaging) and after discussion with my PA formulated the plan. Agree with note with the following edits. HPI:     Having back pain and leg pain. Doing some better. Seen at HD. MRI of the LS spine reviewed. Bone scan done and was not conclusive for osteomyelitis. Aspiration is being planned. She is walking with some difficulty. I reviewed the patient's Past Medical History, Past Surgical History, Medications, and Allergies.      Physical exam:    /74   Pulse 62   Temp 97.7 °F (36.5 °C) (Oral)   Resp 16   Ht 5' 3\" (1.6 m)   Wt 122 lb (55.3 kg)   LMP 09/30/2017

## 2018-10-13 VITALS
SYSTOLIC BLOOD PRESSURE: 124 MMHG | BODY MASS INDEX: 20.31 KG/M2 | OXYGEN SATURATION: 100 % | HEART RATE: 71 BPM | DIASTOLIC BLOOD PRESSURE: 88 MMHG | WEIGHT: 114.64 LBS | HEIGHT: 63 IN | RESPIRATION RATE: 16 BRPM | TEMPERATURE: 97.6 F

## 2018-10-13 LAB
ALBUMIN SERPL-MCNC: 3.2 G/DL (ref 3.4–5)
ANION GAP SERPL CALCULATED.3IONS-SCNC: 13 MMOL/L (ref 3–16)
BUN BLDV-MCNC: 25 MG/DL (ref 7–20)
CALCIUM SERPL-MCNC: 9.7 MG/DL (ref 8.3–10.6)
CHLORIDE BLD-SCNC: 92 MMOL/L (ref 99–110)
CO2: 27 MMOL/L (ref 21–32)
CREAT SERPL-MCNC: 3.8 MG/DL (ref 0.6–1.1)
GFR AFRICAN AMERICAN: 17
GFR NON-AFRICAN AMERICAN: 14
GLUCOSE BLD-MCNC: 90 MG/DL (ref 70–99)
HCT VFR BLD CALC: 33 % (ref 36–48)
HEMOGLOBIN: 10.9 G/DL (ref 12–16)
MCH RBC QN AUTO: 29.1 PG (ref 26–34)
MCHC RBC AUTO-ENTMCNC: 33.1 G/DL (ref 31–36)
MCV RBC AUTO: 87.8 FL (ref 80–100)
PDW BLD-RTO: 19.1 % (ref 12.4–15.4)
PHOSPHORUS: 4.1 MG/DL (ref 2.5–4.9)
PLATELET # BLD: 318 K/UL (ref 135–450)
PMV BLD AUTO: 6.5 FL (ref 5–10.5)
POTASSIUM SERPL-SCNC: 4.8 MMOL/L (ref 3.5–5.1)
RBC # BLD: 3.76 M/UL (ref 4–5.2)
SODIUM BLD-SCNC: 132 MMOL/L (ref 136–145)
WBC # BLD: 7.1 K/UL (ref 4–11)

## 2018-10-13 PROCEDURE — 99238 HOSP IP/OBS DSCHRG MGMT 30/<: CPT | Performed by: INTERNAL MEDICINE

## 2018-10-13 PROCEDURE — 80069 RENAL FUNCTION PANEL: CPT

## 2018-10-13 PROCEDURE — 6360000002 HC RX W HCPCS: Performed by: INTERNAL MEDICINE

## 2018-10-13 PROCEDURE — 2580000003 HC RX 258: Performed by: INTERNAL MEDICINE

## 2018-10-13 PROCEDURE — 85027 COMPLETE CBC AUTOMATED: CPT

## 2018-10-13 PROCEDURE — 6370000000 HC RX 637 (ALT 250 FOR IP): Performed by: NURSE PRACTITIONER

## 2018-10-13 PROCEDURE — 6370000000 HC RX 637 (ALT 250 FOR IP): Performed by: INTERNAL MEDICINE

## 2018-10-13 PROCEDURE — 36415 COLL VENOUS BLD VENIPUNCTURE: CPT

## 2018-10-13 RX ORDER — CYCLOBENZAPRINE HCL 10 MG
10 TABLET ORAL 3 TIMES DAILY PRN
Qty: 30 TABLET | Refills: 0 | Status: SHIPPED | OUTPATIENT
Start: 2018-10-13 | End: 2018-10-23

## 2018-10-13 RX ORDER — LIDOCAINE 50 MG/G
1 PATCH TOPICAL DAILY
Qty: 30 PATCH | Refills: 0 | Status: SHIPPED | OUTPATIENT
Start: 2018-10-14 | End: 2019-06-04

## 2018-10-13 RX ORDER — KETOROLAC TROMETHAMINE 10 MG/1
10 TABLET, FILM COATED ORAL 3 TIMES DAILY
Qty: 30 TABLET | Refills: 0 | Status: SHIPPED | OUTPATIENT
Start: 2018-10-13 | End: 2019-06-04

## 2018-10-13 RX ORDER — PSEUDOEPHEDRINE HCL 30 MG
100 TABLET ORAL 2 TIMES DAILY
Qty: 30 CAPSULE | Refills: 0 | Status: SHIPPED | OUTPATIENT
Start: 2018-10-13 | End: 2019-07-28

## 2018-10-13 RX ADMIN — CARVEDILOL 25 MG: 25 TABLET, FILM COATED ORAL at 08:48

## 2018-10-13 RX ADMIN — CYCLOBENZAPRINE HYDROCHLORIDE 10 MG: 10 TABLET, FILM COATED ORAL at 11:45

## 2018-10-13 RX ADMIN — SEVELAMER CARBONATE 800 MG: 800 TABLET, FILM COATED ORAL at 08:48

## 2018-10-13 RX ADMIN — Medication 10 ML: at 08:48

## 2018-10-13 RX ADMIN — KETOROLAC TROMETHAMINE 15 MG: 30 INJECTION, SOLUTION INTRAMUSCULAR at 03:57

## 2018-10-13 RX ADMIN — PANTOPRAZOLE SODIUM 40 MG: 40 TABLET, DELAYED RELEASE ORAL at 05:34

## 2018-10-13 RX ADMIN — SEVELAMER CARBONATE 800 MG: 800 TABLET, FILM COATED ORAL at 11:45

## 2018-10-13 RX ADMIN — DOCUSATE SODIUM 100 MG: 100 CAPSULE, LIQUID FILLED ORAL at 08:48

## 2018-10-13 RX ADMIN — MUPIROCIN: 20 OINTMENT TOPICAL at 08:47

## 2018-10-13 RX ADMIN — CLONIDINE HYDROCHLORIDE 0.1 MG: 0.1 TABLET ORAL at 08:48

## 2018-10-13 RX ADMIN — NIFEDIPINE 30 MG: 30 TABLET, FILM COATED, EXTENDED RELEASE ORAL at 08:48

## 2018-10-13 RX ADMIN — CYCLOBENZAPRINE HYDROCHLORIDE 10 MG: 10 TABLET, FILM COATED ORAL at 03:57

## 2018-10-13 ASSESSMENT — PAIN DESCRIPTION - PAIN TYPE: TYPE: CHRONIC PAIN

## 2018-10-13 ASSESSMENT — PAIN SCALES - GENERAL: PAINLEVEL_OUTOF10: 7

## 2018-10-13 ASSESSMENT — PAIN DESCRIPTION - ONSET: ONSET: GRADUAL

## 2018-10-13 ASSESSMENT — PAIN DESCRIPTION - FREQUENCY: FREQUENCY: CONTINUOUS

## 2018-10-13 ASSESSMENT — PAIN DESCRIPTION - LOCATION: LOCATION: BACK

## 2018-10-13 ASSESSMENT — PAIN DESCRIPTION - ORIENTATION: ORIENTATION: LOWER

## 2018-10-13 ASSESSMENT — PAIN DESCRIPTION - PROGRESSION: CLINICAL_PROGRESSION: GRADUALLY WORSENING

## 2018-10-13 ASSESSMENT — PAIN DESCRIPTION - DESCRIPTORS: DESCRIPTORS: SHARP

## 2018-10-13 NOTE — CARE COORDINATION
DISCHARGE ORDER  Date/Time 10/13/2018 11:57 AM  Completed by: Priyank Barrett, Case Management    Patient Name: Rafia Velasquez    : 1990  Admitting Diagnosis: Hyperkalemia [E87.5]  Admit Date/Time: 10/9/2018  7:45 PM    Noted discharge order. Confirmed discharge plan with patient / family (pt): Yes   Discharge Plan: Reviewed chart. Role of discharge planner explained and patient verbalized understanding. Discharge order is noted. Pt is being d/c'd to home w/mother today. Pt's O2 sats are 100% on RA. Denies any HC or DME needs. + Active w/ Dionisio Blair for HD. HD run sheets and nephro notes faxed to [facility] @ [phone]. No further discharge needs needed or noted.

## 2019-01-15 ENCOUNTER — TELEPHONE (OUTPATIENT)
Dept: CARDIOTHORACIC SURGERY | Age: 29
End: 2019-01-15

## 2019-01-18 ENCOUNTER — TELEPHONE (OUTPATIENT)
Dept: CARDIOTHORACIC SURGERY | Age: 29
End: 2019-01-18

## 2019-04-20 NOTE — FLOWSHEET NOTE
06/08/18 2000   Vitals   Temp 95.2 °F (35.1 °C)   Temp Source Temporal   Blood warmer applied to CRRT return line. Will monitor. SWAPNIL Cueva RN

## 2019-06-04 ENCOUNTER — HOSPITAL ENCOUNTER (EMERGENCY)
Age: 29
Discharge: HOME | End: 2019-06-05
Attending: EMERGENCY MEDICINE
Payer: MEDICARE

## 2019-06-04 DIAGNOSIS — N18.6 ESRD (END STAGE RENAL DISEASE) ON DIALYSIS (HCC): ICD-10-CM

## 2019-06-04 DIAGNOSIS — Z99.2 ESRD (END STAGE RENAL DISEASE) ON DIALYSIS (HCC): ICD-10-CM

## 2019-06-04 DIAGNOSIS — M25.422 ELBOW SWELLING, LEFT: Primary | ICD-10-CM

## 2019-06-04 DIAGNOSIS — Z98.890: ICD-10-CM

## 2019-06-04 LAB
BASOPHILS ABSOLUTE: 0 K/UL (ref 0–0.2)
BASOPHILS RELATIVE PERCENT: 0.6 %
EOSINOPHILS ABSOLUTE: 0.3 K/UL (ref 0–0.6)
EOSINOPHILS RELATIVE PERCENT: 4.2 %
HCT VFR BLD CALC: 36.5 % (ref 36–48)
HEMOGLOBIN: 12 G/DL (ref 12–16)
LYMPHOCYTES ABSOLUTE: 2.9 K/UL (ref 1–5.1)
LYMPHOCYTES RELATIVE PERCENT: 49.5 %
MCH RBC QN AUTO: 31.5 PG (ref 26–34)
MCHC RBC AUTO-ENTMCNC: 32.8 G/DL (ref 31–36)
MCV RBC AUTO: 95.9 FL (ref 80–100)
MONOCYTES ABSOLUTE: 0.4 K/UL (ref 0–1.3)
MONOCYTES RELATIVE PERCENT: 7 %
NEUTROPHILS ABSOLUTE: 2.3 K/UL (ref 1.7–7.7)
NEUTROPHILS RELATIVE PERCENT: 38.7 %
PDW BLD-RTO: 19.4 % (ref 12.4–15.4)
PLATELET # BLD: 220 K/UL (ref 135–450)
PMV BLD AUTO: 7.3 FL (ref 5–10.5)
RBC # BLD: 3.81 M/UL (ref 4–5.2)
WBC # BLD: 6 K/UL (ref 4–11)

## 2019-06-04 PROCEDURE — 93005 ELECTROCARDIOGRAM TRACING: CPT | Performed by: PHYSICIAN ASSISTANT

## 2019-06-04 PROCEDURE — 6370000000 HC RX 637 (ALT 250 FOR IP): Performed by: PHYSICIAN ASSISTANT

## 2019-06-04 PROCEDURE — 85025 COMPLETE CBC W/AUTO DIFF WBC: CPT

## 2019-06-04 PROCEDURE — 99283 EMERGENCY DEPT VISIT LOW MDM: CPT

## 2019-06-04 RX ORDER — 0.9 % SODIUM CHLORIDE 0.9 %
1000 INTRAVENOUS SOLUTION INTRAVENOUS ONCE
Status: COMPLETED | OUTPATIENT
Start: 2019-06-05 | End: 2019-06-05

## 2019-06-04 RX ORDER — ACETAMINOPHEN 500 MG
500 TABLET ORAL ONCE
Status: COMPLETED | OUTPATIENT
Start: 2019-06-04 | End: 2019-06-04

## 2019-06-04 RX ORDER — CLONIDINE HYDROCHLORIDE 0.1 MG/1
0.1 TABLET ORAL ONCE
Status: COMPLETED | OUTPATIENT
Start: 2019-06-04 | End: 2019-06-04

## 2019-06-04 RX ADMIN — CLONIDINE HYDROCHLORIDE 0.1 MG: 0.1 TABLET ORAL at 22:50

## 2019-06-04 RX ADMIN — ACETAMINOPHEN 500 MG: 500 TABLET ORAL at 22:50

## 2019-06-04 ASSESSMENT — PAIN SCALES - GENERAL: PAINLEVEL_OUTOF10: 5

## 2019-06-04 ASSESSMENT — PAIN DESCRIPTION - ORIENTATION: ORIENTATION: LEFT

## 2019-06-04 ASSESSMENT — PAIN DESCRIPTION - LOCATION: LOCATION: ARM

## 2019-06-04 ASSESSMENT — PAIN DESCRIPTION - DESCRIPTORS: DESCRIPTORS: SORE

## 2019-06-04 ASSESSMENT — PAIN DESCRIPTION - FREQUENCY: FREQUENCY: CONTINUOUS

## 2019-06-05 VITALS
HEIGHT: 63 IN | OXYGEN SATURATION: 97 % | DIASTOLIC BLOOD PRESSURE: 115 MMHG | RESPIRATION RATE: 18 BRPM | TEMPERATURE: 98.7 F | HEART RATE: 78 BPM | SYSTOLIC BLOOD PRESSURE: 186 MMHG | WEIGHT: 125 LBS | BODY MASS INDEX: 22.15 KG/M2

## 2019-06-05 LAB
EKG ATRIAL RATE: 77 BPM
EKG DIAGNOSIS: NORMAL
EKG P AXIS: 70 DEGREES
EKG P-R INTERVAL: 196 MS
EKG Q-T INTERVAL: 394 MS
EKG QRS DURATION: 76 MS
EKG QTC CALCULATION (BAZETT): 445 MS
EKG R AXIS: 112 DEGREES
EKG T AXIS: 65 DEGREES
EKG VENTRICULAR RATE: 77 BPM
POTASSIUM SERPL-SCNC: 5 MMOL/L (ref 3.5–5.1)

## 2019-06-05 PROCEDURE — 96360 HYDRATION IV INFUSION INIT: CPT

## 2019-06-05 PROCEDURE — 84132 ASSAY OF SERUM POTASSIUM: CPT

## 2019-06-05 PROCEDURE — 36415 COLL VENOUS BLD VENIPUNCTURE: CPT

## 2019-06-05 PROCEDURE — 2580000003 HC RX 258: Performed by: PHYSICIAN ASSISTANT

## 2019-06-05 PROCEDURE — 93010 ELECTROCARDIOGRAM REPORT: CPT | Performed by: INTERNAL MEDICINE

## 2019-06-05 RX ADMIN — SODIUM CHLORIDE 1000 ML: 9 INJECTION, SOLUTION INTRAVENOUS at 00:00

## 2019-06-05 ASSESSMENT — PAIN SCALES - GENERAL: PAINLEVEL_OUTOF10: 0

## 2019-06-05 NOTE — ED PROVIDER NOTES
I independently evaluated and obtained a history and physical on [facility]. All diagnostic, treatment, and disposition assistants were made to myself in conjunction the advanced practice provider. For further details of this patient's emergency department encounter, please see the advanced practice provider's documentation. History: Pt with area of redness over recently removed AV grafts. No fevers. Hx IVDU w/endocarditis. Physician Exam: erythema over removed AV graft. No obvious fluctuance. MDM: Discussed medical decision making with [facility] and agree with plan. Please see their note for further detail.          Tin Clayton MD  06/10/19 [id number]

## 2019-06-05 NOTE — ED TRIAGE NOTES
Chief Complaint   Patient presents with    Wound Check     pt arrives to room 5 c/o needing wound checked, pt states has AV graft removed form left upper arm on the 14th and then had stitches removed on friday.  pt states about an hour ago notcied a bump over area, pt states has some soreness in arm denies any injury

## 2019-06-05 NOTE — ED PROVIDER NOTES
Patient initially seen by Abena Goodwin ER nurse practitioner and Dr. Ayah Redd and left me pending lab studies and consult to surgery. Patient had AV shunt removed from her left arm last week and was healing well and then noted a small cystic structure in the left antecubital fossa today. He is not tender there is no erythema is no evidence of infection ultrasound was suspicious for small clot in the fistula bed and we will discuss this with general surgery and arrange for outpatient follow-up. She is not in acute distress.      EKG: EKG demonstrates a normal sinus rhythm with right axis deviation but no acute ischemic changes or arrhythmia identified heart rate is 77 and much like her previous EKG     Herb Farr MD  06/05/19 0010       Herb Farr MD  06/05/19 8712

## 2019-06-13 NOTE — ED PROVIDER NOTES
[address] ED  EMERGENCY DEPARTMENT ENCOUNTER        Pt Name: Jolanta Myers  MRN: [id number]  [place] 1990  Date of evaluation: 6/4/2019  Provider: JAGDISH Salinas  PCP: Feliberto West, APRN - CNP    This patient was seen and evaluated by the attending Raquel Villaseñor MD    [facility]       Chief Complaint   Patient presents with    Wound Check     pt arrives to room 5 c/o needing wound checked, pt states has AV graft removed form left upper arm on the 14th and then had stitches removed on friday. pt states about an hour ago notcied a bump over area, pt states has some soreness in arm denies any injury       HISTORY OF PRESENT ILLNESS   (Location/Symptom, Timing/Onset, Context/Setting, Quality, Duration, Modifying Factors, Severity)  Note limiting factors. Jolanta Myers is a 34 y.o. female who presents for concern of her left elbow. Pt has significant PMHx renal failure on dialysis. She recently had an AV graft removed from her left upper arm on 5/24 and then had suture removal from this procedure about 4 days ago. She has not had any problems with this area until about an hour ago, she noted a \"bump\" to the area where the sutures were. She denies pain, redness, drainage or red streaking to the area. She denies distal numbness, tingling, weakness or loss of function. She denies recent fevers or weakness that is changed from her baseline. She denies nausea, vomiting, abdominal pain, headaches. She has not taken any medicine for this bump. Of note, patient has an appointment for hemodialysis tomorrow. She has not taken her home dose of BP medicine today. Nursing Notes were all reviewed and agreed with or any disagreements were addressed  in the HPI. REVIEW OF SYSTEMS    (2-9 systems for level 4, 10 or more for level 5)     Review of Systems    Positives and Pertinent negatives as per HPI.   Except as noted abovein the ROS, all other systems were reviewed acetaminophen (TYLENOL) 325 MG tablet Take 650 mg by mouth every 6 hours as needed for PainHistorical Med      cloNIDine (CATAPRES) 0.1 MG tablet Take 0.1 mg by mouth 3 times daily as needed for High Blood Pressure Historical Med      sevelamer (RENVELA) 800 MG tablet Take 1 tablet by mouth 3 times daily (with meals)Historical Med               ALLERGIES     Patient has no known allergies. FAMILYHISTORY     History reviewed. No pertinent family history. SOCIAL HISTORY       Social History     Socioeconomic History    Marital status: Single     Spouse name: None    Number of children: None    Years of education: None    Highest education level: None   Occupational History    Occupation: NA   Social Needs    Financial resource strain: None    Food insecurity:     Worry: None     Inability: None    Transportation needs:     Medical: None     Non-medical: None   Tobacco Use    Smoking status: Current Every Day Smoker     Packs/day: 0.25     Years: 4.00     Pack years: 1.00     Types: Cigarettes    Smokeless tobacco: Never Used    Tobacco comment: last one 10/9/18   Substance and Sexual Activity    Alcohol use: No    Drug use: Yes     Types: Marijuana     Comment: 8/29/18- occasional marijuana.  No more IV Drugs since 6/2018    Sexual activity: Not Currently   Lifestyle    Physical activity:     Days per week: None     Minutes per session: None    Stress: None   Relationships    Social connections:     Talks on phone: None     Gets together: None     Attends Yarsanism service: None     Active member of club or organization: None     Attends meetings of clubs or organizations: None     Relationship status: None    Intimate partner violence:     Fear of current or ex partner: None     Emotionally abused: None     Physically abused: None     Forced sexual activity: None   Other Topics Concern    None   Social History Narrative    None       SCREENINGS             PHYSICAL EXAM    (up to 7 for [phone]   POTASSIUM    Narrative:     Performed at:  [facility] ([facility] - [facility]  [address],  [place], [place]   Phone [phone]       All other labs were within normal range or not returned as of this dictation. EKG: All EKG's are interpreted by the Emergency Department Physician who either signs orCo-signs this chart in the absence of a cardiologist.  Please see their note for interpretation of EKG. RADIOLOGY:   Non-plain film images such as CT, Ultrasound and MRI are read by the radiologist. Plain radiographic images are visualized andpreliminarily interpreted by the  ED Provider with the below findings:        Interpretation pert Radiologist below, if available at the time of this note:    No orders to display     No results found. PROCEDURES   Unless otherwise noted below, none     Procedures    CRITICAL CARE TIME   N/A    CONSULTS:  IP CONSULT TO GENERAL SURGERY   Spoke with Dr. Hansa Dc, an associate of Dr. Eliezer Jain at [facility] transplant center. Spoke with Dr. Hansa Dc regarding findings associated with former AV graft site. Dr. Hansa Dc recommends outpatient followup tomorrow with Dr. Rajesh Osorio and has no other recommendations at this time.      EMERGENCY DEPARTMENT COURSE and DIFFERENTIALDIAGNOSIS/MDM:   Vitals:    Vitals:    06/04/19 2216 06/04/19 2359 06/05/19 0105   BP: (!) 192/118 (!) 166/109 (!) 186/115   Pulse: 74 79 78   Resp: 18 18 18   Temp: 98.7 °F (37.1 °C)     TempSrc: Oral     SpO2: 96% 96% 97%   Weight: 125 lb (56.7 kg)     Height: 5' 3\" (1.6 m)         Patient was given thefollowing medications:  Medications   cloNIDine (CATAPRES) tablet 0.1 mg (0.1 mg Oral Given 6/4/19 2250)   acetaminophen (TYLENOL) tablet 500 mg (500 mg Oral Given 6/4/19 2250)   0.9 % sodium chloride bolus (0 mLs Intravenous Stopped 6/5/19 0108)       Pt is a 33 yo female with PMHx ESRD on dialysis who presents with concern regarding her left elbow, site of previous AV graft removal 5/14/19. On exam, pt is alert, oriented, afebrile and well perfused. She is hypertensive, however responded to her home dose clonidine for HBP while in ER and does not show sign of further end organ damage. She has swelling to the left antecubital fossa in absence of signs of infection. The site does not appear to have an current fistula. She is afebrile and does not have leukocytosis. She is found to be hyperkalemic however this is at her relative baseline and her EKG does not show new changes. She is due for hemodialysis tomorrow. Phone call was placed to  transplant center, findings were discussed and patient will be followed up on an outpatient basis. Pt was given reassurance that at this time the site does not appear to be infected and there does not appear to be neurovascular changes. She will be discharged home with instructions to follow up with  transplant center tomorrow and to attend her dialysis appointment as previously scheduled. She was given verbal instructions on signs/symptoms that would warrant immediate return to this department. I discussed my PE findings, diagnostic results, assessment and plan for home discharge with my supervisor who agrees with the above stated plan. Pt is in agreement with the current plan and all questions were addressed. FINAL IMPRESSION      1. Elbow swelling, left    2. Status post removal of arteriovenous fistula    3. ESRD (end stage renal disease) on dialysis [facility])          DISPOSITION/PLAN   DISPOSITION Decision To Discharge 06/05/2019 01:11:38 AM      PATIENT REFERREDTO:  Pretty Baumann MD  [facility]  [address]  [phone]    Call   Call to be seen tomorrow.     [facility]  [address] [facility]  [phone]    If symptoms worsen      DISCHARGE MEDICATIONS:  Discharge Medication List as of 6/5/2019  1:11 AM          DISCONTINUED MEDICATIONS:  Discharge Medication List as of 6/5/2019

## 2019-07-28 ENCOUNTER — APPOINTMENT (OUTPATIENT)
Dept: GENERAL RADIOLOGY | Age: 29
DRG: 425 | End: 2019-07-28
Payer: MEDICARE

## 2019-07-28 ENCOUNTER — HOSPITAL ENCOUNTER (INPATIENT)
Age: 29
LOS: 1 days | Discharge: HOME | DRG: 425 | End: 2019-07-29
Attending: EMERGENCY MEDICINE | Admitting: INTERNAL MEDICINE
Payer: MEDICARE

## 2019-07-28 DIAGNOSIS — Z99.2 END-STAGE RENAL DISEASE ON HEMODIALYSIS (HCC): ICD-10-CM

## 2019-07-28 DIAGNOSIS — R53.83 FATIGUE, UNSPECIFIED TYPE: Primary | ICD-10-CM

## 2019-07-28 DIAGNOSIS — N18.6 END-STAGE RENAL DISEASE ON HEMODIALYSIS (HCC): ICD-10-CM

## 2019-07-28 DIAGNOSIS — R79.89 ELEVATED SERUM CREATININE: ICD-10-CM

## 2019-07-28 DIAGNOSIS — E87.5 HYPERKALEMIA: ICD-10-CM

## 2019-07-28 LAB
ALBUMIN SERPL-MCNC: 4.7 G/DL (ref 3.4–5)
ALP BLD-CCNC: 190 U/L (ref 40–129)
ALT SERPL-CCNC: 14 U/L (ref 10–40)
AMMONIA: 66 UMOL/L (ref 11–51)
ANION GAP SERPL CALCULATED.3IONS-SCNC: 30 MMOL/L (ref 3–16)
AST SERPL-CCNC: 19 U/L (ref 15–37)
BASOPHILS ABSOLUTE: 0 K/UL (ref 0–0.2)
BASOPHILS RELATIVE PERCENT: 0.6 %
BILIRUB SERPL-MCNC: 0.3 MG/DL (ref 0–1)
BILIRUBIN DIRECT: <0.2 MG/DL (ref 0–0.3)
BILIRUBIN, INDIRECT: ABNORMAL MG/DL (ref 0–1)
BUN BLDV-MCNC: 102 MG/DL (ref 7–20)
CALCIUM SERPL-MCNC: 6.6 MG/DL (ref 8.3–10.6)
CHLORIDE BLD-SCNC: 89 MMOL/L (ref 99–110)
CO2: 12 MMOL/L (ref 21–32)
CREAT SERPL-MCNC: 12.4 MG/DL (ref 0.6–1.1)
EOSINOPHILS ABSOLUTE: 0.1 K/UL (ref 0–0.6)
EOSINOPHILS RELATIVE PERCENT: 1.5 %
GFR AFRICAN AMERICAN: 4
GFR NON-AFRICAN AMERICAN: 4
GLUCOSE BLD-MCNC: 133 MG/DL (ref 70–99)
HCG QUALITATIVE: NEGATIVE
HCT VFR BLD CALC: 39.5 % (ref 36–48)
HEMOGLOBIN: 13 G/DL (ref 12–16)
LACTIC ACID: 0.8 MMOL/L (ref 0.4–2)
LYMPHOCYTES ABSOLUTE: 3.1 K/UL (ref 1–5.1)
LYMPHOCYTES RELATIVE PERCENT: 36.4 %
MCH RBC QN AUTO: 32.5 PG (ref 26–34)
MCHC RBC AUTO-ENTMCNC: 33 G/DL (ref 31–36)
MCV RBC AUTO: 98.3 FL (ref 80–100)
MONOCYTES ABSOLUTE: 0.3 K/UL (ref 0–1.3)
MONOCYTES RELATIVE PERCENT: 3.6 %
NEUTROPHILS ABSOLUTE: 5 K/UL (ref 1.7–7.7)
NEUTROPHILS RELATIVE PERCENT: 57.9 %
PDW BLD-RTO: 15.1 % (ref 12.4–15.4)
PLATELET # BLD: 170 K/UL (ref 135–450)
PMV BLD AUTO: 7.4 FL (ref 5–10.5)
POTASSIUM REFLEX MAGNESIUM: 7.6 MMOL/L (ref 3.5–5.1)
PRO-BNP: ABNORMAL PG/ML (ref 0–124)
RBC # BLD: 4.02 M/UL (ref 4–5.2)
SODIUM BLD-SCNC: 131 MMOL/L (ref 136–145)
TOTAL CK: 94 U/L (ref 26–192)
TOTAL PROTEIN: 9.2 G/DL (ref 6.4–8.2)
WBC # BLD: 8.6 K/UL (ref 4–11)

## 2019-07-28 PROCEDURE — 99285 EMERGENCY DEPT VISIT HI MDM: CPT

## 2019-07-28 PROCEDURE — 80076 HEPATIC FUNCTION PANEL: CPT

## 2019-07-28 PROCEDURE — 90935 HEMODIALYSIS ONE EVALUATION: CPT

## 2019-07-28 PROCEDURE — 96361 HYDRATE IV INFUSION ADD-ON: CPT

## 2019-07-28 PROCEDURE — 71045 X-RAY EXAM CHEST 1 VIEW: CPT

## 2019-07-28 PROCEDURE — 2580000003 HC RX 258: Performed by: PHYSICIAN ASSISTANT

## 2019-07-28 PROCEDURE — 2000000000 HC ICU R&B

## 2019-07-28 PROCEDURE — 84703 CHORIONIC GONADOTROPIN ASSAY: CPT

## 2019-07-28 PROCEDURE — 96374 THER/PROPH/DIAG INJ IV PUSH: CPT

## 2019-07-28 PROCEDURE — 83880 ASSAY OF NATRIURETIC PEPTIDE: CPT

## 2019-07-28 PROCEDURE — 80048 BASIC METABOLIC PNL TOTAL CA: CPT

## 2019-07-28 PROCEDURE — 83605 ASSAY OF LACTIC ACID: CPT

## 2019-07-28 PROCEDURE — 93005 ELECTROCARDIOGRAM TRACING: CPT | Performed by: PHYSICIAN ASSISTANT

## 2019-07-28 PROCEDURE — 82140 ASSAY OF AMMONIA: CPT

## 2019-07-28 PROCEDURE — 6360000002 HC RX W HCPCS: Performed by: PHYSICIAN ASSISTANT

## 2019-07-28 PROCEDURE — 82550 ASSAY OF CK (CPK): CPT

## 2019-07-28 PROCEDURE — 85025 COMPLETE CBC W/AUTO DIFF WBC: CPT

## 2019-07-28 PROCEDURE — 6370000000 HC RX 637 (ALT 250 FOR IP): Performed by: PHYSICIAN ASSISTANT

## 2019-07-28 RX ORDER — HEPARIN SODIUM 1000 [USP'U]/ML
INJECTION, SOLUTION INTRAVENOUS; SUBCUTANEOUS
Status: COMPLETED
Start: 2019-07-28 | End: 2019-07-29

## 2019-07-28 RX ORDER — SODIUM CHLORIDE 0.9 % (FLUSH) 0.9 %
10 SYRINGE (ML) INJECTION PRN
Status: DISCONTINUED | OUTPATIENT
Start: 2019-07-28 | End: 2019-07-29 | Stop reason: HOSPADM

## 2019-07-28 RX ORDER — SODIUM CHLORIDE 0.9 % (FLUSH) 0.9 %
SYRINGE (ML) INJECTION
Status: COMPLETED
Start: 2019-07-28 | End: 2019-07-29

## 2019-07-28 RX ORDER — IPRATROPIUM BROMIDE AND ALBUTEROL SULFATE 2.5; .5 MG/3ML; MG/3ML
1 SOLUTION RESPIRATORY (INHALATION) ONCE
Status: DISCONTINUED | OUTPATIENT
Start: 2019-07-28 | End: 2019-07-28

## 2019-07-28 RX ORDER — ACETAMINOPHEN 325 MG/1
325 TABLET ORAL EVERY 4 HOURS PRN
Status: DISCONTINUED | OUTPATIENT
Start: 2019-07-28 | End: 2019-07-29 | Stop reason: HOSPADM

## 2019-07-28 RX ORDER — HEPARIN SODIUM 5000 [USP'U]/ML
5000 INJECTION, SOLUTION INTRAVENOUS; SUBCUTANEOUS EVERY 8 HOURS SCHEDULED
Status: DISCONTINUED | OUTPATIENT
Start: 2019-07-29 | End: 2019-07-29 | Stop reason: HOSPADM

## 2019-07-28 RX ORDER — SODIUM CHLORIDE 0.9 % (FLUSH) 0.9 %
10 SYRINGE (ML) INJECTION EVERY 12 HOURS SCHEDULED
Status: DISCONTINUED | OUTPATIENT
Start: 2019-07-28 | End: 2019-07-29 | Stop reason: HOSPADM

## 2019-07-28 RX ORDER — ONDANSETRON 2 MG/ML
4 INJECTION INTRAMUSCULAR; INTRAVENOUS EVERY 6 HOURS PRN
Status: DISCONTINUED | OUTPATIENT
Start: 2019-07-28 | End: 2019-07-29 | Stop reason: HOSPADM

## 2019-07-28 RX ORDER — 0.9 % SODIUM CHLORIDE 0.9 %
1000 INTRAVENOUS SOLUTION INTRAVENOUS ONCE
Status: COMPLETED | OUTPATIENT
Start: 2019-07-28 | End: 2019-07-29

## 2019-07-28 RX ORDER — ONDANSETRON 2 MG/ML
4 INJECTION INTRAMUSCULAR; INTRAVENOUS ONCE
Status: COMPLETED | OUTPATIENT
Start: 2019-07-28 | End: 2019-07-28

## 2019-07-28 RX ORDER — 0.9 % SODIUM CHLORIDE 0.9 %
500 INTRAVENOUS SOLUTION INTRAVENOUS ONCE
Status: DISCONTINUED | OUTPATIENT
Start: 2019-07-28 | End: 2019-07-29 | Stop reason: HOSPADM

## 2019-07-28 RX ADMIN — ONDANSETRON 4 MG: 2 INJECTION INTRAMUSCULAR; INTRAVENOUS at 20:36

## 2019-07-28 RX ADMIN — SODIUM CHLORIDE 1000 ML: 9 INJECTION, SOLUTION INTRAVENOUS at 20:33

## 2019-07-28 RX ADMIN — IPRATROPIUM BROMIDE AND ALBUTEROL SULFATE 1 AMPULE: .5; 3 SOLUTION RESPIRATORY (INHALATION) at 20:33

## 2019-07-28 SDOH — ECONOMIC STABILITY: FOOD INSECURITY: WITHIN THE PAST 12 MONTHS, YOU WORRIED THAT YOUR FOOD WOULD RUN OUT BEFORE YOU GOT MONEY TO BUY MORE.: NEVER TRUE

## 2019-07-28 SDOH — SOCIAL STABILITY: SOCIAL NETWORK: ARE YOU MARRIED, WIDOWED, DIVORCED, SEPARATED, NEVER MARRIED, OR LIVING WITH A PARTNER?: NEVER MARRIED

## 2019-07-28 SDOH — HEALTH STABILITY: PHYSICAL HEALTH: ON AVERAGE, HOW MANY DAYS PER WEEK DO YOU ENGAGE IN MODERATE TO STRENUOUS EXERCISE (LIKE A BRISK WALK)?: 4 DAYS

## 2019-07-28 SDOH — SOCIAL STABILITY: SOCIAL NETWORK
IN A TYPICAL WEEK, HOW MANY TIMES DO YOU TALK ON THE PHONE WITH FAMILY, FRIENDS, OR NEIGHBORS?: MORE THAN THREE TIMES A WEEK

## 2019-07-28 SDOH — HEALTH STABILITY: MENTAL HEALTH
STRESS IS WHEN SOMEONE FEELS TENSE, NERVOUS, ANXIOUS, OR CAN'T SLEEP AT NIGHT BECAUSE THEIR MIND IS TROUBLED. HOW STRESSED ARE YOU?: NOT AT ALL

## 2019-07-28 SDOH — ECONOMIC STABILITY: FOOD INSECURITY: WITHIN THE PAST 12 MONTHS, THE FOOD YOU BOUGHT JUST DIDN'T LAST AND YOU DIDN'T HAVE MONEY TO GET MORE.: NEVER TRUE

## 2019-07-28 SDOH — SOCIAL STABILITY: SOCIAL NETWORK: HOW OFTEN DO YOU ATTEND CHURCH OR RELIGIOUS SERVICES?: MORE THAN 4 TIMES PER YEAR

## 2019-07-28 SDOH — SOCIAL STABILITY: SOCIAL NETWORK: HOW OFTEN DO YOU ATTENT MEETINGS OF THE CLUB OR ORGANIZATION YOU BELONG TO?: NEVER

## 2019-07-28 SDOH — SOCIAL STABILITY: SOCIAL NETWORK: HOW OFTEN DO YOU GET TOGETHER WITH FRIENDS OR RELATIVES?: TWICE A WEEK

## 2019-07-28 SDOH — ECONOMIC STABILITY: INCOME INSECURITY: HOW HARD IS IT FOR YOU TO PAY FOR THE VERY BASICS LIKE FOOD, HOUSING, MEDICAL CARE, AND HEATING?: NOT HARD AT ALL

## 2019-07-28 SDOH — ECONOMIC STABILITY: TRANSPORTATION INSECURITY
IN THE PAST 12 MONTHS, HAS LACK OF TRANSPORTATION KEPT YOU FROM MEETINGS, WORK, OR FROM GETTING THINGS NEEDED FOR DAILY LIVING?: NO

## 2019-07-28 SDOH — ECONOMIC STABILITY: TRANSPORTATION INSECURITY
IN THE PAST 12 MONTHS, HAS THE LACK OF TRANSPORTATION KEPT YOU FROM MEDICAL APPOINTMENTS OR FROM GETTING MEDICATIONS?: NO

## 2019-07-28 SDOH — SOCIAL STABILITY: SOCIAL NETWORK
DO YOU BELONG TO ANY CLUBS OR ORGANIZATIONS SUCH AS CHURCH GROUPS UNIONS, FRATERNAL OR ATHLETIC GROUPS, OR SCHOOL GROUPS?: NO

## 2019-07-28 ASSESSMENT — PAIN SCALES - GENERAL: PAINLEVEL_OUTOF10: 0

## 2019-07-28 NOTE — ED PROVIDER NOTES
[facility] ED    Patient: Shaun Mendez  NXW:[id number]  : 1990  Date of Evaluation: 2019  ED JED Provider: JAGDISH Verdugo    Chief Complaint       Chief Complaint   Patient presents with    Fatigue     Pt. states she was at dialysis on Friday and the machine only took fluid off and did not clean her blood. Grady Ramirez is a 34 y.o. female with end-stage renal disease on hemodialysis  resents for evaluation of fatigue and lethargy. Patient states that she did indeed receive dialysis yesterday but was told that there was some mechanical problems with the dialysis machine and that they were not sure that she actually finished her dialysis run. As such the day following this she began to feel some fatigue and weakness and this continued into today and now presents for 8 out of 10 weakness fatigue and otherwise rundown feeling following dialysis 2 days prior without recent fever chills or vomiting without abdominal pain chest pain but with some nausea and some intermittent shortness of breath without other radiation aggravating or relieving factors x2 days    ROS:     Review of Systems 2-day history of weakness fatigue in the setting of end-stage renal disease on hemodialysis  as above  At least 10 systemsreviewed and otherwise acutely negative except as in the [address]     Past History     Past Medical History:   Diagnosis Date    Asthma     Cardiac arrest ([facility] 2018    Depression     Endocarditis     ESRD (end stage renal disease) on dialysis ([facility] [address] [facility]     M/W/F    Hepatitis C antibody positive in blood 2017    History of blood transfusion     Hypertension     IV drug abuse (HCC)     Liver disease     MRSA (methicillin resistant staph aureus) culture positive 18, 2017    +blood culture     MRSA (methicillin resistant staph aureus) culture positive 18,

## 2019-07-29 VITALS
TEMPERATURE: 98.3 F | WEIGHT: 118.17 LBS | RESPIRATION RATE: 15 BRPM | DIASTOLIC BLOOD PRESSURE: 106 MMHG | OXYGEN SATURATION: 93 % | HEIGHT: 63 IN | BODY MASS INDEX: 20.94 KG/M2 | HEART RATE: 71 BPM | SYSTOLIC BLOOD PRESSURE: 148 MMHG

## 2019-07-29 PROBLEM — E87.5 HYPERKALEMIA: Status: RESOLVED | Noted: 2017-12-11 | Resolved: 2019-07-29

## 2019-07-29 LAB
ANION GAP SERPL CALCULATED.3IONS-SCNC: 22 MMOL/L (ref 3–16)
BASOPHILS ABSOLUTE: 0 K/UL (ref 0–0.2)
BASOPHILS RELATIVE PERCENT: 0.4 %
BUN BLDV-MCNC: 48 MG/DL (ref 7–20)
CALCIUM SERPL-MCNC: 7.4 MG/DL (ref 8.3–10.6)
CHLORIDE BLD-SCNC: 89 MMOL/L (ref 99–110)
CO2: 21 MMOL/L (ref 21–32)
CREAT SERPL-MCNC: 7.5 MG/DL (ref 0.6–1.1)
EKG ATRIAL RATE: 124 BPM
EKG DIAGNOSIS: NORMAL
EKG P AXIS: 69 DEGREES
EKG P-R INTERVAL: 286 MS
EKG Q-T INTERVAL: 274 MS
EKG QRS DURATION: 84 MS
EKG QTC CALCULATION (BAZETT): 393 MS
EKG R AXIS: 118 DEGREES
EKG T AXIS: 72 DEGREES
EKG VENTRICULAR RATE: 124 BPM
EOSINOPHILS ABSOLUTE: 0.2 K/UL (ref 0–0.6)
EOSINOPHILS RELATIVE PERCENT: 2.6 %
GFR AFRICAN AMERICAN: 8
GFR NON-AFRICAN AMERICAN: 6
GLUCOSE BLD-MCNC: 128 MG/DL (ref 70–99)
HCT VFR BLD CALC: 35 % (ref 36–48)
HEMOGLOBIN: 11.7 G/DL (ref 12–16)
LYMPHOCYTES ABSOLUTE: 2.6 K/UL (ref 1–5.1)
LYMPHOCYTES RELATIVE PERCENT: 38.7 %
MAGNESIUM: 2.2 MG/DL (ref 1.8–2.4)
MCH RBC QN AUTO: 32.2 PG (ref 26–34)
MCHC RBC AUTO-ENTMCNC: 33.4 G/DL (ref 31–36)
MCV RBC AUTO: 96.4 FL (ref 80–100)
MONOCYTES ABSOLUTE: 0.5 K/UL (ref 0–1.3)
MONOCYTES RELATIVE PERCENT: 6.8 %
NEUTROPHILS ABSOLUTE: 3.4 K/UL (ref 1.7–7.7)
NEUTROPHILS RELATIVE PERCENT: 51.5 %
PDW BLD-RTO: 14.4 % (ref 12.4–15.4)
PLATELET # BLD: 154 K/UL (ref 135–450)
PMV BLD AUTO: 7.2 FL (ref 5–10.5)
POTASSIUM REFLEX MAGNESIUM: 3 MMOL/L (ref 3.5–5.1)
POTASSIUM SERPL-SCNC: 3.3 MMOL/L (ref 3.5–5.1)
RBC # BLD: 3.63 M/UL (ref 4–5.2)
SODIUM BLD-SCNC: 132 MMOL/L (ref 136–145)
WBC # BLD: 6.7 K/UL (ref 4–11)

## 2019-07-29 PROCEDURE — 6360000002 HC RX W HCPCS: Performed by: INTERNAL MEDICINE

## 2019-07-29 PROCEDURE — 80048 BASIC METABOLIC PNL TOTAL CA: CPT

## 2019-07-29 PROCEDURE — 85025 COMPLETE CBC W/AUTO DIFF WBC: CPT

## 2019-07-29 PROCEDURE — 2580000003 HC RX 258: Performed by: INTERNAL MEDICINE

## 2019-07-29 PROCEDURE — 84132 ASSAY OF SERUM POTASSIUM: CPT

## 2019-07-29 PROCEDURE — 36415 COLL VENOUS BLD VENIPUNCTURE: CPT

## 2019-07-29 PROCEDURE — 90935 HEMODIALYSIS ONE EVALUATION: CPT

## 2019-07-29 PROCEDURE — 93010 ELECTROCARDIOGRAM REPORT: CPT | Performed by: INTERNAL MEDICINE

## 2019-07-29 PROCEDURE — 6360000002 HC RX W HCPCS

## 2019-07-29 PROCEDURE — 83735 ASSAY OF MAGNESIUM: CPT

## 2019-07-29 PROCEDURE — 6370000000 HC RX 637 (ALT 250 FOR IP): Performed by: INTERNAL MEDICINE

## 2019-07-29 PROCEDURE — 5A1D70Z PERFORMANCE OF URINARY FILTRATION, INTERMITTENT, LESS THAN 6 HOURS PER DAY: ICD-10-PCS | Performed by: INTERNAL MEDICINE

## 2019-07-29 PROCEDURE — 2580000003 HC RX 258

## 2019-07-29 PROCEDURE — 99223 1ST HOSP IP/OBS HIGH 75: CPT | Performed by: INTERNAL MEDICINE

## 2019-07-29 RX ORDER — PANTOPRAZOLE SODIUM 40 MG/1
40 TABLET, DELAYED RELEASE ORAL DAILY
Status: DISCONTINUED | OUTPATIENT
Start: 2019-07-29 | End: 2019-07-29 | Stop reason: HOSPADM

## 2019-07-29 RX ORDER — CHOLECALCIFEROL (VITAMIN D3) 10 MCG
1 TABLET ORAL WEEKLY
Status: DISCONTINUED | OUTPATIENT
Start: 2019-07-29 | End: 2019-07-29 | Stop reason: HOSPADM

## 2019-07-29 RX ORDER — NIFEDIPINE 30 MG/1
30 TABLET, EXTENDED RELEASE ORAL 2 TIMES DAILY
Status: DISCONTINUED | OUTPATIENT
Start: 2019-07-29 | End: 2019-07-29 | Stop reason: HOSPADM

## 2019-07-29 RX ORDER — QUETIAPINE FUMARATE 25 MG/1
50 TABLET, FILM COATED ORAL NIGHTLY
Status: DISCONTINUED | OUTPATIENT
Start: 2019-07-29 | End: 2019-07-29 | Stop reason: HOSPADM

## 2019-07-29 RX ORDER — HEPARIN SODIUM 1000 [USP'U]/ML
3800 INJECTION, SOLUTION INTRAVENOUS; SUBCUTANEOUS PRN
Status: DISCONTINUED | OUTPATIENT
Start: 2019-07-28 | End: 2019-07-29 | Stop reason: HOSPADM

## 2019-07-29 RX ORDER — SEVELAMER CARBONATE 800 MG/1
800 TABLET, FILM COATED ORAL
Status: DISCONTINUED | OUTPATIENT
Start: 2019-07-29 | End: 2019-07-29 | Stop reason: HOSPADM

## 2019-07-29 RX ORDER — CARVEDILOL 25 MG/1
25 TABLET ORAL 2 TIMES DAILY WITH MEALS
Status: DISCONTINUED | OUTPATIENT
Start: 2019-07-29 | End: 2019-07-29 | Stop reason: HOSPADM

## 2019-07-29 RX ADMIN — ASCORBIC ACID, THIAMINE MONONITRATE,RIBOFLAVIN, NIACINAMIDE, PYRIDOXINE HYDROCHLORIDE, FOLIC ACID, CYANOCOBALAMIN, BIOTIN, CALCIUM PANTOTHENATE, 1 MG: 100; 1.5; 1.7; 20; 10; 1; 6000; 150000; 5 CAPSULE, LIQUID FILLED ORAL at 08:09

## 2019-07-29 RX ADMIN — Medication 10 ML: at 00:21

## 2019-07-29 RX ADMIN — SEVELAMER CARBONATE 800 MG: 800 TABLET, FILM COATED ORAL at 08:07

## 2019-07-29 RX ADMIN — PANTOPRAZOLE SODIUM 40 MG: 40 TABLET, DELAYED RELEASE ORAL at 08:07

## 2019-07-29 RX ADMIN — Medication 10 ML: at 00:20

## 2019-07-29 RX ADMIN — ACETAMINOPHEN 325 MG: 325 TABLET ORAL at 08:34

## 2019-07-29 RX ADMIN — HEPARIN SODIUM 3800 UNITS: 1000 INJECTION INTRAVENOUS; SUBCUTANEOUS at 12:26

## 2019-07-29 RX ADMIN — Medication 10 ML: at 08:09

## 2019-07-29 RX ADMIN — NIFEDIPINE 30 MG: 30 TABLET, FILM COATED, EXTENDED RELEASE ORAL at 08:07

## 2019-07-29 RX ADMIN — SEVELAMER CARBONATE 800 MG: 800 TABLET, FILM COATED ORAL at 13:46

## 2019-07-29 RX ADMIN — HEPARIN SODIUM 5000 UNITS: 5000 INJECTION INTRAVENOUS; SUBCUTANEOUS at 05:55

## 2019-07-29 RX ADMIN — CARVEDILOL 25 MG: 25 TABLET, FILM COATED ORAL at 08:07

## 2019-07-29 RX ADMIN — HEPARIN SODIUM: 1000 INJECTION INTRAVENOUS; SUBCUTANEOUS at 00:01

## 2019-07-29 ASSESSMENT — PAIN SCALES - GENERAL
PAINLEVEL_OUTOF10: 0
PAINLEVEL_OUTOF10: 6

## 2019-07-29 NOTE — PROGRESS NOTES
I have been notified of the new consult request for us to see Ms. Cox  Formal consult & note to follow. ESRD on HD:  -Per review of [facility] records, patient frequently stops HD short/does not complete treatment. Only completed 131 minutes of treatment on 7/26/19 (scheduled for 210 minutes)  -Urgent HD tonight for hyperkalemia with plans to resume MWF schedule while hospitalized    Severe Hyperkalemia:  -Due to non-adherence with HD  -Urgent HD as above          Lexus Callahan M.D.     For questions or concerns please contact me at   Telephone: [phone]

## 2019-07-29 NOTE — CONSULTS
Attends Taoism service: More than 4 times per year     Active member of club or organization: No     Attends meetings of clubs or organizations: Never     Relationship status: Never     Intimate partner violence:     Fear of current or ex partner: Not on file     Emotionally abused: Not on file     Physically abused: Not on file     Forced sexual activity: Not on file   Other Topics Concern    Not on file   Social History Narrative    Not on file       Family History:   No history of kidney disease. Review of Systems:   Pertinent positives stated above in HPI. Remainder of 10 point review of systems were reviewed and were negative.     Physical exam:   Constitutional:  VITALS:  BP (!) 133/96   Pulse 68   Temp 98.2 °F (36.8 °C)   Resp 21   Ht 5' 3\" (1.6 m)   Wt 120 lb 5.9 oz (54.6 kg)   LMP 07/28/2017 (Within Months)   SpO2 100%   BMI 21.32 kg/m²   Gen: alert, awake, ill-appearing  Skin: no rash, turgor wnl  Heent:  eomi, mmm  Neck: no bruits or jvd noted, thyroid normal  Cardiovascular:  S1, S2 2/6 johanne, without r/g  Respiratory: CTA B without w/r/r; respiratory effort normal  Abdomen:  +bs, soft, nt, nd, no hepatosplenomegaly  Ext: no lower extremity edema  Access: Left [facility]  Psychiatric: mood and affect appropriate; judgement and insight appropriate  Musculoskeletal:  Rom, muscular strength limited; digits, nails normal    Data/  CBC:   Lab Results   Component Value Date    WBC 6.7 07/29/2019    RBC 3.63 07/29/2019    HGB 11.7 07/29/2019    HCT 35.0 07/29/2019    MCV 96.4 07/29/2019    MCH 32.2 07/29/2019    MCHC 33.4 07/29/2019    RDW 14.4 07/29/2019     07/29/2019    MPV 7.2 07/29/2019     BMP:    Lab Results   Component Value Date     07/29/2019    K 3.3 07/29/2019    K 3.0 07/29/2019    CL 89 07/29/2019    CO2 21 07/29/2019    BUN 48 07/29/2019    LABALBU 4.7 07/28/2019    CREATININE 7.5 07/29/2019    CALCIUM 7.4 07/29/2019    GFRAA 8 07/29/2019    LABGLOM 6 07/29/2019 GLUCOSE 128 07/29/2019         Assessment/    - ESRD HD MWF    - Hyperkalemia - better with HD    - Anemia of chronic disease - Hb 11.7    - Hypertension - controlled      Plan/    - HD today with 1 L UF target - under EDW of 56 kg  - Counseled on low K diet  - Trend labs, BP's      Okay for discharge after HD today      Thank you for the consultation. Please do not hesitate to call with questions.     [employer]

## 2019-07-29 NOTE — PROGRESS NOTES
Patient K+ 3.0 this AM. No existing replacement orders. MD was notified around [phone], read the message, and did not respond.

## 2019-07-29 NOTE — DISCHARGE SUMMARY
Name:  Jef Hooker  Room:  3001/3001-01  MRN:    [id number]    Discharge Summary      This discharge summary is in conjunction with a complete physical exam done on the day of discharge. Discharging Physician: Dr. Stanislaw Strong: 7/28/2019  Discharge:  7/29/2019    HPI taken from admission H&P:    Minnie Leigh is 34 y.o. female who presented with complaint of fatigue. Symptom onset was acute for a time period of 1 day. The severity is described as moderate. The course of his symptoms over time is improved. The symptoms improved with dialysis and worsened with none. The patient's symptom is associated with hyperkalemia.     Minnie Leigh is 34 y.o. female with history of ESRD secondary to bacteremia and history of IVDU  She presents to the ER yesterday with complaint of fatigue   She states she is on HD on MWF   However, last HD session was not completed due to HD equipment malfunction  In the ED, her potassium was 7.6  She is admitted for emergent hemodialysis       History obtained from patient, mother and chart review. Old medical records show -  No recent admission to this hospital     Diagnoses this Admission and Hospital Course   ESRD on HD   Hyperkalemia   - Admit to ICU for emergent HD   - Nephrology consult   - she has improved after HD. 1.6 L fluid removed   - K+ 7.6-->3.3   - Renal diet      HTN  - resume home meds     Anemia of chronic disease  - H/H stable     Procedures (Please Review Full Report for Details)  HD    Consults    Intensivist   Nephrology       Physical Exam at Discharge:    BP (!) 148/106   Pulse 71   Temp 98.3 °F (36.8 °C)   Resp 15   Ht 5' 3\" (1.6 m)   Wt 118 lb 2.7 oz (53.6 kg)   LMP 07/28/2017 (Within Months)   SpO2 93%   BMI 20.93 kg/m²   General appearance:  Appears comfortable. Well nourished  Eyes: Sclera clear, pupils equal  ENT: Moist mucus membranes, no thrush. Trachea midline. Cardiovascular: Regular rhythm, normal S1, S2.  No murmur, gallop,

## 2019-07-29 NOTE — CARE COORDINATION
Case Management Assessment  Initial Evaluation and DC Note    Date/Time of Evaluation: 7/29/2019 11:18 AM  Assessment Completed by: Beatriz Duverney    Patient Name: Meme Benítez  YOB: 1990  Diagnosis: ESRD on hemodialysis ([address] [N18.6, Z99.2]  Date / Time: 7/28/2019  6:10 PM  Admission status/Date:7/28/2019 inpt  Chart Reviewed: Yes      Patient Interviewed: Yes   Family Interviewed:  No      Hospitalization in the last 30 days:  No    Contacts  :     Relationship to Patient:   Phone Number:    Alternate Contact:     Relationship to Patient:     Phone Number:    Met with:    Current PCP  Armando Dorman required for SNF : Y        3 night stay required - [employer]  Support Systems: Parent  Transportation: family    Meal Preparation: self    Housing  Home Environment: lives with mother in apt  Steps: IPTA  Plans to Return to Present Housing: Yes  Other Identified Issues:     Rodrick Peoples Giselle  Currently active with [facility] : No  Type of Home Care Services: None  Passport/Waiver : No  :                      Phone Number:    Passport/Waiver Services:           [id number] Provider: none  Equipment: none  Walker__Cane__RTS__ BSC__Shower Chair__  02__ HHN__ CPAP__  BiPap__  Hospital Bed__ W/C___ Other__________      Has Home O2 in place on admit:  No  Informed of need to bring portable home O2 tank on day of discharge for nursing to connect prior to leaving:   Not Indicated  Verbalized agreement/Understanding:   Not Indicated    Community Service Affiliation  Dialysis:  Yes - [facility]    · Name:  · Location  · Dialysis Schedule:[facility]  · Phone:   · Fax: Outpatient PT/OT: No    Cancer Center: No     CHF Clinic: No     Pulmonary Rehab: No  Pain Clinic: No  Community Mental Health: No    Wound Clinic: No     Other:     DISCHARGE PLAN: Chart reviewed and role of dcp explained.  Pt is from apt with mother and plans to return. IPTA Mother provides transportation. Pt is active with [facility] M[facility]. Following  Explained Case Management role/services. DISCHARGE ORDER  Date/Time 2019 3:17 PM  Completed by: Kevin Burnham, Case Management    Patient Name: Marcel Kuhn    : 1990  Admitting Diagnosis: ESRD on hemodialysis ([facility] [address] [N18.6, Z99.2]  Admit Date/Time: 2019  6:10 PM    Noted discharge order. Confirmed discharge plan with patient / family (pt): Yes   Discharge Plan: Chart reviewed. Pt discharging home. Nephrology note and run note faxed to [facility]. TC to [facility] and spoke to [place], made her aware pt will return to them on Wednesday. NO dc needs voiced or identified.

## 2020-07-22 NOTE — PROGRESS NOTES
Pt requesting water - discussed with patient that she cannot be provided large amounts of water d/t possibility of needing procedures with sedation, but could be provided small amounts to keep her mouth moist. Pt became angry and started pulling Pulse ox off saying she wanted to sign out AMA. RN discussed with patient that she is extremely ill, expressed concern of emergent return if she were to leave. RN willing to provide some ice chips for relief. Pt still c/o HA - had perfect Served Dr. Lo Ngo earlier and did not receive call back or order. Second unit of RBC started at this time and new bag of protonix hung. VS , SP02 96% on 3 L NC, /92 (112) and RR 36. Pt has elevated tempt prior to blood administration (101.7 oral). Will continue to monitor closely. Wound care to buttocks. See flow sheet for details. Patient was calm and cooperative throughout interaction.

## 2020-11-03 PROBLEM — J18.9 PNEUMONIA DUE TO ORGANISM: Status: RESOLVED | Noted: 2020-11-03 | Resolved: 2020-11-03

## 2020-11-03 PROBLEM — I10 ESSENTIAL HYPERTENSION: Status: RESOLVED | Noted: 2020-11-03 | Resolved: 2020-11-03

## 2021-03-22 NOTE — PROGRESS NOTES
Hospitalist Progress Note      PCP: SHREYA Ugarte - CNP    Date of Admission: 6/6/2018    Subjective: cont to spike a fever, hb dropped to 4.8 today - getting blood transfusion today    Medications:  Reviewed    Infusion Medications    sodium chloride 1,000 mL (07/13/18 1126)    dextrose       Scheduled Medications    sodium chloride  250 mL Intravenous Once    pantoprazole  40 mg Intravenous BID    vancomycin  750 mg Intravenous Once    meropenem  250 mg Intravenous Q8H    fentanyl  1 patch Transdermal Q72H    fentaNYL  1 patch Transdermal Q72H    QUEtiapine  50 mg Oral Nightly    micafungin  100 mg Intravenous Daily    polyethylene glycol  17 g Oral Daily    sodium chloride flush  10 mL Intravenous 2 times per day    ipratropium-albuterol  1 ampule Inhalation Q4H    sennosides-docusate sodium  1 tablet Oral Daily    cloNIDine  0.1 mg Oral TID    nystatin   Topical BID    chlorhexidine  15 mL Mouth/Throat BID    doxercalciferol  1 mcg Intravenous Once per day on Tue Thu Sat    darbepoetin emi-polysorbate  100 mcg Intravenous Weekly - Thursday    And    darbepoetin emi-polysorbate  25 mcg Subcutaneous Weekly - Thursday     PRN Meds: oxyCODONE, HYDROmorphone, diazepam, sodium chloride flush, heparin (porcine), albumin human, dextrose, sodium phosphate IVPB **OR** [DISCONTINUED] sodium phosphate IVPB **OR** [DISCONTINUED] sodium phosphate IVPB **OR** [DISCONTINUED] sodium phosphate IVPB, ondansetron, glucose, glucagon (rDNA), dextrose, acetaminophen, promethazine      Intake/Output Summary (Last 24 hours) at 07/14/18 1828  Last data filed at 07/14/18 1331   Gross per 24 hour   Intake          1170.08 ml   Output              130 ml   Net          1040.08 ml       Physical Exam Performed:    BP (!) 156/91   Pulse 106   Temp 99.9 °F (37.7 °C)   Resp 18   Ht 5' 2\" (1.575 m)   Wt 118 lb 9.7 oz (53.8 kg)   SpO2 99%   BMI 21.69 kg/m²     General: young female with trach collar. Tracheostomy+   Awake, alert and oriented  Very weak and tired  Eyes: PERRL. No sclera icterus. No conjunctiva injected. ENT: No discharge. Neck: Trachea midline. Normal thyroid. trach in place . Resp: Diminished breath sounds. Bilateral diffuse rhonchi. CV: tachycardic + ESM in tricuspid area  GI:  + distended. abd wall edema +  No masses. Bowel sounds diminished. Mild diffuse tendereness . No hernia. PEG +   Skin: Warm and dry. No nodule on exposed extremities. No rash on exposed extremities  Lymph: No cervical LAD. No supraclavicular LAD. M/S: .  Diffuse 1+ edema in all extremities   Neuro - non focal , very weak and lethargic     Labs:   Recent Labs      07/12/18   0545  07/13/18   0515  07/14/18   0630  07/14/18   0720   WBC  8.7  8.5  11.5*   --    HGB  7.0*  10.0*  4.8*  4.7*   HCT  21.1*  30.0*  14.4*  13.9*   PLT  102*  91*  118*   --      Recent Labs      07/12/18   0545  07/13/18   0515  07/14/18   0630   NA  131*  136  136   K  3.5  3.5  4.0   CL  95*  101  102   CO2  23  25  24   BUN  21*  17  27*   CREATININE  3.7*  2.8*  3.9*   CALCIUM  8.8  8.7  8.6   PHOS  2.9  2.5  3.4     No results for input(s): AST, ALT, BILIDIR, BILITOT, ALKPHOS in the last 72 hours. Recent Labs      07/14/18   1230   INR  1.49*     No results for input(s): Purnima Rudy in the last 72 hours. Urinalysis:    Lab Results   Component Value Date    NITRU Negative 07/09/2018    WBCUA >100 07/09/2018    BACTERIA see below 07/09/2018    RBCUA see below 07/09/2018    BLOODU LARGE 07/09/2018    SPECGRAV 1.015 07/09/2018    GLUCOSEU Negative 07/09/2018       Radiology:  US GUIDED PARACENTESIS   Final Result   1. Complex peritoneal fluid with thick septations throughout the abdomen in a   pattern that would suggest underlying peritonitis. 2. Fluid is not amenable to complete drainage by paracentesis, nor by   CT-guided drainage.   Based upon results of fluid analysis, treatment may   require further antibiotics or surgical consultation. 3. Successful collection of approximately 510 mL of thin, dark brown fluid   sent to the lab for culture and analysis. CT Chest WO Contrast   Final Result   Overall improved appearance of the chest with decreasing areas of cavitation. Small pleural effusions. Large amount free fluid within the abdomen and pelvis. Heterogeneous appearance of the spleen is again seen and may be related to   infection/abscesses, given the process within the chest.  Alternatively,   these may represent areas of infarct. Low-density area within the left   hepatic lobe likely is a similar process to the spleen and appears improved   from the previous study. CT ABDOMEN PELVIS WO CONTRAST Additional Contrast? Oral   Final Result   Overall improved appearance of the chest with decreasing areas of cavitation. Small pleural effusions. Large amount free fluid within the abdomen and pelvis. Heterogeneous appearance of the spleen is again seen and may be related to   infection/abscesses, given the process within the chest.  Alternatively,   these may represent areas of infarct. Low-density area within the left   hepatic lobe likely is a similar process to the spleen and appears improved   from the previous study. XR CHEST PORTABLE   Final Result   Increasing right basilar and stable left basilar airspace disease. XR ABDOMEN (KUB) (SINGLE AP VIEW)   Final Result   Unchanged gaseous distention of several bowel loops compared to the prior   study. Unchanged linear density overlying the pubic symphysis compared to   07/05/2018, possibly external to the patient. XR CHEST PORTABLE   Final Result   1. Interval worsening in left basilar pulmonary opacity. Unchanged right   lung base pulmonary opacity. Unchanged trace bilateral pleural effusions. Recommend radiographic follow-up to complete resolution.       2. Tip of the left upper extremity PICC terminates over the cavoatrial   junction. XR ABDOMEN (KUB) (SINGLE AP VIEW)   Final Result   Unremarkable KUB. IR PICC EQUAL OR GREATER THAN 5 YEARS   Final Result   Successful placement of left upper extremity PICC line. This projects at the   right heart border in good position. Occlusion of the right brachiocephalic vein near the confluence precluded   placement of right upper extremity line. IR RESPOSITION PRV CVC W FLUORO   Final Result   Successful placement of left upper extremity PICC line. This projects at the   right heart border in good position. Occlusion of the right brachiocephalic vein near the confluence precluded   placement of right upper extremity line. IR TUNNELED CATHETER PLACEMENT GREATER THAN 5 YEARS   Final Result   Status post successful ultrasound/fluoroscopically guided placement of left   internal jugular tunneled dialysis catheter as described above. XR CHEST PORTABLE   Final Result   Malpositioned right subclavian line. Recommend repositioning. Findings related with patient's nurse Donta Sales at 07/06/2018 at 12:47   p.m. XR ABDOMEN (KUB) (SINGLE AP VIEW)   Final Result   Gaseous prominence of bowel some of which appears to be colonic, but some may   be small bowel. Overall bowel gas pattern is nonspecific, but not   definitively obstructive, may reflect ileus. Progress radiographs may be of   further diagnostic aid, as indicated. XR CHEST PORTABLE   Final Result   1. Line placement without complicating feature. 2. Worsening pulmonary edema and/or superimposed pneumonia. XR CHEST PORTABLE   Final Result   NG tube tip projects over the body of the stomach. XR Acute Abd Series Chest 1 VW   Final Result   Gaseous bowel distention greater in the central small bowel most likely ileus.          XR CHEST PORTABLE   Final Result   Stable chest         XR ABDOMEN (KUB) (SINGLE AP VIEW)   Final Result   Nondiagnostic due to paucity of small bowel gas         XR ABDOMEN (KUB) (SINGLE AP VIEW)   Final Result   Multiple mildly dilated loops of small bowel. Findings may represent ileus   or partial small bowel obstruction. XR CHEST PORTABLE   Final Result   Stable positioning of left central venous catheter. No pneumothorax. Slightly improved aeration with persistent ground-glass opacities in the left   mid lung. XR CHEST PORTABLE   Final Result   Retraction of the left internal jugular CVC with its tip in the expected   region of the brachiocephalic vein      No significant change in the cavitary nodules and bibasilar airspace disease         XR CHEST PORTABLE   Final Result   Tracheostomy tube placement. Left basilar airspace disease most consistent with atelectasis         IR NONTUNNELED VASCULAR CATHETER   Final Result   Successful ultrasound and fluoroscopy guided non-tunneled right femoral vein   temporary dialysis catheter placement. XR CHEST PORTABLE   Final Result   Improving bibasilar airspace disease. Stable cavitary lesions. CT Chest WO Contrast   Final Result   1. Minimal worsening partially cavitating pulmonary nodules and airspace   disease throughout the bilateral lungs consistent with septic emboli. 2. New trace bilateral pleural effusions. 3. Mediastinal adenopathy, likely reactive. 4. Heterogeneous splenomegaly. XR CHEST PORTABLE   Final Result   Stable appearing bibasilar airspace disease. Support tubes and lines as   above. XR CHEST PORTABLE   Final Result   No significant interval change in bilateral airspace disease as compared to   prior. Cavitary pulmonary nodules are again demonstrated. XR CHEST PORTABLE   Final Result   Left mid lung airspace disease is similar to minimally improved as compared   to prior. Persistent basilar atelectasis and cavitary right lung lesions.          VL Extremity Venous Bilateral Final Result      XR CHEST PORTABLE   Final Result   Left IJ central venous line in the superior vena cava with no pneumothorax         US GALLBLADDER RUQ   Final Result   1. Small amount of complex ascites concerning for hemorrhage. Consider   further evaluation with CT of the abdomen and pelvis. 2. Cholelithiases without evidence of acute cholecystitis. 3. Cirrhosis. XR CHEST PORTABLE   Final Result   Interval increase in right basilar atelectasis. No appreciable change in   appearance of septic pulmonary emboli         XR CHEST PORTABLE   Final Result   Slightly decreased right-sided airspace disease, otherwise stable chest.         XR CHEST PORTABLE   Final Result   Slight worsening of patchy airspace opacity right lower lobe over the past   few days. Relatively stable cavitary nodular lesions both lungs. Tubes and lines remain in good position. XR CHEST PORTABLE   Final Result   No significant change in the bony basilar airspace disease and suspected   septic emboli. XR CHEST PORTABLE   Final Result   1. Stable lines, tubes and support devices. 2. Stable cardiopulmonary status including bilateral airspace opacities. XR CHEST PORTABLE   Final Result   Stable life support devices. No acute interval change regarding bilateral   airspace disease. XR CHEST PORTABLE   Final Result   Stable life support device positioning. No substantial change in multifocal consolidative cavitary airspace disease. XR CHEST PORTABLE   Final Result   Stable chest         XR CHEST PORTABLE   Final Result   Improvement in focus of airspace disease in the left mid lung. Persistent   multifocal cavitary lesions compatible with septic emboli         XR CHEST PORTABLE   Final Result   No significant interval change.          CT ABDOMEN PELVIS WO CONTRAST Additional Contrast? Radiologist Recommendation   Final Result   Development of moderate diffuse ascites since the prior Detail Level: Zone Continue Regimen: Finish prednisone, continue with Triamcinolone cream for 2 weeks, stop for 2weeks and repeat.

## 2021-03-25 ENCOUNTER — HOSPITAL ENCOUNTER (INPATIENT)
Age: 31
LOS: 1 days | Discharge: LEFT BEFORE DISCHARGE | DRG: 425 | End: 2021-03-26
Attending: EMERGENCY MEDICINE | Admitting: INTERNAL MEDICINE
Payer: MEDICARE

## 2021-03-25 DIAGNOSIS — R16.0 HEPATOMEGALY: ICD-10-CM

## 2021-03-25 DIAGNOSIS — F11.10 HEROIN ABUSE (HCC): ICD-10-CM

## 2021-03-25 DIAGNOSIS — N18.6 ESRD (END STAGE RENAL DISEASE) ON DIALYSIS (HCC): ICD-10-CM

## 2021-03-25 DIAGNOSIS — Z99.2 ESRD (END STAGE RENAL DISEASE) ON DIALYSIS (HCC): ICD-10-CM

## 2021-03-25 DIAGNOSIS — R18.8 ASCITES OF LIVER: Primary | ICD-10-CM

## 2021-03-25 PROCEDURE — 99284 EMERGENCY DEPT VISIT MOD MDM: CPT

## 2021-03-25 PROCEDURE — 5A1D70Z PERFORMANCE OF URINARY FILTRATION, INTERMITTENT, LESS THAN 6 HOURS PER DAY: ICD-10-PCS | Performed by: INTERNAL MEDICINE

## 2021-03-25 PROCEDURE — 96374 THER/PROPH/DIAG INJ IV PUSH: CPT

## 2021-03-25 PROCEDURE — 96375 TX/PRO/DX INJ NEW DRUG ADDON: CPT

## 2021-03-25 RX ORDER — CALCITRIOL 0.25 UG/1
0.5 CAPSULE, LIQUID FILLED ORAL DAILY
COMMUNITY

## 2021-03-25 RX ORDER — OMEPRAZOLE 10 MG/1
10 CAPSULE, DELAYED RELEASE ORAL DAILY
COMMUNITY

## 2021-03-25 RX ORDER — HYDRALAZINE HYDROCHLORIDE 20 MG/ML
20 INJECTION INTRAMUSCULAR; INTRAVENOUS ONCE
Status: COMPLETED | OUTPATIENT
Start: 2021-03-25 | End: 2021-03-26

## 2021-03-25 RX ORDER — LANOLIN ALCOHOL/MO/W.PET/CERES
5 CREAM (GRAM) TOPICAL DAILY
COMMUNITY

## 2021-03-25 ASSESSMENT — PAIN SCALES - GENERAL: PAINLEVEL_OUTOF10: 8

## 2021-03-26 ENCOUNTER — APPOINTMENT (OUTPATIENT)
Dept: CT IMAGING | Age: 31
DRG: 425 | End: 2021-03-26
Payer: MEDICARE

## 2021-03-26 VITALS
OXYGEN SATURATION: 99 % | HEART RATE: 98 BPM | WEIGHT: 132.5 LBS | DIASTOLIC BLOOD PRESSURE: 107 MMHG | RESPIRATION RATE: 24 BRPM | BODY MASS INDEX: 23.48 KG/M2 | SYSTOLIC BLOOD PRESSURE: 172 MMHG | HEIGHT: 63 IN | TEMPERATURE: 98.6 F

## 2021-03-26 PROBLEM — E87.70 VOLUME OVERLOAD: Status: ACTIVE | Noted: 2021-03-26

## 2021-03-26 LAB
ALBUMIN SERPL-MCNC: 4 G/DL (ref 3.4–5)
ALP BLD-CCNC: 304 U/L (ref 40–129)
ALT SERPL-CCNC: 16 U/L (ref 10–40)
AMMONIA: 36 UMOL/L (ref 11–51)
ANION GAP SERPL CALCULATED.3IONS-SCNC: 16 MMOL/L (ref 3–16)
AST SERPL-CCNC: 14 U/L (ref 15–37)
BASE EXCESS VENOUS: 0.6 MMOL/L (ref -3–3)
BASOPHILS ABSOLUTE: 0.1 K/UL (ref 0–0.2)
BASOPHILS RELATIVE PERCENT: 0.6 %
BILIRUB SERPL-MCNC: 0.4 MG/DL (ref 0–1)
BILIRUBIN DIRECT: <0.2 MG/DL (ref 0–0.3)
BILIRUBIN, INDIRECT: ABNORMAL MG/DL (ref 0–1)
BUN BLDV-MCNC: 43 MG/DL (ref 7–20)
C-REACTIVE PROTEIN: 30.3 MG/L (ref 0–5.1)
CALCIUM SERPL-MCNC: 9.5 MG/DL (ref 8.3–10.6)
CARBOXYHEMOGLOBIN: 11.9 % (ref 0–1.5)
CHLORIDE BLD-SCNC: 97 MMOL/L (ref 99–110)
CO2: 23 MMOL/L (ref 21–32)
CREAT SERPL-MCNC: 7 MG/DL (ref 0.6–1.1)
EKG ATRIAL RATE: 79 BPM
EKG DIAGNOSIS: NORMAL
EKG P AXIS: 55 DEGREES
EKG P-R INTERVAL: 192 MS
EKG Q-T INTERVAL: 386 MS
EKG QRS DURATION: 74 MS
EKG QTC CALCULATION (BAZETT): 442 MS
EKG R AXIS: 93 DEGREES
EKG T AXIS: 35 DEGREES
EKG VENTRICULAR RATE: 79 BPM
EOSINOPHILS ABSOLUTE: 0.3 K/UL (ref 0–0.6)
EOSINOPHILS RELATIVE PERCENT: 3.8 %
GFR AFRICAN AMERICAN: 8
GFR NON-AFRICAN AMERICAN: 7
GLUCOSE BLD-MCNC: 108 MG/DL (ref 70–99)
GLUCOSE BLD-MCNC: 72 MG/DL (ref 70–99)
GLUCOSE BLD-MCNC: 90 MG/DL (ref 70–99)
GLUCOSE BLD-MCNC: 91 MG/DL (ref 70–99)
HCG QUALITATIVE: NEGATIVE
HCO3 VENOUS: 23 MMOL/L (ref 23–29)
HCT VFR BLD CALC: 33.6 % (ref 36–48)
HEMOGLOBIN: 10.3 G/DL (ref 12–16)
INR BLD: 1.11 (ref 0.86–1.14)
LACTATE DEHYDROGENASE: 210 U/L (ref 100–190)
LACTIC ACID, SEPSIS: 1.3 MMOL/L (ref 0.4–1.9)
LIPASE: 19 U/L (ref 13–60)
LYMPHOCYTES ABSOLUTE: 3.2 K/UL (ref 1–5.1)
LYMPHOCYTES RELATIVE PERCENT: 37.5 %
MCH RBC QN AUTO: 28.9 PG (ref 26–34)
MCHC RBC AUTO-ENTMCNC: 30.8 G/DL (ref 31–36)
MCV RBC AUTO: 93.9 FL (ref 80–100)
METHEMOGLOBIN VENOUS: 0.4 %
MONOCYTES ABSOLUTE: 0.8 K/UL (ref 0–1.3)
MONOCYTES RELATIVE PERCENT: 9.4 %
NEUTROPHILS ABSOLUTE: 4.2 K/UL (ref 1.7–7.7)
NEUTROPHILS RELATIVE PERCENT: 48.7 %
O2 CONTENT, VEN: 13 VOL %
O2 SAT, VEN: 100 %
O2 THERAPY: ABNORMAL
PCO2, VEN: 29.1 MMHG (ref 40–50)
PDW BLD-RTO: 19.6 % (ref 12.4–15.4)
PERFORMED ON: ABNORMAL
PERFORMED ON: NORMAL
PERFORMED ON: NORMAL
PH VENOUS: 7.51 (ref 7.35–7.45)
PLATELET # BLD: 143 K/UL (ref 135–450)
PMV BLD AUTO: 7.4 FL (ref 5–10.5)
PO2, VEN: 137 MMHG (ref 25–40)
POTASSIUM REFLEX MAGNESIUM: 6.5 MMOL/L (ref 3.5–5.1)
POTASSIUM SERPL-SCNC: 5.9 MMOL/L (ref 3.5–5.1)
PROCALCITONIN: 10.07 NG/ML (ref 0–0.15)
PROTHROMBIN TIME: 12.9 SEC (ref 10–13.2)
RBC # BLD: 3.57 M/UL (ref 4–5.2)
SODIUM BLD-SCNC: 136 MMOL/L (ref 136–145)
TCO2 CALC VENOUS: 54 MMOL/L
TOTAL PROTEIN: 8.3 G/DL (ref 6.4–8.2)
TROPONIN: 0.01 NG/ML
WBC # BLD: 8.6 K/UL (ref 4–11)

## 2021-03-26 PROCEDURE — 85025 COMPLETE CBC W/AUTO DIFF WBC: CPT

## 2021-03-26 PROCEDURE — 6360000004 HC RX CONTRAST MEDICATION: Performed by: EMERGENCY MEDICINE

## 2021-03-26 PROCEDURE — 2580000003 HC RX 258: Performed by: INTERNAL MEDICINE

## 2021-03-26 PROCEDURE — 84703 CHORIONIC GONADOTROPIN ASSAY: CPT

## 2021-03-26 PROCEDURE — 2580000003 HC RX 258: Performed by: EMERGENCY MEDICINE

## 2021-03-26 PROCEDURE — 2060000000 HC ICU INTERMEDIATE R&B

## 2021-03-26 PROCEDURE — 85610 PROTHROMBIN TIME: CPT

## 2021-03-26 PROCEDURE — 71260 CT THORAX DX C+: CPT

## 2021-03-26 PROCEDURE — 6360000002 HC RX W HCPCS: Performed by: INTERNAL MEDICINE

## 2021-03-26 PROCEDURE — 6370000000 HC RX 637 (ALT 250 FOR IP): Performed by: EMERGENCY MEDICINE

## 2021-03-26 PROCEDURE — 84145 PROCALCITONIN (PCT): CPT

## 2021-03-26 PROCEDURE — 83605 ASSAY OF LACTIC ACID: CPT

## 2021-03-26 PROCEDURE — 6370000000 HC RX 637 (ALT 250 FOR IP): Performed by: INTERNAL MEDICINE

## 2021-03-26 PROCEDURE — 90935 HEMODIALYSIS ONE EVALUATION: CPT

## 2021-03-26 PROCEDURE — 87040 BLOOD CULTURE FOR BACTERIA: CPT

## 2021-03-26 PROCEDURE — 82140 ASSAY OF AMMONIA: CPT

## 2021-03-26 PROCEDURE — 99255 IP/OBS CONSLTJ NEW/EST HI 80: CPT | Performed by: INTERNAL MEDICINE

## 2021-03-26 PROCEDURE — 82803 BLOOD GASES ANY COMBINATION: CPT

## 2021-03-26 PROCEDURE — 86702 HIV-2 ANTIBODY: CPT

## 2021-03-26 PROCEDURE — 80048 BASIC METABOLIC PNL TOTAL CA: CPT

## 2021-03-26 PROCEDURE — 80076 HEPATIC FUNCTION PANEL: CPT

## 2021-03-26 PROCEDURE — 86140 C-REACTIVE PROTEIN: CPT

## 2021-03-26 PROCEDURE — 83690 ASSAY OF LIPASE: CPT

## 2021-03-26 PROCEDURE — 2500000003 HC RX 250 WO HCPCS: Performed by: INTERNAL MEDICINE

## 2021-03-26 PROCEDURE — 84484 ASSAY OF TROPONIN QUANT: CPT

## 2021-03-26 PROCEDURE — 83615 LACTATE (LD) (LDH) ENZYME: CPT

## 2021-03-26 PROCEDURE — 93010 ELECTROCARDIOGRAM REPORT: CPT | Performed by: INTERNAL MEDICINE

## 2021-03-26 PROCEDURE — 6360000002 HC RX W HCPCS: Performed by: EMERGENCY MEDICINE

## 2021-03-26 PROCEDURE — 2500000003 HC RX 250 WO HCPCS: Performed by: EMERGENCY MEDICINE

## 2021-03-26 PROCEDURE — 93005 ELECTROCARDIOGRAM TRACING: CPT | Performed by: EMERGENCY MEDICINE

## 2021-03-26 PROCEDURE — 87390 HIV-1 AG IA: CPT

## 2021-03-26 PROCEDURE — 86701 HIV-1ANTIBODY: CPT

## 2021-03-26 PROCEDURE — 36415 COLL VENOUS BLD VENIPUNCTURE: CPT

## 2021-03-26 PROCEDURE — 84132 ASSAY OF SERUM POTASSIUM: CPT

## 2021-03-26 RX ORDER — MORPHINE SULFATE 4 MG/ML
4 INJECTION, SOLUTION INTRAMUSCULAR; INTRAVENOUS EVERY 4 HOURS PRN
Status: DISCONTINUED | OUTPATIENT
Start: 2021-03-26 | End: 2021-03-26

## 2021-03-26 RX ORDER — MORPHINE SULFATE 4 MG/ML
3 INJECTION, SOLUTION INTRAMUSCULAR; INTRAVENOUS ONCE
Status: COMPLETED | OUTPATIENT
Start: 2021-03-26 | End: 2021-03-26

## 2021-03-26 RX ORDER — LABETALOL HYDROCHLORIDE 5 MG/ML
10 INJECTION, SOLUTION INTRAVENOUS EVERY 4 HOURS PRN
Status: DISCONTINUED | OUTPATIENT
Start: 2021-03-26 | End: 2021-03-26 | Stop reason: HOSPADM

## 2021-03-26 RX ORDER — DOXERCALCIFEROL 2 UG/ML
2 INJECTION, SOLUTION INTRAVENOUS
Status: DISCONTINUED | OUTPATIENT
Start: 2021-03-29 | End: 2021-03-26 | Stop reason: HOSPADM

## 2021-03-26 RX ORDER — HEPARIN SODIUM 1000 [USP'U]/ML
10 INJECTION, SOLUTION INTRAVENOUS; SUBCUTANEOUS PRN
COMMUNITY

## 2021-03-26 RX ORDER — MORPHINE SULFATE 4 MG/ML
4 INJECTION, SOLUTION INTRAMUSCULAR; INTRAVENOUS
Status: DISCONTINUED | OUTPATIENT
Start: 2021-03-26 | End: 2021-03-26 | Stop reason: HOSPADM

## 2021-03-26 RX ORDER — CALCIUM GLUCONATE 20 MG/ML
1000 INJECTION, SOLUTION INTRAVENOUS ONCE
Status: COMPLETED | OUTPATIENT
Start: 2021-03-26 | End: 2021-03-26

## 2021-03-26 RX ORDER — ONDANSETRON 2 MG/ML
4 INJECTION INTRAMUSCULAR; INTRAVENOUS EVERY 6 HOURS PRN
Status: DISCONTINUED | OUTPATIENT
Start: 2021-03-26 | End: 2021-03-26 | Stop reason: HOSPADM

## 2021-03-26 RX ORDER — SODIUM CHLORIDE 0.9 % (FLUSH) 0.9 %
10 SYRINGE (ML) INJECTION PRN
Status: DISCONTINUED | OUTPATIENT
Start: 2021-03-26 | End: 2021-03-26 | Stop reason: HOSPADM

## 2021-03-26 RX ORDER — CALCITRIOL 0.25 UG/1
0.5 CAPSULE, LIQUID FILLED ORAL DAILY
Status: DISCONTINUED | OUTPATIENT
Start: 2021-03-26 | End: 2021-03-26 | Stop reason: HOSPADM

## 2021-03-26 RX ORDER — SERTRALINE HYDROCHLORIDE 25 MG/1
100 TABLET, FILM COATED ORAL DAILY
COMMUNITY

## 2021-03-26 RX ORDER — SEVELAMER CARBONATE 800 MG/1
800 TABLET, FILM COATED ORAL
Status: DISCONTINUED | OUTPATIENT
Start: 2021-03-26 | End: 2021-03-26 | Stop reason: HOSPADM

## 2021-03-26 RX ORDER — ASCORBIC ACID, THIAMINE MONONITRATE,RIBOFLAVIN, NIACINAMIDE, PYRIDOXINE HYDROCHLORIDE, FOLIC ACID, CYANOCOBALAMIN, BIOTIN, CALCIUM PANTOTHENATE, 100; 1.5; 1.7; 20; 10; 1; 6000; 150000; 5 MG/1; MG/1; MG/1; MG/1; MG/1; MG/1; UG/1; UG/1; MG/1
CAPSULE, LIQUID FILLED ORAL
Status: ON HOLD | COMMUNITY
End: 2021-03-26

## 2021-03-26 RX ORDER — CARVEDILOL 25 MG/1
25 TABLET ORAL 2 TIMES DAILY
COMMUNITY
Start: 2020-10-02

## 2021-03-26 RX ORDER — OMEPRAZOLE 20 MG/1
20 TABLET, DELAYED RELEASE ORAL DAILY
COMMUNITY
Start: 2020-09-04

## 2021-03-26 RX ORDER — POLYETHYLENE GLYCOL 3350 17 G/17G
17 POWDER, FOR SOLUTION ORAL DAILY
COMMUNITY

## 2021-03-26 RX ORDER — PROMETHAZINE HYDROCHLORIDE 25 MG/1
12.5 TABLET ORAL EVERY 6 HOURS PRN
Status: DISCONTINUED | OUTPATIENT
Start: 2021-03-26 | End: 2021-03-26 | Stop reason: HOSPADM

## 2021-03-26 RX ORDER — POLYETHYLENE GLYCOL 3350 17 G/17G
17 POWDER, FOR SOLUTION ORAL DAILY PRN
Status: DISCONTINUED | OUTPATIENT
Start: 2021-03-26 | End: 2021-03-26 | Stop reason: HOSPADM

## 2021-03-26 RX ORDER — ONDANSETRON 4 MG/1
4 TABLET, FILM COATED ORAL EVERY 8 HOURS PRN
COMMUNITY
Start: 2020-12-24

## 2021-03-26 RX ORDER — NIFEDIPINE 30 MG/1
30 TABLET, EXTENDED RELEASE ORAL ONCE
Status: COMPLETED | OUTPATIENT
Start: 2021-03-26 | End: 2021-03-26

## 2021-03-26 RX ORDER — HEPARIN SODIUM 1000 [USP'U]/ML
INJECTION, SOLUTION INTRAVENOUS; SUBCUTANEOUS
Status: DISCONTINUED
Start: 2021-03-26 | End: 2021-03-26 | Stop reason: HOSPADM

## 2021-03-26 RX ORDER — LANOLIN ALCOHOL/MO/W.PET/CERES
5 CREAM (GRAM) TOPICAL NIGHTLY
Status: DISCONTINUED | OUTPATIENT
Start: 2021-03-26 | End: 2021-03-26 | Stop reason: HOSPADM

## 2021-03-26 RX ORDER — QUETIAPINE FUMARATE 100 MG/1
50 TABLET, FILM COATED ORAL NIGHTLY
Status: DISCONTINUED | OUTPATIENT
Start: 2021-03-26 | End: 2021-03-26 | Stop reason: HOSPADM

## 2021-03-26 RX ORDER — SODIUM CHLORIDE 0.9 % (FLUSH) 0.9 %
10 SYRINGE (ML) INJECTION EVERY 12 HOURS SCHEDULED
Status: DISCONTINUED | OUTPATIENT
Start: 2021-03-26 | End: 2021-03-26 | Stop reason: HOSPADM

## 2021-03-26 RX ORDER — BLOOD-GLUCOSE METER
EACH MISCELLANEOUS
COMMUNITY
Start: 2020-10-02

## 2021-03-26 RX ORDER — ONDANSETRON 4 MG/1
4 TABLET, ORALLY DISINTEGRATING ORAL EVERY 4 HOURS PRN
COMMUNITY

## 2021-03-26 RX ORDER — PANTOPRAZOLE SODIUM 40 MG/1
40 TABLET, DELAYED RELEASE ORAL 2 TIMES DAILY
COMMUNITY

## 2021-03-26 RX ORDER — HEPARIN SODIUM 5000 [USP'U]/ML
5000 INJECTION, SOLUTION INTRAVENOUS; SUBCUTANEOUS EVERY 8 HOURS SCHEDULED
Status: DISCONTINUED | OUTPATIENT
Start: 2021-03-26 | End: 2021-03-26 | Stop reason: HOSPADM

## 2021-03-26 RX ORDER — NIFEDIPINE 30 MG/1
60 TABLET, EXTENDED RELEASE ORAL 2 TIMES DAILY
Status: DISCONTINUED | OUTPATIENT
Start: 2021-03-26 | End: 2021-03-26 | Stop reason: HOSPADM

## 2021-03-26 RX ORDER — PANTOPRAZOLE SODIUM 40 MG/1
40 TABLET, DELAYED RELEASE ORAL
Status: DISCONTINUED | OUTPATIENT
Start: 2021-03-26 | End: 2021-03-26 | Stop reason: HOSPADM

## 2021-03-26 RX ORDER — TRAMADOL HYDROCHLORIDE 50 MG/1
50 TABLET ORAL PRN
Status: DISCONTINUED | OUTPATIENT
Start: 2021-03-26 | End: 2021-03-26 | Stop reason: HOSPADM

## 2021-03-26 RX ORDER — NIFEDIPINE 30 MG/1
30 TABLET, EXTENDED RELEASE ORAL 2 TIMES DAILY
Status: DISCONTINUED | OUTPATIENT
Start: 2021-03-26 | End: 2021-03-26

## 2021-03-26 RX ORDER — LABETALOL 100 MG/1
TABLET, FILM COATED ORAL
COMMUNITY

## 2021-03-26 RX ORDER — FLUCONAZOLE 100 MG/1
100 TABLET ORAL
COMMUNITY
Start: 2020-12-24

## 2021-03-26 RX ORDER — DEXTROSE MONOHYDRATE 25 G/50ML
25 INJECTION, SOLUTION INTRAVENOUS PRN
Status: DISCONTINUED | OUTPATIENT
Start: 2021-03-26 | End: 2021-03-26 | Stop reason: HOSPADM

## 2021-03-26 RX ORDER — SENNA PLUS 8.6 MG/1
1 TABLET ORAL 2 TIMES DAILY
COMMUNITY
Start: 2020-12-24

## 2021-03-26 RX ORDER — LABETALOL 200 MG/1
200 TABLET, FILM COATED ORAL 3 TIMES DAILY
COMMUNITY

## 2021-03-26 RX ORDER — CARVEDILOL 25 MG/1
25 TABLET ORAL 2 TIMES DAILY WITH MEALS
Status: DISCONTINUED | OUTPATIENT
Start: 2021-03-26 | End: 2021-03-26 | Stop reason: HOSPADM

## 2021-03-26 RX ORDER — ACETAMINOPHEN 325 MG/1
650 TABLET ORAL EVERY 6 HOURS PRN
Status: DISCONTINUED | OUTPATIENT
Start: 2021-03-26 | End: 2021-03-26 | Stop reason: HOSPADM

## 2021-03-26 RX ORDER — CEFDINIR 300 MG/1
300 CAPSULE ORAL
COMMUNITY
Start: 2020-12-24

## 2021-03-26 RX ORDER — RENO CAPS 100; 1.5; 1.7; 20; 10; 1; 150; 5; 6 MG/1; MG/1; MG/1; MG/1; MG/1; MG/1; UG/1; MG/1; UG/1
CAPSULE ORAL
COMMUNITY
Start: 2020-12-22

## 2021-03-26 RX ORDER — HYDRALAZINE HYDROCHLORIDE 20 MG/ML
10 INJECTION INTRAMUSCULAR; INTRAVENOUS EVERY 6 HOURS PRN
Status: DISCONTINUED | OUTPATIENT
Start: 2021-03-26 | End: 2021-03-26 | Stop reason: HOSPADM

## 2021-03-26 RX ORDER — POLYETHYLENE GLYCOL 3350 17 G/17G
34 POWDER, FOR SOLUTION ORAL 2 TIMES DAILY
COMMUNITY
Start: 2020-12-24

## 2021-03-26 RX ADMIN — HEPARIN SODIUM 5000 UNITS: 5000 INJECTION INTRAVENOUS; SUBCUTANEOUS at 06:25

## 2021-03-26 RX ADMIN — INSULIN HUMAN 5 UNITS: 100 INJECTION, SOLUTION PARENTERAL at 03:40

## 2021-03-26 RX ADMIN — PANTOPRAZOLE SODIUM 40 MG: 40 TABLET, DELAYED RELEASE ORAL at 08:48

## 2021-03-26 RX ADMIN — Medication 1000 MG: at 04:01

## 2021-03-26 RX ADMIN — SODIUM ZIRCONIUM CYCLOSILICATE 10 G: 10 POWDER, FOR SUSPENSION ORAL at 09:14

## 2021-03-26 RX ADMIN — CALCIUM GLUCONATE 1000 MG: 20 INJECTION, SOLUTION INTRAVENOUS at 02:24

## 2021-03-26 RX ADMIN — IOPAMIDOL 75 ML: 755 INJECTION, SOLUTION INTRAVENOUS at 01:33

## 2021-03-26 RX ADMIN — CARVEDILOL 25 MG: 25 TABLET, FILM COATED ORAL at 12:01

## 2021-03-26 RX ADMIN — Medication 10 ML: at 08:50

## 2021-03-26 RX ADMIN — MORPHINE SULFATE 3 MG: 4 INJECTION, SOLUTION INTRAMUSCULAR; INTRAVENOUS at 13:55

## 2021-03-26 RX ADMIN — MORPHINE SULFATE 4 MG: 4 INJECTION, SOLUTION INTRAMUSCULAR; INTRAVENOUS at 09:45

## 2021-03-26 RX ADMIN — NIFEDIPINE 30 MG: 30 TABLET, FILM COATED, EXTENDED RELEASE ORAL at 08:48

## 2021-03-26 RX ADMIN — HYDRALAZINE HYDROCHLORIDE 20 MG: 20 INJECTION INTRAMUSCULAR; INTRAVENOUS at 01:02

## 2021-03-26 RX ADMIN — VANCOMYCIN HYDROCHLORIDE 1000 MG: 1 INJECTION, POWDER, LYOPHILIZED, FOR SOLUTION INTRAVENOUS at 04:01

## 2021-03-26 RX ADMIN — NIFEDIPINE 30 MG: 30 TABLET, FILM COATED, EXTENDED RELEASE ORAL at 12:01

## 2021-03-26 RX ADMIN — CALCITRIOL 0.5 MCG: 0.25 CAPSULE ORAL at 08:48

## 2021-03-26 RX ADMIN — TRAMADOL HYDROCHLORIDE 50 MG: 50 TABLET, FILM COATED ORAL at 15:05

## 2021-03-26 RX ADMIN — HYDRALAZINE HYDROCHLORIDE 10 MG: 20 INJECTION INTRAMUSCULAR; INTRAVENOUS at 10:39

## 2021-03-26 RX ADMIN — LABETALOL HYDROCHLORIDE 10 MG: 5 INJECTION INTRAVENOUS at 04:01

## 2021-03-26 RX ADMIN — MORPHINE SULFATE 4 MG: 4 INJECTION, SOLUTION INTRAMUSCULAR; INTRAVENOUS at 12:52

## 2021-03-26 RX ADMIN — DEXTROSE MONOHYDRATE 25 G: 25 INJECTION, SOLUTION INTRAVENOUS at 03:40

## 2021-03-26 RX ADMIN — DEXTROSE MONOHYDRATE 5 MG/HR: 50 INJECTION, SOLUTION INTRAVENOUS at 12:01

## 2021-03-26 ASSESSMENT — ENCOUNTER SYMPTOMS
TROUBLE SWALLOWING: 0
SHORTNESS OF BREATH: 0
EYE DISCHARGE: 0
EYE REDNESS: 0
FACIAL SWELLING: 0
APNEA: 0
NAUSEA: 0
COUGH: 0
PHOTOPHOBIA: 0
BLOOD IN STOOL: 0
STRIDOR: 0
CHEST TIGHTNESS: 0
DIARRHEA: 0
COLOR CHANGE: 0
ABDOMINAL PAIN: 0
RHINORRHEA: 0
CHOKING: 0
BACK PAIN: 1

## 2021-03-26 ASSESSMENT — PAIN SCALES - GENERAL
PAINLEVEL_OUTOF10: 0
PAINLEVEL_OUTOF10: 8
PAINLEVEL_OUTOF10: 8
PAINLEVEL_OUTOF10: 6
PAINLEVEL_OUTOF10: 0
PAINLEVEL_OUTOF10: 8

## 2021-03-26 NOTE — CONSULTS
Infectious Diseases   Consult Note        Admission Date: 3/25/2021  Hospital Day: Hospital Day: 2   Attending: Melly Jaeger MD  Date of service: 3/26/21     Reason for admission: Volume overload [E87.70]    Chief complaint/ Reason for consult:  L3-L4 and L4-L5 discitis and osteomyelitis, SI joint degenerative changes, cannot rule out septic arthritis, right inferior pubic ramus osteomyelitis       Microbiology:        I have reviewed allavailable micro lab data and cultures    · Blood culture (2/2) - collected on 3/25/2021: in process      Antibiotics and immunizations:       Current antibiotics: All antibiotics and their doses were reviewed by me    Recent Abx Admin                   vancomycin 1000 mg IVPB in 250 mL D5W addavial (mg) 1,000 mg New Bag 03/26/21 0401    cefTRIAXone (ROCEPHIN) 1000 mg in sterile water 10 mL IV syringe (mg) 1,000 mg Given 03/26/21 0401                  Immunization History: All immunization history was reviewed by me today. Immunization History   Administered Date(s) Administered    Influenza Virus Vaccine 11/06/2017    Pneumococcal Conjugate 7-valent (Prevnar7) 11/10/2017    Tdap (Boostrix, Adacel) 07/23/2017       Known drug allergies: All allergies were reviewed and updated    No Known Allergies    Social history:     Social History:  All social andepidemiologic history was reviewed and updated by me today as needed. · Tobacco use:   reports that she has been smoking cigarettes. She has a 1.00 pack-year smoking history. She has never used smokeless tobacco.  · Alcohol use:   reports no history of alcohol use. · Currently lives in: [address] [address]  ·  reports current drug use. Drug: Marijuana.        Assessment:     The patient is a 31 y.o. old female who  has a past medical history of Asthma, Cardiac arrest ([facility] (06/08/2018), Depression, Endocarditis, ESRD (end stage renal disease) on dialysis ([facility] [address] Hepatitis C antibody positive in blood (03/22/2017), History of blood transfusion, Hypertension, IV drug abuse ([facility] Liver disease, MRSA (methicillin resistant staph aureus) culture positive (6/5/18, 03/12/2017), MRSA (methicillin resistant staph aureus) culture positive (6/12/18, 07/31/2017), PTSD (post-traumatic stress disorder), and Seizures ([facility] with following problems:    · CT scan concerning for L3-L4 and L4-L5 discitis and osteomyelitis   · CT concerning for SI joint degenerative changes, cannot rule out septic arthritis  · Right inferior pubic ramus osteomyelitis   · Interstitial pulmonary edema  · Mediastinal and retroperitoneal lymphadenopathy  · Ascites  · Cholelithiasis without cholecystitis  · ESRD on dialysis  · IV drug user  · Chronic hepatitis C  · History of MRSA  · History of endocarditis      Discussion:      The patient is afebrile. White cell count is 8600. Blood cultures were sent on admission. Patient is on IV vancomycin and ceftriaxone empirically by primary team.    She is an ESRD patient on dialysis    CRP is 30.3    She had endocarditis in June 2018 with MRSA. He also had streptococcal and Enterobacter bacteremia in the past.    The patient had previous imaging studies concerning for vertebral osteomyelitis at L3-L4 and L4-L5 vertebrae. Plan:     Diagnostic Workup:    · Will order blood screen  · Will order baseline HIV screen  · Agree with blood cultures  · Will order sed rate and CRP  · Continue to follow fever curve, WBC count and blood cultures  · Follow up on liverand renal functions closely    Antimicrobials:    · She is afebrile, has no leukocytosis. Will will stop empiric IV vancomycin and IV ceftriaxone  · The patient has history of endocarditis and the changes noted on the CT scan of his lumbar spine, SI joint and right inferior pubic ramus area may very likely be secondary to remnant changes from previous lumbar discitis and osteomyelitis, which was noted on MRI of the lumbar spine in October 2018.   · Will recommend watching her off antibiotics, unless blood cultures come back positive  · If there is significant concern for acute infection, will recommend a CT-guided biopsy of the affected area for tissue culture and pathology for putting her on empiric long-term antibiotics  · We will follow up on the culture results and clinical progress and will make further recommendations accordingly. · Continue close vitals monitoring. · Maintain good glycemic control. · Fall precautions. Aspiration precautions. · Continue to watch for new fever or diarrhea. · DVT prophylaxis. · Discussed all above with patient and RN and Dr. Abel Carreno      I/v access Management:    · Continue to monitor i.v access sites for erythema, induration, discharge or tenderness. · As always, continue efforts to minimizetubes/lines/drains as clinically appropriate to reduce chances of line associated infections. Current isolation precautions: There are no current isolations documented for this patient. Level of complexity of consult: High     Risk of Complications/Morbidity: High     · Illness(es)/ Infection present that pose threat to life/bodily function. · There is potential for severe exacerbation of infection/side effects of treatment. · Therapy requires intensive monitoring for antimicrobial agent toxicity. Thank you for involving me in the care of your patient. I will continue to follow. If you have any additional questions, please do not hesitate to contact me. Subjective:     Presenting complaint in ER:     Chief Complaint   Patient presents with    Hip Pain     Pt c/o left hip pain. Pt states her stomach has been swollen for 2 weeks. HD pt MWF.        HPI: Taz Rollins is a 31 y.o. female patient, who was seen at the request of Dr. Charmayne Baseman, MD.    History was obtained from chart review and the patient. The patient was admitted on 3/25/2021.  I have been consulted to see the patient for above mentioned reason(s). The patient has multiple medical comorbidities, and presented to the ER for shortness of breath, abdominal distention and left leg pain and generalized weakness. The patient has history of asthma and depression and he is an ESRD patient on dialysis. In the ER, he was afebrile. White cell count was 8600. She e was admitted. She had a CT scan of chest abdomen and pelvis done which was concerning for osteomyelitis involving the lumbar spine endplates, SI joints as well as right inferior pubic ramus. Blood cultures were sent. She was started on IV vancomycin and ceftriaxone    I have been asked for my opinion for management for this patient. Past Medical History: All past medical history reviewed today. Past Medical History:   Diagnosis Date    Asthma     Cardiac arrest ([facility] 06/08/2018    Depression     Endocarditis     ESRD (end stage renal disease) on dialysis ([facility]     M/W/F    Hepatitis C antibody positive in blood 03/22/2017    History of blood transfusion     Hypertension     IV drug abuse ([facility]     Liver disease     MRSA (methicillin resistant staph aureus) culture positive 6/5/18, 03/12/2017    +blood culture     MRSA (methicillin resistant staph aureus) culture positive 6/12/18, 07/31/2017    tracheal aspirate, nasal screen    PTSD (post-traumatic stress disorder)     Seizures ([facility]     8/8/2017         Past Surgical History: All pastsurgical history was reviewed today. Past Surgical History:   Procedure Laterality Date    ABDOMINAL EXPLORATION SURGERY N/A 07/13/2018    WITH DRAINAGE INTRA ABDOMINAL ABSCESS    AV FISTULA REPAIR      removal 5/14/2019    DIALYSIS FISTULA CREATION Right 08/17/2018    Dr. Dinh Clark - at . [place]    GASTROSTOMY TUBE PLACEMENT      Removed 2018    TONSILLECTOMY      UPPER GASTROINTESTINAL ENDOSCOPY  06/06/2018    Gastric Angiodysplasia    UPPER GASTROINTESTINAL ENDOSCOPY  06/27/2018    Peg Placement Family History: All family history was reviewed today. History reviewed. No pertinent family history. Medications: All current and past medications were reviewed. Medications Prior to Admission: Blood Glucose Calibration (EMBRACE LARISSA CONTROL LEVEL 2) Normal LIQD, [facility]  seizure disorder with generalized tonic clonic seizures.   B Complex-C-Folic Acid (CHANTAL CAPS) 1 MG CAPS, TAKE 1 CAPSULE BY MOUTH EVERY DAY  carvedilol (COREG) 25 MG tablet, Take 25 mg by mouth 2 times daily  cefdinir (OMNICEF) 300 MG capsule, Take 300 mg by mouth every 48 hours  fluconazole (DIFLUCAN) 100 MG tablet, Take 100 mg by mouth Daily with supper  omeprazole (PRILOSEC OTC) 20 MG tablet, Take 20 mg by mouth daily  ondansetron (ZOFRAN) 4 MG tablet, Take 4 mg by mouth every 8 hours as needed  polyethylene glycol (GOLYTELY) 236 g solution, Drink 8 ounces every 10 minutes until bowel movements  senna (SENOKOT) 8.6 MG tablet, Take 1 tablet by mouth 2 times daily  polyethylene glycol (GLYCOLAX) 17 GM/SCOOP powder, Take 34 g by mouth 2 times daily  melatonin 3 MG TABS tablet, Take 5 mg by mouth daily  calcitRIOL (ROCALTROL) 0.25 MCG capsule, Take 0.5 mcg by mouth daily  omeprazole (PRILOSEC) 10 MG delayed release capsule, Take 10 mg by mouth daily  b complex-C-folic acid (NEPHROCAPS) 1 MG capsule, Take 1 capsule by mouth once a week   NIFEdipine (ADALAT CC) 30 MG extended release tablet, Take 30 mg by mouth 2 times daily  QUEtiapine (SEROQUEL) 50 MG tablet, Take 1 tablet by mouth nightly  acetaminophen (TYLENOL) 325 MG tablet, Take 650 mg by mouth every 6 hours as needed for Pain  cloNIDine (CATAPRES) 0.1 MG tablet, Take 0.1 mg by mouth 3 times daily as needed for High Blood Pressure   sevelamer (RENVELA) 800 MG tablet, Take 1 tablet by mouth 3 times daily (with meals)  Sodium Polystyrene Sulfonate (KALEXATE PO), Take by mouth  Nutritional Supplements (3232A INFANT FORMULA PO), Take by mouth  FERROUS SULFATE ER PO, Take 400 mg by mouth daily  Ca [id number] (CALCIUM-FOLIC ACID PLUS D PO), Take 1 tablet by mouth daily  darbepoetin emi-polysorbate (ARANESP) 100 MCG/ML injection, Inject 200 mcg into the skin once a week  diphenhydrAMINE (SOMINEX) 25 MG tablet, Take 25 mg by mouth nightly  Heparin Sodium, Porcine, (HEPARIN, PORCINE,) 1000 UNIT/ML injection, 10 mLs as needed  labetalol (NORMODYNE) 200 MG tablet, Take 200 mg by mouth 3 times daily  labetalol (NORMODYNE) 100 MG tablet, Take by mouth  ondansetron (ZOFRAN-ODT) 4 MG disintegrating tablet, Take 4 mg by mouth every 4 hours as needed  pantoprazole (PROTONIX) 40 MG tablet, Take 40 mg by mouth 2 times daily  sertraline (ZOLOFT) 25 MG tablet, Take 100 mg by mouth daily  polyethylene glycol (GLYCOLAX) 17 GM/SCOOP powder, Take 17 g by mouth daily  [DISCONTINUED] B Complex-C-Folic Acid (RENAL) 1 MG CAPS, Take by mouth     calcitRIOL  0.5 mcg Oral Daily    melatonin  4.5 mg Oral Nightly    pantoprazole  40 mg Oral QAM AC    QUEtiapine  50 mg Oral Nightly    sevelamer  800 mg Oral TID WC    sodium chloride flush  10 mL Intravenous 2 times per day    heparin (porcine)  5,000 Units Subcutaneous 3 times per day    heparin (porcine)        carvedilol  25 mg Oral BID WC    NIFEdipine  60 mg Oral BID          REVIEW OF SYSTEMS:       Review of Systems   Constitutional: Positive for fatigue. Negative for chills, diaphoresis and unexpected weight change. HENT: Negative for congestion, ear discharge, ear pain, facial swelling, hearing loss, rhinorrhea and trouble swallowing. Eyes: Negative for photophobia, discharge, redness and visual disturbance. Respiratory: Negative for apnea, cough, choking, chest tightness, shortness of breath and stridor. Cardiovascular: Negative for chest pain and palpitations. Gastrointestinal: Negative for abdominal pain, blood in stool, diarrhea and nausea. Endocrine: Negative for polydipsia, polyphagia and polyuria. Genitourinary: Negative for difficulty urinating, dysuria, frequency, hematuria, menstrual problem and vaginal discharge. Musculoskeletal: Positive for back pain. Negative for arthralgias, joint swelling, myalgias and neck stiffness. Skin: Negative for color change and rash. Allergic/Immunologic: Negative for immunocompromised state. Neurological: Negative for dizziness, seizures, speech difficulty, light-headedness and headaches. Hematological: Negative for adenopathy. Psychiatric/Behavioral: Negative for agitation, hallucinations and suicidal ideas. Objective:       PHYSICAL EXAM:      Vitals:   Vitals:    03/26/21 0800 03/26/21 0802 03/26/21 0900 03/26/21 1000   BP: (!) 224/133 (!) 213/134 (!) 243/152    Pulse: 76 72 69    Resp: 19 21 24    Temp:  98.6 °F (37 °C)     TempSrc:  Temporal     SpO2: 100% 100% 100% 99%   Weight:       Height:           Physical Exam  Vitals signs and nursing note reviewed. Constitutional:       General: She is not in acute distress. Appearance: She is well-developed. She is not diaphoretic. HENT:      Head: Normocephalic. Right Ear: External ear normal.      Left Ear: External ear normal.      Nose: Nose normal.   Eyes:      General: No scleral icterus. Right eye: No discharge. Left eye: No discharge. Conjunctiva/sclera: Conjunctivae normal.      Pupils: Pupils are equal, round, and reactive to light. Neck:      Musculoskeletal: Normal range of motion and neck supple. Cardiovascular:      Rate and Rhythm: Normal rate and regular rhythm. Heart sounds: No murmur. No friction rub. Pulmonary:      Effort: Pulmonary effort is normal.      Breath sounds: No stridor. No wheezing or rales. Chest:      Chest wall: No tenderness. Abdominal:      Palpations: Abdomen is soft. There is no mass. Tenderness: There is no abdominal tenderness. There is no guarding or rebound. Musculoskeletal:         General: No tenderness. Lymphadenopathy:      Cervical: No cervical adenopathy. Skin:     General: Skin is warm and dry. Findings: No erythema or rash. Neurological:      Mental Status: She is alert and oriented to person, place, and time. Motor: No abnormal muscle tone. Psychiatric:         Judgment: Judgment normal.           Lines: All vascular access sites are healthy with no local erythema, discharge or tenderness. Intake and output:     No intake/output data recorded. Lab Data:   All available labs were reviewed by me today. CBC:   Recent Labs     03/26/21  0040   WBC 8.6   RBC 3.57*   HGB 10.3*   HCT 33.6*      MCV 93.9   MCH 28.9   MCHC 30.8*   RDW 19.6*        BMP:  Recent Labs     03/26/21  0040 03/26/21  0245     --    K 6.5* 5.9*   CL 97*  --    CO2 23  --    BUN 43*  --    CREATININE 7.0*  --    CALCIUM 9.5  --    GLUCOSE 91  --         Hepatic FunctionPanel:   Lab Results   Component Value Date    ALKPHOS 304 03/26/2021    ALT 16 03/26/2021    AST 14 03/26/2021    PROT 8.3 03/26/2021    BILITOT 0.4 03/26/2021    BILIDIR <0.2 03/26/2021    IBILI see below 03/26/2021    LABALBU 4.0 03/26/2021       CPK:   Lab Results   Component Value Date    CKTOTAL 94 07/28/2019     ESR:   Lab Results   Component Value Date    SEDRATE 72 (H) 03/20/2017     CRP:   Lab Results   Component Value Date    CRP 30.3 (H) 03/26/2021         Imaging: All pertinent images and reports for the current visit were reviewed by meduring this visit. CT CHEST ABDOMEN PELVIS W CONTRAST   Final Result   Interstitial pulmonary edema. Small area of left upper lobe ground-glass   opacity may represent alveolar pulmonary edema. Infectious process felt less   likely given the isolated involvement. Additional bibasilar nodular   opacities appear unchanged compatible with scarring. Mediastinal and retroperitoneal adenopathy redemonstrated without interval   progression, likely related to prior infectious process. Moderate cardiomegaly. Contrast reflux into the hepatic veins and IVC   suggests decreased right heart output. Heterogeneous appearance of the liver with subtle hyperdense nodules   suggesting regenerative nodules. Large volume ascites. Differential   includes acute hepatic decompensation, acute hepatitis or chronic liver   failure. Destructive changes of the L3-4 and L4-5 endplates concerning for   osteomyelitis/discitis. MRI of the lumbar spine with and without contrast   may be helpful for further evaluation. Erosive changes of the SI joints, new compared to prior study. Findings may   also indicate septic arthritis. Inflammatory arthropathy cannot be excluded. This can also be evaluated at the time of MRI. Lytic expansile lesion of the right inferior pubic ramus with nondisplaced   fracture. Osteomyelitis favored given previously mentioned findings. Metastatic disease should also be considered in the proper clinical setting. No appreciable effusion or osseous abnormality of either hip. Cholelithiasis. Anasarca. 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 Outside records:    Labs, Microbiology, Radiology and pertinent results from Care everywhere, if available, were reviewed as a part ofthe consultation.       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 ESRD on dialysis ([facility] N18.6, Z99.2    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    Healthcare-associated pneumonia J18.9    Abnormal CT of the chest R93.89    Pericardial effusion I31.3    Hypertensive urgency I16.0    H/O endocarditis Z86.79    Hypertensive crisis I16.9    Cardiac mass I51.89    Noncompliance Z91.19    Upper GI bleed K92.2    Severe protein-calorie malnutrition (HCC) E43    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    MRSA bacteremia R78.81, B95.62    Hyponatremia E87.1    Acute blood loss anemia D62    Ileus ([facility]) K56.7    Other ascites R18.8    Splenic infarct D73.5    Intra-abdominal abscess (HCC) K65.1    Peritonitis (HCC) K65.9    Severe anemia D64.9    Acute midline low back pain without sciatica M54.5    Hypertension secondary to other renal disorders I15.1, N28.89    Stage 5 chronic kidney disease on chronic dialysis (HCC) N18.6, Z99.2    Chronic back pain M54.9, G89.29    End-stage renal disease on hemodialysis (HCC) N18.6, Z99.2    Fatigue R53.83    Volume overload E87.70    Lumbar discitis M46.46    Chronic osteomyelitis of lumbar spine (HCC) M86.68    Chronic pulmonary edema J81.1    History of methicillin resistant staphylococcus aureus (MRSA) Z86.14         Please note that this chart was generated using Dragon dictation software. Although every effort was made to ensure the accuracy of this automated transcription, some errors in transcription may have occurred inadvertently. If you may need any clarification, please do not hesitate to contact me through EPIC or at the phone number provided below with my electronic signature. Any pictures or media included in this note were obtained after taking informed verbal consent from the patient and with their approval to include those in the patient's medical record.       Samra Shea MD, MPH  3/26/21, 11:56 AM EDT   [facility] Infectious Disease   [address], [place], [address] [place] [address]  Office: [phone]  Fax: [phone]  Clinic days:  Tuesday & Thursday

## 2021-03-26 NOTE — PROGRESS NOTES
Pt signed self out AMA. Hospitalist aware. Pt understand poor prognosis and potential risks. Pt's ride in ER according to patient. Pt provided direction to ER. IV removed.

## 2021-03-26 NOTE — PROGRESS NOTES
Treatment time: 4 hours  Net UF: 3991 ml    Pre weight: 63 kg   Post weight: 60.1 kg  EDW: 56 kg    Access used: LIJ tunneled line  Access function: good without incident     Medications or blood products given: heparin dwells    Regular outpatient schedule: [address]    Summary of response to treatment: Tolerated good. BP at beginning of treatment 242/143. Got order from Dr. Corine Vora ok to start treatment with that bp. Started on cardene gtt-7.5mg too much-sbp dropped to 150's. Ordered to keep >160. So maintained that gtt at 5mg/hr. Effective-bp ended 165/94. Pt starting to withdraw from her IV drug abuse(she uses daily per pt). Medications given to pt by ICU team to decrease those symptoms(restless, wanting to leave AMA and stop HD treatment-pt verbalized to go get drugs cause she couldn't stand the withdrawal feeling, SOB/anxiety). Order put in orders to not given epogen if sbp >180. Copy of dialysis treatment record placed in chart, to be scanned into EMR.

## 2021-03-26 NOTE — ED NOTES
Patient with elevated K+, MD aware. Lab work with hemolysis, MD aware but declines new lab draw at this time. RN to await further orders.      Yoli Dillard RN  03/26/21 4050

## 2021-03-26 NOTE — FLOWSHEET NOTE
03/26/21 1134 03/26/21 1537   Treatment   Time On 1134  --    Time Off  --  1537   Treatment Goal 4000  --    Observations & Evaluations   Level of Consciousness Alert (0) Alert (0)   Oriented X 3 3   Vital Signs   Temp 98.6 °F (37 °C) 98.6 °F (37 °C)   Pulse 86 98   Resp 24 24   Weight 138 lb 14.2 oz (63 kg) 132 lb 7.9 oz (60.1 kg)   Weight Method Bed scale Bed scale   Percent Weight Change 3.11 -4.6   Dry Weight 123 lb 7.3 oz (56 kg)  --    Pain Assessment   Pain Assessment 0-10 0-10   Pain Level 0 8   Pain Location  --  Generalized   Technical Checks   RO Machine Log Sheet Completed Yes  --    Machine Alarm Self Test Completed  --    Machine Autotest Completed  --    Air Foam Detector Tested  --    Extracorporeal Circuit Tested for Integrity Yes  --    Treatment Initiation   Dialyze Hours 4  --    Treatment  Initiation Universal Precautions maintained;Lines secured to patient; Connections secured;Prime given;Venous Parameters set; Arterial Parameters set; Air foam detector engaged; Hemosafe Device; Saline line double clamped; Hemo-Safe Applied;Dialyzer;F160  --    During Hemodialysis Assessment   Blood Flow Rate (ml/min) 350 ml/min  --    Ultrafiltration Rate (ml/hr) 1100 ml/hr  --    Arterial Pressure (mmHg) -130 mmHg  --    Venous Pressure (mmHg) 140  --    TMP 70  --      --    Access Visible Yes  --    Dialysis Bath   K+ (Potassium) 2  --    Ca+ (Calcium) 2.5  --    Na+ (Sodium) 138  --    HCO3 (Bicarb) 32  --    Post-Hemodialysis Assessment   Post-Treatment Procedures  --  Blood returned;Catheter capped, clamped and heparinized x 2 ports   Machine Disinfection Process  --  Acid/Vinegar Clean;Heat Disinfect; Exterior Machine Disinfection   Rinseback Volume (ml)  --  400 ml   Total Liters Processed (l/min)  --  79.5 l/min   Dialyzer Clearance  --  Lightly streaked   Duration of Treatment (minutes)  --  240 minutes   Hemodialysis Intake (ml)  --  400 ml   Hemodialysis Output (ml)  --  4391 ml   NET Removed (ml)  --  3991 ml   Tolerated Treatment  --  Good

## 2021-03-26 NOTE — CONSULTS
Nephrology Consult Note  [phone]  [phone]   [email or web address]        Reason for Consult:  ESRD on HD       HISTORY OF PRESENT ILLNESS:      The patient is a 31 y.o. female with significant past medical history of ESRD on hemodialysis, depression, IV drug abuse, past history of endocarditis, hypertension, noncompliant who presents  to the ER with shortness of breath, abdominal distention, low back pain and left hip pain  She is on maintenance hemodialysis via left internal jugular catheter, dialyzes at [facility] Monday Wednesday Friday in 2 days as scheduled dialysis day  In the ER she was found to have hypertensive urgency with a systolic blood pressure greater than 240, fluid overload, potassium of 5.9  The way to treatment for hyperkalemia was started and urgent dialysis was ordered. Past Medical History:        Diagnosis Date    Asthma     Cardiac arrest ([facility] [address] 06/08/2018    Depression     Endocarditis     ESRD (end stage renal disease) on dialysis ([facility] [address]     M/W/F    Hepatitis C antibody positive in blood 03/22/2017    History of blood transfusion     Hypertension     IV drug abuse ([facility] [address]     Liver disease     MRSA (methicillin resistant staph aureus) culture positive 6/5/18, 03/12/2017    +blood culture     MRSA (methicillin resistant staph aureus) culture positive 6/12/18, 07/31/2017    tracheal aspirate, nasal screen    PTSD (post-traumatic stress disorder)     Seizures ([facility] [address]     8/8/2017       Past Surgical History:        Procedure Laterality Date    ABDOMINAL EXPLORATION SURGERY N/A 07/13/2018    WITH DRAINAGE INTRA ABDOMINAL ABSCESS    AV FISTULA REPAIR      removal 5/14/2019    DIALYSIS [facility]ISTUL[facility]    GASTROSTOMY TUBE PLACEMENT      Removed 2018    TONSILLECTOMY      UPPER GASTROINTESTINAL ENDOSCOPY  06/06/2018    Gastric Angiodysplasia    UPPER GASTROINTESTINAL ENDOSCOPY  06/27/2018    Peg Placement Current Medications:    No current facility-administered medications on file prior to encounter. Current Outpatient Medications on File Prior to Encounter   Medication Sig Dispense Refill    Blood Glucose Calibration (EMBRACE LARISSA CONTROL LEVEL 2) Normal LIQD Med [facility]: [facility]  seizure disorder with generalized tonic clonic seizures.       B Complex-C-Folic Acid (CHANTAL CAPS) 1 MG CAPS TAKE 1 CAPSULE BY MOUTH EVERY DAY      carvedilol (COREG) 25 MG tablet Take 25 mg by mouth 2 times daily      cefdinir (OMNICEF) 300 MG capsule Take 300 mg by mouth every 48 hours      fluconazole (DIFLUCAN) 100 MG tablet Take 100 mg by mouth Daily with supper      omeprazole (PRILOSEC OTC) 20 MG tablet Take 20 mg by mouth daily      ondansetron (ZOFRAN) 4 MG tablet Take 4 mg by mouth every 8 hours as needed      polyethylene glycol (GOLYTELY) 236 g solution Drink 8 ounces every 10 minutes until bowel movements      senna (SENOKOT) 8.6 MG tablet Take 1 tablet by mouth 2 times daily      polyethylene glycol (GLYCOLAX) 17 GM/SCOOP powder Take 34 g by mouth 2 times daily      melatonin 3 MG TABS tablet Take 5 mg by mouth daily      calcitRIOL (ROCALTROL) 0.25 MCG capsule Take 0.5 mcg by mouth daily      omeprazole (PRILOSEC) 10 MG delayed release capsule Take 10 mg by mouth daily      b complex-C-folic acid (NEPHROCAPS) 1 MG capsule Take 1 capsule by mouth once a week       NIFEdipine (ADALAT CC) 30 MG extended release tablet Take 30 mg by mouth 2 times daily      QUEtiapine (SEROQUEL) 50 MG tablet Take 1 tablet by mouth nightly 30 tablet 0    acetaminophen (TYLENOL) 325 MG tablet Take 650 mg by mouth every 6 hours as needed for Pain      cloNIDine (CATAPRES) 0.1 MG tablet Take 0.1 mg by mouth 3 times daily as needed for High Blood Pressure       sevelamer (RENVELA) 800 MG tablet Take 1 tablet by mouth 3 times daily (with meals)      Sodium Polystyrene Sulfonate (KALEXATE PO) Take by mouth      Nutritional Supplements (3232A INFANT FORMULA PO) Take by mouth      FERROUS SULFATE ER PO Take 400 mg by mouth daily      Ca [id number] (CALCIUM-FOLIC ACID PLUS D PO) Take 1 tablet by mouth daily      darbepoetin emi-polysorbate (ARANESP) 100 MCG/ML injection Inject 200 mcg into the skin once a week      diphenhydrAMINE (SOMINEX) 25 MG tablet Take 25 mg by mouth nightly      Heparin Sodium, Porcine, (HEPARIN, PORCINE,) 1000 UNIT/ML injection 10 mLs as needed      labetalol (NORMODYNE) 200 MG tablet Take 200 mg by mouth 3 times daily      labetalol (NORMODYNE) 100 MG tablet Take by mouth      ondansetron (ZOFRAN-ODT) 4 MG disintegrating tablet Take 4 mg by mouth every 4 hours as needed      pantoprazole (PROTONIX) 40 MG tablet Take 40 mg by mouth 2 times daily      sertraline (ZOLOFT) 25 MG tablet Take 100 mg by mouth daily      polyethylene glycol (GLYCOLAX) 17 GM/SCOOP powder Take 17 g by mouth daily         Allergies:  Patient has no known allergies. Social History:    Social History     Socioeconomic History    Marital status: Single     Spouse name: Not on file    Number of children: Not on file    Years of education: Not on file    Highest education level: Not on file   Occupational History    Occupation: NA   Social Needs    Financial resource strain: Not hard at all   [place] insecurity     Worry: Never true     Inability: Never true    Transportation needs     Medical: No     Non-medical: No   Tobacco Use    Smoking status: Current Every Day Smoker     Packs/day: 0.25     Years: 4.00     Pack years: 1.00     Types: Cigarettes     Last attempt to quit: 2019     Years since quittin.7    Smokeless tobacco: Never Used   Substance and Sexual Activity    Alcohol use: No    Drug use: Yes     Types: Marijuana     Comment: 20- occasional marijuana.  No more IV Drugs since 2018    Sexual activity: Not Currently     Partners: Male   Lifestyle    Physical activity Days per week: 4 days     Minutes per session: Not on file    Stress: Not at all   Relationships    Social connections     Talks on phone: More than three times a week     Gets together: Twice a week     Attends Muslim service: More than 4 times per year     Active member of club or organization: No     Attends meetings of clubs or organizations: Never     Relationship status: Never     Intimate partner violence     Fear of current or ex partner: Not on file     Emotionally abused: Not on file     Physically abused: Not on file     Forced sexual activity: Not on file   Other Topics Concern    Not on file   Social History Narrative    Not on file       Family History:   History reviewed. No pertinent family history. REVIEW OF SYSTEMS:      10 pt ROS done, relevant features as in [place], rest negative       PHYSICAL EXAM:    Vitals:    BP (!) 243/152   Pulse 69   Temp 98.6 °F (37 °C) (Temporal)   Resp 24   Ht 5' 3\" (1.6 m)   Wt 134 lb 11.2 oz (61.1 kg)   SpO2 99%   BMI 23.86 kg/m²   No intake/output data recorded. No intake/output data recorded. Physical Exam:  Gen:  alert, oriented x 3, in pain   PERRL , EOM +  Pallor +, No icterus  JVP not raised   CV: RRR no murmur or rub . Lungs:B/ L air entry, Normal breath sounds . Tracheostomy scar +  Abd: distended , tender, shifting dullness  soft, bowel sounds + , No organomegaly .  Scars +   Ext: No edema, no cyanosis  Neuro: nonfocal.  Skin   No rash  Left IJ TDC , exit site normal. Sutures +      DATA:    CBC with Differential:    Lab Results   Component Value Date    WBC 8.6 03/26/2021    RBC 3.57 03/26/2021    HGB 10.3 03/26/2021    HCT 33.6 03/26/2021     03/26/2021    MCV 93.9 03/26/2021    MCH 28.9 03/26/2021    MCHC 30.8 03/26/2021    RDW 19.6 03/26/2021    NRBC 1 07/18/2018    SEGSPCT 41.0 03/16/2020    BANDSPCT 1 07/17/2018    METASPCT 2 07/15/2018    LYMPHOPCT 37.5 03/26/2021    MONOPCT 9.4 03/26/2021    MYELOPCT 1.0 03/16/2020    BASOPCT 0.6 03/26/2021    MONOSABS 0.8 03/26/2021    LYMPHSABS 3.2 03/26/2021    EOSABS 0.3 03/26/2021    BASOSABS 0.1 03/26/2021     CMP:    Lab Results   Component Value Date     03/26/2021    K 5.9 03/26/2021    K 6.5 03/26/2021    CL 97 03/26/2021    CO2 23 03/26/2021    BUN 43 03/26/2021    CREATININE 7.0 03/26/2021    GFRAA 8 03/26/2021    AGRATIO 0.6 10/09/2018    LABGLOM 7 03/26/2021    GLUCOSE 91 03/26/2021    PROT 8.3 03/26/2021    LABALBU 4.0 03/26/2021    CALCIUM 9.5 03/26/2021    BILITOT 0.4 03/26/2021    ALKPHOS 304 03/26/2021    AST 14 03/26/2021    ALT 16 03/26/2021     Magnesium:    Lab Results   Component Value Date    MG 2.20 07/29/2019     Phosphorus:    Lab Results   Component Value Date    PHOS 4.1 10/13/2018     Troponin:    Lab Results   Component Value Date    TROPONINI 0.01 03/26/2021           IMPRESSION/RECOMMENDATIONS:      1. ESRD on HD   [facility] Monday Wednesday Friday    2. Hyperkalemia   K 5.9   Lokelma 10 g x1    Urgent HD      3. Hypertensive Urgency    AV nicardipine drip, restart home carvedilol 25 twice daily and nifedipine at  60 twice daily. Avoid using clonidine in this lady who is noncompliant and  likely to get rebound HTN  on stopping clonidine   UF 4 Kg     4. Anemia of CKD   hemoglobin 9-11, Hb 10.3     5. Renal osteodystrophy   Phos 4.1, Ca 9.5     6. Ascites: Do ultrasound to confirm and if present consult GI    Patient seen on the ICU. Spent seeing the patient planning her care and discussing with other consultants and RN 45 minutes of critical care time    Thank you for allowing me to participate in the care of this patient. I will continue to follow along. Please call with questions.     Devaughn Carreon MD  3/26/2021  [facility]

## 2021-03-26 NOTE — ED PROVIDER NOTES
Emergency Department Encounter    Patient: Julia Morris  MRN: [id number]  : 1990  Date of Evaluation: 3/28/2021  ED Provider:  Siobhan Rocha    Triage Chief Complaint:   Hip Pain (Pt c/o left hip pain. Pt states her stomach has been swollen for 2 weeks. HD pt MWF.)    [place]:  Julia Morris is a 31 y.o. female that presents to the ER for evaluation of worsening abdominal protuberance and pain with left hip pain with increasing dyspnea generalized fatigue and weakness. She is a history of end-stage renal disease secondary to bacteremia and sepsis due to IVDA with underlying tricuspid valve history of endocarditis. She continues to abuse heroin, last IV DA was within the last 3 days. Afebrile.   She had an additional run of hemodialysis today she is typically , at [facility] her nephrologist is Dr. Abi Goss - see HPI, below listed is current ROS at time of my eval:  General:  No fevers, no chills, no weakness  Eyes:  no discharge  ENT:  No sore throat, no nasal congestion  Cardiovascular:  No chest pain, no palpitations  Respiratory:  No shortness of breath, no cough, no wheezing  Gastrointestinal:  + pain, + nausea, no vomiting, no diarrhea  Musculoskeletal:  No muscle pain, no joint pain  Skin:  No rash, no pruritis  Neurologic:  no headache  Genitourinary:  No dysuria, no hematuria  Endocrine:  No unexpected weight gain, no unexpected weight loss  Extremities:  no edema, no pain    Past Medical History:   Diagnosis Date    Asthma     Cardiac arrest ([facility] [address] 2018    Depression     Endocarditis     ESRD (end stage renal disease) on dialysis ([facility] [address]     M/W/F    Hepatitis C antibody positive in blood 2017    History of blood transfusion     Hypertension     IV drug abuse (HCC)     Liver disease     MRSA (methicillin resistant staph aureus) culture positive 18, 2017    +blood culture     MRSA (methicillin resistant staph aureus) culture positive 18, 2017    tracheal aspirate, nasal screen    PTSD (post-traumatic stress disorder)     Seizures ([facility] [address]     2017     Past Surgical History:   Procedure Laterality Date    ABDOMINAL EXPLORATION SURGERY N/A 2018    WITH DRAINAGE INTRA ABDOMINAL ABSCESS    AV FISTULA REPAIR      removal 2019    DIALYSIS FISTULA CREATION Right 2018    [facility]    GASTROSTOMY TUBE PLACEMENT      Removed     TONSILLECTOMY      UPPER GASTROINTESTINAL ENDOSCOPY  2018    Gastric Angiodysplasia    UPPER GASTROINTESTINAL ENDOSCOPY  2018    Peg Placement     History reviewed. No pertinent family history. Social History     Socioeconomic History    Marital status: Single     Spouse name: Not on file    Number of children: Not on file    Years of education: Not on file    Highest education level: Not on file   Occupational History    Occupation: NA   Social Needs    Financial resource strain: Not hard at all   [employer] insecurity     Worry: Never true     Inability: Never true    Transportation needs     Medical: No     Non-medical: No   Tobacco Use    Smoking status: Current Every Day Smoker     Packs/day: 0.25     Years: 4.00     Pack years: 1.00     Types: Cigarettes     Last attempt to quit: 2019     Years since quittin.7    Smokeless tobacco: Never Used   Substance and Sexual Activity    Alcohol use: No    Drug use: Yes     Types: Marijuana     Comment: 20- occasional marijuana.  No more IV Drugs since 2018    Sexual activity: Not Currently     Partners: Male   Lifestyle    Physical activity     Days per week: 4 days     Minutes per session: Not on file    Stress: Not at all   Relationships    Social connections     Talks on phone: More than three times a week     Gets together: Twice a week     Attends Anglican service: More than 4 times per year     Active member of club or organization: No     Attends meetings of clubs or organizations: Never     Relationship status: Never     Intimate partner violence     Fear of current or ex partner: Not on file     Emotionally abused: Not on file     Physically abused: Not on file     Forced sexual activity: Not on file   Other Topics Concern    Not on file   Social History Narrative    Not on file     No current facility-administered medications for this encounter. Current Outpatient Medications   Medication Sig Dispense Refill    Blood Glucose Calibration (EMBRACE LARISSA CONTROL LEVEL 2) Normal LIQD Med [facility]: [facility] for  seizure disorder with generalized tonic clonic seizures.       B Complex-C-Folic Acid (CHANTAL CAPS) 1 MG CAPS TAKE 1 CAPSULE BY MOUTH EVERY DAY      carvedilol (COREG) 25 MG tablet Take 25 mg by mouth 2 times daily      cefdinir (OMNICEF) 300 MG capsule Take 300 mg by mouth every 48 hours      fluconazole (DIFLUCAN) 100 MG tablet Take 100 mg by mouth Daily with supper      omeprazole (PRILOSEC OTC) 20 MG tablet Take 20 mg by mouth daily      ondansetron (ZOFRAN) 4 MG tablet Take 4 mg by mouth every 8 hours as needed      polyethylene glycol (GOLYTELY) 236 g solution Drink 8 ounces every 10 minutes until bowel movements      senna (SENOKOT) 8.6 MG tablet Take 1 tablet by mouth 2 times daily      polyethylene glycol (GLYCOLAX) 17 GM/SCOOP powder Take 34 g by mouth 2 times daily      melatonin 3 MG TABS tablet Take 5 mg by mouth daily      calcitRIOL (ROCALTROL) 0.25 MCG capsule Take 0.5 mcg by mouth daily      omeprazole (PRILOSEC) 10 MG delayed release capsule Take 10 mg by mouth daily      b complex-C-folic acid (NEPHROCAPS) 1 MG capsule Take 1 capsule by mouth once a week       NIFEdipine (ADALAT CC) 30 MG extended release tablet Take 30 mg by mouth 2 times daily      QUEtiapine (SEROQUEL) 50 MG tablet Take 1 tablet by mouth nightly 30 tablet 0    acetaminophen (TYLENOL) 325 MG tablet Take 650 mg by mouth every 6 hours as needed for Pain  cloNIDine (CATAPRES) 0.1 MG tablet Take 0.1 mg by mouth 3 times daily as needed for High Blood Pressure       sevelamer (RENVELA) 800 MG tablet Take 1 tablet by mouth 3 times daily (with meals)      Sodium Polystyrene Sulfonate (KALEXATE PO) Take by mouth      Nutritional Supplements (3232A INFANT FORMULA PO) Take by mouth      FERROUS SULFATE ER PO Take 400 mg by mouth daily      Ca [id number] (CALCIUM-FOLIC ACID PLUS D PO) Take 1 tablet by mouth daily      darbepoetin emi-polysorbate (ARANESP) 100 MCG/ML injection Inject 200 mcg into the skin once a week      diphenhydrAMINE (SOMINEX) 25 MG tablet Take 25 mg by mouth nightly      Heparin Sodium, Porcine, (HEPARIN, PORCINE,) 1000 UNIT/ML injection 10 mLs as needed      labetalol (NORMODYNE) 200 MG tablet Take 200 mg by mouth 3 times daily      labetalol (NORMODYNE) 100 MG tablet Take by mouth      ondansetron (ZOFRAN-ODT) 4 MG disintegrating tablet Take 4 mg by mouth every 4 hours as needed      pantoprazole (PROTONIX) 40 MG tablet Take 40 mg by mouth 2 times daily      sertraline (ZOLOFT) 25 MG tablet Take 100 mg by mouth daily      polyethylene glycol (GLYCOLAX) 17 GM/SCOOP powder Take 17 g by mouth daily       No Known Allergies    Nursing Notes Reviewed    Physical Exam:  Triage VS:    ED Triage Vitals [03/25/21 2229]   Enc Vitals Group      BP (!) 180/127      Pulse 78      Resp 14      Temp 98.2 °F (36.8 °C)      Temp Source Oral      SpO2       Weight 145 lb (65.8 kg)      Height 5' 3\" (1.6 m)      Head Circumference       Peak Flow       Pain Score       Pain Loc       Pain Edu? Excl. in [address]? My pulse ox interpretation is - normal    General appearance:  No acute distress. Skin:  Warm. Dry. No rash. Neck:  Trachea midline. Heart:  Regular rate and rhythm, normal S1 & S2.    Perfusion:  intact  Respiratory:  Lungs clear to auscultation bilaterally. Respirations nonlabored.      Abdominal:  diffuse tenderness to palpation, no rebound guarding or rigidity, neg Lassiter's sign, neg McBurney's point tenderness to palpation, normal bowel sounds, no masses, no organomegaly, no pulsatile masses  Back:  No CVA TTP  Extremity:    normal ROM   Neurological:  Alert and oriented times 3. I have reviewed and interpreted all of the currently available lab results from this visit (if applicable):  Results for orders placed or performed during the hospital encounter of 03/25/21   Culture, Blood 1    Specimen: Blood   Result Value Ref Range    Blood Culture, Routine       No Growth to date. Any change in status will be called. Culture, Blood 2    Specimen: Blood   Result Value Ref Range    Culture, Blood 2       No Growth to date. Any change in status will be called.    CBC Auto Differential   Result Value Ref Range    WBC 8.6 4.0 - 11.0 K/uL    RBC 3.57 (L) 4.00 - 5.20 M/uL    Hemoglobin 10.3 (L) 12.0 - 16.0 g/dL    Hematocrit 33.6 (L) 36.0 - 48.0 %    MCV 93.9 80.0 - 100.0 fL    MCH 28.9 26.0 - 34.0 pg    MCHC 30.8 (L) 31.0 - 36.0 g/dL    RDW 19.6 (H) 12.4 - 15.4 %    Platelets 807 081 - 257 K/uL    MPV 7.4 5.0 - 10.5 fL    Neutrophils % 48.7 %    Lymphocytes % 37.5 %    Monocytes % 9.4 %    Eosinophils % 3.8 %    Basophils % 0.6 %    Neutrophils Absolute 4.2 1.7 - 7.7 K/uL    Lymphocytes Absolute 3.2 1.0 - 5.1 K/uL    Monocytes Absolute 0.8 0.0 - 1.3 K/uL    Eosinophils Absolute 0.3 0.0 - 0.6 K/uL    Basophils Absolute 0.1 0.0 - 0.2 K/uL   Basic Metabolic Panel w/ Reflex to MG   Result Value Ref Range    Sodium 136 136 - 145 mmol/L    Potassium reflex Magnesium 6.5 (HH) 3.5 - 5.1 mmol/L    Chloride 97 (L) 99 - 110 mmol/L    CO2 23 21 - 32 mmol/L    Anion Gap 16 3 - 16    Glucose 91 70 - 99 mg/dL    BUN 43 (H) 7 - 20 mg/dL    CREATININE 7.0 (HH) 0.6 - 1.1 mg/dL    GFR Non-African American 7 (A) >60    GFR  8 (A) >60    Calcium 9.5 8.3 - 10.6 mg/dL   Troponin   Result Value Ref Range    Troponin 0.01 <0.01 ng/mL   Hepatic Function Panel   Result Value Ref Range    Total Protein 8.3 (H) 6.4 - 8.2 g/dL    Albumin 4.0 3.4 - 5.0 g/dL    Alkaline Phosphatase 304 (H) 40 - 129 U/L    ALT 16 10 - 40 U/L    AST 14 (L) 15 - 37 U/L    Total Bilirubin 0.4 0.0 - 1.0 mg/dL    Bilirubin, Direct <0.2 0.0 - 0.3 mg/dL    Bilirubin, Indirect see below 0.0 - 1.0 mg/dL   Protime-INR   Result Value Ref Range    Protime 12.9 10.0 - 13.2 sec    INR 1.11 0.86 - 1.14   Blood Gas, Venous   Result Value Ref Range    pH, Burt 7.507 (H) 7.350 - 7.450    pCO2, Burt 29.1 (L) 40.0 - 50.0 mmHg    pO2, Burt 137.0 (H) 25 - 40 mmHg    HCO3, Venous 23.0 23.0 - 29.0 mmol/L    Base Excess, Burt 0.6 -3.0 - 3.0 mmol/L    O2 Sat, Burt 100 Not Established %    Carboxyhemoglobin 11.9 (H) 0.0 - 1.5 %    MetHgb, Burt 0.4 <1.5 %    TC02 (Calc), Burt 54 Not Established mmol/L    O2 Content, Burt 13 Not Established VOL %    O2 Therapy Unknown    Lactate, Sepsis   Result Value Ref Range    Lactic Acid, Sepsis 1.3 0.4 - 1.9 mmol/L   Lipase   Result Value Ref Range    Lipase 19.0 13.0 - 60.0 U/L   Ammonia   Result Value Ref Range    Ammonia 36 11 - 51 umol/L   Procalcitonin   Result Value Ref Range    Procalcitonin 10.07 (H) 0.00 - 0.15 ng/mL   Lactate dehydrogenase   Result Value Ref Range     (H) 100 - 190 U/L   C-reactive protein   Result Value Ref Range    CRP 30.3 (H) 0.0 - 5.1 mg/L   hCG, serum, qualitative   Result Value Ref Range    hCG Qual Negative Detects HCG level >10 MIU/mL   Potassium   Result Value Ref Range    Potassium 5.9 (H) 3.5 - 5.1 mmol/L   HIV Screen   Result Value Ref Range    HIV Ag/Ab Non-Reactive Non-reactive    HIV-1 Antibody Non-Reactive Non-reactive    HIV ANTIGEN Non-Reactive Non-reactive    HIV-2 Ab Non-Reactive Non-reactive   POCT Glucose   Result Value Ref Range    POC Glucose 108 (H) 70 - 99 mg/dl    Performed on ACCU-CHEK    POCT Glucose   Result Value Ref Range    POC Glucose 72 70 - 99 mg/dl    Performed on ACCU-CHEK    POCT Glucose   Result Value Ref Range    POC Glucose 90 70 - 99 mg/dl    Performed on ACCU-CHEK    EKG 12 Lead   Result Value Ref Range    Ventricular Rate 79 BPM    Atrial Rate 79 BPM    P-R Interval 192 ms    QRS Duration 74 ms    Q-T Interval 386 ms    QTc Calculation (Bazett) 442 ms    P Axis 55 degrees    R Axis 93 degrees    T Axis 35 degrees    Diagnosis       Normal sinus rhythmPossible Left atrial enlargementRightward axisCannot rule out Anterior infarct , age undeterminedAbnormal ECGConfirmed by [facility] MD, [id number] ([id number]) on 3/26/2021 3:19:23 PM      Radiographs (if obtained):  Radiologist's Report Reviewed:  No results found. EKG (if obtained): (All EKG's are interpreted by myself in the absence of a cardiologist)      MDM:  Patient presents to the ER for evaluation of severe ascites of the abdomen with severe hepatomegaly and extensively large liver, she continues to have heroin abuse there is no evidence of septic emboli, she will require recurrent hemodialysis and possible paracentesis and counseled with regards to the severity of her heroin abuse and multiorgan disruption. No evidence of septic shock at this point time. No clinical concern for SBP    Clinical Impression:  1. Ascites of liver    2. Hepatomegaly    3. ESRD (end stage renal disease) on dialysis ([facility] [address]    4. Heroin abuse ([facility] [address]      Disposition referral (if applicable):  No follow-up provider specified. Disposition medications (if applicable):  Discharge Medication List as of 3/26/2021  4:57 PM          Comment: Please note this report has been produced using speech recognition software and may contain errors related to that system including errors in grammar, punctuation, and spelling, as well as words and phrases that may be inappropriate. Efforts were made to edit the dictations.      Luly España MD  [id number]

## 2021-03-26 NOTE — PROGRESS NOTES
Pt verbalized being \"dope sick\", asking to be discontinued from dialysis and sign out AMA. Nephrology notified, see new orders. Pt received medication see MAR. Hospitalist notified.

## 2021-03-26 NOTE — PROGRESS NOTES
Received patient from ED, orders released,  went over medications,  And patient has been assessed see flowsheet.

## 2021-03-26 NOTE — H&P
Hospital Medicine History and Physical    3/26/2021    Date of Admission: 3/26/2021    Date of Service: Pt seen/examined on 3/26/2021 and admitted to inpatient. Assessment/plan:  1. Volume overload secondary to end-stage renal disease. Nephrology has been consulted to assist with dialysis. 2. Hyperkalemia, potassium 6.5. Suspect hemolysis. Repeat potassium is currently pending. Patient already receiving treatment for hyperkalemia in the emergency room (receiving calcium, insulin). I have ordered stat repeat potassium, follow result. Telemetry monitor. 3. Acute pulmonary edema, likely secondary to #1. Nephrology planning dialysis later today. 4. End-stage renal disease on [facility] hemodialysis. Nephrology has been consulted from the emergency room, planning dialysis later today. 5. Hypertensive urgency. Could be secondary to pain, medication noncompliance. Resume home antihypertensive medications. Add as needed IV hydralazine for systolic blood pressure greater than 150 mmHg. Check urine drug screen. 6. Abdominal ascites, likely secondary to #1. 7. Possible osteomyelitis/discitis of lumbar spine. Changes in L3-4, L4-5 endplates concerning for osteomyelitis or discitis. Cannot exclude bacteremia and IV drug use. Start Rocephin and vancomycin. Consider MRI-lumbar spine. Infectious disease consult in place. 8. Erosive changes in sacroiliac joint concerning for septic arthritis. Currently on antibiotics. Consider orthopedic surgery consult in the morning. 9. Lytic expansile lesion in right inferior pubic ramus with nondisplaced fracture concerning for osteomyelitis. As noted above, consider follow-up MRI-lumbar/sacral spine. 10. Significantly elevated procalcitonin level of 10.07. Elevation could also be secondary to underlying end-stage renal disease. Given concern for infectious process, continue antibiotics as noted above.   Await infectious disease evaluation and input. 11. History of intravenous drug use. Patient does not want to be counseled about cessation of illicit drug use. 12. Other comorbidities: History of asthma, history of cardiac arrest/PEA arrest, depression, chronic hepatitis C, essential hypertension, infective endocarditis, seizure disorder. Activities: Up with assist  Prophylaxis: Subcutaneous heparin  Code status: Full code    ==========================================================  Chief complaint:  Chief Complaint   Patient presents with    Hip Pain     Pt c/o left hip pain. Pt states her stomach has been swollen for 2 weeks. HD pt MWF. History of Presenting Illness: This is a pleasant 31 y.o. female with history of asthma, depression, end-stage renal disease on [facility] hemodialysis, chronic hepatitis C, essential hypertension, infective endocarditis, history of seizure, history of cardiac arrest, who continues to use intravenous drug (last used heroin 3 days ago), who presents to the emergency room with complaints of increasing shortness of breath, left hip pain and abdominal distention. She also reports generalized weakness. She does not have fever or chills. No cough. She reports she got extra 1 hour of dialysis on 3/25/2021 (which is her nondialysis day).     Past Medical History:      Diagnosis Date    Asthma     Cardiac arrest ([facility] 06/08/2018    Depression     Endocarditis     ESRD (end stage renal disease) on dialysis ([facility]     M/W/F    Hepatitis C antibody positive in blood 03/22/2017    History of blood transfusion     Hypertension     [facility] drug abuse ([facility]     Liver disease     MRSA (methicillin resistant staph aureus) culture positive 6/5/18, 03/12/2017    +blood culture     MRSA (methicillin resistant staph aureus) culture positive 6/12/18, 07/31/2017    tracheal aspirate, nasal screen    PTSD (post-traumatic stress disorder)     Seizures ([facility]     8/8/2017       Past Surgical History: Systems:  Pertinent positives are listed in HPI. At least 10-point ROS reviewed and were negative. Vitals and physical examination:  BP (!) 196/119   Pulse 78   Temp 98.2 °F (36.8 °C) (Oral)   Resp 14   Ht 5' 3\" (1.6 m)   Wt 145 lb (65.8 kg)   SpO2 97%   BMI 25.69 kg/m²   Gen/overall appearance: Not in acute distress. Alert. Oriented. Head: Normocephalic, atraumatic  Eyes: EOMI, good acuity  ENT: Oral mucosa moist  Neck: Jugular venous distention. CVS: Nml S1S2, no MRG, RRR  Pulm: Bibasilar crackles lung bases. Gastrointestinal: Significant hepatomegaly. Musculoskeletal: Diffuse anasarca. Neuro: No focal deficit. Moves extremity spontaneously. Psychiatry: Appropriate affect. Not agitated. Skin: Needle tracks present. Capillary refill: Brisk,< 3 seconds   Peripheral Pulses: +2 palpable, equal bilaterally       Labs/imaging/EKG:  CBC:   Recent Labs     03/26/21  0040   WBC 8.6   HGB 10.3*        BMP:    Recent Labs     03/26/21 0040      K 6.5*   CL 97*   CO2 23   BUN 43*   CREATININE 7.0*   GLUCOSE 91     Hepatic:   Recent Labs     03/26/21 0040   AST 14*   ALT 16   BILITOT 0.4   ALKPHOS 304*       Ct Chest Abdomen Pelvis W Contrast    Result Date: 3/26/2021  EXAMINATION: CT OF THE CHEST, ABDOMEN, AND PELVIS WITH CONTRAST 3/26/2021 1:21 am TECHNIQUE: CT of the chest, abdomen and pelvis was performed with the administration of intravenous contrast. Multiplanar reformatted images are provided for review. Dose modulation, iterative reconstruction, and/or weight based adjustment of the mA/kV was utilized to reduce the radiation dose to as low as reasonably achievable.  COMPARISON: Chest radiograph July 20, 2019, chest CT July 11, 2018, CT abdomen pelvis September 30, 2018 HISTORY: ORDERING SYSTEM PROVIDED HISTORY: IVDA ESRD ON HD,hx plenic infarct spetic embolism, ro septic emoli, nfarct, ? osteo left hip, TECHNOLOGIST PROVIDED HISTORY: Reason for exam:->IVDA ESRD ON HD,hx plenic infarct spetic embolism, ro septic emoli, nfarct, ? osteo left hip, Additional Contrast?->None Decision Support Exception->Emergency Medical Condition (MA) Reason for Exam: IVDA ESRD ON HD,hx plenic infarct spetic embolism, ro septic emoli, nfarct, ? osteo left hip, Acuity: Acute Type of Exam: Initial Relevant Medical/Surgical History: Hip Pain (Pt c/o left hip pain. Pt states her stomach has been swollen for 2 weeks. HD pt MWF.) FINDINGS: Chest: Mediastinum: Left IJ tunneled hemodialysis catheter terminates in the right atrium in satisfactory position. Prominent right chest wall collaterals and contrast flow diversion suggests occlusion of the right brachiocephalic vein. Extensive mediastinal adenopathy similar to prior study with some degree of improvement since 2018. Moderate cardiomegaly. No pericardial effusion. Aortic arch and pulmonary trunk are normal in caliber. Contrast reflux into the IVC and hepatic veins. Lungs/pleura: Interlobular septal thickening. Multiple areas of bibasilar scarring. Small area of ground-glass in the left upper lobe anteriorly. Soft Tissues/Bones: Right chest wall collaterals. No acute abnormality identified. No aggressive osseous lesions. Abdomen/Pelvis: Organs: Diffuse hypoattenuation and heterogeneous appearance of the liver. Cholelithiasis with decompression of the gallbladder. Nodular contour of the spleen likely related to remote infarcts. Adrenals appear unremarkable. Pancreas appears unremarkable. Severe atrophic appearance of the kidneys bilaterally. 2 small hilar calcifications of the right kidney may represent nonobstructing stones or possibly vascular calcifications. GI/Bowel: No abnormal dilatation or appreciable wall thickening. Normal appendix. Pelvis: Urinary bladder is decompressed. Peritoneum/Retroperitoneum: Large volume ascites. Abdominal aorta is normal in caliber. Retroperitoneal adenopathy not appreciably changed.  Bones/Soft Tissues: Diffuse body wall edema. Erosive changes of the L3-4 and L4-5 endplates with disc space narrowing. Erosive changes of the SI joints bilaterally. Lytic lesion of the right inferior pubic ramus with nondisplaced fracture. No appreciable hip effusion or acute osseous abnormality. Interstitial pulmonary edema. Small area of left upper lobe ground-glass opacity may represent alveolar pulmonary edema. Infectious process felt less likely given the isolated involvement. Additional bibasilar nodular opacities appear unchanged compatible with scarring. Mediastinal and retroperitoneal adenopathy redemonstrated without interval progression, likely related to prior infectious process. Moderate cardiomegaly. Contrast reflux into the hepatic veins and IVC suggests decreased right heart output. Heterogeneous appearance of the liver with subtle hyperdense nodules suggesting regenerative nodules. Large volume ascites. Differential includes acute hepatic decompensation, acute hepatitis or chronic liver failure. Destructive changes of the L3-4 and L4-5 endplates concerning for osteomyelitis/discitis. MRI of the lumbar spine with and without contrast may be helpful for further evaluation. Erosive changes of the SI joints, new compared to prior study. Findings may also indicate septic arthritis. Inflammatory arthropathy cannot be excluded. This can also be evaluated at the time of MRI. Lytic expansile lesion of the right inferior pubic ramus with nondisplaced fracture. Osteomyelitis favored given previously mentioned findings. Metastatic disease should also be considered in the proper clinical setting. No appreciable effusion or osseous abnormality of either hip. Cholelithiasis. Anasarca. 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 EKG: Sinus rhythm, rate 79 beats per minutes. No acute ST/T changes. I reviewed EKG. Discussed with ER provider.       Thank you Yusef Bolanos MD for the opportunity to be involved in this patient's care.    -----------------------------  Crow Carrion MD  [facility]

## 2021-03-27 LAB
HIV AG/AB: NORMAL
HIV ANTIGEN: NORMAL
HIV-1 ANTIBODY: NORMAL
HIV-2 AB: NORMAL

## 2021-03-30 LAB
BLOOD CULTURE, ROUTINE: NORMAL
CULTURE, BLOOD 2: NORMAL

## 2021-05-06 NOTE — DISCHARGE SUMMARY
This is a pleasant 31 y.o. female with history of asthma, depression, end-stage renal disease on [facility] hemodialysis, chronic hepatitis C, essential hypertension, infective endocarditis, history of seizure, history of cardiac arrest, who continues to use intravenous drug (last used heroin 3 days ago), who presents to the emergency room with complaints of increasing shortness of breath, left hip pain and abdominal distention. She also reports generalized weakness. She does not have fever or chills. No cough. She reports she got extra 1 hour of dialysis on 3/25/2021 (which is her nondialysis day). 1. Volume overload secondary to end-stage renal disease. Nephrology has been consulted to assist with dialysis. She dialyzed  2. Hyperkalemia, potassium 6.5. Suspect hemolysis. Repeat potassium is currently pending. Patient already receiving treatment for hyperkalemia in the emergency room (receiving calcium, insulin). I have ordered stat repeat potassium, follow result. Telemetry monitor. 3. Acute pulmonary edema, likely secondary to #1. Nephrology planning dialysis later today. 4. End-stage renal disease on [facility] hemodialysis. Nephrology has been consulted from the emergency room, planning dialysis later today. 5. Hypertensive urgency. Could be secondary to pain, medication noncompliance. Resume home antihypertensive medications. Add as needed IV hydralazine for systolic blood pressure greater than 150 mmHg. Check urine drug screen. 6. Abdominal ascites, likely secondary to #1. 7. Possible osteomyelitis/discitis of lumbar spine. Changes in L3-4, L4-5 endplates concerning for osteomyelitis or discitis. Cannot exclude bacteremia and IV drug use. Start Rocephin and vancomycin. Consider MRI-lumbar spine. Infectious disease consult in place. 8. Erosive changes in sacroiliac joint concerning for septic arthritis. Currently on antibiotics.   Consider orthopedic surgery consult in the morning. 9. Lytic expansile lesion in right inferior pubic ramus with nondisplaced fracture concerning for osteomyelitis. As noted above, consider follow-up MRI-lumbar/sacral spine. 10. Significantly elevated procalcitonin level of 10.07. Elevation could also be secondary to underlying end-stage renal disease. Given concern for infectious process, continue antibiotics as noted above. Await infectious disease evaluation and input. 11. History of intravenous drug use. Patient does not want to be counseled about cessation of illicit drug use. 12. Other comorbidities: History of asthma, history of cardiac arrest/PEA arrest, depression, chronic hepatitis C, essential hypertension, infective endocarditis, seizure disorder. She left AMA to go and use heroine.

## 2022-02-03 ENCOUNTER — APPOINTMENT (OUTPATIENT)
Dept: GENERAL RADIOLOGY | Age: 32
End: 2022-02-03
Payer: MEDICARE

## 2022-02-03 ENCOUNTER — HOSPITAL ENCOUNTER (EMERGENCY)
Age: 32
Discharge: LEFT BEFORE DISCHARGE | End: 2022-02-03
Attending: INTERNAL MEDICINE | Admitting: INTERNAL MEDICINE
Payer: MEDICARE

## 2022-02-03 VITALS
OXYGEN SATURATION: 98 % | BODY MASS INDEX: 23.91 KG/M2 | RESPIRATION RATE: 18 BRPM | SYSTOLIC BLOOD PRESSURE: 165 MMHG | DIASTOLIC BLOOD PRESSURE: 108 MMHG | TEMPERATURE: 97.9 F | HEART RATE: 95 BPM | WEIGHT: 135 LBS

## 2022-02-03 DIAGNOSIS — N18.6 ESRD NEEDING DIALYSIS (HCC): ICD-10-CM

## 2022-02-03 DIAGNOSIS — N18.6 ESRD ON DIALYSIS (HCC): Primary | ICD-10-CM

## 2022-02-03 DIAGNOSIS — Z99.2 ESRD ON DIALYSIS (HCC): Primary | ICD-10-CM

## 2022-02-03 DIAGNOSIS — Z99.2 ESRD NEEDING DIALYSIS (HCC): ICD-10-CM

## 2022-02-03 LAB
A/G RATIO: 1.2 (ref 1.1–2.2)
ALBUMIN SERPL-MCNC: 3.8 G/DL (ref 3.4–5)
ALP BLD-CCNC: 298 U/L (ref 40–129)
ALT SERPL-CCNC: 14 U/L (ref 10–40)
ANION GAP SERPL CALCULATED.3IONS-SCNC: 18 MMOL/L (ref 3–16)
AST SERPL-CCNC: 10 U/L (ref 15–37)
BASOPHILS ABSOLUTE: 0 K/UL (ref 0–0.2)
BASOPHILS RELATIVE PERCENT: 0.6 %
BILIRUB SERPL-MCNC: 0.3 MG/DL (ref 0–1)
BUN BLDV-MCNC: 59 MG/DL (ref 7–20)
CALCIUM SERPL-MCNC: 9 MG/DL (ref 8.3–10.6)
CHLORIDE BLD-SCNC: 97 MMOL/L (ref 99–110)
CO2: 24 MMOL/L (ref 21–32)
CREAT SERPL-MCNC: 11 MG/DL (ref 0.6–1.1)
EKG ATRIAL RATE: 69 BPM
EKG DIAGNOSIS: NORMAL
EKG P AXIS: 59 DEGREES
EKG P-R INTERVAL: 182 MS
EKG Q-T INTERVAL: 410 MS
EKG QRS DURATION: 72 MS
EKG QTC CALCULATION (BAZETT): 439 MS
EKG R AXIS: 81 DEGREES
EKG T AXIS: 44 DEGREES
EKG VENTRICULAR RATE: 69 BPM
EOSINOPHILS ABSOLUTE: 0.8 K/UL (ref 0–0.6)
EOSINOPHILS RELATIVE PERCENT: 10.9 %
GFR AFRICAN AMERICAN: 5
GFR NON-AFRICAN AMERICAN: 4
GLUCOSE BLD-MCNC: 97 MG/DL (ref 70–99)
HCG QUALITATIVE: NEGATIVE
HCT VFR BLD CALC: 31.7 % (ref 36–48)
HEMOGLOBIN: 10.4 G/DL (ref 12–16)
LYMPHOCYTES ABSOLUTE: 2.1 K/UL (ref 1–5.1)
LYMPHOCYTES RELATIVE PERCENT: 27.6 %
MCH RBC QN AUTO: 31.8 PG (ref 26–34)
MCHC RBC AUTO-ENTMCNC: 32.8 G/DL (ref 31–36)
MCV RBC AUTO: 96.8 FL (ref 80–100)
MONOCYTES ABSOLUTE: 0.5 K/UL (ref 0–1.3)
MONOCYTES RELATIVE PERCENT: 6.7 %
NEUTROPHILS ABSOLUTE: 4.2 K/UL (ref 1.7–7.7)
NEUTROPHILS RELATIVE PERCENT: 54.2 %
PDW BLD-RTO: 14.8 % (ref 12.4–15.4)
PLATELET # BLD: 227 K/UL (ref 135–450)
PMV BLD AUTO: 6.4 FL (ref 5–10.5)
POTASSIUM REFLEX MAGNESIUM: 5.4 MMOL/L (ref 3.5–5.1)
PRO-BNP: ABNORMAL PG/ML (ref 0–124)
RBC # BLD: 3.28 M/UL (ref 4–5.2)
SODIUM BLD-SCNC: 139 MMOL/L (ref 136–145)
TOTAL PROTEIN: 7.1 G/DL (ref 6.4–8.2)
TROPONIN: <0.01 NG/ML
WBC # BLD: 7.7 K/UL (ref 4–11)

## 2022-02-03 PROCEDURE — 84703 CHORIONIC GONADOTROPIN ASSAY: CPT

## 2022-02-03 PROCEDURE — 6370000000 HC RX 637 (ALT 250 FOR IP): Performed by: PHYSICIAN ASSISTANT

## 2022-02-03 PROCEDURE — 93010 ELECTROCARDIOGRAM REPORT: CPT | Performed by: INTERNAL MEDICINE

## 2022-02-03 PROCEDURE — 93005 ELECTROCARDIOGRAM TRACING: CPT | Performed by: PHYSICIAN ASSISTANT

## 2022-02-03 PROCEDURE — 83880 ASSAY OF NATRIURETIC PEPTIDE: CPT

## 2022-02-03 PROCEDURE — 84484 ASSAY OF TROPONIN QUANT: CPT

## 2022-02-03 PROCEDURE — 80053 COMPREHEN METABOLIC PANEL: CPT

## 2022-02-03 PROCEDURE — 71046 X-RAY EXAM CHEST 2 VIEWS: CPT

## 2022-02-03 PROCEDURE — G0378 HOSPITAL OBSERVATION PER HR: HCPCS

## 2022-02-03 PROCEDURE — 85025 COMPLETE CBC W/AUTO DIFF WBC: CPT

## 2022-02-03 PROCEDURE — 99284 EMERGENCY DEPT VISIT MOD MDM: CPT

## 2022-02-03 RX ORDER — ACETAMINOPHEN 650 MG/1
650 SUPPOSITORY RECTAL EVERY 6 HOURS PRN
Status: CANCELLED | OUTPATIENT
Start: 2022-02-03

## 2022-02-03 RX ORDER — ONDANSETRON 4 MG/1
4 TABLET, ORALLY DISINTEGRATING ORAL EVERY 8 HOURS PRN
Status: CANCELLED | OUTPATIENT
Start: 2022-02-03

## 2022-02-03 RX ORDER — ONDANSETRON 2 MG/ML
4 INJECTION INTRAMUSCULAR; INTRAVENOUS EVERY 6 HOURS PRN
Status: CANCELLED | OUTPATIENT
Start: 2022-02-03

## 2022-02-03 RX ORDER — POLYETHYLENE GLYCOL 3350 17 G/17G
17 POWDER, FOR SOLUTION ORAL DAILY PRN
Status: CANCELLED | OUTPATIENT
Start: 2022-02-03

## 2022-02-03 RX ORDER — SODIUM CHLORIDE 9 MG/ML
25 INJECTION, SOLUTION INTRAVENOUS PRN
Status: CANCELLED | OUTPATIENT
Start: 2022-02-03

## 2022-02-03 RX ORDER — SODIUM CHLORIDE 0.9 % (FLUSH) 0.9 %
10 SYRINGE (ML) INJECTION PRN
Status: CANCELLED | OUTPATIENT
Start: 2022-02-03

## 2022-02-03 RX ORDER — CLONIDINE HYDROCHLORIDE 0.1 MG/1
0.1 TABLET ORAL ONCE
Status: COMPLETED | OUTPATIENT
Start: 2022-02-03 | End: 2022-02-03

## 2022-02-03 RX ORDER — SODIUM CHLORIDE 0.9 % (FLUSH) 0.9 %
5-40 SYRINGE (ML) INJECTION EVERY 12 HOURS SCHEDULED
Status: CANCELLED | OUTPATIENT
Start: 2022-02-03

## 2022-02-03 RX ORDER — ACETAMINOPHEN 325 MG/1
650 TABLET ORAL EVERY 6 HOURS PRN
Status: CANCELLED | OUTPATIENT
Start: 2022-02-03

## 2022-02-03 RX ORDER — HEPARIN SODIUM 5000 [USP'U]/ML
5000 INJECTION, SOLUTION INTRAVENOUS; SUBCUTANEOUS EVERY 8 HOURS SCHEDULED
Status: CANCELLED | OUTPATIENT
Start: 2022-02-03

## 2022-02-03 RX ADMIN — CLONIDINE HYDROCHLORIDE 0.1 MG: 0.1 TABLET ORAL at 15:49
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
## 2022-02-03 NOTE — ED PROVIDER NOTES
I did not personally evaluate this patient nor was I involved in her care. EKG is interpreted as follows: The Ekg interpreted by me shows  normal sinus rhythm with a rate of 69  Axis is   Normal  QTc is  within an acceptable range  Intervals and Durations are unremarkable.       ST Segments: normal           [facility], DO  02/03/22 4916

## 2022-02-03 NOTE — Clinical Note
Patient Class: Observation [104]   REQUIRED: Diagnosis: Hemodialysis patient [facility]) [[id number]]   Estimated Length of Stay: Estimated stay of less than 2 midnights   Admitting Provider: Siobhan Diaz [[id number]]   Telemetry/Cardiac Monitoring Required?: Yes

## 2023-01-01 NOTE — PROGRESS NOTES
This note also relates to the following rows which could not be included:  Vt Ordered - Cannot attach notes to unvalidated device data  Rate Set - Cannot attach notes to unvalidated device data  Peak Flow - Cannot attach notes to unvalidated device data  Pressure Support - Cannot attach notes to unvalidated device data  FiO2  - Cannot attach notes to unvalidated device data  Sensitivity - Cannot attach notes to unvalidated device data  PEEP/CPAP - Cannot attach notes to unvalidated device data  I Time/ I Time % - Cannot attach notes to unvalidated device data  Vt Exhaled - Cannot attach notes to unvalidated device data  Rate Measured - Cannot attach notes to unvalidated device data  Minute Volume - Cannot attach notes to unvalidated device data  Peak Inspiratory Pressure - Cannot attach notes to unvalidated device data  I:E Ratio - Cannot attach notes to unvalidated device data  High Peep/I Pressure - Cannot attach notes to unvalidated device data  Mean Airway Pressure - Cannot attach notes to unvalidated device data  Pulse - Cannot attach notes to unvalidated device data  SpO2 - Cannot attach notes to unvalidated device data  Resp - Cannot attach notes to unvalidated device data  End Tidal CO2 - Cannot attach notes to unvalidated device data  High Pressure Alarm - Cannot attach notes to unvalidated device data  Low Minute Volume Alarm - Cannot attach notes to unvalidated device data  High Respiratory Rate - Cannot attach notes to unvalidated device data       06/28/18 1100   Vent Information   Vent Type 840   Vent Mode AC/VC   Humidification Source Heated wire   Humidification Temp 37   Humidification Temp Measured 37.1   Circuit Condensation Drained   Cough/Sputum   Sputum How Obtained Tracheal;Suctioned   Cough Productive   Sputum Amount Moderate   Sputum Color Tan;Brown   Tenacity Thick   Breath Sounds   Right Upper Lobe Rhonchi   Right Middle Lobe Rhonchi   Right Lower Lobe Rhonchi   Left Upper Lobe Rhonchi 2023

## 2023-07-05 ENCOUNTER — HOSPITAL ENCOUNTER (EMERGENCY)
Age: 33
Discharge: ANOTHER HOSPITAL | End: 2023-07-05
Attending: EMERGENCY MEDICINE
Payer: MEDICAID

## 2023-07-05 ENCOUNTER — APPOINTMENT (OUTPATIENT)
Dept: GENERAL RADIOLOGY | Age: 33
End: 2023-07-05
Payer: MEDICAID

## 2023-07-05 ENCOUNTER — APPOINTMENT (OUTPATIENT)
Dept: CT IMAGING | Age: 33
End: 2023-07-05
Attending: EMERGENCY MEDICINE
Payer: MEDICAID

## 2023-07-05 VITALS
BODY MASS INDEX: 21.79 KG/M2 | HEART RATE: 93 BPM | OXYGEN SATURATION: 97 % | SYSTOLIC BLOOD PRESSURE: 141 MMHG | RESPIRATION RATE: 21 BRPM | HEIGHT: 63 IN | WEIGHT: 123 LBS | TEMPERATURE: 97.4 F | DIASTOLIC BLOOD PRESSURE: 82 MMHG

## 2023-07-05 DIAGNOSIS — E87.5 HYPERKALEMIA: ICD-10-CM

## 2023-07-05 DIAGNOSIS — I61.5 RIGHT-SIDED NONTRAUMATIC INTRAVENTRICULAR INTRACEREBRAL HEMORRHAGE (HCC): Primary | ICD-10-CM

## 2023-07-05 DIAGNOSIS — I16.1 HYPERTENSIVE EMERGENCY: ICD-10-CM

## 2023-07-05 LAB
ALBUMIN SERPL-MCNC: 3.8 G/DL (ref 3.4–5)
ALBUMIN/GLOB SERPL: 1 {RATIO} (ref 1.1–2.2)
ALP SERPL-CCNC: 468 U/L (ref 40–129)
ALT SERPL-CCNC: 7 U/L (ref 10–40)
ANION GAP SERPL CALCULATED.3IONS-SCNC: 22 MMOL/L (ref 3–16)
AST SERPL-CCNC: 15 U/L (ref 15–37)
BASOPHILS # BLD: 0.1 K/UL (ref 0–0.2)
BASOPHILS NFR BLD: 1.2 %
BILIRUB SERPL-MCNC: 0.4 MG/DL (ref 0–1)
BUN SERPL-MCNC: 80 MG/DL (ref 7–20)
CALCIUM SERPL-MCNC: 9 MG/DL (ref 8.3–10.6)
CHLORIDE SERPL-SCNC: 88 MMOL/L (ref 99–110)
CO2 SERPL-SCNC: 18 MMOL/L (ref 21–32)
CREAT SERPL-MCNC: 10.8 MG/DL (ref 0.6–1.1)
DEPRECATED RDW RBC AUTO: 16.4 % (ref 12.4–15.4)
EOSINOPHIL # BLD: 0.2 K/UL (ref 0–0.6)
EOSINOPHIL NFR BLD: 3.3 %
GFR SERPLBLD CREATININE-BSD FMLA CKD-EPI: 4 ML/MIN/{1.73_M2}
GLUCOSE BLD-MCNC: 122 MG/DL (ref 70–99)
GLUCOSE BLD-MCNC: 161 MG/DL (ref 70–99)
GLUCOSE SERPL-MCNC: 66 MG/DL (ref 70–99)
HCT VFR BLD AUTO: 35 % (ref 36–48)
HGB BLD-MCNC: 11.7 G/DL (ref 12–16)
INR PPP: 1.2 (ref 0.84–1.16)
LYMPHOCYTES # BLD: 1 K/UL (ref 1–5.1)
LYMPHOCYTES NFR BLD: 14 %
MCH RBC QN AUTO: 29.9 PG (ref 26–34)
MCHC RBC AUTO-ENTMCNC: 33.4 G/DL (ref 31–36)
MCV RBC AUTO: 89.6 FL (ref 80–100)
MONOCYTES # BLD: 0.3 K/UL (ref 0–1.3)
MONOCYTES NFR BLD: 4.4 %
NEUTROPHILS # BLD: 5.6 K/UL (ref 1.7–7.7)
NEUTROPHILS NFR BLD: 77.1 %
PERFORMED ON: ABNORMAL
PERFORMED ON: ABNORMAL
PLATELET # BLD AUTO: 247 K/UL (ref 135–450)
PMV BLD AUTO: 6.8 FL (ref 5–10.5)
POTASSIUM SERPL-SCNC: 8.3 MMOL/L (ref 3.5–5.1)
POTASSIUM SERPL-SCNC: 8.8 MMOL/L (ref 3.5–5.1)
PROT SERPL-MCNC: 7.6 G/DL (ref 6.4–8.2)
PROTHROMBIN TIME: 15.2 SEC (ref 11.5–14.8)
RBC # BLD AUTO: 3.91 M/UL (ref 4–5.2)
SODIUM SERPL-SCNC: 128 MMOL/L (ref 136–145)
TROPONIN, HIGH SENSITIVITY: 45 NG/L (ref 0–14)
WBC # BLD AUTO: 7.3 K/UL (ref 4–11)

## 2023-07-05 PROCEDURE — 2580000003 HC RX 258: Performed by: EMERGENCY MEDICINE

## 2023-07-05 PROCEDURE — 85025 COMPLETE CBC W/AUTO DIFF WBC: CPT

## 2023-07-05 PROCEDURE — 85610 PROTHROMBIN TIME: CPT

## 2023-07-05 PROCEDURE — 96374 THER/PROPH/DIAG INJ IV PUSH: CPT

## 2023-07-05 PROCEDURE — 96368 THER/DIAG CONCURRENT INF: CPT

## 2023-07-05 PROCEDURE — 6360000002 HC RX W HCPCS: Performed by: EMERGENCY MEDICINE

## 2023-07-05 PROCEDURE — 71045 X-RAY EXAM CHEST 1 VIEW: CPT

## 2023-07-05 PROCEDURE — 2500000003 HC RX 250 WO HCPCS

## 2023-07-05 PROCEDURE — 96375 TX/PRO/DX INJ NEW DRUG ADDON: CPT

## 2023-07-05 PROCEDURE — 70498 CT ANGIOGRAPHY NECK: CPT

## 2023-07-05 PROCEDURE — 2500000003 HC RX 250 WO HCPCS: Performed by: EMERGENCY MEDICINE

## 2023-07-05 PROCEDURE — 6370000000 HC RX 637 (ALT 250 FOR IP): Performed by: EMERGENCY MEDICINE

## 2023-07-05 PROCEDURE — 96366 THER/PROPH/DIAG IV INF ADDON: CPT

## 2023-07-05 PROCEDURE — 70450 CT HEAD/BRAIN W/O DYE: CPT

## 2023-07-05 PROCEDURE — 80053 COMPREHEN METABOLIC PANEL: CPT

## 2023-07-05 PROCEDURE — 99285 EMERGENCY DEPT VISIT HI MDM: CPT

## 2023-07-05 PROCEDURE — 6360000004 HC RX CONTRAST MEDICATION: Performed by: EMERGENCY MEDICINE

## 2023-07-05 PROCEDURE — 84484 ASSAY OF TROPONIN QUANT: CPT

## 2023-07-05 PROCEDURE — 96365 THER/PROPH/DIAG IV INF INIT: CPT

## 2023-07-05 PROCEDURE — 84132 ASSAY OF SERUM POTASSIUM: CPT

## 2023-07-05 PROCEDURE — 96372 THER/PROPH/DIAG INJ SC/IM: CPT

## 2023-07-05 RX ORDER — DEXTROSE MONOHYDRATE 25 G/50ML
25 INJECTION, SOLUTION INTRAVENOUS ONCE
Status: COMPLETED | OUTPATIENT
Start: 2023-07-05 | End: 2023-07-05

## 2023-07-05 RX ORDER — CALCIUM CHLORIDE 100 MG/ML
1000 INJECTION INTRAVENOUS; INTRAVENTRICULAR ONCE
Status: COMPLETED | OUTPATIENT
Start: 2023-07-05 | End: 2023-07-05

## 2023-07-05 RX ORDER — ONDANSETRON 2 MG/ML
4 INJECTION INTRAMUSCULAR; INTRAVENOUS ONCE
Status: COMPLETED | OUTPATIENT
Start: 2023-07-05 | End: 2023-07-05

## 2023-07-05 RX ORDER — DEXTROSE MONOHYDRATE 25 G/50ML
INJECTION, SOLUTION INTRAVENOUS
Status: COMPLETED
Start: 2023-07-05 | End: 2023-07-05

## 2023-07-05 RX ADMIN — IOPAMIDOL 75 ML: 755 INJECTION, SOLUTION INTRAVENOUS at 12:39

## 2023-07-05 RX ADMIN — DEXTROSE MONOHYDRATE: 25 INJECTION, SOLUTION INTRAVENOUS at 12:25

## 2023-07-05 RX ADMIN — SODIUM BICARBONATE: 84 INJECTION, SOLUTION INTRAVENOUS at 14:30

## 2023-07-05 RX ADMIN — LEVETIRACETAM 1000 MG: 500 INJECTION, SOLUTION, CONCENTRATE INTRAVENOUS at 13:18

## 2023-07-05 RX ADMIN — CALCIUM CHLORIDE INJECTION 1000 MG: 100 INJECTION, SOLUTION INTRAVENOUS at 13:48

## 2023-07-05 RX ADMIN — NICARDIPINE HYDROCHLORIDE 5 MG/HR: 0.1 INJECTION, SOLUTION INTRAVENTRICULAR at 12:41

## 2023-07-05 RX ADMIN — ONDANSETRON 4 MG: 2 INJECTION INTRAMUSCULAR; INTRAVENOUS at 13:54

## 2023-07-05 RX ADMIN — DEXTROSE MONOHYDRATE 25 G: 25 INJECTION, SOLUTION INTRAVENOUS at 13:44

## 2023-07-05 RX ADMIN — DESMOPRESSIN ACETATE 24.48 MCG: 4 INJECTION, SOLUTION INTRAVENOUS; SUBCUTANEOUS at 13:56

## 2023-07-05 RX ADMIN — INSULIN HUMAN 10 UNITS: 100 INJECTION, SOLUTION PARENTERAL at 13:45

## 2023-07-05 ASSESSMENT — PAIN - FUNCTIONAL ASSESSMENT: PAIN_FUNCTIONAL_ASSESSMENT: NONE - DENIES PAIN

## 2023-07-05 NOTE — ED NOTES
CODE STROKE TIMELINE  1217-CODE STROKE CALLED BY   1217- CALLED CT- RM 1  1217- CALLED  STROKE TEAM 1ST PAGE  1218- NOTIFIED LAB   1222- PT TO CT   1222-  STROKE TEAM RETURNED PAGE- TRANSFERRED TO DR.LEE Roula Hay  07/05/23 2994

## 2023-07-05 NOTE — ED NOTES
TRANSFER TIMELINE  1254- CALLED NELDA WITH NEUROSURGERY - TRANSFERRED TO   1307-CALLED [facility]- THEY STATED WAIT FOR CTA TO ACCEPT PT  9371- CTA RESULTED- SPOKE WITH NEUROSURGERY- DEFERRING TO [facility]   1312- CALLED [facility] TO INITIATE TRANSFER- DR. LOVE ACCEPTED PT  1320- SPOKE WITH NELDA FROM NEUROSURGERY TO UPDATE PTS STATUS AND FACILITY TRANSFER  1332- [facility] CALLED WITH BED ASSIGNMENT  G  N2N:[phone]  1340- CALLED [facility]- ETA 1402     María Ibaenz  23 1403

## 2023-07-05 NOTE — ED NOTES
Report to Table8 team at bedside. They are going to take the sodium bicarb drip and start it in the air.       Abelardo Baker RN  07/05/23 6378

## 2023-07-05 NOTE — ED PROVIDER NOTES
[address] [place] [address]  ED     EMERGENCY DEPARTMENT ENCOUNTER            Pt Name: Zaria Damon   MRN: [id number]   [address] [facility] 1990   Date of evaluation: 7/5/2023   Provider: Yuli Ramsey DO   PCP: Comfort Jacob MD   Note Started: 4:17 PM EDT 7/5/23          CHIEF COMPLAINT     Chief Complaint   Patient presents with    Extremity Weakness     Patient to the ED for left sided weakness. States this started about 2 hours ago. States she was having some tingling in the left side yesterday but the weakness started today. Patient isn't able to move entire left side of body. No facial droop or slurred speech. Blood sugar 66 at time of triage. HISTORY OF PRESENT ILLNESS:   History from : Patient   Limitations to history : None     Zaria Damon is a 34 y.o. female who presents to the emergency department with left-sided weakness. Patient states that she noted some left arm cramping yesterday and then approximately 2 hours prior to arrival(10:30 AM) she began developing sudden onset left upper extremity and left lower extremity weakness and numbness. Patient states that she fell she was unable to walk. Patient mildly hypertensive upon arrival.  No additional complaints at this time. Of note, patient has history of end-stage renal disease and is scheduled for dialysis today. She states that she most recently went on Monday 2 days ago. Nursing Notes were all reviewed and agreed with, or any disagreements were addressed in the HPI. REVIEW OF SYSTEMS :    Positives and Pertinent negatives as per HPI.       MEDICAL HISTORY   has a past medical history of Asthma, Cardiac arrest ([address]) (06/08/2018), Depression, Endocarditis, ESRD (end stage renal disease) on dialysis ([address]), Hepatitis C antibody positive in blood (03/22/2017), History of blood transfusion, Hypertension, IV drug abuse ([address]), Liver disease, MRSA (methicillin resistant staph aureus) culture positive (6/5/18, 03/12/2017),

## 2023-07-10 LAB
GLUCOSE BLD-MCNC: 66 MG/DL (ref 70–99)
PERFORMED ON: ABNORMAL

## 2023-09-29 ENCOUNTER — HOSPITAL ENCOUNTER (EMERGENCY)
Age: 33
Discharge: ANOTHER HOSPITAL | End: 2023-09-30
Attending: STUDENT IN AN ORGANIZED HEALTH CARE EDUCATION/TRAINING PROGRAM
Payer: MEDICAID

## 2023-09-29 ENCOUNTER — APPOINTMENT (OUTPATIENT)
Dept: GENERAL RADIOLOGY | Age: 33
End: 2023-09-29
Payer: MEDICAID

## 2023-09-29 ENCOUNTER — APPOINTMENT (OUTPATIENT)
Dept: CT IMAGING | Age: 33
End: 2023-09-29
Payer: MEDICAID

## 2023-09-29 DIAGNOSIS — S32.502A CLOSED DISPLACED FRACTURE OF LEFT PUBIS, INITIAL ENCOUNTER (HCC): Primary | ICD-10-CM

## 2023-09-29 DIAGNOSIS — N94.89 PELVIC HEMATOMA, FEMALE: ICD-10-CM

## 2023-09-29 DIAGNOSIS — I72.5 BASILAR ARTERY ANEURYSM (HCC): ICD-10-CM

## 2023-09-29 DIAGNOSIS — M89.9 LYTIC LESION OF BONE ON X-RAY: ICD-10-CM

## 2023-09-29 DIAGNOSIS — Z99.2 ESRD (END STAGE RENAL DISEASE) ON DIALYSIS (HCC): ICD-10-CM

## 2023-09-29 DIAGNOSIS — Z87.81 HISTORY OF RIB FRACTURE: ICD-10-CM

## 2023-09-29 DIAGNOSIS — M89.9 RIB LESION: ICD-10-CM

## 2023-09-29 DIAGNOSIS — I16.1 HYPERTENSIVE EMERGENCY: ICD-10-CM

## 2023-09-29 DIAGNOSIS — N18.6 ESRD (END STAGE RENAL DISEASE) ON DIALYSIS (HCC): ICD-10-CM

## 2023-09-29 DIAGNOSIS — K80.20 ASYMPTOMATIC CHOLELITHIASIS: ICD-10-CM
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
## 2023-09-29 LAB
ANION GAP SERPL CALCULATED.3IONS-SCNC: 18 MMOL/L (ref 3–16)
BASOPHILS # BLD: 0 K/UL (ref 0–0.2)
BASOPHILS NFR BLD: 0.5 %
BUN SERPL-MCNC: 51 MG/DL (ref 7–20)
CALCIUM SERPL-MCNC: 8.5 MG/DL (ref 8.3–10.6)
CHLORIDE SERPL-SCNC: 95 MMOL/L (ref 99–110)
CO2 SERPL-SCNC: 28 MMOL/L (ref 21–32)
CREAT SERPL-MCNC: 5.2 MG/DL (ref 0.6–1.1)
DEPRECATED RDW RBC AUTO: 16.8 % (ref 12.4–15.4)
EOSINOPHIL # BLD: 0.4 K/UL (ref 0–0.6)
EOSINOPHIL NFR BLD: 3.5 %
FLUAV RNA RESP QL NAA+PROBE: NOT DETECTED
FLUBV RNA RESP QL NAA+PROBE: NOT DETECTED
GFR SERPLBLD CREATININE-BSD FMLA CKD-EPI: 11 ML/MIN/{1.73_M2}
GLUCOSE BLD-MCNC: 124 MG/DL (ref 70–99)
GLUCOSE SERPL-MCNC: 97 MG/DL (ref 70–99)
HCG SERPL QL: NEGATIVE
HCT VFR BLD AUTO: 29.2 % (ref 36–48)
HGB BLD-MCNC: 9.9 G/DL (ref 12–16)
INR PPP: 1.23 (ref 0.84–1.16)
LYMPHOCYTES # BLD: 2.1 K/UL (ref 1–5.1)
LYMPHOCYTES NFR BLD: 20.3 %
MCH RBC QN AUTO: 29.9 PG (ref 26–34)
MCHC RBC AUTO-ENTMCNC: 33.9 G/DL (ref 31–36)
MCV RBC AUTO: 88.1 FL (ref 80–100)
MONOCYTES # BLD: 0.6 K/UL (ref 0–1.3)
MONOCYTES NFR BLD: 5.6 %
NEUTROPHILS # BLD: 7.1 K/UL (ref 1.7–7.7)
NEUTROPHILS NFR BLD: 70.1 %
PERFORMED ON: ABNORMAL
PLATELET # BLD AUTO: 199 K/UL (ref 135–450)
PMV BLD AUTO: 6.8 FL (ref 5–10.5)
POTASSIUM SERPL-SCNC: 5.3 MMOL/L (ref 3.5–5.1)
PROTHROMBIN TIME: 15.5 SEC (ref 11.5–14.8)
RBC # BLD AUTO: 3.31 M/UL (ref 4–5.2)
SARS-COV-2 RNA RESP QL NAA+PROBE: NOT DETECTED
SODIUM SERPL-SCNC: 141 MMOL/L (ref 136–145)
WBC # BLD AUTO: 10.2 K/UL (ref 4–11)

## 2023-09-29 PROCEDURE — 6370000000 HC RX 637 (ALT 250 FOR IP): Performed by: PHYSICIAN ASSISTANT

## 2023-09-29 PROCEDURE — 85610 PROTHROMBIN TIME: CPT

## 2023-09-29 PROCEDURE — 70450 CT HEAD/BRAIN W/O DYE: CPT

## 2023-09-29 PROCEDURE — 2580000003 HC RX 258: Performed by: STUDENT IN AN ORGANIZED HEALTH CARE EDUCATION/TRAINING PROGRAM

## 2023-09-29 PROCEDURE — 70498 CT ANGIOGRAPHY NECK: CPT

## 2023-09-29 PROCEDURE — 80048 BASIC METABOLIC PNL TOTAL CA: CPT

## 2023-09-29 PROCEDURE — 6360000002 HC RX W HCPCS: Performed by: PHYSICIAN ASSISTANT

## 2023-09-29 PROCEDURE — 85025 COMPLETE CBC W/AUTO DIFF WBC: CPT

## 2023-09-29 PROCEDURE — 73502 X-RAY EXAM HIP UNI 2-3 VIEWS: CPT

## 2023-09-29 PROCEDURE — 6360000002 HC RX W HCPCS

## 2023-09-29 PROCEDURE — 74177 CT ABD & PELVIS W/CONTRAST: CPT

## 2023-09-29 PROCEDURE — 96375 TX/PRO/DX INJ NEW DRUG ADDON: CPT

## 2023-09-29 PROCEDURE — 84703 CHORIONIC GONADOTROPIN ASSAY: CPT

## 2023-09-29 PROCEDURE — 71045 X-RAY EXAM CHEST 1 VIEW: CPT

## 2023-09-29 PROCEDURE — 87636 SARSCOV2 & INF A&B AMP PRB: CPT

## 2023-09-29 PROCEDURE — 96376 TX/PRO/DX INJ SAME DRUG ADON: CPT

## 2023-09-29 PROCEDURE — 96366 THER/PROPH/DIAG IV INF ADDON: CPT

## 2023-09-29 PROCEDURE — 96365 THER/PROPH/DIAG IV INF INIT: CPT

## 2023-09-29 PROCEDURE — 36415 COLL VENOUS BLD VENIPUNCTURE: CPT

## 2023-09-29 PROCEDURE — 6360000004 HC RX CONTRAST MEDICATION: Performed by: PHYSICIAN ASSISTANT

## 2023-09-29 PROCEDURE — 99285 EMERGENCY DEPT VISIT HI MDM: CPT

## 2023-09-29 PROCEDURE — 2500000003 HC RX 250 WO HCPCS: Performed by: STUDENT IN AN ORGANIZED HEALTH CARE EDUCATION/TRAINING PROGRAM

## 2023-09-29 RX ORDER — ALBUTEROL SULFATE 2.5 MG/3ML
5 SOLUTION RESPIRATORY (INHALATION) ONCE
Status: COMPLETED | OUTPATIENT
Start: 2023-09-29 | End: 2023-09-29

## 2023-09-29 RX ORDER — CLONIDINE HYDROCHLORIDE 0.1 MG/1
0.3 TABLET ORAL ONCE
Status: COMPLETED | OUTPATIENT
Start: 2023-09-29 | End: 2023-09-29

## 2023-09-29 RX ORDER — HYDRALAZINE HYDROCHLORIDE 20 MG/ML
5 INJECTION INTRAMUSCULAR; INTRAVENOUS ONCE
Status: COMPLETED | OUTPATIENT
Start: 2023-09-29 | End: 2023-09-29

## 2023-09-29 RX ORDER — OXYCODONE HYDROCHLORIDE AND ACETAMINOPHEN 5; 325 MG/1; MG/1
1 TABLET ORAL ONCE
Status: COMPLETED | OUTPATIENT
Start: 2023-09-29 | End: 2023-09-29

## 2023-09-29 RX ADMIN — OXYCODONE AND ACETAMINOPHEN 1 TABLET: 5; 325 TABLET ORAL at 19:05

## 2023-09-29 RX ADMIN — IOPAMIDOL 100 ML: 755 INJECTION, SOLUTION INTRAVENOUS at 22:58

## 2023-09-29 RX ADMIN — SODIUM CHLORIDE 5 MG/HR: 9 INJECTION, SOLUTION INTRAVENOUS at 23:20

## 2023-09-29 RX ADMIN — CLONIDINE HYDROCHLORIDE 0.3 MG: 0.1 TABLET ORAL at 19:05

## 2023-09-29 RX ADMIN — HYDRALAZINE HYDROCHLORIDE 5 MG: 20 INJECTION, SOLUTION INTRAMUSCULAR; INTRAVENOUS at 21:37

## 2023-09-29 RX ADMIN — HYDROMORPHONE HYDROCHLORIDE 0.5 MG: 1 INJECTION, SOLUTION INTRAMUSCULAR; INTRAVENOUS; SUBCUTANEOUS at 22:00

## 2023-09-29 RX ADMIN — HYDROMORPHONE HYDROCHLORIDE 0.5 MG: 1 INJECTION, SOLUTION INTRAMUSCULAR; INTRAVENOUS; SUBCUTANEOUS at 22:35

## 2023-09-29 RX ADMIN — ALBUTEROL SULFATE 5 MG: 2.5 SOLUTION RESPIRATORY (INHALATION) at 21:37

## 2023-09-29 ASSESSMENT — PAIN DESCRIPTION - LOCATION
LOCATION: GENERALIZED
LOCATION: HIP;LEG
LOCATION: HIP

## 2023-09-29 ASSESSMENT — PAIN SCALES - GENERAL
PAINLEVEL_OUTOF10: 10

## 2023-09-29 ASSESSMENT — PAIN DESCRIPTION - ORIENTATION
ORIENTATION: LEFT

## 2023-09-29 ASSESSMENT — PAIN - FUNCTIONAL ASSESSMENT: PAIN_FUNCTIONAL_ASSESSMENT: 0-10

## 2023-09-29 ASSESSMENT — PAIN DESCRIPTION - DESCRIPTORS: DESCRIPTORS: ACHING;SHARP

## 2023-09-30 VITALS
DIASTOLIC BLOOD PRESSURE: 104 MMHG | SYSTOLIC BLOOD PRESSURE: 149 MMHG | WEIGHT: 130 LBS | RESPIRATION RATE: 27 BRPM | HEART RATE: 93 BPM | TEMPERATURE: 98.5 F | HEIGHT: 62 IN | OXYGEN SATURATION: 94 % | BODY MASS INDEX: 23.92 KG/M2

## 2023-09-30 LAB
ABO + RH BLD: NORMAL
APTT BLD: 41.3 SEC (ref 22.7–35.9)
BASE EXCESS BLDV CALC-SCNC: 0.4 MMOL/L (ref -3–3)
BASOPHILS # BLD: 0 K/UL (ref 0–0.2)
BASOPHILS NFR BLD: 0.4 %
BLD GP AB SCN SERPL QL: NORMAL
BLOOD BANK DISPENSE STATUS: NORMAL
BLOOD BANK PRODUCT CODE: NORMAL
BPU ID: NORMAL
CO2 BLDV-SCNC: 27 MMOL/L
COHGB MFR BLDV: 2.3 % (ref 0–1.5)
DEPRECATED RDW RBC AUTO: 17.1 % (ref 12.4–15.4)
DESCRIPTION BLOOD BANK: NORMAL
EKG ATRIAL RATE: 94 BPM
EKG DIAGNOSIS: NORMAL
EKG P AXIS: 62 DEGREES
EKG P-R INTERVAL: 208 MS
EKG Q-T INTERVAL: 378 MS
EKG QRS DURATION: 82 MS
EKG QTC CALCULATION (BAZETT): 472 MS
EKG R AXIS: 100 DEGREES
EKG T AXIS: -80 DEGREES
EKG VENTRICULAR RATE: 94 BPM
EOSINOPHIL # BLD: 0.3 K/UL (ref 0–0.6)
EOSINOPHIL NFR BLD: 4.2 %
HCO3 BLDV-SCNC: 25.4 MMOL/L (ref 23–29)
HCT VFR BLD AUTO: 26 % (ref 36–48)
HGB BLD-MCNC: 8.7 G/DL (ref 12–16)
LYMPHOCYTES # BLD: 1.9 K/UL (ref 1–5.1)
LYMPHOCYTES NFR BLD: 25.5 %
MCH RBC QN AUTO: 29.7 PG (ref 26–34)
MCHC RBC AUTO-ENTMCNC: 33.6 G/DL (ref 31–36)
MCV RBC AUTO: 88.4 FL (ref 80–100)
METHGB MFR BLDV: 0.3 %
MONOCYTES # BLD: 0.5 K/UL (ref 0–1.3)
MONOCYTES NFR BLD: 6.5 %
NEUTROPHILS # BLD: 4.6 K/UL (ref 1.7–7.7)
NEUTROPHILS NFR BLD: 63.4 %
NT-PROBNP SERPL-MCNC: ABNORMAL PG/ML (ref 0–124)
O2 CT VFR BLDV CALC: 14 VOL %
O2 THERAPY: ABNORMAL
PCO2 BLDV: 42.6 MMHG (ref 40–50)
PH BLDV: 7.39 [PH] (ref 7.35–7.45)
PLATELET # BLD AUTO: 188 K/UL (ref 135–450)
PMV BLD AUTO: 6.7 FL (ref 5–10.5)
PO2 BLDV: 137.1 MMHG (ref 25–40)
POTASSIUM SERPL-SCNC: 4.5 MMOL/L (ref 3.5–5.1)
RBC # BLD AUTO: 2.94 M/UL (ref 4–5.2)
SAO2 % BLDV: 99 %
TROPONIN, HIGH SENSITIVITY: 25 NG/L (ref 0–14)
WBC # BLD AUTO: 7.3 K/UL (ref 4–11)

## 2023-09-30 PROCEDURE — 85018 HEMOGLOBIN: CPT

## 2023-09-30 PROCEDURE — 86923 COMPATIBILITY TEST ELECTRIC: CPT

## 2023-09-30 PROCEDURE — 82803 BLOOD GASES ANY COMBINATION: CPT

## 2023-09-30 PROCEDURE — 83880 ASSAY OF NATRIURETIC PEPTIDE: CPT

## 2023-09-30 PROCEDURE — 36430 TRANSFUSION BLD/BLD COMPNT: CPT

## 2023-09-30 PROCEDURE — 85014 HEMATOCRIT: CPT

## 2023-09-30 PROCEDURE — 86900 BLOOD TYPING SEROLOGIC ABO: CPT

## 2023-09-30 PROCEDURE — 85730 THROMBOPLASTIN TIME PARTIAL: CPT

## 2023-09-30 PROCEDURE — 86850 RBC ANTIBODY SCREEN: CPT

## 2023-09-30 PROCEDURE — P9016 RBC LEUKOCYTES REDUCED: HCPCS

## 2023-09-30 PROCEDURE — 93005 ELECTROCARDIOGRAM TRACING: CPT | Performed by: STUDENT IN AN ORGANIZED HEALTH CARE EDUCATION/TRAINING PROGRAM

## 2023-09-30 PROCEDURE — 36415 COLL VENOUS BLD VENIPUNCTURE: CPT

## 2023-09-30 PROCEDURE — 84132 ASSAY OF SERUM POTASSIUM: CPT

## 2023-09-30 PROCEDURE — 93010 ELECTROCARDIOGRAM REPORT: CPT | Performed by: INTERNAL MEDICINE

## 2023-09-30 PROCEDURE — 96366 THER/PROPH/DIAG IV INF ADDON: CPT

## 2023-09-30 PROCEDURE — 86901 BLOOD TYPING SEROLOGIC RH(D): CPT

## 2023-09-30 PROCEDURE — 6370000000 HC RX 637 (ALT 250 FOR IP): Performed by: STUDENT IN AN ORGANIZED HEALTH CARE EDUCATION/TRAINING PROGRAM

## 2023-09-30 PROCEDURE — 85025 COMPLETE CBC W/AUTO DIFF WBC: CPT

## 2023-09-30 PROCEDURE — 84484 ASSAY OF TROPONIN QUANT: CPT

## 2023-09-30 RX ORDER — IPRATROPIUM BROMIDE AND ALBUTEROL SULFATE 2.5; .5 MG/3ML; MG/3ML
1 SOLUTION RESPIRATORY (INHALATION) ONCE
Status: COMPLETED | OUTPATIENT
Start: 2023-09-30 | End: 2023-09-30

## 2023-09-30 RX ORDER — SODIUM CHLORIDE 9 MG/ML
INJECTION, SOLUTION INTRAVENOUS PRN
Status: DISCONTINUED | OUTPATIENT
Start: 2023-09-30 | End: 2023-09-30 | Stop reason: HOSPADM

## 2023-09-30 RX ADMIN — IPRATROPIUM BROMIDE AND ALBUTEROL SULFATE 1 DOSE: 2.5; .5 SOLUTION RESPIRATORY (INHALATION) at 01:35

## 2023-09-30 RX ADMIN — IPRATROPIUM BROMIDE AND ALBUTEROL SULFATE 1 DOSE: 2.5; .5 SOLUTION RESPIRATORY (INHALATION) at 00:53

## 2023-09-30 ASSESSMENT — PAIN DESCRIPTION - ORIENTATION: ORIENTATION: LEFT

## 2023-09-30 ASSESSMENT — PAIN DESCRIPTION - PAIN TYPE: TYPE: ACUTE PAIN

## 2023-09-30 ASSESSMENT — PAIN DESCRIPTION - LOCATION: LOCATION: PELVIS

## 2023-09-30 ASSESSMENT — PAIN SCALES - GENERAL: PAINLEVEL_OUTOF10: 2

## 2023-09-30 NOTE — ED NOTES
[address] a stroke Alert     2215 Called Stroke Team for consult      Trevor Kunz  09/29/23 2215    2218 Called CT for scan      Trevor Kunz  09/29/23 2221

## 2023-09-30 NOTE — PLAN OF CARE
STROKE TEAM PLAN OF CARE    Name:  Olvin Ramos  : 1990  MRN:  [id number]  Today's Date: 2023    Stroke team contacted at: 22:15  History obtained from: emergency physician, emergency medicine JED    History     Olvin Ramos is a 34 y.o. female who presented with fall. Briefly, pt has a history of ICH with residual left sided weakness. She was attempting to use a cane today when she fell onto the left sided. In the ED, she was diagnosed with pelvic ramus fracture and was found to be hypertensive. She also reported a heaviness feeling in the LUE, which is somewhat new compared to prior. 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 Otherwise, no acute abnormality or flow-limiting stenosis in the remainder of   the major arteries of the head and neck. Mildly prominent mediastinal and bilateral cervical lymph nodes, likely   related to infection/inflammation. Follow-up is recommended to exclude   lymphoproliferative disorder. Question of expansile lytic lesion in the left 4th rib anterior laterally. Neoplasm cannot be excluded. Stable diffuse sclerosis throughout the vertebral bodies. Findings may be   related to renal osteodystrophy. CT HEAD WO CONTRAST   Final Result   Addendum (preliminary)    ADDENDUM:   Results were communicated to JAGDISH Hendrickson, by the radiology   communications center at 11:08 p.m. Final   Focal encephalomalacia along the high right parietal lobe, sequelae from   prior intraparenchymal hemorrhage. No CT evidence of acute intracranial   pathology. Findings consistent with pansinusitis. Acute Ischemic Stroke Clinical Decision Making     (1) Intravenous thrombolysis:  The patient is not a candidate for IV thrombolysis based on:  History of intracranial or subarachnoid hemorrhage.   Overall, given history and lower pre-test

## 2023-09-30 NOTE — CONSENT
Informed Consent for Blood Component Transfusion Note    I have discussed with the patient the rationale for blood component transfusion; its benefits in treating or preventing fatigue, organ damage, or death; and its risk which includes mild transfusion reactions, rare risk of blood borne infection, or more serious but rare reactions. I have discussed the alternatives to transfusion, including the risk and consequences of not receiving transfusion. The patient had an opportunity to ask questions and had agreed to proceed with transfusion of blood components.     Electronically signed by Alok Carballo PA-C on 9/30/23 at 12:44 AM EDT

## 2023-09-30 NOTE — ED NOTES
Pt transferred to [facility] ED w/ blood and nicardipine infusing per [facility] MICU. Pt stable upon departure.       Ken Cristobal RN  09/30/23 2448

## 2023-09-30 NOTE — ED NOTES
Writer override cabinet for 0.5mg of dilaudid. MAR is showing that writer overrode cabinet for 1mg. Writer called pharmacy to fix the issue of showing that writer overrode 1mg instead of 0.5mg, pharmacy stated \"I dont know what to tell you, I'm not sure why its showing that\". Dilaudid vial verified with Swetha Butt RN that the correct dosage of 0.5mg/0.5ml was given.       Juan Manuel Murphy RN  09/29/23 0328

## 2023-09-30 NOTE — ED PROVIDER NOTES
[address] [address] ED  EMERGENCY DEPARTMENT ENCOUNTER        Pt Name: Lincoln Joaquin  MRN: [id number]  [address] [facility] 1990  Date of evaluation: 9/29/2023  Provider: Dariela Recinos PA-C  PCP: Emily Dumont MD  Note Started: 10:31 PM EDT 9/29/23       I have seen and evaluated this patient with my supervising physician Stevo Roach MD.      [address]       Chief Complaint   Patient presents with    Fall     Pt reports falling in her kitchen \"about an hour ago\", pt reports she had a stroke a couple months ago and walks with a cane due to left leg weakness, pt reports hitting her left leg and left hip on the ground, pt denies hitting her head, pt denies taking blood thinners       HISTORY OF PRESENT ILLNESS: 1 or more Elements     History From: Patient    Lincoln Joaquin is a 34 y.o. female who presents emergency department for evaluation of possible left hip fracture. The patient has history of left side weakness following a hypertensive bleed stroke occurring July 4, 2023. Typically uses walker. Was using a cane when she bent over and fell to her left landing on the left hip area with subsequent pain and difficulty with ambulation. She was brought in for evaluation by private car. This injury incident occurred at 4:30 PM.  The patient's initial ED presenting blood pressure was 203/139. Patient does have known history hypertension which is of renal origin. Patient states that she had infection of the kidneys that cause them to go bad and subsequently is on hemodialysis. She has poorly controlled blood pressure. Patient presenting to ED on July 4, 2023 with hypertensive emergency and a right-sided brain bleed with subsequent left-sided hemiparesis which has improved since time of event. Patient currently on clonidine 0.2 3 times daily, Coreg 25 twice daily and nifedipine XL 60 twice daily. She has taken all meds as prescribed.   The patient does report having had her afternoon dose of clonidine at approximately 3 PM.    Patient has a tight wheezy cough. Not productive. Minor chest discomfort as a result. She indicates no fevers or chills. She is not hypoxic with a saturation of 100%. Respiratory rate a bit tachypneic at 24 and heart rate currently at 94. Patient sees Dr. Retia Nyhan nephrology and [place]. She is dialyzed Tuesday, Thursday and Saturday 3.5 hours each time removing about 3.5 L as she tells me. This patient is not on anticoagulants. Nursing Notes were all reviewed and agreed with or any disagreements were addressed in the HPI. REVIEW OF SYSTEMS :      Review of Systems    Positives and Pertinent negatives as per HPI. SURGICAL HISTORY     Past Surgical History:   Procedure Laterality Date    ABDOMINAL EXPLORATION SURGERY N/A 07/13/2018    WITH DRAINAGE INTRA ABDOMINAL ABSCESS    AV FISTULA REPAIR      removal 5/14/2019    DIALYSIS FISTULA CREATION Right 08/17/2018    Dr. Erasmo Kapoor - at [facility]      Removed 2018    TONSILLECTOMY      UPPER GASTROINTESTINAL ENDOSCOPY  06/06/2018    Gastric Angiodysplasia    UPPER GASTROINTESTINAL ENDOSCOPY  06/27/2018    Peg Placement       CURRENTMEDICATIONS       Discharge Medication List as of 9/30/2023  2:13 AM        CONTINUE these medications which have NOT CHANGED    Details   Blood Glucose Calibration (EMBRACE LARISSA CONTROL LEVEL 2) Normal LIQD Med [facility]  seizure disorder with generalized tonic clonic seizures. Historical Med      !! B Complex-C-Folic Acid (CHANTAL CAPS) 1 MG CAPS TAKE 1 CAPSULE BY MOUTH EVERY DAYHistorical Med      Sodium Polystyrene Sulfonate (KALEXATE PO) Take by mouthHistorical Med      Nutritional Supplements (3232A INFANT FORMULA PO) Take by mouthHistorical Med      FERROUS SULFATE ER PO Take 400 mg by mouth dailyHistorical Med      Ca [id number] (CALCIUM-FOLIC ACID PLUS D PO) Take 1 tablet by mouth dailyHistorical Med      carvedilol (COREG)

## 2023-09-30 NOTE — ED NOTES
Pt was going to CT for code stroke and was refusing to get on CT table because of pain. Dr. Yuni Guzman gave verbal order for 0.5mg of dilaudid. Writer overrode cabinet for pain medication for CT Mariangel Waterman placed order.       Crispin Habermann, RN  09/29/23 [id number]

## 2023-09-30 NOTE — ED PROVIDER NOTES
Emergency Department Encounter    Patient: Joneen Severe  MRN: [id number]  : 1990  Date of Evaluation: 2023  ED Provider:  Alex Knutson MD    Triage Chief Complaint:   Fall (Pt reports falling in her kitchen \"about an hour ago\", pt reports she had a stroke a couple months ago and walks with a cane due to left leg weakness, pt reports hitting her left leg and left hip on the ground, pt denies hitting her head, pt denies taking blood thinners)    [place]:  Joneen Severe is a 34 y.o. 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 Patient in July had intraparenchymal hemorrhage causing residual left-sided deficits. Patient states she walks with a cane/walker. He is about an hour prior to presentation she fell because her left-sided weakness in your left leg and hip on the ground. Denies hitting her head his brother stopped it from hitting. Denies anticoagulation. States she has had some cough with sputum production over the past week and a half. States she does have some mild shortness of breath because of this. Denies any abdominal pain. Denies nausea vomiting, diarrhea or constipation patient is on dialysis and no longer urinates. Patient was initially seen and evaluated by PA. At 26 PA states that patient began complaining of left-sided heaviness in her upper extremity about 15 minutes prior. I immediately went to evaluate patient. Patient states she does have some heaviness in her left upper extremity. States she is chronically weak in her left upper extremity but mildly more severe than it has been previously denies any sensory changes. Denies any facial asymmetry, speech changes.   States that her left lower

## 2023-10-06 ENCOUNTER — HOSPITAL ENCOUNTER (INPATIENT)
Age: 33
LOS: 11 days | Discharge: HOME | DRG: 425 | End: 2023-10-17
Attending: STUDENT IN AN ORGANIZED HEALTH CARE EDUCATION/TRAINING PROGRAM | Admitting: INTERNAL MEDICINE
Payer: MEDICAID

## 2023-10-06 DIAGNOSIS — E87.5 HYPERKALEMIA: ICD-10-CM

## 2023-10-06 DIAGNOSIS — N18.6 ESRD (END STAGE RENAL DISEASE) ON DIALYSIS (HCC): Primary | ICD-10-CM

## 2023-10-06 DIAGNOSIS — Z99.2 ESRD (END STAGE RENAL DISEASE) ON DIALYSIS (HCC): Primary | ICD-10-CM

## 2023-10-06 DIAGNOSIS — D64.9 ACUTE ON CHRONIC ANEMIA: ICD-10-CM

## 2023-10-06 DIAGNOSIS — M25.559 PAIN IN JOINT INVOLVING PELVIC REGION AND THIGH, UNSPECIFIED LATERALITY: ICD-10-CM

## 2023-10-06 DIAGNOSIS — S32.592S PUBIC RAMUS FRACTURE, LEFT, SEQUELA: ICD-10-CM

## 2023-10-06 DIAGNOSIS — R26.2 CANNOT WALK: ICD-10-CM

## 2023-10-06 LAB
ALBUMIN SERPL-MCNC: 4.1 G/DL (ref 3.4–5)
ALBUMIN/GLOB SERPL: 1.1 {RATIO} (ref 1.1–2.2)
ALP SERPL-CCNC: 297 U/L (ref 40–129)
ALT SERPL-CCNC: 6 U/L (ref 10–40)
ANION GAP SERPL CALCULATED.3IONS-SCNC: 13 MMOL/L (ref 3–16)
AST SERPL-CCNC: 11 U/L (ref 15–37)
BASOPHILS # BLD: 0 K/UL (ref 0–0.2)
BASOPHILS NFR BLD: 0.5 %
BILIRUB SERPL-MCNC: 0.4 MG/DL (ref 0–1)
BUN SERPL-MCNC: 62 MG/DL (ref 7–20)
CALCIUM SERPL-MCNC: 8.2 MG/DL (ref 8.3–10.6)
CHLORIDE SERPL-SCNC: 94 MMOL/L (ref 99–110)
CO2 SERPL-SCNC: 27 MMOL/L (ref 21–32)
CREAT SERPL-MCNC: 8.2 MG/DL (ref 0.6–1.1)
DEPRECATED RDW RBC AUTO: 19.3 % (ref 12.4–15.4)
EKG ATRIAL RATE: 88 BPM
EKG DIAGNOSIS: NORMAL
EKG P AXIS: 51 DEGREES
EKG P-R INTERVAL: 234 MS
EKG Q-T INTERVAL: 380 MS
EKG QRS DURATION: 74 MS
EKG QTC CALCULATION (BAZETT): 459 MS
EKG R AXIS: 130 DEGREES
EKG T AXIS: 65 DEGREES
EKG VENTRICULAR RATE: 88 BPM
EOSINOPHIL # BLD: 0.5 K/UL (ref 0–0.6)
EOSINOPHIL NFR BLD: 5.5 %
GFR SERPLBLD CREATININE-BSD FMLA CKD-EPI: 6 ML/MIN/{1.73_M2}
GLUCOSE BLD-MCNC: 119 MG/DL (ref 70–99)
GLUCOSE BLD-MCNC: 227 MG/DL (ref 70–99)
GLUCOSE BLD-MCNC: 73 MG/DL (ref 70–99)
GLUCOSE BLD-MCNC: 95 MG/DL (ref 70–99)
GLUCOSE SERPL-MCNC: 94 MG/DL (ref 70–99)
HCG SERPL QL: NEGATIVE
HCT VFR BLD AUTO: 29.3 % (ref 36–48)
HGB BLD-MCNC: 9.7 G/DL (ref 12–16)
LYMPHOCYTES # BLD: 2.2 K/UL (ref 1–5.1)
LYMPHOCYTES NFR BLD: 24.4 %
MCH RBC QN AUTO: 28.9 PG (ref 26–34)
MCHC RBC AUTO-ENTMCNC: 33.2 G/DL (ref 31–36)
MCV RBC AUTO: 87.1 FL (ref 80–100)
MONOCYTES # BLD: 0.8 K/UL (ref 0–1.3)
MONOCYTES NFR BLD: 8.4 %
NEUTROPHILS # BLD: 5.6 K/UL (ref 1.7–7.7)
NEUTROPHILS NFR BLD: 61.2 %
PERFORMED ON: ABNORMAL
PERFORMED ON: ABNORMAL
PERFORMED ON: NORMAL
PERFORMED ON: NORMAL
PLATELET # BLD AUTO: 190 K/UL (ref 135–450)
PMV BLD AUTO: 6.9 FL (ref 5–10.5)
POTASSIUM SERPL-SCNC: 6.4 MMOL/L (ref 3.5–5.1)
POTASSIUM SERPL-SCNC: 6.9 MMOL/L (ref 3.5–5.1)
PROT SERPL-MCNC: 7.9 G/DL (ref 6.4–8.2)
RBC # BLD AUTO: 3.37 M/UL (ref 4–5.2)
SODIUM SERPL-SCNC: 134 MMOL/L (ref 136–145)
WBC # BLD AUTO: 9.1 K/UL (ref 4–11)

## 2023-10-06 PROCEDURE — 96374 THER/PROPH/DIAG INJ IV PUSH: CPT

## 2023-10-06 PROCEDURE — 6370000000 HC RX 637 (ALT 250 FOR IP): Performed by: PHYSICIAN ASSISTANT

## 2023-10-06 PROCEDURE — 6360000002 HC RX W HCPCS: Performed by: PHYSICIAN ASSISTANT

## 2023-10-06 PROCEDURE — 84703 CHORIONIC GONADOTROPIN ASSAY: CPT

## 2023-10-06 PROCEDURE — 96375 TX/PRO/DX INJ NEW DRUG ADDON: CPT

## 2023-10-06 PROCEDURE — 2580000003 HC RX 258: Performed by: STUDENT IN AN ORGANIZED HEALTH CARE EDUCATION/TRAINING PROGRAM

## 2023-10-06 PROCEDURE — 36415 COLL VENOUS BLD VENIPUNCTURE: CPT

## 2023-10-06 PROCEDURE — 93005 ELECTROCARDIOGRAM TRACING: CPT | Performed by: PHYSICIAN ASSISTANT

## 2023-10-06 PROCEDURE — 84132 ASSAY OF SERUM POTASSIUM: CPT

## 2023-10-06 PROCEDURE — 6370000000 HC RX 637 (ALT 250 FOR IP): Performed by: STUDENT IN AN ORGANIZED HEALTH CARE EDUCATION/TRAINING PROGRAM

## 2023-10-06 PROCEDURE — 80053 COMPREHEN METABOLIC PANEL: CPT

## 2023-10-06 PROCEDURE — 85025 COMPLETE CBC W/AUTO DIFF WBC: CPT

## 2023-10-06 PROCEDURE — 2500000003 HC RX 250 WO HCPCS: Performed by: INTERNAL MEDICINE

## 2023-10-06 PROCEDURE — 93010 ELECTROCARDIOGRAM REPORT: CPT | Performed by: INTERNAL MEDICINE

## 2023-10-06 PROCEDURE — 2060000000 HC ICU INTERMEDIATE R&B

## 2023-10-06 PROCEDURE — 99285 EMERGENCY DEPT VISIT HI MDM: CPT

## 2023-10-06 PROCEDURE — 6370000000 HC RX 637 (ALT 250 FOR IP): Performed by: INTERNAL MEDICINE

## 2023-10-06 PROCEDURE — 2580000003 HC RX 258: Performed by: INTERNAL MEDICINE

## 2023-10-06 PROCEDURE — 6360000002 HC RX W HCPCS: Performed by: INTERNAL MEDICINE

## 2023-10-06 RX ORDER — SODIUM CHLORIDE 0.9 % (FLUSH) 0.9 %
5-40 SYRINGE (ML) INJECTION PRN
Status: DISCONTINUED | OUTPATIENT
Start: 2023-10-06 | End: 2023-10-17 | Stop reason: HOSPADM

## 2023-10-06 RX ORDER — MECOBALAMIN 5000 MCG
5 TABLET,DISINTEGRATING ORAL NIGHTLY PRN
Status: DISCONTINUED | OUTPATIENT
Start: 2023-10-06 | End: 2023-10-17 | Stop reason: HOSPADM

## 2023-10-06 RX ORDER — ACETAMINOPHEN 500 MG
500 TABLET ORAL ONCE
Status: COMPLETED | OUTPATIENT
Start: 2023-10-06 | End: 2023-10-06

## 2023-10-06 RX ORDER — SENNOSIDES A AND B 8.6 MG/1
1 TABLET, FILM COATED ORAL 2 TIMES DAILY
Status: DISCONTINUED | OUTPATIENT
Start: 2023-10-06 | End: 2023-10-06

## 2023-10-06 RX ORDER — PANTOPRAZOLE SODIUM 40 MG/1
40 TABLET, DELAYED RELEASE ORAL 2 TIMES DAILY
Status: DISCONTINUED | OUTPATIENT
Start: 2023-10-06 | End: 2023-10-17 | Stop reason: HOSPADM

## 2023-10-06 RX ORDER — ACETAMINOPHEN 325 MG/1
650 TABLET ORAL EVERY 6 HOURS PRN
Status: DISCONTINUED | OUTPATIENT
Start: 2023-10-06 | End: 2023-10-17 | Stop reason: HOSPADM

## 2023-10-06 RX ORDER — QUETIAPINE FUMARATE 200 MG/1
200 TABLET, FILM COATED ORAL NIGHTLY
Status: DISCONTINUED | OUTPATIENT
Start: 2023-10-06 | End: 2023-10-17 | Stop reason: HOSPADM

## 2023-10-06 RX ORDER — CLONIDINE HYDROCHLORIDE 0.1 MG/1
0.1 TABLET ORAL 3 TIMES DAILY
Status: DISCONTINUED | OUTPATIENT
Start: 2023-10-06 | End: 2023-10-12

## 2023-10-06 RX ORDER — SEVELAMER CARBONATE 800 MG/1
800 TABLET, FILM COATED ORAL
Status: DISCONTINUED | OUTPATIENT
Start: 2023-10-07 | End: 2023-10-17 | Stop reason: HOSPADM

## 2023-10-06 RX ORDER — SENNOSIDES A AND B 8.6 MG/1
1 TABLET, FILM COATED ORAL NIGHTLY PRN
Status: DISCONTINUED | OUTPATIENT
Start: 2023-10-06 | End: 2023-10-17 | Stop reason: HOSPADM

## 2023-10-06 RX ORDER — POLYETHYLENE GLYCOL 3350 17 G/17G
34 POWDER, FOR SOLUTION ORAL 2 TIMES DAILY
Status: CANCELLED | OUTPATIENT
Start: 2023-10-06

## 2023-10-06 RX ORDER — POLYETHYLENE GLYCOL 3350 17 G/17G
17 POWDER, FOR SOLUTION ORAL DAILY PRN
Status: DISCONTINUED | OUTPATIENT
Start: 2023-10-06 | End: 2023-10-07 | Stop reason: SDUPTHER

## 2023-10-06 RX ORDER — M-VIT,TX,IRON,MINS/CALC/FOLIC 27MG-0.4MG
1 TABLET ORAL DAILY
Status: DISCONTINUED | OUTPATIENT
Start: 2023-10-07 | End: 2023-10-17 | Stop reason: HOSPADM

## 2023-10-06 RX ORDER — ONDANSETRON 4 MG/1
4 TABLET, ORALLY DISINTEGRATING ORAL EVERY 8 HOURS PRN
Status: DISCONTINUED | OUTPATIENT
Start: 2023-10-06 | End: 2023-10-17 | Stop reason: HOSPADM

## 2023-10-06 RX ORDER — MORPHINE SULFATE 2 MG/ML
2 INJECTION, SOLUTION INTRAMUSCULAR; INTRAVENOUS ONCE
Status: COMPLETED | OUTPATIENT
Start: 2023-10-06 | End: 2023-10-06

## 2023-10-06 RX ORDER — OXYCODONE AND ACETAMINOPHEN 10; 325 MG/1; MG/1
1 TABLET ORAL EVERY 4 HOURS PRN
Status: DISCONTINUED | OUTPATIENT
Start: 2023-10-06 | End: 2023-10-17 | Stop reason: HOSPADM

## 2023-10-06 RX ORDER — DIPHENHYDRAMINE HCL 25 MG
25 TABLET ORAL NIGHTLY
Status: DISCONTINUED | OUTPATIENT
Start: 2023-10-06 | End: 2023-10-06

## 2023-10-06 RX ORDER — FERROUS SULFATE 325(65) MG
325 TABLET ORAL DAILY
Status: DISCONTINUED | OUTPATIENT
Start: 2023-10-07 | End: 2023-10-06

## 2023-10-06 RX ORDER — CARVEDILOL 25 MG/1
25 TABLET ORAL 2 TIMES DAILY
Status: DISCONTINUED | OUTPATIENT
Start: 2023-10-06 | End: 2023-10-10

## 2023-10-06 RX ORDER — HEPARIN SODIUM 5000 [USP'U]/ML
5000 INJECTION, SOLUTION INTRAVENOUS; SUBCUTANEOUS EVERY 8 HOURS SCHEDULED
Status: DISCONTINUED | OUTPATIENT
Start: 2023-10-06 | End: 2023-10-17 | Stop reason: HOSPADM

## 2023-10-06 RX ORDER — CALCITRIOL 0.25 UG/1
0.5 CAPSULE, LIQUID FILLED ORAL DAILY
Status: DISCONTINUED | OUTPATIENT
Start: 2023-10-07 | End: 2023-10-17 | Stop reason: HOSPADM

## 2023-10-06 RX ORDER — SODIUM CHLORIDE 9 MG/ML
INJECTION, SOLUTION INTRAVENOUS PRN
Status: DISCONTINUED | OUTPATIENT
Start: 2023-10-06 | End: 2023-10-17 | Stop reason: HOSPADM

## 2023-10-06 RX ORDER — POLYETHYLENE GLYCOL 3350 17 G/17G
17 POWDER, FOR SOLUTION ORAL DAILY PRN
Status: DISCONTINUED | OUTPATIENT
Start: 2023-10-06 | End: 2023-10-17 | Stop reason: HOSPADM

## 2023-10-06 RX ORDER — QUETIAPINE FUMARATE 200 MG/1
200 TABLET, FILM COATED ORAL NIGHTLY
COMMUNITY

## 2023-10-06 RX ORDER — DIPHENHYDRAMINE HCL 25 MG
25 TABLET ORAL NIGHTLY PRN
Status: DISCONTINUED | OUTPATIENT
Start: 2023-10-06 | End: 2023-10-17 | Stop reason: HOSPADM

## 2023-10-06 RX ORDER — ONDANSETRON 4 MG/1
4 TABLET, ORALLY DISINTEGRATING ORAL EVERY 8 HOURS PRN
Status: DISCONTINUED | OUTPATIENT
Start: 2023-10-06 | End: 2023-10-06

## 2023-10-06 RX ORDER — NIFEDIPINE 30 MG/1
30 TABLET, EXTENDED RELEASE ORAL 2 TIMES DAILY
Status: DISCONTINUED | OUTPATIENT
Start: 2023-10-07 | End: 2023-10-10

## 2023-10-06 RX ORDER — DEXTROSE MONOHYDRATE 100 MG/ML
INJECTION, SOLUTION INTRAVENOUS CONTINUOUS PRN
Status: DISCONTINUED | OUTPATIENT
Start: 2023-10-06 | End: 2023-10-17 | Stop reason: HOSPADM

## 2023-10-06 RX ORDER — RENO CAPS 100; 1.5; 1.7; 20; 10; 1; 150; 5; 6 MG/1; MG/1; MG/1; MG/1; MG/1; MG/1; UG/1; MG/1; UG/1
1 CAPSULE ORAL DAILY
Status: CANCELLED | OUTPATIENT
Start: 2023-10-06

## 2023-10-06 RX ORDER — LIDOCAINE 4 G/G
1 PATCH TOPICAL ONCE
Status: COMPLETED | OUTPATIENT
Start: 2023-10-06 | End: 2023-10-07

## 2023-10-06 RX ORDER — POLYETHYLENE GLYCOL 3350 17 G/17G
17 POWDER, FOR SOLUTION ORAL 2 TIMES DAILY
Status: DISCONTINUED | OUTPATIENT
Start: 2023-10-06 | End: 2023-10-06

## 2023-10-06 RX ORDER — CALCIUM GLUCONATE 94 MG/ML
1000 INJECTION, SOLUTION INTRAVENOUS ONCE
Status: COMPLETED | OUTPATIENT
Start: 2023-10-06 | End: 2023-10-06

## 2023-10-06 RX ORDER — NIFEDIPINE 30 MG/1
30 TABLET, EXTENDED RELEASE ORAL 2 TIMES DAILY
Status: DISCONTINUED | OUTPATIENT
Start: 2023-10-06 | End: 2023-10-06

## 2023-10-06 RX ORDER — POLYETHYLENE GLYCOL 3350 17 G/17G
17 POWDER, FOR SOLUTION ORAL 2 TIMES DAILY
Status: DISCONTINUED | OUTPATIENT
Start: 2023-10-06 | End: 2023-10-06 | Stop reason: CLARIF

## 2023-10-06 RX ORDER — ONDANSETRON 2 MG/ML
4 INJECTION INTRAMUSCULAR; INTRAVENOUS EVERY 6 HOURS PRN
Status: DISCONTINUED | OUTPATIENT
Start: 2023-10-06 | End: 2023-10-17 | Stop reason: HOSPADM

## 2023-10-06 RX ORDER — OMEPRAZOLE 20 MG/1
20 CAPSULE, DELAYED RELEASE ORAL DAILY
COMMUNITY

## 2023-10-06 RX ORDER — MECOBALAMIN 5000 MCG
5 TABLET,DISINTEGRATING ORAL
Status: DISCONTINUED | OUTPATIENT
Start: 2023-10-06 | End: 2023-10-06

## 2023-10-06 RX ORDER — SODIUM CHLORIDE 0.9 % (FLUSH) 0.9 %
5-40 SYRINGE (ML) INJECTION EVERY 12 HOURS SCHEDULED
Status: DISCONTINUED | OUTPATIENT
Start: 2023-10-06 | End: 2023-10-17 | Stop reason: HOSPADM

## 2023-10-06 RX ADMIN — DEXTROSE MONOHYDRATE 250 ML: 100 INJECTION, SOLUTION INTRAVENOUS at 21:51

## 2023-10-06 RX ADMIN — CARVEDILOL 25 MG: 25 TABLET, FILM COATED ORAL at 21:29

## 2023-10-06 RX ADMIN — ACETAMINOPHEN 500 MG: 500 TABLET ORAL at 17:24

## 2023-10-06 RX ADMIN — INSULIN HUMAN 10 UNITS: 100 INJECTION, SOLUTION PARENTERAL at 21:52

## 2023-10-06 RX ADMIN — MORPHINE SULFATE 2 MG: 2 INJECTION, SOLUTION INTRAMUSCULAR; INTRAVENOUS at 17:24

## 2023-10-06 RX ADMIN — SODIUM ZIRCONIUM CYCLOSILICATE 15 G: 10 POWDER, FOR SUSPENSION ORAL at 18:47

## 2023-10-06 RX ADMIN — QUETIAPINE FUMARATE 200 MG: 200 TABLET ORAL at 21:29

## 2023-10-06 RX ADMIN — HYDROMORPHONE HYDROCHLORIDE 1 MG: 1 INJECTION, SOLUTION INTRAMUSCULAR; INTRAVENOUS; SUBCUTANEOUS at 20:18

## 2023-10-06 RX ADMIN — ONDANSETRON 4 MG: 2 INJECTION INTRAMUSCULAR; INTRAVENOUS at 21:29

## 2023-10-06 RX ADMIN — SODIUM CHLORIDE, PRESERVATIVE FREE 10 ML: 5 INJECTION INTRAVENOUS at 21:32

## 2023-10-06 RX ADMIN — CLONIDINE HYDROCHLORIDE 0.1 MG: 0.1 TABLET ORAL at 21:29

## 2023-10-06 RX ADMIN — PANTOPRAZOLE SODIUM 40 MG: 40 TABLET, DELAYED RELEASE ORAL at 21:29

## 2023-10-06 RX ADMIN — HEPARIN SODIUM 5000 UNITS: 5000 INJECTION INTRAVENOUS; SUBCUTANEOUS at 21:29

## 2023-10-06 RX ADMIN — CALCIUM GLUCONATE 1000 MG: 98 INJECTION, SOLUTION INTRAVENOUS at 18:09

## 2023-10-06 ASSESSMENT — ENCOUNTER SYMPTOMS
SHORTNESS OF BREATH: 0
COUGH: 0
EYE DISCHARGE: 0
EYE REDNESS: 0
CONSTIPATION: 0
ABDOMINAL PAIN: 0
NAUSEA: 0
CHEST TIGHTNESS: 0
RHINORRHEA: 0
DIARRHEA: 0
VOMITING: 0
SINUS PRESSURE: 0
SORE THROAT: 0
SINUS PAIN: 0

## 2023-10-06 ASSESSMENT — PAIN SCALES - GENERAL
PAINLEVEL_OUTOF10: 10
PAINLEVEL_OUTOF10: 9
PAINLEVEL_OUTOF10: 7
PAINLEVEL_OUTOF10: 10

## 2023-10-06 ASSESSMENT — PAIN - FUNCTIONAL ASSESSMENT: PAIN_FUNCTIONAL_ASSESSMENT: 0-10

## 2023-10-06 ASSESSMENT — LIFESTYLE VARIABLES
HOW OFTEN DO YOU HAVE A DRINK CONTAINING ALCOHOL: NEVER
HOW MANY STANDARD DRINKS CONTAINING ALCOHOL DO YOU HAVE ON A TYPICAL DAY: PATIENT DOES NOT DRINK

## 2023-10-06 ASSESSMENT — PAIN DESCRIPTION - LOCATION: LOCATION: PELVIS

## 2023-10-06 NOTE — ED PROVIDER NOTES
I independently performed a history and physical on Germaine Cox. I have completed a substantive portion of the visit including all aspects of the medical decision making. All diagnostic, treatment, and disposition decisions were made by myself in conjunction with the advanced practice provider/resident. In summary the patient presents with refractory pelvic pain in the context of a recent pelvic fracture after a ground-level fall which has prohibited the patient from attending dialysis. On my examination she is alert and conversant in little acute distress afebrile hemodynamically stable. Breath sounds are clear abdomen soft nontender nontender distended extremities are warm and well-perfused. We will seek to manage her pain acutely here in the emergency department assess for features suggesting need for emergent dialysis but anticipate she will require admission to further support her recovery. I independently reviewed the ECG as follows:    Sinus rhythm at a rate of 88 without ectopy. Normal axis. First-degree AV block MIRIAN 234 otherwise normal intervals. No evidence of acute ischemia. No significant changes when compared to prior dated September 30, 2023. For further details of [address] [place] [address] emergency department encounter, please see the JED/resident's documentation.       MD Neelima Lehman MD  10/06/23 6426

## 2023-10-06 NOTE — PLAN OF CARE
51-year-old female, ESRD, on hemodialysis Tuesday Thursday Saturday. Missed dialysis yesterday due to increased pain. Patient with recent pubic fracture, was seen and discharged from , on Percocet for pain which is not helping. Potassium 6.9 in the ED-calcium gluconate and Lokelma given. Nephrology coming in for emergent dialysis. Percocet not helping her pain, will add Dilaudid as needed.   PT OT and case management consult , patient will need placement

## 2023-10-06 NOTE — ED NOTES
Report given to [facility]. All questions answered.       Pearl Ponce RN  10/06/23 1946

## 2023-10-06 NOTE — ED PROVIDER NOTES
[address] [address] ED  EMERGENCY DEPARTMENT ENCOUNTER        Pt Name: Nico Roman  MRN: [id number]  [address] [facility] [place] 1990  Date of evaluation: 10/6/2023  Provider: Agus Duarte PA-C  PCP: Michelle Bellamy MD  Note Started: 4:20 PM EDT 10/6/23       I have seen and evaluated this patient with my supervising physician     [address]       Chief Complaint   Patient presents with    Hip Pain     Fractured pelvis about week and a half ago. Was at [facility] and d/c from there 10/4. HD patient and missed treatment yesterday d/t pain and unable to get out of bed. History of previous CVA July 2023. HISTORY OF PRESENT ILLNESS: 1 or more Elements     History from : Patient    Limitations to history : None    Krishna Mg is a 34 y.o. female with PMH of ESRD on dialysis T/Th/Sa, prior IVDU, hx of prior septic pulm emboli, splenic infarction, spinal ostoemyelitis, endocarditis, hx of ascites requiring multiple paracenteses, liver cirrhosis, hep C, hemorrhagic CVA, depression, asthma, fall on 09/30/23 sustained a pubic ramis fracture, d/c from  on Wednesday, 2 days pta. Patient advised pain medications are not helping, she can not ambulate and the pain caused her to miss dialysis yesterday. No new injuries since discharge. Sent home with percocet, 12 doses. Not helping pain. Living with mother since she had a stroke. Can not ambulate or care for herself, mother tried to help her but can not. Thinks she should have been sent to rehab facility instead of home on d/c from [facility]      Nursing Notes were all reviewed and agreed with or any disagreements were addressed in the HPI. REVIEW OF SYSTEMS :      Review of Systems   Constitutional:  Negative for chills and fever. HENT: Negative. Negative for congestion, rhinorrhea, sinus pressure, sinus pain and sore throat. Eyes:  Negative for discharge, redness and visual disturbance.    Respiratory:  Negative for cough,

## 2023-10-06 NOTE — H&P
V2.0  History and Physical      Name:  Theo Orozco /Age/Sex: 1990  (34 y.o. female)   MRN & CSN:  [id number] & [id number] Encounter Date/Time: 10/6/2023 7:23 PM EDT   Location:  [facility] [place]-97 PCP: Pankaj Sharma MD       Assessment and Plan:   Theo Orozco is a 34 y.o. female with ESRD on HD TTS, recent left superior and inferior ramus fracture presented with worsening pelvic pain. Refractory pelvic pain  Left superior and inferior ramus fracture secondary to fall. Discharged on 10/4/2023 from , no surgical intervention performed and patient was discharged on pain regimen during Tylenol tizanidine and oxycodone. Optimize pain regimen  PT OT when able, may require placement    Hyperkalemia, secondary to missed HD  ESRD on HD TTS  Last session on Monday, unable to go due to pubic pain.  6.9>6.4>7.3  EKG without any toxic changes. Discussed with nephrology for urgent HD as hyperkalemia is nonamenable to medical management. Dr. Aleena Russell is calling in dialysis team.    Hypertension, uncontrolled  Due to missed HD  Continue home regimen clonidine, Coreg and Procardia    Mood disorder  Continue Seroquel    GERD  Continue Protonix    History of hemorrhagic CVA  History of infective endocarditis  History of septic pulmonary emboli    Inpatient, PCU telemetry  Full code    Disposition:   Current Living situation: Home  Expected Disposition: Home versus SNF  Estimated D/C: 2-3 days    Diet ADULT DIET;  Regular; Low Potassium (Less than 3000 mg/day)   DVT Prophylaxis [x] Lovenox, []  Heparin, [] SCDs, [] Ambulation,  [] Eliquis, [] Xarelto, [] Coumadin   Code Status Full Code   Surrogate Decision Maker/ POA Yamileth     Personally reviewed Lab Studies and Imaging     History from:     Patient     History of Present Illness:     Chief Complaint: Refractory pelvic pain    Theo Orozco is a 34 y.o. female with ESRD on HD TTS, recent left superior and inferior ramus fracture presented with worsening pelvic

## 2023-10-07 PROBLEM — S32.592S PUBIC RAMUS FRACTURE, LEFT, SEQUELA: Status: ACTIVE | Noted: 2023-10-07

## 2023-10-07 LAB
ANION GAP SERPL CALCULATED.3IONS-SCNC: 17 MMOL/L (ref 3–16)
BASOPHILS # BLD: 0 K/UL (ref 0–0.2)
BASOPHILS NFR BLD: 0.6 %
BUN SERPL-MCNC: 69 MG/DL (ref 7–20)
CALCIUM SERPL-MCNC: 8.9 MG/DL (ref 8.3–10.6)
CHLORIDE SERPL-SCNC: 93 MMOL/L (ref 99–110)
CO2 SERPL-SCNC: 23 MMOL/L (ref 21–32)
CREAT SERPL-MCNC: 9.2 MG/DL (ref 0.6–1.1)
DEPRECATED RDW RBC AUTO: 19.7 % (ref 12.4–15.4)
EKG ATRIAL RATE: 95 BPM
EKG DIAGNOSIS: NORMAL
EKG P AXIS: 45 DEGREES
EKG P-R INTERVAL: 250 MS
EKG Q-T INTERVAL: 370 MS
EKG QRS DURATION: 90 MS
EKG QTC CALCULATION (BAZETT): 464 MS
EKG R AXIS: 111 DEGREES
EKG T AXIS: 150 DEGREES
EKG VENTRICULAR RATE: 95 BPM
EOSINOPHIL # BLD: 0.4 K/UL (ref 0–0.6)
EOSINOPHIL NFR BLD: 6.5 %
GFR SERPLBLD CREATININE-BSD FMLA CKD-EPI: 5 ML/MIN/{1.73_M2}
GLUCOSE BLD-MCNC: 100 MG/DL (ref 70–99)
GLUCOSE BLD-MCNC: 100 MG/DL (ref 70–99)
GLUCOSE BLD-MCNC: 102 MG/DL (ref 70–99)
GLUCOSE BLD-MCNC: 104 MG/DL (ref 70–99)
GLUCOSE BLD-MCNC: 111 MG/DL (ref 70–99)
GLUCOSE BLD-MCNC: 112 MG/DL (ref 70–99)
GLUCOSE BLD-MCNC: 119 MG/DL (ref 70–99)
GLUCOSE BLD-MCNC: 124 MG/DL (ref 70–99)
GLUCOSE BLD-MCNC: 201 MG/DL (ref 70–99)
GLUCOSE BLD-MCNC: 81 MG/DL (ref 70–99)
GLUCOSE BLD-MCNC: >600 MG/DL (ref 70–99)
GLUCOSE SERPL-MCNC: 97 MG/DL (ref 70–99)
HCT VFR BLD AUTO: 25.3 % (ref 36–48)
HGB BLD-MCNC: 8.5 G/DL (ref 12–16)
LYMPHOCYTES # BLD: 2.2 K/UL (ref 1–5.1)
LYMPHOCYTES NFR BLD: 31.6 %
MCH RBC QN AUTO: 28.9 PG (ref 26–34)
MCHC RBC AUTO-ENTMCNC: 33.8 G/DL (ref 31–36)
MCV RBC AUTO: 85.6 FL (ref 80–100)
MONOCYTES # BLD: 0.6 K/UL (ref 0–1.3)
MONOCYTES NFR BLD: 8.1 %
NEUTROPHILS # BLD: 3.6 K/UL (ref 1.7–7.7)
NEUTROPHILS NFR BLD: 53.2 %
PERFORMED ON: ABNORMAL
PERFORMED ON: NORMAL
PLATELET # BLD AUTO: 169 K/UL (ref 135–450)
PMV BLD AUTO: 7.2 FL (ref 5–10.5)
POTASSIUM SERPL-SCNC: 3.4 MMOL/L (ref 3.5–5.1)
POTASSIUM SERPL-SCNC: 7 MMOL/L (ref 3.5–5.1)
POTASSIUM SERPL-SCNC: 7.3 MMOL/L (ref 3.5–5.1)
RBC # BLD AUTO: 2.95 M/UL (ref 4–5.2)
SODIUM SERPL-SCNC: 133 MMOL/L (ref 136–145)
WBC # BLD AUTO: 6.8 K/UL (ref 4–11)

## 2023-10-07 PROCEDURE — 2580000003 HC RX 258: Performed by: INTERNAL MEDICINE

## 2023-10-07 PROCEDURE — 99233 SBSQ HOSP IP/OBS HIGH 50: CPT | Performed by: INTERNAL MEDICINE

## 2023-10-07 PROCEDURE — 6370000000 HC RX 637 (ALT 250 FOR IP): Performed by: INTERNAL MEDICINE

## 2023-10-07 PROCEDURE — 93010 ELECTROCARDIOGRAM REPORT: CPT | Performed by: INTERNAL MEDICINE

## 2023-10-07 PROCEDURE — 2500000003 HC RX 250 WO HCPCS: Performed by: INTERNAL MEDICINE

## 2023-10-07 PROCEDURE — 80048 BASIC METABOLIC PNL TOTAL CA: CPT

## 2023-10-07 PROCEDURE — 85025 COMPLETE CBC W/AUTO DIFF WBC: CPT

## 2023-10-07 PROCEDURE — 90935 HEMODIALYSIS ONE EVALUATION: CPT

## 2023-10-07 PROCEDURE — 2060000000 HC ICU INTERMEDIATE R&B

## 2023-10-07 PROCEDURE — 6370000000 HC RX 637 (ALT 250 FOR IP): Performed by: STUDENT IN AN ORGANIZED HEALTH CARE EDUCATION/TRAINING PROGRAM

## 2023-10-07 PROCEDURE — 2580000003 HC RX 258: Performed by: STUDENT IN AN ORGANIZED HEALTH CARE EDUCATION/TRAINING PROGRAM

## 2023-10-07 PROCEDURE — 6360000002 HC RX W HCPCS: Performed by: INTERNAL MEDICINE

## 2023-10-07 PROCEDURE — 93005 ELECTROCARDIOGRAM TRACING: CPT | Performed by: INTERNAL MEDICINE

## 2023-10-07 PROCEDURE — 5A1D70Z PERFORMANCE OF URINARY FILTRATION, INTERMITTENT, LESS THAN 6 HOURS PER DAY: ICD-10-PCS | Performed by: INTERNAL MEDICINE

## 2023-10-07 PROCEDURE — 6360000002 HC RX W HCPCS: Performed by: STUDENT IN AN ORGANIZED HEALTH CARE EDUCATION/TRAINING PROGRAM

## 2023-10-07 PROCEDURE — 36415 COLL VENOUS BLD VENIPUNCTURE: CPT

## 2023-10-07 PROCEDURE — 99255 IP/OBS CONSLTJ NEW/EST HI 80: CPT | Performed by: INTERNAL MEDICINE

## 2023-10-07 PROCEDURE — 84132 ASSAY OF SERUM POTASSIUM: CPT

## 2023-10-07 RX ORDER — ISOSORBIDE DINITRATE 20 MG/1
20 TABLET ORAL 3 TIMES DAILY
Status: DISCONTINUED | OUTPATIENT
Start: 2023-10-07 | End: 2023-10-17 | Stop reason: HOSPADM

## 2023-10-07 RX ORDER — CLONIDINE HYDROCHLORIDE 0.1 MG/1
0.1 TABLET ORAL ONCE
Status: COMPLETED | OUTPATIENT
Start: 2023-10-07 | End: 2023-10-07

## 2023-10-07 RX ORDER — HEPARIN SODIUM 1000 [USP'U]/ML
3200 INJECTION, SOLUTION INTRAVENOUS; SUBCUTANEOUS PRN
Status: DISCONTINUED | OUTPATIENT
Start: 2023-10-07 | End: 2023-10-17 | Stop reason: HOSPADM

## 2023-10-07 RX ORDER — CALCIUM GLUCONATE 94 MG/ML
1000 INJECTION, SOLUTION INTRAVENOUS ONCE
Status: COMPLETED | OUTPATIENT
Start: 2023-10-07 | End: 2023-10-07

## 2023-10-07 RX ORDER — DEXTROSE MONOHYDRATE 100 MG/ML
INJECTION, SOLUTION INTRAVENOUS CONTINUOUS PRN
Status: DISCONTINUED | OUTPATIENT
Start: 2023-10-07 | End: 2023-10-07 | Stop reason: SDUPTHER

## 2023-10-07 RX ADMIN — HEPARIN SODIUM 5000 UNITS: 5000 INJECTION INTRAVENOUS; SUBCUTANEOUS at 14:20

## 2023-10-07 RX ADMIN — CLONIDINE HYDROCHLORIDE 0.1 MG: 0.1 TABLET ORAL at 10:17

## 2023-10-07 RX ADMIN — PANTOPRAZOLE SODIUM 40 MG: 40 TABLET, DELAYED RELEASE ORAL at 20:09

## 2023-10-07 RX ADMIN — CARVEDILOL 25 MG: 25 TABLET, FILM COATED ORAL at 20:09

## 2023-10-07 RX ADMIN — OXYCODONE HYDROCHLORIDE AND ACETAMINOPHEN 1 TABLET: 10; 325 TABLET ORAL at 11:07

## 2023-10-07 RX ADMIN — HYDROMORPHONE HYDROCHLORIDE 1 MG: 1 INJECTION, SOLUTION INTRAMUSCULAR; INTRAVENOUS; SUBCUTANEOUS at 16:28

## 2023-10-07 RX ADMIN — ISOSORBIDE DINITRATE 20 MG: 20 TABLET ORAL at 20:09

## 2023-10-07 RX ADMIN — SODIUM ZIRCONIUM CYCLOSILICATE 15 G: 10 POWDER, FOR SUSPENSION ORAL at 04:57

## 2023-10-07 RX ADMIN — CALCIUM GLUCONATE 1000 MG: 98 INJECTION, SOLUTION INTRAVENOUS at 04:59

## 2023-10-07 RX ADMIN — SODIUM CHLORIDE, PRESERVATIVE FREE 10 ML: 5 INJECTION INTRAVENOUS at 20:11

## 2023-10-07 RX ADMIN — HEPARIN SODIUM 5000 UNITS: 5000 INJECTION INTRAVENOUS; SUBCUTANEOUS at 21:55

## 2023-10-07 RX ADMIN — MULTIPLE VITAMINS W/ MINERALS TAB 1 TABLET: TAB at 12:22

## 2023-10-07 RX ADMIN — PANTOPRAZOLE SODIUM 40 MG: 40 TABLET, DELAYED RELEASE ORAL at 12:22

## 2023-10-07 RX ADMIN — ISOSORBIDE DINITRATE 20 MG: 20 TABLET ORAL at 16:08

## 2023-10-07 RX ADMIN — DIPHENHYDRAMINE HCL 25 MG: 25 TABLET ORAL at 21:56

## 2023-10-07 RX ADMIN — SODIUM CHLORIDE, PRESERVATIVE FREE 10 ML: 5 INJECTION INTRAVENOUS at 12:26

## 2023-10-07 RX ADMIN — NIFEDIPINE 30 MG: 30 TABLET, FILM COATED, EXTENDED RELEASE ORAL at 20:09

## 2023-10-07 RX ADMIN — SEVELAMER CARBONATE 800 MG: 800 TABLET, FILM COATED ORAL at 09:04

## 2023-10-07 RX ADMIN — INSULIN HUMAN 10 UNITS: 100 INJECTION, SOLUTION PARENTERAL at 04:51

## 2023-10-07 RX ADMIN — CARVEDILOL 25 MG: 25 TABLET, FILM COATED ORAL at 09:08

## 2023-10-07 RX ADMIN — CLONIDINE HYDROCHLORIDE 0.1 MG: 0.1 TABLET ORAL at 20:09

## 2023-10-07 RX ADMIN — OXYCODONE HYDROCHLORIDE AND ACETAMINOPHEN 1 TABLET: 10; 325 TABLET ORAL at 15:29

## 2023-10-07 RX ADMIN — HYDROMORPHONE HYDROCHLORIDE 1 MG: 1 INJECTION, SOLUTION INTRAMUSCULAR; INTRAVENOUS; SUBCUTANEOUS at 03:45

## 2023-10-07 RX ADMIN — QUETIAPINE FUMARATE 200 MG: 200 TABLET ORAL at 20:09

## 2023-10-07 RX ADMIN — SEVELAMER CARBONATE 800 MG: 800 TABLET, FILM COATED ORAL at 12:22

## 2023-10-07 RX ADMIN — HYDROMORPHONE HYDROCHLORIDE 1 MG: 1 INJECTION, SOLUTION INTRAMUSCULAR; INTRAVENOUS; SUBCUTANEOUS at 21:56

## 2023-10-07 RX ADMIN — OXYCODONE HYDROCHLORIDE AND ACETAMINOPHEN 1 TABLET: 10; 325 TABLET ORAL at 20:09

## 2023-10-07 RX ADMIN — CLONIDINE HYDROCHLORIDE 0.1 MG: 0.1 TABLET ORAL at 09:09

## 2023-10-07 RX ADMIN — DEXTROSE MONOHYDRATE 250 ML: 100 INJECTION, SOLUTION INTRAVENOUS at 04:50

## 2023-10-07 RX ADMIN — CLONIDINE HYDROCHLORIDE 0.1 MG: 0.1 TABLET ORAL at 14:19

## 2023-10-07 RX ADMIN — HYDROMORPHONE HYDROCHLORIDE 1 MG: 1 INJECTION, SOLUTION INTRAMUSCULAR; INTRAVENOUS; SUBCUTANEOUS at 12:22

## 2023-10-07 RX ADMIN — SEVELAMER CARBONATE 800 MG: 800 TABLET, FILM COATED ORAL at 16:27

## 2023-10-07 RX ADMIN — NIFEDIPINE 30 MG: 30 TABLET, FILM COATED, EXTENDED RELEASE ORAL at 09:08

## 2023-10-07 RX ADMIN — CALCITRIOL 0.5 MCG: 0.25 CAPSULE ORAL at 09:09

## 2023-10-07 RX ADMIN — ONDANSETRON 4 MG: 2 INJECTION INTRAMUSCULAR; INTRAVENOUS at 20:09

## 2023-10-07 ASSESSMENT — PAIN DESCRIPTION - ORIENTATION
ORIENTATION: LEFT

## 2023-10-07 ASSESSMENT — PAIN SCALES - GENERAL
PAINLEVEL_OUTOF10: 9
PAINLEVEL_OUTOF10: 7
PAINLEVEL_OUTOF10: 7
PAINLEVEL_OUTOF10: 8
PAINLEVEL_OUTOF10: 0
PAINLEVEL_OUTOF10: 8
PAINLEVEL_OUTOF10: 6
PAINLEVEL_OUTOF10: 8

## 2023-10-07 ASSESSMENT — PAIN DESCRIPTION - LOCATION
LOCATION: PELVIS

## 2023-10-07 ASSESSMENT — PAIN DESCRIPTION - DESCRIPTORS
DESCRIPTORS: ACHING

## 2023-10-07 NOTE — PLAN OF CARE
Problem: Discharge Planning  Goal: Discharge to home or other facility with appropriate resources  Outcome: Progressing  Flowsheets (Taken 10/6/2023 2153)  Discharge to home or other facility with appropriate resources:   Identify barriers to discharge with patient and caregiver   Identify discharge learning needs (meds, wound care, etc)   Arrange for needed discharge resources and transportation as appropriate     Problem: Pain  Goal: Verbalizes/displays adequate comfort level or baseline comfort level  Outcome: Progressing     Problem: Safety - Adult  Goal: Free from fall injury  Outcome: Progressing     Problem: Skin/Tissue Integrity  Goal: Absence of new skin breakdown  Description: 1. Monitor for areas of redness and/or skin breakdown  2. Assess vascular access sites hourly  3. Every 4-6 hours minimum:  Change oxygen saturation probe site  4. Every 4-6 hours:  If on nasal continuous positive airway pressure, respiratory therapy assess nares and determine need for appliance change or resting period.   Outcome: Progressing

## 2023-10-08 LAB
ANION GAP SERPL CALCULATED.3IONS-SCNC: 15 MMOL/L (ref 3–16)
BASOPHILS # BLD: 0 K/UL (ref 0–0.2)
BASOPHILS NFR BLD: 0.7 %
BUN SERPL-MCNC: 36 MG/DL (ref 7–20)
CALCIUM SERPL-MCNC: 9.1 MG/DL (ref 8.3–10.6)
CHLORIDE SERPL-SCNC: 95 MMOL/L (ref 99–110)
CO2 SERPL-SCNC: 22 MMOL/L (ref 21–32)
CREAT SERPL-MCNC: 5.6 MG/DL (ref 0.6–1.1)
DEPRECATED RDW RBC AUTO: 19.2 % (ref 12.4–15.4)
EOSINOPHIL # BLD: 0.3 K/UL (ref 0–0.6)
EOSINOPHIL NFR BLD: 6.5 %
GFR SERPLBLD CREATININE-BSD FMLA CKD-EPI: 10 ML/MIN/{1.73_M2}
GLUCOSE BLD-MCNC: 100 MG/DL (ref 70–99)
GLUCOSE BLD-MCNC: 101 MG/DL (ref 70–99)
GLUCOSE SERPL-MCNC: 94 MG/DL (ref 70–99)
HCT VFR BLD AUTO: 25.6 % (ref 36–48)
HGB BLD-MCNC: 8.4 G/DL (ref 12–16)
LYMPHOCYTES # BLD: 1.8 K/UL (ref 1–5.1)
LYMPHOCYTES NFR BLD: 37.9 %
MCH RBC QN AUTO: 28.7 PG (ref 26–34)
MCHC RBC AUTO-ENTMCNC: 32.7 G/DL (ref 31–36)
MCV RBC AUTO: 87.9 FL (ref 80–100)
MONOCYTES # BLD: 0.5 K/UL (ref 0–1.3)
MONOCYTES NFR BLD: 10.5 %
NEUTROPHILS # BLD: 2.2 K/UL (ref 1.7–7.7)
NEUTROPHILS NFR BLD: 44.4 %
PERFORMED ON: ABNORMAL
PERFORMED ON: ABNORMAL
PLATELET # BLD AUTO: 175 K/UL (ref 135–450)
PMV BLD AUTO: 7.4 FL (ref 5–10.5)
POTASSIUM SERPL-SCNC: 5.2 MMOL/L (ref 3.5–5.1)
RBC # BLD AUTO: 2.91 M/UL (ref 4–5.2)
SODIUM SERPL-SCNC: 132 MMOL/L (ref 136–145)
WBC # BLD AUTO: 4.9 K/UL (ref 4–11)

## 2023-10-08 PROCEDURE — 6360000002 HC RX W HCPCS: Performed by: INTERNAL MEDICINE

## 2023-10-08 PROCEDURE — 85025 COMPLETE CBC W/AUTO DIFF WBC: CPT

## 2023-10-08 PROCEDURE — 97530 THERAPEUTIC ACTIVITIES: CPT

## 2023-10-08 PROCEDURE — 97535 SELF CARE MNGMENT TRAINING: CPT

## 2023-10-08 PROCEDURE — 6370000000 HC RX 637 (ALT 250 FOR IP): Performed by: INTERNAL MEDICINE

## 2023-10-08 PROCEDURE — 99232 SBSQ HOSP IP/OBS MODERATE 35: CPT | Performed by: INTERNAL MEDICINE

## 2023-10-08 PROCEDURE — 2060000000 HC ICU INTERMEDIATE R&B

## 2023-10-08 PROCEDURE — 80048 BASIC METABOLIC PNL TOTAL CA: CPT

## 2023-10-08 PROCEDURE — 2580000003 HC RX 258: Performed by: INTERNAL MEDICINE

## 2023-10-08 PROCEDURE — 97162 PT EVAL MOD COMPLEX 30 MIN: CPT

## 2023-10-08 PROCEDURE — 36415 COLL VENOUS BLD VENIPUNCTURE: CPT

## 2023-10-08 PROCEDURE — 2500000003 HC RX 250 WO HCPCS: Performed by: INTERNAL MEDICINE

## 2023-10-08 PROCEDURE — 97167 OT EVAL HIGH COMPLEX 60 MIN: CPT

## 2023-10-08 RX ADMIN — DIPHENHYDRAMINE HCL 25 MG: 25 TABLET ORAL at 21:42

## 2023-10-08 RX ADMIN — CALCITRIOL 0.5 MCG: 0.25 CAPSULE ORAL at 08:43

## 2023-10-08 RX ADMIN — PANTOPRAZOLE SODIUM 40 MG: 40 TABLET, DELAYED RELEASE ORAL at 08:45

## 2023-10-08 RX ADMIN — SEVELAMER CARBONATE 800 MG: 800 TABLET, FILM COATED ORAL at 11:36

## 2023-10-08 RX ADMIN — CLONIDINE HYDROCHLORIDE 0.1 MG: 0.1 TABLET ORAL at 13:38

## 2023-10-08 RX ADMIN — OXYCODONE HYDROCHLORIDE AND ACETAMINOPHEN 1 TABLET: 10; 325 TABLET ORAL at 16:15

## 2023-10-08 RX ADMIN — OXYCODONE HYDROCHLORIDE AND ACETAMINOPHEN 1 TABLET: 10; 325 TABLET ORAL at 05:45

## 2023-10-08 RX ADMIN — ISOSORBIDE DINITRATE 20 MG: 20 TABLET ORAL at 08:43

## 2023-10-08 RX ADMIN — HYDROMORPHONE HYDROCHLORIDE 1 MG: 1 INJECTION, SOLUTION INTRAMUSCULAR; INTRAVENOUS; SUBCUTANEOUS at 18:15

## 2023-10-08 RX ADMIN — QUETIAPINE FUMARATE 200 MG: 200 TABLET ORAL at 21:42

## 2023-10-08 RX ADMIN — CLONIDINE HYDROCHLORIDE 0.1 MG: 0.1 TABLET ORAL at 08:43

## 2023-10-08 RX ADMIN — ONDANSETRON 4 MG: 2 INJECTION INTRAMUSCULAR; INTRAVENOUS at 05:45

## 2023-10-08 RX ADMIN — SEVELAMER CARBONATE 800 MG: 800 TABLET, FILM COATED ORAL at 08:43

## 2023-10-08 RX ADMIN — MULTIPLE VITAMINS W/ MINERALS TAB 1 TABLET: TAB at 08:43

## 2023-10-08 RX ADMIN — OXYCODONE HYDROCHLORIDE AND ACETAMINOPHEN 1 TABLET: 10; 325 TABLET ORAL at 21:42

## 2023-10-08 RX ADMIN — HEPARIN SODIUM 5000 UNITS: 5000 INJECTION INTRAVENOUS; SUBCUTANEOUS at 21:43

## 2023-10-08 RX ADMIN — NIFEDIPINE 30 MG: 30 TABLET, FILM COATED, EXTENDED RELEASE ORAL at 08:43

## 2023-10-08 RX ADMIN — OXYCODONE HYDROCHLORIDE AND ACETAMINOPHEN 1 TABLET: 10; 325 TABLET ORAL at 11:36

## 2023-10-08 RX ADMIN — CARVEDILOL 25 MG: 25 TABLET, FILM COATED ORAL at 21:42

## 2023-10-08 RX ADMIN — PANTOPRAZOLE SODIUM 40 MG: 40 TABLET, DELAYED RELEASE ORAL at 21:42

## 2023-10-08 RX ADMIN — HEPARIN SODIUM 5000 UNITS: 5000 INJECTION INTRAVENOUS; SUBCUTANEOUS at 13:38

## 2023-10-08 RX ADMIN — HYDROMORPHONE HYDROCHLORIDE 1 MG: 1 INJECTION, SOLUTION INTRAMUSCULAR; INTRAVENOUS; SUBCUTANEOUS at 13:38

## 2023-10-08 RX ADMIN — ONDANSETRON 4 MG: 2 INJECTION INTRAMUSCULAR; INTRAVENOUS at 21:42

## 2023-10-08 RX ADMIN — ISOSORBIDE DINITRATE 20 MG: 20 TABLET ORAL at 13:38

## 2023-10-08 RX ADMIN — SODIUM ZIRCONIUM CYCLOSILICATE 10 G: 10 POWDER, FOR SUSPENSION ORAL at 16:12

## 2023-10-08 RX ADMIN — CLONIDINE HYDROCHLORIDE 0.1 MG: 0.1 TABLET ORAL at 21:42

## 2023-10-08 RX ADMIN — CARVEDILOL 25 MG: 25 TABLET, FILM COATED ORAL at 08:43

## 2023-10-08 RX ADMIN — HYDROMORPHONE HYDROCHLORIDE 1 MG: 1 INJECTION, SOLUTION INTRAMUSCULAR; INTRAVENOUS; SUBCUTANEOUS at 08:40

## 2023-10-08 RX ADMIN — HEPARIN SODIUM 5000 UNITS: 5000 INJECTION INTRAVENOUS; SUBCUTANEOUS at 05:44

## 2023-10-08 RX ADMIN — ISOSORBIDE DINITRATE 20 MG: 20 TABLET ORAL at 21:42

## 2023-10-08 RX ADMIN — SODIUM CHLORIDE, PRESERVATIVE FREE 10 ML: 5 INJECTION INTRAVENOUS at 21:45

## 2023-10-08 RX ADMIN — SEVELAMER CARBONATE 800 MG: 800 TABLET, FILM COATED ORAL at 16:12

## 2023-10-08 RX ADMIN — SODIUM CHLORIDE, PRESERVATIVE FREE 10 ML: 5 INJECTION INTRAVENOUS at 08:41

## 2023-10-08 RX ADMIN — SODIUM ZIRCONIUM CYCLOSILICATE 10 G: 10 POWDER, FOR SUSPENSION ORAL at 21:45

## 2023-10-08 RX ADMIN — NIFEDIPINE 30 MG: 30 TABLET, FILM COATED, EXTENDED RELEASE ORAL at 21:42

## 2023-10-08 ASSESSMENT — PAIN SCALES - GENERAL
PAINLEVEL_OUTOF10: 8
PAINLEVEL_OUTOF10: 8
PAINLEVEL_OUTOF10: 9
PAINLEVEL_OUTOF10: 6
PAINLEVEL_OUTOF10: 8
PAINLEVEL_OUTOF10: 7
PAINLEVEL_OUTOF10: 7
PAINLEVEL_OUTOF10: 8
PAINLEVEL_OUTOF10: 4

## 2023-10-08 ASSESSMENT — PAIN DESCRIPTION - LOCATION
LOCATION: GENERALIZED
LOCATION: ABDOMEN;BACK;PELVIS
LOCATION: ABDOMEN;PELVIS
LOCATION: ABDOMEN;PELVIS
LOCATION: ABDOMEN;BACK;PELVIS
LOCATION: ABDOMEN;PELVIS

## 2023-10-08 ASSESSMENT — PAIN - FUNCTIONAL ASSESSMENT
PAIN_FUNCTIONAL_ASSESSMENT: PREVENTS OR INTERFERES SOME ACTIVE ACTIVITIES AND ADLS

## 2023-10-08 ASSESSMENT — PAIN DESCRIPTION - PAIN TYPE: TYPE: ACUTE PAIN

## 2023-10-08 ASSESSMENT — PAIN DESCRIPTION - ORIENTATION
ORIENTATION: RIGHT;LEFT
ORIENTATION: RIGHT;LEFT
ORIENTATION: RIGHT;LEFT;MID
ORIENTATION: RIGHT;LEFT
ORIENTATION: RIGHT;LEFT

## 2023-10-08 ASSESSMENT — PAIN DESCRIPTION - DESCRIPTORS
DESCRIPTORS: ACHING;DISCOMFORT
DESCRIPTORS: ACHING
DESCRIPTORS: ACHING;DISCOMFORT

## 2023-10-08 NOTE — PLAN OF CARE
Problem: Discharge Planning  Goal: Discharge to home or other facility with appropriate resources  Outcome: Progressing  Flowsheets (Taken 10/7/2023 2013)  Discharge to home or other facility with appropriate resources:   Identify barriers to discharge with patient and caregiver   Arrange for needed discharge resources and transportation as appropriate   Identify discharge learning needs (meds, wound care, etc)     Problem: Pain  Goal: Verbalizes/displays adequate comfort level or baseline comfort level  Outcome: Progressing     Problem: Safety - Adult  Goal: Free from fall injury  Outcome: Progressing     Problem: Skin/Tissue Integrity  Goal: Absence of new skin breakdown  Description: 1. Monitor for areas of redness and/or skin breakdown  2. Assess vascular access sites hourly  3. Every 4-6 hours minimum:  Change oxygen saturation probe site  4. Every 4-6 hours:  If on nasal continuous positive airway pressure, respiratory therapy assess nares and determine need for appliance change or resting period.   Outcome: Progressing

## 2023-10-09 LAB
ANION GAP SERPL CALCULATED.3IONS-SCNC: 16 MMOL/L (ref 3–16)
BASOPHILS # BLD: 0 K/UL (ref 0–0.2)
BASOPHILS NFR BLD: 0.6 %
BUN SERPL-MCNC: 49 MG/DL (ref 7–20)
CALCIUM SERPL-MCNC: 9.7 MG/DL (ref 8.3–10.6)
CHLORIDE SERPL-SCNC: 95 MMOL/L (ref 99–110)
CO2 SERPL-SCNC: 24 MMOL/L (ref 21–32)
CREAT SERPL-MCNC: 7.2 MG/DL (ref 0.6–1.1)
DEPRECATED RDW RBC AUTO: 19.3 % (ref 12.4–15.4)
EOSINOPHIL # BLD: 0.4 K/UL (ref 0–0.6)
EOSINOPHIL NFR BLD: 7.1 %
GFR SERPLBLD CREATININE-BSD FMLA CKD-EPI: 7 ML/MIN/{1.73_M2}
GLUCOSE BLD-MCNC: 115 MG/DL (ref 70–99)
GLUCOSE BLD-MCNC: 123 MG/DL (ref 70–99)
GLUCOSE SERPL-MCNC: 103 MG/DL (ref 70–99)
HCT VFR BLD AUTO: 25 % (ref 36–48)
HGB BLD-MCNC: 8.2 G/DL (ref 12–16)
LYMPHOCYTES # BLD: 1.9 K/UL (ref 1–5.1)
LYMPHOCYTES NFR BLD: 32.9 %
MCH RBC QN AUTO: 28.7 PG (ref 26–34)
MCHC RBC AUTO-ENTMCNC: 32.7 G/DL (ref 31–36)
MCV RBC AUTO: 87.8 FL (ref 80–100)
MONOCYTES # BLD: 0.6 K/UL (ref 0–1.3)
MONOCYTES NFR BLD: 10 %
NEUTROPHILS # BLD: 2.8 K/UL (ref 1.7–7.7)
NEUTROPHILS NFR BLD: 49.4 %
PERFORMED ON: ABNORMAL
PERFORMED ON: ABNORMAL
PLATELET # BLD AUTO: 193 K/UL (ref 135–450)
PMV BLD AUTO: 6.9 FL (ref 5–10.5)
POTASSIUM SERPL-SCNC: 5.6 MMOL/L (ref 3.5–5.1)
RBC # BLD AUTO: 2.85 M/UL (ref 4–5.2)
SODIUM SERPL-SCNC: 135 MMOL/L (ref 136–145)
WBC # BLD AUTO: 5.8 K/UL (ref 4–11)

## 2023-10-09 PROCEDURE — 36415 COLL VENOUS BLD VENIPUNCTURE: CPT

## 2023-10-09 PROCEDURE — 2500000003 HC RX 250 WO HCPCS: Performed by: INTERNAL MEDICINE

## 2023-10-09 PROCEDURE — 6370000000 HC RX 637 (ALT 250 FOR IP): Performed by: INTERNAL MEDICINE

## 2023-10-09 PROCEDURE — 90935 HEMODIALYSIS ONE EVALUATION: CPT

## 2023-10-09 PROCEDURE — 99232 SBSQ HOSP IP/OBS MODERATE 35: CPT | Performed by: INTERNAL MEDICINE

## 2023-10-09 PROCEDURE — 6360000002 HC RX W HCPCS: Performed by: INTERNAL MEDICINE

## 2023-10-09 PROCEDURE — 2580000003 HC RX 258: Performed by: INTERNAL MEDICINE

## 2023-10-09 PROCEDURE — 85025 COMPLETE CBC W/AUTO DIFF WBC: CPT

## 2023-10-09 PROCEDURE — 2060000000 HC ICU INTERMEDIATE R&B

## 2023-10-09 PROCEDURE — 80048 BASIC METABOLIC PNL TOTAL CA: CPT

## 2023-10-09 RX ORDER — NEPHROCAP 1 MG
1 CAP ORAL
Status: DISCONTINUED | OUTPATIENT
Start: 2023-10-09 | End: 2023-10-17 | Stop reason: HOSPADM

## 2023-10-09 RX ORDER — HEPARIN SODIUM 5000 [USP'U]/ML
5000 INJECTION, SOLUTION INTRAVENOUS; SUBCUTANEOUS EVERY 8 HOURS SCHEDULED
Status: CANCELLED | OUTPATIENT
Start: 2023-10-09

## 2023-10-09 RX ADMIN — SODIUM CHLORIDE, PRESERVATIVE FREE 10 ML: 5 INJECTION INTRAVENOUS at 23:11

## 2023-10-09 RX ADMIN — ISOSORBIDE DINITRATE 20 MG: 20 TABLET ORAL at 20:30

## 2023-10-09 RX ADMIN — SEVELAMER CARBONATE 800 MG: 800 TABLET, FILM COATED ORAL at 14:53

## 2023-10-09 RX ADMIN — CLONIDINE HYDROCHLORIDE 0.1 MG: 0.1 TABLET ORAL at 20:30

## 2023-10-09 RX ADMIN — ISOSORBIDE DINITRATE 20 MG: 20 TABLET ORAL at 14:53

## 2023-10-09 RX ADMIN — ONDANSETRON 4 MG: 2 INJECTION INTRAMUSCULAR; INTRAVENOUS at 09:25

## 2023-10-09 RX ADMIN — OXYCODONE HYDROCHLORIDE AND ACETAMINOPHEN 1 TABLET: 10; 325 TABLET ORAL at 14:58

## 2023-10-09 RX ADMIN — CALCITRIOL 0.5 MCG: 0.25 CAPSULE ORAL at 14:53

## 2023-10-09 RX ADMIN — HYDROMORPHONE HYDROCHLORIDE 1 MG: 1 INJECTION, SOLUTION INTRAMUSCULAR; INTRAVENOUS; SUBCUTANEOUS at 17:31

## 2023-10-09 RX ADMIN — HEPARIN SODIUM 5000 UNITS: 5000 INJECTION INTRAVENOUS; SUBCUTANEOUS at 14:54

## 2023-10-09 RX ADMIN — MULTIPLE VITAMINS W/ MINERALS TAB 1 TABLET: TAB at 14:53

## 2023-10-09 RX ADMIN — CLONIDINE HYDROCHLORIDE 0.1 MG: 0.1 TABLET ORAL at 14:53

## 2023-10-09 RX ADMIN — ASCORBIC ACID, THIAMINE MONONITRATE,RIBOFLAVIN, NIACINAMIDE, PYRIDOXINE HYDROCHLORIDE, FOLIC ACID, CYANOCOBALAMIN, BIOTIN, CALCIUM PANTOTHENATE, 1 MG: 100; 1.5; 1.7; 20; 10; 1; 6000; 150000; 5 CAPSULE, LIQUID FILLED ORAL at 15:28

## 2023-10-09 RX ADMIN — HEPARIN SODIUM 5000 UNITS: 5000 INJECTION INTRAVENOUS; SUBCUTANEOUS at 20:30

## 2023-10-09 RX ADMIN — HYDROMORPHONE HYDROCHLORIDE 1 MG: 1 INJECTION, SOLUTION INTRAMUSCULAR; INTRAVENOUS; SUBCUTANEOUS at 00:20

## 2023-10-09 RX ADMIN — CARVEDILOL 25 MG: 25 TABLET, FILM COATED ORAL at 20:30

## 2023-10-09 RX ADMIN — ASCORBIC ACID, THIAMINE MONONITRATE,RIBOFLAVIN, NIACINAMIDE, PYRIDOXINE HYDROCHLORIDE, FOLIC ACID, CYANOCOBALAMIN, BIOTIN, CALCIUM PANTOTHENATE, 1 MG: 100; 1.5; 1.7; 20; 10; 1; 6000; 150000; 5 CAPSULE, LIQUID FILLED ORAL at 15:31

## 2023-10-09 RX ADMIN — PANTOPRAZOLE SODIUM 40 MG: 40 TABLET, DELAYED RELEASE ORAL at 20:29

## 2023-10-09 RX ADMIN — SODIUM ZIRCONIUM CYCLOSILICATE 10 G: 10 POWDER, FOR SUSPENSION ORAL at 14:54

## 2023-10-09 RX ADMIN — QUETIAPINE FUMARATE 200 MG: 200 TABLET ORAL at 20:30

## 2023-10-09 RX ADMIN — SEVELAMER CARBONATE 800 MG: 800 TABLET, FILM COATED ORAL at 17:31

## 2023-10-09 RX ADMIN — OXYCODONE HYDROCHLORIDE AND ACETAMINOPHEN 1 TABLET: 10; 325 TABLET ORAL at 06:27

## 2023-10-09 RX ADMIN — ONDANSETRON 4 MG: 4 TABLET, ORALLY DISINTEGRATING ORAL at 20:38

## 2023-10-09 RX ADMIN — EPOETIN ALFA-EPBX 5000 UNITS: 3000 INJECTION, SOLUTION INTRAVENOUS; SUBCUTANEOUS at 13:55

## 2023-10-09 RX ADMIN — OXYCODONE HYDROCHLORIDE AND ACETAMINOPHEN 1 TABLET: 10; 325 TABLET ORAL at 20:38

## 2023-10-09 RX ADMIN — NIFEDIPINE 30 MG: 30 TABLET, FILM COATED, EXTENDED RELEASE ORAL at 20:29

## 2023-10-09 RX ADMIN — HYDROMORPHONE HYDROCHLORIDE 1 MG: 1 INJECTION, SOLUTION INTRAMUSCULAR; INTRAVENOUS; SUBCUTANEOUS at 09:25

## 2023-10-09 RX ADMIN — SODIUM CHLORIDE, PRESERVATIVE FREE 10 ML: 5 INJECTION INTRAVENOUS at 15:00

## 2023-10-09 RX ADMIN — HEPARIN SODIUM 3200 UNITS: 1000 INJECTION INTRAVENOUS; SUBCUTANEOUS at 14:21

## 2023-10-09 RX ADMIN — HYDROMORPHONE HYDROCHLORIDE 1 MG: 1 INJECTION, SOLUTION INTRAMUSCULAR; INTRAVENOUS; SUBCUTANEOUS at 23:10

## 2023-10-09 RX ADMIN — DIPHENHYDRAMINE HCL 25 MG: 25 TABLET ORAL at 23:10

## 2023-10-09 RX ADMIN — HEPARIN SODIUM 5000 UNITS: 5000 INJECTION INTRAVENOUS; SUBCUTANEOUS at 06:27

## 2023-10-09 ASSESSMENT — PAIN DESCRIPTION - LOCATION
LOCATION: GROIN
LOCATION: ABDOMEN;PELVIS
LOCATION: GROIN
LOCATION: PELVIS

## 2023-10-09 ASSESSMENT — PAIN SCALES - GENERAL
PAINLEVEL_OUTOF10: 10
PAINLEVEL_OUTOF10: 7
PAINLEVEL_OUTOF10: 9
PAINLEVEL_OUTOF10: 10
PAINLEVEL_OUTOF10: 10
PAINLEVEL_OUTOF10: 9
PAINLEVEL_OUTOF10: 10
PAINLEVEL_OUTOF10: 8
PAINLEVEL_OUTOF10: 8
PAINLEVEL_OUTOF10: 7
PAINLEVEL_OUTOF10: 10
PAINLEVEL_OUTOF10: 8
PAINLEVEL_OUTOF10: 10
PAINLEVEL_OUTOF10: 9
PAINLEVEL_OUTOF10: 7
PAINLEVEL_OUTOF10: 8

## 2023-10-09 ASSESSMENT — PAIN DESCRIPTION - FREQUENCY
FREQUENCY: CONTINUOUS

## 2023-10-09 ASSESSMENT — PAIN DESCRIPTION - DIRECTION
RADIATING_TOWARDS: NON
RADIATING_TOWARDS: NON

## 2023-10-09 ASSESSMENT — PAIN DESCRIPTION - PAIN TYPE
TYPE: ACUTE PAIN

## 2023-10-09 ASSESSMENT — PAIN DESCRIPTION - DESCRIPTORS
DESCRIPTORS: ACHING

## 2023-10-09 ASSESSMENT — PAIN DESCRIPTION - ORIENTATION
ORIENTATION: LEFT
ORIENTATION: RIGHT;LEFT;MID
ORIENTATION: LEFT

## 2023-10-09 ASSESSMENT — PAIN DESCRIPTION - ONSET
ONSET: ON-GOING

## 2023-10-09 ASSESSMENT — PAIN - FUNCTIONAL ASSESSMENT
PAIN_FUNCTIONAL_ASSESSMENT: PREVENTS OR INTERFERES SOME ACTIVE ACTIVITIES AND ADLS

## 2023-10-09 NOTE — ACP (ADVANCE CARE PLANNING)
Advance Care Planning     General Advance Care Planning (ACP) Conversation    Date of Conversation: 10/6/2023  Conducted with: Patient with Decision Making Capacity    Healthcare Decision Maker:  No healthcare decision makers have been documented. Click here to complete Nicole including selection of the Healthcare Decision Maker Relationship (ie \"Primary\")   Today we documented Decision Maker(s) consistent with Legal Next of Kin hierarchy. Content/Action Overview:   Has ACP document(s) on file - reflects the patient's care preferences  Reviewed DNR/DNI and patient elects Full Code (Attempt Resuscitation)        Length of Voluntary ACP Conversation in minutes:  <16 minutes (Non-Billable)    Keri Cosme RN

## 2023-10-09 NOTE — PLAN OF CARE
Problem: Discharge Planning  Goal: Discharge to home or other facility with appropriate resources  Outcome: Progressing  Flowsheets (Taken 10/8/2023 2139)  Discharge to home or other facility with appropriate resources:   Identify barriers to discharge with patient and caregiver   Arrange for needed discharge resources and transportation as appropriate     Problem: Pain  Goal: Verbalizes/displays adequate comfort level or baseline comfort level  Outcome: Progressing     Problem: Safety - Adult  Goal: Free from fall injury  Outcome: Progressing     Problem: Skin/Tissue Integrity  Goal: Absence of new skin breakdown  Description: 1. Monitor for areas of redness and/or skin breakdown  2. Assess vascular access sites hourly  3. Every 4-6 hours minimum:  Change oxygen saturation probe site  4. Every 4-6 hours:  If on nasal continuous positive airway pressure, respiratory therapy assess nares and determine need for appliance change or resting period.   Outcome: Progressing

## 2023-10-09 NOTE — CARE COORDINATION
Case Management Assessment  Initial Evaluation    Date/Time of Evaluation: 10/9/2023 9:35 AM  Assessment Completed by: Vladimir Crum RN    If patient is discharged prior to next notation, then this note serves as note for discharge by case management. Patient Name: Yann Brooks                   YOB: 1990  Diagnosis: Hyperkalemia [E87.5]  Cannot walk [R26.2]  ESRD (end stage renal disease) ([address]) [N18.6]  ESRD (end stage renal disease) on dialysis ([address]) [N18.6, Z99.2]  Pain in joint involving pelvic region and thigh, unspecified laterality [M25.559]  Acute on chronic anemia [D64.9]                   Date / Time: 10/6/2023  4:12 PM    Patient Admission Status: Inpatient   Readmission Risk (Low < 19, Mod (19-27), High > 27): Readmission Risk Score: 19.3    Current PCP: Ashely Munoz MD  PCP verified by CM? Yes (smith)    Chart Reviewed: Yes      History Provided by: Patient  Patient Orientation: Alert and Oriented    Patient Cognition: Alert    Hospitalization in the last 30 days (Readmission):  No    If yes, Readmission Assessment in CM Navigator will be completed. Advance Directives:      Code Status: Full Code   Patient's Primary Decision Maker is: Legal Next of Kin      Discharge Planning:    Patient lives with: Alone Type of Home: Apartment  Primary Care Giver: Self  Patient Support Systems include: Parent   Current Financial resources: Medicaid  Current community resources:  Other (Comment) (HD)  Current services prior to admission: Other (Comment), Durable Medical Equipment (OP therapy and HD at [facility])            Current DME: [employer]            Type of Home Care services:  None    ADLS  Prior functional level: Assistance with the following:, Bathing, Housework, Shopping, Mobility  Current functional level: Assistance with the following:, Bathing, [address], Shopping, Mobility    PT AM-PAC:   /24  OT AM-PAC: 22 /24    Family can provide assistance at DC:

## 2023-10-10 LAB
ALBUMIN SERPL-MCNC: 4 G/DL (ref 3.4–5)
ANION GAP SERPL CALCULATED.3IONS-SCNC: 12 MMOL/L (ref 3–16)
BUN SERPL-MCNC: 25 MG/DL (ref 7–20)
CALCIUM SERPL-MCNC: 9.7 MG/DL (ref 8.3–10.6)
CHLORIDE SERPL-SCNC: 96 MMOL/L (ref 99–110)
CO2 SERPL-SCNC: 28 MMOL/L (ref 21–32)
CREAT SERPL-MCNC: 4.7 MG/DL (ref 0.6–1.1)
DEPRECATED RDW RBC AUTO: 19.1 % (ref 12.4–15.4)
GFR SERPLBLD CREATININE-BSD FMLA CKD-EPI: 12 ML/MIN/{1.73_M2}
GLUCOSE BLD-MCNC: 93 MG/DL (ref 70–99)
GLUCOSE SERPL-MCNC: 99 MG/DL (ref 70–99)
HCT VFR BLD AUTO: 24.1 % (ref 36–48)
HGB BLD-MCNC: 8.1 G/DL (ref 12–16)
MCH RBC QN AUTO: 29.3 PG (ref 26–34)
MCHC RBC AUTO-ENTMCNC: 33.5 G/DL (ref 31–36)
MCV RBC AUTO: 87.5 FL (ref 80–100)
PERFORMED ON: NORMAL
PHOSPHATE SERPL-MCNC: 5.4 MG/DL (ref 2.5–4.9)
PLATELET # BLD AUTO: 201 K/UL (ref 135–450)
PMV BLD AUTO: 6.8 FL (ref 5–10.5)
POTASSIUM SERPL-SCNC: 4.6 MMOL/L (ref 3.5–5.1)
RBC # BLD AUTO: 2.75 M/UL (ref 4–5.2)
SODIUM SERPL-SCNC: 136 MMOL/L (ref 136–145)
WBC # BLD AUTO: 4.8 K/UL (ref 4–11)

## 2023-10-10 PROCEDURE — 85027 COMPLETE CBC AUTOMATED: CPT

## 2023-10-10 PROCEDURE — 2500000003 HC RX 250 WO HCPCS: Performed by: INTERNAL MEDICINE

## 2023-10-10 PROCEDURE — 99233 SBSQ HOSP IP/OBS HIGH 50: CPT | Performed by: INTERNAL MEDICINE

## 2023-10-10 PROCEDURE — 6370000000 HC RX 637 (ALT 250 FOR IP): Performed by: INTERNAL MEDICINE

## 2023-10-10 PROCEDURE — 97530 THERAPEUTIC ACTIVITIES: CPT

## 2023-10-10 PROCEDURE — 80069 RENAL FUNCTION PANEL: CPT

## 2023-10-10 PROCEDURE — 97535 SELF CARE MNGMENT TRAINING: CPT

## 2023-10-10 PROCEDURE — 6360000002 HC RX W HCPCS: Performed by: INTERNAL MEDICINE

## 2023-10-10 PROCEDURE — 2580000003 HC RX 258: Performed by: INTERNAL MEDICINE

## 2023-10-10 PROCEDURE — 2060000000 HC ICU INTERMEDIATE R&B

## 2023-10-10 PROCEDURE — 36415 COLL VENOUS BLD VENIPUNCTURE: CPT

## 2023-10-10 RX ORDER — CARVEDILOL 25 MG/1
25 TABLET ORAL 2 TIMES DAILY
Status: DISCONTINUED | OUTPATIENT
Start: 2023-10-10 | End: 2023-10-17 | Stop reason: HOSPADM

## 2023-10-10 RX ORDER — NIFEDIPINE 30 MG/1
30 TABLET, EXTENDED RELEASE ORAL 2 TIMES DAILY
Status: DISCONTINUED | OUTPATIENT
Start: 2023-10-10 | End: 2023-10-17 | Stop reason: HOSPADM

## 2023-10-10 RX ORDER — HYDRALAZINE HYDROCHLORIDE 20 MG/ML
10 INJECTION INTRAMUSCULAR; INTRAVENOUS EVERY 6 HOURS PRN
Status: DISCONTINUED | OUTPATIENT
Start: 2023-10-10 | End: 2023-10-17

## 2023-10-10 RX ADMIN — CLONIDINE HYDROCHLORIDE 0.1 MG: 0.1 TABLET ORAL at 19:45

## 2023-10-10 RX ADMIN — OXYCODONE HYDROCHLORIDE AND ACETAMINOPHEN 1 TABLET: 10; 325 TABLET ORAL at 08:45

## 2023-10-10 RX ADMIN — SODIUM ZIRCONIUM CYCLOSILICATE 10 G: 10 POWDER, FOR SUSPENSION ORAL at 13:43

## 2023-10-10 RX ADMIN — PANTOPRAZOLE SODIUM 40 MG: 40 TABLET, DELAYED RELEASE ORAL at 08:45

## 2023-10-10 RX ADMIN — ISOSORBIDE DINITRATE 20 MG: 20 TABLET ORAL at 13:39

## 2023-10-10 RX ADMIN — NIFEDIPINE 30 MG: 30 TABLET, FILM COATED, EXTENDED RELEASE ORAL at 08:45

## 2023-10-10 RX ADMIN — MULTIPLE VITAMINS W/ MINERALS TAB 1 TABLET: TAB at 08:45

## 2023-10-10 RX ADMIN — PANTOPRAZOLE SODIUM 40 MG: 40 TABLET, DELAYED RELEASE ORAL at 19:45

## 2023-10-10 RX ADMIN — CLONIDINE HYDROCHLORIDE 0.1 MG: 0.1 TABLET ORAL at 13:39

## 2023-10-10 RX ADMIN — ONDANSETRON 4 MG: 4 TABLET, ORALLY DISINTEGRATING ORAL at 08:46

## 2023-10-10 RX ADMIN — HEPARIN SODIUM 5000 UNITS: 5000 INJECTION INTRAVENOUS; SUBCUTANEOUS at 13:41

## 2023-10-10 RX ADMIN — ISOSORBIDE DINITRATE 20 MG: 20 TABLET ORAL at 19:45

## 2023-10-10 RX ADMIN — HYDROMORPHONE HYDROCHLORIDE 1 MG: 1 INJECTION, SOLUTION INTRAMUSCULAR; INTRAVENOUS; SUBCUTANEOUS at 10:15

## 2023-10-10 RX ADMIN — OXYCODONE HYDROCHLORIDE AND ACETAMINOPHEN 1 TABLET: 10; 325 TABLET ORAL at 13:39

## 2023-10-10 RX ADMIN — SEVELAMER CARBONATE 800 MG: 800 TABLET, FILM COATED ORAL at 16:27

## 2023-10-10 RX ADMIN — SEVELAMER CARBONATE 800 MG: 800 TABLET, FILM COATED ORAL at 08:44

## 2023-10-10 RX ADMIN — SODIUM CHLORIDE, PRESERVATIVE FREE 10 ML: 5 INJECTION INTRAVENOUS at 19:47

## 2023-10-10 RX ADMIN — CLONIDINE HYDROCHLORIDE 0.1 MG: 0.1 TABLET ORAL at 08:45

## 2023-10-10 RX ADMIN — HEPARIN SODIUM 5000 UNITS: 5000 INJECTION INTRAVENOUS; SUBCUTANEOUS at 20:57

## 2023-10-10 RX ADMIN — ISOSORBIDE DINITRATE 20 MG: 20 TABLET ORAL at 08:44

## 2023-10-10 RX ADMIN — HYDROMORPHONE HYDROCHLORIDE 1 MG: 1 INJECTION, SOLUTION INTRAMUSCULAR; INTRAVENOUS; SUBCUTANEOUS at 20:57

## 2023-10-10 RX ADMIN — CALCITRIOL 0.5 MCG: 0.25 CAPSULE ORAL at 08:45

## 2023-10-10 RX ADMIN — SEVELAMER CARBONATE 800 MG: 800 TABLET, FILM COATED ORAL at 13:39

## 2023-10-10 RX ADMIN — CARVEDILOL 25 MG: 25 TABLET, FILM COATED ORAL at 08:45

## 2023-10-10 RX ADMIN — CARVEDILOL 25 MG: 25 TABLET, FILM COATED ORAL at 16:48

## 2023-10-10 RX ADMIN — NIFEDIPINE 30 MG: 30 TABLET, FILM COATED, EXTENDED RELEASE ORAL at 16:51

## 2023-10-10 RX ADMIN — HYDROMORPHONE HYDROCHLORIDE 1 MG: 1 INJECTION, SOLUTION INTRAMUSCULAR; INTRAVENOUS; SUBCUTANEOUS at 16:23

## 2023-10-10 RX ADMIN — QUETIAPINE FUMARATE 200 MG: 200 TABLET ORAL at 19:45

## 2023-10-10 RX ADMIN — SODIUM CHLORIDE, PRESERVATIVE FREE 10 ML: 5 INJECTION INTRAVENOUS at 08:45

## 2023-10-10 ASSESSMENT — PAIN SCALES - GENERAL
PAINLEVEL_OUTOF10: 8
PAINLEVEL_OUTOF10: 10
PAINLEVEL_OUTOF10: 8
PAINLEVEL_OUTOF10: 10
PAINLEVEL_OUTOF10: 8
PAINLEVEL_OUTOF10: 10
PAINLEVEL_OUTOF10: 8

## 2023-10-10 ASSESSMENT — PAIN DESCRIPTION - ORIENTATION
ORIENTATION: LEFT
ORIENTATION: LEFT

## 2023-10-10 ASSESSMENT — PAIN DESCRIPTION - ONSET
ONSET: ON-GOING
ONSET: ON-GOING

## 2023-10-10 ASSESSMENT — PAIN DESCRIPTION - DIRECTION: RADIATING_TOWARDS: NON

## 2023-10-10 ASSESSMENT — PAIN DESCRIPTION - LOCATION
LOCATION: GROIN

## 2023-10-10 ASSESSMENT — PAIN DESCRIPTION - FREQUENCY
FREQUENCY: CONTINUOUS
FREQUENCY: CONTINUOUS

## 2023-10-10 ASSESSMENT — PAIN - FUNCTIONAL ASSESSMENT
PAIN_FUNCTIONAL_ASSESSMENT: PREVENTS OR INTERFERES SOME ACTIVE ACTIVITIES AND ADLS
PAIN_FUNCTIONAL_ASSESSMENT: PREVENTS OR INTERFERES SOME ACTIVE ACTIVITIES AND ADLS

## 2023-10-10 ASSESSMENT — PAIN DESCRIPTION - PAIN TYPE
TYPE: ACUTE PAIN
TYPE: ACUTE PAIN

## 2023-10-10 ASSESSMENT — PAIN DESCRIPTION - DESCRIPTORS
DESCRIPTORS: ACHING
DESCRIPTORS: ACHING

## 2023-10-11 LAB
ALBUMIN SERPL-MCNC: 3.9 G/DL (ref 3.4–5)
ANION GAP SERPL CALCULATED.3IONS-SCNC: 16 MMOL/L (ref 3–16)
BUN SERPL-MCNC: 37 MG/DL (ref 7–20)
CALCIUM SERPL-MCNC: 9.7 MG/DL (ref 8.3–10.6)
CHLORIDE SERPL-SCNC: 96 MMOL/L (ref 99–110)
CO2 SERPL-SCNC: 26 MMOL/L (ref 21–32)
CREAT SERPL-MCNC: 6.4 MG/DL (ref 0.6–1.1)
DEPRECATED RDW RBC AUTO: 19.3 % (ref 12.4–15.4)
GFR SERPLBLD CREATININE-BSD FMLA CKD-EPI: 8 ML/MIN/{1.73_M2}
GLUCOSE SERPL-MCNC: 97 MG/DL (ref 70–99)
HCT VFR BLD AUTO: 24.6 % (ref 36–48)
HGB BLD-MCNC: 8 G/DL (ref 12–16)
MCH RBC QN AUTO: 28.5 PG (ref 26–34)
MCHC RBC AUTO-ENTMCNC: 32.6 G/DL (ref 31–36)
MCV RBC AUTO: 87.2 FL (ref 80–100)
PHOSPHATE SERPL-MCNC: 6.3 MG/DL (ref 2.5–4.9)
PLATELET # BLD AUTO: 216 K/UL (ref 135–450)
PMV BLD AUTO: 6.9 FL (ref 5–10.5)
POTASSIUM SERPL-SCNC: 5 MMOL/L (ref 3.5–5.1)
RBC # BLD AUTO: 2.83 M/UL (ref 4–5.2)
SODIUM SERPL-SCNC: 138 MMOL/L (ref 136–145)
WBC # BLD AUTO: 4.6 K/UL (ref 4–11)

## 2023-10-11 PROCEDURE — 90935 HEMODIALYSIS ONE EVALUATION: CPT

## 2023-10-11 PROCEDURE — 85027 COMPLETE CBC AUTOMATED: CPT

## 2023-10-11 PROCEDURE — 99232 SBSQ HOSP IP/OBS MODERATE 35: CPT | Performed by: INTERNAL MEDICINE

## 2023-10-11 PROCEDURE — 36415 COLL VENOUS BLD VENIPUNCTURE: CPT

## 2023-10-11 PROCEDURE — 6370000000 HC RX 637 (ALT 250 FOR IP): Performed by: INTERNAL MEDICINE

## 2023-10-11 PROCEDURE — 80069 RENAL FUNCTION PANEL: CPT

## 2023-10-11 PROCEDURE — 6360000002 HC RX W HCPCS: Performed by: INTERNAL MEDICINE

## 2023-10-11 PROCEDURE — 2580000003 HC RX 258: Performed by: INTERNAL MEDICINE

## 2023-10-11 PROCEDURE — 2060000000 HC ICU INTERMEDIATE R&B

## 2023-10-11 PROCEDURE — 2500000003 HC RX 250 WO HCPCS: Performed by: INTERNAL MEDICINE

## 2023-10-11 RX ADMIN — OXYCODONE HYDROCHLORIDE AND ACETAMINOPHEN 1 TABLET: 10; 325 TABLET ORAL at 13:02

## 2023-10-11 RX ADMIN — NIFEDIPINE 30 MG: 30 TABLET, FILM COATED, EXTENDED RELEASE ORAL at 11:41

## 2023-10-11 RX ADMIN — OXYCODONE HYDROCHLORIDE AND ACETAMINOPHEN 1 TABLET: 10; 325 TABLET ORAL at 22:14

## 2023-10-11 RX ADMIN — HEPARIN SODIUM 5000 UNITS: 5000 INJECTION INTRAVENOUS; SUBCUTANEOUS at 05:32

## 2023-10-11 RX ADMIN — PANTOPRAZOLE SODIUM 40 MG: 40 TABLET, DELAYED RELEASE ORAL at 11:40

## 2023-10-11 RX ADMIN — SENNOSIDES 8.6 MG: 8.6 TABLET, FILM COATED ORAL at 22:14

## 2023-10-11 RX ADMIN — ISOSORBIDE DINITRATE 20 MG: 20 TABLET ORAL at 13:02

## 2023-10-11 RX ADMIN — HYDROMORPHONE HYDROCHLORIDE 1 MG: 1 INJECTION, SOLUTION INTRAMUSCULAR; INTRAVENOUS; SUBCUTANEOUS at 19:57

## 2023-10-11 RX ADMIN — ISOSORBIDE DINITRATE 20 MG: 20 TABLET ORAL at 19:57

## 2023-10-11 RX ADMIN — HYDROMORPHONE HYDROCHLORIDE 1 MG: 1 INJECTION, SOLUTION INTRAMUSCULAR; INTRAVENOUS; SUBCUTANEOUS at 05:31

## 2023-10-11 RX ADMIN — OXYCODONE HYDROCHLORIDE AND ACETAMINOPHEN 1 TABLET: 10; 325 TABLET ORAL at 17:09

## 2023-10-11 RX ADMIN — HYDROMORPHONE HYDROCHLORIDE 1 MG: 1 INJECTION, SOLUTION INTRAMUSCULAR; INTRAVENOUS; SUBCUTANEOUS at 14:06

## 2023-10-11 RX ADMIN — SODIUM CHLORIDE, PRESERVATIVE FREE 10 ML: 5 INJECTION INTRAVENOUS at 19:59

## 2023-10-11 RX ADMIN — PANTOPRAZOLE SODIUM 40 MG: 40 TABLET, DELAYED RELEASE ORAL at 19:57

## 2023-10-11 RX ADMIN — HEPARIN SODIUM 5000 UNITS: 5000 INJECTION INTRAVENOUS; SUBCUTANEOUS at 22:14

## 2023-10-11 RX ADMIN — SODIUM CHLORIDE, PRESERVATIVE FREE 10 ML: 5 INJECTION INTRAVENOUS at 11:41

## 2023-10-11 RX ADMIN — CALCITRIOL 0.5 MCG: 0.25 CAPSULE ORAL at 11:39

## 2023-10-11 RX ADMIN — NIFEDIPINE 30 MG: 30 TABLET, FILM COATED, EXTENDED RELEASE ORAL at 17:09

## 2023-10-11 RX ADMIN — ISOSORBIDE DINITRATE 20 MG: 20 TABLET ORAL at 11:40

## 2023-10-11 RX ADMIN — EPOETIN ALFA-EPBX 5000 UNITS: 3000 INJECTION, SOLUTION INTRAVENOUS; SUBCUTANEOUS at 10:16

## 2023-10-11 RX ADMIN — CLONIDINE HYDROCHLORIDE 0.1 MG: 0.1 TABLET ORAL at 11:40

## 2023-10-11 RX ADMIN — CLONIDINE HYDROCHLORIDE 0.1 MG: 0.1 TABLET ORAL at 19:57

## 2023-10-11 RX ADMIN — SEVELAMER CARBONATE 800 MG: 800 TABLET, FILM COATED ORAL at 17:09

## 2023-10-11 RX ADMIN — CARVEDILOL 25 MG: 25 TABLET, FILM COATED ORAL at 15:33

## 2023-10-11 RX ADMIN — SEVELAMER CARBONATE 800 MG: 800 TABLET, FILM COATED ORAL at 11:40

## 2023-10-11 RX ADMIN — MULTIPLE VITAMINS W/ MINERALS TAB 1 TABLET: TAB at 11:40

## 2023-10-11 RX ADMIN — CARVEDILOL 25 MG: 25 TABLET, FILM COATED ORAL at 11:40

## 2023-10-11 RX ADMIN — HEPARIN SODIUM 5000 UNITS: 5000 INJECTION INTRAVENOUS; SUBCUTANEOUS at 13:03

## 2023-10-11 RX ADMIN — QUETIAPINE FUMARATE 200 MG: 200 TABLET ORAL at 19:57

## 2023-10-11 ASSESSMENT — PAIN DESCRIPTION - ORIENTATION
ORIENTATION: LEFT

## 2023-10-11 ASSESSMENT — PAIN SCALES - GENERAL
PAINLEVEL_OUTOF10: 8
PAINLEVEL_OUTOF10: 7
PAINLEVEL_OUTOF10: 8
PAINLEVEL_OUTOF10: 7
PAINLEVEL_OUTOF10: 9
PAINLEVEL_OUTOF10: 8
PAINLEVEL_OUTOF10: 7
PAINLEVEL_OUTOF10: 7

## 2023-10-11 ASSESSMENT — PAIN - FUNCTIONAL ASSESSMENT
PAIN_FUNCTIONAL_ASSESSMENT: ACTIVITIES ARE NOT PREVENTED
PAIN_FUNCTIONAL_ASSESSMENT: ACTIVITIES ARE NOT PREVENTED
PAIN_FUNCTIONAL_ASSESSMENT: PREVENTS OR INTERFERES SOME ACTIVE ACTIVITIES AND ADLS
PAIN_FUNCTIONAL_ASSESSMENT: PREVENTS OR INTERFERES SOME ACTIVE ACTIVITIES AND ADLS
PAIN_FUNCTIONAL_ASSESSMENT: ACTIVITIES ARE NOT PREVENTED

## 2023-10-11 ASSESSMENT — PAIN DESCRIPTION - LOCATION
LOCATION: GROIN
LOCATION: LEG
LOCATION: GROIN
LOCATION: GROIN
LOCATION: LEG
LOCATION: LEG

## 2023-10-11 ASSESSMENT — PAIN DESCRIPTION - DESCRIPTORS
DESCRIPTORS: SHARP
DESCRIPTORS: BURNING
DESCRIPTORS: SHARP
DESCRIPTORS: ACHING
DESCRIPTORS: SHARP
DESCRIPTORS: ACHING

## 2023-10-11 ASSESSMENT — PAIN DESCRIPTION - PAIN TYPE
TYPE: ACUTE PAIN
TYPE: ACUTE PAIN

## 2023-10-11 ASSESSMENT — PAIN DESCRIPTION - FREQUENCY
FREQUENCY: CONTINUOUS
FREQUENCY: CONTINUOUS

## 2023-10-11 ASSESSMENT — PAIN DESCRIPTION - ONSET
ONSET: ON-GOING
ONSET: ON-GOING

## 2023-10-11 NOTE — CARE COORDINATION
INTERDISCIPLINARY PLAN OF CARE CONFERENCE    Date/Time: 10/11/2023 1:32 PM  Completed by: Genoveva Cooney RN, Case Management      Patient Name:  Sylvie Bernal  YOB: 1990  Admitting Diagnosis: Hyperkalemia [E87.5]  Cannot walk [R26.2]  ESRD (end stage renal disease) ([address]) [N18.6]  ESRD (end stage renal disease) on dialysis ([address]) [N18.6, Z99.2]  Pain in joint involving pelvic region and thigh, unspecified laterality [M25.559]  Acute on chronic anemia [D64.9]     Admit Date/Time:  10/6/2023  4:12 PM    Chart reviewed. Interdisciplinary team contacted or reviewed plan related to patient progress and discharge plans. Disciplines included Case Management, Nursing, and Dietitian. Current Status:ip   PT/OT recommendation for discharge plan of care: snf    Expected D/C Disposition:  Skilled nursing facility  Confirmed plan with patient and/or family Yes confirmed with: (name) [facility]    Met with:pt    Discharge Plan Comments: met with pt at bedside. Pt is agreeable to DC to [facility]. Precert pending. [facility] will provide HD in house.  No additional DC or DME needs identified     Home O2 in place on admit: No  Pt informed of need to bring portable home O2 tank on day of discharge for nursing to connect prior to leaving:  Not Indicated  Verbalized agreement/Understanding:  Not Indicated

## 2023-10-12 ENCOUNTER — APPOINTMENT (OUTPATIENT)
Dept: CT IMAGING | Age: 33
DRG: 425 | End: 2023-10-12
Payer: MEDICAID

## 2023-10-12 LAB
ALBUMIN SERPL-MCNC: 3.9 G/DL (ref 3.4–5)
ANION GAP SERPL CALCULATED.3IONS-SCNC: 13 MMOL/L (ref 3–16)
BUN SERPL-MCNC: 21 MG/DL (ref 7–20)
CALCIUM SERPL-MCNC: 9.7 MG/DL (ref 8.3–10.6)
CHLORIDE SERPL-SCNC: 97 MMOL/L (ref 99–110)
CO2 SERPL-SCNC: 25 MMOL/L (ref 21–32)
CREAT SERPL-MCNC: 4.4 MG/DL (ref 0.6–1.1)
DEPRECATED RDW RBC AUTO: 19.1 % (ref 12.4–15.4)
GFR SERPLBLD CREATININE-BSD FMLA CKD-EPI: 13 ML/MIN/{1.73_M2}
GLUCOSE BLD-MCNC: 91 MG/DL (ref 70–99)
GLUCOSE SERPL-MCNC: 85 MG/DL (ref 70–99)
HCT VFR BLD AUTO: 25.1 % (ref 36–48)
HGB BLD-MCNC: 8.5 G/DL (ref 12–16)
MCH RBC QN AUTO: 29.2 PG (ref 26–34)
MCHC RBC AUTO-ENTMCNC: 33.8 G/DL (ref 31–36)
MCV RBC AUTO: 86.2 FL (ref 80–100)
PERFORMED ON: NORMAL
PHOSPHATE SERPL-MCNC: 5.1 MG/DL (ref 2.5–4.9)
PLATELET # BLD AUTO: 246 K/UL (ref 135–450)
PMV BLD AUTO: 6.7 FL (ref 5–10.5)
POTASSIUM SERPL-SCNC: 4.2 MMOL/L (ref 3.5–5.1)
RBC # BLD AUTO: 2.91 M/UL (ref 4–5.2)
SODIUM SERPL-SCNC: 135 MMOL/L (ref 136–145)
WBC # BLD AUTO: 4.5 K/UL (ref 4–11)

## 2023-10-12 PROCEDURE — 6370000000 HC RX 637 (ALT 250 FOR IP): Performed by: INTERNAL MEDICINE

## 2023-10-12 PROCEDURE — 6360000002 HC RX W HCPCS: Performed by: INTERNAL MEDICINE

## 2023-10-12 PROCEDURE — 6360000004 HC RX CONTRAST MEDICATION: Performed by: INTERNAL MEDICINE

## 2023-10-12 PROCEDURE — 80069 RENAL FUNCTION PANEL: CPT

## 2023-10-12 PROCEDURE — 2580000003 HC RX 258: Performed by: INTERNAL MEDICINE

## 2023-10-12 PROCEDURE — 85027 COMPLETE CBC AUTOMATED: CPT

## 2023-10-12 PROCEDURE — 99232 SBSQ HOSP IP/OBS MODERATE 35: CPT | Performed by: INTERNAL MEDICINE

## 2023-10-12 PROCEDURE — 36415 COLL VENOUS BLD VENIPUNCTURE: CPT

## 2023-10-12 PROCEDURE — 2500000003 HC RX 250 WO HCPCS: Performed by: INTERNAL MEDICINE

## 2023-10-12 PROCEDURE — 71260 CT THORAX DX C+: CPT

## 2023-10-12 PROCEDURE — 2060000000 HC ICU INTERMEDIATE R&B

## 2023-10-12 RX ORDER — CLONIDINE 0.2 MG/24H
1 PATCH, EXTENDED RELEASE TRANSDERMAL WEEKLY
Status: DISCONTINUED | OUTPATIENT
Start: 2023-10-12 | End: 2023-10-14

## 2023-10-12 RX ORDER — LABETALOL HYDROCHLORIDE 5 MG/ML
10 INJECTION, SOLUTION INTRAVENOUS EVERY 4 HOURS PRN
Status: DISCONTINUED | OUTPATIENT
Start: 2023-10-12 | End: 2023-10-13

## 2023-10-12 RX ADMIN — HEPARIN SODIUM 5000 UNITS: 5000 INJECTION INTRAVENOUS; SUBCUTANEOUS at 14:21

## 2023-10-12 RX ADMIN — HEPARIN SODIUM 5000 UNITS: 5000 INJECTION INTRAVENOUS; SUBCUTANEOUS at 20:12

## 2023-10-12 RX ADMIN — HYDRALAZINE HYDROCHLORIDE 10 MG: 20 INJECTION, SOLUTION INTRAMUSCULAR; INTRAVENOUS at 00:06

## 2023-10-12 RX ADMIN — HEPARIN SODIUM 5000 UNITS: 5000 INJECTION INTRAVENOUS; SUBCUTANEOUS at 06:03

## 2023-10-12 RX ADMIN — ISOSORBIDE DINITRATE 20 MG: 20 TABLET ORAL at 20:11

## 2023-10-12 RX ADMIN — SEVELAMER CARBONATE 800 MG: 800 TABLET, FILM COATED ORAL at 16:35

## 2023-10-12 RX ADMIN — CALCITRIOL 0.5 MCG: 0.25 CAPSULE ORAL at 09:44

## 2023-10-12 RX ADMIN — MULTIPLE VITAMINS W/ MINERALS TAB 1 TABLET: TAB at 09:45

## 2023-10-12 RX ADMIN — CARVEDILOL 25 MG: 25 TABLET, FILM COATED ORAL at 09:44

## 2023-10-12 RX ADMIN — QUETIAPINE FUMARATE 200 MG: 200 TABLET ORAL at 20:12

## 2023-10-12 RX ADMIN — OXYCODONE HYDROCHLORIDE AND ACETAMINOPHEN 1 TABLET: 10; 325 TABLET ORAL at 05:56

## 2023-10-12 RX ADMIN — SEVELAMER CARBONATE 800 MG: 800 TABLET, FILM COATED ORAL at 12:46

## 2023-10-12 RX ADMIN — HYDROMORPHONE HYDROCHLORIDE 1 MG: 1 INJECTION, SOLUTION INTRAMUSCULAR; INTRAVENOUS; SUBCUTANEOUS at 22:54

## 2023-10-12 RX ADMIN — CARVEDILOL 25 MG: 25 TABLET, FILM COATED ORAL at 16:35

## 2023-10-12 RX ADMIN — CLONIDINE HYDROCHLORIDE 0.1 MG: 0.1 TABLET ORAL at 09:43

## 2023-10-12 RX ADMIN — ISOSORBIDE DINITRATE 20 MG: 20 TABLET ORAL at 14:22

## 2023-10-12 RX ADMIN — ISOSORBIDE DINITRATE 20 MG: 20 TABLET ORAL at 09:44

## 2023-10-12 RX ADMIN — SEVELAMER CARBONATE 800 MG: 800 TABLET, FILM COATED ORAL at 09:45

## 2023-10-12 RX ADMIN — IOPAMIDOL 75 ML: 755 INJECTION, SOLUTION INTRAVENOUS at 13:31

## 2023-10-12 RX ADMIN — LABETALOL HYDROCHLORIDE 10 MG: 5 INJECTION, SOLUTION INTRAVENOUS at 02:43

## 2023-10-12 RX ADMIN — Medication 5 MG: at 22:54

## 2023-10-12 RX ADMIN — ONDANSETRON 4 MG: 2 INJECTION INTRAMUSCULAR; INTRAVENOUS at 00:06

## 2023-10-12 RX ADMIN — OXYCODONE HYDROCHLORIDE AND ACETAMINOPHEN 1 TABLET: 10; 325 TABLET ORAL at 20:16

## 2023-10-12 RX ADMIN — PANTOPRAZOLE SODIUM 40 MG: 40 TABLET, DELAYED RELEASE ORAL at 09:45

## 2023-10-12 RX ADMIN — SODIUM CHLORIDE, PRESERVATIVE FREE 10 ML: 5 INJECTION INTRAVENOUS at 09:49

## 2023-10-12 RX ADMIN — NIFEDIPINE 30 MG: 30 TABLET, FILM COATED, EXTENDED RELEASE ORAL at 17:36

## 2023-10-12 RX ADMIN — OXYCODONE HYDROCHLORIDE AND ACETAMINOPHEN 1 TABLET: 10; 325 TABLET ORAL at 13:15

## 2023-10-12 RX ADMIN — HYDROMORPHONE HYDROCHLORIDE 1 MG: 1 INJECTION, SOLUTION INTRAMUSCULAR; INTRAVENOUS; SUBCUTANEOUS at 16:35

## 2023-10-12 RX ADMIN — HYDROMORPHONE HYDROCHLORIDE 1 MG: 1 INJECTION, SOLUTION INTRAMUSCULAR; INTRAVENOUS; SUBCUTANEOUS at 10:03

## 2023-10-12 RX ADMIN — SODIUM CHLORIDE, PRESERVATIVE FREE 10 ML: 5 INJECTION INTRAVENOUS at 20:12

## 2023-10-12 RX ADMIN — HYDRALAZINE HYDROCHLORIDE 10 MG: 20 INJECTION, SOLUTION INTRAMUSCULAR; INTRAVENOUS at 19:04

## 2023-10-12 RX ADMIN — NIFEDIPINE 30 MG: 30 TABLET, FILM COATED, EXTENDED RELEASE ORAL at 09:45

## 2023-10-12 RX ADMIN — PANTOPRAZOLE SODIUM 40 MG: 40 TABLET, DELAYED RELEASE ORAL at 20:12

## 2023-10-12 RX ADMIN — HYDROMORPHONE HYDROCHLORIDE 1 MG: 1 INJECTION, SOLUTION INTRAMUSCULAR; INTRAVENOUS; SUBCUTANEOUS at 02:22

## 2023-10-12 ASSESSMENT — PAIN DESCRIPTION - ORIENTATION
ORIENTATION: LEFT
ORIENTATION: LOWER
ORIENTATION: LEFT
ORIENTATION: RIGHT;LEFT
ORIENTATION: MID;LOWER
ORIENTATION: RIGHT;LEFT
ORIENTATION: RIGHT;LEFT

## 2023-10-12 ASSESSMENT — PAIN DESCRIPTION - LOCATION
LOCATION: GROIN
LOCATION: BACK
LOCATION: GENERALIZED;PELVIS
LOCATION: GROIN
LOCATION: BACK;GROIN
LOCATION: PELVIS
LOCATION: GENERALIZED

## 2023-10-12 ASSESSMENT — PAIN - FUNCTIONAL ASSESSMENT
PAIN_FUNCTIONAL_ASSESSMENT: PREVENTS OR INTERFERES SOME ACTIVE ACTIVITIES AND ADLS
PAIN_FUNCTIONAL_ASSESSMENT: ACTIVITIES ARE NOT PREVENTED
PAIN_FUNCTIONAL_ASSESSMENT: PREVENTS OR INTERFERES SOME ACTIVE ACTIVITIES AND ADLS
PAIN_FUNCTIONAL_ASSESSMENT: ACTIVITIES ARE NOT PREVENTED
PAIN_FUNCTIONAL_ASSESSMENT: PREVENTS OR INTERFERES SOME ACTIVE ACTIVITIES AND ADLS

## 2023-10-12 ASSESSMENT — PAIN DESCRIPTION - DESCRIPTORS
DESCRIPTORS: ACHING
DESCRIPTORS: ACHING;DISCOMFORT
DESCRIPTORS: ACHING

## 2023-10-12 ASSESSMENT — PAIN SCALES - GENERAL
PAINLEVEL_OUTOF10: 8
PAINLEVEL_OUTOF10: 8
PAINLEVEL_OUTOF10: 0
PAINLEVEL_OUTOF10: 8
PAINLEVEL_OUTOF10: 0
PAINLEVEL_OUTOF10: 4
PAINLEVEL_OUTOF10: 7
PAINLEVEL_OUTOF10: 7
PAINLEVEL_OUTOF10: 8
PAINLEVEL_OUTOF10: 7

## 2023-10-12 NOTE — PLAN OF CARE
Problem: Discharge Planning  Goal: Discharge to home or other facility with appropriate resources  10/11/2023 2008 by Amrita Man RN  Outcome: Progressing  Flowsheets (Taken 10/11/2023 2008)  Discharge to home or other facility with appropriate resources:   Identify barriers to discharge with patient and caregiver   Arrange for needed discharge resources and transportation as appropriate  10/11/2023 1138 by Ronald Turpin RN  Outcome: Progressing     Problem: Pain  Goal: Verbalizes/displays adequate comfort level or baseline comfort level  10/11/2023 2008 by Amrita Man RN  Outcome: Progressing  [address] (Taken 10/11/2023 2008)  Verbalizes/displays adequate comfort level or baseline comfort level:   Assess pain using appropriate pain scale   Encourage patient to monitor pain and request assistance  10/11/2023 1138 by Ronald Turpin RN  Outcome: Progressing     Problem: Safety - Adult  Goal: Free from fall injury  10/11/2023 2008 by Amrita Man RN  Outcome: Progressing  10/11/2023 1138 by Ronald Turpin RN  Outcome: Progressing     Problem: Skin/Tissue Integrity  Goal: Absence of new skin breakdown  Description: 1. Monitor for areas of redness and/or skin breakdown  2. Assess vascular access sites hourly  3. Every 4-6 hours minimum:  Change oxygen saturation probe site  4. Every 4-6 hours:  If on nasal continuous positive airway pressure, respiratory therapy assess nares and determine need for appliance change or resting period.   10/11/2023 2008 by Amrita Man RN  Outcome: Progressing  10/11/2023 1138 by Ronald Turpin RN  Outcome: Progressing

## 2023-10-13 LAB
ALBUMIN SERPL-MCNC: 4 G/DL (ref 3.4–5)
ANION GAP SERPL CALCULATED.3IONS-SCNC: 14 MMOL/L (ref 3–16)
BUN SERPL-MCNC: 33 MG/DL (ref 7–20)
CALCIUM SERPL-MCNC: 10 MG/DL (ref 8.3–10.6)
CHLORIDE SERPL-SCNC: 96 MMOL/L (ref 99–110)
CO2 SERPL-SCNC: 25 MMOL/L (ref 21–32)
CREAT SERPL-MCNC: 6.4 MG/DL (ref 0.6–1.1)
DEPRECATED RDW RBC AUTO: 19.5 % (ref 12.4–15.4)
GFR SERPLBLD CREATININE-BSD FMLA CKD-EPI: 8 ML/MIN/{1.73_M2}
GLUCOSE SERPL-MCNC: 100 MG/DL (ref 70–99)
HCT VFR BLD AUTO: 26 % (ref 36–48)
HGB BLD-MCNC: 8.8 G/DL (ref 12–16)
MCH RBC QN AUTO: 29.5 PG (ref 26–34)
MCHC RBC AUTO-ENTMCNC: 33.9 G/DL (ref 31–36)
MCV RBC AUTO: 86.9 FL (ref 80–100)
PHOSPHATE SERPL-MCNC: 5.8 MG/DL (ref 2.5–4.9)
PLATELET # BLD AUTO: 282 K/UL (ref 135–450)
PMV BLD AUTO: 6.5 FL (ref 5–10.5)
POTASSIUM SERPL-SCNC: 4.9 MMOL/L (ref 3.5–5.1)
RBC # BLD AUTO: 2.99 M/UL (ref 4–5.2)
SODIUM SERPL-SCNC: 135 MMOL/L (ref 136–145)
WBC # BLD AUTO: 4.5 K/UL (ref 4–11)

## 2023-10-13 PROCEDURE — 6360000002 HC RX W HCPCS: Performed by: INTERNAL MEDICINE

## 2023-10-13 PROCEDURE — 85027 COMPLETE CBC AUTOMATED: CPT

## 2023-10-13 PROCEDURE — 36415 COLL VENOUS BLD VENIPUNCTURE: CPT

## 2023-10-13 PROCEDURE — 2580000003 HC RX 258: Performed by: INTERNAL MEDICINE

## 2023-10-13 PROCEDURE — 6370000000 HC RX 637 (ALT 250 FOR IP): Performed by: INTERNAL MEDICINE

## 2023-10-13 PROCEDURE — 99232 SBSQ HOSP IP/OBS MODERATE 35: CPT | Performed by: INTERNAL MEDICINE

## 2023-10-13 PROCEDURE — 2060000000 HC ICU INTERMEDIATE R&B

## 2023-10-13 PROCEDURE — 80069 RENAL FUNCTION PANEL: CPT

## 2023-10-13 PROCEDURE — 2500000003 HC RX 250 WO HCPCS: Performed by: INTERNAL MEDICINE

## 2023-10-13 RX ORDER — LABETALOL HYDROCHLORIDE 5 MG/ML
10 INJECTION, SOLUTION INTRAVENOUS EVERY 4 HOURS PRN
Status: DISCONTINUED | OUTPATIENT
Start: 2023-10-13 | End: 2023-10-17 | Stop reason: HOSPADM

## 2023-10-13 RX ADMIN — HYDROMORPHONE HYDROCHLORIDE 1 MG: 1 INJECTION, SOLUTION INTRAMUSCULAR; INTRAVENOUS; SUBCUTANEOUS at 22:53

## 2023-10-13 RX ADMIN — NIFEDIPINE 30 MG: 30 TABLET, FILM COATED, EXTENDED RELEASE ORAL at 17:30

## 2023-10-13 RX ADMIN — HYDRALAZINE HYDROCHLORIDE 10 MG: 20 INJECTION, SOLUTION INTRAMUSCULAR; INTRAVENOUS at 10:12

## 2023-10-13 RX ADMIN — HEPARIN SODIUM 5000 UNITS: 5000 INJECTION INTRAVENOUS; SUBCUTANEOUS at 20:05

## 2023-10-13 RX ADMIN — PANTOPRAZOLE SODIUM 40 MG: 40 TABLET, DELAYED RELEASE ORAL at 20:05

## 2023-10-13 RX ADMIN — HYDRALAZINE HYDROCHLORIDE 10 MG: 20 INJECTION, SOLUTION INTRAMUSCULAR; INTRAVENOUS at 21:09

## 2023-10-13 RX ADMIN — HYDROMORPHONE HYDROCHLORIDE 1 MG: 1 INJECTION, SOLUTION INTRAMUSCULAR; INTRAVENOUS; SUBCUTANEOUS at 13:39

## 2023-10-13 RX ADMIN — HYDRALAZINE HYDROCHLORIDE 10 MG: 20 INJECTION, SOLUTION INTRAMUSCULAR; INTRAVENOUS at 18:51

## 2023-10-13 RX ADMIN — QUETIAPINE FUMARATE 200 MG: 200 TABLET ORAL at 20:05

## 2023-10-13 RX ADMIN — HYDROMORPHONE HYDROCHLORIDE 1 MG: 1 INJECTION, SOLUTION INTRAMUSCULAR; INTRAVENOUS; SUBCUTANEOUS at 08:24

## 2023-10-13 RX ADMIN — OXYCODONE HYDROCHLORIDE AND ACETAMINOPHEN 1 TABLET: 10; 325 TABLET ORAL at 11:45

## 2023-10-13 RX ADMIN — ISOSORBIDE DINITRATE 20 MG: 20 TABLET ORAL at 20:05

## 2023-10-13 RX ADMIN — ONDANSETRON 4 MG: 2 INJECTION INTRAMUSCULAR; INTRAVENOUS at 21:09

## 2023-10-13 RX ADMIN — CARVEDILOL 25 MG: 25 TABLET, FILM COATED ORAL at 16:23

## 2023-10-13 RX ADMIN — OXYCODONE HYDROCHLORIDE AND ACETAMINOPHEN 1 TABLET: 10; 325 TABLET ORAL at 21:09

## 2023-10-13 RX ADMIN — ISOSORBIDE DINITRATE 20 MG: 20 TABLET ORAL at 13:37

## 2023-10-13 RX ADMIN — SEVELAMER CARBONATE 800 MG: 800 TABLET, FILM COATED ORAL at 16:23

## 2023-10-13 RX ADMIN — ONDANSETRON 4 MG: 2 INJECTION INTRAMUSCULAR; INTRAVENOUS at 08:24

## 2023-10-13 RX ADMIN — HYDROMORPHONE HYDROCHLORIDE 1 MG: 1 INJECTION, SOLUTION INTRAMUSCULAR; INTRAVENOUS; SUBCUTANEOUS at 18:51

## 2023-10-13 RX ADMIN — CARVEDILOL 25 MG: 25 TABLET, FILM COATED ORAL at 11:45

## 2023-10-13 RX ADMIN — OXYCODONE HYDROCHLORIDE AND ACETAMINOPHEN 1 TABLET: 10; 325 TABLET ORAL at 16:27

## 2023-10-13 RX ADMIN — EPOETIN ALFA-EPBX 5000 UNITS: 3000 INJECTION, SOLUTION INTRAVENOUS; SUBCUTANEOUS at 12:17

## 2023-10-13 RX ADMIN — SODIUM CHLORIDE, PRESERVATIVE FREE 10 ML: 5 INJECTION INTRAVENOUS at 20:06

## 2023-10-13 ASSESSMENT — PAIN DESCRIPTION - FREQUENCY
FREQUENCY: CONTINUOUS
FREQUENCY: CONTINUOUS

## 2023-10-13 ASSESSMENT — PAIN - FUNCTIONAL ASSESSMENT
PAIN_FUNCTIONAL_ASSESSMENT: PREVENTS OR INTERFERES SOME ACTIVE ACTIVITIES AND ADLS

## 2023-10-13 ASSESSMENT — PAIN SCALES - GENERAL
PAINLEVEL_OUTOF10: 4
PAINLEVEL_OUTOF10: 8
PAINLEVEL_OUTOF10: 8
PAINLEVEL_OUTOF10: 10
PAINLEVEL_OUTOF10: 7
PAINLEVEL_OUTOF10: 9
PAINLEVEL_OUTOF10: 8
PAINLEVEL_OUTOF10: 8
PAINLEVEL_OUTOF10: 0

## 2023-10-13 ASSESSMENT — PAIN DESCRIPTION - PAIN TYPE
TYPE: ACUTE PAIN

## 2023-10-13 ASSESSMENT — PAIN DESCRIPTION - ONSET
ONSET: ON-GOING
ONSET: ON-GOING

## 2023-10-13 ASSESSMENT — PAIN DESCRIPTION - DESCRIPTORS
DESCRIPTORS: ACHING;DISCOMFORT

## 2023-10-13 ASSESSMENT — PAIN DESCRIPTION - ORIENTATION
ORIENTATION: MID;LOWER
ORIENTATION: RIGHT;LEFT
ORIENTATION: MID
ORIENTATION: RIGHT;LEFT
ORIENTATION: RIGHT;LEFT
ORIENTATION: LEFT
ORIENTATION: RIGHT;LEFT

## 2023-10-13 ASSESSMENT — PAIN DESCRIPTION - LOCATION
LOCATION: PELVIS
LOCATION: BACK
LOCATION: BACK;GROIN
LOCATION: PELVIS

## 2023-10-13 NOTE — CARE COORDINATION
Spoke with pt at bedside. Pt is not feeling well today. Elevated BP.      Precert is still pending with [facility]

## 2023-10-14 LAB
ALBUMIN SERPL-MCNC: 4.3 G/DL (ref 3.4–5)
ANION GAP SERPL CALCULATED.3IONS-SCNC: 13 MMOL/L (ref 3–16)
BUN SERPL-MCNC: 19 MG/DL (ref 7–20)
CALCIUM SERPL-MCNC: 9.9 MG/DL (ref 8.3–10.6)
CHLORIDE SERPL-SCNC: 97 MMOL/L (ref 99–110)
CO2 SERPL-SCNC: 27 MMOL/L (ref 21–32)
CREAT SERPL-MCNC: 4 MG/DL (ref 0.6–1.1)
DEPRECATED RDW RBC AUTO: 19.1 % (ref 12.4–15.4)
GFR SERPLBLD CREATININE-BSD FMLA CKD-EPI: 15 ML/MIN/{1.73_M2}
GLUCOSE SERPL-MCNC: 93 MG/DL (ref 70–99)
HCT VFR BLD AUTO: 27.8 % (ref 36–48)
HGB BLD-MCNC: 9.4 G/DL (ref 12–16)
MCH RBC QN AUTO: 29.1 PG (ref 26–34)
MCHC RBC AUTO-ENTMCNC: 33.8 G/DL (ref 31–36)
MCV RBC AUTO: 86 FL (ref 80–100)
PHOSPHATE SERPL-MCNC: 5 MG/DL (ref 2.5–4.9)
PLATELET # BLD AUTO: 293 K/UL (ref 135–450)
PMV BLD AUTO: 6.2 FL (ref 5–10.5)
POTASSIUM SERPL-SCNC: 4.7 MMOL/L (ref 3.5–5.1)
RBC # BLD AUTO: 3.23 M/UL (ref 4–5.2)
SODIUM SERPL-SCNC: 137 MMOL/L (ref 136–145)
WBC # BLD AUTO: 4.6 K/UL (ref 4–11)

## 2023-10-14 PROCEDURE — 6360000002 HC RX W HCPCS: Performed by: INTERNAL MEDICINE

## 2023-10-14 PROCEDURE — 80069 RENAL FUNCTION PANEL: CPT

## 2023-10-14 PROCEDURE — 2060000000 HC ICU INTERMEDIATE R&B

## 2023-10-14 PROCEDURE — 6370000000 HC RX 637 (ALT 250 FOR IP): Performed by: INTERNAL MEDICINE

## 2023-10-14 PROCEDURE — 99232 SBSQ HOSP IP/OBS MODERATE 35: CPT | Performed by: INTERNAL MEDICINE

## 2023-10-14 PROCEDURE — 85027 COMPLETE CBC AUTOMATED: CPT

## 2023-10-14 PROCEDURE — 90935 HEMODIALYSIS ONE EVALUATION: CPT

## 2023-10-14 PROCEDURE — 2500000003 HC RX 250 WO HCPCS: Performed by: INTERNAL MEDICINE

## 2023-10-14 PROCEDURE — 2580000003 HC RX 258: Performed by: INTERNAL MEDICINE

## 2023-10-14 PROCEDURE — 36415 COLL VENOUS BLD VENIPUNCTURE: CPT

## 2023-10-14 RX ORDER — CLONIDINE HYDROCHLORIDE 0.2 MG/1
0.2 TABLET ORAL 3 TIMES DAILY
Status: DISCONTINUED | OUTPATIENT
Start: 2023-10-14 | End: 2023-10-17 | Stop reason: HOSPADM

## 2023-10-14 RX ADMIN — CALCITRIOL 0.5 MCG: 0.25 CAPSULE ORAL at 12:09

## 2023-10-14 RX ADMIN — Medication 5 MG: at 21:36

## 2023-10-14 RX ADMIN — HYDROMORPHONE HYDROCHLORIDE 1 MG: 1 INJECTION, SOLUTION INTRAMUSCULAR; INTRAVENOUS; SUBCUTANEOUS at 17:09

## 2023-10-14 RX ADMIN — CLONIDINE HYDROCHLORIDE 0.2 MG: 0.2 TABLET ORAL at 19:42

## 2023-10-14 RX ADMIN — CARVEDILOL 25 MG: 25 TABLET, FILM COATED ORAL at 17:11

## 2023-10-14 RX ADMIN — SODIUM CHLORIDE, PRESERVATIVE FREE 10 ML: 5 INJECTION INTRAVENOUS at 19:44

## 2023-10-14 RX ADMIN — HYDROMORPHONE HYDROCHLORIDE 1 MG: 1 INJECTION, SOLUTION INTRAMUSCULAR; INTRAVENOUS; SUBCUTANEOUS at 12:11

## 2023-10-14 RX ADMIN — ONDANSETRON 4 MG: 2 INJECTION INTRAMUSCULAR; INTRAVENOUS at 23:57

## 2023-10-14 RX ADMIN — CARVEDILOL 25 MG: 25 TABLET, FILM COATED ORAL at 12:09

## 2023-10-14 RX ADMIN — ISOSORBIDE DINITRATE 20 MG: 20 TABLET ORAL at 19:42

## 2023-10-14 RX ADMIN — MULTIPLE VITAMINS W/ MINERALS TAB 1 TABLET: TAB at 12:09

## 2023-10-14 RX ADMIN — OXYCODONE HYDROCHLORIDE AND ACETAMINOPHEN 1 TABLET: 10; 325 TABLET ORAL at 23:56

## 2023-10-14 RX ADMIN — SEVELAMER CARBONATE 800 MG: 800 TABLET, FILM COATED ORAL at 17:11

## 2023-10-14 RX ADMIN — SEVELAMER CARBONATE 800 MG: 800 TABLET, FILM COATED ORAL at 12:09

## 2023-10-14 RX ADMIN — PANTOPRAZOLE SODIUM 40 MG: 40 TABLET, DELAYED RELEASE ORAL at 12:09

## 2023-10-14 RX ADMIN — LABETALOL HYDROCHLORIDE 10 MG: 5 INJECTION, SOLUTION INTRAVENOUS at 00:02

## 2023-10-14 RX ADMIN — OXYCODONE HYDROCHLORIDE AND ACETAMINOPHEN 1 TABLET: 10; 325 TABLET ORAL at 13:49

## 2023-10-14 RX ADMIN — ISOSORBIDE DINITRATE 20 MG: 20 TABLET ORAL at 13:49

## 2023-10-14 RX ADMIN — HYDROMORPHONE HYDROCHLORIDE 1 MG: 1 INJECTION, SOLUTION INTRAMUSCULAR; INTRAVENOUS; SUBCUTANEOUS at 21:36

## 2023-10-14 RX ADMIN — OXYCODONE HYDROCHLORIDE AND ACETAMINOPHEN 1 TABLET: 10; 325 TABLET ORAL at 19:10

## 2023-10-14 RX ADMIN — HEPARIN SODIUM 5000 UNITS: 5000 INJECTION INTRAVENOUS; SUBCUTANEOUS at 19:43

## 2023-10-14 RX ADMIN — NIFEDIPINE 30 MG: 30 TABLET, FILM COATED, EXTENDED RELEASE ORAL at 12:09

## 2023-10-14 RX ADMIN — CLONIDINE HYDROCHLORIDE 0.2 MG: 0.2 TABLET ORAL at 14:11

## 2023-10-14 RX ADMIN — HYDROMORPHONE HYDROCHLORIDE 1 MG: 1 INJECTION, SOLUTION INTRAMUSCULAR; INTRAVENOUS; SUBCUTANEOUS at 05:38

## 2023-10-14 RX ADMIN — ONDANSETRON 4 MG: 2 INJECTION INTRAMUSCULAR; INTRAVENOUS at 13:49

## 2023-10-14 RX ADMIN — QUETIAPINE FUMARATE 200 MG: 200 TABLET ORAL at 19:42

## 2023-10-14 RX ADMIN — PANTOPRAZOLE SODIUM 40 MG: 40 TABLET, DELAYED RELEASE ORAL at 19:42

## 2023-10-14 RX ADMIN — HYDRALAZINE HYDROCHLORIDE 10 MG: 20 INJECTION, SOLUTION INTRAMUSCULAR; INTRAVENOUS at 13:53

## 2023-10-14 RX ADMIN — NIFEDIPINE 30 MG: 30 TABLET, FILM COATED, EXTENDED RELEASE ORAL at 17:11

## 2023-10-14 ASSESSMENT — PAIN - FUNCTIONAL ASSESSMENT
PAIN_FUNCTIONAL_ASSESSMENT: PREVENTS OR INTERFERES SOME ACTIVE ACTIVITIES AND ADLS
PAIN_FUNCTIONAL_ASSESSMENT: PREVENTS OR INTERFERES SOME ACTIVE ACTIVITIES AND ADLS
PAIN_FUNCTIONAL_ASSESSMENT: ACTIVITIES ARE NOT PREVENTED
PAIN_FUNCTIONAL_ASSESSMENT: PREVENTS OR INTERFERES SOME ACTIVE ACTIVITIES AND ADLS
PAIN_FUNCTIONAL_ASSESSMENT: PREVENTS OR INTERFERES SOME ACTIVE ACTIVITIES AND ADLS
PAIN_FUNCTIONAL_ASSESSMENT: INTOLERABLE, UNABLE TO DO ANY ACTIVE OR PASSIVE ACTIVITIES
PAIN_FUNCTIONAL_ASSESSMENT: PREVENTS OR INTERFERES SOME ACTIVE ACTIVITIES AND ADLS
PAIN_FUNCTIONAL_ASSESSMENT: PREVENTS OR INTERFERES SOME ACTIVE ACTIVITIES AND ADLS

## 2023-10-14 ASSESSMENT — PAIN DESCRIPTION - PAIN TYPE
TYPE: ACUTE PAIN

## 2023-10-14 ASSESSMENT — PAIN DESCRIPTION - LOCATION
LOCATION: PELVIS
LOCATION: PELVIS
LOCATION: BACK;GROIN
LOCATION: BACK;GROIN
LOCATION: PELVIS
LOCATION: BACK;GROIN
LOCATION: BACK;SACRUM
LOCATION: PELVIS

## 2023-10-14 ASSESSMENT — PAIN DESCRIPTION - DESCRIPTORS
DESCRIPTORS: ACHING
DESCRIPTORS: ACHING;GNAWING
DESCRIPTORS: ACHING;DISCOMFORT
DESCRIPTORS: ACHING
DESCRIPTORS: ACHING
DESCRIPTORS: ACHING;DISCOMFORT
DESCRIPTORS: ACHING
DESCRIPTORS: ACHING;DISCOMFORT

## 2023-10-14 ASSESSMENT — PAIN SCALES - GENERAL
PAINLEVEL_OUTOF10: 8
PAINLEVEL_OUTOF10: 10
PAINLEVEL_OUTOF10: 7
PAINLEVEL_OUTOF10: 10
PAINLEVEL_OUTOF10: 9
PAINLEVEL_OUTOF10: 6
PAINLEVEL_OUTOF10: 8
PAINLEVEL_OUTOF10: 8
PAINLEVEL_OUTOF10: 5

## 2023-10-14 ASSESSMENT — PAIN DESCRIPTION - ORIENTATION
ORIENTATION: ANTERIOR
ORIENTATION: ANTERIOR
ORIENTATION: MID;LOWER
ORIENTATION: MID;LOWER
ORIENTATION: LEFT
ORIENTATION: MID
ORIENTATION: LEFT;RIGHT

## 2023-10-15 LAB
ALBUMIN SERPL-MCNC: 4.1 G/DL (ref 3.4–5)
ANION GAP SERPL CALCULATED.3IONS-SCNC: 15 MMOL/L (ref 3–16)
BUN SERPL-MCNC: 19 MG/DL (ref 7–20)
CALCIUM SERPL-MCNC: 10 MG/DL (ref 8.3–10.6)
CHLORIDE SERPL-SCNC: 95 MMOL/L (ref 99–110)
CO2 SERPL-SCNC: 25 MMOL/L (ref 21–32)
CREAT SERPL-MCNC: 4.3 MG/DL (ref 0.6–1.1)
GFR SERPLBLD CREATININE-BSD FMLA CKD-EPI: 13 ML/MIN/{1.73_M2}
GLUCOSE SERPL-MCNC: 113 MG/DL (ref 70–99)
PHOSPHATE SERPL-MCNC: 4.6 MG/DL (ref 2.5–4.9)
POTASSIUM SERPL-SCNC: 4.1 MMOL/L (ref 3.5–5.1)
SODIUM SERPL-SCNC: 135 MMOL/L (ref 136–145)

## 2023-10-15 PROCEDURE — 6370000000 HC RX 637 (ALT 250 FOR IP): Performed by: INTERNAL MEDICINE

## 2023-10-15 PROCEDURE — 80069 RENAL FUNCTION PANEL: CPT

## 2023-10-15 PROCEDURE — 2500000003 HC RX 250 WO HCPCS: Performed by: INTERNAL MEDICINE

## 2023-10-15 PROCEDURE — 2060000000 HC ICU INTERMEDIATE R&B

## 2023-10-15 PROCEDURE — 99232 SBSQ HOSP IP/OBS MODERATE 35: CPT | Performed by: INTERNAL MEDICINE

## 2023-10-15 PROCEDURE — 36415 COLL VENOUS BLD VENIPUNCTURE: CPT

## 2023-10-15 PROCEDURE — 97110 THERAPEUTIC EXERCISES: CPT

## 2023-10-15 PROCEDURE — 97116 GAIT TRAINING THERAPY: CPT

## 2023-10-15 PROCEDURE — 2580000003 HC RX 258: Performed by: INTERNAL MEDICINE

## 2023-10-15 RX ADMIN — SEVELAMER CARBONATE 800 MG: 800 TABLET, FILM COATED ORAL at 12:26

## 2023-10-15 RX ADMIN — CARVEDILOL 25 MG: 25 TABLET, FILM COATED ORAL at 07:47

## 2023-10-15 RX ADMIN — HYDROMORPHONE HYDROCHLORIDE 1 MG: 1 INJECTION, SOLUTION INTRAMUSCULAR; INTRAVENOUS; SUBCUTANEOUS at 20:54

## 2023-10-15 RX ADMIN — HYDROMORPHONE HYDROCHLORIDE 1 MG: 1 INJECTION, SOLUTION INTRAMUSCULAR; INTRAVENOUS; SUBCUTANEOUS at 10:25

## 2023-10-15 RX ADMIN — CLONIDINE HYDROCHLORIDE 0.2 MG: 0.2 TABLET ORAL at 20:54

## 2023-10-15 RX ADMIN — CARVEDILOL 25 MG: 25 TABLET, FILM COATED ORAL at 16:58

## 2023-10-15 RX ADMIN — SEVELAMER CARBONATE 800 MG: 800 TABLET, FILM COATED ORAL at 16:58

## 2023-10-15 RX ADMIN — PANTOPRAZOLE SODIUM 40 MG: 40 TABLET, DELAYED RELEASE ORAL at 07:47

## 2023-10-15 RX ADMIN — QUETIAPINE FUMARATE 200 MG: 200 TABLET ORAL at 20:53

## 2023-10-15 RX ADMIN — SODIUM CHLORIDE, PRESERVATIVE FREE 10 ML: 5 INJECTION INTRAVENOUS at 07:48

## 2023-10-15 RX ADMIN — ISOSORBIDE DINITRATE 20 MG: 20 TABLET ORAL at 20:53

## 2023-10-15 RX ADMIN — MULTIPLE VITAMINS W/ MINERALS TAB 1 TABLET: TAB at 07:46

## 2023-10-15 RX ADMIN — SODIUM CHLORIDE, PRESERVATIVE FREE 10 ML: 5 INJECTION INTRAVENOUS at 21:04

## 2023-10-15 RX ADMIN — CALCITRIOL 0.5 MCG: 0.25 CAPSULE ORAL at 07:46

## 2023-10-15 RX ADMIN — PANTOPRAZOLE SODIUM 40 MG: 40 TABLET, DELAYED RELEASE ORAL at 20:54

## 2023-10-15 RX ADMIN — OXYCODONE HYDROCHLORIDE AND ACETAMINOPHEN 1 TABLET: 10; 325 TABLET ORAL at 07:46

## 2023-10-15 RX ADMIN — ISOSORBIDE DINITRATE 20 MG: 20 TABLET ORAL at 15:13

## 2023-10-15 RX ADMIN — OXYCODONE HYDROCHLORIDE AND ACETAMINOPHEN 1 TABLET: 10; 325 TABLET ORAL at 12:26

## 2023-10-15 RX ADMIN — ISOSORBIDE DINITRATE 20 MG: 20 TABLET ORAL at 07:46

## 2023-10-15 RX ADMIN — HYDROMORPHONE HYDROCHLORIDE 1 MG: 1 INJECTION, SOLUTION INTRAMUSCULAR; INTRAVENOUS; SUBCUTANEOUS at 15:16

## 2023-10-15 RX ADMIN — ONDANSETRON 4 MG: 4 TABLET, ORALLY DISINTEGRATING ORAL at 20:53

## 2023-10-15 RX ADMIN — CLONIDINE HYDROCHLORIDE 0.2 MG: 0.2 TABLET ORAL at 15:13

## 2023-10-15 RX ADMIN — NIFEDIPINE 30 MG: 30 TABLET, FILM COATED, EXTENDED RELEASE ORAL at 07:46

## 2023-10-15 RX ADMIN — SEVELAMER CARBONATE 800 MG: 800 TABLET, FILM COATED ORAL at 07:47

## 2023-10-15 RX ADMIN — NIFEDIPINE 30 MG: 30 TABLET, FILM COATED, EXTENDED RELEASE ORAL at 16:58

## 2023-10-15 RX ADMIN — CLONIDINE HYDROCHLORIDE 0.2 MG: 0.2 TABLET ORAL at 07:47

## 2023-10-15 RX ADMIN — OXYCODONE HYDROCHLORIDE AND ACETAMINOPHEN 1 TABLET: 10; 325 TABLET ORAL at 16:58

## 2023-10-15 ASSESSMENT — PAIN DESCRIPTION - DESCRIPTORS
DESCRIPTORS: SHARP
DESCRIPTORS: ACHING
DESCRIPTORS: ACHING;DISCOMFORT
DESCRIPTORS: ACHING

## 2023-10-15 ASSESSMENT — PAIN DESCRIPTION - LOCATION
LOCATION: PELVIS
LOCATION: BACK;PELVIS

## 2023-10-15 ASSESSMENT — PAIN SCALES - GENERAL
PAINLEVEL_OUTOF10: 7
PAINLEVEL_OUTOF10: 8
PAINLEVEL_OUTOF10: 7
PAINLEVEL_OUTOF10: 8
PAINLEVEL_OUTOF10: 5
PAINLEVEL_OUTOF10: 8
PAINLEVEL_OUTOF10: 5
PAINLEVEL_OUTOF10: 5
PAINLEVEL_OUTOF10: 10

## 2023-10-15 ASSESSMENT — PAIN DESCRIPTION - PAIN TYPE
TYPE: ACUTE PAIN

## 2023-10-15 ASSESSMENT — PAIN DESCRIPTION - ONSET
ONSET: GRADUAL

## 2023-10-15 ASSESSMENT — PAIN DESCRIPTION - ORIENTATION
ORIENTATION: RIGHT;LOWER
ORIENTATION: RIGHT
ORIENTATION: ANTERIOR
ORIENTATION: ANTERIOR

## 2023-10-15 ASSESSMENT — PAIN DESCRIPTION - FREQUENCY
FREQUENCY: INTERMITTENT

## 2023-10-16 PROCEDURE — 6370000000 HC RX 637 (ALT 250 FOR IP): Performed by: INTERNAL MEDICINE

## 2023-10-16 PROCEDURE — 2500000003 HC RX 250 WO HCPCS: Performed by: INTERNAL MEDICINE

## 2023-10-16 PROCEDURE — 2060000000 HC ICU INTERMEDIATE R&B

## 2023-10-16 PROCEDURE — 2580000003 HC RX 258: Performed by: INTERNAL MEDICINE

## 2023-10-16 PROCEDURE — 99232 SBSQ HOSP IP/OBS MODERATE 35: CPT

## 2023-10-16 PROCEDURE — 6360000002 HC RX W HCPCS: Performed by: INTERNAL MEDICINE

## 2023-10-16 RX ADMIN — SODIUM CHLORIDE, PRESERVATIVE FREE 10 ML: 5 INJECTION INTRAVENOUS at 09:23

## 2023-10-16 RX ADMIN — OXYCODONE HYDROCHLORIDE AND ACETAMINOPHEN 1 TABLET: 10; 325 TABLET ORAL at 17:55

## 2023-10-16 RX ADMIN — NIFEDIPINE 30 MG: 30 TABLET, FILM COATED, EXTENDED RELEASE ORAL at 09:27

## 2023-10-16 RX ADMIN — ASCORBIC ACID, THIAMINE MONONITRATE,RIBOFLAVIN, NIACINAMIDE, PYRIDOXINE HYDROCHLORIDE, FOLIC ACID, CYANOCOBALAMIN, BIOTIN, CALCIUM PANTOTHENATE, 1 MG: 100; 1.5; 1.7; 20; 10; 1; 6000; 150000; 5 CAPSULE, LIQUID FILLED ORAL at 09:26

## 2023-10-16 RX ADMIN — SEVELAMER CARBONATE 800 MG: 800 TABLET, FILM COATED ORAL at 12:04

## 2023-10-16 RX ADMIN — CALCITRIOL 0.5 MCG: 0.25 CAPSULE ORAL at 09:26

## 2023-10-16 RX ADMIN — PANTOPRAZOLE SODIUM 40 MG: 40 TABLET, DELAYED RELEASE ORAL at 22:17

## 2023-10-16 RX ADMIN — PANTOPRAZOLE SODIUM 40 MG: 40 TABLET, DELAYED RELEASE ORAL at 09:27

## 2023-10-16 RX ADMIN — QUETIAPINE FUMARATE 200 MG: 200 TABLET ORAL at 22:17

## 2023-10-16 RX ADMIN — OXYCODONE HYDROCHLORIDE AND ACETAMINOPHEN 1 TABLET: 10; 325 TABLET ORAL at 04:38

## 2023-10-16 RX ADMIN — ISOSORBIDE DINITRATE 20 MG: 20 TABLET ORAL at 13:43

## 2023-10-16 RX ADMIN — ISOSORBIDE DINITRATE 20 MG: 20 TABLET ORAL at 22:17

## 2023-10-16 RX ADMIN — CLONIDINE HYDROCHLORIDE 0.2 MG: 0.2 TABLET ORAL at 13:43

## 2023-10-16 RX ADMIN — CARVEDILOL 25 MG: 25 TABLET, FILM COATED ORAL at 16:38

## 2023-10-16 RX ADMIN — NIFEDIPINE 30 MG: 30 TABLET, FILM COATED, EXTENDED RELEASE ORAL at 17:05

## 2023-10-16 RX ADMIN — CARVEDILOL 25 MG: 25 TABLET, FILM COATED ORAL at 09:27

## 2023-10-16 RX ADMIN — HYDROMORPHONE HYDROCHLORIDE 1 MG: 1 INJECTION, SOLUTION INTRAMUSCULAR; INTRAVENOUS; SUBCUTANEOUS at 09:22

## 2023-10-16 RX ADMIN — POLYETHYLENE GLYCOL (3350) 17 G: 17 POWDER, FOR SOLUTION ORAL at 16:38

## 2023-10-16 RX ADMIN — ISOSORBIDE DINITRATE 20 MG: 20 TABLET ORAL at 09:26

## 2023-10-16 RX ADMIN — MULTIPLE VITAMINS W/ MINERALS TAB 1 TABLET: TAB at 09:27

## 2023-10-16 RX ADMIN — SEVELAMER CARBONATE 800 MG: 800 TABLET, FILM COATED ORAL at 17:05

## 2023-10-16 RX ADMIN — HEPARIN SODIUM 5000 UNITS: 5000 INJECTION INTRAVENOUS; SUBCUTANEOUS at 04:38

## 2023-10-16 RX ADMIN — CLONIDINE HYDROCHLORIDE 0.2 MG: 0.2 TABLET ORAL at 22:17

## 2023-10-16 RX ADMIN — CLONIDINE HYDROCHLORIDE 0.2 MG: 0.2 TABLET ORAL at 09:27

## 2023-10-16 RX ADMIN — ONDANSETRON 4 MG: 4 TABLET, ORALLY DISINTEGRATING ORAL at 09:27

## 2023-10-16 RX ADMIN — SODIUM CHLORIDE, PRESERVATIVE FREE 10 ML: 5 INJECTION INTRAVENOUS at 22:23

## 2023-10-16 RX ADMIN — SEVELAMER CARBONATE 800 MG: 800 TABLET, FILM COATED ORAL at 09:26

## 2023-10-16 RX ADMIN — OXYCODONE HYDROCHLORIDE AND ACETAMINOPHEN 1 TABLET: 10; 325 TABLET ORAL at 13:42

## 2023-10-16 RX ADMIN — OXYCODONE HYDROCHLORIDE AND ACETAMINOPHEN 1 TABLET: 10; 325 TABLET ORAL at 22:22

## 2023-10-16 ASSESSMENT — PAIN DESCRIPTION - PAIN TYPE
TYPE: ACUTE PAIN
TYPE: ACUTE PAIN

## 2023-10-16 ASSESSMENT — PAIN SCALES - GENERAL
PAINLEVEL_OUTOF10: 8
PAINLEVEL_OUTOF10: 3
PAINLEVEL_OUTOF10: 0
PAINLEVEL_OUTOF10: 7
PAINLEVEL_OUTOF10: 8
PAINLEVEL_OUTOF10: 8
PAINLEVEL_OUTOF10: 4
PAINLEVEL_OUTOF10: 2
PAINLEVEL_OUTOF10: 7
PAINLEVEL_OUTOF10: 0

## 2023-10-16 ASSESSMENT — PAIN DESCRIPTION - ORIENTATION
ORIENTATION: LEFT
ORIENTATION: LEFT
ORIENTATION: RIGHT
ORIENTATION: LEFT
ORIENTATION: LEFT

## 2023-10-16 ASSESSMENT — PAIN DESCRIPTION - DESCRIPTORS
DESCRIPTORS: ACHING
DESCRIPTORS: SHARP
DESCRIPTORS: SHARP
DESCRIPTORS: ACHING

## 2023-10-16 ASSESSMENT — PAIN DESCRIPTION - LOCATION
LOCATION: PELVIS
LOCATION: HIP
LOCATION: PELVIS

## 2023-10-16 NOTE — CARE COORDINATION
Precert still pending as of 10/16 4823. [facility] (has IP HD chair) to send updated therapy notes.  Will follow

## 2023-10-17 VITALS
OXYGEN SATURATION: 96 % | HEIGHT: 61 IN | WEIGHT: 114.42 LBS | RESPIRATION RATE: 16 BRPM | HEART RATE: 70 BPM | SYSTOLIC BLOOD PRESSURE: 125 MMHG | DIASTOLIC BLOOD PRESSURE: 81 MMHG | TEMPERATURE: 97.8 F | BODY MASS INDEX: 21.6 KG/M2

## 2023-10-17 LAB
ANION GAP SERPL CALCULATED.3IONS-SCNC: 12 MMOL/L (ref 3–16)
ANION GAP SERPL CALCULATED.3IONS-SCNC: 16 MMOL/L (ref 3–16)
BASOPHILS # BLD: 0.1 K/UL (ref 0–0.2)
BASOPHILS NFR BLD: 0.9 %
BUN SERPL-MCNC: 17 MG/DL (ref 7–20)
BUN SERPL-MCNC: 49 MG/DL (ref 7–20)
CALCIUM SERPL-MCNC: 10 MG/DL (ref 8.3–10.6)
CALCIUM SERPL-MCNC: 9.9 MG/DL (ref 8.3–10.6)
CHLORIDE SERPL-SCNC: 92 MMOL/L (ref 99–110)
CHLORIDE SERPL-SCNC: 93 MMOL/L (ref 99–110)
CO2 SERPL-SCNC: 25 MMOL/L (ref 21–32)
CO2 SERPL-SCNC: 26 MMOL/L (ref 21–32)
CREAT SERPL-MCNC: 3.9 MG/DL (ref 0.6–1.1)
CREAT SERPL-MCNC: 8.3 MG/DL (ref 0.6–1.1)
DEPRECATED RDW RBC AUTO: 19.7 % (ref 12.4–15.4)
EOSINOPHIL # BLD: 0.5 K/UL (ref 0–0.6)
EOSINOPHIL NFR BLD: 8.2 %
GFR SERPLBLD CREATININE-BSD FMLA CKD-EPI: 15 ML/MIN/{1.73_M2}
GFR SERPLBLD CREATININE-BSD FMLA CKD-EPI: 6 ML/MIN/{1.73_M2}
GLUCOSE SERPL-MCNC: 100 MG/DL (ref 70–99)
GLUCOSE SERPL-MCNC: 110 MG/DL (ref 70–99)
HCT VFR BLD AUTO: 26.1 % (ref 36–48)
HGB BLD-MCNC: 8.7 G/DL (ref 12–16)
LYMPHOCYTES # BLD: 2.7 K/UL (ref 1–5.1)
LYMPHOCYTES NFR BLD: 44.2 %
MCH RBC QN AUTO: 29.1 PG (ref 26–34)
MCHC RBC AUTO-ENTMCNC: 33.2 G/DL (ref 31–36)
MCV RBC AUTO: 87.9 FL (ref 80–100)
MONOCYTES # BLD: 0.5 K/UL (ref 0–1.3)
MONOCYTES NFR BLD: 8.1 %
NEUTROPHILS # BLD: 2.3 K/UL (ref 1.7–7.7)
NEUTROPHILS NFR BLD: 38.6 %
PLATELET # BLD AUTO: 245 K/UL (ref 135–450)
PMV BLD AUTO: 6.6 FL (ref 5–10.5)
POTASSIUM SERPL-SCNC: 4.8 MMOL/L (ref 3.5–5.1)
POTASSIUM SERPL-SCNC: 5.8 MMOL/L (ref 3.5–5.1)
RBC # BLD AUTO: 2.97 M/UL (ref 4–5.2)
SODIUM SERPL-SCNC: 131 MMOL/L (ref 136–145)
SODIUM SERPL-SCNC: 133 MMOL/L (ref 136–145)
WBC # BLD AUTO: 6 K/UL (ref 4–11)

## 2023-10-17 PROCEDURE — 6370000000 HC RX 637 (ALT 250 FOR IP): Performed by: INTERNAL MEDICINE

## 2023-10-17 PROCEDURE — 2580000003 HC RX 258: Performed by: INTERNAL MEDICINE

## 2023-10-17 PROCEDURE — 85025 COMPLETE CBC W/AUTO DIFF WBC: CPT

## 2023-10-17 PROCEDURE — 99232 SBSQ HOSP IP/OBS MODERATE 35: CPT

## 2023-10-17 PROCEDURE — 80048 BASIC METABOLIC PNL TOTAL CA: CPT

## 2023-10-17 PROCEDURE — 90935 HEMODIALYSIS ONE EVALUATION: CPT

## 2023-10-17 PROCEDURE — 36415 COLL VENOUS BLD VENIPUNCTURE: CPT

## 2023-10-17 RX ORDER — CALCITRIOL 0.25 UG/1
0.5 CAPSULE, LIQUID FILLED ORAL DAILY
Qty: 30 CAPSULE | Refills: 3 | Status: SHIPPED | OUTPATIENT
Start: 2023-10-17

## 2023-10-17 RX ORDER — CLONIDINE HYDROCHLORIDE 0.1 MG/1
0.2 TABLET ORAL 3 TIMES DAILY
Qty: 60 TABLET | Refills: 3 | Status: SHIPPED | OUTPATIENT
Start: 2023-10-17

## 2023-10-17 RX ORDER — OXYCODONE HYDROCHLORIDE AND ACETAMINOPHEN 5; 325 MG/1; MG/1
1 TABLET ORAL EVERY 6 HOURS PRN
Qty: 12 TABLET | Refills: 0 | Status: SHIPPED | OUTPATIENT
Start: 2023-10-17 | End: 2023-10-20

## 2023-10-17 RX ORDER — CLONIDINE HYDROCHLORIDE 0.1 MG/1
0.2 TABLET ORAL 3 TIMES DAILY PRN
Qty: 60 TABLET | Refills: 3 | Status: SHIPPED | OUTPATIENT
Start: 2023-10-17 | End: 2023-10-17 | Stop reason: SDUPTHER

## 2023-10-17 RX ORDER — ISOSORBIDE DINITRATE 20 MG/1
20 TABLET ORAL 3 TIMES DAILY
Qty: 90 TABLET | Refills: 0 | Status: SHIPPED | OUTPATIENT
Start: 2023-10-17

## 2023-10-17 RX ADMIN — MULTIPLE VITAMINS W/ MINERALS TAB 1 TABLET: TAB at 11:56

## 2023-10-17 RX ADMIN — CALCITRIOL 0.5 MCG: 0.25 CAPSULE ORAL at 11:55

## 2023-10-17 RX ADMIN — OXYCODONE HYDROCHLORIDE AND ACETAMINOPHEN 1 TABLET: 10; 325 TABLET ORAL at 15:43

## 2023-10-17 RX ADMIN — OXYCODONE HYDROCHLORIDE AND ACETAMINOPHEN 1 TABLET: 10; 325 TABLET ORAL at 11:55

## 2023-10-17 RX ADMIN — SEVELAMER CARBONATE 800 MG: 800 TABLET, FILM COATED ORAL at 17:21

## 2023-10-17 RX ADMIN — SODIUM CHLORIDE, PRESERVATIVE FREE 10 ML: 5 INJECTION INTRAVENOUS at 11:57

## 2023-10-17 RX ADMIN — NIFEDIPINE 30 MG: 30 TABLET, FILM COATED, EXTENDED RELEASE ORAL at 17:21

## 2023-10-17 RX ADMIN — SEVELAMER CARBONATE 800 MG: 800 TABLET, FILM COATED ORAL at 11:56

## 2023-10-17 RX ADMIN — CLONIDINE HYDROCHLORIDE 0.2 MG: 0.2 TABLET ORAL at 15:39

## 2023-10-17 RX ADMIN — ISOSORBIDE DINITRATE 20 MG: 20 TABLET ORAL at 15:40

## 2023-10-17 RX ADMIN — PANTOPRAZOLE SODIUM 40 MG: 40 TABLET, DELAYED RELEASE ORAL at 11:56

## 2023-10-17 RX ADMIN — CARVEDILOL 25 MG: 25 TABLET, FILM COATED ORAL at 17:21

## 2023-10-17 ASSESSMENT — PAIN SCALES - GENERAL
PAINLEVEL_OUTOF10: 7
PAINLEVEL_OUTOF10: 8
PAINLEVEL_OUTOF10: 3

## 2023-10-17 ASSESSMENT — PAIN DESCRIPTION - LOCATION
LOCATION: HIP;BACK
LOCATION: BACK;HIP

## 2023-10-17 ASSESSMENT — PAIN SCALES - WONG BAKER: WONGBAKER_NUMERICALRESPONSE: 2

## 2023-10-17 NOTE — CARE COORDINATION
DISCHARGE ORDER  Date/Time 10/17/2023 3:20 PM  Completed by: Jude Hinojosa RN, Case Management    Patient Name: Nico Roman      : 1990  Admitting Diagnosis: Hyperkalemia [E87.5]  Cannot walk [R26.2]  ESRD (end stage renal disease) ([address]) [N18.6]  ESRD (end stage renal disease) on dialysis ([address]) [N18.6, Z99.2]  Pain in joint involving pelvic region and thigh, unspecified laterality [M25.559]  Acute on chronic anemia [D64.9]      Admit order Date and Status:ip 10/6  (verify MD's last order for status of admission)      Noted discharge order. If applicable PT/OT recommendation at Discharge: snf  DME recommendation by PT/OT:defer to facility  Confirmed discharge plan  (): Yes  with whom___[facility]ton____________  If pt confirmed DC plan does family need to be contacted by CM No     Discharge Plan: met with pt at bedside. Precert is still pending. Pt states she will just go home with home care and continue OP HD. Pt has HD at [facility] and would like to transfer to [facility] until she is feeling better. Spoke to SW at [facility] who state they will be able to transfer her if there is an open chair at the [facility] site. Pt will be serviced by [facility] care for home PT/OT. Pts mother to provide transportation home today. No additional DC or dme needs identified     Date of Last IMM Given: na    Reviewed chart. Role of discharge planner explained and patient verbalized understanding. Discharge order is noted. Has Home O2 in place on admit:  No  Informed of need to bring portable home O2 tank on day of discharge for nursing to connect prior to leaving:   Not Indicated  Verbalized agreement/Understanding:   Not Indicated  Pt is being d/c'd to home today. Pt's O2 sats are 97% on ra. Discharge timeout done with rn, cm, pt. All discharge needs and concerns addressed.

## 2023-10-17 NOTE — DISCHARGE INSTR - DIET
Good nutrition is important when healing from an illness, injury, or surgery. Follow any nutrition recommendations given to you during your hospital stay. If you were given an oral nutrition supplement while in the hospital, continue to take this supplement at home. You can take it with meals, in-between meals, and/or before bedtime. These supplements can be purchased at most local grocery stores, pharmacies, and chain [employer]. If you have any questions about your diet or nutrition, call the hospital and ask for the dietitian.     General  Low Potassium  1000 ml fluid restriction

## 2023-10-17 NOTE — PLAN OF CARE
Problem: Discharge Planning  Goal: Discharge to home or other facility with appropriate resources  10/17/2023 1409 by Khloe Pink RN  Outcome: Progressing  10/17/2023 1408 by Khloe Pink RN  Outcome: Progressing     Problem: Pain  Goal: Verbalizes/displays adequate comfort level or baseline comfort level  10/17/2023 1409 by Khloe Pink RN  Outcome: Progressing  10/17/2023 1408 by Khloe Pink RN  Outcome: Progressing     Problem: Safety - Adult  Goal: Free from fall injury  10/17/2023 1409 by Khloe Pink RN  Outcome: Progressing  10/17/2023 1408 by Khloe Pink RN  Outcome: Progressing     Problem: Skin/Tissue Integrity  Goal: Absence of new skin breakdown  10/17/2023 1409 by Khloe Pink RN  Outcome: Progressing  10/17/2023 1408 by Khloe Pink RN  Outcome: Progressing     Problem: Nutrition Deficit:  Goal: Optimize nutritional status  10/17/2023 1409 by Khloe Pink RN  Outcome: Progressing  10/17/2023 1408 by Khloe Pink RN  Outcome: Progressing

## 2023-10-17 NOTE — CARE COORDINATION
1204: call to [facility]. No resolution for precert at this time. They are to look into it and return call. Pt is wanting to DC today. Pt states if there is no determination she will DC home with home care and OP HD.

## 2023-10-17 NOTE — DISCHARGE SUMMARY
10/17/2023    HCT 26.1 (L) 10/17/2023    MCV 87.9 10/17/2023     10/17/2023     Lab Results   Component Value Date     (L) 10/17/2023    K 4.8 10/17/2023    CL 93 (L) 10/17/2023    CO2 26 10/17/2023    BUN 17 10/17/2023    CREATININE 3.9 (H) 10/17/2023    GLUCOSE 110 (H) 10/17/2023    CALCIUM 10.0 10/17/2023    PROT 7.9 10/06/2023    LABALBU 4.1 10/15/2023    BILITOT 0.4 10/06/2023    ALKPHOS 297 (H) 10/06/2023    AST 11 (L) 10/06/2023    ALT 6 (L) 10/06/2023    LABGLOM 15 (A) 10/17/2023    GFRAA 5 (A) 02/03/2022    AGRATIO 1.1 10/06/2023    GLOB 5.0 10/09/2018             CULTURES  Results       ** No results found for the last 336 hours. **              RADIOLOGY  CT CHEST W CONTRAST   Final Result   Superior portion of the SVC is thread-like, at the confluence of the SVC and   right internal jugular, with multiple vascular collaterals, compatible with   chronic SVC occlusion. Hypodensity is seen marginating the tip of the dialysis catheter which is in   the SVC. This hypodensity could be due to a small amount of chronic thrombus   or fibrin sheath on the catheter tip. A trophic left kidney. .  There is trace perihepatic ascites               Discharge Medications     Medication List        START taking these medications      isosorbide dinitrate 20 MG tablet  Commonly known as: ISORDIL  Take 1 tablet by mouth 3 times daily     oxyCODONE-acetaminophen 5-325 MG per tablet  Commonly known as: Percocet  Take 1 tablet by mouth every 6 hours as needed for Pain for up to 3 days. Intended supply: 3 days.  Take lowest dose possible to manage pain Max Daily Amount: 4 tablets            CHANGE how you take these medications      cloNIDine 0.1 MG tablet  Commonly known as: CATAPRES  Take 2 tablets by mouth 3 times daily  What changed:   how much to take  when to take this  reasons to take this            CONTINUE taking these medications      acetaminophen 325 MG tablet  Commonly known as: TYLENOL

## 2023-10-17 NOTE — PLAN OF CARE
Problem: Discharge Planning  Goal: Discharge to home or other facility with appropriate resources  10/17/2023 1759 by Aditi Arias RN  Outcome: Completed  10/17/2023 1409 by Aditi Arias RN  Outcome: Progressing  10/17/2023 1408 by Aditi Arias RN  Outcome: Progressing     Problem: Pain  Goal: Verbalizes/displays adequate comfort level or baseline comfort level  10/17/2023 1759 by Aditi Arias RN  Outcome: Completed  10/17/2023 1409 by Aditi Arias RN  Outcome: Progressing  10/17/2023 1408 by Aditi Arias RN  Outcome: Progressing     Problem: Safety - Adult  Goal: Free from fall injury  10/17/2023 1759 by Aditi Arias RN  Outcome: Completed  10/17/2023 1409 by Aditi Arias RN  Outcome: Progressing  10/17/2023 1408 by Aditi Arias RN  Outcome: Progressing     Problem: Skin/Tissue Integrity  Goal: Absence of new skin breakdown  10/17/2023 1759 by Aditi Arias RN  Outcome: Completed  10/17/2023 1409 by Aditi Arias RN  Outcome: Progressing  10/17/2023 1408 by Aditi Arias RN  Outcome: Progressing     Problem: Nutrition Deficit:  Goal: Optimize nutritional status  10/17/2023 1759 by Aditi Arias RN  Outcome: Completed  10/17/2023 1409 by Aditi Arias RN  Outcome: Progressing  10/17/2023 1408 by Aditi Arias RN  Outcome: Progressing

## 2023-10-31 ENCOUNTER — HOSPITAL ENCOUNTER (EMERGENCY)
Age: 33
Discharge: HOME | End: 2023-10-31
Attending: EMERGENCY MEDICINE | Admitting: INTERNAL MEDICINE
Payer: MEDICAID

## 2023-10-31 ENCOUNTER — APPOINTMENT (OUTPATIENT)
Dept: CT IMAGING | Age: 33
End: 2023-10-31
Payer: MEDICAID

## 2023-10-31 VITALS
RESPIRATION RATE: 16 BRPM | HEART RATE: 68 BPM | BODY MASS INDEX: 23.98 KG/M2 | TEMPERATURE: 97.3 F | HEIGHT: 61 IN | WEIGHT: 127 LBS | DIASTOLIC BLOOD PRESSURE: 102 MMHG | OXYGEN SATURATION: 98 % | SYSTOLIC BLOOD PRESSURE: 162 MMHG

## 2023-10-31 DIAGNOSIS — M62.838 SPASM OF MUSCLE: ICD-10-CM

## 2023-10-31 DIAGNOSIS — R53.83 OTHER FATIGUE: ICD-10-CM

## 2023-10-31 DIAGNOSIS — E87.5 HYPERKALEMIA: Primary | ICD-10-CM

## 2023-10-31 DIAGNOSIS — N18.6 ESRD ON DIALYSIS (HCC): ICD-10-CM

## 2023-10-31 DIAGNOSIS — Z99.2 ESRD ON DIALYSIS (HCC): ICD-10-CM

## 2023-10-31 DIAGNOSIS — R25.1 TREMORS OF NERVOUS SYSTEM: ICD-10-CM

## 2023-10-31 LAB
ALBUMIN SERPL-MCNC: 4.3 G/DL (ref 3.4–5)
ALBUMIN/GLOB SERPL: 1.4 {RATIO} (ref 1.1–2.2)
ALP SERPL-CCNC: 306 U/L (ref 40–129)
ALT SERPL-CCNC: 8 U/L (ref 10–40)
ANION GAP SERPL CALCULATED.3IONS-SCNC: 18 MMOL/L (ref 3–16)
ANION GAP SERPL CALCULATED.3IONS-SCNC: 20 MMOL/L (ref 3–16)
AST SERPL-CCNC: 11 U/L (ref 15–37)
BASE EXCESS BLDV CALC-SCNC: -1.6 MMOL/L (ref -3–3)
BASOPHILS # BLD: 0 K/UL (ref 0–0.2)
BASOPHILS NFR BLD: 0.7 %
BILIRUB SERPL-MCNC: 0.3 MG/DL (ref 0–1)
BUN SERPL-MCNC: 80 MG/DL (ref 7–20)
BUN SERPL-MCNC: 86 MG/DL (ref 7–20)
CALCIUM SERPL-MCNC: 8.2 MG/DL (ref 8.3–10.6)
CALCIUM SERPL-MCNC: 8.5 MG/DL (ref 8.3–10.6)
CHLORIDE SERPL-SCNC: 92 MMOL/L (ref 99–110)
CHLORIDE SERPL-SCNC: 93 MMOL/L (ref 99–110)
CO2 BLDV-SCNC: 25 MMOL/L
CO2 SERPL-SCNC: 25 MMOL/L (ref 21–32)
CO2 SERPL-SCNC: 25 MMOL/L (ref 21–32)
COHGB MFR BLDV: 6.1 % (ref 0–1.5)
CREAT SERPL-MCNC: 8.2 MG/DL (ref 0.6–1.1)
CREAT SERPL-MCNC: 9.1 MG/DL (ref 0.6–1.1)
DEPRECATED RDW RBC AUTO: 19.9 % (ref 12.4–15.4)
EKG ATRIAL RATE: 61 BPM
EKG DIAGNOSIS: NORMAL
EKG P AXIS: 39 DEGREES
EKG P-R INTERVAL: 230 MS
EKG Q-T INTERVAL: 440 MS
EKG QRS DURATION: 84 MS
EKG QTC CALCULATION (BAZETT): 442 MS
EKG R AXIS: 115 DEGREES
EKG T AXIS: 84 DEGREES
EKG VENTRICULAR RATE: 61 BPM
EOSINOPHIL # BLD: 0.2 K/UL (ref 0–0.6)
EOSINOPHIL NFR BLD: 3.8 %
GFR SERPLBLD CREATININE-BSD FMLA CKD-EPI: 5 ML/MIN/{1.73_M2}
GFR SERPLBLD CREATININE-BSD FMLA CKD-EPI: 6 ML/MIN/{1.73_M2}
GLUCOSE BLD-MCNC: 120 MG/DL (ref 70–99)
GLUCOSE SERPL-MCNC: 76 MG/DL (ref 70–99)
GLUCOSE SERPL-MCNC: 93 MG/DL (ref 70–99)
HCG SERPL QL: NEGATIVE
HCO3 BLDV-SCNC: 23.3 MMOL/L (ref 23–29)
HCT VFR BLD AUTO: 31.6 % (ref 36–48)
HGB BLD-MCNC: 10.5 G/DL (ref 12–16)
LYMPHOCYTES # BLD: 1.9 K/UL (ref 1–5.1)
LYMPHOCYTES NFR BLD: 29.7 %
MCH RBC QN AUTO: 29.7 PG (ref 26–34)
MCHC RBC AUTO-ENTMCNC: 33.4 G/DL (ref 31–36)
MCV RBC AUTO: 89.1 FL (ref 80–100)
METHGB MFR BLDV: 0.3 %
MONOCYTES # BLD: 0.3 K/UL (ref 0–1.3)
MONOCYTES NFR BLD: 4.6 %
NEUTROPHILS # BLD: 4 K/UL (ref 1.7–7.7)
NEUTROPHILS NFR BLD: 61.2 %
O2 CT VFR BLDV CALC: 11 VOL %
O2 THERAPY: ABNORMAL
PCO2 BLDV: 39.8 MMHG (ref 40–50)
PERFORMED ON: ABNORMAL
PH BLDV: 7.38 [PH] (ref 7.35–7.45)
PLATELET # BLD AUTO: 176 K/UL (ref 135–450)
PMV BLD AUTO: 7 FL (ref 5–10.5)
PO2 BLDV: 42.2 MMHG (ref 25–40)
POTASSIUM SERPL-SCNC: 5.9 MMOL/L (ref 3.5–5.1)
POTASSIUM SERPL-SCNC: 7.4 MMOL/L (ref 3.5–5.1)
PROT SERPL-MCNC: 7.4 G/DL (ref 6.4–8.2)
RBC # BLD AUTO: 3.54 M/UL (ref 4–5.2)
SAO2 % BLDV: 77 %
SODIUM SERPL-SCNC: 136 MMOL/L (ref 136–145)
SODIUM SERPL-SCNC: 137 MMOL/L (ref 136–145)
WBC # BLD AUTO: 6.5 K/UL (ref 4–11)

## 2023-10-31 PROCEDURE — 93010 ELECTROCARDIOGRAM REPORT: CPT | Performed by: INTERNAL MEDICINE

## 2023-10-31 PROCEDURE — 99284 EMERGENCY DEPT VISIT MOD MDM: CPT

## 2023-10-31 PROCEDURE — 84703 CHORIONIC GONADOTROPIN ASSAY: CPT

## 2023-10-31 PROCEDURE — 93005 ELECTROCARDIOGRAM TRACING: CPT | Performed by: EMERGENCY MEDICINE

## 2023-10-31 PROCEDURE — 85025 COMPLETE CBC W/AUTO DIFF WBC: CPT

## 2023-10-31 PROCEDURE — 80053 COMPREHEN METABOLIC PANEL: CPT

## 2023-10-31 PROCEDURE — 1200000000 HC SEMI PRIVATE

## 2023-10-31 PROCEDURE — 96375 TX/PRO/DX INJ NEW DRUG ADDON: CPT

## 2023-10-31 PROCEDURE — 82803 BLOOD GASES ANY COMBINATION: CPT

## 2023-10-31 PROCEDURE — 36415 COLL VENOUS BLD VENIPUNCTURE: CPT

## 2023-10-31 PROCEDURE — 70450 CT HEAD/BRAIN W/O DYE: CPT

## 2023-10-31 PROCEDURE — 96374 THER/PROPH/DIAG INJ IV PUSH: CPT

## 2023-10-31 PROCEDURE — 6370000000 HC RX 637 (ALT 250 FOR IP): Performed by: EMERGENCY MEDICINE

## 2023-10-31 PROCEDURE — 2500000003 HC RX 250 WO HCPCS: Performed by: EMERGENCY MEDICINE

## 2023-10-31 PROCEDURE — 2580000003 HC RX 258: Performed by: EMERGENCY MEDICINE

## 2023-10-31 PROCEDURE — 6360000002 HC RX W HCPCS: Performed by: EMERGENCY MEDICINE

## 2023-10-31 RX ORDER — DEXTROSE MONOHYDRATE 100 MG/ML
INJECTION, SOLUTION INTRAVENOUS CONTINUOUS PRN
Status: DISCONTINUED | OUTPATIENT
Start: 2023-10-31 | End: 2023-10-31 | Stop reason: HOSPADM

## 2023-10-31 RX ORDER — POLYETHYLENE GLYCOL 3350 17 G/17G
17 POWDER, FOR SOLUTION ORAL DAILY PRN
Status: CANCELLED | OUTPATIENT
Start: 2023-10-31

## 2023-10-31 RX ORDER — CINACALCET 30 MG/1
60 TABLET, FILM COATED ORAL DAILY
Status: CANCELLED | OUTPATIENT
Start: 2023-10-31

## 2023-10-31 RX ORDER — SENNOSIDES A AND B 8.6 MG/1
1 TABLET, FILM COATED ORAL DAILY PRN
Status: CANCELLED | OUTPATIENT
Start: 2023-10-31

## 2023-10-31 RX ORDER — PANTOPRAZOLE SODIUM 40 MG/1
40 TABLET, DELAYED RELEASE ORAL
Status: CANCELLED | OUTPATIENT
Start: 2023-11-01

## 2023-10-31 RX ORDER — QUETIAPINE FUMARATE 100 MG/1
200 TABLET, FILM COATED ORAL NIGHTLY
Status: CANCELLED | OUTPATIENT
Start: 2023-10-31

## 2023-10-31 RX ORDER — SODIUM CHLORIDE 0.9 % (FLUSH) 0.9 %
5-40 SYRINGE (ML) INJECTION EVERY 12 HOURS SCHEDULED
Status: CANCELLED | OUTPATIENT
Start: 2023-10-31

## 2023-10-31 RX ORDER — ACETAMINOPHEN 650 MG/1
650 SUPPOSITORY RECTAL EVERY 6 HOURS PRN
Status: CANCELLED | OUTPATIENT
Start: 2023-10-31

## 2023-10-31 RX ORDER — CALCITRIOL 0.25 UG/1
0.5 CAPSULE, LIQUID FILLED ORAL DAILY
Status: CANCELLED | OUTPATIENT
Start: 2023-10-31

## 2023-10-31 RX ORDER — OXYCODONE HYDROCHLORIDE AND ACETAMINOPHEN 5; 325 MG/1; MG/1
1 TABLET ORAL EVERY 6 HOURS PRN
COMMUNITY
Start: 2023-10-26

## 2023-10-31 RX ORDER — ONDANSETRON 2 MG/ML
4 INJECTION INTRAMUSCULAR; INTRAVENOUS EVERY 6 HOURS PRN
Status: CANCELLED | OUTPATIENT
Start: 2023-10-31

## 2023-10-31 RX ORDER — CALCIUM GLUCONATE 20 MG/ML
1000 INJECTION, SOLUTION INTRAVENOUS PRN
Status: DISCONTINUED | OUTPATIENT
Start: 2023-10-31 | End: 2023-10-31 | Stop reason: HOSPADM

## 2023-10-31 RX ORDER — CALCIUM GLUCONATE 94 MG/ML
1000 INJECTION, SOLUTION INTRAVENOUS ONCE
Status: COMPLETED | OUTPATIENT
Start: 2023-10-31 | End: 2023-10-31

## 2023-10-31 RX ORDER — ALBUTEROL SULFATE 2.5 MG/3ML
10 SOLUTION RESPIRATORY (INHALATION) ONCE
Status: COMPLETED | OUTPATIENT
Start: 2023-10-31 | End: 2023-10-31

## 2023-10-31 RX ORDER — SODIUM CHLORIDE 0.9 % (FLUSH) 0.9 %
5-40 SYRINGE (ML) INJECTION PRN
Status: CANCELLED | OUTPATIENT
Start: 2023-10-31

## 2023-10-31 RX ORDER — CINACALCET 60 MG/1
60 TABLET, FILM COATED ORAL DAILY
COMMUNITY
Start: 2023-09-15

## 2023-10-31 RX ORDER — FENTANYL CITRATE 50 UG/ML
50 INJECTION, SOLUTION INTRAMUSCULAR; INTRAVENOUS ONCE
Status: COMPLETED | OUTPATIENT
Start: 2023-10-31 | End: 2023-10-31

## 2023-10-31 RX ORDER — HEPARIN SODIUM 5000 [USP'U]/ML
5000 INJECTION, SOLUTION INTRAVENOUS; SUBCUTANEOUS EVERY 8 HOURS SCHEDULED
Status: CANCELLED | OUTPATIENT
Start: 2023-10-31

## 2023-10-31 RX ORDER — ONDANSETRON 4 MG/1
4 TABLET, ORALLY DISINTEGRATING ORAL EVERY 8 HOURS PRN
Status: CANCELLED | OUTPATIENT
Start: 2023-10-31

## 2023-10-31 RX ORDER — CARVEDILOL 6.25 MG/1
25 TABLET ORAL 2 TIMES DAILY
Status: CANCELLED | OUTPATIENT
Start: 2023-10-31

## 2023-10-31 RX ORDER — ACETAMINOPHEN 325 MG/1
650 TABLET ORAL EVERY 6 HOURS PRN
Status: CANCELLED | OUTPATIENT
Start: 2023-10-31

## 2023-10-31 RX ORDER — CLONIDINE HYDROCHLORIDE 0.1 MG/1
0.2 TABLET ORAL 3 TIMES DAILY
Status: CANCELLED | OUTPATIENT
Start: 2023-10-31

## 2023-10-31 RX ORDER — NIFEDIPINE 30 MG/1
30 TABLET, FILM COATED, EXTENDED RELEASE ORAL 2 TIMES DAILY
Status: CANCELLED | OUTPATIENT
Start: 2023-10-31

## 2023-10-31 RX ORDER — OXYCODONE HYDROCHLORIDE AND ACETAMINOPHEN 5; 325 MG/1; MG/1
1 TABLET ORAL EVERY 6 HOURS PRN
Status: CANCELLED | OUTPATIENT
Start: 2023-10-31

## 2023-10-31 RX ORDER — CALCIUM GLUCONATE 20 MG/ML
2000 INJECTION, SOLUTION INTRAVENOUS PRN
Status: DISCONTINUED | OUTPATIENT
Start: 2023-10-31 | End: 2023-10-31 | Stop reason: HOSPADM

## 2023-10-31 RX ORDER — SEVELAMER CARBONATE 800 MG/1
800 TABLET, FILM COATED ORAL
Status: CANCELLED | OUTPATIENT
Start: 2023-10-31

## 2023-10-31 RX ORDER — SODIUM CHLORIDE 9 MG/ML
INJECTION, SOLUTION INTRAVENOUS PRN
Status: CANCELLED | OUTPATIENT
Start: 2023-10-31

## 2023-10-31 RX ORDER — LANOLIN ALCOHOL/MO/W.PET/CERES
3 CREAM (GRAM) TOPICAL NIGHTLY
Status: CANCELLED | OUTPATIENT
Start: 2023-10-31

## 2023-10-31 RX ORDER — MAGNESIUM SULFATE 1 G/100ML
1000 INJECTION INTRAVENOUS PRN
Status: DISCONTINUED | OUTPATIENT
Start: 2023-10-31 | End: 2023-10-31 | Stop reason: HOSPADM

## 2023-10-31 RX ORDER — ISOSORBIDE DINITRATE 20 MG/1
20 TABLET ORAL 3 TIMES DAILY
Status: CANCELLED | OUTPATIENT
Start: 2023-10-31

## 2023-10-31 RX ADMIN — DEXTROSE MONOHYDRATE 250 ML: 100 INJECTION, SOLUTION INTRAVENOUS at 16:48

## 2023-10-31 RX ADMIN — INSULIN HUMAN 10 UNITS: 100 INJECTION, SOLUTION PARENTERAL at 16:40

## 2023-10-31 RX ADMIN — CALCIUM GLUCONATE 1000 MG: 98 INJECTION, SOLUTION INTRAVENOUS at 16:37

## 2023-10-31 RX ADMIN — FENTANYL CITRATE 50 MCG: 50 INJECTION, SOLUTION INTRAMUSCULAR; INTRAVENOUS at 16:54

## 2023-10-31 RX ADMIN — ALBUTEROL SULFATE 10 MG: 2.5 SOLUTION RESPIRATORY (INHALATION) at 16:49

## 2023-10-31 RX ADMIN — SODIUM BICARBONATE 50 MEQ: 84 INJECTION INTRAVENOUS at 16:43

## 2023-10-31 ASSESSMENT — PAIN DESCRIPTION - LOCATION: LOCATION: HIP

## 2023-10-31 ASSESSMENT — ENCOUNTER SYMPTOMS
BACK PAIN: 0
SHORTNESS OF BREATH: 0
VOMITING: 0
ABDOMINAL PAIN: 0
RHINORRHEA: 0
CHEST TIGHTNESS: 0
COUGH: 0
DIARRHEA: 0
EYE REDNESS: 0
EYE DISCHARGE: 0

## 2023-10-31 ASSESSMENT — PAIN DESCRIPTION - ORIENTATION: ORIENTATION: LEFT

## 2023-10-31 ASSESSMENT — PAIN SCALES - GENERAL: PAINLEVEL_OUTOF10: 7

## 2023-10-31 ASSESSMENT — PAIN - FUNCTIONAL ASSESSMENT: PAIN_FUNCTIONAL_ASSESSMENT: NONE - DENIES PAIN

## 2023-10-31 ASSESSMENT — PAIN DESCRIPTION - DESCRIPTORS: DESCRIPTORS: ACHING

## 2023-10-31 NOTE — CONSULTS
1 x dose of lokelma prior to dc or asked patient to take Veltassa at home  Patient aware not to miss dialysis tomorrow     Thank you for asking us to participate in the management of your patient, please do not hesitate to contact me for any concerns regarding my recommendations as outlined above.    -----------------------------  Jorge L Barragan M.D.   [facility]

## 2023-10-31 NOTE — ED NOTES
Who: Dr. Sean Lynn   Date:10/31/2023,  [address] [facility] PM    Electronically signed by Eros Chavis on 10/31/23 at 4:01 PM EDT       Eros Chavis  10/31/23 1601      1700 called nephrology again to get an update, paging Dr. Sean Lynn again.       Eros Chavis  10/31/23 [phone] Dr. Sean Lynn called back to complete consult with Dr. Zoraida Wiggins [facility]  10/31/23 [phone]

## 2023-10-31 NOTE — ED NOTES
6555-49 Lead ECG completed and given to Dr. Carson Edmond for review.       Fei Fuentes  10/31/23 1640

## 2023-10-31 NOTE — ED PROVIDER NOTES
All other components within normal limits    Narrative:     Marianna Wild tel. [phone],  Chemistry results called to and read back by Nicole Henao RN, 10/31/2023  18:12, by Foster Kong   POCT GLUCOSE - Abnormal; Notable for the following components:    POC Glucose 120 (*)     All other components within normal limits   HCG, SERUM, QUALITATIVE   POCT GLUCOSE   POCT GLUCOSE   POCT GLUCOSE   POCT GLUCOSE   POCT GLUCOSE       When ordered, only abnormal lab results are displayed. All other labs were within normal range or not returned as of this dictation. Independent EKG Interpretation: EKG obtained here in the emergency department shows sinus rhythm with a first-degree AV block, left atrial lodgment. Right axis deviation. No significant morphology changes from previous. Otherwise, ND interval 230. QTc 422. No hyperacute T waves in the setting of missed dialysis. RADIOLOGY:      Non-plain film images such as CT, Ultrasound and MRI are read by the radiologist. Interpretation per the Radiologist below, if available at the time of this note:  CT HEAD WO CONTRAST   Final Result   Stable CT of the head with no acute intracranial abnormality. PROCEDURES:  Unless otherwise noted below, none.     Procedures      MEDICATIONS:   Medications   calcium gluconate 10 % injection 1,000 mg (1,000 mg IntraVENous Given 10/31/23 1637)   insulin regular (HUMULIN R;NOVOLIN R) injection 10 Units (10 Units IntraVENous Given 10/31/23 1640)     And   dextrose bolus 10% 250 mL (0 mLs IntraVENous Stopped 10/31/23 1730)   albuterol (PROVENTIL) (2.5 MG/3ML) 0.083% nebulizer solution 10 mg (10 mg Nebulization Given 10/31/23 1649)   sodium bicarbonate 8.4 % injection 50 mEq (50 mEq IntraVENous Given 10/31/23 1643)   fentaNYL (SUBLIMAZE) injection 50 mcg (50 mcg IntraVENous Given 10/31/23 1654)       _____________________________________    MEDICAL DECISION MAKING:     Patient is a 34 y.o. female with a significant

## 2023-11-13 RX ORDER — ISOSORBIDE DINITRATE 20 MG/1
20 TABLET ORAL 3 TIMES DAILY
Qty: 90 TABLET | Refills: 0 | OUTPATIENT
Start: 2023-11-13

## 2023-11-15 RX ORDER — ISOSORBIDE DINITRATE 20 MG/1
20 TABLET ORAL 3 TIMES DAILY
Qty: 90 TABLET | Refills: 0 | OUTPATIENT
Start: 2023-11-15

## 2023-11-27 ENCOUNTER — HOSPITAL ENCOUNTER (EMERGENCY)
Age: 33
Discharge: HOME | DRG: 053 | End: 2023-11-27
Attending: STUDENT IN AN ORGANIZED HEALTH CARE EDUCATION/TRAINING PROGRAM
Payer: MEDICAID

## 2023-11-27 ENCOUNTER — APPOINTMENT (OUTPATIENT)
Dept: CT IMAGING | Age: 33
DRG: 053 | End: 2023-11-27
Payer: MEDICAID

## 2023-11-27 VITALS
HEIGHT: 62 IN | WEIGHT: 146 LBS | TEMPERATURE: 98.1 F | BODY MASS INDEX: 26.87 KG/M2 | RESPIRATION RATE: 20 BRPM | DIASTOLIC BLOOD PRESSURE: 137 MMHG | OXYGEN SATURATION: 100 % | SYSTOLIC BLOOD PRESSURE: 227 MMHG | HEART RATE: 73 BPM

## 2023-11-27 DIAGNOSIS — R56.9 SEIZURE-LIKE ACTIVITY (HCC): Primary | ICD-10-CM

## 2023-11-27 DIAGNOSIS — Z53.20 LEFT BEFORE TREATMENT COMPLETED: ICD-10-CM

## 2023-11-27 DIAGNOSIS — R03.0 ELEVATED BLOOD PRESSURE READING: ICD-10-CM

## 2023-11-27 DIAGNOSIS — Z99.2 DIALYSIS PATIENT (HCC): ICD-10-CM

## 2023-11-27 DIAGNOSIS — R51.9 NONINTRACTABLE HEADACHE, UNSPECIFIED CHRONICITY PATTERN, UNSPECIFIED HEADACHE TYPE: ICD-10-CM

## 2023-11-27 LAB
ALBUMIN SERPL-MCNC: 4.9 G/DL (ref 3.4–5)
ALBUMIN/GLOB SERPL: 1.5 {RATIO} (ref 1.1–2.2)
ALP SERPL-CCNC: 379 U/L (ref 40–129)
ALT SERPL-CCNC: 12 U/L (ref 10–40)
ANION GAP SERPL CALCULATED.3IONS-SCNC: 15 MMOL/L (ref 3–16)
AST SERPL-CCNC: 13 U/L (ref 15–37)
BASOPHILS # BLD: 0 K/UL (ref 0–0.2)
BASOPHILS NFR BLD: 0.6 %
BILIRUB SERPL-MCNC: 0.5 MG/DL (ref 0–1)
BUN SERPL-MCNC: 19 MG/DL (ref 7–20)
CALCIUM SERPL-MCNC: 8.8 MG/DL (ref 8.3–10.6)
CHLORIDE SERPL-SCNC: 93 MMOL/L (ref 99–110)
CO2 SERPL-SCNC: 28 MMOL/L (ref 21–32)
CREAT SERPL-MCNC: 3.9 MG/DL (ref 0.6–1.1)
DEPRECATED RDW RBC AUTO: 18.3 % (ref 12.4–15.4)
EKG ATRIAL RATE: 81 BPM
EKG DIAGNOSIS: NORMAL
EKG P AXIS: 68 DEGREES
EKG P-R INTERVAL: 200 MS
EKG Q-T INTERVAL: 494 MS
EKG QRS DURATION: 80 MS
EKG QTC CALCULATION (BAZETT): 573 MS
EKG R AXIS: 111 DEGREES
EKG T AXIS: 44 DEGREES
EKG VENTRICULAR RATE: 81 BPM
EOSINOPHIL # BLD: 0.2 K/UL (ref 0–0.6)
EOSINOPHIL NFR BLD: 3.6 %
GFR SERPLBLD CREATININE-BSD FMLA CKD-EPI: 15 ML/MIN/{1.73_M2}
GLUCOSE BLD-MCNC: 90 MG/DL (ref 70–99)
GLUCOSE SERPL-MCNC: 86 MG/DL (ref 70–99)
HCT VFR BLD AUTO: 35.9 % (ref 36–48)
HGB BLD-MCNC: 11.9 G/DL (ref 12–16)
LACTATE BLDV-SCNC: 1.1 MMOL/L (ref 0.4–1.9)
LYMPHOCYTES # BLD: 1.2 K/UL (ref 1–5.1)
LYMPHOCYTES NFR BLD: 25.2 %
MCH RBC QN AUTO: 30.2 PG (ref 26–34)
MCHC RBC AUTO-ENTMCNC: 33.1 G/DL (ref 31–36)
MCV RBC AUTO: 91.3 FL (ref 80–100)
MONOCYTES # BLD: 0.2 K/UL (ref 0–1.3)
MONOCYTES NFR BLD: 5.2 %
NEUTROPHILS # BLD: 3.1 K/UL (ref 1.7–7.7)
NEUTROPHILS NFR BLD: 65.4 %
NT-PROBNP SERPL-MCNC: ABNORMAL PG/ML (ref 0–124)
PERFORMED ON: NORMAL
PLATELET # BLD AUTO: 153 K/UL (ref 135–450)
PMV BLD AUTO: 6.3 FL (ref 5–10.5)
POTASSIUM SERPL-SCNC: 3.8 MMOL/L (ref 3.5–5.1)
PROT SERPL-MCNC: 8.1 G/DL (ref 6.4–8.2)
RBC # BLD AUTO: 3.93 M/UL (ref 4–5.2)
SODIUM SERPL-SCNC: 136 MMOL/L (ref 136–145)
TROPONIN, HIGH SENSITIVITY: 40 NG/L (ref 0–14)
TROPONIN, HIGH SENSITIVITY: 41 NG/L (ref 0–14)
WBC # BLD AUTO: 4.8 K/UL (ref 4–11)

## 2023-11-27 PROCEDURE — 70450 CT HEAD/BRAIN W/O DYE: CPT

## 2023-11-27 PROCEDURE — 84484 ASSAY OF TROPONIN QUANT: CPT

## 2023-11-27 PROCEDURE — 99284 EMERGENCY DEPT VISIT MOD MDM: CPT

## 2023-11-27 PROCEDURE — 36415 COLL VENOUS BLD VENIPUNCTURE: CPT

## 2023-11-27 PROCEDURE — 83880 ASSAY OF NATRIURETIC PEPTIDE: CPT

## 2023-11-27 PROCEDURE — 93005 ELECTROCARDIOGRAM TRACING: CPT | Performed by: NURSE PRACTITIONER

## 2023-11-27 PROCEDURE — 6360000002 HC RX W HCPCS: Performed by: NURSE PRACTITIONER

## 2023-11-27 PROCEDURE — 96375 TX/PRO/DX INJ NEW DRUG ADDON: CPT

## 2023-11-27 PROCEDURE — 85025 COMPLETE CBC W/AUTO DIFF WBC: CPT

## 2023-11-27 PROCEDURE — 83605 ASSAY OF LACTIC ACID: CPT

## 2023-11-27 PROCEDURE — 6370000000 HC RX 637 (ALT 250 FOR IP): Performed by: NURSE PRACTITIONER

## 2023-11-27 PROCEDURE — 96374 THER/PROPH/DIAG INJ IV PUSH: CPT

## 2023-11-27 PROCEDURE — 6370000000 HC RX 637 (ALT 250 FOR IP): Performed by: STUDENT IN AN ORGANIZED HEALTH CARE EDUCATION/TRAINING PROGRAM

## 2023-11-27 PROCEDURE — 93010 ELECTROCARDIOGRAM REPORT: CPT | Performed by: INTERNAL MEDICINE

## 2023-11-27 PROCEDURE — 6360000002 HC RX W HCPCS: Performed by: STUDENT IN AN ORGANIZED HEALTH CARE EDUCATION/TRAINING PROGRAM

## 2023-11-27 PROCEDURE — 80053 COMPREHEN METABOLIC PANEL: CPT

## 2023-11-27 RX ORDER — CLONIDINE HYDROCHLORIDE 0.1 MG/1
0.1 TABLET ORAL ONCE
Status: COMPLETED | OUTPATIENT
Start: 2023-11-27 | End: 2023-11-27

## 2023-11-27 RX ORDER — METOCLOPRAMIDE HYDROCHLORIDE 5 MG/ML
10 INJECTION INTRAMUSCULAR; INTRAVENOUS ONCE
Status: COMPLETED | OUTPATIENT
Start: 2023-11-27 | End: 2023-11-27

## 2023-11-27 RX ORDER — DIPHENHYDRAMINE HYDROCHLORIDE 50 MG/ML
10 INJECTION INTRAMUSCULAR; INTRAVENOUS ONCE
Status: COMPLETED | OUTPATIENT
Start: 2023-11-27 | End: 2023-11-27

## 2023-11-27 RX ORDER — ISOSORBIDE DINITRATE 20 MG/1
20 TABLET ORAL 3 TIMES DAILY
Qty: 90 TABLET | Refills: 0 | OUTPATIENT
Start: 2023-11-27

## 2023-11-27 RX ORDER — ISOSORBIDE DINITRATE 20 MG/1
20 TABLET ORAL ONCE
Status: COMPLETED | OUTPATIENT
Start: 2023-11-27 | End: 2023-11-27

## 2023-11-27 RX ORDER — ACETAMINOPHEN 500 MG
1000 TABLET ORAL
Status: DISCONTINUED | OUTPATIENT
Start: 2023-11-27 | End: 2023-11-27 | Stop reason: HOSPADM

## 2023-11-27 RX ORDER — LABETALOL HYDROCHLORIDE 5 MG/ML
5 INJECTION, SOLUTION INTRAVENOUS ONCE
Status: COMPLETED | OUTPATIENT
Start: 2023-11-27 | End: 2023-11-27

## 2023-11-27 RX ORDER — DEXAMETHASONE SODIUM PHOSPHATE 10 MG/ML
10 INJECTION, SOLUTION INTRAMUSCULAR; INTRAVENOUS ONCE
Status: COMPLETED | OUTPATIENT
Start: 2023-11-27 | End: 2023-11-27

## 2023-11-27 RX ADMIN — CLONIDINE HYDROCHLORIDE 0.1 MG: 0.1 TABLET ORAL at 15:29

## 2023-11-27 RX ADMIN — DIPHENHYDRAMINE HYDROCHLORIDE 10 MG: 50 INJECTION INTRAMUSCULAR; INTRAVENOUS at 17:40

## 2023-11-27 RX ADMIN — ISOSORBIDE DINITRATE 20 MG: 20 TABLET ORAL at 16:24

## 2023-11-27 RX ADMIN — LABETALOL HYDROCHLORIDE 5 MG: 5 INJECTION, SOLUTION INTRAVENOUS at 15:30

## 2023-11-27 RX ADMIN — METOCLOPRAMIDE 10 MG: 5 INJECTION, SOLUTION INTRAMUSCULAR; INTRAVENOUS at 17:41

## 2023-11-27 RX ADMIN — DEXAMETHASONE SODIUM PHOSPHATE 10 MG: 10 INJECTION, SOLUTION INTRAMUSCULAR; INTRAVENOUS at 17:38

## 2023-11-27 ASSESSMENT — ENCOUNTER SYMPTOMS
ABDOMINAL PAIN: 0
FACIAL SWELLING: 0
COLOR CHANGE: 0
SHORTNESS OF BREATH: 0
SORE THROAT: 0
RHINORRHEA: 0

## 2023-11-27 ASSESSMENT — PAIN DESCRIPTION - ORIENTATION: ORIENTATION: RIGHT;LEFT

## 2023-11-27 ASSESSMENT — PAIN DESCRIPTION - DESCRIPTORS: DESCRIPTORS: THROBBING

## 2023-11-27 ASSESSMENT — PAIN DESCRIPTION - LOCATION: LOCATION: HEAD

## 2023-11-27 NOTE — ED NOTES
Patient wheeled out in wheelchair by mother. Patient refusing to wait for discharge instructions.      Shilpa Tillman RN  11/27/23 6173

## 2023-11-27 NOTE — ED NOTES
Pt removed her IV herself and requested this RN to come into room stating \"Im leaving, I can't sit here any longer. I feel better\". Mother at bedside and is agreeable to take patient home.        Lorne Carranza RN  11/27/23 3888

## 2023-11-27 NOTE — ED PROVIDER NOTES
[address] [address] ED  EMERGENCY DEPARTMENT ENCOUNTER      I am the Primary Clinician of Record    Note started: 2:53 PM EST 11/27/23     I have seen and evaluated this patient with my supervising physician Dr Kyra Kong Name: Marilee Nageotte  MRN: [id number]  [address] [facility] [place] 1990  Dateof evaluation: 11/27/2023  Provider: SHREYA Armendariz - CNP  PCP: Seth Johnson MD  ED Attending: No att. providers found      [address]       Chief Complaint   Patient presents with    Seizures     Had hemodialysis today. Squad was called for seizure like activity. Left arm was shaking. Squad reports patient was not postictal.  Patient had a stroke back in July with similar symptoms. HISTORY OF PRESENTILLNESS   (Location/Symptom, Timing/Onset, Context/Setting, Quality, Duration, Modifying Factors, Severity)  Note limiting factors. Marilee Nageotte is a 34 y.o. female for seizure like activity. Onset was today. Context includes patient states that she is a dialysis patient receives dialysis 3 days a week. Patient reports that she had seizure-like activity to her left arm. Patient states that this has happened in the past where her left arm will \"lock up and shake \". Patient states that she did see neurology when she had a stroke but does not routinely see neurology for seizures. She does not take any antiepileptics. Patient also reports that she has a headache but typically has a headache when her blood pressure is is elevated. Patient states that she is due for Catapres dose. Patient denies chest pain or shortness of breath nothing. Alleviating factors include nothing. Aggravating factors include nothing. Nothing has been used for pain today. Nursing Notes were all reviewed and agreed with or any disagreements were addressed  in the HPI. REVIEW OF SYSTEMS       Review of Systems   Constitutional:  Negative for activity change, appetite change and fever.    HENT: interpreted by the ED Provider with the below findings:    Head CT interpreted radiologist for  FINDINGS:   BRAIN/VENTRICLES: There is no acute intracranial hemorrhage, mass effect or   midline shift. No abnormal extra-axial fluid collection. The gray-white   differentiation is maintained without evidence of an acute infarct. There is   no evidence of hydrocephalus. Small focal area of encephalomalacia in the   right posterior parietal region juxtaposed to the falx unchanged. ORBITS: The visualized portion of the orbits demonstrate no acute abnormality. SINUSES: Minimal mucoperiosteal thickening within the maxillary sinuses. SOFT TISSUES/SKULL:  No acute abnormality of the visualized skull or soft   tissues. Interpretation per the Radiologist below, if available at the time of this note:    CT HEAD WO CONTRAST   Final Result   No acute intracranial abnormality. No significant interval change compared to the prior study. No results found. No results found. No results found. PROCEDURES   Unless otherwise noted below, none     Procedures     CRITICAL CARE TIME   Unless otherwise noted below, none          EMERGENCYDEPARTMENT COURSE/MDM:     Vitals:    Vitals:    11/27/23 1602 11/27/23 1613 11/27/23 1711 11/27/23 1732   BP: (!) 236/131  (!) 227/137 (!) 227/137   Pulse: 74 76 74 73   Resp: 18 20 16 20   Temp:       TempSrc:       SpO2: 100% 100% 100% 100%   Weight:       Height:             Patient was seen and evaluated by myself and Dr. Corina Zarate. Patient here for concerns for seizure-like activity. Patient reports that she is a dialysis patient and received dialysis today. She reports that her blood pressure typically is elevated after dialysis. She also reports that she has headaches when her blood pressure is high. She states that she is due for her second dose of Catapres for today. Patient denies any fevers nausea vomiting or diarrhea.   She denies chest pain or

## 2023-11-28 ENCOUNTER — HOSPITAL ENCOUNTER (INPATIENT)
Age: 33
LOS: 1 days | Discharge: LEFT BEFORE DISCHARGE | DRG: 053 | End: 2023-11-29
Attending: EMERGENCY MEDICINE | Admitting: INTERNAL MEDICINE
Payer: MEDICAID

## 2023-11-28 DIAGNOSIS — R56.9 NEW ONSET SEIZURE (HCC): Primary | ICD-10-CM

## 2023-11-28 LAB
ANION GAP SERPL CALCULATED.3IONS-SCNC: 21 MMOL/L (ref 3–16)
BASOPHILS # BLD: 0 K/UL (ref 0–0.2)
BASOPHILS NFR BLD: 0.2 %
BUN SERPL-MCNC: 37 MG/DL (ref 7–20)
CALCIUM SERPL-MCNC: 9 MG/DL (ref 8.3–10.6)
CHLORIDE SERPL-SCNC: 90 MMOL/L (ref 99–110)
CO2 SERPL-SCNC: 25 MMOL/L (ref 21–32)
CREAT SERPL-MCNC: 6.2 MG/DL (ref 0.6–1.1)
DEPRECATED RDW RBC AUTO: 19 % (ref 12.4–15.4)
EOSINOPHIL # BLD: 0 K/UL (ref 0–0.6)
EOSINOPHIL NFR BLD: 0.1 %
GFR SERPLBLD CREATININE-BSD FMLA CKD-EPI: 9 ML/MIN/{1.73_M2}
GLUCOSE SERPL-MCNC: 92 MG/DL (ref 70–99)
HCT VFR BLD AUTO: 40.3 % (ref 36–48)
HGB BLD-MCNC: 13.5 G/DL (ref 12–16)
LYMPHOCYTES # BLD: 1.2 K/UL (ref 1–5.1)
LYMPHOCYTES NFR BLD: 13.2 %
MCH RBC QN AUTO: 30.6 PG (ref 26–34)
MCHC RBC AUTO-ENTMCNC: 33.5 G/DL (ref 31–36)
MCV RBC AUTO: 91.3 FL (ref 80–100)
MONOCYTES # BLD: 0.5 K/UL (ref 0–1.3)
MONOCYTES NFR BLD: 5.6 %
NEUTROPHILS # BLD: 7.3 K/UL (ref 1.7–7.7)
NEUTROPHILS NFR BLD: 80.9 %
PLATELET # BLD AUTO: 189 K/UL (ref 135–450)
PMV BLD AUTO: 6.7 FL (ref 5–10.5)
POTASSIUM SERPL-SCNC: 4.5 MMOL/L (ref 3.5–5.1)
RBC # BLD AUTO: 4.41 M/UL (ref 4–5.2)
SODIUM SERPL-SCNC: 136 MMOL/L (ref 136–145)
WBC # BLD AUTO: 9.1 K/UL (ref 4–11)

## 2023-11-28 PROCEDURE — 2580000003 HC RX 258

## 2023-11-28 PROCEDURE — 6360000002 HC RX W HCPCS: Performed by: EMERGENCY MEDICINE

## 2023-11-28 PROCEDURE — 6370000000 HC RX 637 (ALT 250 FOR IP): Performed by: EMERGENCY MEDICINE

## 2023-11-28 PROCEDURE — 80048 BASIC METABOLIC PNL TOTAL CA: CPT

## 2023-11-28 PROCEDURE — 6370000000 HC RX 637 (ALT 250 FOR IP)

## 2023-11-28 PROCEDURE — 99285 EMERGENCY DEPT VISIT HI MDM: CPT

## 2023-11-28 PROCEDURE — 1200000000 HC SEMI PRIVATE

## 2023-11-28 PROCEDURE — 36415 COLL VENOUS BLD VENIPUNCTURE: CPT

## 2023-11-28 PROCEDURE — 99222 1ST HOSP IP/OBS MODERATE 55: CPT

## 2023-11-28 PROCEDURE — 85025 COMPLETE CBC W/AUTO DIFF WBC: CPT

## 2023-11-28 PROCEDURE — 6360000002 HC RX W HCPCS

## 2023-11-28 RX ORDER — POLYETHYLENE GLYCOL 3350 17 G/17G
17 POWDER, FOR SOLUTION ORAL DAILY PRN
Status: DISCONTINUED | OUTPATIENT
Start: 2023-11-28 | End: 2023-11-29 | Stop reason: HOSPADM

## 2023-11-28 RX ORDER — LEVETIRACETAM 500 MG/5ML
1000 INJECTION, SOLUTION, CONCENTRATE INTRAVENOUS ONCE
Status: COMPLETED | OUTPATIENT
Start: 2023-11-28 | End: 2023-11-28

## 2023-11-28 RX ORDER — CLONIDINE HYDROCHLORIDE 0.2 MG/1
0.2 TABLET ORAL 3 TIMES DAILY
Status: DISCONTINUED | OUTPATIENT
Start: 2023-11-28 | End: 2023-11-29 | Stop reason: HOSPADM

## 2023-11-28 RX ORDER — SEVELAMER CARBONATE 800 MG/1
800 TABLET, FILM COATED ORAL
Status: DISCONTINUED | OUTPATIENT
Start: 2023-11-28 | End: 2023-11-28

## 2023-11-28 RX ORDER — ACETAMINOPHEN 650 MG/1
650 SUPPOSITORY RECTAL EVERY 6 HOURS PRN
Status: DISCONTINUED | OUTPATIENT
Start: 2023-11-28 | End: 2023-11-28

## 2023-11-28 RX ORDER — CARVEDILOL 25 MG/1
25 TABLET ORAL 2 TIMES DAILY
Status: DISCONTINUED | OUTPATIENT
Start: 2023-11-28 | End: 2023-11-29 | Stop reason: HOSPADM

## 2023-11-28 RX ORDER — SODIUM CHLORIDE 0.9 % (FLUSH) 0.9 %
5-40 SYRINGE (ML) INJECTION EVERY 12 HOURS SCHEDULED
Status: DISCONTINUED | OUTPATIENT
Start: 2023-11-28 | End: 2023-11-29 | Stop reason: HOSPADM

## 2023-11-28 RX ORDER — ACETAMINOPHEN 650 MG/1
650 SUPPOSITORY RECTAL EVERY 6 HOURS PRN
Status: DISCONTINUED | OUTPATIENT
Start: 2023-11-28 | End: 2023-11-29 | Stop reason: HOSPADM

## 2023-11-28 RX ORDER — CINACALCET 30 MG/1
60 TABLET, FILM COATED ORAL DAILY
Status: DISCONTINUED | OUTPATIENT
Start: 2023-11-28 | End: 2023-11-29 | Stop reason: HOSPADM

## 2023-11-28 RX ORDER — PROCHLORPERAZINE EDISYLATE 5 MG/ML
5 INJECTION INTRAMUSCULAR; INTRAVENOUS EVERY 6 HOURS PRN
Status: DISCONTINUED | OUTPATIENT
Start: 2023-11-28 | End: 2023-11-29 | Stop reason: HOSPADM

## 2023-11-28 RX ORDER — HEPARIN SODIUM 5000 [USP'U]/ML
5000 INJECTION, SOLUTION INTRAVENOUS; SUBCUTANEOUS EVERY 8 HOURS SCHEDULED
Status: DISCONTINUED | OUTPATIENT
Start: 2023-11-28 | End: 2023-11-29 | Stop reason: HOSPADM

## 2023-11-28 RX ORDER — ACETAMINOPHEN 500 MG
1000 TABLET ORAL ONCE
Status: COMPLETED | OUTPATIENT
Start: 2023-11-28 | End: 2023-11-28

## 2023-11-28 RX ORDER — NIFEDIPINE 30 MG/1
30 TABLET, EXTENDED RELEASE ORAL DAILY
Status: DISCONTINUED | OUTPATIENT
Start: 2023-11-28 | End: 2023-11-28

## 2023-11-28 RX ORDER — PROCHLORPERAZINE MALEATE 10 MG
5 TABLET ORAL EVERY 6 HOURS PRN
Status: DISCONTINUED | OUTPATIENT
Start: 2023-11-28 | End: 2023-11-29 | Stop reason: HOSPADM

## 2023-11-28 RX ORDER — HYDRALAZINE HYDROCHLORIDE 20 MG/ML
10 INJECTION INTRAMUSCULAR; INTRAVENOUS EVERY 6 HOURS PRN
Status: DISCONTINUED | OUTPATIENT
Start: 2023-11-28 | End: 2023-11-29 | Stop reason: HOSPADM

## 2023-11-28 RX ORDER — ONDANSETRON 4 MG/1
4 TABLET, ORALLY DISINTEGRATING ORAL EVERY 8 HOURS PRN
Status: DISCONTINUED | OUTPATIENT
Start: 2023-11-28 | End: 2023-11-28

## 2023-11-28 RX ORDER — ACETAMINOPHEN 500 MG
1000 TABLET ORAL EVERY 6 HOURS PRN
Status: DISCONTINUED | OUTPATIENT
Start: 2023-11-28 | End: 2023-11-29 | Stop reason: HOSPADM

## 2023-11-28 RX ORDER — SODIUM CHLORIDE 0.9 % (FLUSH) 0.9 %
10 SYRINGE (ML) INJECTION PRN
Status: DISCONTINUED | OUTPATIENT
Start: 2023-11-28 | End: 2023-11-29 | Stop reason: HOSPADM

## 2023-11-28 RX ORDER — NIFEDIPINE 30 MG/1
30 TABLET, FILM COATED, EXTENDED RELEASE ORAL 2 TIMES DAILY
Status: DISCONTINUED | OUTPATIENT
Start: 2023-11-28 | End: 2023-11-28

## 2023-11-28 RX ORDER — CALCITRIOL 0.25 UG/1
0.5 CAPSULE, LIQUID FILLED ORAL DAILY
Status: DISCONTINUED | OUTPATIENT
Start: 2023-11-28 | End: 2023-11-29 | Stop reason: HOSPADM

## 2023-11-28 RX ORDER — RENO CAPS 100; 1.5; 1.7; 20; 10; 1; 150; 5; 6 MG/1; MG/1; MG/1; MG/1; MG/1; MG/1; UG/1; MG/1; UG/1
1 CAPSULE ORAL DAILY
Status: DISCONTINUED | OUTPATIENT
Start: 2023-11-28 | End: 2023-11-29 | Stop reason: HOSPADM

## 2023-11-28 RX ORDER — SEVELAMER CARBONATE 800 MG/1
2400 TABLET, FILM COATED ORAL
Status: DISCONTINUED | OUTPATIENT
Start: 2023-11-28 | End: 2023-11-29 | Stop reason: HOSPADM

## 2023-11-28 RX ORDER — QUETIAPINE FUMARATE 200 MG/1
200 TABLET, FILM COATED ORAL NIGHTLY
Status: DISCONTINUED | OUTPATIENT
Start: 2023-11-28 | End: 2023-11-29 | Stop reason: HOSPADM

## 2023-11-28 RX ORDER — ACETAMINOPHEN 500 MG
1000 TABLET ORAL EVERY 4 HOURS PRN
Status: DISCONTINUED | OUTPATIENT
Start: 2023-11-28 | End: 2023-11-28

## 2023-11-28 RX ORDER — ACETAMINOPHEN 325 MG/1
650 TABLET ORAL EVERY 6 HOURS PRN
Status: DISCONTINUED | OUTPATIENT
Start: 2023-11-28 | End: 2023-11-28

## 2023-11-28 RX ORDER — ISOSORBIDE DINITRATE 20 MG/1
20 TABLET ORAL 3 TIMES DAILY
Status: DISCONTINUED | OUTPATIENT
Start: 2023-11-28 | End: 2023-11-29 | Stop reason: HOSPADM

## 2023-11-28 RX ORDER — NIFEDIPINE 30 MG/1
60 TABLET, EXTENDED RELEASE ORAL DAILY
Status: DISCONTINUED | OUTPATIENT
Start: 2023-11-29 | End: 2023-11-29

## 2023-11-28 RX ORDER — ONDANSETRON 2 MG/ML
4 INJECTION INTRAMUSCULAR; INTRAVENOUS EVERY 6 HOURS PRN
Status: DISCONTINUED | OUTPATIENT
Start: 2023-11-28 | End: 2023-11-28

## 2023-11-28 RX ORDER — CLONIDINE HYDROCHLORIDE 0.1 MG/1
0.2 TABLET ORAL ONCE
Status: COMPLETED | OUTPATIENT
Start: 2023-11-28 | End: 2023-11-28

## 2023-11-28 RX ORDER — SODIUM CHLORIDE 9 MG/ML
INJECTION, SOLUTION INTRAVENOUS PRN
Status: DISCONTINUED | OUTPATIENT
Start: 2023-11-28 | End: 2023-11-29 | Stop reason: HOSPADM

## 2023-11-28 RX ORDER — PANTOPRAZOLE SODIUM 40 MG/1
40 TABLET, DELAYED RELEASE ORAL
Status: DISCONTINUED | OUTPATIENT
Start: 2023-11-29 | End: 2023-11-29 | Stop reason: HOSPADM

## 2023-11-28 RX ADMIN — HEPARIN SODIUM 5000 UNITS: 5000 INJECTION INTRAVENOUS; SUBCUTANEOUS at 22:03

## 2023-11-28 RX ADMIN — CLONIDINE HYDROCHLORIDE 0.2 MG: 0.1 TABLET ORAL at 13:27

## 2023-11-28 RX ADMIN — SEVELAMER CARBONATE 2400 MG: 800 TABLET, FILM COATED ORAL at 19:08

## 2023-11-28 RX ADMIN — Medication 10 ML: at 22:02

## 2023-11-28 RX ADMIN — HYDRALAZINE HYDROCHLORIDE 10 MG: 20 INJECTION, SOLUTION INTRAMUSCULAR; INTRAVENOUS at 18:38

## 2023-11-28 RX ADMIN — CALCITRIOL 0.5 MCG: 0.25 CAPSULE ORAL at 19:08

## 2023-11-28 RX ADMIN — QUETIAPINE FUMARATE 200 MG: 200 TABLET ORAL at 22:01

## 2023-11-28 RX ADMIN — CLONIDINE HYDROCHLORIDE 0.2 MG: 0.2 TABLET ORAL at 22:01

## 2023-11-28 RX ADMIN — ACETAMINOPHEN 650 MG: 325 TABLET ORAL at 22:02

## 2023-11-28 RX ADMIN — NIFEDIPINE 30 MG: 30 TABLET, FILM COATED, EXTENDED RELEASE ORAL at 19:07

## 2023-11-28 RX ADMIN — CARVEDILOL 25 MG: 25 TABLET, FILM COATED ORAL at 22:01

## 2023-11-28 RX ADMIN — ISOSORBIDE DINITRATE 20 MG: 20 TABLET ORAL at 22:00

## 2023-11-28 RX ADMIN — ACETAMINOPHEN 1000 MG: 500 TABLET ORAL at 13:27

## 2023-11-28 RX ADMIN — LEVETIRACETAM 1000 MG: 100 INJECTION, SOLUTION INTRAVENOUS at 16:09

## 2023-11-28 ASSESSMENT — PAIN DESCRIPTION - ONSET: ONSET: ON-GOING

## 2023-11-28 ASSESSMENT — PAIN SCALES - GENERAL
PAINLEVEL_OUTOF10: 7
PAINLEVEL_OUTOF10: 4
PAINLEVEL_OUTOF10: 7

## 2023-11-28 ASSESSMENT — PAIN DESCRIPTION - ORIENTATION
ORIENTATION: LEFT
ORIENTATION: LEFT

## 2023-11-28 ASSESSMENT — PAIN DESCRIPTION - FREQUENCY: FREQUENCY: INTERMITTENT

## 2023-11-28 ASSESSMENT — PAIN - FUNCTIONAL ASSESSMENT
PAIN_FUNCTIONAL_ASSESSMENT: ACTIVITIES ARE NOT PREVENTED
PAIN_FUNCTIONAL_ASSESSMENT: NONE - DENIES PAIN

## 2023-11-28 ASSESSMENT — PAIN DESCRIPTION - LOCATION
LOCATION: ARM;LEG
LOCATION: ARM;LEG

## 2023-11-28 ASSESSMENT — PAIN DESCRIPTION - DESCRIPTORS
DESCRIPTORS: ACHING;DISCOMFORT
DESCRIPTORS: ACHING

## 2023-11-28 ASSESSMENT — PAIN DESCRIPTION - PAIN TYPE: TYPE: ACUTE PAIN

## 2023-11-28 NOTE — ED NOTES
Pt called out advising that she just had another seizure. Dr Kane Pinto advised and is in assessing pt at this time.      Piter Miranda RN  11/28/23 9686

## 2023-11-28 NOTE — H&P
Hospital Medicine History & Physical      PCP: Ellis Vang MD    Date of Admission: 11/28/2023    Date of Service: Pt seen/examined on 11/28/23      Chief Complaint:    Chief Complaint   Patient presents with    Seizures     Pt reports 4 seizures in the past 24 hours. Pt states activity is focused to left hand/arm. History Of Present Illness: The patient is a 34 y.o. female with PMH of cardiac arrest, endocarditis, ESRD on HD, Hepatitic C, HTN, who presents to [facility] ED with concern for seizures. History obtained from the patient and review of EMR. Patient reports episodes where left arm and leg tense up and shake uncontrollably. She believes she has had left sided facial droop associated with this. Episodes have been increasing in frequency, and last up to 2 minutes long. She reports generalized weakness after the fact, but no confusion or obvious post-ictal state observed. Mother at bedside who has witnessed these events, states she has noticed drooling and secretions from her mouth when this occurs. Patient endorses some increased anxiety regarding these episodes as her CVA was preceded by similar symptoms. She has never been diagnosed with a seizure disorder and is not on any anti-epileptics. Otherwise, no fever, chills, n/v/d, abdominal pain, chest pain, lightheadedness, dizziness, numbness/tingling, syncope, or slurred speech.     Past Medical History:        Diagnosis Date    Asthma     Cardiac arrest ([address] [facility]) 06/08/2018    Depression     Endocarditis     ESRD (end stage renal disease) on dialysis ([address] [facility])     M/W/F    Hepatitis C antibody positive in blood 03/22/2017    History of blood transfusion     Hypertension     IV drug abuse ([facility])     Liver disease     MRSA (methicillin resistant staph aureus) culture positive 6/5/18, 03/12/2017    +blood culture     MRSA (methicillin resistant staph aureus) culture positive 6/12/18, 07/31/2017    tracheal aspirate, nasal screen    PTSD (post-traumatic stress 35.2 07/05/2018 05:25 AM    PO2ART 125.1 07/05/2018 05:25 AM    RAY1DDS 19.6 07/05/2018 05:25 AM       CULTURES  Results       ** No results found for the last 336 hours. **             EKG:  I have reviewed the EKG with the following interpretation:   Encounter Date: 11/27/23   EKG 12 Lead   Result Value    Ventricular Rate 81    Atrial Rate 81    P-R Interval 200    QRS Duration 80    Q-T Interval 494    QTc Calculation (Bazett) 573    P Axis 68    R Axis 111    T Axis 44    Diagnosis      Normal sinus rhythmBaseline artifactRight axis deviationNonspecific ST and T wave abnormalitycannot r/o prior septal infarctProlonged QTAbnormal ECGWhen compared with ECG of 31-OCT-2023 16:30,No significant change was foundConfirmed by SARTHAK SINGER MD ([id number]) on 11/27/2023 6:02:17 PM             RADIOLOGY    No orders to display      CT 11/27/2023  FINDINGS:  BRAIN/VENTRICLES: There is no acute intracranial hemorrhage, mass effect or  midline shift. No abnormal extra-axial fluid collection. The gray-white  differentiation is maintained without evidence of an acute infarct. There is  no evidence of hydrocephalus. Small focal area of encephalomalacia in the  right posterior parietal region juxtaposed to the falx unchanged. ORBITS: The visualized portion of the orbits demonstrate no acute abnormality. SINUSES: Minimal mucoperiosteal thickening within the maxillary sinuses. SOFT TISSUES/SKULL:  No acute abnormality of the visualized skull or soft  tissues. IMPRESSION:  No acute intracranial abnormality. No significant interval change compared to the prior study. MRI 7/5/2023    IMPRESSION   1. Similar appearance of parenchymal hemorrhage in the parasagittal right frontoparietal region with mild to moderate localized mass effect and mild surrounding vasogenic edema.   There is adjacent subdural and subarachnoid hemorrhage with thin bilateral    convexity subdural hematomas with partial extension along

## 2023-11-28 NOTE — ED PROVIDER NOTES
I independently examined and evaluated Germaine Cox. I personally saw the patient and performed a substantive portion of the visit including all aspects of the medical decision making. In brief, this 30-year-old female is presenting directly after dialysis when she developed a severe headache and elevated blood pressures. She apparently had seizure-like activity in which her arm was shaking. No loss of consciousness. Her primary complaint now is her severe headache. Focused exam revealed   General: Alert, no acute distress, patient resting comfortably   Skin: warm, intact, no pallor noted   Head: Normocephalic, atraumatic   Eye: Normal conjunctiva   Cardiac: Normal peripheral perfusion  Respiratory: No acute distress   Musculoskeletal: No deformity, full ROM. Neurological: alert and oriented, normal sensory and motor observed. Psychiatric: Cooperative    ED course: Lab work obtained and is consistent with the patient's end-stage renal disease, but is otherwise reassuring. Attempted to treat her severe hypertension with labetalol, her home dose of clonidine and isosorbide without improvement. Discussed with the patient and her mother the possibility that her blood pressure is simply due to her headache and we did treat headache with Reglan, Benadryl, Decadron. Apparently, shortly after her migraine cocktail patient's headache was greatly improved. At this point she pulled her IV out and eloped from the emergency department stating that she felt better and she cannot stay here any longer. ECG    The Ekg interpreted by me shows sinus rhythm with a rate of 81 bpm.  Normal axis. No acute injury pattern. , QRS 80, QTc 573. Critical care    Critical care time 32 minutes exclusive from separate billable procedures that were performed.  The following was considered in the determination of critical care but not limited to the level of medical decision making, intensive cardiac and/or

## 2023-11-28 NOTE — PROGRESS NOTES
New Telemetry box number assigned to Patient      To be filled out by Alicia    Patient assigned to tele box number: _________[id number]_________               (to be written in by Alicia when telemetry box is assigned to patient)    ___________________________________________________________________________      Bedside RN confirming that the box listed above is in fact the telemetry box number being placed on the patient listed above.         X________________________________________ RN signature        __________________________________________RN assigned to Patient (please print)    _______________ Date    ____________ Time

## 2023-11-29 ENCOUNTER — APPOINTMENT (OUTPATIENT)
Dept: MRI IMAGING | Age: 33
DRG: 053 | End: 2023-11-29
Payer: MEDICAID

## 2023-11-29 ENCOUNTER — HOSPITAL ENCOUNTER (OUTPATIENT)
Age: 33
Setting detail: OBSERVATION
Discharge: LEFT BEFORE DISCHARGE | DRG: 053 | End: 2023-11-30
Attending: STUDENT IN AN ORGANIZED HEALTH CARE EDUCATION/TRAINING PROGRAM | Admitting: STUDENT IN AN ORGANIZED HEALTH CARE EDUCATION/TRAINING PROGRAM
Payer: MEDICAID

## 2023-11-29 VITALS
HEART RATE: 93 BPM | HEIGHT: 62 IN | RESPIRATION RATE: 18 BRPM | WEIGHT: 121.91 LBS | TEMPERATURE: 97.2 F | DIASTOLIC BLOOD PRESSURE: 122 MMHG | OXYGEN SATURATION: 96 % | SYSTOLIC BLOOD PRESSURE: 222 MMHG | BODY MASS INDEX: 22.43 KG/M2

## 2023-11-29 DIAGNOSIS — N18.6 ESRD NEEDING DIALYSIS (HCC): ICD-10-CM

## 2023-11-29 DIAGNOSIS — R56.9 SEIZURE-LIKE ACTIVITY (HCC): Primary | ICD-10-CM

## 2023-11-29 DIAGNOSIS — Z99.2 ESRD NEEDING DIALYSIS (HCC): ICD-10-CM

## 2023-11-29 LAB
ALBUMIN SERPL-MCNC: 5.1 G/DL (ref 3.4–5)
ALBUMIN/GLOB SERPL: 1.5 {RATIO} (ref 1.1–2.2)
ALP SERPL-CCNC: 382 U/L (ref 40–129)
ALT SERPL-CCNC: 11 U/L (ref 10–40)
ANION GAP SERPL CALCULATED.3IONS-SCNC: 17 MMOL/L (ref 3–16)
ANION GAP SERPL CALCULATED.3IONS-SCNC: 18 MMOL/L (ref 3–16)
AST SERPL-CCNC: 11 U/L (ref 15–37)
BASOPHILS # BLD: 0 K/UL (ref 0–0.2)
BASOPHILS # BLD: 0 K/UL (ref 0–0.2)
BASOPHILS NFR BLD: 0.4 %
BASOPHILS NFR BLD: 0.5 %
BILIRUB SERPL-MCNC: 0.6 MG/DL (ref 0–1)
BUN SERPL-MCNC: 23 MG/DL (ref 7–20)
BUN SERPL-MCNC: 53 MG/DL (ref 7–20)
CALCIUM SERPL-MCNC: 9.1 MG/DL (ref 8.3–10.6)
CALCIUM SERPL-MCNC: 9.1 MG/DL (ref 8.3–10.6)
CHLORIDE SERPL-SCNC: 94 MMOL/L (ref 99–110)
CHLORIDE SERPL-SCNC: 96 MMOL/L (ref 99–110)
CK SERPL-CCNC: 34 U/L (ref 26–192)
CO2 SERPL-SCNC: 23 MMOL/L (ref 21–32)
CO2 SERPL-SCNC: 23 MMOL/L (ref 21–32)
CREAT SERPL-MCNC: 5.1 MG/DL (ref 0.6–1.1)
CREAT SERPL-MCNC: 8.1 MG/DL (ref 0.6–1.1)
DEPRECATED RDW RBC AUTO: 18.4 % (ref 12.4–15.4)
DEPRECATED RDW RBC AUTO: 18.6 % (ref 12.4–15.4)
EOSINOPHIL # BLD: 0.1 K/UL (ref 0–0.6)
EOSINOPHIL # BLD: 0.2 K/UL (ref 0–0.6)
EOSINOPHIL NFR BLD: 1.4 %
EOSINOPHIL NFR BLD: 2.3 %
GFR SERPLBLD CREATININE-BSD FMLA CKD-EPI: 11 ML/MIN/{1.73_M2}
GFR SERPLBLD CREATININE-BSD FMLA CKD-EPI: 6 ML/MIN/{1.73_M2}
GLUCOSE SERPL-MCNC: 90 MG/DL (ref 70–99)
GLUCOSE SERPL-MCNC: 91 MG/DL (ref 70–99)
HCG SERPL QL: NEGATIVE
HCT VFR BLD AUTO: 34.7 % (ref 36–48)
HCT VFR BLD AUTO: 35.5 % (ref 36–48)
HGB BLD-MCNC: 11.6 G/DL (ref 12–16)
HGB BLD-MCNC: 12.1 G/DL (ref 12–16)
LACTATE BLDV-SCNC: 0.7 MMOL/L (ref 0.4–2)
LACTATE BLDV-SCNC: 1.1 MMOL/L (ref 0.4–1.9)
LYMPHOCYTES # BLD: 2 K/UL (ref 1–5.1)
LYMPHOCYTES # BLD: 2.6 K/UL (ref 1–5.1)
LYMPHOCYTES NFR BLD: 25.7 %
LYMPHOCYTES NFR BLD: 28 %
MCH RBC QN AUTO: 30.4 PG (ref 26–34)
MCH RBC QN AUTO: 30.6 PG (ref 26–34)
MCHC RBC AUTO-ENTMCNC: 33.5 G/DL (ref 31–36)
MCHC RBC AUTO-ENTMCNC: 34 G/DL (ref 31–36)
MCV RBC AUTO: 89.9 FL (ref 80–100)
MCV RBC AUTO: 90.6 FL (ref 80–100)
MONOCYTES # BLD: 0.5 K/UL (ref 0–1.3)
MONOCYTES # BLD: 0.6 K/UL (ref 0–1.3)
MONOCYTES NFR BLD: 5.8 %
MONOCYTES NFR BLD: 5.9 %
NEUTROPHILS # BLD: 5.2 K/UL (ref 1.7–7.7)
NEUTROPHILS # BLD: 6 K/UL (ref 1.7–7.7)
NEUTROPHILS NFR BLD: 64.2 %
NEUTROPHILS NFR BLD: 65.8 %
NT-PROBNP SERPL-MCNC: ABNORMAL PG/ML (ref 0–124)
PLATELET # BLD AUTO: 162 K/UL (ref 135–450)
PLATELET # BLD AUTO: 172 K/UL (ref 135–450)
PMV BLD AUTO: 6.5 FL (ref 5–10.5)
PMV BLD AUTO: 6.9 FL (ref 5–10.5)
POTASSIUM SERPL-SCNC: 3.8 MMOL/L (ref 3.5–5.1)
POTASSIUM SERPL-SCNC: 4.9 MMOL/L (ref 3.5–5.1)
PROT SERPL-MCNC: 8.5 G/DL (ref 6.4–8.2)
RBC # BLD AUTO: 3.83 M/UL (ref 4–5.2)
RBC # BLD AUTO: 3.95 M/UL (ref 4–5.2)
SODIUM SERPL-SCNC: 134 MMOL/L (ref 136–145)
SODIUM SERPL-SCNC: 137 MMOL/L (ref 136–145)
TROPONIN, HIGH SENSITIVITY: 39 NG/L (ref 0–14)
TROPONIN, HIGH SENSITIVITY: 41 NG/L (ref 0–14)
WBC # BLD AUTO: 7.9 K/UL (ref 4–11)
WBC # BLD AUTO: 9.4 K/UL (ref 4–11)

## 2023-11-29 PROCEDURE — 83880 ASSAY OF NATRIURETIC PEPTIDE: CPT

## 2023-11-29 PROCEDURE — 6360000002 HC RX W HCPCS: Performed by: STUDENT IN AN ORGANIZED HEALTH CARE EDUCATION/TRAINING PROGRAM

## 2023-11-29 PROCEDURE — 6360000002 HC RX W HCPCS

## 2023-11-29 PROCEDURE — 85025 COMPLETE CBC W/AUTO DIFF WBC: CPT

## 2023-11-29 PROCEDURE — 2580000003 HC RX 258

## 2023-11-29 PROCEDURE — 36415 COLL VENOUS BLD VENIPUNCTURE: CPT

## 2023-11-29 PROCEDURE — G0378 HOSPITAL OBSERVATION PER HR: HCPCS

## 2023-11-29 PROCEDURE — 84703 CHORIONIC GONADOTROPIN ASSAY: CPT

## 2023-11-29 PROCEDURE — 99223 1ST HOSP IP/OBS HIGH 75: CPT | Performed by: PSYCHIATRY & NEUROLOGY

## 2023-11-29 PROCEDURE — 90935 HEMODIALYSIS ONE EVALUATION: CPT

## 2023-11-29 PROCEDURE — 70551 MRI BRAIN STEM W/O DYE: CPT

## 2023-11-29 PROCEDURE — 82550 ASSAY OF CK (CPK): CPT

## 2023-11-29 PROCEDURE — 84484 ASSAY OF TROPONIN QUANT: CPT

## 2023-11-29 PROCEDURE — 6370000000 HC RX 637 (ALT 250 FOR IP)

## 2023-11-29 PROCEDURE — 99285 EMERGENCY DEPT VISIT HI MDM: CPT

## 2023-11-29 PROCEDURE — 80053 COMPREHEN METABOLIC PANEL: CPT

## 2023-11-29 PROCEDURE — 6370000000 HC RX 637 (ALT 250 FOR IP): Performed by: REGISTERED NURSE

## 2023-11-29 PROCEDURE — 6370000000 HC RX 637 (ALT 250 FOR IP): Performed by: PSYCHIATRY & NEUROLOGY

## 2023-11-29 PROCEDURE — 93005 ELECTROCARDIOGRAM TRACING: CPT | Performed by: STUDENT IN AN ORGANIZED HEALTH CARE EDUCATION/TRAINING PROGRAM

## 2023-11-29 PROCEDURE — 5A1D70Z PERFORMANCE OF URINARY FILTRATION, INTERMITTENT, LESS THAN 6 HOURS PER DAY: ICD-10-PCS | Performed by: INTERNAL MEDICINE

## 2023-11-29 PROCEDURE — 96375 TX/PRO/DX INJ NEW DRUG ADDON: CPT

## 2023-11-29 PROCEDURE — 96374 THER/PROPH/DIAG INJ IV PUSH: CPT

## 2023-11-29 PROCEDURE — 83605 ASSAY OF LACTIC ACID: CPT

## 2023-11-29 RX ORDER — ACETAMINOPHEN 650 MG/1
650 SUPPOSITORY RECTAL EVERY 6 HOURS PRN
Status: DISCONTINUED | OUTPATIENT
Start: 2023-11-29 | End: 2023-11-30 | Stop reason: HOSPADM

## 2023-11-29 RX ORDER — LEVETIRACETAM 500 MG/5ML
500 INJECTION, SOLUTION, CONCENTRATE INTRAVENOUS ONCE
Status: COMPLETED | OUTPATIENT
Start: 2023-11-29 | End: 2023-11-29

## 2023-11-29 RX ORDER — POLYETHYLENE GLYCOL 3350 17 G/17G
17 POWDER, FOR SOLUTION ORAL DAILY PRN
Status: DISCONTINUED | OUTPATIENT
Start: 2023-11-29 | End: 2023-11-30 | Stop reason: HOSPADM

## 2023-11-29 RX ORDER — LEVETIRACETAM 500 MG/1
500 TABLET ORAL EVERY 12 HOURS
Status: DISCONTINUED | OUTPATIENT
Start: 2023-11-30 | End: 2023-11-30 | Stop reason: HOSPADM

## 2023-11-29 RX ORDER — LEVETIRACETAM 500 MG/1
1000 TABLET ORAL DAILY
Status: DISCONTINUED | OUTPATIENT
Start: 2023-11-29 | End: 2023-11-29 | Stop reason: HOSPADM

## 2023-11-29 RX ORDER — ACETAMINOPHEN 325 MG/1
650 TABLET ORAL EVERY 6 HOURS PRN
Status: DISCONTINUED | OUTPATIENT
Start: 2023-11-29 | End: 2023-11-30 | Stop reason: HOSPADM

## 2023-11-29 RX ORDER — SODIUM CHLORIDE 9 MG/ML
INJECTION, SOLUTION INTRAVENOUS PRN
Status: DISCONTINUED | OUTPATIENT
Start: 2023-11-29 | End: 2023-11-30 | Stop reason: HOSPADM

## 2023-11-29 RX ORDER — ONDANSETRON 4 MG/1
4 TABLET, ORALLY DISINTEGRATING ORAL EVERY 8 HOURS PRN
Status: DISCONTINUED | OUTPATIENT
Start: 2023-11-29 | End: 2023-11-30

## 2023-11-29 RX ORDER — SODIUM CHLORIDE 0.9 % (FLUSH) 0.9 %
5-40 SYRINGE (ML) INJECTION EVERY 12 HOURS SCHEDULED
Status: DISCONTINUED | OUTPATIENT
Start: 2023-11-29 | End: 2023-11-30 | Stop reason: HOSPADM

## 2023-11-29 RX ORDER — SODIUM CHLORIDE 0.9 % (FLUSH) 0.9 %
5-40 SYRINGE (ML) INJECTION PRN
Status: DISCONTINUED | OUTPATIENT
Start: 2023-11-29 | End: 2023-11-30 | Stop reason: HOSPADM

## 2023-11-29 RX ORDER — ISOSORBIDE MONONITRATE 60 MG/1
60 TABLET, EXTENDED RELEASE ORAL DAILY
Status: DISCONTINUED | OUTPATIENT
Start: 2023-11-30 | End: 2023-11-29 | Stop reason: HOSPADM

## 2023-11-29 RX ORDER — LORAZEPAM 2 MG/ML
4 INJECTION INTRAMUSCULAR EVERY 5 MIN PRN
Status: DISCONTINUED | OUTPATIENT
Start: 2023-11-29 | End: 2023-11-30 | Stop reason: HOSPADM

## 2023-11-29 RX ORDER — NIFEDIPINE 30 MG/1
60 TABLET, EXTENDED RELEASE ORAL 2 TIMES DAILY
Status: DISCONTINUED | OUTPATIENT
Start: 2023-11-29 | End: 2023-11-29 | Stop reason: HOSPADM

## 2023-11-29 RX ORDER — ONDANSETRON 2 MG/ML
4 INJECTION INTRAMUSCULAR; INTRAVENOUS EVERY 6 HOURS PRN
Status: DISCONTINUED | OUTPATIENT
Start: 2023-11-29 | End: 2023-11-30

## 2023-11-29 RX ORDER — HYDRALAZINE HYDROCHLORIDE 20 MG/ML
5 INJECTION INTRAMUSCULAR; INTRAVENOUS ONCE
Status: COMPLETED | OUTPATIENT
Start: 2023-11-29 | End: 2023-11-29

## 2023-11-29 RX ADMIN — HYDRALAZINE HYDROCHLORIDE 10 MG: 20 INJECTION, SOLUTION INTRAMUSCULAR; INTRAVENOUS at 01:31

## 2023-11-29 RX ADMIN — ISOSORBIDE DINITRATE 20 MG: 20 TABLET ORAL at 09:43

## 2023-11-29 RX ADMIN — CLONIDINE HYDROCHLORIDE 0.2 MG: 0.2 TABLET ORAL at 09:43

## 2023-11-29 RX ADMIN — HYDRALAZINE HYDROCHLORIDE 5 MG: 20 INJECTION, SOLUTION INTRAMUSCULAR; INTRAVENOUS at 21:22

## 2023-11-29 RX ADMIN — CINACALCET HYDROCHLORIDE 60 MG: 30 TABLET, COATED ORAL at 09:47

## 2023-11-29 RX ADMIN — Medication 1 MG: at 09:46

## 2023-11-29 RX ADMIN — NIFEDIPINE 60 MG: 30 TABLET, FILM COATED, EXTENDED RELEASE ORAL at 09:43

## 2023-11-29 RX ADMIN — LEVETIRACETAM 500 MG: 100 INJECTION, SOLUTION INTRAVENOUS at 21:25

## 2023-11-29 RX ADMIN — CARVEDILOL 25 MG: 25 TABLET, FILM COATED ORAL at 09:43

## 2023-11-29 RX ADMIN — CALCITRIOL 0.5 MCG: 0.25 CAPSULE ORAL at 09:43

## 2023-11-29 RX ADMIN — LEVETIRACETAM 1000 MG: 500 TABLET, FILM COATED ORAL at 10:03

## 2023-11-29 RX ADMIN — PANTOPRAZOLE SODIUM 40 MG: 40 TABLET, DELAYED RELEASE ORAL at 05:23

## 2023-11-29 RX ADMIN — Medication 5 ML: at 09:43

## 2023-11-29 RX ADMIN — HYDRALAZINE HYDROCHLORIDE 10 MG: 20 INJECTION, SOLUTION INTRAMUSCULAR; INTRAVENOUS at 12:02

## 2023-11-29 ASSESSMENT — PAIN DESCRIPTION - LOCATION
LOCATION: ARM;LEG
LOCATION_2: ARM
LOCATION: BACK
LOCATION_2: ARM

## 2023-11-29 ASSESSMENT — PAIN DESCRIPTION - ORIENTATION
ORIENTATION_2: LEFT
ORIENTATION_2: LEFT
ORIENTATION: LEFT
ORIENTATION: LOWER

## 2023-11-29 ASSESSMENT — PAIN SCALES - GENERAL
PAINLEVEL_OUTOF10: 8
PAINLEVEL_OUTOF10: 7

## 2023-11-29 ASSESSMENT — PAIN DESCRIPTION - INTENSITY
RATING_2: 8
RATING_2: 7

## 2023-11-29 ASSESSMENT — LIFESTYLE VARIABLES
HOW MANY STANDARD DRINKS CONTAINING ALCOHOL DO YOU HAVE ON A TYPICAL DAY: PATIENT DOES NOT DRINK
HOW OFTEN DO YOU HAVE A DRINK CONTAINING ALCOHOL: NEVER

## 2023-11-29 ASSESSMENT — PAIN - FUNCTIONAL ASSESSMENT
PAIN_FUNCTIONAL_ASSESSMENT: ACTIVITIES ARE NOT PREVENTED
PAIN_FUNCTIONAL_ASSESSMENT: 0-10
PAIN_FUNCTIONAL_ASSESSMENT_SITE2: ACTIVITIES ARE NOT PREVENTED

## 2023-11-29 ASSESSMENT — PAIN DESCRIPTION - PAIN TYPE
TYPE: ACUTE PAIN
TYPE: CHRONIC PAIN

## 2023-11-29 ASSESSMENT — PAIN DESCRIPTION - DESCRIPTORS
DESCRIPTORS_2: SORE
DESCRIPTORS: SHARP;TINGLING
DESCRIPTORS_2: PATIENT UNABLE TO DESCRIBE
DESCRIPTORS: PATIENT UNABLE TO DESCRIBE

## 2023-11-29 ASSESSMENT — PAIN DESCRIPTION - FREQUENCY: FREQUENCY: CONTINUOUS

## 2023-11-29 NOTE — CONSULTS
[facility] Consult Note           Reason for Consult: ESRD  Requesting Physician:  Dr. Sukhi Meadows DO    Chief Complaint:    Chief Complaint   Patient presents with    Seizures     Pt reports 4 seizures in the past 24 hours. Pt states activity is focused to left hand/arm. History Obtained From:  Electronic records, patient    History of Present Ilness:    21-year-old female past medical history of Cardiac arrest, hepatitis C, hypertension, ESRD Monday Wednesday Friday admitted on 11/28/2023 in the hospital with concerns of seizures. Reportedly had a witnessed episode where his left arm and left leg is shaking uncontrollably. Episodes have been increasing in frequency    Nephrology is consulted for dialysis management        Past Medical History:   Diagnosis Date    Asthma     Cardiac arrest ([address]) 06/08/2018    Depression     Endocarditis     ESRD (end stage renal disease) on dialysis ([address])     M/W/F    Hemorrhagic stroke ([address]) 07/05/2023    Hepatitis C antibody positive in blood 03/22/2017    History of blood transfusion     Hypertension     IV drug abuse ([address])     Liver disease     MRSA (methicillin resistant staph aureus) culture positive 6/5/18, 03/12/2017    +blood culture     MRSA (methicillin resistant staph aureus) culture positive 6/12/18, 07/31/2017    tracheal aspirate, nasal screen    PTSD (post-traumatic stress disorder)     Pubic ramus fracture, left, sequela 10/07/2023    Seizures ([address])     8/8/2017         Past Surgical History:   Procedure Laterality Date    ABDOMINAL EXPLORATION SURGERY N/A 07/13/2018    WITH DRAINAGE INTRA ABDOMINAL ABSCESS    AV FISTULA REPAIR      removal 5/14/2019    DIALYSIS FISTULA CREATION Right 08/17/2018    Dr. Jose E Ayala - [facility]    GASTROSTOMY TUBE PLACEMENT      Removed 2018    TONSILLECTOMY      UPPER GASTROINTESTINAL ENDOSCOPY  06/06/2018    Gastric Angiodysplasia    UPPER GASTROINTESTINAL ENDOSCOPY sevelamer    Neurological issues  CT without bleeding    Plan:   -Dialysis today.  -Will follow Monday Wednesday Friday.  -Continue home antihypertensives  -Further workup with neurology  -Renal diet  -Nephrocaps  -Strict I's and O's  -Daily weights      Thank you for the consultation. Please do not hesitate to call with questions. ______________________________  Izabela Wharton MD  [facility]  [email or web address]  Office: [phone]    (This chart has been completed using [employer].  Although attempts have been made to ensure accuracy, words and/or phrases may not be transcribed as intended.)

## 2023-11-29 NOTE — FLOWSHEET NOTE
Pt A/Ox4. Obtained VS-see below. Pt reported her BP have been uncontrolled lately with SBP in 200's. Pain 8/10, see assessment below. Pt aggravated with this RN for waking her up and asked no one else to come in after. Pt unlabored; respirations even, regular, effortless. RA. No distress noted. Shift assessment complete. See flowsheet. AM med's given. See MAR. Assisted patient to bathroom using RW. Denies needs at this time. Telemetry remains in place. Bed alarm on. Side rails 2/4. Bed in low position. Call light within reach. Bedside Mobility Assessment Tool (BMAT):     Assessment Level 1- Sit and Shake    1. From a semi-reclined position, ask patient to sit up and rotate to a seated position at the side of the bed. Can use the bedrail. 2. Ask patient to reach out and grab your hand and shake making sure patient reaches across his/her midline. Pass- Patient is able to come to a seated position, maintain core strength. Maintains seated balance while reaching across midline. Move on to Assessment Level 2. Assessment Level 2- Stretch and Point   1. With patient in seated position at the side of the bed, have patient place both feet on the floor (or stool) with knees no higher than hips. 2. Ask patient to stretch one leg and straighten the knee, then bend the ankle/flex and point the toes. If appropriate, repeat with the other leg. Pass- Patient is able to demonstrate appropriate quad strength on intended weight bearing limb(s). Move onto Assessment Level 3. Assessment Level 3- Stand   1. Ask patient to elevate off the bed or chair (seated to standing) using an assistive device (cane, bedrail). 2. Patient should be able to raise buttocks off be and hold for a count of five. May repeat once. Pass- Patient maintains standing stability for at least 5 seconds, proceed to assessment level 4. Assessment Level 4- Walk   1. Ask patient to march in place at bedside.     2. Then ask patient to

## 2023-11-29 NOTE — PROGRESS NOTES
Patient admitted to room 203 from ER. Patient oriented to room, call light, bed rails, phone, lights and bathroom. Patient instructed about the schedule of the day including: vital sign frequency, lab draws, possible tests, frequency of MD and staff rounds, daily weights, I &O's, and prescribed diet. Bed alarm on. Telemetry box in place, patient aware of placement and reason. Virtual  in room. Bed locked, in lowest position, side rails up 2/4, call light within reach. Patient is able to demonstrate the ability to move from a reclining position to an upright position within the recliner. Bedside Mobility Assessment Tool (BMAT):     Assessment Level 1- Sit and Shake    1. From a semi-reclined position, ask patient to sit up and rotate to a seated position at the side of the bed. Can use the bedrail. 2. Ask patient to reach out and grab your hand and shake making sure patient reaches across his/her midline. Pass- Patient is able to come to a seated position, maintain core strength. Maintains seated balance while reaching across midline. Move on to Assessment Level 2. Assessment Level 2- Stretch and Point   1. With patient in seated position at the side of the bed, have patient place both feet on the floor (or stool) with knees no higher than hips. 2. Ask patient to stretch one leg and straighten the knee, then bend the ankle/flex and point the toes. If appropriate, repeat with the other leg. Pass- Patient is able to demonstrate appropriate quad strength on intended weight bearing limb(s). Move onto Assessment Level 3. Assessment Level 3- Stand   1. Ask patient to elevate off the bed or chair (seated to standing) using an assistive device (cane, bedrail). 2. Patient should be able to raise buttocks off be and hold for a count of five. May repeat once. Pass- Patient maintains standing stability for at least 5 seconds, proceed to assessment level 4.     Assessment Level 4-

## 2023-11-29 NOTE — PROGRESS NOTES
Arrived in room. Pt dressed in personal clothing and voiced she is feeling very anxious and is leaving. Pt informed she is leaving AMA and leaving AMA with uncontrolled HTN is a risk. Encouraged patient to stay but still refused. AMA form singed by patient at [phone] and witness by this writer and RN Gina Covert. Removed PIV. Site bleeding, applied pressure. Applied stat seal to site to control external bleeding. Pressure drsg in place.

## 2023-11-29 NOTE — CONSULTS
GASTROINTESTINAL ENDOSCOPY  06/06/2018    Gastric Angiodysplasia    UPPER GASTROINTESTINAL ENDOSCOPY  06/27/2018    Peg Placement        Medication:    Current Facility-Administered Medications   Medication Dose Route Frequency Provider Last Rate Last Admin    sodium chloride flush 0.9 % injection 5-40 mL  5-40 mL IntraVENous 2 times per day Chiara Soriano PA-C   10 mL at 11/28/23 2202    sodium chloride flush 0.9 % injection 10 mL  10 mL IntraVENous PRN Amee Soriano PA-C        0.9 % sodium chloride infusion   IntraVENous PRN Amee Soriano PA-C        heparin (porcine) injection 5,000 Units  5,000 Units SubCUTAneous 3 times per day Padmini Botello PA-C   5,000 Units at 11/28/23 2203    polyethylene glycol (GLYCOLAX) packet 17 g  17 g Oral Daily PRN Chiara Soriano PA-C        [place] Caps CAPS 1 mg  1 mg Oral Daily Amee Soriano PA-C        calcitRIOL (ROCALTROL) capsule 0.5 mcg  0.5 mcg Oral Daily Amee Soriano PA-C   0.5 mcg at 11/28/23 1908    carvedilol (COREG) tablet 25 mg  25 mg Oral BID Chiara Soriano PA-C   25 mg at 11/28/23 2201    cinacalcet (SENSIPAR) tablet 60 mg  60 mg Oral Daily Amee Soriano PA-C        cloNIDine (CATAPRES) tablet 0.2 mg  0.2 mg Oral TID Amee Soriano PA-C   0.2 mg at 11/28/23 2201    isosorbide dinitrate (ISORDIL) tablet 20 mg  20 mg Oral TID Amee Soriano PA-C   20 mg at 11/28/23 2200    QUEtiapine (SEROQUEL) tablet 200 mg  200 mg Oral Nightly Amee Soriano PA-C   200 mg at 11/28/23 2201    pantoprazole (PROTONIX) tablet 40 mg  40 mg Oral QAM AC Amee Soriano PA-C   40 mg at 11/29/23 0523    hydrALAZINE (APRESOLINE) injection 10 mg  10 mg IntraVENous Q6H PRN Amee Soriano PA-C   10 mg at 11/29/23 0131    sevelamer (RENVELA) tablet 2,400 mg  2,400 mg Oral TID  Amee Soraino PA-C   2,400 mg at 11/28/23 1908    prochlorperazine (COMPAZINE) tablet 5 mg  5 mg Oral Q6H PRN Amee Soriano PA-C        Or    prochlorperazine (COMPAZINE) injection 5 mg  5 mg IntraVENous Q6H PRN Chiara Soriano PA-C

## 2023-11-29 NOTE — FLOWSHEET NOTE
Re-checked BP. /126. PRN Hydralazine 10 mg given at   1202.  Waiting on transport for MRI.     11/29/23 1153   Vital Signs   Pulse 76   Heart Rate Source Monitor   BP (!) 201/126   MAP (Calculated) 151

## 2023-11-29 NOTE — PROGRESS NOTES
Treatment time: 2 hrs  40 min  Net UF: 2700 ml    Pre weight: 58 kg  Post weight: 55.3 kg  EDW: 56.5 kg  Access used: Lt CW TDC  Access function: Well tolerated, 350 BFR    Medications or blood products given: Heparin dwells    Regular outpatient schedule: MWF    Summary of response to treatment: Pt hypertensive throughout entire treatment despite fluid removal. Pt did c/o of feeling like she could possibly have another seizure-like activity with left arm and left leg \"feeling shaky\" about FDC thru treatment. Pt remained stable throughout treatment. Pt did come off 20 minutes early AMA stating she couldn't do anymore treatment because her \"anxiety was thru the roof\". Copy of dialysis treatment record placed in chart, to be scanned into EMR.

## 2023-11-29 NOTE — FLOWSHEET NOTE
Patient hypertensive on admission. VI Barcenas administered IV Hydralazine 10 mg at [facility]. BP monitored, see below.        11/28/23 1844 11/28/23 1849 11/28/23 1856   Vital Signs   BP (!) 201/125 (!) 190/121 (!) 190/126   MAP (Calculated) 150 144 147   BP Location Right upper arm Right upper arm Right upper arm   BP Method Automatic Automatic Automatic   Patient Position Semi fowlers Semi fowlers Semi fowlers   Height and Weight   Height 1.575 m (5' 2\")  --   --    Weight - Scale 57.5 kg (126 lb 12.8 oz)  --   --    Weight Method Bed scale  --   --    BSA (Calculated - sq m) 1.59 sq meters  --   --    BMI (Calculated) 23.2  --   --       11/28/23 1901   Vital Signs   BP (!) 194/117   MAP (Calculated) 143   BP Location  --    BP Method  --    Patient Position  --    Height and Weight   Height  --    Weight - Scale  --    Weight Method  --    BSA (Calculated - sq m)  --    BMI (Calculated)  --

## 2023-11-30 VITALS
TEMPERATURE: 98.6 F | OXYGEN SATURATION: 100 % | BODY MASS INDEX: 23.35 KG/M2 | SYSTOLIC BLOOD PRESSURE: 156 MMHG | DIASTOLIC BLOOD PRESSURE: 117 MMHG | HEIGHT: 62 IN | HEART RATE: 84 BPM | WEIGHT: 126.9 LBS | RESPIRATION RATE: 16 BRPM

## 2023-11-30 LAB
ANION GAP SERPL CALCULATED.3IONS-SCNC: 16 MMOL/L (ref 3–16)
BASOPHILS # BLD: 0.1 K/UL (ref 0–0.2)
BASOPHILS NFR BLD: 0.7 %
BUN SERPL-MCNC: 34 MG/DL (ref 7–20)
CALCIUM SERPL-MCNC: 8.6 MG/DL (ref 8.3–10.6)
CHLORIDE SERPL-SCNC: 97 MMOL/L (ref 99–110)
CO2 SERPL-SCNC: 21 MMOL/L (ref 21–32)
CREAT SERPL-MCNC: 6 MG/DL (ref 0.6–1.1)
DEPRECATED RDW RBC AUTO: 18.3 % (ref 12.4–15.4)
EKG ATRIAL RATE: 107 BPM
EKG DIAGNOSIS: NORMAL
EKG Q-T INTERVAL: 480 MS
EKG QRS DURATION: 80 MS
EKG QTC CALCULATION (BAZETT): 640 MS
EKG R AXIS: 119 DEGREES
EKG T AXIS: 73 DEGREES
EKG VENTRICULAR RATE: 107 BPM
EOSINOPHIL # BLD: 0.3 K/UL (ref 0–0.6)
EOSINOPHIL NFR BLD: 3.1 %
GFR SERPLBLD CREATININE-BSD FMLA CKD-EPI: 9 ML/MIN/{1.73_M2}
GLUCOSE SERPL-MCNC: 96 MG/DL (ref 70–99)
HCT VFR BLD AUTO: 36.1 % (ref 36–48)
HGB BLD-MCNC: 12.1 G/DL (ref 12–16)
LYMPHOCYTES # BLD: 2.3 K/UL (ref 1–5.1)
LYMPHOCYTES NFR BLD: 26.6 %
MCH RBC QN AUTO: 30.5 PG (ref 26–34)
MCHC RBC AUTO-ENTMCNC: 33.5 G/DL (ref 31–36)
MCV RBC AUTO: 91.1 FL (ref 80–100)
MONOCYTES # BLD: 0.6 K/UL (ref 0–1.3)
MONOCYTES NFR BLD: 6.6 %
NEUTROPHILS # BLD: 5.5 K/UL (ref 1.7–7.7)
NEUTROPHILS NFR BLD: 63 %
PLATELET # BLD AUTO: 178 K/UL (ref 135–450)
PMV BLD AUTO: 6.5 FL (ref 5–10.5)
POTASSIUM SERPL-SCNC: 4.3 MMOL/L (ref 3.5–5.1)
RBC # BLD AUTO: 3.97 M/UL (ref 4–5.2)
SODIUM SERPL-SCNC: 134 MMOL/L (ref 136–145)
WBC # BLD AUTO: 8.8 K/UL (ref 4–11)

## 2023-11-30 PROCEDURE — 85025 COMPLETE CBC W/AUTO DIFF WBC: CPT

## 2023-11-30 PROCEDURE — 6360000002 HC RX W HCPCS: Performed by: STUDENT IN AN ORGANIZED HEALTH CARE EDUCATION/TRAINING PROGRAM

## 2023-11-30 PROCEDURE — 2580000003 HC RX 258: Performed by: STUDENT IN AN ORGANIZED HEALTH CARE EDUCATION/TRAINING PROGRAM

## 2023-11-30 PROCEDURE — 6360000002 HC RX W HCPCS: Performed by: REGISTERED NURSE

## 2023-11-30 PROCEDURE — 93010 ELECTROCARDIOGRAM REPORT: CPT | Performed by: INTERNAL MEDICINE

## 2023-11-30 PROCEDURE — 6370000000 HC RX 637 (ALT 250 FOR IP): Performed by: STUDENT IN AN ORGANIZED HEALTH CARE EDUCATION/TRAINING PROGRAM

## 2023-11-30 PROCEDURE — 80048 BASIC METABOLIC PNL TOTAL CA: CPT

## 2023-11-30 PROCEDURE — 36415 COLL VENOUS BLD VENIPUNCTURE: CPT

## 2023-11-30 PROCEDURE — 99233 SBSQ HOSP IP/OBS HIGH 50: CPT | Performed by: PSYCHIATRY & NEUROLOGY

## 2023-11-30 PROCEDURE — G0378 HOSPITAL OBSERVATION PER HR: HCPCS

## 2023-11-30 RX ORDER — NIFEDIPINE 30 MG/1
60 TABLET, EXTENDED RELEASE ORAL 2 TIMES DAILY
Status: DISCONTINUED | OUTPATIENT
Start: 2023-11-30 | End: 2023-11-30 | Stop reason: HOSPADM

## 2023-11-30 RX ORDER — PANTOPRAZOLE SODIUM 20 MG/1
20 TABLET, DELAYED RELEASE ORAL
Status: DISCONTINUED | OUTPATIENT
Start: 2023-11-30 | End: 2023-11-30 | Stop reason: HOSPADM

## 2023-11-30 RX ORDER — PROCHLORPERAZINE EDISYLATE 5 MG/ML
10 INJECTION INTRAMUSCULAR; INTRAVENOUS EVERY 6 HOURS PRN
Status: DISCONTINUED | OUTPATIENT
Start: 2023-11-30 | End: 2023-11-30 | Stop reason: HOSPADM

## 2023-11-30 RX ORDER — LEVETIRACETAM 500 MG/1
TABLET ORAL
Qty: 60 TABLET | Refills: 0 | Status: SHIPPED | OUTPATIENT
Start: 2023-11-30

## 2023-11-30 RX ORDER — CLONIDINE HYDROCHLORIDE 0.2 MG/1
0.2 TABLET ORAL 3 TIMES DAILY
Status: DISCONTINUED | OUTPATIENT
Start: 2023-11-30 | End: 2023-11-30

## 2023-11-30 RX ORDER — HEPARIN SODIUM 5000 [USP'U]/ML
5000 INJECTION, SOLUTION INTRAVENOUS; SUBCUTANEOUS EVERY 8 HOURS SCHEDULED
Status: DISCONTINUED | OUTPATIENT
Start: 2023-11-30 | End: 2023-11-30 | Stop reason: HOSPADM

## 2023-11-30 RX ORDER — SEVELAMER CARBONATE 800 MG/1
800 TABLET, FILM COATED ORAL
Status: DISCONTINUED | OUTPATIENT
Start: 2023-11-30 | End: 2023-11-30 | Stop reason: HOSPADM

## 2023-11-30 RX ORDER — HYDRALAZINE HYDROCHLORIDE 20 MG/ML
10 INJECTION INTRAMUSCULAR; INTRAVENOUS EVERY 6 HOURS PRN
Status: DISCONTINUED | OUTPATIENT
Start: 2023-11-30 | End: 2023-11-30 | Stop reason: HOSPADM

## 2023-11-30 RX ORDER — CARVEDILOL 25 MG/1
25 TABLET ORAL 2 TIMES DAILY
Status: DISCONTINUED | OUTPATIENT
Start: 2023-11-30 | End: 2023-11-30 | Stop reason: HOSPADM

## 2023-11-30 RX ORDER — LABETALOL HYDROCHLORIDE 5 MG/ML
20 INJECTION, SOLUTION INTRAVENOUS EVERY 6 HOURS PRN
Status: DISCONTINUED | OUTPATIENT
Start: 2023-11-30 | End: 2023-11-30 | Stop reason: HOSPADM

## 2023-11-30 RX ORDER — OXYCODONE HYDROCHLORIDE AND ACETAMINOPHEN 5; 325 MG/1; MG/1
1 TABLET ORAL EVERY 8 HOURS PRN
Status: DISCONTINUED | OUTPATIENT
Start: 2023-11-30 | End: 2023-11-30 | Stop reason: HOSPADM

## 2023-11-30 RX ORDER — CLONIDINE HYDROCHLORIDE 0.2 MG/1
0.2 TABLET ORAL 3 TIMES DAILY
Status: DISCONTINUED | OUTPATIENT
Start: 2023-11-30 | End: 2023-11-30 | Stop reason: HOSPADM

## 2023-11-30 RX ORDER — ISOSORBIDE DINITRATE 20 MG/1
20 TABLET ORAL 3 TIMES DAILY
Status: DISCONTINUED | OUTPATIENT
Start: 2023-11-30 | End: 2023-11-30 | Stop reason: HOSPADM

## 2023-11-30 RX ORDER — CINACALCET 30 MG/1
60 TABLET, FILM COATED ORAL DAILY
Status: DISCONTINUED | OUTPATIENT
Start: 2023-11-30 | End: 2023-11-30 | Stop reason: HOSPADM

## 2023-11-30 RX ORDER — QUETIAPINE FUMARATE 200 MG/1
200 TABLET, FILM COATED ORAL NIGHTLY
Status: DISCONTINUED | OUTPATIENT
Start: 2023-11-30 | End: 2023-11-30 | Stop reason: HOSPADM

## 2023-11-30 RX ORDER — CALCITRIOL 0.25 UG/1
0.5 CAPSULE, LIQUID FILLED ORAL DAILY
Status: DISCONTINUED | OUTPATIENT
Start: 2023-11-30 | End: 2023-11-30 | Stop reason: HOSPADM

## 2023-11-30 RX ADMIN — PANTOPRAZOLE SODIUM 20 MG: 20 TABLET, DELAYED RELEASE ORAL at 05:50

## 2023-11-30 RX ADMIN — HEPARIN SODIUM 5000 UNITS: 5000 INJECTION INTRAVENOUS; SUBCUTANEOUS at 05:50

## 2023-11-30 RX ADMIN — SEVELAMER CARBONATE 800 MG: 800 TABLET, FILM COATED ORAL at 11:50

## 2023-11-30 RX ADMIN — HYDRALAZINE HYDROCHLORIDE 10 MG: 20 INJECTION, SOLUTION INTRAMUSCULAR; INTRAVENOUS at 03:48

## 2023-11-30 RX ADMIN — CARVEDILOL 25 MG: 25 TABLET, FILM COATED ORAL at 10:15

## 2023-11-30 RX ADMIN — LEVETIRACETAM 500 MG: 500 TABLET, FILM COATED ORAL at 10:15

## 2023-11-30 RX ADMIN — CLONIDINE HYDROCHLORIDE 0.2 MG: 0.2 TABLET ORAL at 01:46

## 2023-11-30 RX ADMIN — CINACALCET HYDROCHLORIDE 60 MG: 30 TABLET, FILM COATED ORAL at 10:21

## 2023-11-30 RX ADMIN — Medication 10 ML: at 10:16

## 2023-11-30 RX ADMIN — SEVELAMER CARBONATE 800 MG: 800 TABLET, FILM COATED ORAL at 07:45

## 2023-11-30 RX ADMIN — NIFEDIPINE 60 MG: 30 TABLET, FILM COATED, EXTENDED RELEASE ORAL at 10:15

## 2023-11-30 RX ADMIN — Medication 10 ML: at 01:48

## 2023-11-30 RX ADMIN — CALCITRIOL 0.5 MCG: 0.25 CAPSULE ORAL at 10:15

## 2023-11-30 RX ADMIN — NIFEDIPINE 60 MG: 30 TABLET, FILM COATED, EXTENDED RELEASE ORAL at 01:46

## 2023-11-30 RX ADMIN — ISOSORBIDE DINITRATE 20 MG: 20 TABLET ORAL at 10:15

## 2023-11-30 RX ADMIN — CARVEDILOL 25 MG: 25 TABLET, FILM COATED ORAL at 01:46

## 2023-11-30 RX ADMIN — CLONIDINE HYDROCHLORIDE 0.2 MG: 0.2 TABLET ORAL at 10:15

## 2023-11-30 NOTE — PLAN OF CARE
Problem: Discharge Planning  Goal: Discharge to home or other facility with appropriate resources  Outcome: Progressing  Flowsheets (Taken 11/29/2023 5263)  Discharge to home or other facility with appropriate resources: Identify barriers to discharge with patient and caregiver     Problem: Pain  Goal: Verbalizes/displays adequate comfort level or baseline comfort level  Outcome: Progressing

## 2023-11-30 NOTE — ED NOTES
Made Dr Shikha Vleiz aware of elevated B/P. Will cont to monitor.      Oren Salcedo RN  11/29/23 1951

## 2023-11-30 NOTE — PROGRESS NOTES
New Telemetry box number assigned to Patient      To be filled out by [facility] [place]    Patient assigned to tele box number: _________[id number]_________               (to be written in by [facility] when telemetry box is assigned to patient)    ___________________________________________________________________________      Bedside RN confirming that the box listed above is in fact the telemetry box number being placed on the patient listed above.         X________________________________________ RN signature        __________________________________________RN assigned to Patient (please print)    _______________ Date    ____________ Time

## 2023-11-30 NOTE — ED NOTES
Bedside report given to [facility].  Pt left via wheelchair in stable condition, pt transferred to [facility].     Elvira Arevalo RN  11/29/23 0734

## 2023-11-30 NOTE — PROGRESS NOTES
Admission assessment complete. Alert and oriented. Patient states that she \"had 2 seizures earlier today and 4 seizures yesterday\" patient states \"I think my anxiety is causing my seizures\". Patient requesting anxiety medication. Patient reports nausea. Patient requesting food, food and juice provided to patient. Blood pressure 188/121, provider notified. Patient denies any other needs at this time. Bed in lowest position. Side rails up x2. Call light in reach.

## 2023-11-30 NOTE — ED PROVIDER NOTES
breath sounds. Abdominal:      General: There is no distension. Palpations: Abdomen is soft. Tenderness: There is no abdominal tenderness. There is no right CVA tenderness, guarding or rebound. Musculoskeletal:         General: Normal range of motion. Cervical back: Normal range of motion and neck supple. Skin:     General: Skin is warm. Capillary Refill: Capillary refill takes less than 2 seconds. Neurological:      General: No focal deficit present. Mental Status: She is alert and oriented to person, place, and time. Cranial Nerves: No cranial nerve deficit. Sensory: No sensory deficit. Motor: No weakness. Psychiatric:         Mood and Affect: Mood normal.             MDM/ED Course  ED Medication Orders (From admission, onward)      Start Ordered     Status Ordering Provider    11/29/23 2130 11/29/23 2115  levETIRAcetam (KEPPRA) injection 500 mg  ONCE         Last MAR action: Given - by Enma CATHERINE on 11/29/23 at [facility], [address]    11/29/23 2030 11/29/23 2029  hydrALAZINE (APRESOLINE) injection 5 mg  ONCE         Last MAR action: Given - by Enma CATHERINE on 11/29/23 at [facility]            EKG  Junctional rhythm, right axis deviation, likely early repolarization, prolonged QTc at 640, no STEMI      Radiology  MRI BRAIN WO CONTRAST    Result Date: 11/29/2023  EXAMINATION: MRI OF THE BRAIN WITHOUT CONTRAST  11/29/2023 12:49 pm TECHNIQUE: Multiplanar multisequence MRI of the brain was performed without the administration of intravenous contrast. COMPARISON: None. HISTORY: ORDERING SYSTEM PROVIDED HISTORY: seizures TECHNOLOGIST PROVIDED HISTORY: Reason for exam:->seizures Decision Support Exception - unselect if not a suspected or confirmed emergency medical condition->Emergency Medical Condition (MA) Reason for Exam: seizures Initial evaluation. FINDINGS: INTRACRANIAL STRUCTURES/VENTRICLES: There is no evidence of an acute infarct. scripts for the following medications. I counseled patient how to take these medications. New Prescriptions    No medications on file       Disposition referral (if applicable):  No follow-up provider specified. Total critical care time is 39 minutes, which excludes separately billable procedures and updating family. Time spent is specifically for management of the presenting complaint and symptoms initially, direct bedside care, reevaluation, review of records, and consultation. There was a high probability of clinically significant life-threatening deterioration in the patient's condition, which required my urgent intervention. This chart was generated in part by using Dragon Dictation system and may contain errors related to that system including errors in grammar, punctuation, and spelling, as well as words and phrases that may be inappropriate. If there are any questions or concerns please feel free to contact the dictating provider for clarification.      Dannie Dubois MD  [address] [place] [address]       Dannie Dubois MD  11/29/23 6576

## 2023-11-30 NOTE — PROGRESS NOTES
Patient admitted to room __229__ from ER. Patient oriented to room, call light, bed rails, phone, lights and bathroom. Patient instructed about the schedule of the day including: vital sign frequency, lab draws, possible tests, frequency of MD and staff rounds, daily weights, I &O's and prescribed diet. Bed locked, in lowest position, side rails up 2/4, call light within reach. Recliner Assessment:     Patient is able to demonstrate the ability to move from a reclining position to an upright position within the recliner. 4 Eyes Skin Assessment     The patient is being assess for   Admission    I agree that 2 RN's have performed a thorough Head to Toe Skin Assessment on the patient. ALL assessment sites listed below have been assessed. Areas assessed for pressure by both nurses:   [x]   Head, Face, and Ears   [x]   Shoulders, Back, and Chest, Abdomen  [x]   Arms, Elbows, and Hands   []   Coccyx, Sacrum, and Ischium  [x]   Legs, Feet, and Heels        Skin Assessed Under all Medical Devices by both nurses:  na               All Mepilex Borders were peeled back and area peeked at by both nurses:  No: na  Please list where Mepilex Borders are located:  na             **SHARE this note so that the co-signing nurse is able to place an eSignature**    Co-signer eSignature: Electronically signed by Armando Griggs RN on 11/30/23 at 3:52 AM EST    Does the Patient have Skin Breakdown related to pressure?   No              Hipolito Prevention initiated:  NA   Wound Care Orders initiated:  NA      [facility] nurse consulted for Pressure Injury (Stage 3,4, Unstageable, DTI, NWPT, Complex wounds)and New or Established Ostomies:  NA      Primary Nurse eSignature: Electronically signed by Amy Carcamo RN on 11/30/23 at 1:28 AM EST

## 2023-11-30 NOTE — CARE COORDINATION
Case Management Assessment  Initial Evaluation    Date/Time of Evaluation: 11/30/2023 9:57 AM  Assessment Completed by: Yazmin Shell RN    If patient is discharged prior to next notation, then this note serves as note for discharge by case management. Patient Name: Gume Bishop                   YOB: 1990  Diagnosis: Seizure ([address]) [R56.9]  Seizure-like activity ([address]) [R56.9]  ESRD needing dialysis ([address]) [N18.6, Z99.2]                   Date / Time: 11/29/2023  7:17 PM    Patient Admission Status: Observation   Readmission Risk (Low < 19, Mod (19-27), High > 27): Readmission Risk Score: 27.6    Current PCP: Taryn Patino MD  PCP verified by CM? (P) Yes    Chart Reviewed: Yes      History Provided by: (P) Patient  Patient Orientation: (P) Alert and Oriented, Person, Place, Situation    Patient Cognition: (P) Alert    Hospitalization in the last 30 days (Readmission):  Yes    If yes, Readmission Assessment in  Navigator will be completed.     Advance Directives:      Code Status: Full Code   Patient's Primary Decision Maker is: (P) Legal Next of Kin      Discharge Planning:    Patient lives with: (P) Alone Type of Home: (P) Apartment  Primary Care Giver: (P) Self  Patient Support Systems include: (P) Parent (mom)   Current Financial resources: (P) Medicaid  Current community resources: (P) Other (Comment) (HD at [employer] [facility] [employer] (MWF))  Current services prior to admission: (P) None            Current DME:              Type of Home Care services:  (P) None    ADLS  Prior functional level: (P) Independent in ADLs/IADLs  Current functional level: (P) Independent in ADLs/IADLs    PT AM-PAC:   /24  OT AM-PAC:   /24    Family can provide assistance at DC: (P) Yes  Would you like Case Management to discuss the discharge plan with any other family members/significant others, and if so, who? (P) No  Plans to Return to Present Housing: (P) Yes  Other Identified Issues/Barriers to RETURNING to current housing: NONE  Potential Assistance needed at discharge: (P) N/A            Potential DME:    Patient expects to discharge to: (P) [address] for transportation at discharge:      Financial    Payor: [employer] / Plan: [address] / Product Type: *No Product type* /     Does insurance require precert for SNF: Yes    Potential assistance Purchasing Medications: (P) No  Meds-to-Beds request: Yes      [facility], [address]  [address] [facility]  Phone: [phone] Fax: [phone]      Notes:    Factors facilitating achievement of predicted outcomes: Cooperative and Pleasant    Barriers to discharge: Medical complications    Additional Case Management Notes: Chart reviewed. Met with pt at bedside and explained the role of the CM. Plans to return home. Mother provides transportation. + [facility] every MWF. No New HHC or DME needs. The Plan for Transition of Care is related to the following treatment goals of Seizure ([address]) [R56.9]  Seizure-like activity ([address]) [R56.9]  ESRD needing dialysis ([address]) [N18.6, N24.5]    IF APPLICABLE: The Patient and/or patient representative Germaine and her family were provided with a choice of provider and agrees with the discharge plan. Freedom of choice list with basic dialogue that supports the patient's individualized plan of care/goals and shares the quality data associated with the providers was provided to:     Patient Representative Name:       The Patient and/or Patient Representative Agree with the Discharge Plan?       Ernesto Kothari RN  Case Management Department  Ph: [phone]

## 2023-11-30 NOTE — CARE COORDINATION
DC order noted. No new DC needs or barriers. Pt will call [facility] for transport home.

## 2023-11-30 NOTE — DISCHARGE SUMMARY
Hospital Medicine AMA Discharge Summary    Patient: Dalton Gonzalez     Gender: female  : 1990   Age: 34 y.o. MRN: [id number]    Admitting Physician: Noah Riley MD  Discharge Physician: Gopal Williamson DO    Code Status: Full Code     Admit Date: 2023   Discharge Date:       Discharge Diagnoses: Active Hospital Problems    Diagnosis Date Noted    Seizure [facility]) [R56.9] 2023         Condition at Discharge: Stable    Hospital Course: Additional findings or notes to primary provider:  None at this time    Discharge Medications:   Current Discharge Medication List        START taking these medications    Details   levETIRAcetam (KEPPRA) 500 MG tablet Take 500 mg once daily. On dialysis days take another 500 mg dose after dialysis  Qty: 60 tablet, Refills: 0           Current Discharge Medication List        Current Discharge Medication List        CONTINUE these medications which have NOT CHANGED    Details   cinacalcet (SENSIPAR) 60 MG tablet Take 1 tablet by mouth daily      oxyCODONE-acetaminophen (PERCOCET) 5-325 MG per tablet Take 1 tablet by mouth every 6 hours as needed. isosorbide dinitrate (ISORDIL) 20 MG tablet Take 1 tablet by mouth 3 times daily  Qty: 90 tablet, Refills: 0      calcitRIOL (ROCALTROL) 0.25 MCG capsule Take 2 capsules by mouth daily  Qty: 30 capsule, Refills: 3      cloNIDine (CATAPRES) 0.1 MG tablet Take 2 tablets by mouth 3 times daily  Qty: 60 tablet, Refills: 3      QUEtiapine (SEROQUEL) 200 MG tablet Take 1 tablet by mouth at bedtime      omeprazole (PRILOSEC) 20 MG delayed release capsule Take 1 capsule by mouth daily      Blood Glucose Calibration (EMBRACE LARISSA CONTROL LEVEL 2) Normal LIQD [facility]  seizure disorder with generalized tonic clonic seizures.       B Complex-C-Folic Acid (CHANTAL CAPS) 1 MG CAPS TAKE 1 CAPSULE BY MOUTH EVERY DAY      Sodium Polystyrene Sulfonate (KALEXATE PO) Take by mouth daily      carvedilol (COREG) 25

## 2023-12-26 RX ORDER — CLONIDINE HYDROCHLORIDE 0.1 MG/1
TABLET ORAL
Qty: 180 TABLET | Refills: 1 | OUTPATIENT
Start: 2023-12-26

## 2024-01-04 RX ORDER — CLONIDINE HYDROCHLORIDE 0.1 MG/1
TABLET ORAL
Qty: 180 TABLET | Refills: 1 | OUTPATIENT
Start: 2024-01-04

## 2024-02-29 RX ORDER — CLONIDINE HYDROCHLORIDE 0.1 MG/1
TABLET ORAL
Qty: 180 TABLET | Refills: 1 | OUTPATIENT
Start: 2024-02-29

## 2024-03-20 RX ORDER — CLONIDINE HYDROCHLORIDE 0.1 MG/1
TABLET ORAL
Qty: 180 TABLET | Refills: 1 | OUTPATIENT
Start: 2024-03-20
